# Patient Record
Sex: MALE | Race: WHITE | Employment: OTHER | URBAN - METROPOLITAN AREA
[De-identification: names, ages, dates, MRNs, and addresses within clinical notes are randomized per-mention and may not be internally consistent; named-entity substitution may affect disease eponyms.]

---

## 2017-01-04 ENCOUNTER — APPOINTMENT (OUTPATIENT)
Dept: LAB | Age: 62
End: 2017-01-04
Attending: INTERNAL MEDICINE
Payer: COMMERCIAL

## 2017-01-04 ENCOUNTER — TELEPHONE (OUTPATIENT)
Dept: INTERNAL MEDICINE CLINIC | Facility: CLINIC | Age: 62
End: 2017-01-04

## 2017-01-04 DIAGNOSIS — Z12.5 SPECIAL SCREENING FOR MALIGNANT NEOPLASM OF PROSTATE: ICD-10-CM

## 2017-01-04 DIAGNOSIS — Z00.00 ANNUAL PHYSICAL EXAM: Primary | ICD-10-CM

## 2017-01-04 DIAGNOSIS — R53.83 FATIGUE, UNSPECIFIED TYPE: ICD-10-CM

## 2017-01-04 DIAGNOSIS — I48.20 CHRONIC ATRIAL FIBRILLATION (HCC): ICD-10-CM

## 2017-01-04 DIAGNOSIS — E78.00 HYPERCHOLESTEROLEMIA: ICD-10-CM

## 2017-01-04 PROCEDURE — 85610 PROTHROMBIN TIME: CPT

## 2017-01-04 PROCEDURE — 36415 COLL VENOUS BLD VENIPUNCTURE: CPT

## 2017-02-01 RX ORDER — CELECOXIB 200 MG/1
CAPSULE ORAL
Qty: 90 CAPSULE | Refills: 0 | Status: SHIPPED | OUTPATIENT
Start: 2017-02-01 | End: 2017-04-30

## 2017-02-13 ENCOUNTER — PRIOR ORIGINAL RECORDS (OUTPATIENT)
Dept: OTHER | Age: 62
End: 2017-02-13

## 2017-02-13 ENCOUNTER — LAB ENCOUNTER (OUTPATIENT)
Dept: LAB | Age: 62
End: 2017-02-13
Attending: INTERNAL MEDICINE
Payer: COMMERCIAL

## 2017-02-13 DIAGNOSIS — I48.91 ATRIAL FIBRILLATION (HCC): ICD-10-CM

## 2017-02-13 DIAGNOSIS — Z12.5 SPECIAL SCREENING FOR MALIGNANT NEOPLASM OF PROSTATE: ICD-10-CM

## 2017-02-13 DIAGNOSIS — R53.83 FATIGUE, UNSPECIFIED TYPE: ICD-10-CM

## 2017-02-13 DIAGNOSIS — E78.00 HYPERCHOLESTEROLEMIA: ICD-10-CM

## 2017-02-13 LAB
ALT SERPL-CCNC: 25 U/L (ref 17–63)
ANION GAP SERPL CALC-SCNC: 11 MMOL/L (ref 0–18)
AST SERPL-CCNC: 29 U/L (ref 15–41)
BASOPHILS # BLD: 0 K/UL (ref 0–0.2)
BASOPHILS NFR BLD: 1 %
BUN SERPL-MCNC: 21 MG/DL (ref 8–20)
BUN/CREAT SERPL: 25.9 (ref 10–20)
CALCIUM SERPL-MCNC: 9 MG/DL (ref 8.5–10.5)
CHLORIDE SERPL-SCNC: 105 MMOL/L (ref 95–110)
CHOLEST SERPL-MCNC: 146 MG/DL (ref 110–200)
CO2 SERPL-SCNC: 25 MMOL/L (ref 22–32)
CREAT SERPL-MCNC: 0.81 MG/DL (ref 0.5–1.5)
EOSINOPHIL # BLD: 0.1 K/UL (ref 0–0.7)
EOSINOPHIL NFR BLD: 2 %
ERYTHROCYTE [DISTWIDTH] IN BLOOD BY AUTOMATED COUNT: 13.4 % (ref 11–15)
GLUCOSE SERPL-MCNC: 99 MG/DL (ref 70–99)
HCT VFR BLD AUTO: 44.2 % (ref 41–52)
HDLC SERPL-MCNC: 65 MG/DL
HGB BLD-MCNC: 14.7 G/DL (ref 13.5–17.5)
INR BLD: 3.4 (ref 0.9–1.2)
LDLC SERPL CALC-MCNC: 74 MG/DL (ref 0–99)
LYMPHOCYTES # BLD: 1.1 K/UL (ref 1–4)
LYMPHOCYTES NFR BLD: 21 %
MCH RBC QN AUTO: 30.8 PG (ref 27–32)
MCHC RBC AUTO-ENTMCNC: 33.2 G/DL (ref 32–37)
MCV RBC AUTO: 92.8 FL (ref 80–100)
MONOCYTES # BLD: 0.3 K/UL (ref 0–1)
MONOCYTES NFR BLD: 6 %
NEUTROPHILS # BLD AUTO: 3.8 K/UL (ref 1.8–7.7)
NEUTROPHILS NFR BLD: 71 %
NONHDLC SERPL-MCNC: 81 MG/DL
OSMOLALITY UR CALC.SUM OF ELEC: 295 MOSM/KG (ref 275–295)
PLATELET # BLD AUTO: 193 K/UL (ref 140–400)
PMV BLD AUTO: 8.1 FL (ref 7.4–10.3)
POTASSIUM SERPL-SCNC: 4.2 MMOL/L (ref 3.3–5.1)
PROTHROMBIN TIME: 34.1 SECONDS (ref 11.8–14.5)
PSA SERPL-MCNC: 1.5 NG/ML (ref 0–4)
RBC # BLD AUTO: 4.77 M/UL (ref 4.5–5.9)
SODIUM SERPL-SCNC: 141 MMOL/L (ref 136–144)
TRIGL SERPL-MCNC: 33 MG/DL (ref 1–149)
TSH SERPL-ACNC: 1.12 UIU/ML (ref 0.34–5.6)
WBC # BLD AUTO: 5.4 K/UL (ref 4–11)

## 2017-02-13 PROCEDURE — 80048 BASIC METABOLIC PNL TOTAL CA: CPT

## 2017-02-13 PROCEDURE — 84450 TRANSFERASE (AST) (SGOT): CPT

## 2017-02-13 PROCEDURE — 80061 LIPID PANEL: CPT

## 2017-02-13 PROCEDURE — 85025 COMPLETE CBC W/AUTO DIFF WBC: CPT

## 2017-02-13 PROCEDURE — 84443 ASSAY THYROID STIM HORMONE: CPT

## 2017-02-13 PROCEDURE — 84460 ALANINE AMINO (ALT) (SGPT): CPT

## 2017-02-13 PROCEDURE — 36415 COLL VENOUS BLD VENIPUNCTURE: CPT

## 2017-02-13 PROCEDURE — 85610 PROTHROMBIN TIME: CPT

## 2017-02-20 RX ORDER — SIMVASTATIN 20 MG
TABLET ORAL
Qty: 90 TABLET | Refills: 2 | Status: SHIPPED | OUTPATIENT
Start: 2017-02-20 | End: 2017-11-17

## 2017-02-28 ENCOUNTER — HOSPITAL ENCOUNTER (OUTPATIENT)
Dept: GENERAL RADIOLOGY | Age: 62
Discharge: HOME | End: 2017-02-28
Attending: INTERNAL MEDICINE
Payer: COMMERCIAL

## 2017-02-28 ENCOUNTER — OFFICE VISIT (OUTPATIENT)
Dept: INTERNAL MEDICINE CLINIC | Facility: CLINIC | Age: 62
End: 2017-02-28

## 2017-02-28 VITALS
WEIGHT: 242 LBS | DIASTOLIC BLOOD PRESSURE: 80 MMHG | SYSTOLIC BLOOD PRESSURE: 142 MMHG | BODY MASS INDEX: 31.06 KG/M2 | HEART RATE: 68 BPM | TEMPERATURE: 98 F | HEIGHT: 74 IN

## 2017-02-28 DIAGNOSIS — Z80.0 FAMILY HX OF COLON CANCER: ICD-10-CM

## 2017-02-28 DIAGNOSIS — E78.00 HYPERCHOLESTEROLEMIA: ICD-10-CM

## 2017-02-28 DIAGNOSIS — Z00.00 ROUTINE HEALTH MAINTENANCE: ICD-10-CM

## 2017-02-28 DIAGNOSIS — I48.20 CHRONIC ATRIAL FIBRILLATION (HCC): Primary | ICD-10-CM

## 2017-02-28 DIAGNOSIS — M16.11 PRIMARY OSTEOARTHRITIS OF RIGHT HIP: ICD-10-CM

## 2017-02-28 PROCEDURE — 73502 X-RAY EXAM HIP UNI 2-3 VIEWS: CPT

## 2017-02-28 PROCEDURE — 99396 PREV VISIT EST AGE 40-64: CPT | Performed by: INTERNAL MEDICINE

## 2017-02-28 PROCEDURE — 90715 TDAP VACCINE 7 YRS/> IM: CPT | Performed by: INTERNAL MEDICINE

## 2017-02-28 PROCEDURE — 90471 IMMUNIZATION ADMIN: CPT | Performed by: INTERNAL MEDICINE

## 2017-02-28 RX ORDER — TADALAFIL 10 MG/1
10 TABLET ORAL
Qty: 30 TABLET | Refills: 3 | Status: SHIPPED | OUTPATIENT
Start: 2017-02-28 | End: 2017-05-25

## 2017-02-28 NOTE — PROGRESS NOTES
Raymundo Krabbe is a 57year old malePatient presents with:  Physical    HPI:     Raymundo Krabbe is a 57year old male who presents for a complete physical exam.   R hip has been bothering him. Had L THR (Dr. Iveth Benavidez at [facility]) in 2008.   Asking for ortho blood pressure           Past Surgical History    CABG      HIP REPLACEMENT SURGERY Left     VALVE REPAIR  2002    Comment mitral valve repair    MOLES Left 2011    Comment removed from L upper quadrant. Biopsy (pre-cancerous?  -- Patient unsure if c vaccine. Tdap today 2/28/17. 4. Hypercholesterolemia  Lipids at goal.  Cont zocor and fish oil    5. Varicose veins, stasis ulcer  Has seen vascular surgeon, Dr. Mc Mondragon, at [facility] in the past.  Wears compression stocking daily.      6. OA R hip, rotator c

## 2017-03-01 ENCOUNTER — TELEPHONE (OUTPATIENT)
Dept: INTERNAL MEDICINE CLINIC | Facility: CLINIC | Age: 62
End: 2017-03-01

## 2017-03-02 NOTE — TELEPHONE ENCOUNTER
Pt called Dr Ku's office, cannot be seen for one month, pt would like to see someone sooner  Can Dr Laurance Goodpasture recommend another physician?   Pt can be reached at [phone]  Tasked to nursing

## 2017-03-02 NOTE — TELEPHONE ENCOUNTER
LMTCB  Please give DR. Junior Fernandez # [phone] [facility]

## 2017-03-07 ENCOUNTER — APPOINTMENT (OUTPATIENT)
Dept: LAB | Age: 62
End: 2017-03-07
Attending: INTERNAL MEDICINE
Payer: COMMERCIAL

## 2017-03-07 DIAGNOSIS — I48.91 ATRIAL FIBRILLATION (HCC): ICD-10-CM

## 2017-03-07 LAB
INR BLD: 2.8 (ref 0.9–1.2)
PROTHROMBIN TIME: 29.1 SECONDS (ref 11.8–14.5)

## 2017-03-07 PROCEDURE — 85610 PROTHROMBIN TIME: CPT

## 2017-03-07 PROCEDURE — 36415 COLL VENOUS BLD VENIPUNCTURE: CPT

## 2017-04-12 ENCOUNTER — APPOINTMENT (OUTPATIENT)
Dept: LAB | Age: 62
End: 2017-04-12
Attending: INTERNAL MEDICINE
Payer: COMMERCIAL

## 2017-04-12 DIAGNOSIS — I48.91 ATRIAL FIBRILLATION (HCC): ICD-10-CM

## 2017-04-12 PROCEDURE — 85610 PROTHROMBIN TIME: CPT

## 2017-04-12 PROCEDURE — 36415 COLL VENOUS BLD VENIPUNCTURE: CPT

## 2017-05-02 RX ORDER — CELECOXIB 200 MG/1
CAPSULE ORAL
Qty: 90 CAPSULE | Refills: 3 | Status: SHIPPED | OUTPATIENT
Start: 2017-05-02 | End: 2018-03-07

## 2017-05-16 ENCOUNTER — APPOINTMENT (OUTPATIENT)
Dept: LAB | Age: 62
End: 2017-05-16
Attending: INTERNAL MEDICINE
Payer: COMMERCIAL

## 2017-05-16 DIAGNOSIS — I48.91 ATRIAL FIBRILLATION (HCC): ICD-10-CM

## 2017-05-16 PROCEDURE — 36415 COLL VENOUS BLD VENIPUNCTURE: CPT

## 2017-05-16 PROCEDURE — 85610 PROTHROMBIN TIME: CPT

## 2017-05-18 ENCOUNTER — PRIOR ORIGINAL RECORDS (OUTPATIENT)
Dept: OTHER | Age: 62
End: 2017-05-18

## 2017-05-23 RX ORDER — TADALAFIL 10 MG/1
10 TABLET ORAL
Qty: 30 TABLET | Refills: 3 | Status: CANCELLED | OUTPATIENT
Start: 2017-05-23

## 2017-05-23 NOTE — TELEPHONE ENCOUNTER
To MD:  The above refill request is for a controlled substance. Please indicate yes or no to refill 30 days supply plus one refill. If more refills are appropriate, please indicate quantity  Please advise on pending refills - to DR. Rey Hendrix

## 2017-05-23 NOTE — TELEPHONE ENCOUNTER
Patient has no refills left on Tramadol - thought this was going to be renewed at last appt &  With Dr Ramon Bolanos pt has been taking 2- 10 mg tablets of Cialis - would like Rx for 20 mg of Cialis  Please send both to [facility]

## 2017-05-25 RX ORDER — TADALAFIL 20 MG/1
20 TABLET ORAL
Qty: 30 TABLET | Refills: 3 | Status: SHIPPED | OUTPATIENT
Start: 2017-05-25 | End: 2018-03-07

## 2017-06-16 ENCOUNTER — LAB ENCOUNTER (OUTPATIENT)
Dept: LAB | Age: 62
End: 2017-06-16
Payer: COMMERCIAL

## 2017-06-16 DIAGNOSIS — Z92.29 HISTORY OF COUMADIN THERAPY: Primary | ICD-10-CM

## 2017-06-16 PROCEDURE — 36415 COLL VENOUS BLD VENIPUNCTURE: CPT

## 2017-06-16 PROCEDURE — 85610 PROTHROMBIN TIME: CPT

## 2017-07-14 ENCOUNTER — APPOINTMENT (OUTPATIENT)
Dept: LAB | Age: 62
End: 2017-07-14
Attending: INTERNAL MEDICINE
Payer: COMMERCIAL

## 2017-07-14 DIAGNOSIS — I48.91 ATRIAL FIBRILLATION (HCC): ICD-10-CM

## 2017-07-14 LAB
INR BLD: 2.6 (ref 0.9–1.2)
PROTHROMBIN TIME: 27.1 SECONDS (ref 11.8–14.5)

## 2017-07-14 PROCEDURE — 85610 PROTHROMBIN TIME: CPT

## 2017-07-14 PROCEDURE — 36415 COLL VENOUS BLD VENIPUNCTURE: CPT

## 2017-07-25 ENCOUNTER — OFFICE VISIT (OUTPATIENT)
Dept: INTERNAL MEDICINE CLINIC | Facility: CLINIC | Age: 62
End: 2017-07-25

## 2017-07-25 VITALS
RESPIRATION RATE: 16 BRPM | TEMPERATURE: 98 F | OXYGEN SATURATION: 97 % | HEART RATE: 86 BPM | SYSTOLIC BLOOD PRESSURE: 128 MMHG | BODY MASS INDEX: 31.28 KG/M2 | DIASTOLIC BLOOD PRESSURE: 72 MMHG | WEIGHT: 236 LBS | HEIGHT: 73 IN

## 2017-07-25 DIAGNOSIS — R21 RASH: Primary | ICD-10-CM

## 2017-07-25 PROCEDURE — 99213 OFFICE O/P EST LOW 20 MIN: CPT | Performed by: INTERNAL MEDICINE

## 2017-07-25 PROCEDURE — 99212 OFFICE O/P EST SF 10 MIN: CPT | Performed by: INTERNAL MEDICINE

## 2017-07-25 RX ORDER — AMOXICILLIN 500 MG/1
2000 CAPSULE ORAL AS NEEDED
COMMUNITY
Start: 2017-06-28 | End: 2020-11-12 | Stop reason: ALTCHOICE

## 2017-07-25 RX ORDER — SCOLOPAMINE TRANSDERMAL SYSTEM 1 MG/1
1 PATCH, EXTENDED RELEASE TRANSDERMAL
Qty: 2 PATCH | Refills: 3 | Status: SHIPPED | OUTPATIENT
Start: 2017-07-25 | End: 2019-03-08

## 2017-07-25 RX ORDER — TADALAFIL 10 MG
10 TABLET ORAL AS NEEDED
COMMUNITY
Start: 2017-05-11 | End: 2017-07-25

## 2017-07-25 RX ORDER — METHYLPREDNISOLONE 4 MG/1
TABLET ORAL
Qty: 1 KIT | Refills: 0 | Status: SHIPPED | OUTPATIENT
Start: 2017-07-25 | End: 2017-08-09

## 2017-07-25 RX ORDER — SIMVASTATIN 20 MG
20 TABLET ORAL DAILY
COMMUNITY
Start: 2017-02-20 | End: 2017-07-25

## 2017-07-25 NOTE — PROGRESS NOTES
Axel Marquez is a 57year old male. Patient presents with:  Rash: Red, \"a little itchy\" rash on right LE. First noticed on Friday, progressively spreading on the right leg and has started spreading to the left leg.      HPI:     One week of slightly itch of wine: 15 per week       REVIEW OF SYSTEMS:   GENERAL HEALTH: feels well otherwise  RESPIRATORY: no SOB  CARDIOVASCULAR: no chest pain/pressure  GI: no nausea, vomiting, diarrhea    Wt Readings from Last 5 Encounters:  07/25/17 : 236 lb (107 kg)  02/28/1

## 2017-08-09 ENCOUNTER — OFFICE VISIT (OUTPATIENT)
Dept: DERMATOLOGY CLINIC | Facility: CLINIC | Age: 62
End: 2017-08-09

## 2017-08-09 DIAGNOSIS — L30.9 DERMATITIS: Primary | ICD-10-CM

## 2017-08-09 PROCEDURE — 99202 OFFICE O/P NEW SF 15 MIN: CPT | Performed by: DERMATOLOGY

## 2017-08-09 PROCEDURE — 99212 OFFICE O/P EST SF 10 MIN: CPT | Performed by: DERMATOLOGY

## 2017-08-09 RX ORDER — METHYLPREDNISOLONE 4 MG/1
TABLET ORAL
Qty: 1 KIT | Refills: 0 | Status: SHIPPED | OUTPATIENT
Start: 2017-08-09 | End: 2018-03-07 | Stop reason: ALTCHOICE

## 2017-08-09 RX ORDER — CLOBETASOL PROPIONATE 0.5 MG/G
1 CREAM TOPICAL 2 TIMES DAILY
Qty: 60 G | Refills: 1 | Status: SHIPPED | OUTPATIENT
Start: 2017-08-09 | End: 2018-08-09

## 2017-08-20 NOTE — PROGRESS NOTES
Reji Gupta is a 57year old male. Patient presents with:  Rash: established pt. presents with a rash to arms and legs that started 3-4 weeks. red, bumpy and itchy. pt was RXed medrol dosepak with great results but now it's back.              Emili Encinas History:  Smoking status: Never Smoker                                                              Alcohol use: Yes           9.0 oz/week     Glasses of wine: 15 per week                  Current Outpatient Prescriptions:  methylPREDNISolone (MEDROL) 4 MG Surgical History:  No date: CABG  10/11: COLONOSCOPY  11/16/2016: COLONOSCOPY N/A      Comment: Procedure: COLONOSCOPY;  Surgeon: Good Branch MD;  Location: [facility] ENDOSCOPY  No date: HERNIA SURGERY  : HIP REPLACEMENT SURGERY Left  0 complaints. Physical examination:  Well-developed well-nourished patient alert oriented in no acute distress.       Exam performed, including scalp, head, neck, face,nails, hair, external eyes, including conjunctival mucosa, eyelids, oral mucosa, exter Referral Orders:  No orders of the defined types were placed in this encounter. 8/20/2017  Juan Rizo      The patient indicates understanding of these issues and agrees to the plan.   The patient is asked to return as noted in follow-up/ abo

## 2017-08-24 ENCOUNTER — APPOINTMENT (OUTPATIENT)
Dept: LAB | Age: 62
End: 2017-08-24
Attending: INTERNAL MEDICINE
Payer: COMMERCIAL

## 2017-08-24 DIAGNOSIS — I48.91 ATRIAL FIBRILLATION (HCC): ICD-10-CM

## 2017-08-24 LAB
INR BLD: 2.8 (ref 0.9–1.2)
PROTHROMBIN TIME: 29.2 SECONDS (ref 11.8–14.5)

## 2017-08-24 PROCEDURE — 36415 COLL VENOUS BLD VENIPUNCTURE: CPT

## 2017-08-24 PROCEDURE — 85610 PROTHROMBIN TIME: CPT

## 2017-09-11 LAB
ALT (SGPT): 25 U/L
AST (SGOT): 29 U/L
BUN: 21 MG/DL
CALCIUM: 9 MG/DL
CHLORIDE: 105 MEQ/L
CHOLESTEROL, TOTAL: 146 MG/DL
CREATININE, SERUM: 0.81 MG/DL
GLUCOSE: 99 MG/DL
GLUCOSE: 99 MG/DL
HDL CHOLESTEROL: 65 MG/DL
HEMATOCRIT: 44.2 %
HEMOGLOBIN: 14.7 G/DL
LDL CHOLESTEROL: 74 MG/DL
MCH: 30.8 PG
MCHC: 33.2 G/DL
MCV: 92.8 FL
NON-HDL CHOLESTEROL: 81 MG/DL
PLATELETS: 193 K/UL
POTASSIUM, SERUM: 4.2 MEQ/L
RED BLOOD COUNT: 4.77 X 10-6/U
SGOT (AST): 29 IU/L
SGPT (ALT): 25 IU/L
SODIUM: 141 MEQ/L
TRIGLYCERIDES: 33 MG/DL
WHITE BLOOD COUNT: 5.4 X 10-3/U

## 2017-09-12 ENCOUNTER — MYAURORA ACCOUNT LINK (OUTPATIENT)
Dept: OTHER | Age: 62
End: 2017-09-12

## 2017-09-12 ENCOUNTER — PRIOR ORIGINAL RECORDS (OUTPATIENT)
Dept: OTHER | Age: 62
End: 2017-09-12

## 2017-10-14 ENCOUNTER — APPOINTMENT (OUTPATIENT)
Dept: LAB | Age: 62
End: 2017-10-14
Attending: INTERNAL MEDICINE
Payer: COMMERCIAL

## 2017-10-14 DIAGNOSIS — I48.91 ATRIAL FIBRILLATION (HCC): ICD-10-CM

## 2017-10-14 PROCEDURE — 36415 COLL VENOUS BLD VENIPUNCTURE: CPT

## 2017-10-14 PROCEDURE — 85610 PROTHROMBIN TIME: CPT

## 2017-11-16 ENCOUNTER — APPOINTMENT (OUTPATIENT)
Dept: LAB | Age: 62
End: 2017-11-16
Attending: INTERNAL MEDICINE
Payer: COMMERCIAL

## 2017-11-16 DIAGNOSIS — I48.91 ATRIAL FIBRILLATION (HCC): ICD-10-CM

## 2017-11-16 PROCEDURE — 36415 COLL VENOUS BLD VENIPUNCTURE: CPT

## 2017-11-16 PROCEDURE — 85610 PROTHROMBIN TIME: CPT

## 2017-11-17 RX ORDER — SIMVASTATIN 20 MG
TABLET ORAL
Qty: 90 TABLET | Refills: 1 | Status: SHIPPED | OUTPATIENT
Start: 2017-11-17 | End: 2018-03-07

## 2017-12-20 ENCOUNTER — APPOINTMENT (OUTPATIENT)
Dept: LAB | Age: 62
End: 2017-12-20
Attending: INTERNAL MEDICINE
Payer: COMMERCIAL

## 2017-12-20 DIAGNOSIS — Z92.29 HISTORY OF COUMADIN THERAPY: ICD-10-CM

## 2017-12-20 PROCEDURE — 85610 PROTHROMBIN TIME: CPT

## 2017-12-20 PROCEDURE — 36415 COLL VENOUS BLD VENIPUNCTURE: CPT

## 2018-01-23 ENCOUNTER — APPOINTMENT (OUTPATIENT)
Dept: LAB | Age: 63
End: 2018-01-23
Attending: ORTHOPAEDIC SURGERY
Payer: COMMERCIAL

## 2018-01-23 DIAGNOSIS — Z92.29 HISTORY OF COUMADIN THERAPY: ICD-10-CM

## 2018-01-23 LAB
INR BLD: 2.1 (ref 0.9–1.2)
PROTHROMBIN TIME: 23.1 SECONDS (ref 11.8–14.5)

## 2018-01-23 PROCEDURE — 85610 PROTHROMBIN TIME: CPT

## 2018-01-23 PROCEDURE — 36415 COLL VENOUS BLD VENIPUNCTURE: CPT

## 2018-02-06 ENCOUNTER — OFFICE VISIT (OUTPATIENT)
Dept: WOUND CARE | Facility: HOSPITAL | Age: 63
End: 2018-02-06
Attending: NURSE PRACTITIONER
Payer: COMMERCIAL

## 2018-02-06 DIAGNOSIS — L97.329 NON-PRESSURE CHRONIC ULCER OF LEFT ANKLE (HCC): ICD-10-CM

## 2018-02-06 DIAGNOSIS — L97.329 VARICOSE VEINS OF LEFT LOWER EXTREMITY WITH ULCER OF ANKLE (HCC): Primary | ICD-10-CM

## 2018-02-06 DIAGNOSIS — I83.023 VARICOSE VEINS OF LEFT LOWER EXTREMITY WITH ULCER OF ANKLE (HCC): Primary | ICD-10-CM

## 2018-02-06 PROCEDURE — 97162 PT EVAL MOD COMPLEX 30 MIN: CPT

## 2018-02-06 PROCEDURE — 29581 APPL MULTLAYER CMPRN SYS LEG: CPT

## 2018-02-06 NOTE — PROGRESS NOTES
Subjective    Chief Complaint  This information was obtained from the patient  The patient is new to the [address] [facility],[address] here for an initial visit for the evaluation and management of non-healing wound(s).     Allergies  adhesive tape (Reaction: rash)    HPI Osteoarthrosis, unspecified whether generalized or localized, unspecified site  History of vein stripping  UGI bleed  Unspecified essential hypertension  Atrial fibrillation  High cholesterol  Venous insufficiency    Surgical History  This information was Wound #3 Left, Medial Lower Leg is a Partial Thickness Venous Ulcer and has received a status of Not Healed. Initial wound encounter measurements are 0.7cm length x 0.6cm width x 0.1cm depth, with an area of 0.42 sq cm and a volume of 0.042 cubic cm.  No tu Pt presents with a history of venous insufficiency with chronic LE edema. Pt has a L medial ankle ulcer above the ankle region partial thickness with minimal drainage.  Pt has been wearing 30-40 mmHg compression stocking for work but not all the time and he Cleansed wound and periwound with non-cytotoxic agent. using Wound Cleanser Spray (1)  Applied Primary Wound Dressing.  using Hydrofera ready 2x2 (1), Xeroform 2x2 (1)  Compression wrapping  Limb cleansed using Soap and water (1)  Moisturizing lotion applie

## 2018-02-13 ENCOUNTER — OFFICE VISIT (OUTPATIENT)
Dept: WOUND CARE | Facility: HOSPITAL | Age: 63
End: 2018-02-13
Attending: NURSE PRACTITIONER
Payer: COMMERCIAL

## 2018-02-13 DIAGNOSIS — L97.329 VARICOSE VEINS OF LEFT LOWER EXTREMITY WITH ULCER OF ANKLE (HCC): Primary | ICD-10-CM

## 2018-02-13 DIAGNOSIS — L97.329 NON-PRESSURE CHRONIC ULCER OF LEFT ANKLE (HCC): ICD-10-CM

## 2018-02-13 DIAGNOSIS — I83.023 VARICOSE VEINS OF LEFT LOWER EXTREMITY WITH ULCER OF ANKLE (HCC): Primary | ICD-10-CM

## 2018-02-13 PROCEDURE — 29581 APPL MULTLAYER CMPRN SYS LEG: CPT

## 2018-02-13 NOTE — PROGRESS NOTES
Subjective    Chief Complaint  This information was obtained from the patient  The patient is new to the [address] [facility],[address] here for an initial visit for the evaluation and management of non-healing wound(s).   2/13/18 no complaints for today    Allergies  Arnel Torrey Wound #3 Left, Medial Lower Leg is a Partial Thickness Venous Ulcer and has received a status of Not Healed. There is a small amount of serous drainage noted which has no odor. The patient reports a wound pain of level 1/10.  The wound margin is flat and in Compression used: using Artiflex 10 cm (1), Artiflex 15 cm (1), Comprilan 08 cm (1), Comprilan 10 cm (2), Comprilan 12 cm (1), Komprex kidney - small (curved side away from skin) (1)  Time spent (units are in minutes) using Time (30)  Written PT Goals - . Hu Clemensless

## 2018-02-19 ENCOUNTER — APPOINTMENT (OUTPATIENT)
Dept: WOUND CARE | Facility: HOSPITAL | Age: 63
End: 2018-02-19
Attending: NURSE PRACTITIONER
Payer: COMMERCIAL

## 2018-02-19 DIAGNOSIS — L97.329 VARICOSE VEINS OF LEFT LOWER EXTREMITY WITH ULCER OF ANKLE (HCC): Primary | ICD-10-CM

## 2018-02-19 DIAGNOSIS — I83.023 VARICOSE VEINS OF LEFT LOWER EXTREMITY WITH ULCER OF ANKLE (HCC): Primary | ICD-10-CM

## 2018-02-19 PROCEDURE — 29581 APPL MULTLAYER CMPRN SYS LEG: CPT

## 2018-02-19 NOTE — PROGRESS NOTES
Subjective    Chief Complaint  This information was obtained from the patient  The patient is new to the [address] [facility],[address] here for an initial visit for the evaluation and management of non-healing wound(s).  02/19/2018:  No concerns for today    Allergies  adhe Wound #3 Left, Medial Lower Leg is a Partial Thickness Venous Ulcer and has received an outcome of Resolved. Initial wound encounter measurements are 0cm length x 0cm width x 0cm depth, with an area of 0 sq cm and a volume of 0 cubic cm.  There is a scant a Limb cleansed using Soap and water (1)  Cleansed wound and periwound with non-cytotoxic agent. using Wound Cleanser Spray (1)  Applied Primary Wound Dressing. using Vaseline Gauze (1)  Applied Secondary Wound Dressing.  using Gauze (sterile) 4x4 (3)  Edwina Mckinnon

## 2018-02-26 ENCOUNTER — LAB ENCOUNTER (OUTPATIENT)
Dept: LAB | Age: 63
End: 2018-02-26
Attending: INTERNAL MEDICINE
Payer: COMMERCIAL

## 2018-02-26 DIAGNOSIS — Z79.01 LONG TERM (CURRENT) USE OF ANTICOAGULANTS: ICD-10-CM

## 2018-02-26 DIAGNOSIS — I48.91 A-FIB (HCC): Primary | ICD-10-CM

## 2018-02-26 LAB
INR BLD: 4.5 (ref 0.9–1.2)
PROTHROMBIN TIME: 41.9 SECONDS (ref 11.8–14.5)

## 2018-02-26 PROCEDURE — 36415 COLL VENOUS BLD VENIPUNCTURE: CPT

## 2018-02-26 PROCEDURE — 85610 PROTHROMBIN TIME: CPT

## 2018-02-27 ENCOUNTER — OFFICE VISIT (OUTPATIENT)
Dept: WOUND CARE | Facility: HOSPITAL | Age: 63
End: 2018-02-27
Attending: NURSE PRACTITIONER
Payer: COMMERCIAL

## 2018-02-27 DIAGNOSIS — L97.329 NON-PRESSURE CHRONIC ULCER OF LEFT ANKLE (HCC): ICD-10-CM

## 2018-02-27 DIAGNOSIS — L97.329 VARICOSE VEINS OF LEFT LOWER EXTREMITY WITH ULCER OF ANKLE (HCC): Primary | ICD-10-CM

## 2018-02-27 DIAGNOSIS — I83.023 VARICOSE VEINS OF LEFT LOWER EXTREMITY WITH ULCER OF ANKLE (HCC): Primary | ICD-10-CM

## 2018-02-27 PROCEDURE — 99211 OFF/OP EST MAY X REQ PHY/QHP: CPT

## 2018-02-27 NOTE — PROGRESS NOTES
Subjective    Chief Complaint  This information was obtained from the patient  The patient is new to the [address] [facility],[address] here for an initial visit for the evaluation and management of non-healing wound(s). 02/27/2018:  No concerns for today    Allergies  adhe resolved and pt education was performed to ensure optimal skin care with daily moisturizer prior to bed time. Pt recommended to wear 20-30 mmHg compression stockings from toes to below knee to manage edema and prevent wounds.  Pt has been wearing his compre

## 2018-03-06 ENCOUNTER — APPOINTMENT (OUTPATIENT)
Dept: WOUND CARE | Facility: HOSPITAL | Age: 63
End: 2018-03-06
Attending: NURSE PRACTITIONER
Payer: COMMERCIAL

## 2018-03-07 ENCOUNTER — OFFICE VISIT (OUTPATIENT)
Dept: INTERNAL MEDICINE CLINIC | Facility: CLINIC | Age: 63
End: 2018-03-07

## 2018-03-07 VITALS
HEART RATE: 66 BPM | RESPIRATION RATE: 18 BRPM | TEMPERATURE: 98 F | HEIGHT: 74 IN | DIASTOLIC BLOOD PRESSURE: 84 MMHG | SYSTOLIC BLOOD PRESSURE: 132 MMHG | WEIGHT: 238 LBS | OXYGEN SATURATION: 98 % | BODY MASS INDEX: 30.54 KG/M2

## 2018-03-07 DIAGNOSIS — R53.83 OTHER FATIGUE: ICD-10-CM

## 2018-03-07 DIAGNOSIS — Z11.59 NEED FOR HEPATITIS C SCREENING TEST: ICD-10-CM

## 2018-03-07 DIAGNOSIS — Z00.00 ROUTINE HEALTH MAINTENANCE: ICD-10-CM

## 2018-03-07 DIAGNOSIS — I48.20 CHRONIC ATRIAL FIBRILLATION (HCC): Primary | ICD-10-CM

## 2018-03-07 DIAGNOSIS — Z80.0 FAMILY HX OF COLON CANCER: ICD-10-CM

## 2018-03-07 DIAGNOSIS — M16.11 PRIMARY OSTEOARTHRITIS OF RIGHT HIP: ICD-10-CM

## 2018-03-07 DIAGNOSIS — Z12.5 SPECIAL SCREENING FOR MALIGNANT NEOPLASM OF PROSTATE: ICD-10-CM

## 2018-03-07 DIAGNOSIS — E78.00 HYPERCHOLESTEROLEMIA: ICD-10-CM

## 2018-03-07 DIAGNOSIS — R29.898 WEAKNESS OF BOTH LOWER EXTREMITIES: ICD-10-CM

## 2018-03-07 PROCEDURE — 99396 PREV VISIT EST AGE 40-64: CPT | Performed by: INTERNAL MEDICINE

## 2018-03-07 RX ORDER — SIMVASTATIN 20 MG
TABLET ORAL
Qty: 90 TABLET | Refills: 3 | Status: ON HOLD | OUTPATIENT
Start: 2018-03-07 | End: 2019-05-14

## 2018-03-07 RX ORDER — CELECOXIB 200 MG/1
CAPSULE ORAL
Qty: 90 CAPSULE | Refills: 3 | Status: SHIPPED | OUTPATIENT
Start: 2018-03-07 | End: 2019-03-17

## 2018-03-07 RX ORDER — WARFARIN SODIUM 2.5 MG/1
TABLET ORAL
Qty: 90 TABLET | Refills: 3 | Status: SHIPPED | OUTPATIENT
Start: 2018-03-07 | End: 2019-03-17

## 2018-03-07 RX ORDER — TADALAFIL 20 MG/1
20 TABLET ORAL
Qty: 30 TABLET | Refills: 3 | Status: SHIPPED | OUTPATIENT
Start: 2018-03-07 | End: 2019-03-26

## 2018-03-07 NOTE — PROGRESS NOTES
Pilar York is a 60year old malePatient presents with:  Physical: Annual Px    HPI:     Pilar York is a 60year old male who presents for a complete physical exam.   Notes bilateral distal lower leg stiffness and weakness, don when he first gets up • Atrial fibrillation [facility])     ablation (2004); CV (May 2009)   • High blood pressure    • High cholesterol    • History of vein stripping    • Osteoarthrosis, unspecified whether generalized or localized, unspecified site    • Sleep apnea    • UGI blee distress  HEENT: normal oropharynx, normal TM's  EYES: PERRLA, EOMI, conjunctivae pink  NECK: supple, no cervical or supraclavicular lymphadenopathy, no carotid bruits  LUNGS: clear to auscultation  CARDIO: RRR, normal S1S2, no gallops or murmurs  GI: soft

## 2018-03-12 ENCOUNTER — APPOINTMENT (OUTPATIENT)
Dept: LAB | Age: 63
End: 2018-03-12
Attending: INTERNAL MEDICINE
Payer: COMMERCIAL

## 2018-03-12 DIAGNOSIS — I48.91 A-FIB (HCC): ICD-10-CM

## 2018-03-12 DIAGNOSIS — Z79.01 LONG TERM (CURRENT) USE OF ANTICOAGULANTS: ICD-10-CM

## 2018-03-12 LAB
INR BLD: 2.5 (ref 0.9–1.2)
PROTHROMBIN TIME: 26.3 SECONDS (ref 11.8–14.5)

## 2018-03-12 PROCEDURE — 85610 PROTHROMBIN TIME: CPT

## 2018-03-12 PROCEDURE — 36415 COLL VENOUS BLD VENIPUNCTURE: CPT

## 2018-03-20 ENCOUNTER — APPOINTMENT (OUTPATIENT)
Dept: WOUND CARE | Facility: HOSPITAL | Age: 63
End: 2018-03-20
Attending: NURSE PRACTITIONER
Payer: COMMERCIAL

## 2018-03-24 ENCOUNTER — APPOINTMENT (OUTPATIENT)
Dept: LAB | Age: 63
End: 2018-03-24
Attending: INTERNAL MEDICINE
Payer: COMMERCIAL

## 2018-03-24 ENCOUNTER — PRIOR ORIGINAL RECORDS (OUTPATIENT)
Dept: OTHER | Age: 63
End: 2018-03-24

## 2018-03-24 DIAGNOSIS — Z12.5 SPECIAL SCREENING FOR MALIGNANT NEOPLASM OF PROSTATE: ICD-10-CM

## 2018-03-24 DIAGNOSIS — R29.898 WEAKNESS OF BOTH LOWER EXTREMITIES: ICD-10-CM

## 2018-03-24 DIAGNOSIS — Z00.00 ROUTINE HEALTH MAINTENANCE: ICD-10-CM

## 2018-03-24 DIAGNOSIS — E78.00 HYPERCHOLESTEROLEMIA: ICD-10-CM

## 2018-03-24 DIAGNOSIS — Z11.59 NEED FOR HEPATITIS C SCREENING TEST: ICD-10-CM

## 2018-03-24 LAB
ALBUMIN SERPL BCP-MCNC: 4.2 G/DL (ref 3.5–4.8)
ALBUMIN/GLOB SERPL: 1.5 {RATIO} (ref 1–2)
ALP SERPL-CCNC: 55 U/L (ref 32–100)
ALT SERPL-CCNC: 24 U/L (ref 17–63)
ANION GAP SERPL CALC-SCNC: 9 MMOL/L (ref 0–18)
AST SERPL-CCNC: 25 U/L (ref 15–41)
BILIRUB SERPL-MCNC: 0.8 MG/DL (ref 0.3–1.2)
BUN SERPL-MCNC: 17 MG/DL (ref 8–20)
BUN/CREAT SERPL: 23 (ref 10–20)
CALCIUM SERPL-MCNC: 8.8 MG/DL (ref 8.5–10.5)
CHLORIDE SERPL-SCNC: 103 MMOL/L (ref 95–110)
CHOLEST SERPL-MCNC: 144 MG/DL (ref 110–200)
CO2 SERPL-SCNC: 25 MMOL/L (ref 22–32)
CREAT SERPL-MCNC: 0.74 MG/DL (ref 0.5–1.5)
GLOBULIN PLAS-MCNC: 2.8 G/DL (ref 2.5–3.7)
GLUCOSE SERPL-MCNC: 108 MG/DL (ref 70–99)
HDLC SERPL-MCNC: 59 MG/DL
LDLC SERPL CALC-MCNC: 78 MG/DL (ref 0–99)
NONHDLC SERPL-MCNC: 85 MG/DL
OSMOLALITY UR CALC.SUM OF ELEC: 286 MOSM/KG (ref 275–295)
PATIENT FASTING: YES
POTASSIUM SERPL-SCNC: 3.9 MMOL/L (ref 3.3–5.1)
PROT SERPL-MCNC: 7 G/DL (ref 5.9–8.4)
PSA SERPL-MCNC: 1.3 NG/ML (ref 0–4)
SODIUM SERPL-SCNC: 137 MMOL/L (ref 136–144)
TRIGL SERPL-MCNC: 33 MG/DL (ref 1–149)
TSH SERPL-ACNC: 0.54 UIU/ML (ref 0.45–5.33)
VIT B12 SERPL-MCNC: 1245 PG/ML (ref 181–914)

## 2018-03-24 PROCEDURE — 82607 VITAMIN B-12: CPT

## 2018-03-24 PROCEDURE — 36415 COLL VENOUS BLD VENIPUNCTURE: CPT

## 2018-03-24 PROCEDURE — 82306 VITAMIN D 25 HYDROXY: CPT

## 2018-03-24 PROCEDURE — 80053 COMPREHEN METABOLIC PANEL: CPT

## 2018-03-24 PROCEDURE — 84443 ASSAY THYROID STIM HORMONE: CPT | Performed by: INTERNAL MEDICINE

## 2018-03-24 PROCEDURE — 86803 HEPATITIS C AB TEST: CPT

## 2018-03-24 PROCEDURE — 80061 LIPID PANEL: CPT

## 2018-03-26 LAB
25(OH)D3 SERPL-MCNC: 32.6 NG/ML
HCV AB SERPL QL IA: NONREACTIVE

## 2018-04-09 ENCOUNTER — TELEPHONE (OUTPATIENT)
Dept: INTERNAL MEDICINE CLINIC | Facility: CLINIC | Age: 63
End: 2018-04-09

## 2018-04-09 DIAGNOSIS — R73.9 HYPERGLYCEMIA: Primary | ICD-10-CM

## 2018-04-11 NOTE — TELEPHONE ENCOUNTER
Called patient and relayed Dr Dutch Medrano message on vm per hipaa. Instructed to call back with any further questions.

## 2018-04-16 ENCOUNTER — APPOINTMENT (OUTPATIENT)
Dept: LAB | Facility: HOSPITAL | Age: 63
End: 2018-04-16
Attending: INTERNAL MEDICINE
Payer: COMMERCIAL

## 2018-04-16 DIAGNOSIS — Z79.01 LONG TERM (CURRENT) USE OF ANTICOAGULANTS: ICD-10-CM

## 2018-04-16 DIAGNOSIS — I48.91 A-FIB (HCC): ICD-10-CM

## 2018-04-16 PROCEDURE — 85610 PROTHROMBIN TIME: CPT

## 2018-04-16 PROCEDURE — 36415 COLL VENOUS BLD VENIPUNCTURE: CPT

## 2018-04-16 PROCEDURE — 83036 HEMOGLOBIN GLYCOSYLATED A1C: CPT | Performed by: INTERNAL MEDICINE

## 2018-05-23 ENCOUNTER — APPOINTMENT (OUTPATIENT)
Dept: LAB | Age: 63
End: 2018-05-23
Attending: INTERNAL MEDICINE
Payer: COMMERCIAL

## 2018-05-23 DIAGNOSIS — Z79.01 LONG TERM (CURRENT) USE OF ANTICOAGULANTS: ICD-10-CM

## 2018-05-23 DIAGNOSIS — I48.91 A-FIB (HCC): ICD-10-CM

## 2018-05-23 PROCEDURE — 36415 COLL VENOUS BLD VENIPUNCTURE: CPT

## 2018-05-23 PROCEDURE — 85610 PROTHROMBIN TIME: CPT

## 2018-06-29 ENCOUNTER — LAB ENCOUNTER (OUTPATIENT)
Dept: LAB | Age: 63
End: 2018-06-29
Attending: INTERNAL MEDICINE
Payer: COMMERCIAL

## 2018-06-29 DIAGNOSIS — Z79.01 LONG TERM (CURRENT) USE OF ANTICOAGULANTS: ICD-10-CM

## 2018-06-29 DIAGNOSIS — I48.91 A-FIB (HCC): ICD-10-CM

## 2018-06-29 DIAGNOSIS — R53.83 OTHER FATIGUE: ICD-10-CM

## 2018-06-29 LAB
BASOPHILS # BLD: 0.1 K/UL (ref 0–0.2)
BASOPHILS NFR BLD: 1 %
EOSINOPHIL # BLD: 0.1 K/UL (ref 0–0.7)
EOSINOPHIL NFR BLD: 2 %
ERYTHROCYTE [DISTWIDTH] IN BLOOD BY AUTOMATED COUNT: 14 % (ref 11–15)
HCT VFR BLD AUTO: 40.8 % (ref 41–52)
HGB BLD-MCNC: 13.8 G/DL (ref 13.5–17.5)
INR BLD: 2.4 (ref 0.9–1.2)
LYMPHOCYTES # BLD: 1.8 K/UL (ref 1–4)
LYMPHOCYTES NFR BLD: 32 %
MCH RBC QN AUTO: 30.7 PG (ref 27–32)
MCHC RBC AUTO-ENTMCNC: 33.7 G/DL (ref 32–37)
MCV RBC AUTO: 91.1 FL (ref 80–100)
MONOCYTES # BLD: 0.4 K/UL (ref 0–1)
MONOCYTES NFR BLD: 8 %
NEUTROPHILS # BLD AUTO: 3.1 K/UL (ref 1.8–7.7)
NEUTROPHILS NFR BLD: 57 %
PLATELET # BLD AUTO: 163 K/UL (ref 140–400)
PMV BLD AUTO: 8.1 FL (ref 7.4–10.3)
PROTHROMBIN TIME: 25.6 SECONDS (ref 11.8–14.5)
RBC # BLD AUTO: 4.49 M/UL (ref 4.5–5.9)
WBC # BLD AUTO: 5.5 K/UL (ref 4–11)

## 2018-06-29 PROCEDURE — 85610 PROTHROMBIN TIME: CPT

## 2018-06-29 PROCEDURE — 85025 COMPLETE CBC W/AUTO DIFF WBC: CPT

## 2018-06-29 PROCEDURE — 36415 COLL VENOUS BLD VENIPUNCTURE: CPT

## 2018-08-03 ENCOUNTER — APPOINTMENT (OUTPATIENT)
Dept: LAB | Age: 63
End: 2018-08-03
Attending: INTERNAL MEDICINE
Payer: COMMERCIAL

## 2018-08-03 DIAGNOSIS — Z79.01 LONG TERM (CURRENT) USE OF ANTICOAGULANTS: ICD-10-CM

## 2018-08-03 DIAGNOSIS — I48.91 A-FIB (HCC): ICD-10-CM

## 2018-08-03 LAB
INR BLD: 2.1 (ref 0.9–1.2)
PROTHROMBIN TIME: 23.4 SECONDS (ref 11.8–14.5)

## 2018-08-03 PROCEDURE — 85610 PROTHROMBIN TIME: CPT

## 2018-08-03 PROCEDURE — 36415 COLL VENOUS BLD VENIPUNCTURE: CPT

## 2018-09-10 LAB
CHOLESTEROL, TOTAL: 144 MG/DL
HDL CHOLESTEROL: 59 MG/DL
LDL CHOLESTEROL: 78 MG/DL
NON-HDL CHOLESTEROL: 85 MG/DL
TRIGLYCERIDES: 33 MG/DL

## 2018-09-11 ENCOUNTER — PRIOR ORIGINAL RECORDS (OUTPATIENT)
Dept: OTHER | Age: 63
End: 2018-09-11

## 2018-09-14 ENCOUNTER — PRIOR ORIGINAL RECORDS (OUTPATIENT)
Dept: OTHER | Age: 63
End: 2018-09-14

## 2018-09-14 LAB — INR: 0

## 2018-09-15 ENCOUNTER — MYAURORA ACCOUNT LINK (OUTPATIENT)
Dept: OTHER | Age: 63
End: 2018-09-15

## 2018-09-21 ENCOUNTER — PRIOR ORIGINAL RECORDS (OUTPATIENT)
Dept: OTHER | Age: 63
End: 2018-09-21

## 2018-09-21 ENCOUNTER — APPOINTMENT (OUTPATIENT)
Dept: LAB | Age: 63
End: 2018-09-21
Attending: INTERNAL MEDICINE
Payer: COMMERCIAL

## 2018-09-21 DIAGNOSIS — Z79.01 LONG TERM (CURRENT) USE OF ANTICOAGULANTS: ICD-10-CM

## 2018-09-21 DIAGNOSIS — I48.91 A-FIB (HCC): ICD-10-CM

## 2018-09-21 LAB
INR BLD: 2.3 (ref 0.9–1.2)
INR: 2.3
PROTHROMBIN TIME: 25.1 SECONDS (ref 11.8–14.5)

## 2018-09-21 PROCEDURE — 85610 PROTHROMBIN TIME: CPT

## 2018-09-21 PROCEDURE — 36415 COLL VENOUS BLD VENIPUNCTURE: CPT

## 2018-09-24 ENCOUNTER — PRIOR ORIGINAL RECORDS (OUTPATIENT)
Dept: OTHER | Age: 63
End: 2018-09-24

## 2018-09-27 NOTE — TELEPHONE ENCOUNTER
To Dr. Bob Cool - Benjamin MADRID:  The above refill request is for a controlled substance. Please indicate yes or no to refill 30 days supply plus one refill.   If more refills are appropriate, please indicate quantity

## 2018-10-26 ENCOUNTER — APPOINTMENT (OUTPATIENT)
Dept: LAB | Age: 63
End: 2018-10-26
Attending: INTERNAL MEDICINE
Payer: COMMERCIAL

## 2018-10-26 ENCOUNTER — PRIOR ORIGINAL RECORDS (OUTPATIENT)
Dept: OTHER | Age: 63
End: 2018-10-26

## 2018-10-26 DIAGNOSIS — Z79.01 LONG TERM (CURRENT) USE OF ANTICOAGULANTS: ICD-10-CM

## 2018-10-26 DIAGNOSIS — I48.91 A-FIB (HCC): ICD-10-CM

## 2018-10-26 LAB — INR: 1.9

## 2018-10-26 PROCEDURE — 36415 COLL VENOUS BLD VENIPUNCTURE: CPT

## 2018-10-26 PROCEDURE — 85610 PROTHROMBIN TIME: CPT

## 2018-11-28 ENCOUNTER — PRIOR ORIGINAL RECORDS (OUTPATIENT)
Dept: OTHER | Age: 63
End: 2018-11-28

## 2018-11-28 LAB — INR: 0

## 2018-12-04 ENCOUNTER — APPOINTMENT (OUTPATIENT)
Dept: LAB | Facility: HOSPITAL | Age: 63
End: 2018-12-04
Attending: INTERNAL MEDICINE
Payer: COMMERCIAL

## 2018-12-04 ENCOUNTER — OFFICE VISIT (OUTPATIENT)
Dept: WOUND CARE | Facility: HOSPITAL | Age: 63
End: 2018-12-04
Attending: NURSE PRACTITIONER
Payer: COMMERCIAL

## 2018-12-04 ENCOUNTER — PRIOR ORIGINAL RECORDS (OUTPATIENT)
Dept: OTHER | Age: 63
End: 2018-12-04

## 2018-12-04 DIAGNOSIS — I87.2 VENOUS (PERIPHERAL) INSUFFICIENCY: ICD-10-CM

## 2018-12-04 DIAGNOSIS — Z79.01 LONG TERM (CURRENT) USE OF ANTICOAGULANTS: ICD-10-CM

## 2018-12-04 DIAGNOSIS — S91.002A UNSPECIFIED OPEN WOUND, LEFT ANKLE, INITIAL ENCOUNTER: ICD-10-CM

## 2018-12-04 DIAGNOSIS — I83.023 VARICOSE VEINS OF LEFT LOWER EXTREMITY WITH ULCER OF ANKLE (HCC): Primary | ICD-10-CM

## 2018-12-04 DIAGNOSIS — I48.91 A-FIB (HCC): ICD-10-CM

## 2018-12-04 DIAGNOSIS — L97.329 VARICOSE VEINS OF LEFT LOWER EXTREMITY WITH ULCER OF ANKLE (HCC): Primary | ICD-10-CM

## 2018-12-04 LAB — INR: 2.1

## 2018-12-04 PROCEDURE — 85610 PROTHROMBIN TIME: CPT

## 2018-12-04 PROCEDURE — 29581 APPL MULTLAYER CMPRN SYS LEG: CPT

## 2018-12-04 PROCEDURE — 97162 PT EVAL MOD COMPLEX 30 MIN: CPT

## 2018-12-04 PROCEDURE — 36415 COLL VENOUS BLD VENIPUNCTURE: CPT

## 2018-12-04 NOTE — PROGRESS NOTES
Subjective    Chief Complaint  This information was obtained from the patient  The patient is new to the [address] [facility],[address] here for an initial visit for the evaluation and management of non-healing wound(s). 02/27/2018:  No concerns for today 12/4/2018 \" I am Never smoker, Alcohol Use - 21 glasses of wine/week    Past Medical History  This information was obtained from the patient  Patient has a medical history of:  Osteoarthrosis, unspecified whether generalized or localized, unspecified site  History of vein Lower Extremity Assessment  Edema Assessment:  Left Extremity: Edema is present  Compression Device In Use: No  Vascular Assessment:  Left Extremity Pulses:  Posterior Tibial: Doppler  Dorsalis Pedis: Doppler  Left Extremity colors, hair growth, and condit Active Problems    ICD-10  I83.009 - Varicose veins of unspecified lower extremity with ulcer of unspecified site  L97.909 - Non-pressure chronic ulcer of unspecified part of unspecified lower leg with unspecified severity  General Notes:  Dx: Varicose v

## 2018-12-11 ENCOUNTER — APPOINTMENT (OUTPATIENT)
Dept: WOUND CARE | Facility: HOSPITAL | Age: 63
End: 2018-12-11
Attending: NURSE PRACTITIONER
Payer: COMMERCIAL

## 2018-12-11 DIAGNOSIS — L97.909 VARICOSE VEINS OF LOWER EXTREMITIES WITH ULCER (HCC): ICD-10-CM

## 2018-12-11 DIAGNOSIS — I87.2 VENOUS (PERIPHERAL) INSUFFICIENCY: ICD-10-CM

## 2018-12-11 DIAGNOSIS — L97.329 NON-PRESSURE CHRONIC ULCER OF LEFT ANKLE (HCC): ICD-10-CM

## 2018-12-11 DIAGNOSIS — I83.023 VARICOSE VEINS OF LEFT LOWER EXTREMITY WITH ULCER OF ANKLE (HCC): Primary | ICD-10-CM

## 2018-12-11 DIAGNOSIS — S91.002A UNSPECIFIED OPEN WOUND, LEFT ANKLE, INITIAL ENCOUNTER: ICD-10-CM

## 2018-12-11 DIAGNOSIS — L97.329 VARICOSE VEINS OF LEFT LOWER EXTREMITY WITH ULCER OF ANKLE (HCC): Primary | ICD-10-CM

## 2018-12-11 DIAGNOSIS — I83.009 VARICOSE VEINS OF LOWER EXTREMITIES WITH ULCER (HCC): ICD-10-CM

## 2018-12-11 PROCEDURE — 29581 APPL MULTLAYER CMPRN SYS LEG: CPT

## 2018-12-11 NOTE — PROGRESS NOTES
Subjective    Chief Complaint  This information was obtained from the patient  The patient is new to the [address] [facility],[address] here for an initial visit for the evaluation and management of non-healing wound(s). 02/27/2018:  No concerns for today 12/4/2018 \" I am Height/Length: 74 in (187.96 cm), Weight: 225 lbs (102.27 kgs), BMI: 28.9, Temperature: 98.3 °F (36.83 °C), Pulse: 80 bpm, Respiratory Rate: 16 breaths/min, Blood Pressure: 144/86 mmHg, Pulse Oximetry: 97 %.      Integumentary (Hair, Skin)  Wound #5 Left, L

## 2018-12-18 ENCOUNTER — APPOINTMENT (OUTPATIENT)
Dept: WOUND CARE | Facility: HOSPITAL | Age: 63
End: 2018-12-18
Attending: NURSE PRACTITIONER
Payer: COMMERCIAL

## 2018-12-18 DIAGNOSIS — L97.321 VARICOSE VEINS OF LEFT LOWER EXTREMITY WITH ULCER OF ANKLE LIMITED TO BREAKDOWN OF SKIN (HCC): ICD-10-CM

## 2018-12-18 DIAGNOSIS — I83.009 VARICOSE VEINS OF LOWER EXTREMITIES WITH ULCER (HCC): ICD-10-CM

## 2018-12-18 DIAGNOSIS — L97.329 VARICOSE VEINS OF LEFT LOWER EXTREMITY WITH ULCER OF ANKLE (HCC): Primary | ICD-10-CM

## 2018-12-18 DIAGNOSIS — L97.329 NON-PRESSURE CHRONIC ULCER OF LEFT ANKLE (HCC): ICD-10-CM

## 2018-12-18 DIAGNOSIS — I83.023 VARICOSE VEINS OF LEFT LOWER EXTREMITY WITH ULCER OF ANKLE (HCC): Primary | ICD-10-CM

## 2018-12-18 DIAGNOSIS — I87.2 VENOUS (PERIPHERAL) INSUFFICIENCY: ICD-10-CM

## 2018-12-18 DIAGNOSIS — L97.909 VARICOSE VEINS OF LOWER EXTREMITIES WITH ULCER (HCC): ICD-10-CM

## 2018-12-18 DIAGNOSIS — L97.321 NON-PRESSURE CHRONIC ULCER OF LEFT ANKLE, LIMITED TO BREAKDOWN OF SKIN (HCC): ICD-10-CM

## 2018-12-18 DIAGNOSIS — S91.002A UNSPECIFIED OPEN WOUND, LEFT ANKLE, INITIAL ENCOUNTER: ICD-10-CM

## 2018-12-18 DIAGNOSIS — I83.023 VARICOSE VEINS OF LEFT LOWER EXTREMITY WITH ULCER OF ANKLE LIMITED TO BREAKDOWN OF SKIN (HCC): ICD-10-CM

## 2018-12-18 PROCEDURE — 29581 APPL MULTLAYER CMPRN SYS LEG: CPT

## 2018-12-18 NOTE — PROGRESS NOTES
Subjective    Chief Complaint  This information was obtained from the patient  12/18/2018 \"I feel good the dressing feeling ok no problems\".     Allergies  adhesive tape (Reaction: rash)    HPI  This information was obtained from the patient    HPI 1/27/1 Wound #5 Left, Lateral Ankle is a Full Thickness Venous Ulcer and has received a status of Not Healed. Subsequent wound encounter measurements are 3.3cm length x 1.4cm width x 0.1cm depth, with an area of 4.62 sq cm and a volume of 0.462 cubic cm.  There is Compression used: using Artiflex 10 cm (1), Artiflex 15 cm (1), Comprilan 08 cm (1), Comprilan 10 cm (1), Tubigrip G (1)  Written PT Goals - . Short Term (4-6 weeks)  Reduce necrosis by 75%  Decrease depth by 75%  Written PT Goals - Long Term (8-12 weeks)

## 2018-12-27 ENCOUNTER — OFFICE VISIT (OUTPATIENT)
Dept: WOUND CARE | Facility: HOSPITAL | Age: 63
End: 2018-12-27
Attending: NURSE PRACTITIONER
Payer: COMMERCIAL

## 2018-12-27 DIAGNOSIS — L97.321 VARICOSE VEINS OF LEFT LOWER EXTREMITY WITH ULCER OF ANKLE LIMITED TO BREAKDOWN OF SKIN (HCC): Primary | ICD-10-CM

## 2018-12-27 DIAGNOSIS — I83.023 VARICOSE VEINS OF LEFT LOWER EXTREMITY WITH ULCER OF ANKLE LIMITED TO BREAKDOWN OF SKIN (HCC): Primary | ICD-10-CM

## 2018-12-27 PROCEDURE — 29581 APPL MULTLAYER CMPRN SYS LEG: CPT

## 2018-12-27 NOTE — PROGRESS NOTES
Subjective    Chief Complaint  This information was obtained from the patient  12/27/2018: No new complaints.     Allergies  adhesive tape (Reaction: rash)    HPI  This information was obtained from the patient    HPI 1/27/16: Pt has a long history of venou Wound #5 Left, Lateral Ankle is a Full Thickness Venous Ulcer and has received a status of Not Healed. Subsequent wound encounter measurements are 0.9cm length x 0.5cm width x 0.1cm depth, with an area of 0.45 sq cm and a volume of 0.045 cubic cm.  There is Limb cleansed using Soap and water (1)  Cleansed wound and periwound with non-cytotoxic agent. using Wound Cleanser Spray (1)  Applied topical product to jan-wound area avoiding wound base.  using Betamethasone valerate 0.1% cream (1)  Applied topical agen

## 2019-01-02 ENCOUNTER — OFFICE VISIT (OUTPATIENT)
Dept: WOUND CARE | Facility: HOSPITAL | Age: 64
End: 2019-01-02
Attending: NURSE PRACTITIONER
Payer: COMMERCIAL

## 2019-01-02 DIAGNOSIS — L97.321 VARICOSE VEINS OF LEFT LOWER EXTREMITY WITH ULCER OF ANKLE LIMITED TO BREAKDOWN OF SKIN (HCC): Primary | ICD-10-CM

## 2019-01-02 DIAGNOSIS — I83.023 VARICOSE VEINS OF LEFT LOWER EXTREMITY WITH ULCER OF ANKLE LIMITED TO BREAKDOWN OF SKIN (HCC): Primary | ICD-10-CM

## 2019-01-02 PROCEDURE — 29581 APPL MULTLAYER CMPRN SYS LEG: CPT

## 2019-01-02 NOTE — PROGRESS NOTES
Subjective    Chief Complaint  This information was obtained from the patient  01/02/19: \"I just got two brand new pair of stockings from my insurance- I get them every 6 months\"    Allergies  adhesive tape (Reaction: rash)    HPI  This information was o healing environment. Anticipate ongoing, slow healing but will consider cellular tissue products if needed if wound healing stalls. Active Problems    ICD-10  I83.009 - Varicose veins of unspecified lower extremity with ulcer of unspecified site  L97.

## 2019-01-08 ENCOUNTER — OFFICE VISIT (OUTPATIENT)
Dept: WOUND CARE | Facility: HOSPITAL | Age: 64
End: 2019-01-08
Attending: NURSE PRACTITIONER
Payer: COMMERCIAL

## 2019-01-08 DIAGNOSIS — S91.002A UNSPECIFIED OPEN WOUND, LEFT ANKLE, INITIAL ENCOUNTER: ICD-10-CM

## 2019-01-08 PROCEDURE — 29581 APPL MULTLAYER CMPRN SYS LEG: CPT

## 2019-01-08 NOTE — PROGRESS NOTES
Subjective    Chief Complaint  This information was obtained from the patient  01/02/19: \"I just got two brand new pair of stockings from my insurance- I get them every 6 months\" 1/8/2019 \"The foot was a little more sore and it looks like a little of th Height/Length: 74 in (187.96 cm), Weight: 225 lbs (102.27 kgs), BMI: 28.9, Temperature: 98.1 °F (36.72 °C), Pulse: 80 bpm, Respiratory Rate: 16 breaths/min, Blood Pressure: 128/83 mmHg, Pulse Oximetry: 98 %.      Integumentary (Hair, Skin)  Wound #5 Left, L L97.909 - Non-pressure chronic ulcer of unspecified part of unspecified lower leg with unspecified severity        Plan      Continue per POC above to resolve wounds and transition to compression stockings for edema management.            Entered By: Joann Quintanilla

## 2019-01-11 ENCOUNTER — PRIOR ORIGINAL RECORDS (OUTPATIENT)
Dept: OTHER | Age: 64
End: 2019-01-11

## 2019-01-11 LAB — INR: 0

## 2019-01-15 ENCOUNTER — OFFICE VISIT (OUTPATIENT)
Dept: WOUND CARE | Facility: HOSPITAL | Age: 64
End: 2019-01-15
Attending: NURSE PRACTITIONER
Payer: COMMERCIAL

## 2019-01-15 DIAGNOSIS — I83.009 VARICOSE VEINS OF LOWER EXTREMITIES WITH ULCER (HCC): ICD-10-CM

## 2019-01-15 DIAGNOSIS — I87.2 VENOUS (PERIPHERAL) INSUFFICIENCY: ICD-10-CM

## 2019-01-15 DIAGNOSIS — L97.909 VARICOSE VEINS OF LOWER EXTREMITIES WITH ULCER (HCC): ICD-10-CM

## 2019-01-15 DIAGNOSIS — L97.329 NON-PRESSURE CHRONIC ULCER OF LEFT ANKLE (HCC): ICD-10-CM

## 2019-01-15 DIAGNOSIS — L97.321 VARICOSE VEINS OF LEFT LOWER EXTREMITY WITH ULCER OF ANKLE LIMITED TO BREAKDOWN OF SKIN (HCC): ICD-10-CM

## 2019-01-15 DIAGNOSIS — S91.002A UNSPECIFIED OPEN WOUND, LEFT ANKLE, INITIAL ENCOUNTER: Primary | ICD-10-CM

## 2019-01-15 DIAGNOSIS — I83.023 VARICOSE VEINS OF LEFT LOWER EXTREMITY WITH ULCER OF ANKLE LIMITED TO BREAKDOWN OF SKIN (HCC): ICD-10-CM

## 2019-01-15 PROCEDURE — 29581 APPL MULTLAYER CMPRN SYS LEG: CPT

## 2019-01-15 NOTE — PROGRESS NOTES
Subjective    Chief Complaint  This information was obtained from the patient  01/02/19: \"I just got two brand new pair of stockings from my insurance- I get them every 6 months\" 1/8/2019 \"The foot was a little more sore and it looks like a little of th Height/Length: 74 in (187.96 cm), Weight: 225 lbs (102.27 kgs), BMI: 28.9, Temperature: 98 °F (36.67 °C), Pulse: 66 bpm, Respiratory Rate: 16 breaths/min, Blood Pressure: 148/93 mmHg, Pulse Oximetry: 100 %.      Integumentary (Hair, Skin)  Wound #5 Left, La L97.909 - Non-pressure chronic ulcer of unspecified part of unspecified lower leg with unspecified severity        Plan    Continue per POC above to resolve wound and transition to 30-40 mmHg compression stockings.              Entered By: Doris Jaramillo on

## 2019-01-16 ENCOUNTER — PRIOR ORIGINAL RECORDS (OUTPATIENT)
Dept: OTHER | Age: 64
End: 2019-01-16

## 2019-01-16 ENCOUNTER — APPOINTMENT (OUTPATIENT)
Dept: LAB | Age: 64
End: 2019-01-16
Attending: INTERNAL MEDICINE
Payer: COMMERCIAL

## 2019-01-16 DIAGNOSIS — I48.91 A-FIB (HCC): ICD-10-CM

## 2019-01-16 DIAGNOSIS — Z79.01 LONG TERM (CURRENT) USE OF ANTICOAGULANTS: ICD-10-CM

## 2019-01-16 LAB
INR BLD: 2.3 (ref 0.9–1.2)
INR: 2.3
PROTHROMBIN TIME: 25 SECONDS (ref 11.8–14.5)

## 2019-01-16 PROCEDURE — 36415 COLL VENOUS BLD VENIPUNCTURE: CPT

## 2019-01-16 PROCEDURE — 85610 PROTHROMBIN TIME: CPT

## 2019-01-22 ENCOUNTER — OFFICE VISIT (OUTPATIENT)
Dept: WOUND CARE | Facility: HOSPITAL | Age: 64
End: 2019-01-22
Attending: NURSE PRACTITIONER
Payer: COMMERCIAL

## 2019-01-22 DIAGNOSIS — L97.329 NON-PRESSURE CHRONIC ULCER OF LEFT ANKLE (HCC): ICD-10-CM

## 2019-01-22 DIAGNOSIS — L97.321 VARICOSE VEINS OF LEFT LOWER EXTREMITY WITH ULCER OF ANKLE LIMITED TO BREAKDOWN OF SKIN (HCC): ICD-10-CM

## 2019-01-22 DIAGNOSIS — S91.002A UNSPECIFIED OPEN WOUND, LEFT ANKLE, INITIAL ENCOUNTER: Primary | ICD-10-CM

## 2019-01-22 DIAGNOSIS — L97.909 VARICOSE VEINS OF LOWER EXTREMITIES WITH ULCER (HCC): ICD-10-CM

## 2019-01-22 DIAGNOSIS — I83.009 VARICOSE VEINS OF LOWER EXTREMITIES WITH ULCER (HCC): ICD-10-CM

## 2019-01-22 DIAGNOSIS — I83.023 VARICOSE VEINS OF LEFT LOWER EXTREMITY WITH ULCER OF ANKLE LIMITED TO BREAKDOWN OF SKIN (HCC): ICD-10-CM

## 2019-01-22 DIAGNOSIS — I87.2 VENOUS (PERIPHERAL) INSUFFICIENCY: ICD-10-CM

## 2019-01-22 PROCEDURE — 29581 APPL MULTLAYER CMPRN SYS LEG: CPT

## 2019-01-22 NOTE — PROGRESS NOTES
Subjective    Chief Complaint  This information was obtained from the patient  01/02/19: \"I just got two brand new pair of stockings from my insurance- I get them every 6 months\" 1/8/2019 \"The foot was a little more sore and it looks like a little of th HPI 12/2018: L lat foot/ankle wound began ~ 1 month ago when pt was in [facility] on vacation. Wearing compression stockings on and off- noticed increased edema. Pt sched to see vascular surgeon.         Objective    Constitutional  Height/Length: 74 in (187.96 c Pt presents with with improved wound area and volume. The wound were debrided of non vialbe tissue to promote maximal wound proliferation. Moist wound dressings were applied xeroform and a foam bordered Aquacel to resolve the wounds.  Multilayer compression

## 2019-01-29 ENCOUNTER — OFFICE VISIT (OUTPATIENT)
Dept: WOUND CARE | Facility: HOSPITAL | Age: 64
End: 2019-01-29
Attending: NURSE PRACTITIONER
Payer: COMMERCIAL

## 2019-01-29 DIAGNOSIS — S91.002A UNSPECIFIED OPEN WOUND, LEFT ANKLE, INITIAL ENCOUNTER: Primary | ICD-10-CM

## 2019-01-29 DIAGNOSIS — I83.023 VARICOSE VEINS OF LEFT LOWER EXTREMITY WITH ULCER OF ANKLE LIMITED TO BREAKDOWN OF SKIN (HCC): ICD-10-CM

## 2019-01-29 DIAGNOSIS — L97.909 VARICOSE VEINS OF LOWER EXTREMITIES WITH ULCER (HCC): ICD-10-CM

## 2019-01-29 DIAGNOSIS — L97.329 NON-PRESSURE CHRONIC ULCER OF LEFT ANKLE (HCC): ICD-10-CM

## 2019-01-29 DIAGNOSIS — I83.009 VARICOSE VEINS OF LOWER EXTREMITIES WITH ULCER (HCC): ICD-10-CM

## 2019-01-29 DIAGNOSIS — I87.2 VENOUS (PERIPHERAL) INSUFFICIENCY: ICD-10-CM

## 2019-01-29 DIAGNOSIS — L97.321 VARICOSE VEINS OF LEFT LOWER EXTREMITY WITH ULCER OF ANKLE LIMITED TO BREAKDOWN OF SKIN (HCC): ICD-10-CM

## 2019-01-29 PROCEDURE — 29581 APPL MULTLAYER CMPRN SYS LEG: CPT

## 2019-01-29 NOTE — PROGRESS NOTES
Subjective    Chief Complaint  This information was obtained from the patient  1/29/2019 \"I feel pretty good and the wounds looks good\".     Allergies  adhesive tape (Reaction: rash)    HPI  This information was obtained from the patient    HPI 1/27/16: P Wound #5 Left, Lateral Ankle is a Full Thickness Venous Ulcer and has received a status of Not Healed. There is a small amount of sero-sanguineous drainage noted which has no odor. The wound margin is well defined.  Wound bed has 26-50% slough, 51-75% granu

## 2019-02-05 ENCOUNTER — OFFICE VISIT (OUTPATIENT)
Dept: WOUND CARE | Facility: HOSPITAL | Age: 64
End: 2019-02-05
Attending: NURSE PRACTITIONER
Payer: COMMERCIAL

## 2019-02-05 DIAGNOSIS — S91.002A UNSPECIFIED OPEN WOUND, LEFT ANKLE, INITIAL ENCOUNTER: Primary | ICD-10-CM

## 2019-02-05 DIAGNOSIS — L97.329 NON-PRESSURE CHRONIC ULCER OF LEFT ANKLE (HCC): ICD-10-CM

## 2019-02-05 DIAGNOSIS — L97.909 VARICOSE VEINS OF LOWER EXTREMITIES WITH ULCER (HCC): ICD-10-CM

## 2019-02-05 DIAGNOSIS — L97.321 VARICOSE VEINS OF LEFT LOWER EXTREMITY WITH ULCER OF ANKLE LIMITED TO BREAKDOWN OF SKIN (HCC): ICD-10-CM

## 2019-02-05 DIAGNOSIS — I83.023 VARICOSE VEINS OF LEFT LOWER EXTREMITY WITH ULCER OF ANKLE LIMITED TO BREAKDOWN OF SKIN (HCC): ICD-10-CM

## 2019-02-05 DIAGNOSIS — I83.009 VARICOSE VEINS OF LOWER EXTREMITIES WITH ULCER (HCC): ICD-10-CM

## 2019-02-05 DIAGNOSIS — I87.2 VENOUS (PERIPHERAL) INSUFFICIENCY: ICD-10-CM

## 2019-02-05 PROCEDURE — 29581 APPL MULTLAYER CMPRN SYS LEG: CPT

## 2019-02-05 NOTE — PROGRESS NOTES
Subjective    Chief Complaint  This information was obtained from the patient  1/29/2019 \"I feel pretty good and the wounds looks good\". 2/5/2019 \"The wounds look close to being healed\".     Allergies  adhesive tape (Reaction: rash)    HPI  This informa Wound #5 Left, Lateral Ankle is a Full Thickness Venous Ulcer and has received a status of Not Healed. Subsequent wound encounter measurements are 0.2cm length x 0.1cm width x 0.1cm depth, with an area of 0.02 sq cm and a volume of 0.002 cubic cm.  There is Entered By: Mara Ruiz on 63/73/2293 9:06:27 AM   Signature(s): Date(s):

## 2019-02-12 ENCOUNTER — OFFICE VISIT (OUTPATIENT)
Dept: WOUND CARE | Facility: HOSPITAL | Age: 64
End: 2019-02-12
Attending: NURSE PRACTITIONER
Payer: COMMERCIAL

## 2019-02-12 ENCOUNTER — TELEPHONE (OUTPATIENT)
Dept: INTERNAL MEDICINE CLINIC | Facility: CLINIC | Age: 64
End: 2019-02-12

## 2019-02-12 DIAGNOSIS — S91.002A UNSPECIFIED OPEN WOUND, LEFT ANKLE, INITIAL ENCOUNTER: Primary | ICD-10-CM

## 2019-02-12 DIAGNOSIS — I48.20 CHRONIC ATRIAL FIBRILLATION (HCC): ICD-10-CM

## 2019-02-12 DIAGNOSIS — L97.321 VARICOSE VEINS OF LEFT LOWER EXTREMITY WITH ULCER OF ANKLE LIMITED TO BREAKDOWN OF SKIN (HCC): ICD-10-CM

## 2019-02-12 DIAGNOSIS — E78.00 HYPERCHOLESTEROLEMIA: Primary | ICD-10-CM

## 2019-02-12 DIAGNOSIS — L97.909 VARICOSE VEINS OF LOWER EXTREMITIES WITH ULCER (HCC): ICD-10-CM

## 2019-02-12 DIAGNOSIS — I83.023 VARICOSE VEINS OF LEFT LOWER EXTREMITY WITH ULCER OF ANKLE LIMITED TO BREAKDOWN OF SKIN (HCC): ICD-10-CM

## 2019-02-12 DIAGNOSIS — Z12.5 SCREENING FOR PROSTATE CANCER: ICD-10-CM

## 2019-02-12 DIAGNOSIS — Z00.00 ANNUAL PHYSICAL EXAM: ICD-10-CM

## 2019-02-12 DIAGNOSIS — I83.009 VARICOSE VEINS OF LOWER EXTREMITIES WITH ULCER (HCC): ICD-10-CM

## 2019-02-12 DIAGNOSIS — L97.329 NON-PRESSURE CHRONIC ULCER OF LEFT ANKLE (HCC): ICD-10-CM

## 2019-02-12 DIAGNOSIS — I87.2 VENOUS (PERIPHERAL) INSUFFICIENCY: ICD-10-CM

## 2019-02-12 PROCEDURE — 29581 APPL MULTLAYER CMPRN SYS LEG: CPT

## 2019-02-12 NOTE — TELEPHONE ENCOUNTER
Pt scheduled a physical with Dr Coleen Melchor on March 26  Would like to go for labs prior to appt  Please call pt to confirm orders [phone]

## 2019-02-12 NOTE — PROGRESS NOTES
Subjective    Chief Complaint  This information was obtained from the patient  1/29/2019 \"I feel pretty good and the wounds looks good\". 2/5/2019 \"The wounds look close to being healed\".  2/12/2019 \"The leg feels ok I was hoping it would be closed toda Height/Length: 74 in (187.96 cm), Weight: 225 lbs (102.27 kgs), BMI: 28.9, Temperature: 97.7 °F (36.5 °C), Pulse: 73 bpm, Respiratory Rate: 18 breaths/min, Blood Pressure: 137/86 mmHg, Pulse Oximetry: 95 %.      Integumentary (Hair, Skin)  Wound #5 Left, La

## 2019-02-19 ENCOUNTER — OFFICE VISIT (OUTPATIENT)
Dept: WOUND CARE | Facility: HOSPITAL | Age: 64
End: 2019-02-19
Attending: NURSE PRACTITIONER
Payer: COMMERCIAL

## 2019-02-19 ENCOUNTER — APPOINTMENT (OUTPATIENT)
Dept: LAB | Facility: HOSPITAL | Age: 64
End: 2019-02-19
Attending: INTERNAL MEDICINE
Payer: COMMERCIAL

## 2019-02-19 ENCOUNTER — PRIOR ORIGINAL RECORDS (OUTPATIENT)
Dept: OTHER | Age: 64
End: 2019-02-19

## 2019-02-19 DIAGNOSIS — I48.91 A-FIB (HCC): ICD-10-CM

## 2019-02-19 DIAGNOSIS — L97.909 VARICOSE VEINS OF LOWER EXTREMITIES WITH ULCER (HCC): ICD-10-CM

## 2019-02-19 DIAGNOSIS — L97.329 NON-PRESSURE CHRONIC ULCER OF LEFT ANKLE (HCC): ICD-10-CM

## 2019-02-19 DIAGNOSIS — L97.321 VARICOSE VEINS OF LEFT LOWER EXTREMITY WITH ULCER OF ANKLE LIMITED TO BREAKDOWN OF SKIN (HCC): ICD-10-CM

## 2019-02-19 DIAGNOSIS — I83.009 VARICOSE VEINS OF LOWER EXTREMITIES WITH ULCER (HCC): ICD-10-CM

## 2019-02-19 DIAGNOSIS — S91.002A UNSPECIFIED OPEN WOUND, LEFT ANKLE, INITIAL ENCOUNTER: Primary | ICD-10-CM

## 2019-02-19 DIAGNOSIS — I87.2 VENOUS (PERIPHERAL) INSUFFICIENCY: ICD-10-CM

## 2019-02-19 DIAGNOSIS — Z79.01 LONG TERM (CURRENT) USE OF ANTICOAGULANTS: ICD-10-CM

## 2019-02-19 DIAGNOSIS — I83.023 VARICOSE VEINS OF LEFT LOWER EXTREMITY WITH ULCER OF ANKLE LIMITED TO BREAKDOWN OF SKIN (HCC): ICD-10-CM

## 2019-02-19 LAB
INR BLD: 1.7 (ref 0.9–1.2)
INR: 1.7
PROTHROMBIN TIME: 19.5 SECONDS (ref 11.8–14.5)

## 2019-02-19 PROCEDURE — 36415 COLL VENOUS BLD VENIPUNCTURE: CPT

## 2019-02-19 PROCEDURE — 85610 PROTHROMBIN TIME: CPT

## 2019-02-19 PROCEDURE — 99213 OFFICE O/P EST LOW 20 MIN: CPT

## 2019-02-19 NOTE — PROGRESS NOTES
Subjective    Chief Complaint  This information was obtained from the patient  1/29/2019 \"I feel pretty good and the wounds looks good\". 2/5/2019 \"The wounds look close to being healed\".  2/12/2019 \"The leg feels ok I was hoping it would be closed toda Height/Length: 74 in (187.96 cm), Weight: 225 lbs (102.27 kgs), BMI: 28.9, Temperature: 97.8 °F (36.56 °C), Pulse: 67 bpm, Respiratory Rate: 18 breaths/min, Blood Pressure: 158/97 mmHg, Pulse Oximetry: 100 %.      Integumentary (Hair, Skin)  Wound #5 Left,

## 2019-02-20 ENCOUNTER — ANTI-COAG (OUTPATIENT)
Dept: CARDIOLOGY | Age: 64
End: 2019-02-20

## 2019-02-20 DIAGNOSIS — I48.91 ATRIAL FIBRILLATION, UNSPECIFIED TYPE (CMD): ICD-10-CM

## 2019-02-25 PROBLEM — I48.91 UNSPECIFIED ATRIAL FIBRILLATION (CMD): Status: ACTIVE | Noted: 2019-02-25

## 2019-02-26 ENCOUNTER — APPOINTMENT (OUTPATIENT)
Dept: WOUND CARE | Facility: HOSPITAL | Age: 64
End: 2019-02-26
Attending: NURSE PRACTITIONER
Payer: COMMERCIAL

## 2019-02-28 VITALS
SYSTOLIC BLOOD PRESSURE: 128 MMHG | DIASTOLIC BLOOD PRESSURE: 80 MMHG | BODY MASS INDEX: 34.22 KG/M2 | WEIGHT: 239 LBS | HEIGHT: 70 IN | HEART RATE: 70 BPM

## 2019-02-28 VITALS
SYSTOLIC BLOOD PRESSURE: 134 MMHG | HEIGHT: 70 IN | BODY MASS INDEX: 34.65 KG/M2 | DIASTOLIC BLOOD PRESSURE: 80 MMHG | RESPIRATION RATE: 16 BRPM | OXYGEN SATURATION: 98 % | WEIGHT: 242 LBS | HEART RATE: 65 BPM

## 2019-03-05 ENCOUNTER — APPOINTMENT (OUTPATIENT)
Dept: WOUND CARE | Facility: HOSPITAL | Age: 64
End: 2019-03-05
Attending: NURSE PRACTITIONER
Payer: COMMERCIAL

## 2019-03-09 ENCOUNTER — LAB ENCOUNTER (OUTPATIENT)
Dept: LAB | Age: 64
End: 2019-03-09
Attending: INTERNAL MEDICINE
Payer: COMMERCIAL

## 2019-03-09 DIAGNOSIS — I48.20 CHRONIC ATRIAL FIBRILLATION (HCC): ICD-10-CM

## 2019-03-09 DIAGNOSIS — Z12.5 SCREENING FOR PROSTATE CANCER: ICD-10-CM

## 2019-03-09 DIAGNOSIS — E78.00 HYPERCHOLESTEROLEMIA: ICD-10-CM

## 2019-03-09 DIAGNOSIS — Z00.00 ANNUAL PHYSICAL EXAM: ICD-10-CM

## 2019-03-09 LAB
ALBUMIN SERPL-MCNC: 4.1 G/DL (ref 3.4–5)
ALBUMIN/GLOB SERPL: 1.3 {RATIO} (ref 1–2)
ALP LIVER SERPL-CCNC: 72 U/L (ref 45–117)
ALT SERPL-CCNC: 28 U/L (ref 16–61)
ANION GAP SERPL CALC-SCNC: 6 MMOL/L (ref 0–18)
AST SERPL-CCNC: 22 U/L (ref 15–37)
BASOPHILS # BLD AUTO: 0.06 X10(3) UL (ref 0–0.2)
BASOPHILS NFR BLD AUTO: 0.9 %
BILIRUB SERPL-MCNC: 0.5 MG/DL (ref 0.1–2)
BUN BLD-MCNC: 17 MG/DL (ref 7–18)
BUN/CREAT SERPL: 20.5 (ref 10–20)
CALCIUM BLD-MCNC: 8.8 MG/DL (ref 8.5–10.1)
CHLORIDE SERPL-SCNC: 108 MMOL/L (ref 98–107)
CHOLEST SERPL-MCNC: 154 MG/DL
CHOLEST SMN-MCNC: 154 MG/DL (ref ?–200)
CHOLEST/HDLC SERPL: 65 {RATIO}
CO2 SERPL-SCNC: 27 MMOL/L (ref 21–32)
COMPLEXED PSA SERPL-MCNC: 2.05 NG/ML (ref ?–4)
CREAT BLD-MCNC: 0.83 MG/DL (ref 0.7–1.3)
DEPRECATED RDW RBC AUTO: 46.5 FL (ref 35.1–46.3)
EOSINOPHIL # BLD AUTO: 0.13 X10(3) UL (ref 0–0.7)
EOSINOPHIL NFR BLD AUTO: 2 %
ERYTHROCYTE [DISTWIDTH] IN BLOOD BY AUTOMATED COUNT: 13.3 % (ref 11–15)
GLOBULIN PLAS-MCNC: 3.1 G/DL (ref 2.8–4.4)
GLUCOSE BLD-MCNC: 87 MG/DL (ref 70–99)
HCT VFR BLD AUTO: 45.7 % (ref 39–53)
HDLC SERPL-MCNC: 65 MG/DL (ref 40–59)
HDLC SERPL-MCNC: NORMAL MG/DL
HGB BLD-MCNC: 14.5 G/DL (ref 13–17.5)
IMM GRANULOCYTES # BLD AUTO: 0.02 X10(3) UL (ref 0–1)
IMM GRANULOCYTES NFR BLD: 0.3 %
LDLC SERPL CALC-MCNC: 81 MG/DL
LDLC SERPL CALC-MCNC: 81 MG/DL (ref ?–100)
LENGTH OF FAST TIME PATIENT: NORMAL H
LYMPHOCYTES # BLD AUTO: 2.04 X10(3) UL (ref 1–4)
LYMPHOCYTES NFR BLD AUTO: 31.9 %
M PROTEIN MFR SERPL ELPH: 7.2 G/DL (ref 6.4–8.2)
MCH RBC QN AUTO: 29.8 PG (ref 26–34)
MCHC RBC AUTO-ENTMCNC: 31.7 G/DL (ref 31–37)
MCV RBC AUTO: 94 FL (ref 80–100)
MONOCYTES # BLD AUTO: 0.49 X10(3) UL (ref 0.1–1)
MONOCYTES NFR BLD AUTO: 7.7 %
NEUTROPHILS # BLD AUTO: 3.66 X10 (3) UL (ref 1.5–7.7)
NEUTROPHILS # BLD AUTO: 3.66 X10(3) UL (ref 1.5–7.7)
NEUTROPHILS NFR BLD AUTO: 57.2 %
NONHDLC SERPL-MCNC: 89 MG/DL
NONHDLC SERPL-MCNC: 89 MG/DL (ref ?–130)
OSMOLALITY SERPL CALC.SUM OF ELEC: 293 MOSM/KG (ref 275–295)
PLATELET # BLD AUTO: 201 10(3)UL (ref 150–450)
POTASSIUM SERPL-SCNC: 4.4 MMOL/L (ref 3.5–5.1)
RBC # BLD AUTO: 4.86 X10(6)UL (ref 4.3–5.7)
SODIUM SERPL-SCNC: 141 MMOL/L (ref 136–145)
TRIGL SERPL-MCNC: 39 MG/DL
TRIGL SERPL-MCNC: 39 MG/DL (ref 30–149)
TSI SER-ACNC: 1.92 MIU/ML (ref 0.36–3.74)
VLDLC SERPL CALC-MCNC: 8 MG/DL
VLDLC SERPL CALC-MCNC: 8 MG/DL (ref 0–30)
WBC # BLD AUTO: 6.4 X10(3) UL (ref 4–11)

## 2019-03-09 PROCEDURE — 85025 COMPLETE CBC W/AUTO DIFF WBC: CPT

## 2019-03-09 PROCEDURE — 80053 COMPREHEN METABOLIC PANEL: CPT

## 2019-03-09 PROCEDURE — 80061 LIPID PANEL: CPT

## 2019-03-09 PROCEDURE — 84443 ASSAY THYROID STIM HORMONE: CPT

## 2019-03-09 PROCEDURE — 36415 COLL VENOUS BLD VENIPUNCTURE: CPT

## 2019-03-12 ENCOUNTER — APPOINTMENT (OUTPATIENT)
Dept: WOUND CARE | Facility: HOSPITAL | Age: 64
End: 2019-03-12
Attending: NURSE PRACTITIONER
Payer: COMMERCIAL

## 2019-03-18 NOTE — TELEPHONE ENCOUNTER
S/w patient who states coumadin is managed by [facility] Dr. Erlin Harris but he has been getting refills from Dr. Radha Mcclendon (for a while). INR monthly.  Last INR 1.7 he takes coumadin 2.5mg daily, except Sunday he takes 1/2 tab,       To lise to review refill request.

## 2019-03-18 NOTE — TELEPHONE ENCOUNTER
LMTCB to confirm that patient gets his coumadin 2.5 mg tablet refills from Dr. Seven Jeong and he has his coumadin instructions from [facility] and Dr. Agapito Viramontes. Refills pended to [facility].

## 2019-03-19 ENCOUNTER — APPOINTMENT (OUTPATIENT)
Dept: WOUND CARE | Facility: HOSPITAL | Age: 64
End: 2019-03-19
Attending: NURSE PRACTITIONER
Payer: COMMERCIAL

## 2019-03-19 ENCOUNTER — ANTI-COAG (OUTPATIENT)
Dept: CARDIOLOGY | Age: 64
End: 2019-03-19

## 2019-03-19 DIAGNOSIS — I48.91 ATRIAL FIBRILLATION, UNSPECIFIED TYPE (CMD): ICD-10-CM

## 2019-03-19 DIAGNOSIS — I48.91 ATRIAL FIBRILLATION, UNSPECIFIED TYPE (CMD): Primary | ICD-10-CM

## 2019-03-20 ENCOUNTER — TELEPHONE (OUTPATIENT)
Dept: CARDIOLOGY | Age: 64
End: 2019-03-20

## 2019-03-20 ENCOUNTER — ANTI-COAG (OUTPATIENT)
Dept: CARDIOLOGY | Age: 64
End: 2019-03-20

## 2019-03-20 DIAGNOSIS — I48.91 ATRIAL FIBRILLATION, UNSPECIFIED TYPE (CMD): Primary | ICD-10-CM

## 2019-03-20 RX ORDER — CELECOXIB 200 MG/1
CAPSULE ORAL
Qty: 90 CAPSULE | Refills: 0 | Status: SHIPPED | OUTPATIENT
Start: 2019-03-20 | End: 2019-03-26

## 2019-03-20 RX ORDER — WARFARIN SODIUM 2.5 MG/1
TABLET ORAL
Qty: 90 TABLET | Refills: 0 | Status: SHIPPED | OUTPATIENT
Start: 2019-03-20 | End: 2019-03-26

## 2019-03-20 NOTE — TELEPHONE ENCOUNTER
Reviewed and Rx done  Will call pt to explain future warfarin Rx will need to be done by [facility] [place]

## 2019-03-26 ENCOUNTER — OFFICE VISIT (OUTPATIENT)
Dept: INTERNAL MEDICINE CLINIC | Facility: CLINIC | Age: 64
End: 2019-03-26
Payer: COMMERCIAL

## 2019-03-26 ENCOUNTER — APPOINTMENT (OUTPATIENT)
Dept: WOUND CARE | Facility: HOSPITAL | Age: 64
End: 2019-03-26
Attending: NURSE PRACTITIONER
Payer: COMMERCIAL

## 2019-03-26 VITALS
SYSTOLIC BLOOD PRESSURE: 138 MMHG | BODY MASS INDEX: 28.88 KG/M2 | WEIGHT: 225 LBS | TEMPERATURE: 98 F | HEIGHT: 74 IN | HEART RATE: 54 BPM | DIASTOLIC BLOOD PRESSURE: 90 MMHG | OXYGEN SATURATION: 98 %

## 2019-03-26 DIAGNOSIS — R97.20 INCREASED PROSTATE SPECIFIC ANTIGEN (PSA) VELOCITY: ICD-10-CM

## 2019-03-26 DIAGNOSIS — M79.10 MYALGIA: ICD-10-CM

## 2019-03-26 DIAGNOSIS — Z01.10 ENCOUNTER FOR HEARING SCREENING WITHOUT ABNORMAL FINDINGS: Primary | ICD-10-CM

## 2019-03-26 PROCEDURE — 99396 PREV VISIT EST AGE 40-64: CPT | Performed by: INTERNAL MEDICINE

## 2019-03-26 RX ORDER — TADALAFIL 20 MG/1
20 TABLET ORAL
Qty: 30 TABLET | Refills: 3 | Status: CANCELLED | OUTPATIENT
Start: 2019-03-26

## 2019-03-26 RX ORDER — CELECOXIB 200 MG/1
200 CAPSULE ORAL DAILY
Qty: 90 CAPSULE | Refills: 3 | Status: ON HOLD | OUTPATIENT
Start: 2019-03-26 | End: 2019-12-12

## 2019-03-26 RX ORDER — WARFARIN SODIUM 2.5 MG/1
TABLET ORAL
Qty: 90 TABLET | Refills: 3 | Status: ON HOLD | OUTPATIENT
Start: 2019-03-26 | End: 2019-12-12

## 2019-03-26 RX ORDER — SILDENAFIL 100 MG/1
100 TABLET, FILM COATED ORAL
Qty: 30 TABLET | Refills: 3 | Status: SHIPPED | OUTPATIENT
Start: 2019-03-26 | End: 2020-06-23

## 2019-03-26 RX ORDER — SIMVASTATIN 20 MG
TABLET ORAL
Qty: 90 TABLET | Refills: 3 | Status: CANCELLED | OUTPATIENT
Start: 2019-03-26

## 2019-03-26 NOTE — PROGRESS NOTES
Lily Nava is a 58year old malePatient presents with:  Physical: CRYSTAL Leg pain - x 2 years, CRYSTAL thumb pain. Referral: Pended   Medication Request: pended.      HPI:     Lily Nava is a 58year old male who presents for a complete physical exam. Cap Take 1 capsule by mouth daily.  Disp:  Rfl:       Past Medical History:   Diagnosis Date   • Atrial fibrillation [facility])     ablation (2004); CV (May 2009)   • High blood pressure    • High cholesterol    • History of vein stripping    • Osteoarthrosis, u apparent distress  HEENT: normal oropharynx, normal TM's  EYES: PERRLA, EOMI, conjunctivae pink  NECK: supple, no cervical or supraclavicular lymphadenopathy, no carotid bruits  LUNGS: clear to auscultation  CARDIO: RRR, normal S1S2, no gallops or murmurs PM

## 2019-03-27 ENCOUNTER — APPOINTMENT (OUTPATIENT)
Dept: LAB | Age: 64
End: 2019-03-27
Attending: INTERNAL MEDICINE
Payer: COMMERCIAL

## 2019-03-27 DIAGNOSIS — I48.91 ATRIAL FIBRILLATION, UNSPECIFIED TYPE (HCC): ICD-10-CM

## 2019-03-27 LAB
INR BLD: 1.75 (ref 0.9–1.2)
INR PPP: 1.75
PROTHROMBIN TIME: 20.5 SECONDS (ref 11.8–14.5)

## 2019-03-27 PROCEDURE — 36415 COLL VENOUS BLD VENIPUNCTURE: CPT

## 2019-03-27 PROCEDURE — 85610 PROTHROMBIN TIME: CPT

## 2019-04-03 ENCOUNTER — ANTI-COAG (OUTPATIENT)
Dept: CARDIOLOGY | Age: 64
End: 2019-04-03

## 2019-04-03 DIAGNOSIS — I48.91 ATRIAL FIBRILLATION, UNSPECIFIED TYPE (CMD): ICD-10-CM

## 2019-04-09 RX ORDER — SIMVASTATIN 20 MG
TABLET ORAL
Qty: 90 TABLET | Refills: 3 | OUTPATIENT
Start: 2019-04-09

## 2019-04-12 ENCOUNTER — CLINICAL ABSTRACT (OUTPATIENT)
Dept: CARDIOLOGY | Age: 64
End: 2019-04-12

## 2019-04-12 ENCOUNTER — APPOINTMENT (OUTPATIENT)
Dept: LAB | Age: 64
End: 2019-04-12
Attending: INTERNAL MEDICINE
Payer: COMMERCIAL

## 2019-04-12 DIAGNOSIS — I48.91 ATRIAL FIBRILLATION, UNSPECIFIED TYPE (HCC): ICD-10-CM

## 2019-04-12 DIAGNOSIS — M79.10 MYALGIA: ICD-10-CM

## 2019-04-12 DIAGNOSIS — R97.20 INCREASED PROSTATE SPECIFIC ANTIGEN (PSA) VELOCITY: ICD-10-CM

## 2019-04-12 LAB — INR PPP: 1.96

## 2019-04-12 PROCEDURE — 82550 ASSAY OF CK (CPK): CPT

## 2019-04-12 PROCEDURE — 85652 RBC SED RATE AUTOMATED: CPT

## 2019-04-12 PROCEDURE — 85610 PROTHROMBIN TIME: CPT

## 2019-04-12 PROCEDURE — 36415 COLL VENOUS BLD VENIPUNCTURE: CPT

## 2019-04-12 PROCEDURE — 82085 ASSAY OF ALDOLASE: CPT

## 2019-04-12 PROCEDURE — 84153 ASSAY OF PSA TOTAL: CPT

## 2019-04-12 PROCEDURE — 86140 C-REACTIVE PROTEIN: CPT

## 2019-04-16 ENCOUNTER — TELEPHONE (OUTPATIENT)
Dept: INTERNAL MEDICINE CLINIC | Facility: CLINIC | Age: 64
End: 2019-04-16

## 2019-04-16 RX ORDER — TRAMADOL HYDROCHLORIDE 50 MG/1
TABLET ORAL
COMMUNITY
Start: 2009-09-21 | End: 2020-09-11 | Stop reason: SDUPTHER

## 2019-04-16 RX ORDER — CELECOXIB 200 MG/1
CAPSULE ORAL
COMMUNITY
Start: 2009-09-21

## 2019-04-16 RX ORDER — SILDENAFIL 100 MG/1
TABLET, FILM COATED ORAL
COMMUNITY
Start: 2016-05-13

## 2019-04-16 RX ORDER — AMOXICILLIN 500 MG/1
TABLET, FILM COATED ORAL
COMMUNITY

## 2019-04-16 RX ORDER — SIMVASTATIN 20 MG
TABLET ORAL
COMMUNITY
Start: 2014-08-25 | End: 2019-09-11

## 2019-04-16 RX ORDER — WARFARIN SODIUM 2.5 MG/1
TABLET ORAL
COMMUNITY
Start: 2017-10-26

## 2019-04-17 NOTE — TELEPHONE ENCOUNTER
Spoke to pt and advised on lab results per MD; pt verbalized understanding. Pt states he has not had improvement of muscle aches yet, but has agreed to call with update.     FYI to Dr. Romie Fletcher----

## 2019-04-17 NOTE — TELEPHONE ENCOUNTER
Please call with labs -- muscle enzyme and inflammatory levels are normal.  Repeat PSA is better/decreased. Any improvement in the muscle aches off simvastatin yet? Can sometimes take a couple of months.

## 2019-04-18 ENCOUNTER — ANTI-COAG (OUTPATIENT)
Dept: CARDIOLOGY | Age: 64
End: 2019-04-18

## 2019-04-18 DIAGNOSIS — I48.91 ATRIAL FIBRILLATION, UNSPECIFIED TYPE (CMD): ICD-10-CM

## 2019-04-26 ENCOUNTER — APPOINTMENT (OUTPATIENT)
Dept: LAB | Age: 64
End: 2019-04-26
Attending: INTERNAL MEDICINE
Payer: COMMERCIAL

## 2019-04-26 ENCOUNTER — ANTI-COAG (OUTPATIENT)
Dept: CARDIOLOGY | Age: 64
End: 2019-04-26

## 2019-04-26 DIAGNOSIS — I48.91 ATRIAL FIBRILLATION, UNSPECIFIED TYPE (HCC): ICD-10-CM

## 2019-04-26 DIAGNOSIS — I48.91 ATRIAL FIBRILLATION, UNSPECIFIED TYPE (CMD): ICD-10-CM

## 2019-04-26 LAB — INR PPP: 1.85

## 2019-04-26 PROCEDURE — 85610 PROTHROMBIN TIME: CPT

## 2019-04-26 PROCEDURE — 36415 COLL VENOUS BLD VENIPUNCTURE: CPT

## 2019-05-06 ENCOUNTER — APPOINTMENT (OUTPATIENT)
Dept: WOUND CARE | Facility: HOSPITAL | Age: 64
End: 2019-05-06
Attending: NURSE PRACTITIONER
Payer: COMMERCIAL

## 2019-05-06 DIAGNOSIS — I87.2 VENOUS (PERIPHERAL) INSUFFICIENCY: ICD-10-CM

## 2019-05-06 DIAGNOSIS — L97.919 VARICOSE VEINS OF RIGHT LOWER EXTREMITY WITH ULCER (HCC): Primary | ICD-10-CM

## 2019-05-06 DIAGNOSIS — I83.019 VARICOSE VEINS OF RIGHT LOWER EXTREMITY WITH ULCER (HCC): Primary | ICD-10-CM

## 2019-05-06 PROCEDURE — 29581 APPL MULTLAYER CMPRN SYS LEG: CPT

## 2019-05-06 PROCEDURE — 97162 PT EVAL MOD COMPLEX 30 MIN: CPT

## 2019-05-06 NOTE — PROGRESS NOTES
Subjective    Chief Complaint  This information was obtained from the patient  5/6/2019 \"I was in [place] on vacation and noticed my R ankle started to open up this time about 3 wks ago\".     Allergies  adhesive tape (Reaction: rash)    HPI  This information w Family History  This information was obtained from the patient  Cancer - Father: Colon cancer, Other - Maternal Grandparents: Family history of arthritis and hypertension, Paternal Grandparents: Family history of arthritis and hypertension    Social Histor Wound #7 Right, Distal, Medial Ankle is a chronic Full Thickness Venous Ulcer and has received a status of Not Healed.  Initial wound encounter measurements are 1.2cm length x 0.5cm width x 0.3cm depth, with an area of 0.6 sq cm and a volume of 0.18 cubic c Pt presents with an R medial distal ankle venous ulcer that appeared ~ 4 wks ago. The pt has been wearing BLE compression stockings as advised and he has previously been treated in the [facility] for his LLE.  The pt reports he is scheduled to undergo a vein strip Sincerely,    Electronically signed by therapist: Efren Bell, PT, DPT [facility]    Physician's certification required: Yes    Certification From:  5/6/2019  To: 8/4/2019  Dayana Almaguer [id number]      HPI:    Patient ID: Dayana Almaguer is a 58year old male.

## 2019-05-09 ENCOUNTER — TELEPHONE (OUTPATIENT)
Dept: CARDIOLOGY | Age: 64
End: 2019-05-09

## 2019-05-09 DIAGNOSIS — I48.0 PAROXYSMAL ATRIAL FIBRILLATION (CMD): ICD-10-CM

## 2019-05-09 DIAGNOSIS — Z01.810 PRE-OPERATIVE CARDIOVASCULAR EXAMINATION: Primary | ICD-10-CM

## 2019-05-10 ENCOUNTER — HOSPITAL ENCOUNTER (OUTPATIENT)
Dept: NUCLEAR MEDICINE | Facility: HOSPITAL | Age: 64
Discharge: HOME | End: 2019-05-10
Attending: INTERNAL MEDICINE
Payer: COMMERCIAL

## 2019-05-10 ENCOUNTER — HOSPITAL ENCOUNTER (OUTPATIENT)
Dept: CV DIAGNOSTICS | Facility: HOSPITAL | Age: 64
Discharge: HOME | End: 2019-05-10
Attending: INTERNAL MEDICINE
Payer: COMMERCIAL

## 2019-05-10 DIAGNOSIS — Z91.89 AT RISK FOR INFECTION: ICD-10-CM

## 2019-05-10 DIAGNOSIS — I83.229 VARICOSE VEINS OF LEFT LEG WITH BOTH ULCER AND INFLAMMATION (HCC): ICD-10-CM

## 2019-05-10 DIAGNOSIS — Z91.89 AT RISK FOR BLEEDING: ICD-10-CM

## 2019-05-10 DIAGNOSIS — I48.0 PAROXYSMAL ATRIAL FIBRILLATION (HCC): ICD-10-CM

## 2019-05-10 DIAGNOSIS — I48.91 ATRIAL FIBRILLATION (HCC): ICD-10-CM

## 2019-05-10 DIAGNOSIS — Z01.818 PREOP TESTING: ICD-10-CM

## 2019-05-10 DIAGNOSIS — Z01.810 PRE-OPERATIVE CARDIOVASCULAR EXAMINATION: ICD-10-CM

## 2019-05-10 DIAGNOSIS — L97.929 VARICOSE VEINS OF LEFT LEG WITH BOTH ULCER AND INFLAMMATION (HCC): ICD-10-CM

## 2019-05-10 LAB
ABSOLUTE IMMATURE GRANULOCYTES (OFFPRE24): NORMAL
ALBUMIN SERPL-MCNC: 4 G/DL
ALBUMIN/GLOB SERPL: 1.2 {RATIO}
ALP SERPL-CCNC: 84 U/L
ALT SERPL-CCNC: 29 U/L
ANION GAP SERPL CALC-SCNC: 5 MMOL/L
AST SERPL-CCNC: 19 U/L
BASO+EOS+MONOS # BLD: NORMAL 10*3/UL
BASO+EOS+MONOS NFR BLD: NORMAL %
BASOPHILS # BLD: NORMAL 10*3/UL
BASOPHILS NFR BLD: NORMAL %
BILIRUB SERPL-MCNC: 0.3 MG/DL
BUN SERPL-MCNC: 23 MG/DL
BUN/CREAT SERPL: 28
CALCIUM SERPL-MCNC: 9.2 MG/DL
CHLORIDE SERPL-SCNC: 108 MMOL/L
CO2 SERPL-SCNC: 26 MMOL/L
CREAT SERPL-MCNC: 0.82 MG/DL
DIFFERENTIAL METHOD BLD: NORMAL
EJECTION FRACTION: 63 %
EOSINOPHIL # BLD: NORMAL 10*3/UL
EOSINOPHIL NFR BLD: NORMAL %
ERYTHROCYTE [DISTWIDTH] IN BLOOD: NORMAL %
GLOBULIN SER-MCNC: 3.3 G/DL
GLUCOSE SERPL-MCNC: 87 MG/DL
HCT VFR BLD CALC: 40.1 %
HGB BLD-MCNC: 13 G/DL
IMMATURE GRANULOCYTES (OFFPRE25): NORMAL
LENGTH OF FAST TIME PATIENT: NORMAL H
LYMPHOCYTES # BLD: NORMAL 10*3/UL
LYMPHOCYTES NFR BLD: NORMAL %
MCH RBC QN AUTO: NORMAL PG
MCHC RBC AUTO-ENTMCNC: NORMAL G/DL
MCV RBC AUTO: NORMAL FL
MONOCYTES # BLD: NORMAL 10*3/UL
MONOCYTES NFR BLD: NORMAL %
MPV (OFFPRE2): NORMAL
NEUTROPHILS # BLD: NORMAL 10*3/UL
NEUTROPHILS NFR BLD: NORMAL %
NRBC BLD MANUAL-RTO: NORMAL %
PLAT MORPH BLD: NORMAL
PLATELET # BLD: 185 10*3/UL
POTASSIUM SERPL-SCNC: 4.2 MMOL/L
PROT SERPL-MCNC: 7.3 G/DL
RBC # BLD: 4.27 10*6/UL
RBC MORPH BLD: NORMAL
SODIUM SERPL-SCNC: 139 MMOL/L
WBC # BLD: 6.1 10*3/UL
WBC MORPH BLD: NORMAL

## 2019-05-10 PROCEDURE — 78452 HT MUSCLE IMAGE SPECT MULT: CPT | Performed by: INTERNAL MEDICINE

## 2019-05-10 PROCEDURE — 85610 PROTHROMBIN TIME: CPT | Performed by: SURGERY

## 2019-05-10 PROCEDURE — 93005 ELECTROCARDIOGRAM TRACING: CPT

## 2019-05-10 PROCEDURE — 93017 CV STRESS TEST TRACING ONLY: CPT | Performed by: INTERNAL MEDICINE

## 2019-05-10 PROCEDURE — 93018 CV STRESS TEST I&R ONLY: CPT | Performed by: INTERNAL MEDICINE

## 2019-05-10 PROCEDURE — 85025 COMPLETE CBC W/AUTO DIFF WBC: CPT | Performed by: SURGERY

## 2019-05-10 PROCEDURE — 93016 CV STRESS TEST SUPVJ ONLY: CPT | Performed by: INTERNAL MEDICINE

## 2019-05-10 PROCEDURE — 80053 COMPREHEN METABOLIC PANEL: CPT | Performed by: SURGERY

## 2019-05-10 PROCEDURE — 93010 ELECTROCARDIOGRAM REPORT: CPT | Performed by: SURGERY

## 2019-05-10 PROCEDURE — 36415 COLL VENOUS BLD VENIPUNCTURE: CPT | Performed by: SURGERY

## 2019-05-10 PROCEDURE — 85730 THROMBOPLASTIN TIME PARTIAL: CPT | Performed by: SURGERY

## 2019-05-10 RX ORDER — 0.9 % SODIUM CHLORIDE 0.9 %
VIAL (ML) INJECTION
Status: COMPLETED
Start: 2019-05-10 | End: 2019-05-10

## 2019-05-10 RX ADMIN — 0.9 % SODIUM CHLORIDE: 0.9 % VIAL (ML) INJECTION at 11:25:00

## 2019-05-13 ENCOUNTER — CLINICAL ABSTRACT (OUTPATIENT)
Dept: CARDIOLOGY | Age: 64
End: 2019-05-13

## 2019-05-13 ENCOUNTER — ANESTHESIA EVENT (OUTPATIENT)
Dept: CARDIAC SURGERY | Facility: HOSPITAL | Age: 64
End: 2019-05-13
Payer: COMMERCIAL

## 2019-05-13 ENCOUNTER — OFFICE VISIT (OUTPATIENT)
Dept: WOUND CARE | Facility: HOSPITAL | Age: 64
End: 2019-05-13
Attending: NURSE PRACTITIONER
Payer: COMMERCIAL

## 2019-05-13 DIAGNOSIS — S91.002A UNSPECIFIED OPEN WOUND, LEFT ANKLE, INITIAL ENCOUNTER: ICD-10-CM

## 2019-05-13 PROCEDURE — 29581 APPL MULTLAYER CMPRN SYS LEG: CPT

## 2019-05-13 NOTE — PROGRESS NOTES
Subjective    Chief Complaint  This information was obtained from the patient  5/6/2019 \"I was in [place] on vacation and noticed my R ankle started to open up this time about 3 wks ago\". 5/13/2019 \"I go in for a vein stripping procedure for my L leg on 5/14/ 5/6/2019 Open wound started up on R medial ankle about 3 wks ago pt is also scheduled for a vein stripping procedure for the LLE with Doctor Joanne Plummer with [facility] in the next couple of weeks.  Pt is looking into the same procedure for his RLE when medical hector Continue per POC as above 1x/wk to resolve wound consider a silver product Aquacel Ag if exudation remains moderate.            Entered By: Lissy Marks on 99/63/5652 9:16:00 AM   Signature(s): Date(s):

## 2019-05-14 ENCOUNTER — HOSPITAL ENCOUNTER (OUTPATIENT)
Facility: HOSPITAL | Age: 64
Setting detail: HOSPITAL OUTPATIENT SURGERY
Discharge: HOME | End: 2019-05-14
Attending: SURGERY | Admitting: SURGERY
Payer: COMMERCIAL

## 2019-05-14 ENCOUNTER — ANESTHESIA (OUTPATIENT)
Dept: CARDIAC SURGERY | Facility: HOSPITAL | Age: 64
End: 2019-05-14
Payer: COMMERCIAL

## 2019-05-14 VITALS
WEIGHT: 224.19 LBS | SYSTOLIC BLOOD PRESSURE: 135 MMHG | HEART RATE: 74 BPM | RESPIRATION RATE: 19 BRPM | BODY MASS INDEX: 28.77 KG/M2 | TEMPERATURE: 98 F | OXYGEN SATURATION: 96 % | HEIGHT: 74 IN | DIASTOLIC BLOOD PRESSURE: 87 MMHG

## 2019-05-14 DIAGNOSIS — I83.229 VARICOSE VEINS OF LEFT LEG WITH BOTH ULCER AND INFLAMMATION (HCC): ICD-10-CM

## 2019-05-14 DIAGNOSIS — Z01.818 PREOP TESTING: Primary | ICD-10-CM

## 2019-05-14 DIAGNOSIS — Z91.89 AT RISK FOR BLEEDING: ICD-10-CM

## 2019-05-14 DIAGNOSIS — Z91.89 AT RISK FOR INFECTION: ICD-10-CM

## 2019-05-14 DIAGNOSIS — L97.929 VARICOSE VEINS OF LEFT LEG WITH BOTH ULCER AND INFLAMMATION (HCC): ICD-10-CM

## 2019-05-14 DIAGNOSIS — I48.91 ATRIAL FIBRILLATION (HCC): ICD-10-CM

## 2019-05-14 PROCEDURE — 06DY0ZZ EXTRACTION OF LOWER VEIN, OPEN APPROACH: ICD-10-PCS | Performed by: SURGERY

## 2019-05-14 PROCEDURE — 06LQ0ZZ OCCLUSION OF LEFT SAPHENOUS VEIN, OPEN APPROACH: ICD-10-PCS | Performed by: SURGERY

## 2019-05-14 PROCEDURE — 85610 PROTHROMBIN TIME: CPT | Performed by: SURGERY

## 2019-05-14 PROCEDURE — 85730 THROMBOPLASTIN TIME PARTIAL: CPT | Performed by: SURGERY

## 2019-05-14 RX ORDER — CEFAZOLIN SODIUM/WATER 2 G/20 ML
SYRINGE (ML) INTRAVENOUS
Status: DISCONTINUED | OUTPATIENT
Start: 2019-05-14 | End: 2019-05-14

## 2019-05-14 RX ORDER — SODIUM CHLORIDE, SODIUM LACTATE, POTASSIUM CHLORIDE, CALCIUM CHLORIDE 600; 310; 30; 20 MG/100ML; MG/100ML; MG/100ML; MG/100ML
INJECTION, SOLUTION INTRAVENOUS CONTINUOUS
Status: DISCONTINUED | OUTPATIENT
Start: 2019-05-14 | End: 2019-05-14

## 2019-05-14 RX ORDER — ONDANSETRON 2 MG/ML
4 INJECTION INTRAMUSCULAR; INTRAVENOUS AS NEEDED
Status: DISCONTINUED | OUTPATIENT
Start: 2019-05-14 | End: 2019-05-14 | Stop reason: HOSPADM

## 2019-05-14 RX ORDER — HYDROCODONE BITARTRATE AND ACETAMINOPHEN 10; 325 MG/1; MG/1
1 TABLET ORAL EVERY 4 HOURS PRN
Status: DISCONTINUED | OUTPATIENT
Start: 2019-05-14 | End: 2019-05-14

## 2019-05-14 RX ORDER — METOCLOPRAMIDE HYDROCHLORIDE 5 MG/ML
10 INJECTION INTRAMUSCULAR; INTRAVENOUS AS NEEDED
Status: DISCONTINUED | OUTPATIENT
Start: 2019-05-14 | End: 2019-05-14 | Stop reason: HOSPADM

## 2019-05-14 RX ORDER — HYDROCODONE BITARTRATE AND ACETAMINOPHEN 5; 325 MG/1; MG/1
2 TABLET ORAL AS NEEDED
Status: DISCONTINUED | OUTPATIENT
Start: 2019-05-14 | End: 2019-05-14 | Stop reason: HOSPADM

## 2019-05-14 RX ORDER — BUPIVACAINE HYDROCHLORIDE 5 MG/ML
INJECTION, SOLUTION PERINEURAL AS NEEDED
Status: DISCONTINUED | OUTPATIENT
Start: 2019-05-14 | End: 2019-05-14

## 2019-05-14 RX ORDER — HYDROCODONE BITARTRATE AND ACETAMINOPHEN 5; 325 MG/1; MG/1
1 TABLET ORAL AS NEEDED
Status: DISCONTINUED | OUTPATIENT
Start: 2019-05-14 | End: 2019-05-14 | Stop reason: HOSPADM

## 2019-05-14 RX ORDER — HYDROMORPHONE HYDROCHLORIDE 1 MG/ML
0.5 INJECTION, SOLUTION INTRAMUSCULAR; INTRAVENOUS; SUBCUTANEOUS EVERY 5 MIN PRN
Status: DISCONTINUED | OUTPATIENT
Start: 2019-05-14 | End: 2019-05-14 | Stop reason: HOSPADM

## 2019-05-14 RX ORDER — NALOXONE HYDROCHLORIDE 0.4 MG/ML
80 INJECTION, SOLUTION INTRAMUSCULAR; INTRAVENOUS; SUBCUTANEOUS AS NEEDED
Status: DISCONTINUED | OUTPATIENT
Start: 2019-05-14 | End: 2019-05-14 | Stop reason: HOSPADM

## 2019-05-14 RX ORDER — HYDROCODONE BITARTRATE AND ACETAMINOPHEN 10; 325 MG/1; MG/1
1 TABLET ORAL EVERY 4 HOURS PRN
Qty: 30 TABLET | Refills: 0 | Status: ON HOLD | OUTPATIENT
Start: 2019-05-14 | End: 2019-11-23 | Stop reason: ALTCHOICE

## 2019-05-14 NOTE — ANESTHESIA POSTPROCEDURE EVALUATION
[facility] Patient Status:  Inpatient   Age/Gender 58year old male MRN [id number]   Location [address] [place] [address] Attending Antwan Rivera MD   Hosp Day # 0 PCP Jhonatan Robles MD       Anesthesia Post-op Note

## 2019-05-14 NOTE — ANESTHESIA PREPROCEDURE EVALUATION
PRE-OP EVALUATION    Patient Name: Kimberlyn Kirkland    Pre-op Diagnosis: varicose veins with ulcer and inflammation    Procedure(s):  left stab phlebectomy   Milton MULLER    Surgeon(s) and Role:     Tori Crawley MD - Primary    Pre-op vitals reviewed. problems/murmurs     (+) dysrhythmias and atrial fibrillation                  Endo/Other                           (+) arthritis       Pulmonary                    (+) sleep apnea and CPAP      Neuro/Psych    Negative neuro/psych ROS. guidelines. Post-procedure pain management plan discussed with surgeon and patient.       Plan/risks discussed with: patient and spouse                Present on Admission:  **None**

## 2019-05-14 NOTE — OPERATIVE REPORT
[facility]    PATIENT'S NAME: Vaishali Ware   ATTENDING PHYSICIAN: Colleen Orozco M.D. OPERATING PHYSICIAN: Colleen Orozco M.D.    PATIENT ACCOUNT#:   [de-identified]    LOCATION:  [address] [place] [address] 10  MEDICAL RECORD #:   [id number] and the incision was then closed with 2 layers, 1 of interrupted Vicryl and 1 of running Monocryl.   I then continued phlebectomy proximally and performed a stab phlebectomy of the midthigh with a total of 15 incisions, removing all the varicose veins based

## 2019-05-14 NOTE — BRIEF OP NOTE
Pre-Operative Diagnosis: varicose veins with ulcer and inflammation     Post-Operative Diagnosis: Varicose veins with ulcer and inflammation      Procedure Performed:   1. Left leg stab phlebectomy 15 incisions  2. Ligation of mid calf   3.  Binta Garcia

## 2019-05-20 ENCOUNTER — OFFICE VISIT (OUTPATIENT)
Dept: WOUND CARE | Facility: HOSPITAL | Age: 64
End: 2019-05-20
Attending: NURSE PRACTITIONER
Payer: COMMERCIAL

## 2019-05-20 DIAGNOSIS — I83.009 VARICOSE VEINS OF LOWER EXTREMITIES WITH ULCER (HCC): ICD-10-CM

## 2019-05-20 DIAGNOSIS — L97.329 NON-PRESSURE CHRONIC ULCER OF LEFT ANKLE (HCC): ICD-10-CM

## 2019-05-20 DIAGNOSIS — I83.023 VARICOSE VEINS OF LEFT LOWER EXTREMITY WITH ULCER OF ANKLE LIMITED TO BREAKDOWN OF SKIN (HCC): ICD-10-CM

## 2019-05-20 DIAGNOSIS — L97.909 VARICOSE VEINS OF LOWER EXTREMITIES WITH ULCER (HCC): ICD-10-CM

## 2019-05-20 DIAGNOSIS — L97.321 VARICOSE VEINS OF LEFT LOWER EXTREMITY WITH ULCER OF ANKLE LIMITED TO BREAKDOWN OF SKIN (HCC): ICD-10-CM

## 2019-05-20 DIAGNOSIS — S91.002A UNSPECIFIED OPEN WOUND, LEFT ANKLE, INITIAL ENCOUNTER: Primary | ICD-10-CM

## 2019-05-20 DIAGNOSIS — I87.2 VENOUS (PERIPHERAL) INSUFFICIENCY: ICD-10-CM

## 2019-05-20 PROCEDURE — 97597 DBRDMT OPN WND 1ST 20 CM/<: CPT

## 2019-05-21 NOTE — PROGRESS NOTES
Subjective    Chief Complaint  This information was obtained from the patient  5/6/2019 \"I was in [place] on vacation and noticed my R ankle started to open up this time about 3 wks ago\". 5/13/2019 \"I go in for a vein stripping procedure for my L leg on 5/14/ HPI 12/2018: L lat foot/ankle wound began ~ 1 month ago when pt was in [facility] on vacation. Wearing compression stockings on and off- noticed increased edema. Pt sched to see vascular surgeon.     5/6/2019 Open wound started up on R medial ankle about 3 wks ag I83.009 - Varicose veins of unspecified lower extremity with ulcer of unspecified site  L97.909 - Non-pressure chronic ulcer of unspecified part of unspecified lower leg with unspecified severity        Plan      Continue per POC 1x/wk 30 min each session.

## 2019-05-22 NOTE — H&P
[facility]  Vascular Surgery Consultation    [facility] Patient Status:  Hospital Outpatient Surgery    1955 MRN [id number]   Location [address] [place] [address] Attending No att. providers found   Hosp Day # 0 PCP Zabrina Olson denies cough, sputum, trouble breathing/SOB, chest pain, SOLOMON  GI: denies abdominal pain, heartburn, diarrhea, blood in bm, tarry bm, constipation,    : denies difficulty urinating, pain, blood in urine, or frequency  SKIN: denies any unusual skin lesions

## 2019-05-23 ENCOUNTER — ANTI-COAG (OUTPATIENT)
Dept: CARDIOLOGY | Age: 64
End: 2019-05-23

## 2019-05-23 DIAGNOSIS — I48.91 ATRIAL FIBRILLATION, UNSPECIFIED TYPE (CMD): ICD-10-CM

## 2019-05-28 ENCOUNTER — OFFICE VISIT (OUTPATIENT)
Dept: WOUND CARE | Facility: HOSPITAL | Age: 64
End: 2019-05-28
Attending: NURSE PRACTITIONER
Payer: COMMERCIAL

## 2019-05-28 DIAGNOSIS — I87.2 VENOUS (PERIPHERAL) INSUFFICIENCY: ICD-10-CM

## 2019-05-28 DIAGNOSIS — S91.002A UNSPECIFIED OPEN WOUND, LEFT ANKLE, INITIAL ENCOUNTER: Primary | ICD-10-CM

## 2019-05-28 PROCEDURE — 29581 APPL MULTLAYER CMPRN SYS LEG: CPT

## 2019-05-28 NOTE — PROGRESS NOTES
Subjective    Chief Complaint  This information was obtained from the patient  5/28/2019 \"The wound is hurting a bit maybe you can put something on it to calm it down and it seems a little bigger but more pink in the middle\".      Allergies  adhesive tape 5/6/2019 Open wound started up on R medial ankle about 3 wks ago pt is also scheduled for a vein stripping procedure for the LLE with Doctor Bernice Park with [facility] in the next couple of weeks.  Pt is looking into the same procedure for his RLE when medical progres L97.909 - Non-pressure chronic ulcer of unspecified part of unspecified lower leg with unspecified severity        Plan    Continue per POC to resolve wound and transition to self care with compression stocking 20-30 mmHg or greater TBA as edema requires.

## 2019-06-03 ENCOUNTER — OFFICE VISIT (OUTPATIENT)
Dept: WOUND CARE | Facility: HOSPITAL | Age: 64
End: 2019-06-03
Attending: NURSE PRACTITIONER
Payer: COMMERCIAL

## 2019-06-03 DIAGNOSIS — I83.023 VARICOSE VEINS OF LEFT LOWER EXTREMITY WITH ULCER OF ANKLE (HCC): ICD-10-CM

## 2019-06-03 DIAGNOSIS — L97.329 VARICOSE VEINS OF LEFT LOWER EXTREMITY WITH ULCER OF ANKLE (HCC): ICD-10-CM

## 2019-06-03 DIAGNOSIS — L97.321 VARICOSE VEINS OF LEFT LOWER EXTREMITY WITH ULCER OF ANKLE LIMITED TO BREAKDOWN OF SKIN (HCC): ICD-10-CM

## 2019-06-03 DIAGNOSIS — L97.329 NON-PRESSURE CHRONIC ULCER OF LEFT ANKLE (HCC): ICD-10-CM

## 2019-06-03 DIAGNOSIS — I87.2 VENOUS (PERIPHERAL) INSUFFICIENCY: ICD-10-CM

## 2019-06-03 DIAGNOSIS — L97.909 VARICOSE VEINS OF LOWER EXTREMITIES WITH ULCER (HCC): ICD-10-CM

## 2019-06-03 DIAGNOSIS — S91.002A UNSPECIFIED OPEN WOUND, LEFT ANKLE, INITIAL ENCOUNTER: Primary | ICD-10-CM

## 2019-06-03 DIAGNOSIS — I83.023 VARICOSE VEINS OF LEFT LOWER EXTREMITY WITH ULCER OF ANKLE LIMITED TO BREAKDOWN OF SKIN (HCC): ICD-10-CM

## 2019-06-03 DIAGNOSIS — I83.009 VARICOSE VEINS OF LOWER EXTREMITIES WITH ULCER (HCC): ICD-10-CM

## 2019-06-03 PROCEDURE — 97597 DBRDMT OPN WND 1ST 20 CM/<: CPT

## 2019-06-03 NOTE — PROGRESS NOTES
Subjective    Chief Complaint  This information was obtained from the patient  5/28/2019 \"The wound is hurting a bit maybe you can put something on it to calm it down and it seems a little bigger but more pink in the middle\".      Allergies  adhesive tape 5/6/2019 Open wound started up on R medial ankle about 3 wks ago pt is also scheduled for a vein stripping procedure for the LLE with Doctor Moisés Armendariz with [facility] in the next couple of weeks.  Pt is looking into the same procedure for his RLE when medical hector L97.909 - Non-pressure chronic ulcer of unspecified part of unspecified lower leg with unspecified severity        Plan  Continue per POC scerotherapy intervention is pending to assist with wound prevention and venous insufficiency management.      Wound Or

## 2019-06-10 ENCOUNTER — OFFICE VISIT (OUTPATIENT)
Dept: WOUND CARE | Facility: HOSPITAL | Age: 64
End: 2019-06-10
Attending: NURSE PRACTITIONER
Payer: COMMERCIAL

## 2019-06-10 PROCEDURE — 29581 APPL MULTLAYER CMPRN SYS LEG: CPT

## 2019-06-10 NOTE — PROGRESS NOTES
Subjective    Chief Complaint  This information was obtained from the patient  6/10/2019 \"I may need a referral for the L leg same spot I have some new skin breakdown, I think it is the constant standing in my job and I plan to retire in 6 months\".      A 5/6/2019 Open wound started up on R medial ankle about 3 wks ago pt is also scheduled for a vein stripping procedure for the LLE with Doctor Mary England with [facility] in the next couple of weeks.  Pt is looking into the same procedure for his RLE when medical hector Pt presents with slightly improved wound area with remnants of dry exudates encrusted with each return to the [facility]. The wound edges were debrided on non viable tissue and specialty dressings were applied to promte moist wound healing.  A foam lite dressing w

## 2019-06-17 ENCOUNTER — OFFICE VISIT (OUTPATIENT)
Dept: WOUND CARE | Facility: HOSPITAL | Age: 64
End: 2019-06-17
Attending: NURSE PRACTITIONER
Payer: COMMERCIAL

## 2019-06-17 DIAGNOSIS — L97.909 VARICOSE VEINS OF LOWER EXTREMITIES WITH ULCER (HCC): ICD-10-CM

## 2019-06-17 DIAGNOSIS — L97.329 NON-PRESSURE CHRONIC ULCER OF LEFT ANKLE (HCC): ICD-10-CM

## 2019-06-17 DIAGNOSIS — I83.023 VARICOSE VEINS OF LEFT LOWER EXTREMITY WITH ULCER OF ANKLE (HCC): ICD-10-CM

## 2019-06-17 DIAGNOSIS — L97.329 VARICOSE VEINS OF LEFT LOWER EXTREMITY WITH ULCER OF ANKLE (HCC): ICD-10-CM

## 2019-06-17 DIAGNOSIS — I83.009 VARICOSE VEINS OF LOWER EXTREMITIES WITH ULCER (HCC): ICD-10-CM

## 2019-06-17 DIAGNOSIS — L97.321 VARICOSE VEINS OF LEFT LOWER EXTREMITY WITH ULCER OF ANKLE LIMITED TO BREAKDOWN OF SKIN (HCC): ICD-10-CM

## 2019-06-17 DIAGNOSIS — S91.002A UNSPECIFIED OPEN WOUND, LEFT ANKLE, INITIAL ENCOUNTER: Primary | ICD-10-CM

## 2019-06-17 DIAGNOSIS — I83.023 VARICOSE VEINS OF LEFT LOWER EXTREMITY WITH ULCER OF ANKLE LIMITED TO BREAKDOWN OF SKIN (HCC): ICD-10-CM

## 2019-06-17 DIAGNOSIS — I87.2 VENOUS (PERIPHERAL) INSUFFICIENCY: ICD-10-CM

## 2019-06-17 PROCEDURE — 29581 APPL MULTLAYER CMPRN SYS LEG: CPT

## 2019-06-17 PROCEDURE — 99213 OFFICE O/P EST LOW 20 MIN: CPT

## 2019-06-17 NOTE — PROGRESS NOTES
Subjective    Chief Complaint  This information was obtained from the patient  6/17/2019 \"I feel ok the wound is a bit wet but their is less dead tissue present\".      Allergies  adhesive tape (Reaction: rash)    HPI  This information was obtained from th Constitutional  Height/Length: 74 in (187.96 cm), Weight: 225 lbs (102.27 kgs), BMI: 28.9, Temperature: 99.1 °F (37.28 °C), Pulse: 76 bpm, Respiratory Rate: 17 breaths/min, Blood Pressure: 122/77 mmHg, Pulse Oximetry: 95 %.      Integumentary (Hair, Skin)

## 2019-06-20 ENCOUNTER — ANTI-COAG (OUTPATIENT)
Dept: CARDIOLOGY | Age: 64
End: 2019-06-20

## 2019-06-20 DIAGNOSIS — I48.91 ATRIAL FIBRILLATION, UNSPECIFIED TYPE (CMD): ICD-10-CM

## 2019-06-21 ENCOUNTER — APPOINTMENT (OUTPATIENT)
Dept: LAB | Age: 64
End: 2019-06-21
Attending: INTERNAL MEDICINE
Payer: COMMERCIAL

## 2019-06-21 DIAGNOSIS — I48.91 ATRIAL FIBRILLATION, UNSPECIFIED TYPE (HCC): ICD-10-CM

## 2019-06-21 LAB — INR PPP: 1.7

## 2019-06-21 PROCEDURE — 85610 PROTHROMBIN TIME: CPT

## 2019-06-21 PROCEDURE — 36415 COLL VENOUS BLD VENIPUNCTURE: CPT

## 2019-06-24 ENCOUNTER — OFFICE VISIT (OUTPATIENT)
Dept: WOUND CARE | Facility: HOSPITAL | Age: 64
End: 2019-06-24
Attending: NURSE PRACTITIONER
Payer: COMMERCIAL

## 2019-06-24 DIAGNOSIS — L97.321 VARICOSE VEINS OF LEFT LOWER EXTREMITY WITH ULCER OF ANKLE LIMITED TO BREAKDOWN OF SKIN (HCC): Primary | ICD-10-CM

## 2019-06-24 DIAGNOSIS — I83.023 VARICOSE VEINS OF LEFT LOWER EXTREMITY WITH ULCER OF ANKLE LIMITED TO BREAKDOWN OF SKIN (HCC): Primary | ICD-10-CM

## 2019-06-24 PROCEDURE — 29581 APPL MULTLAYER CMPRN SYS LEG: CPT

## 2019-06-24 NOTE — PROGRESS NOTES
Subjective    Chief Complaint  This information was obtained from the patient  6/24/19: No new complaints.  \"The wound is definitely getting smaller\"    Allergies  adhesive tape (Reaction: rash)    HPI  This information was obtained from the patient    HP Height/Length: 74 in (187.96 cm), Weight: 225 lbs (102.27 kgs), BMI: 28.9, Temperature: 97.1 °F (36.17 °C), Pulse: 60 bpm, Respiratory Rate: 17 breaths/min, Blood Pressure: 147/87 mmHg, Pulse Oximetry: 97 %.      Integumentary (Hair, Skin)  Wound #7 Right, Wound #7 (Right, Distal, Medial Ankle)  . Wound Treatment Note  Limb cleansed using Soap and water (1)  Cleansed wound and periwound with non-cytotoxic agent. using Wound Cleanser Spray (1)  Applied Primary Wound Dressing.  using Xeroform 2x2 (2)  Applied Se

## 2019-06-28 ENCOUNTER — ANTI-COAG (OUTPATIENT)
Dept: CARDIOLOGY | Age: 64
End: 2019-06-28

## 2019-06-28 DIAGNOSIS — I48.91 ATRIAL FIBRILLATION, UNSPECIFIED TYPE (CMD): ICD-10-CM

## 2019-07-01 ENCOUNTER — OFFICE VISIT (OUTPATIENT)
Dept: WOUND CARE | Facility: HOSPITAL | Age: 64
End: 2019-07-01
Attending: NURSE PRACTITIONER
Payer: COMMERCIAL

## 2019-07-01 DIAGNOSIS — L97.321 VARICOSE VEINS OF LEFT LOWER EXTREMITY WITH ULCER OF ANKLE LIMITED TO BREAKDOWN OF SKIN (HCC): Primary | ICD-10-CM

## 2019-07-01 DIAGNOSIS — I83.023 VARICOSE VEINS OF LEFT LOWER EXTREMITY WITH ULCER OF ANKLE LIMITED TO BREAKDOWN OF SKIN (HCC): Primary | ICD-10-CM

## 2019-07-01 PROCEDURE — 99212 OFFICE O/P EST SF 10 MIN: CPT

## 2019-07-01 NOTE — PROGRESS NOTES
Subjective    Chief Complaint  This information was obtained from the patient  07/01/19: \"I need to have wound care on the L ankle now\"    Allergies  adhesive tape (Reaction: rash)    HPI  This information was obtained from the patient    HPI 1/27/16: Pt Wound #7 Right, Distal, Medial Ankle is a chronic Venous Ulcer and has received an outcome of Resolved. Subsequent wound encounter measurements are 0cm length x 0cm width x 0cm depth, with an area of 0 sq cm and a volume of 0 cubic cm.  There is a scant yasmeen Lynn Teague DPT, [facility]OC[facility] 07/01/2019 11:02:38 AM       Entered By: Lynn Teague on 07/01/2019 11:01:58 AM   Treatment Notes Summary  Wound #7 (Right, Distal, Medial Ankle)  . Wound Treatment Note  Limb cleansed using Soap and water (1)  Cleansed wound and p

## 2019-07-08 ENCOUNTER — ANTI-COAG (OUTPATIENT)
Dept: CARDIOLOGY | Age: 64
End: 2019-07-08

## 2019-07-08 ENCOUNTER — APPOINTMENT (OUTPATIENT)
Dept: LAB | Age: 64
End: 2019-07-08
Attending: INTERNAL MEDICINE
Payer: COMMERCIAL

## 2019-07-08 DIAGNOSIS — I48.91 ATRIAL FIBRILLATION, UNSPECIFIED TYPE (HCC): ICD-10-CM

## 2019-07-08 DIAGNOSIS — I48.91 ATRIAL FIBRILLATION, UNSPECIFIED TYPE (CMD): ICD-10-CM

## 2019-07-08 LAB
INR BLD: 2.95 (ref 0.9–1.2)
INR PPP: 2.9
PROTHROMBIN TIME: 31.3 SECONDS (ref 11.8–14.5)

## 2019-07-08 PROCEDURE — 85610 PROTHROMBIN TIME: CPT

## 2019-07-08 PROCEDURE — 36415 COLL VENOUS BLD VENIPUNCTURE: CPT

## 2019-07-09 ENCOUNTER — OFFICE VISIT (OUTPATIENT)
Dept: WOUND CARE | Facility: HOSPITAL | Age: 64
End: 2019-07-09
Attending: NURSE PRACTITIONER
Payer: COMMERCIAL

## 2019-07-09 DIAGNOSIS — L97.329 VARICOSE VEINS OF LEFT LOWER EXTREMITY WITH ULCER OF ANKLE (HCC): ICD-10-CM

## 2019-07-09 DIAGNOSIS — I83.023 VARICOSE VEINS OF LEFT LOWER EXTREMITY WITH ULCER OF ANKLE (HCC): ICD-10-CM

## 2019-07-09 DIAGNOSIS — S91.002A UNSPECIFIED OPEN WOUND, LEFT ANKLE, INITIAL ENCOUNTER: ICD-10-CM

## 2019-07-09 DIAGNOSIS — L97.321 VARICOSE VEINS OF LEFT LOWER EXTREMITY WITH ULCER OF ANKLE LIMITED TO BREAKDOWN OF SKIN (HCC): Primary | ICD-10-CM

## 2019-07-09 DIAGNOSIS — I83.023 VARICOSE VEINS OF LEFT LOWER EXTREMITY WITH ULCER OF ANKLE LIMITED TO BREAKDOWN OF SKIN (HCC): Primary | ICD-10-CM

## 2019-07-09 DIAGNOSIS — I83.009 VARICOSE VEINS OF LOWER EXTREMITIES WITH ULCER (HCC): ICD-10-CM

## 2019-07-09 DIAGNOSIS — I87.2 VENOUS (PERIPHERAL) INSUFFICIENCY: ICD-10-CM

## 2019-07-09 DIAGNOSIS — L97.909 VARICOSE VEINS OF LOWER EXTREMITIES WITH ULCER (HCC): ICD-10-CM

## 2019-07-09 DIAGNOSIS — L97.329 NON-PRESSURE CHRONIC ULCER OF LEFT ANKLE (HCC): ICD-10-CM

## 2019-07-09 PROCEDURE — 99213 OFFICE O/P EST LOW 20 MIN: CPT

## 2019-07-09 NOTE — PROGRESS NOTES
Subjective    Chief Complaint  This information was obtained from the patient  07/09/19: \"I need to have wound care on the L ankle now\"    Allergies  adhesive tape (Reaction: rash)    HPI  This information was obtained from the patient    HPI 1/27/16: Pt 5/6/2019 Open wound started up on R medial ankle about 3 wks ago pt is also scheduled for a vein stripping procedure for the LLE with Doctor Bernice Park with [facility] in the next couple of weeks.  Pt is looking into the same procedure for his RLE when medical progres The periwound skin color is normal. The periwound skin exhibited: Edema, Friable, Moist. The periwound skin did not exhibit: Brawny Induration, Excoriation, Induration, Callus, Crepitus, Fluctuance, Rash, Dry/Scaly, Maceration.         Assessment  Pt presen

## 2019-07-15 ENCOUNTER — OFFICE VISIT (OUTPATIENT)
Dept: WOUND CARE | Facility: HOSPITAL | Age: 64
End: 2019-07-15
Attending: NURSE PRACTITIONER
Payer: COMMERCIAL

## 2019-07-15 DIAGNOSIS — L97.321 VARICOSE VEINS OF LEFT LOWER EXTREMITY WITH ULCER OF ANKLE LIMITED TO BREAKDOWN OF SKIN (HCC): Primary | ICD-10-CM

## 2019-07-15 DIAGNOSIS — I83.023 VARICOSE VEINS OF LEFT LOWER EXTREMITY WITH ULCER OF ANKLE LIMITED TO BREAKDOWN OF SKIN (HCC): Primary | ICD-10-CM

## 2019-07-15 PROCEDURE — 97597 DBRDMT OPN WND 1ST 20 CM/<: CPT

## 2019-07-15 NOTE — PROGRESS NOTES
Subjective    Chief Complaint  This information was obtained from the patient  The patient returns today for follow up and management of Venous Wound(s).  L medial ankle venous ulcer  7/15/19: \"I finally got the order for the L leg\"    Allergies  adhesive 5/6/2019 Open wound started up on R medial ankle about 3 wks ago pt is also scheduled for a vein stripping procedure for the LLE with Doctor Godwin Higgins with [facility] in the next couple of weeks.  Pt is looking into the same procedure for his RLE when medical hector Assessment  Pt has been treated at the [facility] for 2 months for R medial ankle venous ulceration. He has healed and reopened slightly, but today pt's R medial ankle wound is healed. Pt  has been covering and states he has not had any drainage all week.     He Limb cleansed using Soap and water (1)  Cleansed wound and periwound with non-cytotoxic agent. using Wound Cleanser Spray (1)  Applied Primary Wound Dressing. using Vaseline Gauze (1)  Applied Secondary Wound Dressing.  using Foam lite bordered dressing (1) Debridement instrument used: using Scalpel (1)  Assured hemostasis using Pressure (1)  Written PT Goals - . Short Term (4-6 weeks)  Reduce wound area by 100%  Decrease depth by 75%  Written PT Goals - Long Term (8-12 weeks)  Reduce wound area by 100%  Wound

## 2019-07-22 ENCOUNTER — OFFICE VISIT (OUTPATIENT)
Dept: WOUND CARE | Facility: HOSPITAL | Age: 64
End: 2019-07-22
Attending: NURSE PRACTITIONER
Payer: COMMERCIAL

## 2019-07-22 DIAGNOSIS — I83.023 VARICOSE VEINS OF LEFT LOWER EXTREMITY WITH ULCER OF ANKLE LIMITED TO BREAKDOWN OF SKIN (HCC): Primary | ICD-10-CM

## 2019-07-22 DIAGNOSIS — L97.321 VARICOSE VEINS OF LEFT LOWER EXTREMITY WITH ULCER OF ANKLE LIMITED TO BREAKDOWN OF SKIN (HCC): Primary | ICD-10-CM

## 2019-07-22 PROCEDURE — 29581 APPL MULTLAYER CMPRN SYS LEG: CPT

## 2019-07-22 NOTE — PROGRESS NOTES
Subjective    Chief Complaint  This information was obtained from the patient  The patient returns today for follow up and management of Venous Wound(s).  L medial ankle venous ulcer  7/22/19: Pt notes that he is wearing compression and a kidney comprex to 5/6/2019 Open wound started up on R medial ankle about 3 wks ago pt is also scheduled for a vein stripping procedure for the LLE with Doctor Marcus Seen with [facility] in the next couple of weeks.  Pt is looking into the same procedure for his RLE when medical progres Pt is awaiting further venous intervention as well, which may help wound healing progression.     Active Problems    ICD-10  I83.009 - Varicose veins of unspecified lower extremity with ulcer of unspecified site  L97.909 - Non-pressure chronic ulcer of unsp Patient and/or family to be independent with use of compression garments.   Achieve wound closure to promote return to normal functional activities  Notes  Tubigrip to cover comprilan over the top

## 2019-07-29 ENCOUNTER — OFFICE VISIT (OUTPATIENT)
Dept: WOUND CARE | Facility: HOSPITAL | Age: 64
End: 2019-07-29
Attending: NURSE PRACTITIONER
Payer: COMMERCIAL

## 2019-07-29 DIAGNOSIS — I83.023 VARICOSE VEINS OF LEFT LOWER EXTREMITY WITH ULCER OF ANKLE LIMITED TO BREAKDOWN OF SKIN (HCC): Primary | ICD-10-CM

## 2019-07-29 DIAGNOSIS — L97.321 VARICOSE VEINS OF LEFT LOWER EXTREMITY WITH ULCER OF ANKLE LIMITED TO BREAKDOWN OF SKIN (HCC): Primary | ICD-10-CM

## 2019-07-29 PROCEDURE — 29581 APPL MULTLAYER CMPRN SYS LEG: CPT

## 2019-07-29 NOTE — PROGRESS NOTES
Subjective    Chief Complaint  This information was obtained from the patient  The patient returns today for follow up and management of Venous Wound(s).  L medial ankle venous ulcer  7/29/19: \"I went swimming on Saturday, with a waterproof bandage, but I 5/6/2019 Open wound started up on R medial ankle about 3 wks ago pt is also scheduled for a vein stripping procedure for the LLE with Doctor Graeme Salgado with [facility] in the next couple of weeks.  Pt is looking into the same procedure for his RLE when medical progres ICD-10  I83.009 - Varicose veins of unspecified lower extremity with ulcer of unspecified site  L97.909 - Non-pressure chronic ulcer of unspecified part of unspecified lower leg with unspecified severity        Plan  Cont 1x/week per POC to progress toward Tubigrip to cover comprilan over the top

## 2019-08-05 ENCOUNTER — OFFICE VISIT (OUTPATIENT)
Dept: WOUND CARE | Facility: HOSPITAL | Age: 64
End: 2019-08-05
Attending: NURSE PRACTITIONER
Payer: COMMERCIAL

## 2019-08-05 DIAGNOSIS — I83.023 VARICOSE VEINS OF LEFT LOWER EXTREMITY WITH ULCER OF ANKLE LIMITED TO BREAKDOWN OF SKIN (HCC): Primary | ICD-10-CM

## 2019-08-05 DIAGNOSIS — L97.321 VARICOSE VEINS OF LEFT LOWER EXTREMITY WITH ULCER OF ANKLE LIMITED TO BREAKDOWN OF SKIN (HCC): Primary | ICD-10-CM

## 2019-08-05 PROCEDURE — 29581 APPL MULTLAYER CMPRN SYS LEG: CPT

## 2019-08-05 NOTE — PROGRESS NOTES
Subjective    Chief Complaint  This information was obtained from the patient  The patient returns today for follow up and management of Venous Wound(s). L medial ankle venous ulcer  8/05/19: No new wound complaints.     Allergies  adhesive tape (Reaction: 5/6/2019 Open wound started up on R medial ankle about 3 wks ago pt is also scheduled for a vein stripping procedure for the LLE with Doctor Leonardo Miller with [facility] in the next couple of weeks.  Pt is looking into the same procedure for his RLE when medical progres L97.909 - Non-pressure chronic ulcer of unspecified part of unspecified lower leg with unspecified severity        Plan  Cont 1x/week per POC.     Wound Orders:  Wound #1a Left, Medial Ankle     Follow-Up Appointments  Return Appointment: - PT wound care x

## 2019-08-12 ENCOUNTER — OFFICE VISIT (OUTPATIENT)
Dept: WOUND CARE | Facility: HOSPITAL | Age: 64
End: 2019-08-12
Attending: NURSE PRACTITIONER
Payer: COMMERCIAL

## 2019-08-12 DIAGNOSIS — L97.321 VARICOSE VEINS OF LEFT LOWER EXTREMITY WITH ULCER OF ANKLE LIMITED TO BREAKDOWN OF SKIN (HCC): Primary | ICD-10-CM

## 2019-08-12 DIAGNOSIS — I83.023 VARICOSE VEINS OF LEFT LOWER EXTREMITY WITH ULCER OF ANKLE LIMITED TO BREAKDOWN OF SKIN (HCC): Primary | ICD-10-CM

## 2019-08-12 PROCEDURE — 29581 APPL MULTLAYER CMPRN SYS LEG: CPT

## 2019-08-12 NOTE — PROGRESS NOTES
Subjective    Chief Complaint  This information was obtained from the patient  The patient returns today for follow up and management of Venous Wound(s). L medial ankle venous ulcer  8/12/19: No new wound complaints.     Allergies  adhesive tape (Reaction: 5/6/2019 Open wound started up on R medial ankle about 3 wks ago pt is also scheduled for a vein stripping procedure for the LLE with Doctor Audrey Walsh with [facility] in the next couple of weeks.  Pt is looking into the same procedure for his RLE when medical progres I83.009 - Varicose veins of unspecified lower extremity with ulcer of unspecified site  L97.909 - Non-pressure chronic ulcer of unspecified part of unspecified lower leg with unspecified severity        Plan  Cont 1x/week for goal of wound healing and succ Patient and/or family to be independent with use of compression garments.   Achieve wound closure to promote return to normal functional activities  Notes  Tubigrip to cover comprilan over the top

## 2019-08-15 ENCOUNTER — TELEPHONE (OUTPATIENT)
Dept: INTERNAL MEDICINE CLINIC | Facility: CLINIC | Age: 64
End: 2019-08-15

## 2019-08-15 ENCOUNTER — ANTI-COAG (OUTPATIENT)
Dept: CARDIOLOGY | Age: 64
End: 2019-08-15

## 2019-08-15 ENCOUNTER — APPOINTMENT (OUTPATIENT)
Dept: LAB | Age: 64
End: 2019-08-15
Attending: INTERNAL MEDICINE
Payer: COMMERCIAL

## 2019-08-15 DIAGNOSIS — I48.91 ATRIAL FIBRILLATION, UNSPECIFIED TYPE (HCC): ICD-10-CM

## 2019-08-15 DIAGNOSIS — I48.91 ATRIAL FIBRILLATION, UNSPECIFIED TYPE (CMD): ICD-10-CM

## 2019-08-15 LAB
INR BLD: 3.43 (ref 0.9–1.2)
INR PPP: 3.4
PROTHROMBIN TIME: 35.4 SECONDS (ref 11.8–14.5)

## 2019-08-15 PROCEDURE — 85610 PROTHROMBIN TIME: CPT

## 2019-08-15 PROCEDURE — 36415 COLL VENOUS BLD VENIPUNCTURE: CPT

## 2019-08-19 ENCOUNTER — OFFICE VISIT (OUTPATIENT)
Dept: WOUND CARE | Facility: HOSPITAL | Age: 64
End: 2019-08-19
Attending: NURSE PRACTITIONER
Payer: COMMERCIAL

## 2019-08-19 DIAGNOSIS — I83.023 VARICOSE VEINS OF LEFT LOWER EXTREMITY WITH ULCER OF ANKLE LIMITED TO BREAKDOWN OF SKIN (HCC): Primary | ICD-10-CM

## 2019-08-19 DIAGNOSIS — L97.321 VARICOSE VEINS OF LEFT LOWER EXTREMITY WITH ULCER OF ANKLE LIMITED TO BREAKDOWN OF SKIN (HCC): Primary | ICD-10-CM

## 2019-08-19 PROCEDURE — 99212 OFFICE O/P EST SF 10 MIN: CPT

## 2019-08-19 NOTE — PROGRESS NOTES
Subjective    Chief Complaint  This information was obtained from the patient  The patient returns today for follow up and management of Venous Wound(s). L medial ankle venous ulcer  8/19/19: No new complaints regarding wound.  \"I know I will need ongoing 5/6/2019 Open wound started up on R medial ankle about 3 wks ago pt is also scheduled for a vein stripping procedure for the LLE with Doctor Solange Florez with [facility] in the next couple of weeks.  Pt is looking into the same procedure for his RLE when medical hector L97.909 - Non-pressure chronic ulcer of unspecified part of unspecified lower leg with unspecified severity        Plan  F/u in 2 weeks to assess response to transition to self-care.   Wound Orders:  Wound #1a Left, Medial Ankle     Follow-Up Appointments

## 2019-08-26 ENCOUNTER — APPOINTMENT (OUTPATIENT)
Dept: WOUND CARE | Facility: HOSPITAL | Age: 64
End: 2019-08-26
Attending: NURSE PRACTITIONER
Payer: COMMERCIAL

## 2019-08-30 ENCOUNTER — APPOINTMENT (OUTPATIENT)
Dept: LAB | Age: 64
End: 2019-08-30
Attending: INTERNAL MEDICINE
Payer: COMMERCIAL

## 2019-08-30 DIAGNOSIS — I48.91 ATRIAL FIBRILLATION, UNSPECIFIED TYPE (HCC): ICD-10-CM

## 2019-08-30 LAB
INR BLD: 2.1 (ref 0.9–1.2)
INR PPP: 2.1
PROTHROMBIN TIME: 23.8 SECONDS (ref 11.8–14.5)

## 2019-08-30 PROCEDURE — 36415 COLL VENOUS BLD VENIPUNCTURE: CPT

## 2019-08-30 PROCEDURE — 85610 PROTHROMBIN TIME: CPT

## 2019-09-03 ENCOUNTER — ANTI-COAG (OUTPATIENT)
Dept: CARDIOLOGY | Age: 64
End: 2019-09-03

## 2019-09-03 DIAGNOSIS — I48.91 ATRIAL FIBRILLATION, UNSPECIFIED TYPE (CMD): ICD-10-CM

## 2019-09-05 ENCOUNTER — APPOINTMENT (OUTPATIENT)
Dept: WOUND CARE | Facility: HOSPITAL | Age: 64
End: 2019-09-05
Attending: NURSE PRACTITIONER
Payer: COMMERCIAL

## 2019-09-09 ENCOUNTER — APPOINTMENT (OUTPATIENT)
Dept: WOUND CARE | Facility: HOSPITAL | Age: 64
End: 2019-09-09
Attending: NURSE PRACTITIONER
Payer: COMMERCIAL

## 2019-09-10 PROBLEM — I10 BENIGN HYPERTENSION: Status: ACTIVE | Noted: 2019-09-10

## 2019-09-10 PROBLEM — I87.2 VENOUS INSUFFICIENCY (CHRONIC) (PERIPHERAL): Status: ACTIVE | Noted: 2019-09-10

## 2019-09-10 PROBLEM — I34.2 MITRAL VALVE STENOSIS, NON-RHEUMATIC: Status: ACTIVE | Noted: 2019-09-10

## 2019-09-10 PROBLEM — I34.0 MITRAL REGURGITATION: Status: ACTIVE | Noted: 2019-09-10

## 2019-09-10 RX ORDER — CHLORAL HYDRATE 500 MG
1 CAPSULE ORAL DAILY
COMMUNITY
Start: 2012-01-22

## 2019-09-10 RX ORDER — GLUCOSAMINE/CHONDROITIN/C/MANG 500-400 MG
1 CAPSULE ORAL DAILY
COMMUNITY
Start: 2012-08-16

## 2019-09-10 RX ORDER — MULTIVITAMIN
1 CAPSULE ORAL DAILY
COMMUNITY
Start: 2011-05-16

## 2019-09-11 ENCOUNTER — OFFICE VISIT (OUTPATIENT)
Dept: CARDIOLOGY | Age: 64
End: 2019-09-11

## 2019-09-11 VITALS
HEART RATE: 98 BPM | WEIGHT: 228 LBS | DIASTOLIC BLOOD PRESSURE: 66 MMHG | HEIGHT: 74 IN | SYSTOLIC BLOOD PRESSURE: 122 MMHG | BODY MASS INDEX: 29.26 KG/M2

## 2019-09-11 DIAGNOSIS — Z79.01 LONG TERM CURRENT USE OF ANTICOAGULANT: ICD-10-CM

## 2019-09-11 DIAGNOSIS — Z98.890 HISTORY OF MITRAL VALVE REPAIR: ICD-10-CM

## 2019-09-11 DIAGNOSIS — I10 BENIGN HYPERTENSION: ICD-10-CM

## 2019-09-11 DIAGNOSIS — I48.20 CHRONIC ATRIAL FIBRILLATION (CMD): Primary | ICD-10-CM

## 2019-09-11 DIAGNOSIS — I34.0 MITRAL VALVE INSUFFICIENCY, UNSPECIFIED ETIOLOGY: ICD-10-CM

## 2019-09-11 PROCEDURE — 3074F SYST BP LT 130 MM HG: CPT | Performed by: INTERNAL MEDICINE

## 2019-09-11 PROCEDURE — 3078F DIAST BP <80 MM HG: CPT | Performed by: INTERNAL MEDICINE

## 2019-09-11 PROCEDURE — 99214 OFFICE O/P EST MOD 30 MIN: CPT | Performed by: INTERNAL MEDICINE

## 2019-09-16 ENCOUNTER — OFFICE VISIT (OUTPATIENT)
Dept: WOUND CARE | Facility: HOSPITAL | Age: 64
End: 2019-09-16
Attending: NURSE PRACTITIONER
Payer: COMMERCIAL

## 2019-09-16 DIAGNOSIS — I83.023 VARICOSE VEINS OF LEFT LOWER EXTREMITY WITH ULCER OF ANKLE LIMITED TO BREAKDOWN OF SKIN (HCC): Primary | ICD-10-CM

## 2019-09-16 DIAGNOSIS — L97.321 VARICOSE VEINS OF LEFT LOWER EXTREMITY WITH ULCER OF ANKLE LIMITED TO BREAKDOWN OF SKIN (HCC): Primary | ICD-10-CM

## 2019-09-16 PROCEDURE — 99212 OFFICE O/P EST SF 10 MIN: CPT

## 2019-09-16 NOTE — PROGRESS NOTES
Subjective    Chief Complaint  This information was obtained from the patient  The patient returns today for follow up and management of Venous Wound(s).  L medial ankle venous ulcer  9/16/19: \"I've been doing good- I don't think I need wound care anymore\ the same procedure for his RLE when medical progress allows.  7/9/2019 Pt reports he has a new wound on L medial ankle that started about 1.5 wks ago and pt has asked his MD's office for a new referral to include treatment of this wound which is also venous

## 2019-09-27 ENCOUNTER — ANTI-COAG (OUTPATIENT)
Dept: CARDIOLOGY | Age: 64
End: 2019-09-27

## 2019-09-27 ENCOUNTER — APPOINTMENT (OUTPATIENT)
Dept: LAB | Age: 64
End: 2019-09-27
Attending: INTERNAL MEDICINE
Payer: COMMERCIAL

## 2019-09-27 DIAGNOSIS — I48.20 CHRONIC ATRIAL FIBRILLATION (CMD): ICD-10-CM

## 2019-09-27 DIAGNOSIS — I48.91 ATRIAL FIBRILLATION, UNSPECIFIED TYPE (HCC): ICD-10-CM

## 2019-09-27 LAB — INR PPP: 2.05

## 2019-09-27 PROCEDURE — 85610 PROTHROMBIN TIME: CPT

## 2019-09-27 PROCEDURE — 36415 COLL VENOUS BLD VENIPUNCTURE: CPT

## 2019-09-30 ENCOUNTER — APPOINTMENT (OUTPATIENT)
Dept: WOUND CARE | Facility: HOSPITAL | Age: 64
End: 2019-09-30
Attending: NURSE PRACTITIONER
Payer: COMMERCIAL

## 2019-10-07 ENCOUNTER — APPOINTMENT (OUTPATIENT)
Dept: WOUND CARE | Facility: HOSPITAL | Age: 64
End: 2019-10-07
Attending: NURSE PRACTITIONER
Payer: COMMERCIAL

## 2019-10-14 ENCOUNTER — APPOINTMENT (OUTPATIENT)
Dept: WOUND CARE | Facility: HOSPITAL | Age: 64
End: 2019-10-14
Attending: NURSE PRACTITIONER
Payer: COMMERCIAL

## 2019-10-21 ENCOUNTER — APPOINTMENT (OUTPATIENT)
Dept: WOUND CARE | Facility: HOSPITAL | Age: 64
End: 2019-10-21
Attending: NURSE PRACTITIONER
Payer: COMMERCIAL

## 2019-10-28 ENCOUNTER — APPOINTMENT (OUTPATIENT)
Dept: WOUND CARE | Facility: HOSPITAL | Age: 64
End: 2019-10-28
Attending: NURSE PRACTITIONER
Payer: COMMERCIAL

## 2019-11-08 ENCOUNTER — ANTI-COAG (OUTPATIENT)
Dept: CARDIOLOGY | Age: 64
End: 2019-11-08

## 2019-11-08 DIAGNOSIS — I48.20 CHRONIC ATRIAL FIBRILLATION (CMD): ICD-10-CM

## 2019-11-15 ENCOUNTER — APPOINTMENT (OUTPATIENT)
Dept: LAB | Age: 64
End: 2019-11-15
Attending: INTERNAL MEDICINE
Payer: COMMERCIAL

## 2019-11-15 DIAGNOSIS — I48.91 ATRIAL FIBRILLATION, UNSPECIFIED TYPE (HCC): ICD-10-CM

## 2019-11-15 LAB — INR PPP: 2.61

## 2019-11-15 PROCEDURE — 36415 COLL VENOUS BLD VENIPUNCTURE: CPT

## 2019-11-15 PROCEDURE — 85610 PROTHROMBIN TIME: CPT

## 2019-11-18 ENCOUNTER — ANTI-COAG (OUTPATIENT)
Dept: CARDIOLOGY | Age: 64
End: 2019-11-18

## 2019-11-18 DIAGNOSIS — I48.20 CHRONIC ATRIAL FIBRILLATION (CMD): ICD-10-CM

## 2019-11-23 ENCOUNTER — HOSPITAL ENCOUNTER (INPATIENT)
Facility: HOSPITAL | Age: 64
LOS: 19 days | Discharge: NURSING, REHAB OR LONG-TERM CARE | DRG: 329 | End: 2019-12-12
Attending: EMERGENCY MEDICINE | Admitting: INTERNAL MEDICINE
Payer: COMMERCIAL

## 2019-11-23 ENCOUNTER — APPOINTMENT (OUTPATIENT)
Dept: CT IMAGING | Facility: HOSPITAL | Age: 64
DRG: 329 | End: 2019-11-23
Attending: EMERGENCY MEDICINE
Payer: COMMERCIAL

## 2019-11-23 DIAGNOSIS — K35.33 ABSCESS, PERIAPPENDICEAL: ICD-10-CM

## 2019-11-23 DIAGNOSIS — A09 DIARRHEA OF INFECTIOUS ORIGIN: ICD-10-CM

## 2019-11-23 DIAGNOSIS — K35.32 ACUTE PERFORATED APPENDICITIS: Primary | ICD-10-CM

## 2019-11-23 DIAGNOSIS — I48.20 CHRONIC ATRIAL FIBRILLATION (HCC): ICD-10-CM

## 2019-11-23 PROCEDURE — 5A09357 ASSISTANCE WITH RESPIRATORY VENTILATION, LESS THAN 24 CONSECUTIVE HOURS, CONTINUOUS POSITIVE AIRWAY PRESSURE: ICD-10-PCS | Performed by: INTERNAL MEDICINE

## 2019-11-23 PROCEDURE — 74177 CT ABD & PELVIS W/CONTRAST: CPT | Performed by: EMERGENCY MEDICINE

## 2019-11-23 RX ORDER — HYDROMORPHONE HYDROCHLORIDE 1 MG/ML
0.2 INJECTION, SOLUTION INTRAMUSCULAR; INTRAVENOUS; SUBCUTANEOUS EVERY 2 HOUR PRN
Status: DISCONTINUED | OUTPATIENT
Start: 2019-11-23 | End: 2019-11-30

## 2019-11-23 RX ORDER — SODIUM CHLORIDE 9 MG/ML
INJECTION, SOLUTION INTRAVENOUS CONTINUOUS
Status: DISCONTINUED | OUTPATIENT
Start: 2019-11-23 | End: 2019-11-24

## 2019-11-23 RX ORDER — POTASSIUM CHLORIDE 14.9 MG/ML
20 INJECTION INTRAVENOUS ONCE
Status: COMPLETED | OUTPATIENT
Start: 2019-11-23 | End: 2019-11-23

## 2019-11-23 RX ORDER — ACETAMINOPHEN 325 MG/1
650 TABLET ORAL EVERY 6 HOURS PRN
Status: DISCONTINUED | OUTPATIENT
Start: 2019-11-23 | End: 2019-12-04

## 2019-11-23 RX ORDER — LOPERAMIDE HYDROCHLORIDE 2 MG/1
2 TABLET ORAL AS NEEDED
Qty: 20 TABLET | Refills: 0 | Status: SHIPPED | OUTPATIENT
Start: 2019-11-23 | End: 2019-12-12

## 2019-11-23 RX ORDER — MORPHINE SULFATE 4 MG/ML
4 INJECTION, SOLUTION INTRAMUSCULAR; INTRAVENOUS ONCE
Status: COMPLETED | OUTPATIENT
Start: 2019-11-23 | End: 2019-11-23

## 2019-11-23 RX ORDER — FAMOTIDINE 10 MG/ML
20 INJECTION, SOLUTION INTRAVENOUS 2 TIMES DAILY
Status: DISCONTINUED | OUTPATIENT
Start: 2019-11-23 | End: 2019-12-06

## 2019-11-23 RX ORDER — FLUCONAZOLE 2 MG/ML
200 INJECTION, SOLUTION INTRAVENOUS EVERY 24 HOURS
Status: DISCONTINUED | OUTPATIENT
Start: 2019-11-23 | End: 2019-12-06

## 2019-11-23 RX ORDER — ONDANSETRON 2 MG/ML
4 INJECTION INTRAMUSCULAR; INTRAVENOUS EVERY 6 HOURS PRN
Status: DISCONTINUED | OUTPATIENT
Start: 2019-11-23 | End: 2019-12-12

## 2019-11-23 RX ORDER — HYDROMORPHONE HYDROCHLORIDE 1 MG/ML
0.4 INJECTION, SOLUTION INTRAMUSCULAR; INTRAVENOUS; SUBCUTANEOUS EVERY 2 HOUR PRN
Status: DISCONTINUED | OUTPATIENT
Start: 2019-11-23 | End: 2019-11-30

## 2019-11-23 NOTE — ED PROVIDER NOTES
Patient Seen in: [facility] AND [facility] Emergency Department      History   Patient presents with:  Abdominal Pain    Stated Complaint: Diarrhea    HPI    18-year-old male with past medical history significant for atrial fibrillation, high blood pressure, hig Drug use: No             Review of Systems    Positive for stated complaint: Diarrhea  Other systems are as noted in HPI. Constitutional and vital signs reviewed. All other systems reviewed and negative except as noted above.     Physical Exam     ED components:    Glucose 154 (*)     Sodium 133 (*)     BUN 27 (*)     Creatinine 1.32 (*)     BUN/CREA Ratio 20.5 (*)     GFR, Non- 56 (*)     All other components within normal limits   MANUAL DIFFERENTIAL - Abnormal; Notable for the follow Discharge Medication List    START taking these medications    Loperamide HCl 2 MG Oral Tab  Take 1 tablet (2 mg total) by mouth as needed for Diarrhea.   Qty: 20 tablet Refills: 0

## 2019-11-23 NOTE — CONSULTS
Faisal Phillip  FEL:[birth year]  [facility]:[id number]  LOS:0    Date of Admission:  11/23/2019  Date of Consult:  11/23/2019 LIGATION Left 5/14/2019    Performed by Antwan Rivera MD at [facility]      Family History   Problem Relation Age of Onset   • Colon Cancer Father    • Arthritis Other    • Hypertension Other       reports that he has never smoked.  He has never used smokel GFRNAA 56*  --    CA 9.1  --    *  --    K  --  3.2*     --    CO2 22.0  --        Lab Results   Component Value Date    WBC 11.0 11/23/2019    HGB 16.1 11/23/2019    HCT 48.6 11/23/2019    .0 11/23/2019     11/23/2019    K 3.2 1 Ruptured appendix    Acute appendicitis with evidence of rupture  Delayed presentation with phlegmon formation  Atrial fibrillation with warfarin anticoagulation    I discussed our findings in detail with the patient and his wife.   He understood that mitra

## 2019-11-24 PROCEDURE — 99222 1ST HOSP IP/OBS MODERATE 55: CPT | Performed by: INTERNAL MEDICINE

## 2019-11-24 RX ORDER — DEXTROSE, SODIUM CHLORIDE, SODIUM LACTATE, POTASSIUM CHLORIDE, AND CALCIUM CHLORIDE 5; .6; .31; .03; .02 G/100ML; G/100ML; G/100ML; G/100ML; G/100ML
INJECTION, SOLUTION INTRAVENOUS CONTINUOUS
Status: DISCONTINUED | OUTPATIENT
Start: 2019-11-24 | End: 2019-11-28

## 2019-11-24 NOTE — PLAN OF CARE
Pt still having pain and some nausea. C-diff and gi panel negative. Plan is for bowel rest and possible abcess drainage. Pt up in chair today for 30 minutes.

## 2019-11-24 NOTE — PLAN OF CARE
Patient admitted with perforated appendix. Pain controlled with dilaudid currently. Patient is NPO. No surgery planned at this time. Plan is for bowel rest and IV antibiotics. Patient will need PICC line for home antibiotics.  Patient is on coumadin for his anxiety  - Utilize distraction and/or relaxation techniques  - Monitor for opioid side effects  - Notify MD/LIP if interventions unsuccessful or patient reports new pain  - Anticipate increased pain with activity and pre-medicate as appropriate  Outcome: P of redness and/or skin breakdown  - Initiate interventions, skin care algorithm/standards of care as needed  Outcome: Progressing

## 2019-11-24 NOTE — H&P
[facility]    History and Physical    [facility] Patient Status:  Inpatient    1955 MRN [id number]   Location [address] [facility] Attending Ceci Morel,    Hosp Day # 1 PCP Indy Acevedo MD     Date:  2019  Date of Ad Left    • HIP REPLACEMENT SURGERY Right 06/2017   • MOLES Left 2011    removed from L upper quadrant. Biopsy (pre-cancerous?  -- Patient unsure if cancerous)   • TOTAL HIP REPLACEMENT Bilateral    • VALVE REPAIR  2002    mitral valve repair   • VEIN Gastrointestinal: Positive for abdominal pain, diarrhea and abdominal distention. Endocrine: Negative. Genitourinary: Negative for frequency and difficulty urinating. Musculoskeletal: Negative. Skin: Negative.     Neurological: Positive for weak 2.0 11/24/2019    PHOS 1.3 (L) 11/24/2019    CK 96 04/12/2019    B12 1,245 (H) 03/24/2018     Ct Abdomen Pelvis Iv Contrast, No Oral (er)    Result Date: 11/23/2019  CONCLUSION:  1. Findings most consistent with perforated acute appendicitis.   There are ap seen vascular surgeon, Dr. Susan Real, at [facility] in the past.  Wears compression stocking daily.     Rotator cuff tear bilaterally, osteoarthritis multiple joints  Pt has been taking Ultracet 2 tabs qam, sometimes BID.  Takes celebrex daily -- recommend that he t

## 2019-11-24 NOTE — PROGRESS NOTES
[facility]    General Surgery Progress Note  Joane Kehr  XQW:[id number]  HD# 1       Subjective:   Complains of episode of fever this am, improved abdominal pain and denies N/V  Passing flatus      Exam:     General: awake and alert, in appendicitis. There are appendicoliths at the base of the appendix with extensive inflammatory stranding in the right lower quadrant.   In addition, along the posterior margin of the cecum just above the base of the appendix, there is an ill-defined collec CA 8.4 11/24/2019            Karin Truong MD [address] [facility]  11/24/19  8:19 AM

## 2019-11-24 NOTE — PLAN OF CARE
Pt received from [facility], complaining of abdominal pain and nausea, managing with IV dilaudid and zofran, IV fluids and antibiotics infusing, wife at bedside, pt very sleepy    Problem: Patient Centered Care  Goal: Patient preferences are identified and integr appropriate  Outcome: Progressing     Problem: GASTROINTESTINAL - ADULT  Goal: Minimal or absence of nausea and vomiting  Description  INTERVENTIONS:  - Maintain adequate hydration with IV or PO as ordered and tolerated  - Nasogastric tube to low intermitt

## 2019-11-25 PROBLEM — I10 BENIGN ESSENTIAL HTN: Status: ACTIVE | Noted: 2019-11-25

## 2019-11-25 PROBLEM — Z98.890 HISTORY OF MITRAL VALVE REPAIR: Status: ACTIVE | Noted: 2019-11-25

## 2019-11-25 PROBLEM — G47.33 OBSTRUCTIVE SLEEP APNEA SYNDROME: Status: ACTIVE | Noted: 2019-11-25

## 2019-11-25 RX ORDER — ENOXAPARIN SODIUM 100 MG/ML
1 INJECTION SUBCUTANEOUS EVERY 12 HOURS SCHEDULED
Status: DISCONTINUED | OUTPATIENT
Start: 2019-11-25 | End: 2019-11-26

## 2019-11-25 RX ORDER — POTASSIUM CHLORIDE 14.9 MG/ML
20 INJECTION INTRAVENOUS ONCE
Status: COMPLETED | OUTPATIENT
Start: 2019-11-25 | End: 2019-11-25

## 2019-11-25 NOTE — PLAN OF CARE
Pt managing pain with dilaudid every 2 hours and comfort measures. Using call light appropriately and wearing SCDs. Maintaining NPO status with allowance of small amounts of ice chips and popsicles. Ambulating in room.  Plan is for bowel rest and IV antibio unsuccessful or patient reports new pain  - Anticipate increased pain with activity and pre-medicate as appropriate  Outcome: Progressing     Problem: GASTROINTESTINAL - ADULT  Goal: Minimal or absence of nausea and vomiting  Description  INTERVENTIONS:  - Progressing

## 2019-11-25 NOTE — PROGRESS NOTES
[facility]    Progress Note    Vibha Postal Patient Status:  Inpatient    1955 MRN [id number]   Location [facility] 4W/SW/SE Attending Myles Fairbanks, DO   Hosp Day # 2 PCP Jessica Arteaga MD     Subjective:     Constitution at the base of the appendix with extensive inflammatory stranding in the right lower quadrant.   In addition, along the posterior margin of the cecum just above the base of the appendix, there is an ill-defined collection of fluid and gas measuring approxim and to improve patient's strength and endurance.   Has been off Coumadin and INR is now less than 2--      Claire Claire MD  11/25/2019

## 2019-11-25 NOTE — PAYOR COMM NOTE
--------------  ADMISSION REVIEW     Payor: [address]  Subscriber #:  [id number]  Authorization Number: [id number]     Admit date: 11/23/19  Admit time: 0       Admitting Physician: Marilia Devlin DO  Attending Physician:  Marilia Devlin DO  Pr Performed by Wing Verdugo MD at [facility] ENDOSCOPY   • HERNIA SURGERY     • HIP REPLACEMENT SURGERY Left    • HIP REPLACEMENT SURGERY Right 06/2017   • MOLES Left 2011    removed from L upper quadrant. Biopsy (pre-cancerous?  -- Patient unsure if canc Musculoskeletal: Normal range of motion. No deformity. Lymphadenopathy: No sig cervical LAD   Neurological: Awake, alert. Normal reflexes. No cranial nerve deficit. Skin: Skin is warm and dry. No rash noted. No erythema.    Psychiatric:    ED Course Patient's work-up is remarkable for mild dehydration. Patient's BUN and creatinine are elevated from his baseline. He is feeling better after IV fluids and morphine. CT scan is pending at this time.   Will sign out to the next shift to follow-up results Mr. Namita Jordan is a 71year old male with past medical history of atrial fibrillation on coumadin, mitral valve repair presenting with worsening abdominal pain x 2 days.  He took 2 days off of work 2 weeks ago for what he thought was flu, but then was able to re Problem Relation Age of Onset   • Colon Cancer Father    • Arthritis Other    • Hypertension Other      Social History:  Social History    Tobacco Use      Smoking status: Never Smoker      Smokeless tobacco: Never Used    Alcohol use:  Yes      Alcohol/wee Blood pressure 138/82, pulse 84, temperature 98.8 °F (37.1 °C), temperature source Oral, resp. rate 20, height 6' 2\" (1.88 m), weight 218 lb 9.6 oz (99.2 kg), SpO2 94 %. Constitutional: He is oriented to person, place, and time.  He appears well-develo CONCLUSION:  1. Findings most consistent with perforated acute appendicitis. There are appendicoliths at the base of the appendix with extensive inflammatory stranding in the right lower quadrant.   In addition, along the posterior margin of the cecum just Pt has been taking Ultracet 2 tabs qam, sometimes BID.  Takes celebrex daily -- recommend that he try to wean off the celebrex and replace with tylenol (pt aware not to take more than 3g/day of acetaminophen, including that in the ultracet).  Has seen orth Nick Orozco is a a(n) 71year old male who developed muscle aches and malaise 2 weeks ago that lasted 2 days and then he felt better was able to work, eat, etc.  2 to 3 days ago he developed muscle aches fevers and chills and lower abdominal pain.   He d reports that he has never smoked. He has never used smokeless tobacco. He reports current alcohol use of about 15.0 standard drinks of alcohol per week.  He reports that he does not use drugs.      Allergies:     Adhesive Tape               Comment:Other r   HGB 16.1 11/23/2019     HCT 48.6 11/23/2019     .0 11/23/2019      11/23/2019     K 3.2 11/23/2019      11/23/2019     CO2 22.0 11/23/2019     BUN 27 11/23/2019     CREATSERUM 1.32 11/23/2019      11/23/2019     CA 9.1 11/23/201 Atrial fibrillation with warfarin anticoagulation     I discussed our findings in detail with the patient and his wife. He understood that inflammation is so extensive that involves terminal ileum cecum and retroperitoneum.     We discussed possibility of Acute appendicitis with evidence of rupture  Delayed presentation with phlegmon formation  Atrial fibrillation with warfarin anticoagulation     Cont conservative treatment with intravenous antibiotics and bowel rest.  The patient understood that an absces    11/24/2019     MG 2.0 11/24/2019     PHOS 1.3 11/24/2019      11/24/2019     INR 2.68 11/24/2019     CA 8.4 11/24/2019               Goran Nicole MD [facility]  11/24/19  8:19 AM                 11/25  Lizzy Carver,   .0 11/25/2019     CREATSERUM 0.82 11/25/2019     BUN 22 11/25/2019      11/25/2019     K 3.7 11/25/2019      11/25/2019     CO2 26.0 11/25/2019      11/25/2019     CA 8.6 11/25/2019     INR 1.82 11/25/2019     PHOS 1.9 11/25/201 General surgery is following and is planning on repeat CT scan of abdomen over the next 1 to 2 days.     Remains in atrial fibrillation with controlled rate. INR is now less than 2 off Coumadin.   Therefore will start bridging with Lovenox     Blood pressu 11/25/2019 0253 Given 0.4 mg Intravenous Eva Alcaraz RN    11/25/2019 0015 Given 0.4 mg Intravenous Eva Alcaraz RN    11/24/2019 2117 Given 0.4 mg Intravenous Eva Alcaraz RN    11/24/2019 1819 Given 0.4 mg Intravenous Ludivina Beasley RN    11/24/2019

## 2019-11-25 NOTE — CM/SW NOTE
MICHAEL was notified in rounds the pt. Will most likely need IV abx at discharge. MICHAEL met with the pt. At bedside, and he would be agreeable to going to the infusion center at discharge, pending on the IV abx dosing. The pt. Is aware and agreeable.       Dorothea

## 2019-11-25 NOTE — PROGRESS NOTES
[facility]    General Surgery Progress Note  Eleonora Hill  [facility]:[id number]  # 2       Subjective:   Complains of being uncomfortable and feeling weak, has lower abdominal pain and nausea   Afebrile  Passing flatus      Exam:     General: 14.1 13.7   HCT 48.6 41.8 41.4   MCV 89.7 89.7 89.4   MCH 29.7 30.3 29.6   MCHC 33.1 33.7 33.1   RDW 14.4 14.6 15.0   NEPRELIM 9.62* 6.74 7.25   WBC 11.0 7.9 9.1   .0 136.0* 166.0       Recent Labs   Lab 11/23/19  1242  11/24/19  0058 11/24/19  0419

## 2019-11-26 ENCOUNTER — APPOINTMENT (OUTPATIENT)
Dept: CT IMAGING | Facility: HOSPITAL | Age: 64
DRG: 329 | End: 2019-11-26
Attending: SURGERY
Payer: COMMERCIAL

## 2019-11-26 PROCEDURE — 74177 CT ABD & PELVIS W/CONTRAST: CPT | Performed by: SURGERY

## 2019-11-26 PROCEDURE — 99232 SBSQ HOSP IP/OBS MODERATE 35: CPT | Performed by: INTERNAL MEDICINE

## 2019-11-26 NOTE — PLAN OF CARE
Problem: Patient Centered Care  Goal: Patient preferences are identified and integrated in the patient's plan of care  Description  Interventions:  - What would you like us to know as we care for you?  \"I'M A LITTLE [place]\"  - Provide timely, complete, and hydration with IV or PO as ordered and tolerated  - Nasogastric tube to low intermittent suction as ordered  - Evaluate effectiveness of ordered antiemetic medications  - Provide nonpharmacologic comfort measures as appropriate  - Advance diet as tolerated

## 2019-11-26 NOTE — PHYSICAL THERAPY NOTE
PHYSICAL THERAPY EVALUATION - INPATIENT     Room Number: 440/440-A  Evaluation Date: 11/26/2019  Type of Evaluation: Initial   Physician Order: PT Eval and Treat    Presenting Problem: acute perforated appendix  Reason for Therapy: Mobility Dysfunction an education;Gait training;Transfer training;Stair training;Balance training  Rehab Potential : Good  Frequency (Obs): 3x/week       PHYSICAL THERAPY MEDICAL/SOCIAL HISTORY   Problem List  Principal Problem:    Acute perforated appendicitis  Active Problems: precautions)  Fall Risk: Standard fall risk    WEIGHT BEARING RESTRICTION  Weight Bearing Restriction: None                PAIN ASSESSMENT  Ratin  Location: abdomen  Management Techniques: Activity promotion; Body mechanics;Repositioning    COGNITION  · to demonstrate supine - sit EOB @ level: independent     Goal #1   Current Status    Goal #2 Patient is able to demonstrate transfers Sit to/from Stand at assistance level: independent with none     Goal #2  Current Status    Goal #3 Patient is able to amb

## 2019-11-26 NOTE — PROGRESS NOTES
[facility]    Progress Note    Lord Salazar Patient Status:  Inpatient    1955 MRN [id number]   Location [facility] 4W/SW/SE Attending Constantin Arevalo,    Hosp Day # 3 PCP Rui Paniagua MD       SUBJECTIVE:  Feels a littl Q24H  acetaminophen (TYLENOL) tab 650 mg, 650 mg, Oral, Q6H PRN  HYDROmorphone HCl (DILAUDID) 1 MG/ML injection 0.2 mg, 0.2 mg, Intravenous, Q2H PRN    Or  HYDROmorphone HCl (DILAUDID) 1 MG/ML injection 0.4 mg, 0.4 mg, Intravenous, Q2H PRN  influenza vacci

## 2019-11-26 NOTE — PLAN OF CARE
Pt continues NPO status with some ice chips. SCDs on and tolerating pain with PRN medication. Pt went down for CT scan today. Using call light appropriately and frequent rounds being done.        Problem: Patient Centered Care  Goal: Patient preferences are and pre-medicate as appropriate  Outcome: Progressing     Problem: GASTROINTESTINAL - ADULT  Goal: Minimal or absence of nausea and vomiting  Description  INTERVENTIONS:  - Maintain adequate hydration with IV or PO as ordered and tolerated  - Nasogastric t

## 2019-11-26 NOTE — PAYOR COMM NOTE
--------------  CONTINUED STAY REVIEW    Payor: [address] [facility]  Subscriber #:  [id number]  Authorization Number: [id number]     Admit date: 11/23/19  Admit time: 0    Admitting Physician: Myles Fairbanks DO  Attending Physician:  DO JACQUELINE Quiñones Intravenous, Q6H PRN  famoTIDine (PEPCID) injection 20 mg, 20 mg, Intravenous, BID  Fluconazole in Sodium Chloride (DIFLUCAN) IVPB premix 200 mg, 200 mg, Intravenous, Q24H  acetaminophen (TYLENOL) tab 650 mg, 650 mg, Oral, Q6H PRN  HYDROmorphone HCl (DILAU Date Action Dose Route User    11/26/2019 4314 New Bag (none) Intravenous Stella Shine RN    11/25/2019 2018 New Bag (none) Intravenous Eileen ALVAREZ RN      enoxaparin sodium (LOVENOX) 100 MG/ML injection 100 mg     Date Action Dose Route Route User    11/25/2019 1136 New Bag 20 mEq Intravenous Keenan Prakash

## 2019-11-26 NOTE — PROGRESS NOTES
[facility]    General Surgery Progress Note  Lily Nava  HYB:[id number]  HD# 3       Subjective:   Complains of improving lower abdominal pain and min nausea   Afebrile and has more energy  Passing flatus and loose stools but decreased f 11/26/19  0518   RBC 4.66 4.63 4.20   HGB 14.1 13.7 12.8*   HCT 41.8 41.4 38.2*   MCV 89.7 89.4 91.0   MCH 30.3 29.6 30.5   MCHC 33.7 33.1 33.5   RDW 14.6 15.0 14.9   NEPRELIM 6.74 7.25 7.54   WBC 7.9 9.1 9.9   .0* 166.0 170.0       Recent Labs   La

## 2019-11-27 ENCOUNTER — APPOINTMENT (OUTPATIENT)
Dept: GENERAL RADIOLOGY | Facility: HOSPITAL | Age: 64
DRG: 329 | End: 2019-11-27
Attending: SURGERY
Payer: COMMERCIAL

## 2019-11-27 ENCOUNTER — APPOINTMENT (OUTPATIENT)
Dept: PICC SERVICES | Facility: HOSPITAL | Age: 64
DRG: 329 | End: 2019-11-27
Attending: SURGERY
Payer: COMMERCIAL

## 2019-11-27 PROCEDURE — 3E0336Z INTRODUCTION OF NUTRITIONAL SUBSTANCE INTO PERIPHERAL VEIN, PERCUTANEOUS APPROACH: ICD-10-PCS | Performed by: INTERNAL MEDICINE

## 2019-11-27 PROCEDURE — 99232 SBSQ HOSP IP/OBS MODERATE 35: CPT | Performed by: INTERNAL MEDICINE

## 2019-11-27 PROCEDURE — 71046 X-RAY EXAM CHEST 2 VIEWS: CPT | Performed by: SURGERY

## 2019-11-27 PROCEDURE — 71045 X-RAY EXAM CHEST 1 VIEW: CPT | Performed by: SURGERY

## 2019-11-27 PROCEDURE — 02HV33Z INSERTION OF INFUSION DEVICE INTO SUPERIOR VENA CAVA, PERCUTANEOUS APPROACH: ICD-10-PCS | Performed by: INTERNAL MEDICINE

## 2019-11-27 RX ORDER — POTASSIUM CHLORIDE 14.9 MG/ML
20 INJECTION INTRAVENOUS ONCE
Status: COMPLETED | OUTPATIENT
Start: 2019-11-27 | End: 2019-11-27

## 2019-11-27 RX ORDER — SODIUM CHLORIDE 0.9 % (FLUSH) 0.9 %
10 SYRINGE (ML) INJECTION AS NEEDED
Status: DISCONTINUED | OUTPATIENT
Start: 2019-11-27 | End: 2019-12-12

## 2019-11-27 RX ORDER — LIDOCAINE HYDROCHLORIDE 10 MG/ML
0.5 INJECTION, SOLUTION INFILTRATION; PERINEURAL ONCE AS NEEDED
Status: ACTIVE | OUTPATIENT
Start: 2019-11-27 | End: 2019-11-27

## 2019-11-27 NOTE — VASCULAR ACCESS
Initial results of the chest xray indicated that the PICC was proximal. Called to Radiologist Dr. Damien Peters.  We discussed and agreed that the PICC could be further in the SVC, and I felt very confident even though the patient was in A-Fib that the [facility]

## 2019-11-27 NOTE — PROGRESS NOTES
[facility]    General Surgery Progress Note  Jessicastanislaw Hardwick  WLL:[id number]  # 4       Subjective:   Complains of improving lower abdominal pain and min nausea   Afebrile and has more energy and ambulating some  Passing small amount of fla Ct Abdomen+pelvis(contrast Only)(cpt=74177)    Result Date: 11/26/2019  CONCLUSION:  1.  Perforated appendicitis with more pronounced phlegmonous inflammatory changes in the right lower quadrant and periappendiceal abscess formation predominantly asso AM

## 2019-11-27 NOTE — DIETARY NOTE
ADULT NUTRITION INITIAL ASSESSMENT    Pt is at high nutrition risk. Pt does not meet malnutrition criteria. RECOMMENDATIONS TO MD:  See Nutrition Intervention for TPN specifics.        NUTRITION DIAGNOSIS/PROBLEM:  Altered GI function related to alt (218 lb 9.6 oz)   BMI: Body mass index is 28.07 kg/m².   BMI CLASSIFICATION: 25-29.9 kg/m2 - overweight  IBW: 190 lbs        115% IBW  Usual Body Wt: 225 lbs       97% UBW    WEIGHT HISTORY:  Patient Weight(s) for the past 336 hrs:   Weight   11/23/19 1631 = values in this interval not displayed.        NUTRITION RELATED PHYSICAL FINDINGS:  - Body Fat/Muscle Mass: well nourished per visual exam.    - Fluid Accumulation: none per visual exam    - Skin Integrity: intact.  - Seth score 19    NUTRITION PRESCRIP

## 2019-11-27 NOTE — PROGRESS NOTES
[facility]    Progress Note    Cammie Sessions Patient Status:  Inpatient    1955 MRN [id number]   Location [facility] 4W/SW/SE Attending Damien Joshi, DO   Hosp Day # 4 PCP Ranjana Edwards MD       SUBJECTIVE:  Feels better quadrant. 3. Inflammatory changes in right lower quadrant associated with small bowel obstruction. 4. Small right pleural effusion with right basilar atelectasis. 5. Dependent atelectasis in left lower lobe. 6. Mitral valve annuloplasty.   Enlarged left atr for prolonged IV abx. Tylenol prn fever, zofran prn nausea, dilaudid prn pain. Encouraged ambulation, IS.     Diarrhea of infectious origin  cdiff and GI panel negative      Atrial fibrillation (HCC)  Rate controlled; on coumadin per [facility].  Sees Dr. Maryanne Aguilar.

## 2019-11-27 NOTE — PAYOR COMM NOTE
--------------  CONTINUED STAY REVIEW    Payor: [address] [facility]  Subscriber #:  [id number]  Authorization Number: [id number]     Admit date: 11/23/19  Admit time: 0    Admitting Physician: Charley Brittle, DO  Attending Physician:  Charley Brittle, DO P         Height:              Body mass index is 28.07 kg/m².         Intake/Output Summary (Last 24 hours) at 11/27/2019 1115  Last data filed at 11/27/2019 1000      Gross per 24 hour   Intake 3098 ml   Output 300 ml   Net 2798 ml         Ct Abdomen+pelvi BUN 14 11/27/2019     CREATSERUM 0.66 11/27/2019      11/27/2019     MG 2.0 11/27/2019     PHOS 2.8 11/27/2019     PHOS 2.8 11/27/2019     INR 3.35 11/27/2019     CA 8.1 11/27/2019     ALB 2.2 11/27/2019            Cristóbal Cline MD [facility]  Faizan Me Date Action Dose Route User    11/27/2019 0956 New Bag 40 mEq Intravenous IGNACIO BLACK      vancomycin HCl [facility]) 50 MG/ML oral solution 125 mg     Date Action Dose Route User    11/27/2019 0950 Given 125 mg Oral IGNACIO BLACK      11/27  Assessmen

## 2019-11-27 NOTE — PLAN OF CARE
Problem: Patient Centered Care  Goal: Patient preferences are identified and integrated in the patient's plan of care  Description  Interventions:  - What would you like us to know as we care for you?  \"I'M A LITTLE [place]\"  - Provide timely, complete, and hydration with IV or PO as ordered and tolerated  - Nasogastric tube to low intermittent suction as ordered  - Evaluate effectiveness of ordered antiemetic medications  - Provide nonpharmacologic comfort measures as appropriate  - Advance diet as tolerated

## 2019-11-28 PROCEDURE — 99232 SBSQ HOSP IP/OBS MODERATE 35: CPT | Performed by: INTERNAL MEDICINE

## 2019-11-28 RX ORDER — POTASSIUM CHLORIDE 29.8 MG/ML
40 INJECTION INTRAVENOUS ONCE
Status: COMPLETED | OUTPATIENT
Start: 2019-11-28 | End: 2019-11-28

## 2019-11-28 RX ORDER — METOPROLOL TARTRATE 5 MG/5ML
5 INJECTION INTRAVENOUS EVERY 6 HOURS PRN
Status: DISCONTINUED | OUTPATIENT
Start: 2019-11-28 | End: 2019-11-30

## 2019-11-28 NOTE — PLAN OF CARE
Problem: Patient Centered Care  Goal: Patient preferences are identified and integrated in the patient's plan of care  Description  Interventions:  - What would you like us to know as we care for you?  \"I'M A LITTLE [place]\"  - Provide timely, complete, and hydration with IV or PO as ordered and tolerated  - Nasogastric tube to low intermittent suction as ordered  - Evaluate effectiveness of ordered antiemetic medications  - Provide nonpharmacologic comfort measures as appropriate  - Advance diet as tolerated

## 2019-11-28 NOTE — PROGRESS NOTES
[facility]    Progress Note    Lilyreyna Robersongaby Patient Status:  Inpatient    1955 MRN [id number]   Location [facility] 4W/SW/SE Attending María Agustin,    Hosp Day # 5 PCP Antoni Giordano MD     Subjective:     Constitution with tip within the distal SVC. Mild interstitial edema. Bilateral lower lobe air space opacities suggestive of atelectasis.  Underlying pneumonia is felt to be less likely but is also in the differential.    Dictated by (CST): Jarvis Faustin MD on 11/27/20 syndrome    History of mitral valve repair    Remains on Zosyn and Diflucan for management of acute perforated appendicitis. CT has now shown abscess--plan for interventional radiology to drain this on 11/30. Continue with TPN for now.   Continue current

## 2019-11-28 NOTE — PLAN OF CARE
PICC line placed today and placement confirmed by chest X-Ray. IV  Zosyn and Diflucan continuing to be given by IV. Maintaining NPO status and plan for possible drain placement on Saturday. Pain controlled by dilaudid PRN.  Will continue to monitor      Pro reports new pain  - Anticipate increased pain with activity and pre-medicate as appropriate  Outcome: Progressing     Problem: GASTROINTESTINAL - ADULT  Goal: Minimal or absence of nausea and vomiting  Description  INTERVENTIONS:  - Maintain adequate hydra

## 2019-11-28 NOTE — PROGRESS NOTES
[facility]    General Surgery Progress Note  Cassandra Knight  KKS:[id number]  # 5       Subjective:   Feels better improving lower abdominal pain and no nausea   Afebrile and has more energy and ambulating more yesterday  Passing small amou (cpt=71046)    Result Date: 11/27/2019  CONCLUSION:   Right PICC with tip within the distal SVC. Mild interstitial edema. Bilateral lower lobe air space opacities suggestive of atelectasis.  Underlying pneumonia is felt to be less likely but is also in th ALT 40 11/28/2019    INR 2.99 11/28/2019    CA 8.0 11/28/2019    ALB 2.2 11/28/2019    TP 6.5 11/28/2019          Blanquita Valencia MD [facility]  11/28/19  8:23 AM

## 2019-11-28 NOTE — PLAN OF CARE
Problem: Patient Centered Care  Goal: Patient preferences are identified and integrated in the patient's plan of care  Description  Interventions:  - What would you like us to know as we care for you?  \"I'M A LITTLE [place]\"  - Provide timely, complete, and hydration with IV or PO as ordered and tolerated  - Nasogastric tube to low intermittent suction as ordered  - Evaluate effectiveness of ordered antiemetic medications  - Provide nonpharmacologic comfort measures as appropriate  - Advance diet as tolerated monitor.

## 2019-11-29 ENCOUNTER — APPOINTMENT (OUTPATIENT)
Dept: GENERAL RADIOLOGY | Facility: HOSPITAL | Age: 64
DRG: 329 | End: 2019-11-29
Attending: HOSPITALIST
Payer: COMMERCIAL

## 2019-11-29 ENCOUNTER — APPOINTMENT (OUTPATIENT)
Dept: CT IMAGING | Facility: HOSPITAL | Age: 64
DRG: 329 | End: 2019-11-29
Attending: CLINICAL NURSE SPECIALIST
Payer: COMMERCIAL

## 2019-11-29 ENCOUNTER — APPOINTMENT (OUTPATIENT)
Dept: CV DIAGNOSTICS | Facility: HOSPITAL | Age: 64
DRG: 329 | End: 2019-11-29
Attending: INTERNAL MEDICINE
Payer: COMMERCIAL

## 2019-11-29 PROCEDURE — 99232 SBSQ HOSP IP/OBS MODERATE 35: CPT | Performed by: INTERNAL MEDICINE

## 2019-11-29 PROCEDURE — 99152 MOD SED SAME PHYS/QHP 5/>YRS: CPT | Performed by: CLINICAL NURSE SPECIALIST

## 2019-11-29 PROCEDURE — 93306 TTE W/DOPPLER COMPLETE: CPT | Performed by: INTERNAL MEDICINE

## 2019-11-29 PROCEDURE — 0W9G30Z DRAINAGE OF PERITONEAL CAVITY WITH DRAINAGE DEVICE, PERCUTANEOUS APPROACH: ICD-10-PCS | Performed by: RADIOLOGY

## 2019-11-29 PROCEDURE — 74018 RADEX ABDOMEN 1 VIEW: CPT | Performed by: HOSPITALIST

## 2019-11-29 PROCEDURE — 49406 IMAGE CATH FLUID PERI/RETRO: CPT | Performed by: CLINICAL NURSE SPECIALIST

## 2019-11-29 PROCEDURE — 99291 CRITICAL CARE FIRST HOUR: CPT | Performed by: HOSPITALIST

## 2019-11-29 PROCEDURE — 71045 X-RAY EXAM CHEST 1 VIEW: CPT | Performed by: HOSPITALIST

## 2019-11-29 RX ORDER — ALBUTEROL SULFATE 2.5 MG/3ML
2.5 SOLUTION RESPIRATORY (INHALATION) ONCE
Status: COMPLETED | OUTPATIENT
Start: 2019-11-29 | End: 2019-11-29

## 2019-11-29 RX ORDER — ENOXAPARIN SODIUM 100 MG/ML
30 INJECTION SUBCUTANEOUS EVERY 12 HOURS SCHEDULED
Status: DISCONTINUED | OUTPATIENT
Start: 2019-11-29 | End: 2019-11-30

## 2019-11-29 RX ORDER — NALOXONE HYDROCHLORIDE 0.4 MG/ML
80 INJECTION, SOLUTION INTRAMUSCULAR; INTRAVENOUS; SUBCUTANEOUS AS NEEDED
Status: DISCONTINUED | OUTPATIENT
Start: 2019-11-29 | End: 2019-11-29

## 2019-11-29 RX ORDER — FUROSEMIDE 10 MG/ML
40 INJECTION INTRAMUSCULAR; INTRAVENOUS ONCE
Status: COMPLETED | OUTPATIENT
Start: 2019-11-29 | End: 2019-11-29

## 2019-11-29 RX ORDER — MIDAZOLAM HYDROCHLORIDE 1 MG/ML
1 INJECTION INTRAMUSCULAR; INTRAVENOUS EVERY 5 MIN PRN
Status: DISCONTINUED | OUTPATIENT
Start: 2019-11-29 | End: 2019-11-29

## 2019-11-29 RX ORDER — FLUMAZENIL 0.1 MG/ML
0.2 INJECTION, SOLUTION INTRAVENOUS AS NEEDED
Status: DISCONTINUED | OUTPATIENT
Start: 2019-11-29 | End: 2019-11-29

## 2019-11-29 RX ORDER — SODIUM CHLORIDE 9 MG/ML
INJECTION, SOLUTION INTRAVENOUS CONTINUOUS
Status: DISCONTINUED | OUTPATIENT
Start: 2019-11-29 | End: 2019-12-04

## 2019-11-29 RX ORDER — DIPHENHYDRAMINE HYDROCHLORIDE 50 MG/ML
50 INJECTION INTRAMUSCULAR; INTRAVENOUS ONCE
Status: COMPLETED | OUTPATIENT
Start: 2019-11-29 | End: 2019-11-29

## 2019-11-29 RX ORDER — MIDAZOLAM HYDROCHLORIDE 1 MG/ML
INJECTION INTRAMUSCULAR; INTRAVENOUS
Status: DISPENSED
Start: 2019-11-29 | End: 2019-11-29

## 2019-11-29 RX ORDER — DIPHENHYDRAMINE HYDROCHLORIDE 50 MG/ML
25 INJECTION INTRAMUSCULAR; INTRAVENOUS EVERY 4 HOURS PRN
Status: DISCONTINUED | OUTPATIENT
Start: 2019-11-29 | End: 2019-11-30

## 2019-11-29 RX ORDER — ALBUTEROL SULFATE 2.5 MG/3ML
SOLUTION RESPIRATORY (INHALATION)
Status: COMPLETED
Start: 2019-11-29 | End: 2019-11-29

## 2019-11-29 NOTE — PROCEDURES
[facility] - [facility]  Procedure Note    Nick Orozco Patient Status:  Inpatient    1955 MRN [id number]   Location [facility] 4W/SW/SE Attending Romy Aguilar DO   Hosp Day # 6 PCP Ranjit Hagen MD     Procedure: CT guided abdominal

## 2019-11-29 NOTE — SIGNIFICANT EVENT
[facility]    Progress Note    Raymundo Krabbe Patient Status:  Inpatient    1955 MRN [id number]   Location [facility] 4W/SW/SE Attending lEisabet Shaver, DO   Hosp Day # 6 PCP Alexandria Wakefield MD       Subjective:     Responded im 11/29/2019     11/29/2019    CO2 24.0 11/29/2019     (H) 11/29/2019    CA 8.2 (L) 11/29/2019    ALB 2.2 (L) 11/28/2019    ALKPHO 143 (H) 11/28/2019    BILT 1.7 11/28/2019    TP 6.5 11/28/2019    AST 37 11/28/2019    ALT 40 11/28/2019    PTT 38

## 2019-11-29 NOTE — PROGRESS NOTES
[facility]    General Surgery Progress Note  Lilyreyna Robersongaby FARMER:[id number]  # 6       Subjective:   No new complaints improving lower abdominal pain and no nausea   Afebrile and has more energy and more active  Passing small amount of fla 88.0 86.9 87.3   MCH 29.2 29.6 29.6   MCHC 33.1 34.1 33.9   RDW 14.9 14.9 14.9   NEPRELIM 8.98* 11.50* 12.98*   WBC 11.4* 14.5* 16.1*   .0 273.0 345.0       Recent Labs   Lab 11/27/19  0456 11/27/19  1540 11/28/19  0446 11/29/19  0525   *  --

## 2019-11-29 NOTE — PAYOR COMM NOTE
--------------  CONTINUED STAY REVIEW    Payor: [facility]  Subscriber #:  [id number]  Authorization Number: [id number]     Admit date: 11/23/19  Admit time: 0    Admitting Physician: Jesse Robles DO  Attending Physician:  DO JACQUELINE Simms [id number]      Gross per 24 hour   Intake 3284 ml   Output 1850 ml   Net 1434 ml                Results:      Recent Labs   Lab 11/27/19  0456 11/28/19  0446 11/29/19  0525   RBC 4.08 3.98 4.02   HGB 11.9* 11.8* 11.9*   HCT 35.9* 34.6* 35.1*   MCV 88.0 86.9 87. Cardiovascular: Normal rate. An irregularly irregular rhythm present. Edema not present. Pulmonary/Chest: Effort normal and breath sounds normal. No respiratory distress. He has no wheezes. He has no rales. Abdominal: Soft.  He exhibits no distens effusion with right basilar atelectasis. 5. Dependent atelectasis in left lower lobe. 6. Mitral valve annuloplasty. Enlarged left atrium. 7. Small hiatal hernia. 8. Atherosclerotic calcification without aneurysm.     Dictated by (CST): Cyndie Bryant MD on MD  11/28/2019                  MEDICATIONS ADMINISTERED IN LAST 1 DAY:  adult 3 in 1 TPN     Date Action Dose Route User    11/28/2019 2105 New Bag (none) Intravenous Eliane Pratt RN      famoTIDine (PEPCID) injection 20 mg     Date Action Dose Route — — — CJ   11/28/19 1147 99.1 °F (37.3 °C) 100 20 157/103Abnormal  100 % — None (Room air) — CM   11/28/19 0700 — — — 151/95Abnormal  — — — — PARVIN   11/28/19 0547 98.5 °F (36.9 °C) 74 20 166/91Abnormal  97 % — None (Room air) — AA   11/27/19 2043 98.2 °F (36

## 2019-11-29 NOTE — PRE-SEDATION ASSESSMENT
[place] [facility]  IR Pre-Procedure Sedation Assessment    History of snoring or sleep or apnea?    No    History of previous problems with anesthesia or sedation  No    Physical Findings:  Neck: nl ROM  CV: RRR  PULM: normal respiratory rate/effor

## 2019-11-29 NOTE — PHYSICAL THERAPY NOTE
Attempted PT treatment X2 today however pt was done for drain placement and then with RRT when returned from procedure. Will defer PT treatment for today and check on pt tomorrow as able/appropriate.

## 2019-11-29 NOTE — PLAN OF CARE
Fran Banegas is alert and oriented. TPN infusing through PICC. Dilaudid administered PRN for pain. Accuchecks Q6. Metoprolol given PRN for elevated BP. Coumadin and Lovenox on hold for IR procedure on Saturday. Patient and family aware of POC.  Call light in reac interventions unsuccessful or patient reports new pain  - Anticipate increased pain with activity and pre-medicate as appropriate  Outcome: Progressing     Problem: GASTROINTESTINAL - ADULT  Goal: Minimal or absence of nausea and vomiting  Description  INT needed  Outcome: Progressing

## 2019-11-29 NOTE — CONSULTS
Case Management/ Progression of Care    CM  notifiedpt. Will most likely need IV abx at discharge. PT is agreeable to going to the infusion center at discharge, pending on the IV abx dosing. The pt.  Is aware and agreeable.       Written prescription is

## 2019-11-29 NOTE — PROGRESS NOTES
PROCEDURE COMPLETE. 8FR VINCENT DRAIN TO BULB SUCTION PLACED TO RIGHT ABDOMEN. DRAIN DRESSING AND BIOPATCH APPLIED. REPORT CALLED TO RN.

## 2019-11-29 NOTE — PROGRESS NOTES
[facility]    Progress Note    Vibha Postal Patient Status:  Inpatient    1955 MRN [id number]   Location [facility] 4W/SW/SE Attending Myles Fairbanks, DO   Hosp Day # 6 PCP Jessica Arteaga MD         Assessment and Plan: liquids        OBJECTIVE:  PHYSICAL EXAM:     Vital signs in last 24 hours:  /73 (BP Location: Left arm)   Pulse 75   Temp 98.2 °F (36.8 °C) (Oral)   Resp 18   Ht 6' 2\" (1.88 m)   Wt 218 lb 9.6 oz (99.2 kg)   SpO2 94%   BMI 28.07 kg/m²     Intake/Ou Regular Human (NOVOLIN R) 100 UNIT/ML injection 1-5 Units, 1-5 Units, Subcutaneous, Q6H  Normal Saline Flush 0.9 % injection 10 mL, 10 mL, Intravenous, PRN  ondansetron HCl (ZOFRAN) injection 4 mg, 4 mg, Intravenous, Q6H PRN  famoTIDine (PEPCID) injection hours.      Imaging:  Xr Chest Pa + Lat Chest (cpt=71046)    Result Date: 11/27/2019  CONCLUSION:   Right PICC with tip within the distal SVC. Mild interstitial edema. Bilateral lower lobe air space opacities suggestive of atelectasis.  Underlying pneumon prosthesis. 3.  Small right pleural effusion. 4.  Right upper lobe and bibasilar pulmonary opacities which could represent atelectasis, pneumonia, or other inflammatory process. 5.  No pneumothorax.    Dictated by (CST): Nemesio Denny MD on 11/27/2019 at 14:

## 2019-11-30 ENCOUNTER — APPOINTMENT (OUTPATIENT)
Dept: CT IMAGING | Facility: HOSPITAL | Age: 64
DRG: 329 | End: 2019-11-30
Attending: HOSPITALIST
Payer: COMMERCIAL

## 2019-11-30 PROBLEM — I63.9 CVA (CEREBRAL VASCULAR ACCIDENT) (HCC): Status: ACTIVE | Noted: 2019-11-30

## 2019-11-30 PROBLEM — R17 JAUNDICE: Status: ACTIVE | Noted: 2019-11-30

## 2019-11-30 PROCEDURE — 70496 CT ANGIOGRAPHY HEAD: CPT | Performed by: HOSPITALIST

## 2019-11-30 PROCEDURE — 99233 SBSQ HOSP IP/OBS HIGH 50: CPT | Performed by: INTERNAL MEDICINE

## 2019-11-30 PROCEDURE — 3E03317 INTRODUCTION OF OTHER THROMBOLYTIC INTO PERIPHERAL VEIN, PERCUTANEOUS APPROACH: ICD-10-PCS | Performed by: INTERNAL MEDICINE

## 2019-11-30 PROCEDURE — 99291 CRITICAL CARE FIRST HOUR: CPT | Performed by: HOSPITALIST

## 2019-11-30 PROCEDURE — 70450 CT HEAD/BRAIN W/O DYE: CPT | Performed by: HOSPITALIST

## 2019-11-30 PROCEDURE — 99255 IP/OBS CONSLTJ NEW/EST HI 80: CPT | Performed by: OTHER

## 2019-11-30 PROCEDURE — 70498 CT ANGIOGRAPHY NECK: CPT | Performed by: HOSPITALIST

## 2019-11-30 RX ORDER — FAMOTIDINE 10 MG/ML
20 INJECTION, SOLUTION INTRAVENOUS ONCE AS NEEDED
Status: ACTIVE | OUTPATIENT
Start: 2019-11-30 | End: 2019-11-30

## 2019-11-30 RX ORDER — DIPHENHYDRAMINE HYDROCHLORIDE 50 MG/ML
50 INJECTION INTRAMUSCULAR; INTRAVENOUS ONCE AS NEEDED
Status: ACTIVE | OUTPATIENT
Start: 2019-11-30 | End: 2019-11-30

## 2019-11-30 RX ORDER — SODIUM CHLORIDE 9 MG/ML
INJECTION, SOLUTION INTRAVENOUS CONTINUOUS
Status: DISCONTINUED | OUTPATIENT
Start: 2019-11-30 | End: 2019-12-03

## 2019-11-30 RX ORDER — HYDROMORPHONE HYDROCHLORIDE 1 MG/ML
0.2 INJECTION, SOLUTION INTRAMUSCULAR; INTRAVENOUS; SUBCUTANEOUS EVERY 4 HOURS PRN
Status: DISCONTINUED | OUTPATIENT
Start: 2019-11-30 | End: 2019-12-05

## 2019-11-30 RX ORDER — METHYLPREDNISOLONE SODIUM SUCCINATE 125 MG/2ML
125 INJECTION, POWDER, LYOPHILIZED, FOR SOLUTION INTRAMUSCULAR; INTRAVENOUS ONCE AS NEEDED
Status: ACTIVE | OUTPATIENT
Start: 2019-11-30 | End: 2019-11-30

## 2019-11-30 RX ORDER — LABETALOL HYDROCHLORIDE 5 MG/ML
10 INJECTION, SOLUTION INTRAVENOUS ONCE AS NEEDED
Status: ACTIVE | OUTPATIENT
Start: 2019-11-30 | End: 2019-11-30

## 2019-11-30 RX ORDER — ACETAMINOPHEN 650 MG/1
650 SUPPOSITORY RECTAL EVERY 4 HOURS PRN
Status: DISCONTINUED | OUTPATIENT
Start: 2019-11-30 | End: 2019-12-06

## 2019-11-30 NOTE — CM/SW NOTE
Received MDO for  eval.    Per chart review: TERRIE was in process of arranging home infusion IVAB for pt at d/c. Per [facility]: [facility] can accept, pending insurance verification.     Will need paper script for d/c to confirm cost and coverage for

## 2019-11-30 NOTE — PROGRESS NOTES
TPA ordered by neurology. Bolus completed and infusion started. Neuro checks and vital signs per protocol, no changes in neuro status, no bleeding noted at this time, VSS, afib on tele monitor, family at bedside for support.

## 2019-11-30 NOTE — CM/SW NOTE
Case Management/ Progression of Care    CM met with patient and wife who prefer home infusion for IV antibiotics. Prefers home with IV ABX   Rx needed.    Referral sent to [facility] Home infusion  [facility]   Via All scripts by 4th

## 2019-11-30 NOTE — CONSULTS
Neurology Inpatient Consult Note    Keaton Moreno : 1955   Referring Physician: Dr. Bre Bernstein  HPI:     Keaton Moreno is a 71year old male who is being seen in neurologic evaluation. Patient being seen in evaluation for stroke alert.     H • Hypertension Other       Social History:  Social History    Socioeconomic History      Marital status:       Spouse name: Not on file      Number of children: Not on file      Years of education: Not on file      Highest education level: Not on carotid or vertebral arteries  2. No stenosis, occlusion, or gross aneurysm in the anterior or posterior circulations. CT head wo  CONCLUSION: Normal examination for a patient of this age.       ASSESSMENT AND PLAN:   Right sided weakness  And mental with any questions / concerns.     Stacey Schroeder MD  [address] [place] [address] [phone]

## 2019-11-30 NOTE — PROGRESS NOTES
[facility]    Progress Note    Hettie Loly Patient Status:  Inpatient    1955 MRN [id number]   Location [facility] 4W/SW/SE Attending Ceci Morel, DO   Hosp Day # 7 PCP Indy Acevedo MD     Subjective:     Constitution PHOS 3.6 11/30/2019    TROP <0.045 11/30/2019       Xr Abdomen (1 View) (cpt=74018)    Result Date: 11/29/2019  CONCLUSION:  1. Persistent small bowel distension. 2. Percutaneous drainage catheter in the right lower quadrant of the abdomen.     Dictated by for any bleed. CTA still being done. Neurology to evaluate. ?  If patient is candidate for TPA. Patient has been off his Coumadin secondary to IR biopsy done yesterday.   Again, will await neuro evaluations about anticoagulation recommendations at [facility]

## 2019-11-30 NOTE — PLAN OF CARE
RRT    *See RRT Documentation Record*    Reason the RRT was called: 1350  Assessment of patient leading up to RRT: pt came back from IR, VINCENT tube was placed, phytonadione 5 mg IV given.  Pt was c/o being cold noted riger and wheezing, vitals checked BP elev

## 2019-11-30 NOTE — PROGRESS NOTES
RRT    *See RRT Documentation Record*    Reason the RRT was called: at 0830 sudden altered men nickolas status, slurred speech, r facial drop, R arm weakness-inability to move, r  absent,   Assessment of patient leading up to RRT: pt assessment was negative

## 2019-11-30 NOTE — PROGRESS NOTES
[place] HOSPITALIST  RAPID RESPONSE NOTE     Nick Carleyalfa Patient Status:  Inpatient    1955 MRN [id number]   Location [facility] 4W/SW/SE Attending Romy Aguilar, [address] [facility] Day # 7 PCP Ranjit Hagen MD     Reason for RRT: [address] [facility]

## 2019-11-30 NOTE — PROGRESS NOTES
[facility]    General Surgery Progress Note  Keatondavid Moreno  [facility]:[id number]  # 7       Subjective:   No new complaints improving lower abdominal pain and no nausea   Afebrile and has more energy and more active  Passing small amount of fla TempSrc:       SpO2: 92% 94% 93% 94%   Weight:       Height:         Body mass index is 27.94 kg/m².        Intake/Output Summary (Last 24 hours) at 11/30/2019 1432  Last data filed at 11/30/2019 1000  Gross per 24 hour   Intake 150 ml   Output 3010 ml 14.5* 16.1* 13.0*   .0 345.0 381.0       Recent Labs   Lab 11/28/19  0446 11/29/19  0525 11/30/19  0456   * 109* 111*   BUN 9 10 17   CREATSERUM 0.63* 0.75 0.82   GFRAA 120 111 107   GFRNAA 103 96 93   CA 8.0* 8.2* 8.5    135* 138   K 3

## 2019-11-30 NOTE — CONSULTS
INFECTIOUS DISEASE CONSULT NOTE    Isauro Close Patient Status:  Inpatient    1955 MRN [id number]   [facility] 2W/SW Attending Mica Dorsey, [address] [facility] Day # 7 PCP [place] Problem Relation Age of Onset   • Colon Cancer Father    • Arthritis Other    • Hypertension Other       reports that he has never smoked.  He has never used smokeless tobacco. He reports current alcohol use of about 15.0 standard drinks of alcohol per we stroke    Physical Exam:  Vital signs: Blood pressure 145/89, pulse 86, temperature 98.2 °F (36.8 °C), temperature source Oral, resp. rate 26, height 6' 2\" (1.88 m), weight 218 lb 9.6 oz (99.2 kg), SpO2 93 %.     General: Alert, oriented, mild distress, sl drainage (11/29) with clx obtained. GI panel negative. Bcx on admission negative. Vitals with tmax of 102.9 on 11/24, afebrile since, HDS, satting well on RA. Code stroke called 11/30 with slurred speech, right sided facial droop.   Labs with leukocytos

## 2019-11-30 NOTE — PLAN OF CARE
Problem: PAIN - ADULT  Goal: Verbalizes/displays adequate comfort level or patient's stated pain goal  Description  INTERVENTIONS:  - Encourage pt to monitor pain and request assistance  - Assess pain using appropriate pain scale  - Administer analgesics Problem: SKIN/TISSUE INTEGRITY - ADULT  Goal: Skin integrity remains intact  Description  INTERVENTIONS  - Assess and document risk factors for pressure ulcer development  - Assess and document skin integrity  - Monitor for areas of redness and/or skin b appropriate resources  Description  INTERVENTIONS:  - Identify barriers to discharge w/pt and caregiver  - Include patient/family/discharge partner in discharge planning  - Arrange for needed discharge resources and transportation as appropriate  - Identif CONTROLLED\"    Interventions:   - MEDS AS ORDERED  - See additional Care Plan goals for specific interventions   Outcome: Not Progressing     Problem: MUSCULOSKELETAL - ADULT  Goal: Return mobility to safest level of function  Description  INTERVENTIONS:

## 2019-11-30 NOTE — SLP NOTE
ADULT SWALLOWING EVALUATION    ASSESSMENT    ASSESSMENT/OVERALL IMPRESSION:      This BSE was ordered d/t Pt presenting with (R) sided facial droop/stroke alert called earlier in this a.m. Pt was transferred to CCU and placed NPO.           Pt alert, on hong safety/efficiency of the swallow. Will monitor for any new CXR results. Diet to be upgraded as tolerated. Will complete VFSS as appropriate (if CXR declines/no improvement and/or CSA noted at bedside). Family education to be completed as available.  SLE to ordered; not yet completed.        OBJECTIVE   ORAL MOTOR EXAMINATION  Dentition: Natural;Functional     Strength: Unable to assess  Tone: Unable to assess  Range of Motion: Unable to assess  Rate of Motion: Unable to assess    Voice Quality: (aphasic)  Res session(s). In Progress   Goal #6 The patient will complete OMEs targeting Lingual, Labial and Buccal strength, ROM, and coordination with 90 % accuracy within 5 session(s).     In Progress       FOLLOW UP  Treatment Plan/Recommendations: Dysphagia thera

## 2019-11-30 NOTE — PLAN OF CARE
Clarence Dennis is alert and oriented. TPN infusing through PICC. Dilaudid administered PRN for pain. Accuchecks Q6. Lovenox restarted this evening. Patient and family aware of POC. Call light in reach. Will continue to monitor.   Problem: Patient [address] increased pain with activity and pre-medicate as appropriate  Outcome: Progressing     Problem: GASTROINTESTINAL - ADULT  Goal: Minimal or absence of nausea and vomiting  Description  INTERVENTIONS:  - Maintain adequate hydration with IV or PO as ordered a

## 2019-11-30 NOTE — PROGRESS NOTES
responded to Stroke Alert and accompanied patient and team to CT and then to .  assisted team as needed and provided emotional / spiritual support to patient.  Informed him that wife is aware of his transfer to a different room and i

## 2019-11-30 NOTE — PROGRESS NOTES
Pt transferred to CCU after RRT/stroke alert called.  Pt aphasic upon arrival, unable to use right arm or feel sensation to right arm and right leg, bilateral legs respond to commands, left arm strong to respond, right facial droop noted, wife en route to E

## 2019-12-01 ENCOUNTER — APPOINTMENT (OUTPATIENT)
Dept: MRI IMAGING | Facility: HOSPITAL | Age: 64
DRG: 329 | End: 2019-12-01
Attending: Other
Payer: COMMERCIAL

## 2019-12-01 ENCOUNTER — APPOINTMENT (OUTPATIENT)
Dept: CT IMAGING | Facility: HOSPITAL | Age: 64
DRG: 329 | End: 2019-12-01
Attending: Other
Payer: COMMERCIAL

## 2019-12-01 PROCEDURE — 99255 IP/OBS CONSLTJ NEW/EST HI 80: CPT | Performed by: INTERNAL MEDICINE

## 2019-12-01 PROCEDURE — 99233 SBSQ HOSP IP/OBS HIGH 50: CPT | Performed by: OTHER

## 2019-12-01 PROCEDURE — 99233 SBSQ HOSP IP/OBS HIGH 50: CPT | Performed by: INTERNAL MEDICINE

## 2019-12-01 PROCEDURE — 70551 MRI BRAIN STEM W/O DYE: CPT | Performed by: OTHER

## 2019-12-01 RX ORDER — ENOXAPARIN SODIUM 100 MG/ML
1 INJECTION SUBCUTANEOUS EVERY 12 HOURS SCHEDULED
Status: DISCONTINUED | OUTPATIENT
Start: 2019-12-01 | End: 2019-12-02

## 2019-12-01 NOTE — PHYSICAL THERAPY NOTE
PHYSICAL THERAPY EVALUATION - INPATIENT     Room Number: 217/217-A  Evaluation Date: 12/1/2019  Type of Evaluation: Re-evaluation   Physician Order: PT Eval and Treat    Presenting Problem: acute perforated appendicitis, new L parietal and occipital lobe appendicitis  Active Problems:    Atrial fibrillation (HCC)    Other hyperlipidemia    Benign essential HTN    Obstructive sleep apnea syndrome    History of mitral valve repair    CVA (cerebral vascular accident) ([address]    Jaundice      Past Medical Histor mechanics;Repositioning    COGNITION  Oriented x 3, drowsy with difficulty keeping eyes open    RANGE OF MOTION AND STRENGTH ASSESSMENT  UE:  Please see OT eval.  History of B RTC tears    Lower extremity ROM is within functional limits     Lower extremity present    CURRENT GOALS    Goals to be met by: 12/9/19  Patient Goal Patient's self-stated goal is: to regain strength and mobility   Goal #1 Patient is able to demonstrate supine - sit EOB @ level: supervision     Goal #1   Current Status    Goal #2 Dago Osullivan

## 2019-12-01 NOTE — PROGRESS NOTES
Neurology Inpatient Follow-up Note      HPI:     Patient being seen in follow up. Interval notes and workup reviewed. Clinically improved. Less drowsy. Continues to have marked right arm weakness.       Past Medical Hisotory:  Reviewed    Medications: continuity     equation (using LVOT flow): 1.9cm^2. 4. Left atrium: The atrium was massively dilated. 5. Right ventricle: The cavity size was dilated. Systolic function     was reduced. 6. Right atrium: The atrium was severely dilated.   7. Tricuspid pat

## 2019-12-01 NOTE — PROGRESS NOTES
INFECTIOUS DISEASE PROGRESS NOTE    Carine Cespedes Patient Status:  Inpatient    1955 MRN [id number]   Location [facility] 2W/SW Attending Rafael Rodriguez, [address] [facility] Day # 8 PCP Alva Tripp MD     Subjective: Pt got TPA yesterday.   MRI brain hyperlipidemia     Osteoarthrosis     Routine health maintenance     Special screening for malignant neoplasm of prostate     Unspecified open wound, left ankle, initial encounter     Ruptured appendix     Diarrhea of infectious origin     Acute perforated

## 2019-12-01 NOTE — PROGRESS NOTES
[facility]    General Surgery Progress Note  Ernesto Salcido  UDT:[id number]  HD# 8       Subjective:   No new complaints improving lower abdominal pain and no nausea   Afebrile and has more energy and more active  Passing small amount of fla Height:         Body mass index is 28.35 kg/m².        Intake/Output Summary (Last 24 hours) at 12/1/2019 1104  Last data filed at 12/1/2019 0828  Gross per 24 hour   Intake 3981.61 ml   Output 1170 ml   Net 2811.61 ml             Results:     Recent Labs

## 2019-12-01 NOTE — OCCUPATIONAL THERAPY NOTE
Acute perforated appendicitis s/p abscess drainage with subsequent RRT; flaccid R UE s/p TPA; (+) L parietal/occipital infarct; Acute rehab

## 2019-12-01 NOTE — PLAN OF CARE
Problem: Patient/Family Goals  Goal: Patient/Family Long Term Goal  Description  Patient's Long Term Goal: TO GO HOME    Interventions:  - SURG CONSULT, MEDS  - See additional Care Plan goals for specific interventions   Outcome: Progressing  No discharg Progressing  Prn zofran given x 1.       Problem: METABOLIC/FLUID AND ELECTROLYTES - ADULT  Goal: Glucose maintained within prescribed range  Description  INTERVENTIONS:  - Monitor Blood Glucose as ordered  - Assess for signs and symptoms of hyperglycemia a physical limitations  - Instruct pt to call for assistance with activity based on assessment  - Modify environment to reduce risk of injury  - Provide assistive devices as appropriate  - Consider OT/PT consult to assist with strengthening/mobility  - Encou Progressing  VSS, afib on tele monitor. Lovenox scheduled to start this evening.       Problem: MUSCULOSKELETAL - ADULT  Goal: Return mobility to safest level of function  Description  INTERVENTIONS:  - Assess patient stability and activity tolerance for st

## 2019-12-01 NOTE — CONSULTS
[facility]    Report of Consultation    Melinda Kirkland Patient Status:  Inpatient    1955 MRN [id number]   Location [facility] 2W/SW Attending Sammie Crabtree, [address] [facility] Day # 8 PCP Bean Clarke MD     Date of Admission:   Date   • COLONOSCOPY  10/11   • COLONOSCOPY N/A 11/16/2016    Performed by Jose Luis Otto MD at [place] [address]     • HIP REPLACEMENT SURGERY Left    • HIP REPLACEMENT SURGERY Right 06/2017   • MOLES Left 2011    removed from L upp PRN  famoTIDine (PEPCID) injection 20 mg, 20 mg, Intravenous, BID  Fluconazole in Sodium Chloride (DIFLUCAN) IVPB premix 200 mg, 200 mg, Intravenous, Q24H  acetaminophen (TYLENOL) tab 650 mg, 650 mg, Oral, Q6H PRN  influenza vaccine (PF)(FLUZONE HD) high d Component Value Date    WBC 15.0 (H) 12/01/2019    HGB 12.7 (L) 12/01/2019    HCT 38.0 (L) 12/01/2019    .0 (H) 12/01/2019    CREATSERUM 0.77 12/01/2019    CREATSERUM 0.77 12/01/2019    BUN 22 (H) 12/01/2019    BUN 22 (H) 12/01/2019     12/0 Ct Stroke Cta Brain/cta Neck (w Iv)(cpt=70496/06225)    Result Date: 11/30/2019  CONCLUSION:  1. No stenosis, occlusion, or dissection within the carotid or vertebral arteries 2.   No stenosis, occlusion, or gross aneurysm in the anterior or posterior

## 2019-12-01 NOTE — OCCUPATIONAL THERAPY NOTE
Pt currently on hold until 11:45 this date per protocol 2/2 receiving TPA. Will attempt again as appropriate.

## 2019-12-01 NOTE — OCCUPATIONAL THERAPY NOTE
OCCUPATIONAL THERAPY EVALUATION - INPATIENT     Room Number: 217/217-A  Evaluation Date: 12/1/2019  Type of Evaluation: Initial  Presenting Problem: Acute perforated appendicits and CVA     Physician Order: IP Consult to Occupational Therapy  Reason for Th DISCHARGE RECOMMENDATIONS  OT Discharge Recommendations: Acute rehabilitation       PLAN  OT Treatment Plan: Balance activities; Energy conservation/work simplification techniques;ADL training;IADL training;Visual perceptual training;Functional transfer t Dominance: Right  Drives: Yes  Patient Regularly Uses: Glasses    Stairs in Home: yes  Use of Assistive Device(s): none    Prior Level of [place]: I with all ADLs, IADLs and driving; works, but may retire soon    SUBJECTIVE  Thank you.     OCCUPATIONAL use of L UE while seated; wiping face with cloth  Lower Extremity Dressing: D; donning/doffing socks    Education Provided: safety training  Patient End of Session: Up in chair;Needs met;Call light within reach;RN aware of session/findings; All patient ques

## 2019-12-01 NOTE — PROGRESS NOTES
[facility]    Progress Note    Nobles Page Patient Status:  Inpatient    1955 MRN [id number]   Location [facility] 2W/SW Attending Sorin Vaz, [address] [facility] Day # 8 PCP Samuel Valadez MD     Subjective:     Constitutional: K 4.2 12/01/2019     12/01/2019     12/01/2019    CO2 21.0 12/01/2019    CO2 21.0 12/01/2019     12/01/2019     12/01/2019    CA 8.7 12/01/2019    CA 8.7 12/01/2019    ALB 2.3 12/01/2019    ALKPHO 176 12/01/2019    BILT 3.5 12/01/ dissection within the carotid or vertebral arteries 2. No stenosis, occlusion, or gross aneurysm in the anterior or posterior circulations.    Dictated by (CST): Zac Mendoza MD on 11/30/2019 at 9:41     Approved by (CST): Zac Mendoza MD on 11/30/2019 at abdomen when more medically stable. Blood pressure currently controlled. Allow permissive elevated blood pressure status post recent CVA.         Loraine Knowles MD  12/1/2019

## 2019-12-01 NOTE — SLP NOTE
SPEECH DAILY NOTE - INPATIENT    ASSESSMENT & PLAN   ASSESSMENT  RN reports pt NPO d/t TPA administration. RN requests pt to be seen prior to initiating oral diet. SLP reviewed swallow POC with the patient and his wife.  Safe swallowing compensatory strategi Undetermined    Treatment Plan  Treatment Plan/Recommendations: Dysphagia therapy;SLP to reassess;Aspiration precautions    Interdisciplinary Communication: Discussed with RN  Recommendations posted at bedside            GOALS  Goal #1 The patient will debbie Goal #6 The patient will complete OMEs targeting Lingual, Labial and Buccal, BOT, elevation, adduction strength, ROM, and coordination with 90 % accuracy within 5 session(s).   Lingual ROM cheek to cheek x20   Lingual ROM corner to corner of mouth x20

## 2019-12-02 ENCOUNTER — APPOINTMENT (OUTPATIENT)
Dept: INTERVENTIONAL RADIOLOGY/VASCULAR | Facility: HOSPITAL | Age: 64
DRG: 329 | End: 2019-12-02
Attending: NURSE PRACTITIONER
Payer: COMMERCIAL

## 2019-12-02 ENCOUNTER — APPOINTMENT (OUTPATIENT)
Dept: CV DIAGNOSTICS | Facility: HOSPITAL | Age: 64
DRG: 329 | End: 2019-12-02
Attending: INTERNAL MEDICINE
Payer: COMMERCIAL

## 2019-12-02 ENCOUNTER — TELEPHONE (OUTPATIENT)
Dept: INTERNAL MEDICINE CLINIC | Facility: CLINIC | Age: 64
End: 2019-12-02

## 2019-12-02 ENCOUNTER — APPOINTMENT (OUTPATIENT)
Dept: CT IMAGING | Facility: HOSPITAL | Age: 64
DRG: 329 | End: 2019-12-02
Attending: SURGERY
Payer: COMMERCIAL

## 2019-12-02 PROCEDURE — 93320 DOPPLER ECHO COMPLETE: CPT | Performed by: INTERNAL MEDICINE

## 2019-12-02 PROCEDURE — 99233 SBSQ HOSP IP/OBS HIGH 50: CPT | Performed by: INTERNAL MEDICINE

## 2019-12-02 PROCEDURE — B246ZZ4 ULTRASONOGRAPHY OF RIGHT AND LEFT HEART, TRANSESOPHAGEAL: ICD-10-PCS | Performed by: INTERNAL MEDICINE

## 2019-12-02 PROCEDURE — 99232 SBSQ HOSP IP/OBS MODERATE 35: CPT | Performed by: OTHER

## 2019-12-02 PROCEDURE — 93325 DOPPLER ECHO COLOR FLOW MAPG: CPT | Performed by: INTERNAL MEDICINE

## 2019-12-02 PROCEDURE — 99232 SBSQ HOSP IP/OBS MODERATE 35: CPT | Performed by: INTERNAL MEDICINE

## 2019-12-02 PROCEDURE — 74177 CT ABD & PELVIS W/CONTRAST: CPT | Performed by: SURGERY

## 2019-12-02 PROCEDURE — 93312 ECHO TRANSESOPHAGEAL: CPT | Performed by: INTERNAL MEDICINE

## 2019-12-02 RX ORDER — HEPARIN SODIUM AND DEXTROSE 10000; 5 [USP'U]/100ML; G/100ML
1000 INJECTION INTRAVENOUS ONCE
Status: COMPLETED | OUTPATIENT
Start: 2019-12-02 | End: 2019-12-02

## 2019-12-02 RX ORDER — MIDAZOLAM HYDROCHLORIDE 1 MG/ML
4 INJECTION INTRAMUSCULAR; INTRAVENOUS ONCE
Status: COMPLETED | OUTPATIENT
Start: 2019-12-02 | End: 2019-12-02

## 2019-12-02 RX ORDER — SODIUM CHLORIDE 0.9 % (FLUSH) 0.9 %
10 SYRINGE (ML) INJECTION AS NEEDED
Status: DISCONTINUED | OUTPATIENT
Start: 2019-12-02 | End: 2019-12-04 | Stop reason: HOSPADM

## 2019-12-02 RX ORDER — IPRATROPIUM BROMIDE AND ALBUTEROL SULFATE 2.5; .5 MG/3ML; MG/3ML
3 SOLUTION RESPIRATORY (INHALATION) EVERY 6 HOURS PRN
Status: DISCONTINUED | OUTPATIENT
Start: 2019-12-02 | End: 2019-12-12

## 2019-12-02 RX ORDER — HEPARIN SODIUM 1000 [USP'U]/ML
5000 INJECTION, SOLUTION INTRAVENOUS; SUBCUTANEOUS ONCE
Status: COMPLETED | OUTPATIENT
Start: 2019-12-02 | End: 2019-12-02

## 2019-12-02 RX ORDER — HEPARIN SODIUM AND DEXTROSE 10000; 5 [USP'U]/100ML; G/100ML
INJECTION INTRAVENOUS CONTINUOUS
Status: DISCONTINUED | OUTPATIENT
Start: 2019-12-03 | End: 2019-12-12

## 2019-12-02 RX ORDER — MIDAZOLAM HYDROCHLORIDE 1 MG/ML
INJECTION INTRAMUSCULAR; INTRAVENOUS
Status: COMPLETED
Start: 2019-12-02 | End: 2019-12-02

## 2019-12-02 RX ORDER — SODIUM CHLORIDE 9 MG/ML
INJECTION, SOLUTION INTRAVENOUS ONCE
Status: DISCONTINUED | OUTPATIENT
Start: 2019-12-02 | End: 2019-12-02

## 2019-12-02 NOTE — PROGRESS NOTES
[place] [facility]    Progress Note    Lily Nava Patient Status:  Inpatient    1955 MRN [id number]   Location [facility] 2W/SW Attending María Agustin, [address] [facility] Day # 9 PCP Antoni Giordano MD        Subjective:     Constitution check     DAVEY today to r/o Vegetation / Endocarditis      Supportive care   swallow eval   Pt/ot   With h/o a-fib  , anticoagulation with Lovenox subcu was resumed         2-Admitted with perforated appendicitis 11/23/2019  Status post IR drainage  IV anti Dictated by (CST): Jhonatan Reyes MD on 12/01/2019 at 13:10     Approved by (CST): Gm Frey MD on 12/01/2019 at 13:15          Ct Abdomen+pelvis(contrast Only)(cpt=74177)    Result Date: 12/2/2019  CONCLUSION:  1.  Perforated appendi

## 2019-12-02 NOTE — PAYOR COMM NOTE
--------------  CONTINUED STAY REVIEW    Payor: [employer] [facility]  Subscriber #:  [id number]  Authorization Number: [id number]     Admit date: 11/23/19  Admit time: 0    Admitting Physician: Jose R Reyna DO  Attending Physician:  DO JACQUELINE Abarca vegetation     - for time being, due to very likely noninfectious cardioembolism, continue treatment dose Lovenox     - Permissive hypertension, do not treat SBP less than 185 or DBP less than 110     - PT, OT, SLP     - continue delirium precautions       Unspecified essential hypertension     • Venous insufficiency       RT greater saphenous vein ablation 2007         Past Surgical History        Past Surgical History:   Procedure Laterality Date   • COLONOSCOPY   10/11   • COLONOSCOPY N/A 11/16/2016     P (NOVOLIN R) 100 UNIT/ML injection 1-5 Units, 1-5 Units, Subcutaneous, Q6H  Normal Saline Flush 0.9 % injection 10 mL, 10 mL, Intravenous, PRN  ondansetron HCl (ZOFRAN) injection 4 mg, 4 mg, Intravenous, Q6H PRN  famoTIDine (PEPCID) injection 20 mg, 20 mg, guarding or rebound and diminished BS   Ext : no edema , no calf tenderness  Awake , alert   Slurred speech  Weakness in the right arm 2/5  Weakness in the right leg 4/5   Positive Babinski in the right side     Results:      Laboratory Data:        Lab Re Normal examination for a patient of this age. This report was called immediately at 0841 hours to the patient's floor and discussed with nurse Martin Parekh.      Dictated by (CST): Emily Beckwith MD on 11/30/2019 at 8:51     Approved by (CST): Naomi Jain admission for lower abdominal pain and fevers and chills. Found to have perforated appendicitis complicated by an abscess. He underwent IR drainage. Was febrile initially. ID note reviewed.     Stroke alert called today.   Per discussion with RN, luanne s education level: Not on file    Tobacco Use      Smoking status: Never Smoker      Smokeless tobacco: Never Used    Substance and Sexual Activity      Alcohol use:  Yes        Alcohol/week: 15.0 standard drinks        Types: 15 Glasses of wine per week sided weakness  And mental state  High clinical suspicion for L MCA territory stroke, cardioembolic. Suspect patient also has superimposed toxic metabolic encephalopathy.   Other possibilities that cannot absolutely be ruled out include septic cardioemboli IN LAST 1 DAY:  adult 3 in 1 TPN     Date Action Dose Route User    12/1/2019 2203 New Bag (none) Intravenous Jon Roth, RN      enoxaparin sodium (LOVENOX) 100 MG/ML injection 100 mg     Date Action Dose Route User    12/1/2019 2100 Given 100 mg S 140/91Abnormal  95 % — None (Room air) — LR

## 2019-12-02 NOTE — PROGRESS NOTES
Neurology Inpatient Follow-up Note      HPI:     Patient being seen in follow up. Interval notes and workup reviewed. Patient feeling about the same today.       Past Medical Hisotory:  Reviewed    Medications:  Reviewed    Allergies:    Adhesive Tape follow. Please page with any questions / concerns.     Jesse Marie MD  [address] [place] [address] [phone]

## 2019-12-02 NOTE — DIETARY NOTE
ADULT NUTRITION REASSESSMENT     Pt is at high nutrition risk. Pt does not meet malnutrition criteria. RECOMMENDATIONS TO MD:  See Nutrition Intervention for TPN specifics.   If diet advances but inadequate intake appropriate to transition off TPN t Osteoarthrosis, unspecified whether generalized or localized, unspecified site, Sleep apnea, UGI bleed, Unspecified essential hypertension, and Venous insufficiency.      ANTHROPOMETRICS:  HT: 188 cm (6' 2\")  WT: 99.7 kg (219 lb 12.8 oz)   BMI: Body mass i ondansetron HCl, acetaminophen, influenza virus vaccine PF    • fentaNYL citrate       • Midazolam HCl       • Midazolam HCl  4 mg Intravenous Once   • fentaNYL citrate  75 mcg Intravenous Once   • enoxaparin  1 mg/kg Subcutaneous 2 times per day   • merop promote healing and improved GI status, transition off of PN to po intake and/or EN support. DIETITIAN FOLLOW UP: within next 5 days.    Tabby Coffman RD, [address] [facility], [address] [place] [address]  ([id number])

## 2019-12-02 NOTE — TELEPHONE ENCOUNTER
Pt's wife, Tad Gallardo, is calling to get [facility] paperwork filled out by Dr Loren Mantilla. Pt is not sure if this is something Dr Loren Mantilla can do for the pt. Pt is in the hospital @ [facility] right now with a ruptured appendix and needs [facility] paperwork for work.      Best call back: [phone]

## 2019-12-02 NOTE — OCCUPATIONAL THERAPY NOTE
OCCUPATIONAL THERAPY TREATMENT NOTE - INPATIENT        Room Number: 419/085-L           Presenting Problem: Acute perforated appendicits and CVA     Problem List  Principal Problem:    Acute perforated appendicitis  Active Problems:    Atrial fibrillation calculated based on documentation in the [facility] '6 clicks' Inpatient Daily Activity Short Form. Research supports that patients with this level of impairment may benefit from acute rehab.     DISCHARGE RECOMMENDATIONS  OT Discharge Recommendations: Acute re role of OT, activity promotion   Patient End of Session: Up in chair;Needs met;Call light within reach;RN aware of session/findings; Alarm set; Family present    OT Goals:      Patient will complete functional transfer with MOD I  Comment: progress    Patien

## 2019-12-02 NOTE — PLAN OF CARE
- I have discussed and reviewed the risks/benefits of having a DAVEY with the patient  - NO GI bleed and/or other bleeding disorders  - NO Esophageal varacies  - No loose teeth or dentures  - discussed risk of infection & risk of esophageal perforation

## 2019-12-02 NOTE — PROCEDURES
Preop: Cryptogenic stroke. Postop: Cryptogenic stroke  Procedure: Transesophageal echo    Findings: Possible small vegetation on posterior mitral valve leaflet. Marked spontaneous echo contrast in left atrium. No PFO or ASD.   No thrombus in left atrial

## 2019-12-02 NOTE — PLAN OF CARE
Problem: Patient Centered Care  Goal: Patient preferences are identified and integrated in the patient's plan of care  Description  Interventions:  - What would you like us to know as we care for you?  \"I'M A LITTLE Knik\"  - Provide timely, complete, and effects  - Notify MD/LIP if interventions unsuccessful or patient reports new pain  - Anticipate increased pain with activity and pre-medicate as appropriate  12/2/2019 0537 by Revonda Homans, RN  Outcome: Progressing  12/2/2019 0521 by Revonda Homans maintain glucose within target range  - Assess barriers to adequate nutritional intake and initiate nutrition consult as needed  - Instruct patient on self management of diabetes  Outcome: Progressing     Problem: SKIN/TISSUE INTEGRITY - ADULT  Goal: Skin [facility] RN  Outcome: Progressing  12/2/2019 0521 by Juan Alberto Crespo RN  Outcome: Progressing     Problem: DISCHARGE PLANNING  Goal: Discharge to home or other facility with appropriate resources  Description  INTERVENTIONS:  - Identify barriers to disch Ensure adequate protection for wounds/incisions during mobilization  - Obtain PT/OT consults as needed  - Advance activity as appropriate  - Communicate ordered activity level and limitations with patient/family  Outcome: Progressing     Problem: Cristian Lay Progressing     Patient more alert, oriented, following commands. Able to move Right arm more. Pupils equal and reactive. Remains with right facial drop. Patient able to speak in full sentences. RA. Afib. VSS. Tolerating diet.  BMx1. VINCENT in place with bile/g

## 2019-12-02 NOTE — PROGRESS NOTES
[facility]    Progress Note    Ivette Blanco Patient Status:  Inpatient    1955 MRN [id number]   Location [facility] 2W/SW Attending Chrissy Johnston, [address] [facility] Day # 5 PCP Sd Sandhu MD       SUBJECTIVE:    States he is f TP 7.2 7.4 7.3         Imaging:  Xr Abdomen (1 View) (cpt=74018)    Result Date: 11/29/2019  CONCLUSION:  1. Persistent small bowel distension. 2. Percutaneous drainage catheter in the right lower quadrant of the abdomen.     Dictated by (CST): Whitney Enciso No stenosis, occlusion, or gross aneurysm in the anterior or posterior circulations. Dictated by (CST): Nemesio Denny MD on 11/30/2019 at 9:41     Approved by (CST): Nemesio Denny MD on 11/30/2019 at 9:48                  Assessment and Plan:     1.  Acute

## 2019-12-02 NOTE — PROGRESS NOTES
Patient sitting up in chair about 20-25 minutes. States could not tolerate and walked back to bed with 2 person assist.  Patient daughter-in-law at bedside.

## 2019-12-02 NOTE — CONSULTS
Consult requested on this 50-year-old gentleman who was admitted 9 days ago with acute perforated appendicitis. No surgical intervention. Is placed on meropenem and Diflucan per ID since November 30, initially was on Zosyn and Diflucan.   Blood cultures 1

## 2019-12-02 NOTE — PLAN OF CARE
Problem: Patient Centered Care  Goal: Patient preferences are identified and integrated in the patient's plan of care  Description  Interventions:  - What would you like us to know as we care for you?  \"I'M A LITTLE [place]\"  - Provide timely, complete, and factors for pressure ulcer development  - Assess and document skin integrity  - Monitor for areas of redness and/or skin breakdown  - Initiate interventions, skin care algorithm/standards of care as needed  Outcome: Progressing     Problem: SAFETY ADULT - Right facial droop and with expressive aphasia. RA. Afib. VSS. NPO. Voiding. Repositioning q2hrs.

## 2019-12-02 NOTE — PHYSICAL THERAPY NOTE
PHYSICAL THERAPY TREATMENT NOTE - INPATIENT     Room Number: 367/390-D       Presenting Problem: acute perforated appendicitis, new L parietal and occipital lobe CVA    Problem List  Principal Problem:    Acute perforated appendicitis  Active Problems: [employer] press in January. His daughter-in-law's brother is having brain surgery today.     OBJECTIVE  Precautions: (abdominal precautions)    WEIGHT BEARING RESTRICTION  Weight Bearing Restriction: None                PAIN ASSESSMENT   Ratin  Location: Goal #1 Patient is able to demonstrate supine - sit EOB @ level: supervision      Goal #1   Current Status Min A   Goal #2 Patient is able to demonstrate transfers Sit to/from Stand at assistance level: supervision with cane - quad      Goal #2  Current

## 2019-12-02 NOTE — PROGRESS NOTES
INFECTIOUS DISEASE PROGRESS NOTE    Raymundo Krabbe Patient Status:  Inpatient    1955 MRN [id number]   Location [facility] 2W/SW Attending Elisabet Shaver, DO   Hosp Day # 9 PCP Alexandria Wakefield MD     Subjective:   Awake, still with right upper perforated appendicitis     Benign essential HTN     Obstructive sleep apnea syndrome     History of mitral valve repair     CVA (cerebral vascular accident) ([facility] [address]     Jaundice    ASSESSMENT/PLAN:  71year old male w/ PMH sig for HTN, HLD, Afib, TEJINDER, who p

## 2019-12-02 NOTE — PROGRESS NOTES
DAVEY performed. Probe passed and procedure performed. Bubble contrast injected   Probe removed. Suctioned clear secretions. Patient alert and oriented.   [facility]

## 2019-12-02 NOTE — PROGRESS NOTES
CT called to inform of order placed by Dr. Zuleika Estevez. Informed CT that CT needs to be done on second floor close to CCU.

## 2019-12-02 NOTE — CM/SW NOTE
12/2: Received call from [facility] [employer], [facility] CRISTAL BRIONES, patient is covered 100% for IV abx at home. PT/OT recommending acute rehab. Requested PM&R consult. Please see Rola Jerez note regarding additional d/c planning information.     Addendum 12/3/2019:    PMR recommen

## 2019-12-02 NOTE — PROGRESS NOTES
[facility]    General Surgery Progress Note  Juan Jose Medal  SNW:[id number]  # 9       Subjective:   No new complaints improving lower abdominal pain and no nausea   Afebrile and has more energy and more active  Passing small amount of fla 1205 ml   Net 2731.02 ml       Mri Brain (cpt=70551)    Result Date: 12/1/2019  CONCLUSION:  1. Acute infarct in the left superior parietal lobe and left posterior medial occipital lobe.     Dictated by (CST): Jose Alfredo Lezama MD on 12/01/2019 at 13:

## 2019-12-03 ENCOUNTER — APPOINTMENT (OUTPATIENT)
Dept: GENERAL RADIOLOGY | Facility: HOSPITAL | Age: 64
DRG: 329 | End: 2019-12-03
Attending: INTERNAL MEDICINE
Payer: COMMERCIAL

## 2019-12-03 PROCEDURE — 71045 X-RAY EXAM CHEST 1 VIEW: CPT | Performed by: INTERNAL MEDICINE

## 2019-12-03 PROCEDURE — 99232 SBSQ HOSP IP/OBS MODERATE 35: CPT | Performed by: OTHER

## 2019-12-03 PROCEDURE — 99233 SBSQ HOSP IP/OBS HIGH 50: CPT | Performed by: INTERNAL MEDICINE

## 2019-12-03 PROCEDURE — 99232 SBSQ HOSP IP/OBS MODERATE 35: CPT | Performed by: INTERNAL MEDICINE

## 2019-12-03 RX ORDER — HEPARIN SODIUM 1000 [USP'U]/ML
30 INJECTION, SOLUTION INTRAVENOUS; SUBCUTANEOUS ONCE
Status: COMPLETED | OUTPATIENT
Start: 2019-12-03 | End: 2019-12-03

## 2019-12-03 NOTE — PROGRESS NOTES
[facility]    Progress Note    Ayo Andrews Patient Status:  Inpatient    1955 MRN [id number]   Location [facility] 2W/SW Attending Amira Mckeon, [address] [facility] Day # 8 PCP Benoit Mendes MD       SUBJECTIVE:  Feels okay.   St Only)(cpt=74177)    Result Date: 12/2/2019  CONCLUSION:  1. Perforated appendicitis with interval drainage of a right lower quadrant abscess with pigtail drain. Trace residual fluid along the course of the drain with surrounding inflammatory changes.   ANTONIO r ejection fraction was 55-60%. Wall motion    was normal; there were no regional wall motion abnormalities. 2. Aortic valve: Mild regurgitation. 3. Mitral valve: Cannot exclude vegetation. The findings are    consistent with moderate stenosis.  Mild regurgit Aortic valve:   Trileaflet; mildly calcified leaflets. Doppler: There was no stenosis. Mild regurgitation. Aorta: The aorta was mildly dilated. Aortic root: The aortic root was normal in size. Mitral valve:    Moderately thickened, mildly calcified frank ES, A4C                7.4   cm     -----------  LV PW thickness, ED               (H)     1.2   cm     0.6 - 1.0  IVS/LV PW ratio, ED                       0.91         -----------  LV end-diastolic volume, 2-p      (H)     157   ml     62 - 150  LV end-s cm/sec -----------  Mitral deceleration time                  299   ms     -----------  Mitral pressure half-time                 132   ms     -----------  Mitral mean gradient, D                   5     mm Hg  -----------  Mitral peak gradient, D Continuous  acetaminophen (TYLENOL) 650 MG rectal suppository 650 mg, 650 mg, Rectal, Q4H PRN  niCARdipine HCl (CARDENE) 25 mg in sodium chloride 0.9% 250 mL infusion, 5-15 mg/hr, Intravenous, Continuous PRN  Meropenem (MERREM) 500 mg in sodium chloride 0. periappendicial fluid collection, s/p drain by IR (11/29). Drain fluid culture +Ecoli. Repeat CT on 12/2 reveals a new RLQ interloop fluid collection, separate from the drained abscess, not accessible by IR.   Discussed with Dr. Nneka Ramires; pt tentatively s

## 2019-12-03 NOTE — PROGRESS NOTES
[facility]    Progress Note    Rolena Favor Patient Status:  Inpatient    1955 MRN [id number]   [facility] 2W/SW Attending Margaret Durbin, [address] [facility] [address] Day # 10 PCP Sue Stacy MD        Subjective:     Constitutio With h/o a-fib  , anticoagulation IV heparin         2-Admitted with perforated appendicitis 11/23/2019  Status post IR drainage  IV antibiotics  General surgery following      3- chronic a-fib   IV heparin      4-  Obstructive sleep apnea syndrome  Auto enlargement. 7. Bilateral gynecomastia. Dictated by (CST): Osiel Cervantes MD on 12/02/2019 at 11:21     Approved by (CST): Osiel Cervantes MD on 12/02/2019 at 11:33          Xr Chest Ap Portable  (cpt=71045)    Result Date: 12/3/2019  CONCLUSION:  1.  Int

## 2019-12-03 NOTE — PAYOR COMM NOTE
--------------  CONTINUED STAY REVIEW    Payor: [employer] [facility]  Subscriber #:  [id number]  Authorization Number: [id number]     Admit date: 11/23/19  Admit time: 0    Admitting Physician: Jose R Reyna DO  Attending Physician:  DO JACQUELINE Abarca Summary (Last 24 hours) at 12/3/2019 0826  Last data filed at 12/3/2019 0736      Gross per 24 hour   Intake 3026 ml   Output 1533 ml   Net 1493 ml         Ct Abdomen+pelvis(contrast Only)(cpt=74177)     Result Date: 12/2/2019  CONCLUSION:            1.  Pe 0.62 12/03/2019      12/03/2019     PTT 36.9 12/03/2019     CA 8.8 12/03/2019         Kelby Lyman MD [facility]  12/03/19  8:39 AM                MEDICATIONS ADMINISTERED IN LAST 1 DAY:  adult 3 in 1 TPN     Date Action Dose Rout New Bag 500 mg Intravenous Violet Box RN    12/2/2019 2210 New Bag 500 mg Intravenous Violet Box RN    12/2/2019 1345 New Bag 500 mg Intravenous Sukhwinder Mchugh RN      Midazolam HCl (VERSED) 2 MG/2ML injection 4 mg     Date Action Dose Route Us

## 2019-12-03 NOTE — PLAN OF CARE
Problem: Patient Centered Care  Goal: Patient preferences are identified and integrated in the patient's plan of care  Description  Interventions:  - What would you like us to know as we care for you?  \"I'M A LITTLE [place]\"  - Provide timely, complete, and hydration with IV or PO as ordered and tolerated  - Nasogastric tube to low intermittent suction as ordered  - Evaluate effectiveness of ordered antiemetic medications  - Provide nonpharmacologic comfort measures as appropriate  - Advance diet as tolerated Monitor for areas of redness and/or skin breakdown  - Initiate interventions, skin care algorithm/standards of care as needed  Outcome: Progressing     Problem: HEMATOLOGIC - ADULT  Goal: Free from bleeding injury  Description  (Example usage: patient with Complete POLST form as appropriate  - Assess patient's ability to be responsible for managing their own health  - Refer to Case Management Department for coordinating discharge planning if the patient needs post-hospital services based on physician/LIP ord glucose, and sodium.  Initiate appropriate interventions as ordered  Outcome: Progressing  Goal: Achieves maximal functionality and self care  Description  INTERVENTIONS  - Monitor swallowing and airway patency with patient fatigue and changes in neurologic

## 2019-12-03 NOTE — SLP NOTE
SPEECH DAILY NOTE - INPATIENT    ASSESSMENT & PLAN   ASSESSMENT    Pt alert, afebrile and on room air. Pt seen for swallow analysis per BSE recommendations (after consulting with RN).  Pt observed upright in chair with current diet of pureed/honey-thick liq precautions/strategies to improve safety/efficiency of the swallow. Diet to be upgraded as tolerated. Will complete VFSS as cleared by GI MD. Speech therapy to be continued.        Diet Recommendations - Solids: ground  Diet Recommendations - Liquid:  Mark Hutton precautions/strategies discussed and demonstrated. Pt required mild cues for multiple swallows and alternating consistencies. No straws used.  Pt would benefit from additional reinforcement of aspiration precautions and safe swallowing strategies.      In P

## 2019-12-03 NOTE — PROGRESS NOTES
Neurology Inpatient Follow-up Note      HPI:     Patient being seen in follow up. Interval notes and workup reviewed. Patient feeling a little bit better today. Interval notes and work-up reviewed.       Past Medical Hisotory:  Reviewed    Medications: or appendage. There was severe     spontaneous echo contrast (&quot;smoke&quot;) in the cavity and the     appendage. 5. Right atrium: The atrium was moderately dilated.   6. Atrial septum: Doppler showed a possible trivial left-to-right     atrial level s

## 2019-12-03 NOTE — PROGRESS NOTES
[facility]    General Surgery Progress Note  Morelanddavid Mehtaer  DEL:[id number]  # 10       Subjective:   No new complaints denies abdominal pain and no nausea   Afebrile and able to ambulate with help  Passed large liquid BM yesterday but no 12/2/2019  CONCLUSION:  1. Perforated appendicitis with interval drainage of a right lower quadrant abscess with pigtail drain. Trace residual fluid along the course of the drain with surrounding inflammatory changes.   A right lower quadrant interloop flu

## 2019-12-03 NOTE — TELEPHONE ENCOUNTER
I can fill out but we are still unsure of how long he will need to be off -- it will likely be several weeks at least

## 2019-12-03 NOTE — PROGRESS NOTES
INFECTIOUS DISEASE PROGRESS NOTE    Quiana Alberts Patient Status:  Inpatient    1955 MRN [id number]   Location [facility] 2W/SW Attending Asmita Santos, [address] [facility] Day # 10 PCP Robby Henderson MD     Subjective:   Awake, still with right upper appendix     Diarrhea of infectious origin     Acute perforated appendicitis     Benign essential HTN     Obstructive sleep apnea syndrome     History of mitral valve repair     CVA (cerebral vascular accident) ([facility] [address]     Jaundice    ASSESSMENT/PLAN:    64

## 2019-12-03 NOTE — PLAN OF CARE
Problem: Patient Centered Care  Goal: Patient preferences are identified and integrated in the patient's plan of care  Description  Interventions:  - What would you like us to know as we care for you?  \"I'M A LITTLE [place]\"  - Provide timely, complete, adequate hydration with IV or PO as ordered and tolerated  - Nasogastric tube to low intermittent suction as ordered  - Evaluate effectiveness of ordered antiemetic medications  - Provide nonpharmacologic comfort measures as appropriate  - Advance diet as integrity  - Monitor for areas of redness and/or skin breakdown  - Initiate interventions, skin care algorithm/standards of care as needed  Outcome: Progressing     Problem: HEMATOLOGIC - ADULT  Goal: Free from bleeding injury  Description  (Example usage: partner  - Complete POLST form as appropriate  - Assess patient's ability to be responsible for managing their own health  - Refer to Case Management Department for coordinating discharge planning if the patient needs post-hospital services based on physic temperature, glucose, and sodium.  Initiate appropriate interventions as ordered  Outcome: Progressing  Goal: Achieves maximal functionality and self care  Description  INTERVENTIONS  - Monitor swallowing and airway patency with patient fatigue and changes

## 2019-12-03 NOTE — PROGRESS NOTES
Offered emotional and spiritual support to patient prior to scheduled procedure through empathic listening and prayer. Patient expressed anxiousness regarding his son Amy Kimbrough who had seizures earlier today and is now [place].      12/03/19

## 2019-12-03 NOTE — SLP NOTE
SPEECH/LANGUAGE/COGNITIVE EVALUATION - INPATIENT    Admission Date: 11/23/2019  Evaluation Date: 12/03/19    Reason for Referral: Stroke protocol; Stroke Alert called on 11/30/19. MRI Brain 12/01/19:  CONCLUSION:   1.  Acute infarct in the left superi quality judged to be adequate. .     IMPRESSIONS: Pt presents with mild to moderate dysarthria. In conversation, Pt's speech intelligibilty is judged to be ~75% with unfamiliar listener and unknown content.   Per [facility] TINA Scale, Pt's Motor Speech Production informed and has taken part in this evaluation and plan of treatment and have been advised and agree on the findings and goals.       FOLLOW UP  Treatment Plan/Recommendations: Dysphagia therapyAspiration precautions; dysarthria treatment  Number of Visits

## 2019-12-04 ENCOUNTER — ANESTHESIA EVENT (OUTPATIENT)
Dept: SURGERY | Facility: HOSPITAL | Age: 64
DRG: 329 | End: 2019-12-04
Payer: COMMERCIAL

## 2019-12-04 ENCOUNTER — ANESTHESIA (OUTPATIENT)
Dept: SURGERY | Facility: HOSPITAL | Age: 64
DRG: 329 | End: 2019-12-04
Payer: COMMERCIAL

## 2019-12-04 ENCOUNTER — APPOINTMENT (OUTPATIENT)
Dept: GENERAL RADIOLOGY | Facility: HOSPITAL | Age: 64
DRG: 329 | End: 2019-12-04
Attending: PHYSICIAN ASSISTANT
Payer: COMMERCIAL

## 2019-12-04 PROCEDURE — 0DTJ4ZZ RESECTION OF APPENDIX, PERCUTANEOUS ENDOSCOPIC APPROACH: ICD-10-PCS | Performed by: SURGERY

## 2019-12-04 PROCEDURE — 0DQH4ZZ REPAIR CECUM, PERCUTANEOUS ENDOSCOPIC APPROACH: ICD-10-PCS | Performed by: SURGERY

## 2019-12-04 PROCEDURE — 99233 SBSQ HOSP IP/OBS HIGH 50: CPT | Performed by: INTERNAL MEDICINE

## 2019-12-04 PROCEDURE — 0W9G4ZZ DRAINAGE OF PERITONEAL CAVITY, PERCUTANEOUS ENDOSCOPIC APPROACH: ICD-10-PCS | Performed by: SURGERY

## 2019-12-04 PROCEDURE — 99232 SBSQ HOSP IP/OBS MODERATE 35: CPT | Performed by: INTERNAL MEDICINE

## 2019-12-04 PROCEDURE — 71045 X-RAY EXAM CHEST 1 VIEW: CPT | Performed by: PHYSICIAN ASSISTANT

## 2019-12-04 RX ORDER — HYDROCODONE BITARTRATE AND ACETAMINOPHEN 5; 325 MG/1; MG/1
1 TABLET ORAL AS NEEDED
Status: CANCELLED | OUTPATIENT
Start: 2019-12-04

## 2019-12-04 RX ORDER — SODIUM CHLORIDE 9 MG/ML
INJECTION, SOLUTION INTRAVENOUS CONTINUOUS PRN
Status: DISCONTINUED | OUTPATIENT
Start: 2019-12-04 | End: 2019-12-04 | Stop reason: SURG

## 2019-12-04 RX ORDER — MORPHINE SULFATE 10 MG/ML
6 INJECTION, SOLUTION INTRAMUSCULAR; INTRAVENOUS EVERY 10 MIN PRN
Status: CANCELLED | OUTPATIENT
Start: 2019-12-04

## 2019-12-04 RX ORDER — HYDROMORPHONE HYDROCHLORIDE 1 MG/ML
0.4 INJECTION, SOLUTION INTRAMUSCULAR; INTRAVENOUS; SUBCUTANEOUS EVERY 5 MIN PRN
Status: CANCELLED | OUTPATIENT
Start: 2019-12-04

## 2019-12-04 RX ORDER — DEXAMETHASONE SODIUM PHOSPHATE 4 MG/ML
VIAL (ML) INJECTION AS NEEDED
Status: DISCONTINUED | OUTPATIENT
Start: 2019-12-04 | End: 2019-12-04 | Stop reason: SURG

## 2019-12-04 RX ORDER — SODIUM CHLORIDE 9 MG/ML
INJECTION, SOLUTION INTRAVENOUS CONTINUOUS
Status: DISCONTINUED | OUTPATIENT
Start: 2019-12-04 | End: 2019-12-05

## 2019-12-04 RX ORDER — EPHEDRINE SULFATE 50 MG/ML
INJECTION, SOLUTION INTRAVENOUS AS NEEDED
Status: DISCONTINUED | OUTPATIENT
Start: 2019-12-04 | End: 2019-12-04 | Stop reason: SURG

## 2019-12-04 RX ORDER — MORPHINE SULFATE 4 MG/ML
2 INJECTION, SOLUTION INTRAMUSCULAR; INTRAVENOUS EVERY 10 MIN PRN
Status: CANCELLED | OUTPATIENT
Start: 2019-12-04

## 2019-12-04 RX ORDER — ROCURONIUM BROMIDE 10 MG/ML
INJECTION, SOLUTION INTRAVENOUS AS NEEDED
Status: DISCONTINUED | OUTPATIENT
Start: 2019-12-04 | End: 2019-12-04 | Stop reason: SURG

## 2019-12-04 RX ORDER — LIDOCAINE HYDROCHLORIDE 10 MG/ML
INJECTION, SOLUTION EPIDURAL; INFILTRATION; INTRACAUDAL; PERINEURAL AS NEEDED
Status: DISCONTINUED | OUTPATIENT
Start: 2019-12-04 | End: 2019-12-04 | Stop reason: SURG

## 2019-12-04 RX ORDER — HEPARIN SODIUM 1000 [USP'U]/ML
30 INJECTION, SOLUTION INTRAVENOUS; SUBCUTANEOUS ONCE
Status: COMPLETED | OUTPATIENT
Start: 2019-12-04 | End: 2019-12-04

## 2019-12-04 RX ORDER — NALOXONE HYDROCHLORIDE 0.4 MG/ML
80 INJECTION, SOLUTION INTRAMUSCULAR; INTRAVENOUS; SUBCUTANEOUS AS NEEDED
Status: CANCELLED | OUTPATIENT
Start: 2019-12-04 | End: 2019-12-04

## 2019-12-04 RX ORDER — PROCHLORPERAZINE EDISYLATE 5 MG/ML
5 INJECTION INTRAMUSCULAR; INTRAVENOUS ONCE AS NEEDED
Status: CANCELLED | OUTPATIENT
Start: 2019-12-04 | End: 2019-12-04

## 2019-12-04 RX ORDER — HYDROMORPHONE HYDROCHLORIDE 1 MG/ML
0.6 INJECTION, SOLUTION INTRAMUSCULAR; INTRAVENOUS; SUBCUTANEOUS EVERY 5 MIN PRN
Status: CANCELLED | OUTPATIENT
Start: 2019-12-04

## 2019-12-04 RX ORDER — MORPHINE SULFATE 4 MG/ML
4 INJECTION, SOLUTION INTRAMUSCULAR; INTRAVENOUS EVERY 10 MIN PRN
Status: CANCELLED | OUTPATIENT
Start: 2019-12-04

## 2019-12-04 RX ORDER — SODIUM CHLORIDE, SODIUM LACTATE, POTASSIUM CHLORIDE, CALCIUM CHLORIDE 600; 310; 30; 20 MG/100ML; MG/100ML; MG/100ML; MG/100ML
INJECTION, SOLUTION INTRAVENOUS CONTINUOUS
Status: CANCELLED | OUTPATIENT
Start: 2019-12-04

## 2019-12-04 RX ORDER — HYDROMORPHONE HYDROCHLORIDE 1 MG/ML
0.2 INJECTION, SOLUTION INTRAMUSCULAR; INTRAVENOUS; SUBCUTANEOUS EVERY 5 MIN PRN
Status: CANCELLED | OUTPATIENT
Start: 2019-12-04

## 2019-12-04 RX ORDER — BUPIVACAINE HYDROCHLORIDE AND EPINEPHRINE 5; 5 MG/ML; UG/ML
INJECTION, SOLUTION PERINEURAL AS NEEDED
Status: DISCONTINUED | OUTPATIENT
Start: 2019-12-04 | End: 2019-12-04 | Stop reason: HOSPADM

## 2019-12-04 RX ORDER — HALOPERIDOL 5 MG/ML
0.25 INJECTION INTRAMUSCULAR ONCE AS NEEDED
Status: CANCELLED | OUTPATIENT
Start: 2019-12-04 | End: 2019-12-04

## 2019-12-04 RX ORDER — ONDANSETRON 2 MG/ML
INJECTION INTRAMUSCULAR; INTRAVENOUS AS NEEDED
Status: DISCONTINUED | OUTPATIENT
Start: 2019-12-04 | End: 2019-12-04 | Stop reason: SURG

## 2019-12-04 RX ORDER — MIDAZOLAM HYDROCHLORIDE 1 MG/ML
INJECTION INTRAMUSCULAR; INTRAVENOUS AS NEEDED
Status: DISCONTINUED | OUTPATIENT
Start: 2019-12-04 | End: 2019-12-04 | Stop reason: SURG

## 2019-12-04 RX ORDER — ONDANSETRON 2 MG/ML
4 INJECTION INTRAMUSCULAR; INTRAVENOUS ONCE AS NEEDED
Status: CANCELLED | OUTPATIENT
Start: 2019-12-04 | End: 2019-12-04

## 2019-12-04 RX ORDER — HYDROCODONE BITARTRATE AND ACETAMINOPHEN 5; 325 MG/1; MG/1
2 TABLET ORAL AS NEEDED
Status: CANCELLED | OUTPATIENT
Start: 2019-12-04

## 2019-12-04 RX ADMIN — ROCURONIUM BROMIDE 10 MG: 10 INJECTION, SOLUTION INTRAVENOUS at 12:32:00

## 2019-12-04 RX ADMIN — ROCURONIUM BROMIDE 10 MG: 10 INJECTION, SOLUTION INTRAVENOUS at 13:44:00

## 2019-12-04 RX ADMIN — MIDAZOLAM HYDROCHLORIDE 2 MG: 1 INJECTION INTRAMUSCULAR; INTRAVENOUS at 11:45:00

## 2019-12-04 RX ADMIN — EPHEDRINE SULFATE 10 MG: 50 INJECTION, SOLUTION INTRAVENOUS at 12:20:00

## 2019-12-04 RX ADMIN — SODIUM CHLORIDE: 9 INJECTION, SOLUTION INTRAVENOUS at 11:58:00

## 2019-12-04 RX ADMIN — LIDOCAINE HYDROCHLORIDE 50 MG: 10 INJECTION, SOLUTION EPIDURAL; INFILTRATION; INTRACAUDAL; PERINEURAL at 11:53:00

## 2019-12-04 RX ADMIN — SODIUM CHLORIDE: 9 INJECTION, SOLUTION INTRAVENOUS at 11:45:00

## 2019-12-04 RX ADMIN — ONDANSETRON 4 MG: 2 INJECTION INTRAMUSCULAR; INTRAVENOUS at 12:33:00

## 2019-12-04 RX ADMIN — ROCURONIUM BROMIDE 10 MG: 10 INJECTION, SOLUTION INTRAVENOUS at 12:40:00

## 2019-12-04 RX ADMIN — DEXAMETHASONE SODIUM PHOSPHATE 4 MG: 4 MG/ML VIAL (ML) INJECTION at 12:33:00

## 2019-12-04 RX ADMIN — ROCURONIUM BROMIDE 20 MG: 10 INJECTION, SOLUTION INTRAVENOUS at 13:11:00

## 2019-12-04 RX ADMIN — ROCURONIUM BROMIDE 10 MG: 10 INJECTION, SOLUTION INTRAVENOUS at 12:12:00

## 2019-12-04 RX ADMIN — SODIUM CHLORIDE: 9 INJECTION, SOLUTION INTRAVENOUS at 13:15:00

## 2019-12-04 RX ADMIN — SODIUM CHLORIDE: 9 INJECTION, SOLUTION INTRAVENOUS at 15:16:00

## 2019-12-04 RX ADMIN — SODIUM CHLORIDE: 9 INJECTION, SOLUTION INTRAVENOUS at 11:46:00

## 2019-12-04 RX ADMIN — ROCURONIUM BROMIDE 40 MG: 10 INJECTION, SOLUTION INTRAVENOUS at 11:53:00

## 2019-12-04 NOTE — ANESTHESIA PROCEDURE NOTES
Peripheral IV  Inserted by: Dulce Maria Mccabe MD    Placement  Laterality: left  Location: hand  Local anesthetic: none  Site prep: alcohol  Technique: anatomical landmarks  Attempts: 1

## 2019-12-04 NOTE — OCCUPATIONAL THERAPY NOTE
Attempt for occupational therapy treatment. Pt out of room for surgery from late morning to late afternoon: laparoscopic drainage of multiple abscesses, lap appendectomy, lap suture colon perforation, and lap extensive lysis of adhesions.  Discussed with RN

## 2019-12-04 NOTE — RESPIRATORY THERAPY NOTE
Patient's wife is refusing CPAP stating  he can't tolerate hospital machine. Refusing to use CPAP during hospital stay.

## 2019-12-04 NOTE — CM/SW NOTE
Referral sent via [employer] to [address] [place] [address], awaiting review. Last PT 12/3 and OT 12/2. Call placed to [facility] liaison/Odette to alert of the case and to clarify if a full PMR consult is needed as pt was away at testing when PMR came to see him.     Anson Edwards

## 2019-12-04 NOTE — ANESTHESIA PREPROCEDURE EVALUATION
Anesthesia PreOp Note    HPI:     Ernesto Salcido is a 71year old male who presents for preoperative consultation requested by: Jared Jim MD    Date of Surgery: 11/23/2019 - 12/4/2019    Procedure(s):  LAPAROSCOPIC APPENDECTOMY  Indication: absces • Unspecified essential hypertension    • Venous insufficiency     RT greater saphenous vein ablation 2007       Past Surgical History:   Procedure Laterality Date   • COLONOSCOPY  10/11   • COLONOSCOPY N/A 11/16/2016    Performed by Toby Fernandez MD IFTIKHAR, CRNA, 10 mg at 12/04/19 1220  Rocuronium Bromide (ZEMURON) injection, , Intravenous, PRN, Ryann BUITRAGO, HOWIE, 10 mg at 12/04/19 1232  Midazolam HCl (VERSED) 2 MG/2ML injection, , Intravenous, PRN, Ryann BUITRAGO, HOWIE, 2 mg at 12/04/ HYDROmorphone HCl (DILAUDID) 1 MG/ML injection 0.2 mg, 0.2 mg, Intravenous, Q4H PRN, Allison Huynh MD, 0.2 mg at 12/01/19 0508  0.9% NaCl infusion, , Intravenous, Continuous, iMke Staton MD  vancomycin HCl [facility]) 50 MG/ML oral solution 125 mg Non-medical: Not on file    Tobacco Use      Smoking status: Never Smoker      Smokeless tobacco: Never Used    Substance and Sexual Activity      Alcohol use:  Yes        Alcohol/week: 15.0 standard drinks        Types: 15 Glasses of wine per week        C 12/04/2019    RDW 15.2 (H) 12/04/2019    .0 (H) 12/04/2019     Lab Results   Component Value Date     12/04/2019    K 4.0 12/04/2019     12/04/2019    CO2 23.0 12/04/2019    BUN 17 12/04/2019    CREATSERUM 0.60 (L) 12/04/2019

## 2019-12-04 NOTE — ANESTHESIA POSTPROCEDURE EVALUATION
Patient: Cassandra Knight    Procedure Summary     Date:  12/04/19 Room / Location:  [address] [place] [address] MAIN OR 04 / 300 [facility] MAIN OR    Anesthesia Start:  3004 Anesthesia Stop:      Procedure:  LAPAROSCOPIC APPENDECTOMY (N/A Abdomen) Diagnosis:  (abscess, perforated appendix)

## 2019-12-04 NOTE — OR NURSING
A 16fr saenz (10cc balloon) catheter  was inserted, urine was returned after a few minutes urine was leaking around penis tip and saenz was removed,  per PA. Two hundred urine in the saenz bag.     A new saenz was inserted and the balloon was inflated by

## 2019-12-04 NOTE — PROGRESS NOTES
[facility]    Progress Note    Jessica Matter Patient Status:  Inpatient    1955 MRN [id number]   Location [facility] [place] 2W/SW Attending Penelope Palmer, [address] [facility] Day # 6 PCP Teressa Swanson MD       SUBJECTIVE:  Feels okay.   Sl x 2.7 by 4.2 cm is separate from the drain. 2. Right lower quadrant inflammatory changes associated with small bowel obstruction. 3. Prostate enlargement. 4. Small hiatal hernia. 5. Atherosclerotic calcification without aneurysm.  6. Mitral valve annuloplas normal. Wall thickness was    increased in a pattern of mild LVH. Systolic function was    normal. The estimated ejection fraction was 55-60%. Wall motion    was normal; there were no regional wall motion abnormalities. 2. Aortic valve: Mild regurgitation. was not technically sufficient to allow evaluation of LV diastolic dysfunction due to atrial fibrillation. Aortic valve:   Trileaflet; mildly calcified leaflets. Doppler: There was no stenosis. Mild regurgitation. Aorta: The aorta was mildly dilated. 2.5   cm     2.5 - 4.0  LV fx shortening, PLAX                    41    %      25 - 43  LV ID, major axis, ES, A4C                7.4   cm     -----------  LV PW thickness, ED               (H)     1.2   cm     0.6 - 1.0  IVS/LV PW ratio, ED Mitral valve                              Value        Reference  Mitral E-wave peak velocity               176   cm/sec -----------  Mitral deceleration time                  299   ms     -----------  Mitral pressure half-time                 132   ms 50989dydua/250mL infusion CONTINUOUS, 200-3,000 Units/hr, Intravenous, Continuous  acetaminophen (TYLENOL) 650 MG rectal suppository 650 mg, 650 mg, Rectal, Q4H PRN  niCARdipine HCl (CARDENE) 25 mg in sodium chloride 0.9% 250 mL infusion, 5-15 mg/hr, Intra droop and slurred speech, as well as RUE weakness. RLE strength is good. Pt ambulated with PT yesterday. PT/OT/Speech to continue.   Cont heparin gtt.     Acute perforated appendicitis  On meropenem and diflucan per ID since 11/30 (initially was on zosyn

## 2019-12-04 NOTE — PHYSICAL THERAPY NOTE
PHYSICAL THERAPY TREATMENT NOTE - INPATIENT     Room Number: 389/733-C       Presenting Problem: acute perforated appendicitis, new L parietal and occipital lobe CVA    Problem List  Principal Problem:    Acute perforated appendicitis  Active Problems: Static Sitting: Good  Dynamic Sitting: Good           Static Standing: Fair +  Dynamic Standing: Fair +    ACTIVITY TOLERANCE level: minimum assistance   Goal #3   Current Status MET 12/2/19   Goal #4 NEW GOAL added 12/2/19: Patient is able to ambulate 300 feet w/o AD with modified independence.     Goal #4   Current Status Amb 150 ft w/ quad cane & CGA  Amb 15 ft w/o AD & Min A

## 2019-12-04 NOTE — ANESTHESIA PROCEDURE NOTES
Airway  Urgency: Elective      General Information and Staff    Patient location during procedure: OR  Anesthesiologist: Ivette Beverly MD  Resident/CRNA: Jessi Blanca CRNA  Performed: anesthesiologist and CRNA     Indications and Patient C

## 2019-12-04 NOTE — PLAN OF CARE
Patient s/p Lap drainage of abscess, lysis of adhesions. Drowsy, wife at bedside. Remains in Afib- heparin to be resumed at 1900. NG to LIS with minimal to no output noted. 2 right sided VINCENT drains noted from OR. Patient remains NPO at this time.   TPN and I response  - Consider cultural and social influences on pain and pain management  - Manage/alleviate anxiety  - Utilize distraction and/or relaxation techniques  - Monitor for opioid side effects  - Notify MD/LIP if interventions unsuccessful or patient rep and symptoms of hyperglycemia and hypoglycemia  - Administer ordered medications to maintain glucose within target range  - Assess barriers to adequate nutritional intake and initiate nutrition consult as needed  - Instruct patient on self management of di PLANNING  Goal: Discharge to home or other facility with appropriate resources  Description  INTERVENTIONS:  - Identify barriers to discharge w/pt and caregiver  - Include patient/family/discharge partner in discharge planning  - Arrange for needed dischar as appropriate  - Communicate ordered activity level and limitations with patient/family  Outcome: Progressing     Problem: NEUROLOGICAL - ADULT  Goal: Achieves stable or improved neurological status  Description  INTERVENTIONS  - Assess for and report adia

## 2019-12-04 NOTE — PLAN OF CARE
Promoted patient to perform incentive spirometry 10 times every hour while awake. Still in afib on heparin drip. VINCENT irrigated, drained, output monitored. No abdominal pain, no neurological concerns overnight. Patient reports he is feeling better.  Upon wak distraction and/or relaxation techniques  - Monitor for opioid side effects  - Notify MD/LIP if interventions unsuccessful or patient reports new pain  - Anticipate increased pain with activity and pre-medicate as appropriate  Outcome: Progressing     Prob range  - Assess barriers to adequate nutritional intake and initiate nutrition consult as needed  - Instruct patient on self management of diabetes  Outcome: Progressing     Problem: SKIN/TISSUE INTEGRITY - ADULT  Goal: Skin integrity remains intact  Descr barriers to discharge w/pt and caregiver  - Include patient/family/discharge partner in discharge planning  - Arrange for needed discharge resources and transportation as appropriate  - Identify discharge learning needs (meds, wound care, etc)  - Arrange f Problem: NEUROLOGICAL - ADULT  Goal: Achieves stable or improved neurological status  Description  INTERVENTIONS  - Assess for and report changes in neurological status  - Initiate measures to prevent increased intracranial pressure  - Maintain blood pre

## 2019-12-04 NOTE — PAYOR COMM NOTE
--------------  CONTINUED STAY REVIEW    Payor: [facility]  Subscriber #:  [id number]  Authorization Number: [id number]     Admit date: 11/23/19  Admit time: 0    Admitting Physician: Celestino Zuñiga DO  Attending Physician:  DO JACQUELINE Gandhi Routine health maintenance     Special screening for malignant neoplasm of prostate     Unspecified open wound, left ankle, initial encounter     Ruptured appendix     Diarrhea of infectious origin     Acute perforated appendicitis     Benign essential Electronically signed by Sahara Cheema MD at 12/4/2019  9:50 AM           MEDICATIONS ADMINISTERED IN LAST 1 DAY:  adult 3 in 1 TPN     Date Action Dose Route User    12/3/2019 2102 New Bag (none) Intravenous Yunior Fishman RN      heparin (PORCINE) Supportive care   Pt/ot and rehab   With h/o a-fib  , anticoagulation IV heparin         2-Admitted with perforated appendicitis 11/23/2019  Status post IR drainage  IV antibiotics  General surgery following and laparoscopic appendectomy today      3-

## 2019-12-04 NOTE — PROGRESS NOTES
[facility]    General Surgery Progress Note  Nobles Page  [id number]:[id number]  HD# [id number]       Subjective:   No new complaints denies abdominal pain and no nausea   Afebrile and able to ambulate with help  Passed large liquid BM today but minimal 12.7* 12.3* 12.5*   HCT 38.0* 36.9* 37.6*   MCV 87.6 88.7 89.7   MCH 29.3 29.6 29.8   MCHC 33.4 33.3 33.2   RDW 15.4* 15.5* 15.3*   NEPRELIM 12.75* 9.24* 6.68   WBC 15.0* 11.4* 9.3   .0* 496.0* 486.0*       Recent Labs   Lab 12/03/19  0443 12/03/19

## 2019-12-04 NOTE — OPERATIVE REPORT
[facility]    PATIENT'S NAME: CANDELARIOANDIDAIANA   ATTENDING PHYSICIAN: Zandra Kussmaul, DO   OPERATING PHYSICIAN: Tee Friday.  Madai Rajput MD   PATIENT ACCOUNT#:   [id number]    LOCATION:  [address] [place] [address] #:   [id number]       DATE OF BIRTH:  0 perforation, or small bowel perforation, etc.  The patient and his wife understood the indications, details, potential risks, complications, including bleeding, infection, damage to surrounding structures, sepsis, DVT, PE, MI, CVA, etc.  They consented to We irrigated, suctioned, etc.  We could see the entrance of the ileocecal valve, but we could not see the actual hole in the cecum. It was decided not to pursue any further dissection due to fear of damaging the terminal ilium.   Then, we used 2-0 Vicryl

## 2019-12-04 NOTE — BRIEF OP NOTE
Pre-Operative Diagnosis: abscess, perforated appendix     Post-Operative Diagnosis: abscess, perforated appendix, dense adhesions      Procedure Performed:   Procedure(s): laparoscopic drainage of multiple abscesses, lap appendectomy, lap suture colon perf

## 2019-12-04 NOTE — PROGRESS NOTES
INFECTIOUS DISEASE PROGRESS NOTE    Ayo Andrews Patient Status:  Inpatient    1955 MRN [id number]   Location [facility] 2W/SW Attending Amira Mckeon, [address] [facility] Day # 6 PCP Benoit Mendes MD     Subjective:   Overall feels better. Had BM. Ruptured appendix     Diarrhea of infectious origin     Acute perforated appendicitis     Benign essential HTN     Obstructive sleep apnea syndrome     History of mitral valve repair     CVA (cerebral vascular accident) ([address]     Jaundice    ASSESSMENT/KEN

## 2019-12-04 NOTE — PLAN OF CARE
Patient alert and oriented, rested throughout the day in bed. Got up to the chair, ambulated in the room, and ambulated in the hallway with PT.  Speech eval suggesting ground/nectar thick diet and also a swallow video eval recommended, but needs okay from s Consider cultural and social influences on pain and pain management  - Manage/alleviate anxiety  - Utilize distraction and/or relaxation techniques  - Monitor for opioid side effects  - Notify MD/LIP if interventions unsuccessful or patient reports new samira of hyperglycemia and hypoglycemia  - Administer ordered medications to maintain glucose within target range  - Assess barriers to adequate nutritional intake and initiate nutrition consult as needed  - Instruct patient on self management of diabetes  Outco Discharge to home or other facility with appropriate resources  Description  INTERVENTIONS:  - Identify barriers to discharge w/pt and caregiver  - Include patient/family/discharge partner in discharge planning  - Arrange for needed discharge resources and Communicate ordered activity level and limitations with patient/family  Outcome: Progressing     Problem: NEUROLOGICAL - ADULT  Goal: Achieves stable or improved neurological status  Description  INTERVENTIONS  - Assess for and report changes in neurologic

## 2019-12-04 NOTE — PROGRESS NOTES
Neurology Inpatient interval note    Attempt to see patient today, away for surgery. ASSESSMENT/PLAN:   Acute ischemic stroke, left MCA distribution  Stroke likely cardioembolic in the context of A. fib, much less likely infectious embolus.         - con

## 2019-12-05 ENCOUNTER — APPOINTMENT (OUTPATIENT)
Dept: GENERAL RADIOLOGY | Facility: HOSPITAL | Age: 64
DRG: 329 | End: 2019-12-05
Attending: PHYSICIAN ASSISTANT
Payer: COMMERCIAL

## 2019-12-05 PROCEDURE — 99233 SBSQ HOSP IP/OBS HIGH 50: CPT | Performed by: INTERNAL MEDICINE

## 2019-12-05 PROCEDURE — 99231 SBSQ HOSP IP/OBS SF/LOW 25: CPT | Performed by: OTHER

## 2019-12-05 PROCEDURE — 99232 SBSQ HOSP IP/OBS MODERATE 35: CPT | Performed by: INTERNAL MEDICINE

## 2019-12-05 PROCEDURE — 71045 X-RAY EXAM CHEST 1 VIEW: CPT | Performed by: PHYSICIAN ASSISTANT

## 2019-12-05 RX ORDER — HYDROMORPHONE HYDROCHLORIDE 1 MG/ML
0.5 INJECTION, SOLUTION INTRAMUSCULAR; INTRAVENOUS; SUBCUTANEOUS EVERY 2 HOUR PRN
Status: DISCONTINUED | OUTPATIENT
Start: 2019-12-05 | End: 2019-12-12

## 2019-12-05 NOTE — PROGRESS NOTES
Pulmonary/Critical Care Follow Up Note    HPI:   Raymundo Krabbe is a 71year old male with Patient presents with:  Abdominal Pain      PCP Alexandria Wakefield MD  Admission Attending Silvana Walls, [facility] Day #12    Feeling better  No HA  No N/V  leana s discharge      Allergies:    Adhesive Tape               Comment:Other reaction(s): rash        PHYSICAL EXAM:   Blood pressure 121/52, pulse 82, temperature 97.8 °F (36.6 °C), temperature source Temporal, resp.  rate 26, height 6' 2\" (1.88 m), weight 211

## 2019-12-05 NOTE — SLP NOTE
VFSS was recommended prior to upgrade to thin liquids. Per RN,  d/t GI issues, MD requesting \"wait one more day\" prior to VFSS being completed with barium intake. Will f/u 12/06/19 and complete VFSS as appropriate.        Tommy Pham M.S. CCC/SLP

## 2019-12-05 NOTE — PLAN OF CARE
Patient up walking with PT today. Remains NPO with NG to LIS. Encouraged IS and CDB exercises. IV fluids discontinued, TPN infusing. Heparin gtt therapeutic at this time.  Patient still has right facial droop, right arm/hand weakness and mild slurring of sp influences on pain and pain management  - Manage/alleviate anxiety  - Utilize distraction and/or relaxation techniques  - Monitor for opioid side effects  - Notify MD/LIP if interventions unsuccessful or patient reports new pain  - Anticipate increased samira hypoglycemia  - Administer ordered medications to maintain glucose within target range  - Assess barriers to adequate nutritional intake and initiate nutrition consult as needed  - Instruct patient on self management of diabetes  Outcome: Progressing     P other facility with appropriate resources  Description  INTERVENTIONS:  - Identify barriers to discharge w/pt and caregiver  - Include patient/family/discharge partner in discharge planning  - Arrange for needed discharge resources and transportation as ap activity level and limitations with patient/family  Outcome: Progressing     Problem: NEUROLOGICAL - ADULT  Goal: Achieves stable or improved neurological status  Description  INTERVENTIONS  - Assess for and report changes in neurological status  - Initiat

## 2019-12-05 NOTE — PROGRESS NOTES
Neurology Inpatient Follow-up Note      HPI:     Patient being seen in follow up. Interval notes and workup reviewed. Op report reviewed. Patient understandably in pain after surgery. However, he does feel right arm power is improving.       Past Medi

## 2019-12-05 NOTE — PROGRESS NOTES
[facility] [place] [facility]    General Surgery Progress Note  Rolena Favor  YAV:[id number]  # 12       Subjective:   No new complaints periincisional and RLQ abdominal pain and no nausea   NGT draining non bilious gastric output  Passing no flatus yet Cardiomegaly. Post mitral valve annuloplasty. No acute CHF. 4. Right PICC line tip near RA SVC junction. 5. Atherosclerosis aorta.     Dictated by (CST): Dejan Dobbins MD on 12/04/2019 at 16:05     Approved by (CST): Dejan Dobbins MD on 12/04/2019 at 16

## 2019-12-05 NOTE — PAYOR COMM NOTE
--------------  CONTINUED STAY REVIEW------REQUESTING ADDITIONAL DAY 12/5      Payor: Eric [facility]  Subscriber #:  [id number]  Authorization Number: [id number]     Admit date: 11/23/19  Admit time: 0    Admitting Physician: Selam Rosenthal DO  At TempSrc:       Temporal   SpO2: 97% 96% 97% 98%   Weight:           Height:              Body mass index is 27.19 kg/m².         Intake/Output Summary (Last 24 hours) at 12/5/2019 0846  Last data filed at 12/5/2019 0600      Gross per 24 hour   Intake [address]  12/05/19  8:51 AM                                 MEDICATIONS ADMINISTERED IN LAST 1 DAY:  adult 3 in 1 TPN     Date Action Dose Route User    12/5/2019 0600 Rate/Dose Verify (none) Intravenous Heather Hawk RN    12/4/2019 9904 12/4/2019 1315 Given 50 mcg Intravenous Yary Medicine M, CRNA    12/4/2019 1237 Given 50 mcg Intravenous Yary Medicine, CRNA    12/4/2019 1233 Given 50 mcg Intravenous Yary Medicine, CRNA    12/4/2019 1205 Given 50 mcg Intraven 12/4/2019 1344 Given 10 mg Intravenous Shayy Rayshawn M, CRNA    12/4/2019 1311 Given 20 mg Intravenous Shayy Rayshawn, CRNA    12/4/2019 1240 Given 10 mg Intravenous Shayy Rayshawn, CRNA    12/4/2019 1232 Given 10 mg Intravenous

## 2019-12-05 NOTE — PROGRESS NOTES
Discussed with patient and his wife at bedside the importance of using CPAP machine at night for TEJINDER. Patient's wife brought in patient's CPAP appliance and machine from home. Per patient, this is his second CPAP appliance and machine he has used.  Patient

## 2019-12-05 NOTE — PHYSICAL THERAPY NOTE
PHYSICAL THERAPY Re-EVALUATION - INPATIENT     Room Number: 217/217-A  Evaluation Date: 12/5/2019  Type of Evaluation: Re-evaluation   Physician Order: PT Eval and Treat    Presenting Problem: acute perforate appendicitis; s/p abscess drainage; CVA (Acute these deficits in preparation for discharge. DISCHARGE RECOMMENDATIONS  PT Discharge Recommendations: Acute rehabilitation    PLAN  PT Treatment Plan: Bed mobility; Endurance; Patient education; Family education;Gait training;Range of motion;Strengthening; localized, unspecified site    • Sleep apnea    • UGI bleed    • Unspecified essential hypertension    • Venous insufficiency     RT greater saphenous vein ablation 2007       Past Surgical History  Past Surgical History:   Procedure Laterality Date   • CO assistance required to correct errors made and decreased awareness of errors   · Awareness of Deficits:  decreased awareness of deficits  · Problem Solving:  assistance required to identify errors made, assistance required to generate solutions and assista 12/19/19  Patient Goal Patient's self-stated goal is: not stated   Goal #1 Patient is able to demonstrate supine - sit EOB @ level: independent     Goal #1   Current Status    Goal #2 Patient is able to demonstrate transfers Sit to/from Stand at assistance

## 2019-12-05 NOTE — CM/SW NOTE
Discharge Planning: Followed up with [facility] liaison, Pratik Singh, to determine if patient accepted. Due to patient having appendectomy/abcess drain yesterday, Pratik Singh requesting updates. Progress notes and PT/OT notes sent.   Speech will re-evaluate tomorrow and C

## 2019-12-05 NOTE — PROGRESS NOTES
INFECTIOUS DISEASE PROGRESS NOTE    Pablo Torres Patient Status:  Inpatient    1955 MRN [id number]   Location [facility] 2W/SW Attending Josh Langley, [address] [facility] Day # 15 PCP Bella Garcia MD     Subjective:   Having some post-operative lo of infectious origin     Acute perforated appendicitis     Benign essential HTN     Obstructive sleep apnea syndrome     History of mitral valve repair     CVA (cerebral vascular accident) ([facility] [address]     Jaundice    ASSESSMENT/PLAN:    71year old male w/ PMH s

## 2019-12-05 NOTE — PLAN OF CARE
Problem: Patient Centered Care  Goal: Patient preferences are identified and integrated in the patient's plan of care  Description  Interventions:  - What would you like us to know as we care for you?  \"I'M A LITTLE [place]\"  - Provide timely, complete, and Minimal or absence of nausea and vomiting  Description  INTERVENTIONS:  - Maintain adequate hydration with IV or PO as ordered and tolerated  - Nasogastric tube to low intermittent suction as ordered  - Evaluate effectiveness of ordered antiemetic medicati document risk factors for pressure ulcer development  - Assess and document skin integrity  - Monitor for areas of redness and/or skin breakdown  - Initiate interventions, skin care algorithm/standards of care as needed  Outcome: Progressing     Problem: H as needed  - Consider post-discharge preferences of patient/family/discharge partner  - Complete POLST form as appropriate  - Assess patient's ability to be responsible for managing their own health  - Refer to Case Management Department for coordinating d parameters to optimize cerebral perfusion and minimize risk of hemorrhage  - Monitor temperature, glucose, and sodium.  Initiate appropriate interventions as ordered  Outcome: Progressing  Goal: Achieves maximal functionality and self care  Description  INT

## 2019-12-05 NOTE — OCCUPATIONAL THERAPY NOTE
OCCUPATIONAL THERAPY   RE EVALUATION - INPATIENT     Room Number: 217/217-A  Evaluation Date: 12/5/2019  Type of Evaluation: Initial  Presenting Problem: Acute perforated appendicits and CVA     Physician Order: IP Consult to Occupational Therapy  Reason f lotion to improve R side awareness (rub lotion along arm while focusing, looking at location). Pt attempting to utilize RUE as a stabilizer. At times it is positioned awkwardly when pt at rest and he needs verbal cues for positioning.  Talked to pt and wife RT greater saphenous vein ablation 2007       Past Surgical History  Past Surgical History:   Procedure Laterality Date   • COLONOSCOPY  10/11   • COLONOSCOPY N/A 11/16/2016    Performed by Any Drummond MD at [facility] ENDOSCOPY   • HERNIA SURGERY     • HIP R the patient currently need…  -   Putting on and taking off regular lower body clothing?: Total  -   Bathing (including washing, rinsing, drying)?: A Lot  -   Toileting, which includes using toilet, bedpan or urinal? : A Lot  -   Putting on and taking off r

## 2019-12-06 PROCEDURE — 99233 SBSQ HOSP IP/OBS HIGH 50: CPT | Performed by: INTERNAL MEDICINE

## 2019-12-06 RX ORDER — ACETAMINOPHEN 10 MG/ML
1000 INJECTION, SOLUTION INTRAVENOUS EVERY 6 HOURS PRN
Status: DISCONTINUED | OUTPATIENT
Start: 2019-12-06 | End: 2019-12-07

## 2019-12-06 NOTE — PAYOR COMM NOTE
--------------  CONTINUED STAY REVIEW    Payor: Eric [facility]  Subscriber #:  [id number]  Authorization Number: [id number]     Admit date: 11/23/19  Admit time: 0    Admitting Physician: Jayy Hirsch DO  Attending Physician:  DO JACQUELINE Chris       3- chronic a-fib   History of mitral valve repair   LVEF 60 %   Moderate MS   RV dilated and reduced function   DAEVY possible small vegetation on MV ,  not enough large vegetation to require surgery      IV heparin      4-  Obstructive sleep apnea syn the GE junction. 2. Suboptimal inspiration with probable atelectasis in the right lower lobe. 3. Cardiomegaly. Post mitral valve annuloplasty. No acute CHF. 4. Right PICC line tip near RA SVC junction. 5. Atherosclerosis aorta.     Dictated by (CST): [place] 12/5/2019 2000 Hi-Risk Rate/Dose Verify 1750 Units/hr Intravenous Dora Cook RN    12/5/2019 1900 Hi-Risk Rate/Dose Verify 1750 Units/hr Intravenous Dora Cook [facility]    12/5/2019 1520 New Bag 1750 Units/hr Intravenous Max King

## 2019-12-06 NOTE — PROGRESS NOTES
[facility] - [facility]    Progress Note (Late entry 19)    Melinda Kirkland Patient Status:  Inpatient    1955 MRN [id number]   [facility] 2W/SW Attending Sammie Crabtree, [address] [facility] Day # 15 PCP Bean Clarke MD       SUBJECT 3. Nasogastric tube extends into the stomach. Right-sided PICC line with the tip in the distal SVC.     Dictated by (CST): Steven Guerrero MD on 12/05/2019 at 8:49     Approved by (CST): Steven Guerrero MD on 12/05/2019 at 8:53          Xr Chest Ap was 55-60%. Wall motion    was normal; there were no regional wall motion abnormalities. 2. Aortic valve: Mild regurgitation. 3. Mitral valve: Cannot exclude vegetation. The findings are    consistent with moderate stenosis. Mild regurgitation.  Valve    ar Trileaflet; mildly calcified leaflets. Doppler: There was no stenosis. Mild regurgitation. Aorta: The aorta was mildly dilated. Aortic root: The aortic root was normal in size. Mitral valve: Moderately thickened, mildly calcified leaflets.  Mobility 7.4   cm     -----------  LV PW thickness, ED               (H)     1.2   cm     0.6 - 1.0  IVS/LV PW ratio, ED                       0.91         -----------  LV end-diastolic volume, 2-p      (H)     157   ml     62 - 563  LV end-systolic volume, 2- -----------  Mitral deceleration time                  299   ms     -----------  Mitral pressure half-time                 132   ms     -----------  Mitral mean gradient, D                   5     mm Hg  -----------  Mitral peak gradient, D Nebulization, Q6H PRN  heparin (PORCINE) drip 79743stfab/250mL infusion CONTINUOUS, 200-3,000 Units/hr, Intravenous, Continuous  niCARdipine HCl (CARDENE) 25 mg in sodium chloride 0.9% 250 mL infusion, 5-15 mg/hr, Intravenous, Continuous PRN  Meropenem (ME cx 11/29 NGTD  CT with periappendicial fluid collection, s/p drain by IR (11/29). Drain fluid culture +Ecoli.   Repeat CT on 12/2 revealed a new RLQ interloop fluid collection, separate from the drained abscess, not accessible by IR.   Pt underwent surgical

## 2019-12-06 NOTE — PROGRESS NOTES
INFECTIOUS DISEASE PROGRESS NOTE    Eric Loveless Patient Status:  Inpatient    1955 MRN [id number]   Location [facility] 2W/SW Attending Alen Palomino DO   Hosp Day # 15 PCP Danielle Rock MD     Subjective:   Awake, NGT in place.  Sitting Benign essential HTN     Obstructive sleep apnea syndrome     History of mitral valve repair     CVA (cerebral vascular accident) ([facility] [address]     Jaundice    ASSESSMENT/PLAN:    71year old male w/ PMH sig for HTN, HLD, Afib, TEJINDER, who presented to ED on 11/23 wi

## 2019-12-06 NOTE — PHYSICAL THERAPY NOTE
PHYSICAL THERAPY TREATMENT NOTE - INPATIENT     Room Number: 863/769-D       Presenting Problem: acute perforate appendicitis; s/p abscess drainage; CVA (Acute infacrt L parietal & occipital lobe w/ LUE weakness); s/p laparocopic drainage of small abscess Static Sitting: Fair -  Dynamic Sitting: Fair -           Static Standing: Poor +  Dynamic Standing: Poor    ACTIVITY TOLERANCE independence with home activity/exercise instructions provided to patient in preparation for discharge.    Goal #5   Current Status    Goal #6    Goal #6  Current Status

## 2019-12-06 NOTE — SLP NOTE
SLP attempt x2. RN reports pt remains NPO with NG drain in place and not appropriate for swallow evaluation at this time. SLP to f/u with re-evaluation of swallow function as pt appropriate for oral intake and assess for VFSS readiness. Thank you.

## 2019-12-06 NOTE — PLAN OF CARE
Pt A/Ox4. RUE weakness noted with mild contracture. LE weakness R>L. Slurred speech. Follows commands. On cpap overnight. Tolerated well. On RA when awake. Clear lung sounds upon ausculation. Spo2: 96%. BP and HR stable.  Controlled A.fib. on heparin gtt pe much as possible.   - See additional Care Plan goals for specific interventions    Outcome: Progressing     Problem: PAIN - ADULT  Goal: Verbalizes/displays adequate comfort level or patient's stated pain goal  Description  INTERVENTIONS:  - Encourage pt to rhythm and assess patient for signs and symptoms of electrolyte imbalances  - Administer electrolyte replacement as ordered  - Monitor response to electrolyte replacements, including rhythm and repeat lab results as appropriate  - Fluid restriction as orde deficits and behaviors that affect risk of falls.   - [place] fall precautions as indicated by assessment.  - Educate pt/family on patient safety including physical limitations  - Instruct pt to call for assistance with activity based on assessment  - Mod weights  Outcome: Progressing     Problem: MUSCULOSKELETAL - ADULT  Goal: Return mobility to safest level of function  Description  INTERVENTIONS:  - Assess patient stability and activity tolerance for standing, transferring and ambulating w/ or w/o assist assistance (call light)  - Provide an  as needed  - Communicate barriers and strategies to overcome with those who interact with patient  Outcome: Progressing

## 2019-12-06 NOTE — PROGRESS NOTES
[facility]    Progress Note    Eleonora [place] Patient Status:  Inpatient    1955 MRN [id number]   [facility] 2W/SW Attending Adalgisa May, [address] [facility] [address] Day # 15 PCP Laina Silva MD        Subjective:     Constitutio MS   RV dilated and reduced function   DAVEY possible small vegetation on MV ,  not enough large vegetation to require surgery     IV heparin      4-  Obstructive sleep apnea syndrome  Auto cpap during sleep      5- DVT prophylaxis   Heparin iv     Ok with m annuloplasty. No acute CHF. 4. Right PICC line tip near RA SVC junction. 5. Atherosclerosis aorta.     Dictated by (CST): Tez Childers MD on 12/04/2019 at 16:05     Approved by (CST): Tez Childers MD on 12/04/2019 at 16:09                       Rajni Rojas

## 2019-12-06 NOTE — OCCUPATIONAL THERAPY NOTE
OCCUPATIONAL THERAPY TREATMENT NOTE - INPATIENT        Room Number: 526/290-M           Presenting Problem: Acute perforated appendicits and CVA     Problem List  Principal Problem:    Acute perforated appendicitis  Active Problems:    Atrial fibrillation Degree of Impairment: 59.67% has been calculated based on documentation in the [facility] '6 clicks' Inpatient Daily Activity Short Form. Research supports that patients with this level of impairment may benefit from acute rehab.  Pt is young and motivated to w assistance(x2)  Chair Transfer: Max A x 2  Bedroom Mobility: Mod A x 2    BALANCE ASSESSMENT  Static Sitting: fair-  Static Standing: poor    Education Provided: role of OT, activity promotion, neuromuscular reeducation   Patient End of Session: In bed;Rocco

## 2019-12-06 NOTE — PROGRESS NOTES
[facility] - [facility]    General Surgery Progress Note  Quiana Alberts  NHS:[id number]  # 13       Subjective:   No new complaints periincisional and RLQ abdominal pain and no nausea   NGT draining non bilious gastric output  Passing no flatus yet 89.0 90.2 89.2   MCH 29.3 29.3 29.0   MCHC 32.9 32.4 32.5   RDW 15.2* 15.1* 15.0   NEPRELIM 5.34 8.85* 8.55*   WBC 8.1 11.9* 11.7*   .0* 512.0* 463.0*       Recent Labs   Lab 12/05/19  0520 12/05/19  1838 12/06/19  0516   * 117* 101*   BUN 20

## 2019-12-06 NOTE — PROGRESS NOTES
[facility]    Progress Note    Raymundo Krabbe Patient Status:  Inpatient    1955 MRN [id number]   Location [facility] 2W/SW Attending Elisabet Shaver, [address] Day # 15 PCP Alexandria Wakefield MD       SUBJECTIVE:  Feels okay but consolidation. 3. Nasogastric tube extends into the stomach. Right-sided PICC line with the tip in the distal SVC.     Dictated by (CST): Mari Pope MD on 12/05/2019 at 8:49     Approved by (CST): Mari Pope MD on 12/05/2019 at 8:53 ejection fraction was 55-60%. Wall motion    was normal; there were no regional wall motion abnormalities. 2. Aortic valve: Mild regurgitation. 3. Mitral valve: Cannot exclude vegetation. The findings are    consistent with moderate stenosis.  Mild regurgit Aortic valve:   Trileaflet; mildly calcified leaflets. Doppler: There was no stenosis. Mild regurgitation. Aorta: The aorta was mildly dilated. Aortic root: The aortic root was normal in size. Mitral valve:    Moderately thickened, mildly calcified frank ES, A4C                7.4   cm     -----------  LV PW thickness, ED               (H)     1.2   cm     0.6 - 1.0  IVS/LV PW ratio, ED                       0.91         -----------  LV end-diastolic volume, 2-p      (H)     157   ml     62 - 150  LV end-s cm/sec -----------  Mitral deceleration time                  299   ms     -----------  Mitral pressure half-time                 132   ms     -----------  Mitral mean gradient, D                   5     mm Hg  -----------  Mitral peak gradient, D mL, Nebulization, Q6H PRN  heparin (PORCINE) drip 11440fmctg/250mL infusion CONTINUOUS, 200-3,000 Units/hr, Intravenous, Continuous  niCARdipine HCl (CARDENE) 25 mg in sodium chloride 0.9% 250 mL infusion, 5-15 mg/hr, Intravenous, Continuous PRN  Meropenem and diflucan)  Blood cx 11/29 NGTD  CT with periappendicial fluid collection, s/p drain by IR (11/29). Drain fluid culture +Ecoli.   Repeat CT on 12/2 revealed a new RLQ interloop fluid collection, separate from the drained abscess, not accessible by IR.

## 2019-12-06 NOTE — SLP NOTE
Pt currently NPO w/ NGT placement. SLP attempt to schedule VFSS. RN reports will contact MD about NGT placement and removal for swallow evaluation and/or VFSS. RN to contact SLP with MD clearance to safely proceed with an  evaluation. Thank you.     B

## 2019-12-07 PROCEDURE — 99233 SBSQ HOSP IP/OBS HIGH 50: CPT | Performed by: INTERNAL MEDICINE

## 2019-12-07 PROCEDURE — 99232 SBSQ HOSP IP/OBS MODERATE 35: CPT | Performed by: INTERNAL MEDICINE

## 2019-12-07 RX ORDER — ACETAMINOPHEN 10 MG/ML
1000 INJECTION, SOLUTION INTRAVENOUS EVERY 8 HOURS
Status: DISCONTINUED | OUTPATIENT
Start: 2019-12-07 | End: 2019-12-10

## 2019-12-07 RX ORDER — WARFARIN SODIUM 5 MG/1
5 TABLET ORAL
Status: COMPLETED | OUTPATIENT
Start: 2019-12-07 | End: 2019-12-07

## 2019-12-07 NOTE — PHYSICAL THERAPY NOTE
PHYSICAL THERAPY TREATMENT NOTE - INPATIENT     Room Number: 414/279-L       Presenting Problem: acute perforate appendicitis; s/p abscess drainage; CVA (Acute infacrt L parietal & occipital lobe w/ LUE weakness); s/p laparocopic drainage of small abscess activity with pt for improved static and dynamic standing balance with CGA to min assist of 2 used . At end of session pt left up in chair , needs within reach , report to RN and chair alarm set. Pt with stable vitals this session see below.       The pa much difficulty does the patient currently have. ..  -   Turning over in bed (including adjusting bedclothes, sheets and blankets)?: A Lot   -   Sitting down on and standing up from a chair with arms (e.g., wheelchair, bedside commode, etc.): A Lot   -   Mo instructions provided to patient in preparation for discharge.    Goal #5   Current Status  initiated in progress      Goal #6     Goal #6  Current Status

## 2019-12-07 NOTE — OCCUPATIONAL THERAPY NOTE
OCCUPATIONAL THERAPY TREATMENT NOTE - INPATIENT        Room Number: 328/767-C           Presenting Problem: Acute perforated appendicits and CVA     Problem List  Principal Problem:    Acute perforated appendicitis  Active Problems:    Atrial fibrillation SATURATIONS                ACTIVITIES OF DAILY LIVING ASSESSMENT  AM-PAC ‘6-Clicks’ Inpatient Daily Activity Short Form  How much help from another person does the patient currently need…  -   Putting on and taking off regular lower body clothing?: A Lot

## 2019-12-07 NOTE — PROGRESS NOTES
[facility]    Progress Note    Abimbola Files Patient Status:  Inpatient    1955 MRN [id number]   Location [facility] 2W/SW Attending Ahsan Lakhani, [address] [facility] Day # 15 PCP Asuncion Umana MD       SUBJECTIVE:  Feels okay but complete spectral Doppler, and color Doppler ------------------------------------------------------------------- Elver Barton 64 09/09/1955 H: [id number]                                E: [id number] Study date: Nov 29 2019 body habitus. Scanning was performed from the parasternal, apical, and subcostal acoustic windows. Study completion:  The patient tolerated the procedure well. There were no complications. Transthoracic echocardiography.  M-mode, complete 2D, complete spec not restricted. Doppler: There was no evidence for stenosis. Moderate regurgitation. Pulmonary artery:   The right ventricular systolic pressure was increased consistent with moderate pulmonary hypertension.  Right atrium:  The atrium was severely dilate Value        Reference  IVS thickness, ED                 (H)     1.1   cm     0.6 - 1.0   LVOT                                      Value        Reference  LVOT ID, S                                2.5   cm     -----------  Stroke volume (SV), LVOT 3.4   cm     -----------  TAPSE                                     2.0   cm     1.7 - 3.1  RV pressure, S, DP                        62    mm Hg  -----------  RV s&apos;, lateral, S                         0.13  m/sec  0.06 - 0.13   Pulmonic valve S/p tPA 11/30. MRI 12/1 demonstrating acute infarct in L superior parietal and L posterior medial occipital lobe. -likely 2/2 embolism from LA, as pt with AF and was off coumadin in anticipation of poss surgery.  DAVEY revealed a very large amount of sponta since 3/2019)     Varicose veins, stasis ulcer  Has seen vascular surgeon, Dr. Anson Jason, at [facility] in the past.  Wears compression stocking daily.     TEJINDER  Uses nasal CPAP at home -- unable to adequately use while NGT in place.        FEN:  IVF  pepcid changed

## 2019-12-07 NOTE — PLAN OF CARE
Patient Aox4. Patietn demonstrates some right sided upper weakness as residual from stroke. Patient has moderate right hand  with a score of +3 for lifting the limb. NIH scale done daily. Patient on RA. A-fib with rate 60-75.  NG tube to LIS with Naila Ferguson severity of pain and evaluate response  - Implement non-pharmacological measures as appropriate and evaluate response  - Consider cultural and social influences on pain and pain management  - Manage/alleviate anxiety  - Utilize distraction and/or relaxatio within prescribed range  Description  INTERVENTIONS:  - Monitor Blood Glucose as ordered  - Assess for signs and symptoms of hyperglycemia and hypoglycemia  - Administer ordered medications to maintain glucose within target range  - Assess barriers to adeq with strengthening/mobility  - Encourage toileting schedule  Outcome: Progressing     Problem: DISCHARGE PLANNING  Goal: Discharge to home or other facility with appropriate resources  Description  INTERVENTIONS:  - Identify barriers to discharge w/pt and protection for wounds/incisions during mobilization  - Obtain PT/OT consults as needed  - Advance activity as appropriate  - Communicate ordered activity level and limitations with patient/family  Outcome: Progressing     Problem: [address] [facility]

## 2019-12-07 NOTE — PROGRESS NOTES
Pulmonary/Critical Care Follow Up Note    HPI:   Eleonora Hill is a 71year old male with Patient presents with:  Abdominal Pain      PCP Laina Silva MD  Admission Attending [address] [facility] Day #14      No CARVER  No N/V  leana sob  No appteit ondansetron HCl (ZOFRAN) injection 4 mg, 4 mg, Intravenous, Q6H PRN  •  influenza vaccine (PF)(FLUZONE HD) high dose for 65 yrs & older inj 0.5ml, 0.5 mL, Intramuscular, Prior to discharge      Allergies:    Adhesive Tape               Comment:Other reacti

## 2019-12-07 NOTE — PLAN OF CARE
Pt resting in bed at this time in no distress. Pt up to chair 3 times today however only staying up for approx 45 mins at a time, despite being pre medicated with pain medication before getting up.  Encouraged pt to stay up in chair for longer periods of ti temperature  - Assess for signs of decreased coronary artery perfusion - ex.  Angina  - Evaluate fluid balance, assess for edema, trend weights  Outcome: Progressing     Problem: NEUROLOGICAL - ADULT  Goal: Achieves stable or improved neurological status  D

## 2019-12-07 NOTE — SLP NOTE
SLP collaborated with RN who stated NG in place for suction at this time. PM suction monitoring per RN reportedly at 4pm today and will determine removal of NG in place and potential for PO intake. Will follow 12/08 for PO/VFSS readiness as appropriate.

## 2019-12-07 NOTE — PROGRESS NOTES
[facility] - [facility]    General Surgery Progress Note  Joane Kehr  [id number]  # 14       Subjective:   No new complaints RLQ abdominal pain improved and no nausea   NGT draining non bilious gastric output - decreased amount  Passing no flat 29.3 29.0 29.4   MCHC 32.4 32.5 32.6   RDW 15.1* 15.0 14.7   NEPRELIM 8.85* 8.55* 6.26   WBC 11.9* 11.7* 8.7   .0* 463.0* 438.0       Recent Labs   Lab 12/05/19  1838 12/06/19  0516 12/07/19  0440   * 101* 131*   BUN 26* 24* 17   CREATSERUM 0

## 2019-12-07 NOTE — DIETARY NOTE
ADULT NUTRITION REASSESSMENT     Pt is at high nutrition risk. Pt does not meet malnutrition criteria. RECOMMENDATIONS TO MD:  See Nutrition Intervention for TPN specifics.   If diet initiated, advances but inadequate intake appropriate to transitio appendectomy    PAST MEDICAL HISTORY   has a past medical history of Arrhythmia, Atrial fibrillation ([facility] [address] High blood pressure, High cholesterol, History of vein stripping, Osteoarthrosis, unspecified whether generalized or localized, unspecified site, Sl meropenem  500 mg Intravenous Q8H   • vancomycin HCl  125 mg Oral Daily       LABS: reviewed. TPN ordered accordingly.    Recent Labs     12/04/19  0528 12/05/19  0520 12/05/19  1838 12/06/19  0516 12/07/19  0440   * 119* 117* 101* 131*   BUN 17 20*

## 2019-12-07 NOTE — PROGRESS NOTES
INFECTIOUS DISEASE PROGRESS NOTE    Axel Marquez Patient Status:  Inpatient    1955 MRN [id number]   Location [facility] 2W/SW Attending Marilia Devlin DO   Hosp Day # 15 PCP Akin Purcell MD     Subjective:   Awake, NGT in place.  Still wi Benign essential HTN     Obstructive sleep apnea syndrome     History of mitral valve repair     CVA (cerebral vascular accident) ([facility] [address]     Jaundice    ASSESSMENT/PLAN:    71year old male w/ PMH sig for HTN, HLD, Afib, TEJINDER, who presented to ED on 11/23 wi

## 2019-12-08 PROCEDURE — 99232 SBSQ HOSP IP/OBS MODERATE 35: CPT | Performed by: INTERNAL MEDICINE

## 2019-12-08 PROCEDURE — 99233 SBSQ HOSP IP/OBS HIGH 50: CPT | Performed by: INTERNAL MEDICINE

## 2019-12-08 NOTE — PROGRESS NOTES
Collaborated with RN who reported NG remains in place, though to trial clamping today. Will continue to hold ST and follow up when medically appropriate.     Doy Jeans M.S., [address] [place] [address]

## 2019-12-08 NOTE — PLAN OF CARE
Problem: Patient Centered Care  Goal: Patient preferences are identified and integrated in the patient's plan of care  Description  Interventions:  - What would you like us to know as we care for you? \"I'M A LITTLE [place]\".  Pt is from home wit Anticipate increased pain with activity and pre-medicate as appropriate  Outcome: Progressing     Problem: GASTROINTESTINAL - ADULT  Goal: Minimal or absence of nausea and vomiting  Description  INTERVENTIONS:  - Maintain adequate hydration with IV or PO a Progressing     Problem: SKIN/TISSUE INTEGRITY - ADULT  Goal: Skin integrity remains intact  Description  INTERVENTIONS  - Assess and document risk factors for pressure ulcer development  - Assess and document skin integrity  - Monitor for areas of redness transportation as appropriate  - Identify discharge learning needs (meds, wound care, etc)  - Arrange for interpreters to assist at discharge as needed  - Consider post-discharge preferences of patient/family/discharge partner  - Complete POLST form as jamal neurological status  - Initiate measures to prevent increased intracranial pressure  - Maintain blood pressure and fluid volume within ordered parameters to optimize cerebral perfusion and minimize risk of hemorrhage  - Monitor temperature, glucose, and so

## 2019-12-08 NOTE — PROGRESS NOTES
[facility]    General Surgery Progress Note  Lily Nava  [id number]  # 15       Subjective:   No new complaints RLQ abdominal pain and no nausea   NGT draining non bilious gastric output - decreased amount  Passing no flatus yet 11.1* 10.8*   HCT 34.8* 34.1* 32.0*   MCV 89.2 90.5 89.9   MCH 29.0 29.4 30.3   MCHC 32.5 32.6 33.8   RDW 15.0 14.7 14.6   NEPRELIM 8.55* 6.26 5.59   WBC 11.7* 8.7 7.8   .0* 438.0 444.0       Recent Labs   Lab 12/06/19  0516 12/07/19  0440 12/08/19

## 2019-12-08 NOTE — PROGRESS NOTES
Skin check perform with second RN, Heriberto. Skin is clean, dry and intact with no signs of redness or trauma.

## 2019-12-08 NOTE — PHYSICAL THERAPY NOTE
Attempted Treatment:  Pts chart reviewed. Per RN Giselle Patton, pt just reconnected to NGT and preferred not to get the pt OOB at this time. RN requesting PT to come back again later. PT to attempt again later as time allows.

## 2019-12-08 NOTE — PROGRESS NOTES
[facility] - [facility]    Progress Note    Eleonora PleitezValders Patient Status:  Inpatient    1955 MRN [id number]   Location [facility] 2W/SW Attending Adalgisa May, [address] [facility] Day # 13 PCP Laina Silva MD       SUBJECTIVE:  Feels tired tod Doppler, and color Doppler ------------------------------------------------------------------- Dionisio Madrid 64 09/09/1955 H: [id number]                                E: [id number] Study date: Nov 29 2019 4:28PM Scanning was performed from the parasternal, apical, and subcostal acoustic windows. Study completion:  The patient tolerated the procedure well. There were no complications. Transthoracic echocardiography.  M-mode, complete 2D, complete spectral Doppler, restricted. Doppler: There was no evidence for stenosis. Moderate regurgitation. Pulmonary artery:   The right ventricular systolic pressure was increased consistent with moderate pulmonary hypertension. Right atrium:  The atrium was severely dilated.  P Value        Reference  IVS thickness, ED                 (H)     1.1   cm     0.6 - 1.0   LVOT                                      Value        Reference  LVOT ID, S                                2.5   cm     -----------  Stroke volume (SV), LVOT DP 3.4   cm     -----------  TAPSE                                     2.0   cm     1.7 - 3.1  RV pressure, S, DP                        62    mm Hg  -----------  RV s&apos;, lateral, S                         0.13  m/sec  0.06 - 0.13   Pulmonic valve S/p tPA 11/30. MRI 12/1 demonstrating acute infarct in L superior parietal and L posterior medial occipital lobe. -likely 2/2 embolism from LA, as pt with AF and was off coumadin in anticipation of poss surgery.  DAVEY revealed a very large amount of sponta normalized.     Hypercholesterolemia  Cont fish oil (off zocor since 3/2019)     Varicose veins, stasis ulcer  Has seen vascular surgeon, Dr. Oz Ingram, at [facility] in the past.  Wears compression stocking daily.     TEJINDER  Uses nasal CPAP at home -- unable to adequ

## 2019-12-08 NOTE — PROGRESS NOTES
Patient received as a transfer from PCU. VS stable. AFIB on tele. Patient and family oriented to room and unit. TPN infusing, heparin gtt infusing. Bed alarm on. Call light within reach. Skin check done. No skin breakdown.

## 2019-12-08 NOTE — PLAN OF CARE
Problem: Patient Centered Care  Goal: Patient preferences are identified and integrated in the patient's plan of care  Description  Interventions:  - What would you like us to know as we care for you?  \"I'M A LITTLE [place]\"  - Provide timely, complete, and Minimal or absence of nausea and vomiting  Description  INTERVENTIONS:  - Maintain adequate hydration with IV or PO as ordered and tolerated  - Nasogastric tube to low intermittent suction as ordered  - Evaluate effectiveness of ordered antiemetic medicati document risk factors for pressure ulcer development  - Assess and document skin integrity  - Monitor for areas of redness and/or skin breakdown  - Initiate interventions, skin care algorithm/standards of care as needed  Outcome: Progressing     Problem: H as needed  - Consider post-discharge preferences of patient/family/discharge partner  - Complete POLST form as appropriate  - Assess patient's ability to be responsible for managing their own health  - Refer to Case Management Department for coordinating d parameters to optimize cerebral perfusion and minimize risk of hemorrhage  - Monitor temperature, glucose, and sodium.  Initiate appropriate interventions as ordered  Outcome: Progressing  Goal: Achieves maximal functionality and self care  Description  INT

## 2019-12-08 NOTE — PROGRESS NOTES
Pulmonary/Critical Care Follow Up Note    HPI:   Janes Crawford is a 71year old male with Patient presents with:  Abdominal Pain      PCP Merlin Dike, MD  Admission Attending Yaw Sanders, [address] Day #15      No HA  No N/V  Denies sob  No apptei PRN  •  influenza vaccine (PF)(FLUZONE HD) high dose for 65 yrs & older inj 0.5ml, 0.5 mL, Intramuscular, Prior to discharge      Allergies:    Adhesive Tape               Comment:Other reaction(s): rash        PHYSICAL EXAM:   Blood pressure 125/76, pulse

## 2019-12-09 ENCOUNTER — APPOINTMENT (OUTPATIENT)
Dept: GENERAL RADIOLOGY | Facility: HOSPITAL | Age: 64
DRG: 329 | End: 2019-12-09
Attending: INTERNAL MEDICINE
Payer: COMMERCIAL

## 2019-12-09 PROCEDURE — 99232 SBSQ HOSP IP/OBS MODERATE 35: CPT | Performed by: INTERNAL MEDICINE

## 2019-12-09 PROCEDURE — 71046 X-RAY EXAM CHEST 2 VIEWS: CPT | Performed by: INTERNAL MEDICINE

## 2019-12-09 PROCEDURE — 74230 X-RAY XM SWLNG FUNCJ C+: CPT | Performed by: INTERNAL MEDICINE

## 2019-12-09 NOTE — PROGRESS NOTES
[facility]    Progress Note    Eleonora PleitezGeorgetown Patient Status:  Inpatient    1955 MRN [id number]   Location [facility] 3W/SW Attending Adalgisa May,    Hosp Day # 16 PCP Laina Silva MD        Subjective:     Constitutio HGB 10.6 (L) 12/09/2019    HCT 32.7 (L) 12/09/2019    .0 12/09/2019    CREATSERUM 0.49 (L) 12/09/2019    BUN 18 12/09/2019     12/09/2019    K 4.0 12/09/2019     12/09/2019    CO2 26.0 12/09/2019    GLU 88 12/09/2019    CA 8.9 12/09/2019

## 2019-12-09 NOTE — PROGRESS NOTES
INFECTIOUS DISEASE PROGRESS NOTE    Pablo Torres Patient Status:  Inpatient    1955 MRN [id number]   Location [facility] 2W/SW Attending Josh Langley, DO   Hosp Day # 12 PCP Bella Garcia MD     Subjective:   Awake, NGT removed yesterday. Diarrhea of infectious origin     Acute perforated appendicitis     Benign essential HTN     Obstructive sleep apnea syndrome     History of mitral valve repair     CVA (cerebral vascular accident) ([facility] [address]     Jaundice    ASSESSMENT/PLAN:    71year old male

## 2019-12-09 NOTE — PROGRESS NOTES
[facility] - [facility]    General Surgery Progress Note  Cassandra Knight  [id number]  # 16       Subjective:   No new complaints feels better, abdominal pain improved and no nausea   NGT removed yesterday  Passing flatus - no BM yet    Exam:     Ge 10.6*   HCT 34.1* 32.0* 32.7*   MCV 90.5 89.9 89.6   MCH 29.4 30.3 29.0   MCHC 32.6 33.8 32.4   RDW 14.7 14.6 14.6   NEPRELIM 6.26 5.59 3.77   WBC 8.7 7.8 6.5   .0 444.0 449.0       Recent Labs   Lab 12/08/19  0448 12/08/19  2045 12/09/19  1045   GL

## 2019-12-09 NOTE — PROGRESS NOTES
[facility]    Progress Note    Lord Salazar Patient Status:  Inpatient    1955 MRN [id number]   Location [facility] 3W/SW Attending Constantin Arevalo, [address] [facility] Day # 12 PCP Rui Paniagua MD         Assessment and Plan:     A C.glabrata. Blood cx 11/29 NGTD  -WBC normalized.  NG removed 12/8  -On TPN; swallow eval today  - on Ofirmev 1000mg IV q8hrs for pain; prn IV dilaudid.    -fever to 101 on 12/8; repeat CXR PA Lat, check UA C&S; repeat BCx drawn; check CBC; VINCENT drain with m non-distended  Extremities: no clubbing, cyanosis or edema        Meds:   Warfarin Sodium (COUMADIN) tab 7.5 mg, 7.5 mg, Oral, Once at night  adult 3 in 1 TPN, , Intravenous, Continuous TPN  Warfarin Sodium (COUMADIN) tab 7.5 mg, 7.5 mg, Oral, Once at nigh UROBILINOGEN, NITRITE, LEUUR, WBCUR, RBCUR, BACUR, EPIUR in the last 168 hours. No results for input(s): URINE, CULTI, BLDSMR in the last 168 hours.       Imaging:                         Khadijah Eisenberg MD

## 2019-12-09 NOTE — PAYOR COMM NOTE
--------------  CONTINUED STAY REVIEW    Payor: [facility]  Subscriber #:  [id number]  Authorization Number: [id number]     Admit date: 11/23/19  Admit time: 0    Admitting Physician: Sara Maldonado DO  Attending Physician:  DO JACQUELINE [facility] filed at 12/9/2019 1200      Gross per 24 hour   Intake 1632 ml   Output 1960 ml   Net -328 ml                Results:            Recent Labs   Lab 12/07/19  0440 12/08/19  0448 12/09/19  1035   RBC 3.77* 3.56* 3.65*   HGB 11.1* 10.8* 10.6* essential hypertension     • Venous insufficiency       RT greater saphenous vein ablation 2007            Medications:     Current Facility-Administered Medications:   •  adult 3 in 1 TPN, , Intravenous, Continuous TPN  •  Micafungin Sodium (MYCAMINE) 100 abd soft +bs  Ext warm        LABS:           Lab Results   Component Value Date     WBC 7.8 12/08/2019     HGB 10.8 12/08/2019     HCT 32.0 12/08/2019     .0 12/08/2019     CREATSERUM 0.49 12/08/2019     BUN 16 12/08/2019      12/08/2019 8. 1 11.9* 11.7* 8.7   HGB 12.5* 12.2* 12.0* 11.3* 11.1*   .0* 543.0* 512.0* 463.0* 438.0              Recent Labs   Lab 12/04/19  0528 12/05/19  0520 12/05/19  1838 12/06/19  0516 12/07/19  0440    137 136 134* 138   K 4.0 4.2 4.1 4.2 4.1   CL following                -s/p CT A/P 12/2 with new smaller fluid collection, not amenable to IR drainage               - OR 12/4 for laparoscopy I&D, lap appendectomy, OR cx with E. Coli, PSAR, C. glabrata  # Acute stroke s/p TPA               -Neurology f Crystal Resendiz RN    12/8/2019 1842 New Bag 1900 Units/hr Intravenous Luisa Montoya RN      HYDROmorphone HCl (DILAUDID) 1 MG/ML injection 0.5 mg     Date Action Dose Route User    12/9/2019 1556 Given 0.5 mg Intravenous Herb Gaitan, [facility]    12/9/2019 2015 Give 164/60Abnormal  90 % — None (Room air) — MD   12/07/19 0400 97.5 °F (36.4 °C) 76 22 137/86 97 % 212 lb 3.2 oz None (Room air) — MD

## 2019-12-09 NOTE — OCCUPATIONAL THERAPY NOTE
OCCUPATIONAL THERAPY TREATMENT NOTE - INPATIENT        Room Number: 347/347-A           Presenting Problem: Acute perforated appendicits and CVA     Problem List  Principal Problem:    Acute perforated appendicitis  Active Problems:    Atrial fibrillation impacts his performance in self care and transfer skills. Recommend ACUTE REHAB at this time. The patient's Approx Degree of Impairment: 59.67% has been calculated based on documentation in the [facility] '6 clicks' Inpatient Daily Activity Short Form.   Res Ax2    Bedroom Mobility: mod. Ax2    FUNCTIONAL ADL ASSESSMENT  Grooming: mod. A   Feeding: n/t   Bathing: n/t   Toileting: max.  A   Upper Extremity Dressing: n/t   Lower Extremity Dressing: n/t     Education Provided: Role of therapies, safety awareness,

## 2019-12-09 NOTE — PHYSICAL THERAPY NOTE
Attempted PT treatment- pt off floor for testing. Will re-attempt as schedule permits.    Thank you,  Jalyn Blum, PT, DPT

## 2019-12-09 NOTE — OCCUPATIONAL THERAPY NOTE
Attempted to see pt for OT session, however pt off floor for video swallow test per JOE Wolf. Will cont to follow.

## 2019-12-09 NOTE — PLAN OF CARE
Febrile last night, resolved. IV Tylenol scheduled. TPN continuous. Heparin per protocol. IV antibiotics. Melo. VINCENT drains x2. Neuros p2yxgyi. Will continue to monitor.     Problem: Patient Centered Care  Goal: Patient preferences are identified and Adonis Carrasquillo anxiety  - Utilize distraction and/or relaxation techniques  - Monitor for opioid side effects  - Notify MD/LIP if interventions unsuccessful or patient reports new pain  - Anticipate increased pain with activity and pre-medicate as appropriate  Outcome: P within target range  - Assess barriers to adequate nutritional intake and initiate nutrition consult as needed  - Instruct patient on self management of diabetes  Outcome: Progressing     Problem: SKIN/TISSUE INTEGRITY - ADULT  Goal: Skin integrity remains resources  Description  INTERVENTIONS:  - Identify barriers to discharge w/pt and caregiver  - Include patient/family/discharge partner in discharge planning  - Arrange for needed discharge resources and transportation as appropriate  - Identify discharge with patient/family  Outcome: Progressing     Problem: NEUROLOGICAL - ADULT  Goal: Achieves stable or improved neurological status  Description  INTERVENTIONS  - Assess for and report changes in neurological status  - Initiate measures to prevent increased

## 2019-12-09 NOTE — SLP NOTE
ADULT VIDEOFLUOROSCOPIC SWALLOWING STUDY    Admission Date: 11/23/2019  Evaluation Date: 12/09/19  Radiologist: Dr. Flood Huge: Mechanical soft ground(chin tuck )  Diet Recommendations - Liquid: Nectar thi alveolar consolidation. 3. Nasogastric tube extends into the stomach. Right-sided PICC line with the tip in the distal SVC.     Dictated by (CST): Guicho Steve MD on 12/05/2019 at 8:49       Approved by (CST): Guicho Steve MD on 12/05/2019 a straws  Effectiveness: Yes     PUREE  Oral Phase of Swallow (VFSS - Puree): Impaired  Bolus Retrieval (VFSS - Puree): Intact  Bilabial Seal (VFSS - Puree): Intact  Bolus Formation (VFSS - Puree): Impaired  Bolus Propulsion (VFSS - Puree):  Impaired  Retenti Impression: Abnormal     GOALS  Goal #1 The patient will tolerate GROUND SOLID  consistency and NECTAR THICKENED  Liquids with chin tuck maneuver without overt signs or symptoms of aspiration with 100 % accuracy over 2-3 session(s).   In Progress   Goal #2 [phone]

## 2019-12-09 NOTE — PHYSICAL THERAPY NOTE
PHYSICAL THERAPY TREATMENT NOTE - INPATIENT     Room Number: 347/347-A       Presenting Problem: acute perforate appendicitis; s/p abscess drainage; CVA (Acute infacrt L parietal & occipital lobe w/ LUE weakness); s/p laparocopic drainage of small abscess optimize functional outcomes. DISCHARGE RECOMMENDATIONS  PT Discharge Recommendations: 24 hour care/supervision;Acute rehabilitation     PLAN  PT Treatment Plan: Bed mobility; Body mechanics; Coordination; Endurance; Energy conservation;Patient education; Fa Scale): 35.33   CMS Modifier (G-Code): CL    FUNCTIONAL ABILITY STATUS  Gait Assessment   Gait Assistance:  Moderate assistance  Distance (ft): 30 ft   Assistive Device: (SBQC on left side )  Pattern: R Steppage;L Steppage;R Foot flat;L Foot flat  Stoop/Cur

## 2019-12-10 PROCEDURE — 99232 SBSQ HOSP IP/OBS MODERATE 35: CPT | Performed by: INTERNAL MEDICINE

## 2019-12-10 RX ORDER — ACETAMINOPHEN 160 MG/5ML
1000 SOLUTION ORAL EVERY 8 HOURS
Status: DISCONTINUED | OUTPATIENT
Start: 2019-12-10 | End: 2019-12-12

## 2019-12-10 NOTE — PLAN OF CARE
Problem: Patient Centered Care  Goal: Patient preferences are identified and integrated in the patient's plan of care  Description  Interventions:  - What would you like us to know as we care for you? \"I'M A LITTLE Hard of hearing\".  Pt is from home wit injury  Description  (Example usage: patient with low platelets)  INTERVENTIONS:  - Avoid intramuscular injections, enemas and rectal medication administration  - Ensure safe mobilization of patient  - Hold pressure on venipuncture sites to achieve adequat post-hospital services based on physician/LIP order or complex needs related to functional status, cognitive ability or social support system  Outcome: Progressing     Problem: CARDIOVASCULAR - ADULT  Goal: Maintains optimal cardiac output and hemodynamic patency with patient fatigue and changes in neurological status  - Encourage and assist patient to increase activity and self care with guidance from PT/OT  - Encourage visually impaired, hearing impaired and aphasic patients to use assistive/communication

## 2019-12-10 NOTE — SLP NOTE
SPEECH DAILY NOTE - INPATIENT    ASSESSMENT & PLAN   ASSESSMENT      Pt alert, afebrile and on room air. Pt seen for swallow analysis per VFSS recommendations (after consulting with RN).  Pt observed upright in chair with current diet of ground/nectar-thick sips;No straw  Medication Administration Recommendations: Whole in puree    Patient Experiencing Pain: No                Discharge Recommendations  Discharge Recommendations/Plan: Undetermined    Treatment Plan  Treatment Plan/Recommendations: Dysphagia th recommendations. Mild cues required for chin-tuck, bolus placement on the (L) and controlled amounts. No straws used.  Pt would benefit from additional reinforcement of aspiration precautions and safe swallowing strategies d/t new strategies recommended fro

## 2019-12-10 NOTE — PROGRESS NOTES
[facility]    General Surgery Progress Note  Lord Salazar  WGV:[id number]  # 17       Subjective:   No new complaints feels better, abdominal pain improved and no nausea   Passing flatus and BM    Exam:     General: awake and alert, in n by (CST): Aishwarya Frey MD on 12/09/2019 at 16:33     Approved by (CST): Aishwarya Frey MD on 12/09/2019 at 16:35          Xr Video Swallow (cpt=74230)    Result Date: 12/9/2019  CONCLUSION:  1. Penetration and aspiration.  2.          A

## 2019-12-10 NOTE — DIETARY NOTE
ADULT NUTRITION REASSESSMENT     Pt is at high nutrition risk. Pt does not meet malnutrition criteria. RECOMMENDATIONS TO MD:  See Nutrition Intervention for TPN specifics. Watch diet progression.  If inadequate intake appropriate to transition off rounds today.    - Discharge and transfer of nutrition care to new setting or provider: monitor plans    ADMITTING DIAGNOSIS:   Diarrhea of infectious origin, Acute perforated appendicitis   POD#6 laparoscopic drainage of abscesses and appendectomy    PAST dextrose, Normal Saline Flush, ondansetron HCl, influenza virus vaccine PF    • Warfarin Sodium  7.5 mg Oral Once at night   • acetaminophen  1,000 mg Oral Q8H   • micafungin (MYCAMINE) IVPB  100 mg Intravenous Q24H   • pantoprazole (PROTONIX) IV push  40

## 2019-12-10 NOTE — PLAN OF CARE
Faisal Braden is resting on the chair with no complaints at this time. Patient has a dry cough and is encouraged to use incentive spirometry. Vital signs stable, medications given and reviewed. Chair alarm on and call light in hand.      Problem: Patient Centered influences on pain and pain management  - Manage/alleviate anxiety  - Utilize distraction and/or relaxation techniques  - Monitor for opioid side effects  - Notify MD/LIP if interventions unsuccessful or patient reports new pain  - Anticipate increased samira hypoglycemia  - Administer ordered medications to maintain glucose within target range  - Assess barriers to adequate nutritional intake and initiate nutrition consult as needed  - Instruct patient on self management of diabetes  Outcome: Progressing     P other facility with appropriate resources  Description  INTERVENTIONS:  - Identify barriers to discharge w/pt and caregiver  - Include patient/family/discharge partner in discharge planning  - Arrange for needed discharge resources and transportation as ap activity level and limitations with patient/family  Outcome: Progressing     Problem: NEUROLOGICAL - ADULT  Goal: Achieves stable or improved neurological status  Description  INTERVENTIONS  - Assess for and report changes in neurological status  - Initiat

## 2019-12-10 NOTE — OCCUPATIONAL THERAPY NOTE
OCCUPATIONAL THERAPY TREATMENT NOTE - INPATIENT        Room Number: 347/347-A           Presenting Problem: Acute perforated appendicits and CVA     Problem List  Principal Problem:    Acute perforated appendicitis  Active Problems:    Atrial fibrillation transfer training;UE strengthening/ROM; Endurance training;Patient/Family education;Patient/Family training;Equipment eval/education; Neuromuscluar reeducation; Fine motor coordination activities; Compensatory technique education    SUBJECTIVE  \"I can't sit i complete functional transfer with SBA  Comment: Progressing- minimal assist    Patient will complete toileting with SBA  Comment: NT    Patient will tolerate standing for 5 minutes in prep for adls with SBA   Comment: Progressing    Patient will complete i

## 2019-12-10 NOTE — PHYSICAL THERAPY NOTE
PHYSICAL THERAPY TREATMENT NOTE - INPATIENT     Room Number: 347/347-A       Presenting Problem: acute perforate appendicitis; s/p abscess drainage; CVA (Acute infacrt L parietal & occipital lobe w/ LUE weakness); s/p laparocopic drainage of small abscess care/supervision;Acute rehabilitation     PLAN  PT Treatment Plan: Bed mobility; Endurance; Energy conservation;Patient education; Family education;Gait training;Strengthening;Transfer training;Balance training    SUBJECTIVE  \"I feel stronger than yesterday. extension     Position Sitting       Patient End of Session: Up in chair;Needs met; All patient questions and concerns addressed; Alarm set    CURRENT GOALS   Goals to be met by: 12/19/19  Patient Goal Patient's self-stated goal is: not stated   Goal #1 Kimmy

## 2019-12-10 NOTE — PROGRESS NOTES
[facility]    Progress Note    Cassandra Knight Patient Status:  Inpatient    1955 MRN [id number]   Location [facility] 3W/SW Attending Leny Perdomo DO   Hosp Day # 16 PCP Jhonatan Robles MD       SUBJECTIVE:  Feeling a littl 12/9/2019  CONCLUSION:  1. Penetration and aspiration. 2.          A full report from speech pathology to follow. Dictated by (CST): Brittany Anthony MD on 12/09/2019 at 14:43     Approved by (CST):  Brittany Anthony MD on 12/09/2019 at 14:44 6. Right atrium: The atrium was severely dilated. 7. Tricuspid valve: Moderate regurgitation. 8. Pericardium, extracardiac: A small pericardial effusion was    identified. There was a left pleural effusion. No previous study was available for comparison.  - Valve area by continuity equation (using LVOT flow): 1.9cm^2. Mean gradient (D): 5mm Hg. Peak gradient (D): 10mm Hg. Left atrium:  The atrium was massively dilated. Atrial septum:  The septum was normal. Right ventricle: The cavity size was dilated.  Sy 28 - 75  Richar BEEBE  LV e&apos;, lateral                            16.5  cm/sec -----------  LV E/e&apos;, lateral                          10.7         -----------  LV e&apos;, medial                             7.9   cm/sec -----------  LV E/e&apos;, peak RV-RA gradient             47    mm Hg  -----------   Right atrium                              Value        Reference  RA volume, ES, A/L                        136   ml     -----------  RA volume/bsa, ES, A/L            (H)     59.6  ml/m^2 11.0 - 3 mg, 125 mg, Oral, Daily  dextrose 10 % infusion, , Intravenous, Continuous PRN  Normal Saline Flush 0.9 % injection 10 mL, 10 mL, Intravenous, PRN  ondansetron HCl (ZOFRAN) injection 4 mg, 4 mg, Intravenous, Q6H PRN  influenza vaccine (PF)(FLUZONE HD) high q8hrs per ID since 11/30/19 (initially was on zosyn and diflucan); Diflucan changed to micafungin for C.glabrata. Blood cx 11/29 NGTD  -WBC normalized.  NG removed 12/8  -On TPN; pt being followed by speech therapy -- on mech ground solid diet with NTL.

## 2019-12-10 NOTE — SLP NOTE
SPEECH DAILY NOTE - INPATIENT    ASSESSMENT & PLAN   ASSESSMENT      PLAN: Treatment focused on training OME, verbal agility drills and speech compensatory strategies for improved speech intelligibility in all contexts.  Pt with good participation in all ta for the reduced length of speech production.        GOAL MET      Goal #4 Pt to use trained speech compensatory strategies (slow rate, exaggerated articulation, pausing between words) during repetition of 5-7 word functional sentences with 90% accuracy with

## 2019-12-10 NOTE — PAYOR COMM NOTE
--------------  CONTINUED STAY REVIEW    Payor: [facility]  Subscriber #:  [id number]  Authorization Number: [id number]     Admit date: 11/23/19  Admit time: 0    Admitting Physician: Romy Aguilar DO  Attending Physician:  DO JACQUELINE Casper 12/10/2019 1235  Last data filed at 12/10/2019 0752      Gross per 24 hour   Intake 3208.4 ml   Output 2166 ml   Net 1042.4 ml         Xr Chest Pa + Lat Chest (kue=[id number])     Result Date: 12/9/2019  CONCLUSION:            1. No acute findings.     Dictated DAY:  acetaminophen (OFIRMEV) infusion 1,000 mg     Date Action Dose Route User    12/10/2019 0556 Given Other 1000 mg Intravenous Martita Neville RN    12/9/2019 2329 Given 1000 mg Intravenous Martita Neville RN    12/9/2019 1514 Given 1000 mg Intravenous Candis 2146 Given 7.5 mg Oral Radha Napier, JOE        SUBJECTIVE:  Feeling a little better     OBJECTIVE:  Vital signs in last 24 hours:  /84 (BP Location: Left arm)   Pulse 75   Temp 98.1 °F (36.7 °C) (Oral)   Resp 18   Ht 6' 2\" (1.88 m)   Wt 209 lb 1.6 o Darcie Cramer MD on 12/09/2019 at 14:43     Approved by (CST):  Darcie Cramer MD on 12/09/2019 at 14:44           Card Echo 2d Doppler (qtz=[id number])     Result Date: 11/29/2019                    [facility]* ----------------------- pericardial effusion was    identified. There was a left pleural effusion. No previous study was available for comparison. ------------------------------------------------------------------- Study data:  No prior study was available for comparison.   Study The atrium was massively dilated. Atrial septum:  The septum was normal. Right ventricle: The cavity size was dilated. Systolic function was reduced. Pulmonic valve:   Not well visualized. The valve appears to be grossly normal.    Doppler:    There was n 10.7         -----------  LV e&apos;, medial                             7.9   cm/sec -----------  LV E/e&apos;, medial                           22.3         -----------  LV e&apos;, average                            12.2  cm/sec -----------  LV ES, A/L                        136   ml     -----------  RA volume/bsa, ES, A/L            (H)     59.6  ml/m^2 11.0 - 39.0   Systemic veins                            Value        Reference  Estimated CVP                             15    mm Hg  --------- Intravenous, PRN  ondansetron HCl (ZOFRAN) injection 4 mg, 4 mg, Intravenous, Q6H PRN  influenza vaccine (PF)(FLUZONE HD) high dose for 65 yrs & older inj 0.5ml, 0.5 mL, Intramuscular, Prior to discharge              Assessment & Plan     Acute CVA with RU 11/29 NGTD  -WBC normalized.  NG removed 12/8  -On TPN; pt being followed by speech therapy -- on [facility] ground solid diet with NTL.   - on Ofirmev 1000mg IV q8hrs for pain -- change to po acetaminophen; prn IV dilaudid.   -fever to 101 on 12/8; CXR neg; uri

## 2019-12-11 PROCEDURE — 99232 SBSQ HOSP IP/OBS MODERATE 35: CPT | Performed by: INTERNAL MEDICINE

## 2019-12-11 RX ORDER — ACETAMINOPHEN 160 MG/5ML
650 SOLUTION ORAL EVERY 6 HOURS PRN
Status: DISCONTINUED | OUTPATIENT
Start: 2019-12-11 | End: 2019-12-11

## 2019-12-11 RX ORDER — OXYCODONE HYDROCHLORIDE 5 MG/1
5 TABLET ORAL EVERY 4 HOURS PRN
Status: DISCONTINUED | OUTPATIENT
Start: 2019-12-11 | End: 2019-12-12

## 2019-12-11 NOTE — PROGRESS NOTES
INFECTIOUS DISEASE PROGRESS NOTE    Cammie Sessions Patient Status:  Inpatient    1955 MRN [id number]   Location [facility] 2W/SW Attending Damien Joshi,    Hosp Day # 25 PCP Ranjana Edwards MD     Subjective:   Awake, TPN stopped last night History of mitral valve repair     CVA (cerebral vascular accident) ([facility] [address]     Jaundice    ASSESSMENT/PLAN:    71year old male w/ PMH sig for HTN, HLD, Afib, TEJINDER, who presented to ED on 11/23 with diarrhea, F/C, lower abd pain.   Found to have perforated ap

## 2019-12-11 NOTE — PAYOR COMM NOTE
--------------  CONTINUED STAY REVIEW------REQUESTING ADDITIONAL DAY 12/11      Payor: [address] [facility]  Subscriber #:  [id number]  Authorization Number: [id number]     Admit date: 11/23/19  Admit time: 1617    Admitting Physician: DO ANTONIO Wilkins Imaging:  Xr Chest Pa + Lat Chest (xha=[id number])     Result Date: 12/9/2019  CONCLUSION:  1. No acute findings.     Dictated by (CST): Jhonatan Reyes MD on 12/09/2019 at 16:33     Approved by (CST): Gm Frey MD on 12/09/2019 at 16:35 [facility]* -------------------------------------------------------------------      Transthoracic Echocardiography M-mode, complete 2D, complete spectral Doppler, and color Doppler ------------------------------------ ------------------------------------------------------------------- Study data:  No prior study was available for comparison. Study status:  Elective. Procedure:  Transthoracic echocardiography. The study was technically limited due to body habitus.  Scan Systolic function was reduced. Pulmonic valve:   Not well visualized. The valve appears to be grossly normal.    Doppler: There was no evidence for stenosis. Mild regurgitation. Tricuspid valve:   Structurally normal valve.    Mobility was not restricte E/e&apos;, medial                           22.3         -----------  LV e&apos;, average                            12.2  cm/sec -----------  LV E/e&apos;, average                          14           -----------   Ventricular septum 11.0 - 39.0   Systemic veins                            Value        Reference  Estimated CVP                             15    mm Hg  -----------   Right ventricle                           Value        Reference  RV ID, ED, PLAX Assessment & Plan     Acute CVA with RUE weakness and facial droop  -11/30/19 code stroke called; initial CT negative. S/p tPA 11/30. MRI 12/1 demonstrating acute infarct in L superior parietal and L posterior medial occipital lobe.    -likely 2/2 embolism - fever to 101 on 12/8; CXR neg; urine cx neg; repeat BCx still NGTD (PICC and peripheral); WBC normal; no further fevers.     Atrial fibrillation  On IV heparin gtt; redose coumadin.   If pt subtherapeutic on coumadin at time of discharge, can change hepar Micafungin Sodium (MYCAMINE) 100 mg in sodium chloride 0.9% 100 mL IVPB     Date Action Dose Route User    12/11/2019 1057 New Bag 100 mg Intravenous Tamera Wright, RN      Pantoprazole Sodium (PROTONIX) 40 mg in Sodium Chloride 0.9 % 10 mL IV push

## 2019-12-11 NOTE — PROGRESS NOTES
VINCENT drains x 2 removed at bedside w/o issue. Dressing applied.    Possible d/c to rehab in [id number] Rusty Cordero, PA-C  12/11/19  12:07 PM     D/w Dr. Dalila Zepeda and RN

## 2019-12-11 NOTE — SLP NOTE
SPEECH DAILY NOTE - INPATIENT    ASSESSMENT & PLAN   ASSESSMENT    Received call from RN requesting speech evaluate to determine if diet can be advanced to soft.   Per the RN the MD is advancing the pt to a full liquid diet with advancement to soft diet/low straw  Medication Administration Recommendations: Whole in puree    Patient Experiencing Pain: No                Discharge Recommendations  Discharge Recommendations/Plan: Undetermined    Treatment Plan  Treatment Plan/Recommendations: Dysphagia therapy; As upright in the bed self-feeding oral trials. Swallowing precautions/strategies discussed and demonstrated. Mild cues required for chin-tuck, bolus placement on the (L) and alternating amounts. No straws used.  Pt demonstrated improved use of slow rate and s

## 2019-12-11 NOTE — PROGRESS NOTES
[place] [facility]    Progress Note    Venita Morris Patient Status:  Inpatient    1955 MRN [id number]   Location [facility] 3W/SW Attending Jesse Robles, [address] [facility] [place] Day # 25 PCP Isidro Knowles MD       SUBJECTIVE:  Feels better to 12/09/2019 at 14:44           Card Echo 2d Doppler (cpt=[id number])    Result Date: 11/29/2019                    [facility]* -------------------------------------------------------------------      Transthoracic Echocardiography M-mode, com available for comparison. ------------------------------------------------------------------- Study data:  No prior study was available for comparison. Study status:  Elective. Procedure:  Transthoracic echocardiography.  The study was technically limited cavity size was dilated. Systolic function was reduced. Pulmonic valve:   Not well visualized. The valve appears to be grossly normal.    Doppler: There was no evidence for stenosis. Mild regurgitation. Tricuspid valve:   Structurally normal valve.    Breana Wallace -----------  LV E/e&apos;, medial                           22.3         -----------  LV e&apos;, average                            12.2  cm/sec -----------  LV E/e&apos;, average                          14           -----------   Ventricular septum 59.6  ml/m^2 11.0 - 39.0   Systemic veins                            Value        Reference  Estimated CVP                             15    mm Hg  -----------   Right ventricle                           Value        Reference  RV ID, ED, PLAX discharge          Assessment & Plan    Acute CVA with RUE weakness and facial droop  -11/30/19 code stroke called; initial CT negative. S/p tPA 11/30. MRI 12/1 demonstrating acute infarct in L superior parietal and L posterior medial occipital lobe.    -li fever to 101 on 12/8; CXR neg; urine cx neg; repeat BCx still NGTD (PICC and peripheral); WBC normal; no further fevers.     Atrial fibrillation  On IV heparin gtt; redose coumadin.   If pt subtherapeutic on coumadin at time of discharge, can change heparin

## 2019-12-11 NOTE — DIETARY NOTE
ADULT NUTRITION UPDATE NOTE    Pt is at moderate nutrition risk off TPN. Pt does not meet malnutrition criteria. PATIENT STATUS:  Initial 11/27/2019 Pt admitted with dx of perforated appendicitis and now with developing abscess via CT.  MD orders fo 11/30/19 1100 98.7 kg (217 lb 9.5 oz)     GASTROINTESTINAL: perforated appendicitis with developing abscess. POD#7   + BM's    FOOD/NUTRITION RELATED HISTORY:  Appetite: fair to good.   Wanting to eat    NUTRITION PRESCRIPTION:  Diet: 2 gm Na, Full Liquid

## 2019-12-11 NOTE — PLAN OF CARE
Patient is alert and oriented x 4. VSS. RA. Voiding. Ambulates with one assist. Heparin gtt infusing at 18 mg/hr. IV merrem. IV micafungin. PO vanco. Patient has remained free from falls. Bed locked and in lowest position.  Call light and belongings within measures as appropriate and evaluate response  - Consider cultural and social influences on pain and pain management  - Manage/alleviate anxiety  - Utilize distraction and/or relaxation techniques  - Monitor for opioid side effects  - Notify MD/LIP if inte Glucose as ordered  - Assess for signs and symptoms of hyperglycemia and hypoglycemia  - Administer ordered medications to maintain glucose within target range  - Assess barriers to adequate nutritional intake and initiate nutrition consult as needed  - In Progressing     Problem: DISCHARGE PLANNING  Goal: Discharge to home or other facility with appropriate resources  Description  INTERVENTIONS:  - Identify barriers to discharge w/pt and caregiver  - Include patient/family/discharge partner in discharge fernando consults as needed  - Advance activity as appropriate  - Communicate ordered activity level and limitations with patient/family  Outcome: Progressing     Problem: NEUROLOGICAL - ADULT  Goal: Achieves stable or improved neurological status  Description  INT

## 2019-12-11 NOTE — OCCUPATIONAL THERAPY NOTE
OCCUPATIONAL THERAPY TREATMENT NOTE - INPATIENT        Room Number: 347/347-A           Presenting Problem: Acute perforated appendicits and CVA     Problem List  Principal Problem:    Acute perforated appendicitis  Active Problems:    Atrial fibrillation OT session    OBJECTIVE  Precautions: Cardiac;Limb alert - right;Bed/chair alarm    WEIGHT BEARING RESTRICTION  Weight Bearing Restriction: None                PAIN ASSESSMENT  Rating: Unable to rate  Location: (generalized abdomen)  Management Techniques: functional transfer with SBA  Comment: Progressing- minimal assist    Patient will complete toileting with SBA  Comment: mod assist    Patient will tolerate standing for 5 minutes in prep for adls with SBA   Comment: 5 min with CGA    Patient will complete

## 2019-12-11 NOTE — PLAN OF CARE
Rosa M Joe is resting in bed comfortably with no complaints of discomfort. Vital signs stable, medications given. Call light in hand, bed alarm on.      Problem: Patient Centered Care  Goal: Patient preferences are identified and integrated in the patient's fernando and/or relaxation techniques  - Monitor for opioid side effects  - Notify MD/LIP if interventions unsuccessful or patient reports new pain  - Anticipate increased pain with activity and pre-medicate as appropriate  Outcome: Progressing     Problem: Cathy Avila barriers to adequate nutritional intake and initiate nutrition consult as needed  - Instruct patient on self management of diabetes  Outcome: Progressing     Problem: SKIN/TISSUE INTEGRITY - ADULT  Goal: Skin integrity remains intact  Description  INTERVEN discharge w/pt and caregiver  - Include patient/family/discharge partner in discharge planning  - Arrange for needed discharge resources and transportation as appropriate  - Identify discharge learning needs (meds, wound care, etc)  - Arrange for interpret NEUROLOGICAL - ADULT  Goal: Achieves stable or improved neurological status  Description  INTERVENTIONS  - Assess for and report changes in neurological status  - Initiate measures to prevent increased intracranial pressure  - Maintain blood pressure and f

## 2019-12-11 NOTE — PHYSICAL THERAPY NOTE
PHYSICAL THERAPY TREATMENT NOTE - INPATIENT     Room Number: 347/347-A       Presenting Problem: acute perforate appendicitis; s/p abscess drainage; CVA (Acute infacrt L parietal & occipital lobe w/ LUE weakness); s/p laparocopic drainage of small abscess Static Sitting: Good  Dynamic Sitting: Good           Static Standing: Fair -  Dynamic Standing: Poor +               AM-PAC '6-Clicks' INHALEYEN independent   Goal #3   Current Status  min A 150 feet with RW; assist on R UE at times, cues required   Goal #4 Patient will negotiate 12 stairs/one curb w/ assistive device and supervision   Goal #4   Current Status  NA    Goal #5 Patient to demonstrate

## 2019-12-11 NOTE — PROGRESS NOTES
[facility] [place] [facility]    General Surgery Progress Note  Lord Salazar  [id number]  # 18       Subjective:   No new complaints feels better, abdominal pain improved and no nausea   Passing flatus and BM    Exam:     General: awake and alert, in n 12/08/19  0448 12/09/19  1035 12/10/19  0504   RBC 3.56* 3.65* 3.63*   HGB 10.8* 10.6* 11.1*   HCT 32.0* 32.7* 32.5*   MCV 89.9 89.6 89.5   MCH 30.3 29.0 30.6   MCHC 33.8 32.4 34.2   RDW 14.6 14.6 14.6   NEPRELIM 5.59 3.77 4.12   WBC 7.8 6.5 6.7   .

## 2019-12-12 VITALS
TEMPERATURE: 98 F | HEIGHT: 74 IN | SYSTOLIC BLOOD PRESSURE: 117 MMHG | BODY MASS INDEX: 26.31 KG/M2 | WEIGHT: 205 LBS | OXYGEN SATURATION: 95 % | HEART RATE: 70 BPM | RESPIRATION RATE: 17 BRPM | DIASTOLIC BLOOD PRESSURE: 86 MMHG

## 2019-12-12 LAB — INR PPP: 2.26

## 2019-12-12 PROCEDURE — 99239 HOSP IP/OBS DSCHRG MGMT >30: CPT | Performed by: INTERNAL MEDICINE

## 2019-12-12 RX ORDER — ACETAMINOPHEN 160 MG/5ML
1000 SOLUTION ORAL EVERY 8 HOURS
Qty: 1 BOTTLE | Refills: 0 | Status: SHIPPED | COMMUNITY
Start: 2019-12-12 | End: 2020-01-09

## 2019-12-12 RX ORDER — OXYCODONE HYDROCHLORIDE 5 MG/1
5 TABLET ORAL EVERY 4 HOURS PRN
Qty: 30 TABLET | Refills: 0 | Status: SHIPPED | OUTPATIENT
Start: 2019-12-12 | End: 2020-01-09

## 2019-12-12 RX ORDER — PANTOPRAZOLE SODIUM 40 MG/1
40 TABLET, DELAYED RELEASE ORAL DAILY
Qty: 30 TABLET | Refills: 0 | Status: SHIPPED | COMMUNITY
Start: 2019-12-12 | End: 2020-06-12

## 2019-12-12 RX ORDER — WARFARIN SODIUM 2.5 MG/1
2.5 TABLET ORAL NIGHTLY
Qty: 90 TABLET | Refills: 3 | Status: SHIPPED | COMMUNITY
Start: 2019-12-12 | End: 2020-06-16

## 2019-12-12 RX ORDER — ENOXAPARIN SODIUM 100 MG/ML
90 INJECTION SUBCUTANEOUS ONCE
Status: COMPLETED | OUTPATIENT
Start: 2019-12-12 | End: 2019-12-12

## 2019-12-12 NOTE — PLAN OF CARE
Problem: DISCHARGE PLANNING  Goal: Discharge to home or other facility with appropriate resources  Description  INTERVENTIONS:  - Identify barriers to discharge w/pt and caregiver  - Include patient/family/discharge partner in discharge Demond Hernandez

## 2019-12-12 NOTE — DISCHARGE SUMMARY
[facility]    Discharge Summary    Krissy Kirkland Patient Status:  Inpatient    1955 MRN [id number]   [facility] 3W/SW Attending Charley Brittle, [address] [facility] Day # 23 PCP Regine Yan MD     Date of Admission:  meropenem 500 mg IV q8hrs per ID since 11/30/19 (initially was on zosyn and diflucan); Diflucan changed to micafungin for C.glabrata.   Pt to continue meropenem and micafungin thru 12/14/19 and oral vancomycin thru 12/21/19  Blood cx 11/29 and 12/9/19 NGTD fibrillation  On IV heparin gtt;  INR now therapeutic at 2.26. Received coumadin 7.5mg last pm.  Will change coumadin to 2.5mg qhs (home dose was 2.5mg qhs except 5mg on Sun). Repeat INR tomorrow.   Will stop heparin gtt and give one dose of lovenox 1mg/k into the vein every 8 (eight) hours. sodium chloride 0.9% SOLN 100 mL with Micafungin Sodium 100 MG SOLR 100 mg  Inject 100 mg into the vein daily. vancomycin HCl 50 MG/ML Oral Recon Soln  Take 2.5 mL (125 mg total) by mouth daily.     oxyCODONE HCl 5 Steri Strip \"butterfly\" tapes intact. You may continue to shower after that. All incisions become somewhat hard and sometimes lumpy. That is part of the normal healing process.  Bruising around the incisions or lower is also normal and should resolve w

## 2019-12-12 NOTE — CM/SW NOTE
Per RN rounds - pt ready for d/c today. MICHAEL spoke w/ Pratik Singh - pt accepted and requesting 2PM d/c.    MICHAEL updated pt's RN - agreeable to 2PM d/c.    MICHAEL also spoke w/ pt and pt's wife - they are agreeable to [id number] transport.     MICHAEL informed pt and his wife

## 2019-12-12 NOTE — PROGRESS NOTES
[facility]    General Surgery Progress Note  Ivette Blanco  [id number]  # 19       Subjective:   No new complaints feels better, denies abdominal pain and no nausea   Passing flatus and BM  Tolerating diet    Exam:     General: awake 89.5 90.3   MCH 29.0 30.6 29.2   MCHC 32.4 34.2 32.4   RDW 14.6 14.6 14.5   NEPRELIM 3.77 4.12 2.88   WBC 6.5 6.7 5.6   .0 420.0 393.0       Recent Labs   Lab 12/10/19  0504 12/11/19  0516 12/12/19  0340   * 92 92   BUN 17 21* 24*   CHIO

## 2019-12-12 NOTE — PROGRESS NOTES
INFECTIOUS DISEASE PROGRESS NOTE    Isauro Close Patient Status:  Inpatient    1955 MRN [id number]   [facility] 2W/SW Attending Mica Dorsey, DO   Hosp Day # 23 PCP Henok Swanson MD     Subjective:   Awake, tolerating soft diet. mitral valve repair     CVA (cerebral vascular accident) ([facility] [address]     Jaundice    ASSESSMENT/PLAN:    71year old male w/ PMH sig for HTN, HLD, Afib, TEJINDER, who presented to ED on 11/23 with diarrhea, F/C, lower abd pain.   Found to have perforated appendicitis

## 2019-12-12 NOTE — SLP NOTE
SPEECH DAILY NOTE - INPATIENT    ASSESSMENT & PLAN   ASSESSMENT  Received call from PA requesting follow up for possible upgrade to thin liquids prior to d/c to [facility] today. Pt was advanced to soft diet yesterday and remained on nectar thick liquids. patient/family/caregiver will demonstrate understanding and implementation of aspiration precautions and swallow strategies independently over 2-3 session(s). Reviewed swallowing precautions/strategies with Pt.   Pt acknowledged understanding of educatio solid consistency and thin liquids without overt signs or symptoms of aspiration with 90 % accuracy over 3 session(s).     New goal     FOLLOW UP  Follow Up Needed: Yes  SLP Follow-up Date: 12/13/19  Number of Visits to Meet Established Goals: 5    Session:

## 2019-12-12 NOTE — PLAN OF CARE
Problem: Patient Centered Care  Goal: Patient preferences are identified and integrated in the patient's plan of care  Description  Interventions:  - What would you like us to know as we care for you? \"I'M A LITTLE Hard of hearing\".  Pt is from home wit Anticipate increased pain with activity and pre-medicate as appropriate  Outcome: Progressing     Problem: GASTROINTESTINAL - ADULT  Goal: Minimal or absence of nausea and vomiting  Description  INTERVENTIONS:  - Maintain adequate hydration with IV or PO a of vasoactive medications to optimize hemodynamic stability  - Monitor arterial and/or venous puncture sites for bleeding and/or hematoma  - Assess quality of pulses, skin color and temperature  - Assess for signs of decreased coronary artery perfusion - e

## 2019-12-12 NOTE — PAYOR COMM NOTE
--------------  CONTINUED STAY REVIEW    Payor: [facility]  Subscriber #:  [id number]  Authorization Number: [id number]     Admit date: 11/23/19  Admit time: 0    Admitting Physician: Adalgisa May DO  Attending Physician:  DO JACQUELINE Randall Gross per 24 hour   Intake 390 ml   Output 865 ml   Net -475 ml                Results:            Recent Labs   Lab 12/09/19  1035 12/10/19  0504 12/12/19  0340   RBC 3.65* 3.63* 3.90   HGB 10.6* 11.1* 11.4*   HCT 32.7* 32.5* 35.2*   MCV 89.6 89.5 90.3 Sanjeev Jarvis RN    12/11/2019 1449 New Bag 500 mg Intravenous Tamera Wright RN      Micafungin Sodium (MYCAMINE) 100 mg in sodium chloride 0.9% 100 mL IVPB     Date Action Dose Route User    12/11/2019 1057 New Bag 100 mg Intravenous Bautista Wright

## 2019-12-12 NOTE — OCCUPATIONAL THERAPY NOTE
OCCUPATIONAL THERAPY TREATMENT NOTE - INPATIENT        Room Number: 347/347-A           Presenting Problem: Acute perforated appendicits and CVA     Problem List  Principal Problem:    Acute perforated appendicitis  Active Problems:    Atrial fibrillation training;Functional transfer training;UE strengthening/ROM; Endurance training    SUBJECTIVE  Pt seen up in chair and agreeable for OT session     OBJECTIVE  Precautions: Cardiac;Limb alert - right;Bed/chair alarm    WEIGHT BEARING RESTRICTION  Weight Beari use of RW for support, car transfers. Patient End of Session: Up in chair; With [facility] staff;Needs met;Call light within reach;RN aware of session/findings; Family present    OT Goals:       Patient will complete functional transfer with SBA  Comment: Progress

## 2019-12-13 NOTE — PAYOR COMM NOTE
--------------  DISCHARGE REVIEW    Payor: [address]  Subscriber #:  [id number]  Authorization Number: [id number]     Admit date: 11/23/19  Admit time:  1763  Discharge Date: 12/12/2019  2:39 PM     Admitting Physician: DO Sesar Kirkland murmurs. Equal pulses   Abdomen: Soft, minimal RLQ tend, nondistended. Positive bowel sounds.  No rebound tenderness  Extremities: No LE edema, +b/l varicosities and chronic venous stasis changes        Hospital Course:     Acute perforated appendicitis  - leaflet per Dr. Josie Gomez; no intervention indicated. Clinically, pt does not have sx of infective endocarditis; blood cx neg on admission 11/23 and on 11/29; fever to 101 on 12/8; repeat BCx drawn;   Still with some R facial droop and slurred speech (but imp Rosa Elena Joseph MD Consulting Physician  SURGERY, [address] [facility], [address], [address] [facility] Physician  Internal Medicine        Surgical Procedures     Case IDs Date Procedure Surgeon Location Status    [id number] 11/23/19 LAPAROSCOPIC APPENDECTOMY Yeny Arreguin Schedule an appointment as soon as possible for a visit with Lori Calvin MD.    Specialty:  SURGERY, GENERAL  Why:  For follow up in 2 weeks, call to schedule apt  Contact information:  [address]

## 2019-12-16 ENCOUNTER — ANTI-COAG (OUTPATIENT)
Dept: CARDIOLOGY | Age: 64
End: 2019-12-16

## 2019-12-16 DIAGNOSIS — I48.20 CHRONIC ATRIAL FIBRILLATION (CMD): ICD-10-CM

## 2019-12-26 ENCOUNTER — ANTI-COAG (OUTPATIENT)
Dept: CARDIOLOGY | Age: 64
End: 2019-12-26

## 2019-12-26 ENCOUNTER — LAB ENCOUNTER (OUTPATIENT)
Dept: LAB | Facility: HOSPITAL | Age: 64
End: 2019-12-26
Payer: COMMERCIAL

## 2019-12-26 DIAGNOSIS — I48.20 CHRONIC ATRIAL FIBRILLATION (HCC): ICD-10-CM

## 2019-12-26 DIAGNOSIS — I48.20 CHRONIC ATRIAL FIBRILLATION (CMD): ICD-10-CM

## 2019-12-26 DIAGNOSIS — I48.91 A-FIB (HCC): Primary | ICD-10-CM

## 2019-12-26 LAB — INR PPP: 2.18

## 2019-12-26 PROCEDURE — 36415 COLL VENOUS BLD VENIPUNCTURE: CPT

## 2019-12-26 PROCEDURE — 85610 PROTHROMBIN TIME: CPT

## 2019-12-30 ENCOUNTER — APPOINTMENT (OUTPATIENT)
Dept: LAB | Age: 64
End: 2019-12-30
Payer: COMMERCIAL

## 2019-12-30 DIAGNOSIS — I48.91 A-FIB (HCC): ICD-10-CM

## 2019-12-30 LAB
INR BLD: 2.2 (ref 0.9–1.2)
INR PPP: 2.2
PROTHROMBIN TIME: 24.7 SECONDS (ref 11.8–14.5)

## 2019-12-30 PROCEDURE — 36415 COLL VENOUS BLD VENIPUNCTURE: CPT

## 2019-12-30 PROCEDURE — 85610 PROTHROMBIN TIME: CPT

## 2020-01-02 ENCOUNTER — ANTI-COAG (OUTPATIENT)
Dept: CARDIOLOGY | Age: 65
End: 2020-01-02

## 2020-01-02 DIAGNOSIS — I48.20 CHRONIC ATRIAL FIBRILLATION (CMD): ICD-10-CM

## 2020-01-06 ENCOUNTER — ANTI-COAG (OUTPATIENT)
Dept: CARDIOLOGY | Age: 65
End: 2020-01-06

## 2020-01-06 ENCOUNTER — APPOINTMENT (OUTPATIENT)
Dept: LAB | Age: 65
End: 2020-01-06
Payer: COMMERCIAL

## 2020-01-06 DIAGNOSIS — I48.20 CHRONIC ATRIAL FIBRILLATION (CMD): ICD-10-CM

## 2020-01-06 DIAGNOSIS — I48.91 A-FIB (HCC): ICD-10-CM

## 2020-01-06 LAB
INR BLD: 2.59 (ref 0.9–1.2)
INR PPP: 2.5
PROTHROMBIN TIME: 28.2 SECONDS (ref 11.8–14.5)

## 2020-01-06 PROCEDURE — 85610 PROTHROMBIN TIME: CPT

## 2020-01-06 PROCEDURE — 36415 COLL VENOUS BLD VENIPUNCTURE: CPT

## 2020-01-09 ENCOUNTER — OFFICE VISIT (OUTPATIENT)
Dept: INTERNAL MEDICINE CLINIC | Facility: CLINIC | Age: 65
End: 2020-01-09
Payer: COMMERCIAL

## 2020-01-09 VITALS
HEART RATE: 72 BPM | DIASTOLIC BLOOD PRESSURE: 80 MMHG | BODY MASS INDEX: 28.49 KG/M2 | SYSTOLIC BLOOD PRESSURE: 112 MMHG | TEMPERATURE: 98 F | WEIGHT: 222 LBS | HEIGHT: 74 IN

## 2020-01-09 DIAGNOSIS — Z12.5 PROSTATE CANCER SCREENING: ICD-10-CM

## 2020-01-09 DIAGNOSIS — I63.9 CEREBROVASCULAR ACCIDENT (CVA), UNSPECIFIED MECHANISM (HCC): ICD-10-CM

## 2020-01-09 DIAGNOSIS — M79.662 BILATERAL CALF PAIN: ICD-10-CM

## 2020-01-09 DIAGNOSIS — M62.81 CALF MUSCLE WEAKNESS: Primary | ICD-10-CM

## 2020-01-09 DIAGNOSIS — I10 BENIGN ESSENTIAL HTN: ICD-10-CM

## 2020-01-09 DIAGNOSIS — E53.8 VITAMIN B12 DEFICIENCY: ICD-10-CM

## 2020-01-09 DIAGNOSIS — M79.661 BILATERAL CALF PAIN: ICD-10-CM

## 2020-01-09 DIAGNOSIS — I48.20 CHRONIC ATRIAL FIBRILLATION (HCC): ICD-10-CM

## 2020-01-09 DIAGNOSIS — E78.49 OTHER HYPERLIPIDEMIA: ICD-10-CM

## 2020-01-09 PROCEDURE — 1111F DSCHRG MED/CURRENT MED MERGE: CPT | Performed by: INTERNAL MEDICINE

## 2020-01-09 PROCEDURE — 99215 OFFICE O/P EST HI 40 MIN: CPT | Performed by: INTERNAL MEDICINE

## 2020-01-09 RX ORDER — CELECOXIB 200 MG/1
200 CAPSULE ORAL EVERY OTHER DAY
Qty: 90 CAPSULE | Refills: 0 | COMMUNITY
Start: 2020-01-09 | End: 2020-05-05

## 2020-01-09 RX ORDER — SIMVASTATIN 20 MG
20 TABLET ORAL NIGHTLY
Qty: 90 TABLET | Refills: 3 | Status: SHIPPED | OUTPATIENT
Start: 2020-01-09 | End: 2021-01-03

## 2020-01-09 RX ORDER — ACETAMINOPHEN 500 MG
1000 TABLET ORAL EVERY 6 HOURS PRN
COMMUNITY
End: 2020-07-14

## 2020-01-09 NOTE — PROGRESS NOTES
Dayana Almaguer is a 71year old male. Patient presents with:  Hospital F/U: Patient is here today for a hospital/rehab f/u. In general he is feeling better each day. He notes feeling weak, achy, and fatigued. Joints are more painful.  Tylenol only helps slig Cap Take 2,000 mg by mouth as needed (FOR DENTAL WORK). • Glucosamine-Chondroit-Vit C-Mn (GLUCOSAMINE-CHONDROITIN) Oral Cap Take 1 tablet by mouth daily. • omega-3 fatty acids (FISH OIL) 1000 MG Oral Cap Take 1 capsule by mouth daily.         Past new RLQ interloop fluid collection, separate from the drained abscess, not accessible by IR.  -s/p laparoscopic drainage of mult abscesses, appendectomy, and suture of colon perforation on 12/4/19, Dr. Maame Tan.   -s/p IV abx         Acute CVA with RUE weak celebrex (qod)      The patient indicates understanding of these issues and agrees to the plan.     RTC in 3/2020 for annual PE and labs    Regine Yan, 01/09/20, 5:17 PM

## 2020-01-17 ENCOUNTER — APPOINTMENT (OUTPATIENT)
Dept: LAB | Age: 65
End: 2020-01-17
Attending: STUDENT IN AN ORGANIZED HEALTH CARE EDUCATION/TRAINING PROGRAM
Payer: COMMERCIAL

## 2020-01-17 ENCOUNTER — OFFICE VISIT (OUTPATIENT)
Dept: OTOLARYNGOLOGY | Facility: CLINIC | Age: 65
End: 2020-01-17
Payer: COMMERCIAL

## 2020-01-17 VITALS
WEIGHT: 222 LBS | SYSTOLIC BLOOD PRESSURE: 145 MMHG | BODY MASS INDEX: 28.49 KG/M2 | TEMPERATURE: 98 F | HEIGHT: 74 IN | DIASTOLIC BLOOD PRESSURE: 88 MMHG

## 2020-01-17 DIAGNOSIS — I48.91 A-FIB (HCC): ICD-10-CM

## 2020-01-17 DIAGNOSIS — R49.0 HOARSENESS: Primary | ICD-10-CM

## 2020-01-17 LAB
INR BLD: 3.42 (ref 0.9–1.2)
INR PPP: 3.4
PROTHROMBIN TIME: 35.3 SECONDS (ref 11.8–14.5)

## 2020-01-17 PROCEDURE — 99243 OFF/OP CNSLTJ NEW/EST LOW 30: CPT | Performed by: OTOLARYNGOLOGY

## 2020-01-17 PROCEDURE — 36415 COLL VENOUS BLD VENIPUNCTURE: CPT

## 2020-01-17 PROCEDURE — 31575 DIAGNOSTIC LARYNGOSCOPY: CPT | Performed by: OTOLARYNGOLOGY

## 2020-01-17 PROCEDURE — 85610 PROTHROMBIN TIME: CPT

## 2020-01-17 NOTE — PROGRESS NOTES
Yocasta Whittaker is a 71year old male. Patient presents with:  Throat Problem: per pt  he had stroke, c/o coughing when start talking    HPI:   About 6 weeks ago he had a stroke. He was having difficulty eating drinking and swallowing as he was choking.   Michelle Machuca site    • Sleep apnea    • UGI bleed    • Unspecified essential hypertension    • Venous insufficiency     RT greater saphenous vein ablation 2007      Social History:  Social History    Tobacco Use      Smoking status: Never Smoker      Smokeless tobacco: Procedure/risks were explained. Questions were answered. Correct patient identified. Correct side and site confirmed. A topical spray of 0.25% Neosynephrine was sprayed into the nose.     Laryngoscopy:  Flexible Fiberoptic Laryngoscopy: A diagnostic fl

## 2020-01-20 ENCOUNTER — ANTI-COAG (OUTPATIENT)
Dept: CARDIOLOGY | Age: 65
End: 2020-01-20

## 2020-01-20 DIAGNOSIS — I48.20 CHRONIC ATRIAL FIBRILLATION (CMD): ICD-10-CM

## 2020-02-03 ENCOUNTER — APPOINTMENT (OUTPATIENT)
Dept: LAB | Age: 65
End: 2020-02-03
Attending: STUDENT IN AN ORGANIZED HEALTH CARE EDUCATION/TRAINING PROGRAM
Payer: COMMERCIAL

## 2020-02-03 DIAGNOSIS — I48.91 ATRIAL FIBRILLATION (HCC): Primary | ICD-10-CM

## 2020-02-03 DIAGNOSIS — I48.91 A-FIB (HCC): ICD-10-CM

## 2020-02-03 LAB
INR BLD: 2.26 (ref 0.9–1.2)
INR PPP: 2.26 (ref 2–3)
PROTHROMBIN TIME: 25.2 SECONDS (ref 11.8–14.5)

## 2020-02-03 PROCEDURE — 36415 COLL VENOUS BLD VENIPUNCTURE: CPT

## 2020-02-03 PROCEDURE — 85610 PROTHROMBIN TIME: CPT

## 2020-02-04 ENCOUNTER — ANTI-COAG (OUTPATIENT)
Dept: CARDIOLOGY | Age: 65
End: 2020-02-04

## 2020-02-04 DIAGNOSIS — I48.20 CHRONIC ATRIAL FIBRILLATION (CMD): ICD-10-CM

## 2020-02-19 ENCOUNTER — PROCEDURE VISIT (OUTPATIENT)
Dept: NEUROLOGY | Facility: CLINIC | Age: 65
End: 2020-02-19
Payer: COMMERCIAL

## 2020-02-19 DIAGNOSIS — R20.2 NUMBNESS AND TINGLING OF BOTH LEGS: Primary | ICD-10-CM

## 2020-02-19 DIAGNOSIS — R20.0 NUMBNESS AND TINGLING OF BOTH LEGS: Primary | ICD-10-CM

## 2020-02-19 PROCEDURE — 95911 NRV CNDJ TEST 9-10 STUDIES: CPT | Performed by: PHYSICAL MEDICINE & REHABILITATION

## 2020-02-19 PROCEDURE — 95886 MUSC TEST DONE W/N TEST COMP: CPT | Performed by: PHYSICAL MEDICINE & REHABILITATION

## 2020-02-19 NOTE — PROCEDURES
[address] [facility]   Nerve Conduction Study and Electromyography Procedure Note    Patient: Lee Ann Skinner Hand Dominance:  Right   Patient ID: [id number] Referring Dr:  Dr. Ricky Choe   Sex: Male Test Dr:  Dr. Lillian Zambrano R. Peroneus longus Peroneal L5-S1 N 2+ 1+ None None Normal N N N N   R. Tibialis posterior Tibial L4-L5 N 4+ None None None Normal 8-10 N N N   L. Vastus medialis Femoral L2-L4 N None None None None Normal N N N N   L.  Gastrocnemius (Medial head) Tibial S1 The needle EMG examination was performed in 10 muscles. It was normal in 5 muscle(s): R. Vastus medialis, L. Vastus medialis, L. Gastrocnemius (Medial head), L. Peroneus longus, L. Tibialis anterior.  The study was abnormal in 5 muscle(s), with the followin

## 2020-02-21 ENCOUNTER — TELEPHONE (OUTPATIENT)
Dept: INTERNAL MEDICINE CLINIC | Facility: CLINIC | Age: 65
End: 2020-02-21

## 2020-02-21 DIAGNOSIS — M54.17 LUMBOSACRAL RADICULOPATHY AT L5: ICD-10-CM

## 2020-02-21 DIAGNOSIS — R29.898 BILATERAL LEG WEAKNESS: ICD-10-CM

## 2020-02-21 DIAGNOSIS — G60.9 IDIOPATHIC PERIPHERAL NEUROPATHY: Primary | ICD-10-CM

## 2020-02-21 NOTE — TELEPHONE ENCOUNTER
Patient calling for results of EMG done 2/18 or 2/19 at [facility]. (pt can't remember which date). Ordered 1/9/2020    Patient states can wait until Tuesday for Dr. Tang Saavedra to call him back.     Best number to contact patient at [phone]

## 2020-02-25 ENCOUNTER — TELEPHONE (OUTPATIENT)
Dept: INTERNAL MEDICINE CLINIC | Facility: CLINIC | Age: 65
End: 2020-02-25

## 2020-02-25 NOTE — TELEPHONE ENCOUNTER
EMG shows a severe sensorimotor peripheral neuropathy in both legs -- this is likely contributing to his calf weakness. I would like to check a Vitamin B12 level to rule out Vit B12 deficiency as a cause (although level was normal in 2018).   I would like

## 2020-02-25 NOTE — TELEPHONE ENCOUNTER
Pt stopped in today to drop off a form to start working out at [employer] in [address] would like completed form to be faxed to [employer] @ [phone] with Attn to Azeb Preston. Pt states he does not wish to have a copy for himself.     Fo

## 2020-02-25 NOTE — TELEPHONE ENCOUNTER
Patient called and relayed Dr Seven Jeong message. Patient verbalized understanding and will do lab, schedule with Dr Marquis Douglass, and schedule MRI spine with numbers given for scheduling. No further questions noted.

## 2020-03-09 ENCOUNTER — APPOINTMENT (OUTPATIENT)
Dept: LAB | Age: 65
End: 2020-03-09
Attending: INTERNAL MEDICINE
Payer: COMMERCIAL

## 2020-03-09 ENCOUNTER — ANTI-COAG (OUTPATIENT)
Dept: CARDIOLOGY | Age: 65
End: 2020-03-09

## 2020-03-09 DIAGNOSIS — I48.20 CHRONIC ATRIAL FIBRILLATION (CMD): ICD-10-CM

## 2020-03-09 DIAGNOSIS — I48.91 ATRIAL FIBRILLATION, UNSPECIFIED TYPE (HCC): ICD-10-CM

## 2020-03-09 LAB
INR BLD: 2.39 (ref 0.9–1.2)
INR PPP: 2.39
PROTHROMBIN TIME: 26.4 SECONDS (ref 11.8–14.5)

## 2020-03-09 PROCEDURE — 36415 COLL VENOUS BLD VENIPUNCTURE: CPT

## 2020-03-09 PROCEDURE — 85610 PROTHROMBIN TIME: CPT

## 2020-03-15 ENCOUNTER — HOSPITAL ENCOUNTER (OUTPATIENT)
Dept: MRI IMAGING | Facility: HOSPITAL | Age: 65
Discharge: HOME | End: 2020-03-15
Attending: INTERNAL MEDICINE
Payer: COMMERCIAL

## 2020-03-15 DIAGNOSIS — R29.898 BILATERAL LEG WEAKNESS: ICD-10-CM

## 2020-03-15 DIAGNOSIS — M54.17 LUMBOSACRAL RADICULOPATHY AT L5: ICD-10-CM

## 2020-03-15 PROCEDURE — 72148 MRI LUMBAR SPINE W/O DYE: CPT | Performed by: INTERNAL MEDICINE

## 2020-03-18 ENCOUNTER — TELEPHONE (OUTPATIENT)
Dept: INTERNAL MEDICINE CLINIC | Facility: CLINIC | Age: 65
End: 2020-03-18

## 2020-03-18 DIAGNOSIS — M48.061 SPINAL STENOSIS OF LUMBAR REGION, UNSPECIFIED WHETHER NEUROGENIC CLAUDICATION PRESENT: Primary | ICD-10-CM

## 2020-03-18 DIAGNOSIS — M54.16 LUMBAR RADICULOPATHY: ICD-10-CM

## 2020-03-18 RX ORDER — GABAPENTIN 300 MG/1
300 CAPSULE ORAL 3 TIMES DAILY
Qty: 90 CAPSULE | Refills: 2 | Status: SHIPPED | OUTPATIENT
Start: 2020-03-18 | End: 2020-05-05

## 2020-03-18 NOTE — TELEPHONE ENCOUNTER
Discussed MRI results -- mod-severe foraminal stenosis at L4-5. Still with R>L leg pain/weakness. Has 4 more sessions of PT at [address] [place] [address] (s/p CVA) -- rec additional PT to address the spinal stenosis. Order mailed to pt.   Given recent CVA off coumadin, he

## 2020-03-27 ENCOUNTER — LAB ENCOUNTER (OUTPATIENT)
Dept: LAB | Facility: HOSPITAL | Age: 65
End: 2020-03-27
Attending: INTERNAL MEDICINE
Payer: COMMERCIAL

## 2020-03-27 DIAGNOSIS — I10 BENIGN ESSENTIAL HTN: ICD-10-CM

## 2020-03-27 DIAGNOSIS — Z12.5 PROSTATE CANCER SCREENING: ICD-10-CM

## 2020-03-27 DIAGNOSIS — G60.9 IDIOPATHIC PERIPHERAL NEUROPATHY: ICD-10-CM

## 2020-03-27 DIAGNOSIS — E53.8 VITAMIN B12 DEFICIENCY: ICD-10-CM

## 2020-03-27 DIAGNOSIS — E78.49 OTHER HYPERLIPIDEMIA: ICD-10-CM

## 2020-03-27 LAB
ALBUMIN SERPL-MCNC: 3.9 G/DL (ref 3.4–5)
ALBUMIN/GLOB SERPL: 1.1 {RATIO} (ref 1–2)
ALP LIVER SERPL-CCNC: 73 U/L (ref 45–117)
ALT SERPL-CCNC: 27 U/L (ref 16–61)
ANION GAP SERPL CALC-SCNC: 5 MMOL/L (ref 0–18)
AST SERPL-CCNC: 20 U/L (ref 15–37)
BASOPHILS # BLD AUTO: 0.04 X10(3) UL (ref 0–0.2)
BASOPHILS NFR BLD AUTO: 0.8 %
BILIRUB SERPL-MCNC: 0.7 MG/DL (ref 0.1–2)
BUN BLD-MCNC: 22 MG/DL (ref 7–18)
BUN/CREAT SERPL: 27.2 (ref 10–20)
CALCIUM BLD-MCNC: 8.8 MG/DL (ref 8.5–10.1)
CHLORIDE SERPL-SCNC: 108 MMOL/L (ref 98–112)
CHOLEST SERPL-MCNC: 150 MG/DL
CHOLEST SMN-MCNC: 150 MG/DL (ref ?–200)
CO2 SERPL-SCNC: 27 MMOL/L (ref 21–32)
COMPLEXED PSA SERPL-MCNC: 3.29 NG/ML (ref ?–4)
CREAT BLD-MCNC: 0.81 MG/DL (ref 0.7–1.3)
DEPRECATED RDW RBC AUTO: 46.5 FL (ref 35.1–46.3)
EOSINOPHIL # BLD AUTO: 0.11 X10(3) UL (ref 0–0.7)
EOSINOPHIL NFR BLD AUTO: 2.1 %
ERYTHROCYTE [DISTWIDTH] IN BLOOD BY AUTOMATED COUNT: 13.8 % (ref 11–15)
GLOBULIN PLAS-MCNC: 3.5 G/DL (ref 2.8–4.4)
GLUCOSE BLD-MCNC: 92 MG/DL (ref 70–99)
HCT VFR BLD AUTO: 44.1 % (ref 39–53)
HCT VFR BLD CALC: 44.1 %
HDLC SERPL-MCNC: 78 MG/DL
HDLC SERPL-MCNC: 78 MG/DL (ref 40–59)
HGB BLD-MCNC: 14.3 G/DL
HGB BLD-MCNC: 14.3 G/DL (ref 13–17.5)
IMM GRANULOCYTES # BLD AUTO: 0.01 X10(3) UL (ref 0–1)
IMM GRANULOCYTES NFR BLD: 0.2 %
LDLC SERPL CALC-MCNC: 62 MG/DL
LDLC SERPL CALC-MCNC: 62 MG/DL (ref ?–100)
LYMPHOCYTES # BLD AUTO: 1.92 X10(3) UL (ref 1–4)
LYMPHOCYTES NFR BLD AUTO: 37.1 %
M PROTEIN MFR SERPL ELPH: 7.4 G/DL (ref 6.4–8.2)
MCH RBC QN AUTO: 30 PG (ref 26–34)
MCHC RBC AUTO-ENTMCNC: 32.4 G/DL (ref 31–37)
MCV RBC AUTO: 92.5 FL (ref 80–100)
MONOCYTES # BLD AUTO: 0.36 X10(3) UL (ref 0.1–1)
MONOCYTES NFR BLD AUTO: 7 %
NEUTROPHILS # BLD AUTO: 2.73 X10 (3) UL (ref 1.5–7.7)
NEUTROPHILS # BLD AUTO: 2.73 X10(3) UL (ref 1.5–7.7)
NEUTROPHILS NFR BLD AUTO: 52.8 %
NONHDLC SERPL-MCNC: 72 MG/DL
NONHDLC SERPL-MCNC: 72 MG/DL (ref ?–130)
OSMOLALITY SERPL CALC.SUM OF ELEC: 293 MOSM/KG (ref 275–295)
PATIENT FASTING Y/N/NP: YES
PATIENT FASTING Y/N/NP: YES
PLATELET # BLD AUTO: 202 10(3)UL (ref 150–450)
PLATELET # BLD: 202 K/MCL
POTASSIUM SERPL-SCNC: 4.1 MMOL/L (ref 3.5–5.1)
RBC # BLD AUTO: 4.77 X10(6)UL (ref 3.8–5.8)
RBC # BLD: 4.77 10*6/UL
SODIUM SERPL-SCNC: 140 MMOL/L (ref 136–145)
TRIGL SERPL-MCNC: 48 MG/DL
TRIGL SERPL-MCNC: 48 MG/DL (ref 30–149)
TSH SERPL-ACNC: 0.78 MCUNITS/ML
TSI SER-ACNC: 0.78 MIU/ML (ref 0.36–3.74)
VIT B12 SERPL-MCNC: 571 PG/ML (ref 193–986)
VLDLC SERPL CALC-MCNC: 10 MG/DL
VLDLC SERPL CALC-MCNC: 10 MG/DL (ref 0–30)
WBC # BLD AUTO: 5.2 X10(3) UL (ref 4–11)
WBC # BLD: 5.2 K/MCL

## 2020-03-27 PROCEDURE — 80053 COMPREHEN METABOLIC PANEL: CPT

## 2020-03-27 PROCEDURE — 82607 VITAMIN B-12: CPT

## 2020-03-27 PROCEDURE — 84443 ASSAY THYROID STIM HORMONE: CPT

## 2020-03-27 PROCEDURE — 36415 COLL VENOUS BLD VENIPUNCTURE: CPT

## 2020-03-27 PROCEDURE — 85025 COMPLETE CBC W/AUTO DIFF WBC: CPT

## 2020-03-27 PROCEDURE — 80061 LIPID PANEL: CPT

## 2020-03-31 ENCOUNTER — OFFICE VISIT (OUTPATIENT)
Dept: INTERNAL MEDICINE CLINIC | Facility: CLINIC | Age: 65
End: 2020-03-31
Payer: COMMERCIAL

## 2020-03-31 VITALS
HEIGHT: 74 IN | SYSTOLIC BLOOD PRESSURE: 150 MMHG | TEMPERATURE: 98 F | BODY MASS INDEX: 30.42 KG/M2 | WEIGHT: 237 LBS | HEART RATE: 59 BPM | OXYGEN SATURATION: 100 % | DIASTOLIC BLOOD PRESSURE: 90 MMHG

## 2020-03-31 DIAGNOSIS — I63.9 CEREBROVASCULAR ACCIDENT (CVA), UNSPECIFIED MECHANISM (HCC): ICD-10-CM

## 2020-03-31 DIAGNOSIS — M48.061 SPINAL STENOSIS OF LUMBAR REGION, UNSPECIFIED WHETHER NEUROGENIC CLAUDICATION PRESENT: ICD-10-CM

## 2020-03-31 DIAGNOSIS — R97.20 ELEVATED PSA: Primary | ICD-10-CM

## 2020-03-31 DIAGNOSIS — R03.0 ELEVATED BLOOD PRESSURE READING: ICD-10-CM

## 2020-03-31 DIAGNOSIS — E78.49 OTHER HYPERLIPIDEMIA: ICD-10-CM

## 2020-03-31 DIAGNOSIS — I10 BENIGN ESSENTIAL HTN: ICD-10-CM

## 2020-03-31 DIAGNOSIS — I48.20 CHRONIC ATRIAL FIBRILLATION (HCC): ICD-10-CM

## 2020-03-31 DIAGNOSIS — G60.9 IDIOPATHIC PERIPHERAL NEUROPATHY: ICD-10-CM

## 2020-03-31 DIAGNOSIS — G47.33 OBSTRUCTIVE SLEEP APNEA SYNDROME: ICD-10-CM

## 2020-03-31 DIAGNOSIS — H91.93 BILATERAL HEARING LOSS, UNSPECIFIED HEARING LOSS TYPE: ICD-10-CM

## 2020-03-31 PROBLEM — R17 JAUNDICE: Status: RESOLVED | Noted: 2019-11-30 | Resolved: 2020-03-31

## 2020-03-31 PROCEDURE — 90471 IMMUNIZATION ADMIN: CPT | Performed by: INTERNAL MEDICINE

## 2020-03-31 PROCEDURE — 99397 PER PM REEVAL EST PAT 65+ YR: CPT | Performed by: INTERNAL MEDICINE

## 2020-03-31 PROCEDURE — 99212 OFFICE O/P EST SF 10 MIN: CPT | Performed by: INTERNAL MEDICINE

## 2020-03-31 PROCEDURE — 90732 PPSV23 VACC 2 YRS+ SUBQ/IM: CPT | Performed by: INTERNAL MEDICINE

## 2020-03-31 NOTE — PROGRESS NOTES
Lord Salazar is a 71year old maleNo chief complaint on file. HPI:     Lord Salazar is a 71year old male who presents for a complete physical exam.     Feels well. PT stopped 2 weeks ago due to the Covid-19 outbreak. Doing exercises at home.   R h capsule by mouth daily.         Past Medical History:   Diagnosis Date   • Acute, but ill-defined, cerebrovascular disease 12/2019   • Arrhythmia     afib   • Atrial fibrillation [facility])     ablation (2004); CV (May 2009)   • High blood pressure    • High cho stream  NEURO: denies headaches or dizziness      EXAM:   /90 (BP Location: Right arm, Patient Position: Sitting, Cuff Size: adult)   Pulse 59   Temp 97.8 °F (36.6 °C) (Oral)   Ht 6' 2\" (1.88 m)   Wt 237 lb (107.5 kg)   SpO2 100%   BMI 30.43 kg/m² compression stocking daily.  Had planned to have laser on the varicose veins; will reschedule in the future.     TEJINDER  Uses nasal CPAP     Bilateral calf weakness and R calf pain (preceded the CVA), due to peripheral neuropathy and lumbar stenosis  - Trial o given to pt.     RTC 1 yr/liz Yan MD  3/31/2020  12:33 PM

## 2020-04-15 ENCOUNTER — APPOINTMENT (OUTPATIENT)
Dept: LAB | Facility: HOSPITAL | Age: 65
End: 2020-04-15
Attending: INTERNAL MEDICINE
Payer: COMMERCIAL

## 2020-04-15 LAB — INR PPP: 2.57

## 2020-04-15 PROCEDURE — 85610 PROTHROMBIN TIME: CPT

## 2020-04-15 PROCEDURE — 36415 COLL VENOUS BLD VENIPUNCTURE: CPT

## 2020-04-20 ENCOUNTER — ANTI-COAG (OUTPATIENT)
Dept: CARDIOLOGY | Age: 65
End: 2020-04-20

## 2020-04-20 DIAGNOSIS — I48.20 CHRONIC ATRIAL FIBRILLATION (CMD): ICD-10-CM

## 2020-04-30 ENCOUNTER — TELEPHONE (OUTPATIENT)
Dept: INTERNAL MEDICINE CLINIC | Facility: CLINIC | Age: 65
End: 2020-04-30

## 2020-05-01 ENCOUNTER — TELEPHONE (OUTPATIENT)
Dept: CARDIOLOGY | Age: 65
End: 2020-05-01

## 2020-05-01 ENCOUNTER — TELEPHONE (OUTPATIENT)
Dept: INTERNAL MEDICINE CLINIC | Facility: CLINIC | Age: 65
End: 2020-05-01

## 2020-05-01 DIAGNOSIS — I10 ESSENTIAL HYPERTENSION: Primary | ICD-10-CM

## 2020-05-01 RX ORDER — GABAPENTIN 300 MG/1
300 CAPSULE ORAL 3 TIMES DAILY
Qty: 90 CAPSULE | Refills: 2 | OUTPATIENT
Start: 2020-05-01

## 2020-05-01 NOTE — TELEPHONE ENCOUNTER
Pt dropped of his BP readings about two weeks ago its on  desk like a call after reviewing.  Please advise

## 2020-05-01 NOTE — TELEPHONE ENCOUNTER
Pt fills locally  Current refill request refused due to refill is either a duplicate request or has active refills at the pharmacy. Check previous templates.     Requested Prescriptions     Refused Prescriptions Disp Refills   • gabapentin 300 MG Oral Cap

## 2020-05-05 RX ORDER — LOSARTAN POTASSIUM 50 MG/1
50 TABLET ORAL DAILY
Qty: 30 TABLET | Refills: 0 | Status: SHIPPED | OUTPATIENT
Start: 2020-05-05 | End: 2020-06-01

## 2020-05-05 RX ORDER — CELECOXIB 200 MG/1
CAPSULE ORAL
Qty: 90 CAPSULE | Refills: 3 | Status: SHIPPED | OUTPATIENT
Start: 2020-05-05 | End: 2021-04-07

## 2020-05-05 RX ORDER — GABAPENTIN 300 MG/1
300 CAPSULE ORAL 3 TIMES DAILY
Qty: 270 CAPSULE | Refills: 3 | Status: SHIPPED | OUTPATIENT
Start: 2020-05-05 | End: 2020-06-12

## 2020-05-05 RX ORDER — LOSARTAN POTASSIUM 50 MG/1
50 TABLET ORAL DAILY
Qty: 90 TABLET | Refills: 3 | Status: SHIPPED | OUTPATIENT
Start: 2020-05-05 | End: 2020-06-01

## 2020-05-05 NOTE — TELEPHONE ENCOUNTER
BP's still elevated (150's-170's/90's-100's). Recommend starting him on losartan 50mg/day (Rx pended). Check BMP (ordered) and call with BP readings (or send thru MyChart) in 2 weeks. 62 y/o male with PMHx of ESRD on HD ( M-W-F )  HTN, Chronic Anemia, Thrombocytopenia, S/P Left Nephrectomy due to Renal stone,  presenting to the ED c/o SOB that began yesterday, worse when he lays down flat, better when sitting forward. Pt reports he missed two days of dialysis because he was out of the country in [place] for the past 3 months.  Admits to epigastric pain that he said felt like gas and has resolved, NO CP, + SOB, mild dizziness, no HA, no N/V, no cough, + Dysuria, no fever, still making urine, . Denies other abdominal pain, diarrhea, fever, chills, recent illness. Pt is A+O x 3, seen By Nephrology tonight for Volume overload, HD tonight and tomorrow,     Labs: Lactate 0.6, Na 137, K+ 5.1, CO2=19, BNP 9621, , Creatinine 17.06, Glucose 95, LFT Normal, KENIA < 0.06, , WBC 6.01, Hgb 7.4, Platelet 109,     Vitals Tem 98.4, HR 60, /107, RR 16, 100% RA

## 2020-05-05 NOTE — TELEPHONE ENCOUNTER
Called patient and relayed DR. CHRISTINA message - verbalized understanding. Short term supply sent to local pharmacy and  [facility] .

## 2020-05-11 ENCOUNTER — OFFICE VISIT (OUTPATIENT)
Dept: WOUND CARE | Facility: HOSPITAL | Age: 65
End: 2020-05-11
Attending: NURSE PRACTITIONER
Payer: COMMERCIAL

## 2020-05-11 DIAGNOSIS — I83.023 VARICOSE VEINS OF LEFT LOWER EXTREMITY WITH ULCER OF ANKLE LIMITED TO BREAKDOWN OF SKIN (HCC): Primary | ICD-10-CM

## 2020-05-11 DIAGNOSIS — I87.312 CHRONIC VENOUS HYPERTENSION W ULCER OF L LOW EXTREM (HCC): ICD-10-CM

## 2020-05-11 DIAGNOSIS — L97.321 VARICOSE VEINS OF LEFT LOWER EXTREMITY WITH ULCER OF ANKLE LIMITED TO BREAKDOWN OF SKIN (HCC): Primary | ICD-10-CM

## 2020-05-11 DIAGNOSIS — I87.2 VENOUS (PERIPHERAL) INSUFFICIENCY: ICD-10-CM

## 2020-05-11 DIAGNOSIS — L97.929 CHRONIC VENOUS HYPERTENSION W ULCER OF L LOW EXTREM (HCC): ICD-10-CM

## 2020-05-11 PROCEDURE — 29581 APPL MULTLAYER CMPRN SYS LEG: CPT

## 2020-05-11 PROCEDURE — 99214 OFFICE O/P EST MOD 30 MIN: CPT

## 2020-05-11 RX ORDER — WARFARIN SODIUM 2.5 MG/1
TABLET ORAL
Qty: 90 TABLET | Refills: 3 | OUTPATIENT
Start: 2020-05-11

## 2020-05-11 NOTE — TELEPHONE ENCOUNTER
Refill request has failed the Ambulatory Medication Refill Standing Order cardiologist monitors    Requested Prescriptions     Refused Prescriptions Disp Refills   • WARFARIN SODIUM 2.5 MG Oral Tab [Pharmacy Med Name: WARFARIN TABS 2.5MG] 90 tablet 3     S

## 2020-05-11 NOTE — PROGRESS NOTES
Subjective    Chief Complaint  This information was obtained from the patient  The patient returns today for follow up and management of left medial ankle venous ulcer, which started a week ago. Patient states that he may have bumped it.     Allergies  adhe HPI 12/2018: L lat foot/ankle wound began ~ 1 month ago when pt was in [facility] on vacation. Wearing compression stockings on and off- noticed increased edema. Pt sched to see vascular surgeon.     5/6/2019 Open wound started up on R medial ankle about 3 wks ag Eyes: Vision Changes (right eye reduced vision, corrected by eye glasses), Glasses / Contacts  Ear/Nose/Mouth/Throat: Hearing Loss / Aid  Musculoskeletal: Muscle Weakness (right side)  Integumentary (Hair/Skin/Nails): Ulcers (left lower leg)  Neurological: Wound #8 Left, Medial Lower Leg is a Full Thickness Venous Ulcer and has received a status of Not Healed. Initial wound encounter measurements are 6cm length x 3.5cm width x 0.2cm depth, with an area of 21 sq cm and a volume of 4.2 cubic cm.  No tunneling h soft, non-tender, bowel sounds present. Lymphatic:  no lymphedema. Musculoskeletal:  Normal gait. Onychomycosis toenails, no clubbing or cyanosis on toenails. No significant deformity or joint abnormality. No edema. Peripheral pulses intact.  Caitlin Cottrell Lipodermatosclerosis: No    General Notes:  heel to knee=50.5 cm  biphasic pedal and posterior tibial signals via handheld doppler on BLE        Assessment    Active Problems    ICD-10  (Encounter Diagnosis) I87.312 - Chronic venous hypertension (idiopathi Increase dietary protein intake. Exercise as tolerated. Decrease salt intake. Moisturizer. S/S of infection - monitor for S&S of soft tissue infection fever, chills, erythema, increased drainage and foul smell from the wound.     Plan of Care:  Nutritio Status: Initiated Date: 5/11/2020  - Perform lower extremity assessment to determine perfusion status by assessing pedal pulses (dorsalis pedis and posterior tibial), skin and color temperature changes, venous refill time, capillary refill, and presence of Discussed lifelong compression therapy as the main management of venous insufficiency and prevention of the ulcerations. Discussed leg elevation and regular exercises to manage edema in addition to compression therapy.   Discussed signs and symptoms of inf Compression used: using Artiflex 10 cm (1), Artiflex 15 cm (1), Comprilan 08 cm (2), Comprilan 10 cm (2), Comprilan 12 cm (1)    Header Image    Multi Layer Compression Wrap Details  Patient Name: Joleen Null   Patient Number: [id number]  Patient Date of B

## 2020-05-12 ENCOUNTER — APPOINTMENT (OUTPATIENT)
Dept: WOUND CARE | Facility: HOSPITAL | Age: 65
End: 2020-05-12
Attending: NURSE PRACTITIONER
Payer: COMMERCIAL

## 2020-05-18 ENCOUNTER — OFFICE VISIT (OUTPATIENT)
Dept: WOUND CARE | Facility: HOSPITAL | Age: 65
End: 2020-05-18
Attending: NURSE PRACTITIONER
Payer: COMMERCIAL

## 2020-05-18 DIAGNOSIS — L97.929 CHRONIC VENOUS HYPERTENSION W ULCER OF L LOW EXTREM (HCC): ICD-10-CM

## 2020-05-18 DIAGNOSIS — I83.023 VARICOSE VEINS OF LEFT LOWER EXTREMITY WITH ULCER OF ANKLE LIMITED TO BREAKDOWN OF SKIN (HCC): Primary | ICD-10-CM

## 2020-05-18 DIAGNOSIS — L97.321 VARICOSE VEINS OF LEFT LOWER EXTREMITY WITH ULCER OF ANKLE LIMITED TO BREAKDOWN OF SKIN (HCC): Primary | ICD-10-CM

## 2020-05-18 DIAGNOSIS — I87.312 CHRONIC VENOUS HYPERTENSION W ULCER OF L LOW EXTREM (HCC): ICD-10-CM

## 2020-05-18 PROCEDURE — 29581 APPL MULTLAYER CMPRN SYS LEG: CPT

## 2020-05-18 RX ORDER — DOXYCYCLINE HYCLATE 100 MG
100 TABLET ORAL 2 TIMES DAILY
Qty: 14 TABLET | Refills: 0 | Status: SHIPPED | OUTPATIENT
Start: 2020-05-18 | End: 2020-05-25

## 2020-05-18 NOTE — PROGRESS NOTES
Subjective    Chief Complaint  This information was obtained from the patient  The patient returns today for follow up and management of left medial ankle venous ulcer, which started a week ago. No additional concerns for today's visit.     Allergies  adhes HPI 12/2018: L lat foot/ankle wound began ~ 1 month ago when pt was in [facility] on vacation. Wearing compression stockings on and off- noticed increased edema. Pt sched to see vascular surgeon.     5/6/2019 Open wound started up on R medial ankle about 3 wks ag Wound #8 Left, Medial Lower Leg is a Full Thickness Venous Ulcer and has received a status of Not Healed. Subsequent wound encounter measurements are 10.8cm length x 4.5cm width x 0.2cm depth, with an area of 48.6 sq cm and a volume of 9.72 cubic cm.  No tu (Encounter Diagnosis) I87.312 - Chronic venous hypertension (idiopathic) with ulcer of left lower extremity  (Encounter Diagnosis) I48.91 - Unspecified atrial fibrillation    Diagnoses    ICD-10  I87.312: Chronic venous hypertension (idiopathic) with ulcer Electronic Signature(s)  Signed By: Date:  Salma Becker FNP-BC 05/18/2020 11:09:12 AM       Entered By: Salma Becker on 05/18/2020 11:06:40 AM       Treatment Notes Summary  Wound #8 (Left, Medial Lower Leg)  . Wound Treatment Note  Assessed patient’s samira

## 2020-05-21 ENCOUNTER — OFFICE VISIT (OUTPATIENT)
Dept: WOUND CARE | Facility: HOSPITAL | Age: 65
End: 2020-05-21
Attending: NURSE PRACTITIONER
Payer: COMMERCIAL

## 2020-05-21 DIAGNOSIS — I87.312 CHRONIC VENOUS HYPERTENSION (IDIOPATHIC) WITH ULCER OF LEFT LOWER EXTREMITY (HCC): Primary | ICD-10-CM

## 2020-05-21 DIAGNOSIS — L97.929 CHRONIC VENOUS HYPERTENSION (IDIOPATHIC) WITH ULCER OF LEFT LOWER EXTREMITY (HCC): Primary | ICD-10-CM

## 2020-05-21 PROCEDURE — 29581 APPL MULTLAYER CMPRN SYS LEG: CPT

## 2020-05-21 PROCEDURE — 97162 PT EVAL MOD COMPLEX 30 MIN: CPT

## 2020-05-21 NOTE — PROGRESS NOTES
Subjective    Chief Complaint  This information was obtained from the patient  The patient returns today for follow up and management of left medial ankle venous ulcer and compression stocking/garment fitting by [facility].  5/21/20: No new wound com foot/ankle wound began ~ 1 month ago when pt was in [facility] on vacation. Wearing compression stockings on and off- noticed increased edema. Pt sched to see vascular surgeon.     5/6/2019 Open wound started up on R medial ankle about 3 wks ago pt is also schedu skin exhibited: Edema, Rash, Moist, Hemosiderosis.  The periwound skin did not exhibit: Brawny Induration, Excoriation, Induration, Callus, Crepitus, Fluctuance, Friable, Dry/Scaly, Maceration, Atrophie [place], Cyanosis, Ecchymosis, Erythema, Pallor, Rubor betamethasone applied. Pt is on oral antibiotics and should continue until prescription is out. Will monitor wound response and adjust plan of care accordingly. Pt is not diabetic and has adequate perfusion for healing.   Healing will likely be delayed using Hydrofera Blue Classic 4x4 (1)  Applied Secondary Wound Dressing.  using ABD (1), Aquacel 4x5 hydrofiber (1), Vaseline Gauze (2)  Dressing secured with non-allergenic tape/stockinet/wrap. using Kerlix (1), Silk tape (1)  Compression wrapping  Moisturi

## 2020-05-26 ENCOUNTER — APPOINTMENT (OUTPATIENT)
Dept: LAB | Age: 65
End: 2020-05-26
Attending: INTERNAL MEDICINE
Payer: COMMERCIAL

## 2020-05-26 ENCOUNTER — APPOINTMENT (OUTPATIENT)
Dept: WOUND CARE | Facility: HOSPITAL | Age: 65
End: 2020-05-26
Payer: COMMERCIAL

## 2020-05-26 DIAGNOSIS — I10 ESSENTIAL HYPERTENSION: ICD-10-CM

## 2020-05-26 DIAGNOSIS — R97.20 ELEVATED PSA: ICD-10-CM

## 2020-05-26 DIAGNOSIS — I48.91 A-FIB (HCC): ICD-10-CM

## 2020-05-26 LAB — INR PPP: 2.37

## 2020-05-26 PROCEDURE — 85610 PROTHROMBIN TIME: CPT

## 2020-05-26 PROCEDURE — 84153 ASSAY OF PSA TOTAL: CPT

## 2020-05-26 PROCEDURE — 80048 BASIC METABOLIC PNL TOTAL CA: CPT

## 2020-05-26 PROCEDURE — 36415 COLL VENOUS BLD VENIPUNCTURE: CPT

## 2020-05-27 ENCOUNTER — TELEPHONE (OUTPATIENT)
Dept: INTERNAL MEDICINE CLINIC | Facility: CLINIC | Age: 65
End: 2020-05-27

## 2020-05-27 ENCOUNTER — ANTI-COAG (OUTPATIENT)
Dept: CARDIOLOGY | Age: 65
End: 2020-05-27

## 2020-05-27 DIAGNOSIS — R97.20 ELEVATED PSA: ICD-10-CM

## 2020-05-27 DIAGNOSIS — I10 ESSENTIAL HYPERTENSION: Primary | ICD-10-CM

## 2020-05-27 DIAGNOSIS — I48.20 CHRONIC ATRIAL FIBRILLATION (CMD): ICD-10-CM

## 2020-05-27 NOTE — TELEPHONE ENCOUNTER
Received pt's BP readings. Improved but still somewhat high in the mornings (130's-150's/80's-90's). Evenings are good (120's-130's/60's-70's). Recommend increasing losartan from 50mg to 100mg daily (try taking at night).     Check BMP in 2 weeks (no f

## 2020-05-28 ENCOUNTER — OFFICE VISIT (OUTPATIENT)
Dept: WOUND CARE | Facility: HOSPITAL | Age: 65
End: 2020-05-28
Attending: NURSE PRACTITIONER
Payer: COMMERCIAL

## 2020-05-28 DIAGNOSIS — L97.929 CHRONIC VENOUS HYPERTENSION (IDIOPATHIC) WITH ULCER OF LEFT LOWER EXTREMITY (HCC): Primary | ICD-10-CM

## 2020-05-28 DIAGNOSIS — I87.312 CHRONIC VENOUS HYPERTENSION (IDIOPATHIC) WITH ULCER OF LEFT LOWER EXTREMITY (HCC): Primary | ICD-10-CM

## 2020-05-28 PROCEDURE — 29581 APPL MULTLAYER CMPRN SYS LEG: CPT

## 2020-05-28 PROCEDURE — 87186 SC STD MICRODIL/AGAR DIL: CPT

## 2020-05-28 PROCEDURE — 87205 SMEAR GRAM STAIN: CPT

## 2020-05-28 PROCEDURE — 87070 CULTURE OTHR SPECIMN AEROBIC: CPT

## 2020-05-28 PROCEDURE — 87077 CULTURE AEROBIC IDENTIFY: CPT

## 2020-05-28 NOTE — PROGRESS NOTES
Subjective    Chief Complaint  This information was obtained from the patient  The patient returns today for follow up and management of left medial ankle venous ulcer and compression stocking/garment fitting by [facility].  5/28/20: Pt finished oral PMH: Unspecified venous (peripheral) insufficiency, Varicose veins with ulcer and inflammation ([facility] [address] Atrial fibrillation ([facility] [address] Hypercholesterolemia, Musculoskeletal, Osteoarthrosis, Other, Family hx of colon cancer  HPI 12/2018: L lat foot/ankle wound be Wound #3a Left, Medial Lower Leg is a Full Thickness Venous Ulcer and has received a status of Not Healed. Subsequent wound encounter measurements are 6.5cm length x 4cm width x 0.2cm depth, with an area of 26 sq cm and a volume of 5.2 cubic cm.  There is a (Encounter Diagnosis) I48.91 - Unspecified atrial fibrillation        Plan  Cont 1x/week per POC as above. Await culture results. Pt to call if worsening concerns.   Wound Orders:  Wound #3a Left, Medial Lower Leg    Follow-Up Appointments  Return Appoint Written PT Goals - . Short Term (4-6 weeks)  Reduce wound area by 25%  Decrease depth by 75%  Decrease edema by 1 cm by >=1 area measured  Written PT Goals - Long Term (8-12 weeks)  Reduce wound area by 75%  Notes  Aerobic culture obtained  Wound #3a (Left,

## 2020-05-29 NOTE — TELEPHONE ENCOUNTER
Spoke to patient and relayed MD message. Patient verbalized understanding and agrees with plan. Dr. Artur Caruso-- patient still c/o leg pains after starting gabapentin 300 mg TID.  Pt states pain has not worsened but that is also has not improved since startin

## 2020-06-01 ENCOUNTER — APPOINTMENT (OUTPATIENT)
Dept: WOUND CARE | Facility: HOSPITAL | Age: 65
End: 2020-06-01
Payer: COMMERCIAL

## 2020-06-01 RX ORDER — LOSARTAN POTASSIUM 50 MG/1
TABLET ORAL
Qty: 30 TABLET | Refills: 0 | OUTPATIENT
Start: 2020-06-01

## 2020-06-01 RX ORDER — LOSARTAN POTASSIUM 100 MG/1
100 TABLET ORAL DAILY
Qty: 30 TABLET | Refills: 1 | Status: SHIPPED | OUTPATIENT
Start: 2020-06-01 | End: 2020-06-16 | Stop reason: DRUGHIGH

## 2020-06-01 NOTE — TELEPHONE ENCOUNTER
From 5/27/20  Note      Received pt's BP readings. Improved but still somewhat high in the mornings (130's-150's/80's-90's). Evenings are good (120's-130's/60's-70's).     Recommend increasing losartan from 50mg to 100mg daily (try taking at night).

## 2020-06-02 NOTE — TELEPHONE ENCOUNTER
Please remind him to see neurologist Dr. Michael Wei re the neuropathy.   Can increase the gabapentin to 600mg TID:  Day 1 300/300/600  Day 2 600/300/600  Day 3 and thereafter: 600 TID

## 2020-06-04 ENCOUNTER — APPOINTMENT (OUTPATIENT)
Dept: WOUND CARE | Facility: HOSPITAL | Age: 65
End: 2020-06-04
Attending: NURSE PRACTITIONER
Payer: COMMERCIAL

## 2020-06-05 ENCOUNTER — OFFICE VISIT (OUTPATIENT)
Dept: WOUND CARE | Facility: HOSPITAL | Age: 65
End: 2020-06-05
Attending: NURSE PRACTITIONER
Payer: COMMERCIAL

## 2020-06-05 DIAGNOSIS — I87.2 VENOUS (PERIPHERAL) INSUFFICIENCY: Primary | ICD-10-CM

## 2020-06-05 PROCEDURE — 29581 APPL MULTLAYER CMPRN SYS LEG: CPT

## 2020-06-05 NOTE — PROGRESS NOTES
Subjective    Chief Complaint  This information was obtained from the patient  The patient returns today for follow up and management of left medial ankle venous ulcer and compression stocking/garment fitting by [facility].  5/28/20: Pt finished oral HPI 2/6/2018 PMH of venous ulcers with past treatments in the [facility] with compression and specialty dressings. L medical ankle wound present for 3 wks. Pt reports wound started with weeping and increased clear drainage that ended up forming a ulcer.  Pt has a Respiratory: Oxygen Use  Cardiovascular (Central/Peripheral): Chest Pain, Palpitations  Gastrointestinal (GI): Constipation, Nausea / Vomiting / Diarrhea (N/V/D)  Genitourinary (): Urinary Incontinence  Musculoskeletal: Decreased Activity  Neurological: Constitutional  well developed, no acute distress. Respiratory:  effortless breathing. Cardiovascular:  BLE varicose veins, with hemosiderin staining, BLE's edema well managed by compression therapy .     Musculoskeletal:  no clubbing, no cyanosis of Left leg wound culture was reviewed in person today, 2+ growth Pseudomonas aeruginosa. Also reviewed the indication for oral antibiotics and benefit and possible side effect of Cipro with patient.   Patient is physically active and is concern about tendon r Limb cleansed using Soap and water (1)  Moisturizing lotion applied to skin  Stockinette applied using 3\" (1)  Compression applied to: using Toe bases to tibial tubercle (1)  Compression used: using 1/2\" foam (1), Artiflex 10 cm (1), Artiflex 15 cm (1),

## 2020-06-08 ENCOUNTER — APPOINTMENT (OUTPATIENT)
Dept: WOUND CARE | Facility: HOSPITAL | Age: 65
End: 2020-06-08
Payer: COMMERCIAL

## 2020-06-11 ENCOUNTER — APPOINTMENT (OUTPATIENT)
Dept: WOUND CARE | Facility: HOSPITAL | Age: 65
End: 2020-06-11
Attending: NURSE PRACTITIONER
Payer: COMMERCIAL

## 2020-06-12 ENCOUNTER — LAB ENCOUNTER (OUTPATIENT)
Dept: LAB | Facility: HOSPITAL | Age: 65
End: 2020-06-12
Attending: Other
Payer: COMMERCIAL

## 2020-06-12 ENCOUNTER — OFFICE VISIT (OUTPATIENT)
Dept: NEUROLOGY | Facility: CLINIC | Age: 65
End: 2020-06-12
Payer: COMMERCIAL

## 2020-06-12 ENCOUNTER — OFFICE VISIT (OUTPATIENT)
Dept: WOUND CARE | Facility: HOSPITAL | Age: 65
End: 2020-06-12
Attending: NURSE PRACTITIONER
Payer: COMMERCIAL

## 2020-06-12 VITALS — BODY MASS INDEX: 30.16 KG/M2 | WEIGHT: 235 LBS | HEIGHT: 74 IN

## 2020-06-12 DIAGNOSIS — G60.9 IDIOPATHIC PERIPHERAL NEUROPATHY: Primary | ICD-10-CM

## 2020-06-12 DIAGNOSIS — I87.312 CHRONIC VENOUS HYPERTENSION (IDIOPATHIC) WITH ULCER OF LEFT LOWER EXTREMITY (HCC): Primary | ICD-10-CM

## 2020-06-12 DIAGNOSIS — G60.9 IDIOPATHIC PERIPHERAL NEUROPATHY: ICD-10-CM

## 2020-06-12 DIAGNOSIS — I10 ESSENTIAL HYPERTENSION: ICD-10-CM

## 2020-06-12 DIAGNOSIS — M54.17 LUMBOSACRAL RADICULOPATHY AT L5: ICD-10-CM

## 2020-06-12 DIAGNOSIS — L97.929 CHRONIC VENOUS HYPERTENSION (IDIOPATHIC) WITH ULCER OF LEFT LOWER EXTREMITY (HCC): Primary | ICD-10-CM

## 2020-06-12 DIAGNOSIS — I63.9 CEREBROVASCULAR ACCIDENT (CVA), UNSPECIFIED MECHANISM (HCC): ICD-10-CM

## 2020-06-12 PROCEDURE — 29581 APPL MULTLAYER CMPRN SYS LEG: CPT

## 2020-06-12 PROCEDURE — 86038 ANTINUCLEAR ANTIBODIES: CPT

## 2020-06-12 PROCEDURE — 86334 IMMUNOFIX E-PHORESIS SERUM: CPT

## 2020-06-12 PROCEDURE — 99215 OFFICE O/P EST HI 40 MIN: CPT | Performed by: OTHER

## 2020-06-12 PROCEDURE — 83516 IMMUNOASSAY NONANTIBODY: CPT

## 2020-06-12 PROCEDURE — 80048 BASIC METABOLIC PNL TOTAL CA: CPT

## 2020-06-12 PROCEDURE — 86255 FLUORESCENT ANTIBODY SCREEN: CPT

## 2020-06-12 PROCEDURE — 82784 ASSAY IGA/IGD/IGG/IGM EACH: CPT

## 2020-06-12 PROCEDURE — 86256 FLUORESCENT ANTIBODY TITER: CPT

## 2020-06-12 PROCEDURE — 86431 RHEUMATOID FACTOR QUANT: CPT | Performed by: OTHER

## 2020-06-12 PROCEDURE — 36415 COLL VENOUS BLD VENIPUNCTURE: CPT

## 2020-06-12 RX ORDER — GABAPENTIN 400 MG/1
800 CAPSULE ORAL 3 TIMES DAILY
Qty: 540 CAPSULE | Refills: 3 | Status: SHIPPED | OUTPATIENT
Start: 2020-06-12 | End: 2020-06-25

## 2020-06-12 NOTE — PROGRESS NOTES
Subjective    Chief Complaint  This information was obtained from the patient  6/12/2020 \"I feel ok the dressings have been good no problems\".     Allergies  adhesive tape (Reaction: rash)    HPI  This information was obtained from the patient  6/5/2020: PMH: Unspecified venous (peripheral) insufficiency, Varicose veins with ulcer and inflammation ([facility] [address] Atrial fibrillation ([facility] [address] Hypercholesterolemia, Musculoskeletal, Osteoarthrosis, Other, Family hx of colon cancer  HPI 12/2018: L lat foot/ankle wound be Wound #3a Left, Medial Lower Leg is a Full Thickness Venous Ulcer and has received a status of Not Healed. Subsequent wound encounter measurements are 6.4cm length x 4cm width x 0.2cm depth, with an area of 25.6 sq cm and a volume of 5.12 cubic cm.  There i Wound #1b (Left, Medial Ankle)  . Wound Treatment Note  Assessed patient’s pain status and effectiveness of pain management plan. Limb cleansed using Betasept and water (1), Soap and water (1)  Cleansed wound and periwound with non-cytotoxic agent.  using W Applied topical product to jan-wound area avoiding wound base. using Betamethasone valerate 0.1% cream (1)  Applied topical agent to base of wound bed. using Mupirocin ointment (1)  Applied Primary Wound Dressing.  using Hydrofera Blue Classic 4x4 (1)  Jessie

## 2020-06-12 NOTE — PROGRESS NOTES
Neurology Follow up Visit     Referred By: Dr. Camacho ref. provider found    Chief Complaint: Patient presents with:  Neurologic Problem: Patient presents today stating he had a stroke about 6 months ago and was hospitalized on 12/12/19.  He states that since unspecified site    • Sleep apnea    • UGI bleed    • Unspecified essential hypertension    • Venous insufficiency     RT greater saphenous vein ablation 2007       Past Surgical History:   Procedure Laterality Date   • APPENDECTOMY  12/04/2019   • Jaziel Unger Erectile Dysfunction. , Disp: 30 tablet, Rfl: 3  •  amoxicillin 500 MG Oral Cap, Take 2,000 mg by mouth as needed (FOR DENTAL WORK). , Disp: , Rfl:   •  Glucosamine-Chondroit-Vit C-Mn (GLUCOSAMINE-CHONDROITIN) Oral Cap, Take 1 tablet by mouth daily. , Disp: right  Brachioradialis 2+ on the left and 3+ right  Patellar 2+ on the left and 3+ right  Ankle jerk 2+ on the left and 3+ right  Upgoing toe on the right    Coordination:  Finger to nose intact on the left and slower and mildly ataxic on the right  Rapid seek medical attention immediately if symptoms worsen. Patient verbalized understanding of information given. All questions were answered. All side effects of drugs were discussed. Return to clinic in: Return in about 3 months (around 9/12/2020).     A

## 2020-06-15 ENCOUNTER — TELEPHONE (OUTPATIENT)
Dept: INTERNAL MEDICINE CLINIC | Facility: CLINIC | Age: 65
End: 2020-06-15

## 2020-06-15 ENCOUNTER — APPOINTMENT (OUTPATIENT)
Dept: WOUND CARE | Facility: HOSPITAL | Age: 65
End: 2020-06-15
Payer: COMMERCIAL

## 2020-06-15 ENCOUNTER — TELEPHONE (OUTPATIENT)
Dept: NEUROLOGY | Facility: CLINIC | Age: 65
End: 2020-06-15

## 2020-06-15 DIAGNOSIS — I10 BENIGN ESSENTIAL HTN: Primary | ICD-10-CM

## 2020-06-15 NOTE — TELEPHONE ENCOUNTER
Patient notified of the normal RA test results per Dr. Gisela Mckeon. Patient expressed understanding and will await results of the other testing.

## 2020-06-15 NOTE — TELEPHONE ENCOUNTER
----- Message from Dee Dee Garcia MD sent at 6/15/2020  7:23 AM CDT -----  Please let the patient know that results of these particular lab tests so far were normal.    Thank you

## 2020-06-16 ENCOUNTER — TELEPHONE (OUTPATIENT)
Dept: NEUROLOGY | Facility: CLINIC | Age: 65
End: 2020-06-16

## 2020-06-16 RX ORDER — WARFARIN SODIUM 2.5 MG/1
TABLET ORAL
Qty: 90 TABLET | Refills: 3 | Status: SHIPPED | OUTPATIENT
Start: 2020-06-16 | End: 2021-04-07

## 2020-06-16 RX ORDER — LOSARTAN POTASSIUM AND HYDROCHLOROTHIAZIDE 12.5; 1 MG/1; MG/1
1 TABLET ORAL DAILY
Qty: 30 TABLET | Refills: 5 | Status: SHIPPED | OUTPATIENT
Start: 2020-06-16 | End: 2020-11-06

## 2020-06-16 NOTE — TELEPHONE ENCOUNTER
Spoke with patient and relayed message from Dr. Radha Mcclendon. Patient verbalized understanding and repeated back instructions. Educated on diuretic therapy and BMP/potassium monitoring. Emphasized stopping current plain losartan and starting new combo pill.  Pa

## 2020-06-16 NOTE — TELEPHONE ENCOUNTER
Patients wife Ricard Hodgkins informed of normal RA, Paraneoplastic Autoab and ROSS labs were normal per Dr. Allen Smith.  Ricard Hodgkins verbalized understanding and will notify patient,

## 2020-06-16 NOTE — TELEPHONE ENCOUNTER
----- Message from La Nena Zheng MD sent at 6/15/2020  3:17 PM CDT -----  Please let the patient know that results of these particular lab tests so far were normal.    Thank you

## 2020-06-16 NOTE — TELEPHONE ENCOUNTER
Losartan increased to 100mg/day on 6/1/20. BP's improved (no further 150's) but still somewhat high (110's-140's/80's-90's). Rec changing losartan to losartan /12.5mg once daily (order pended).   The addition of the diuretic can be very helpful f

## 2020-06-18 ENCOUNTER — OFFICE VISIT (OUTPATIENT)
Dept: WOUND CARE | Facility: HOSPITAL | Age: 65
End: 2020-06-18
Attending: NURSE PRACTITIONER
Payer: COMMERCIAL

## 2020-06-18 DIAGNOSIS — L97.929 CHRONIC VENOUS HYPERTENSION (IDIOPATHIC) WITH ULCER OF LEFT LOWER EXTREMITY (HCC): Primary | ICD-10-CM

## 2020-06-18 DIAGNOSIS — I87.312 CHRONIC VENOUS HYPERTENSION (IDIOPATHIC) WITH ULCER OF LEFT LOWER EXTREMITY (HCC): Primary | ICD-10-CM

## 2020-06-18 PROCEDURE — 29581 APPL MULTLAYER CMPRN SYS LEG: CPT

## 2020-06-18 NOTE — PROGRESS NOTES
Subjective    Chief Complaint  This information was obtained from the patient  The patient returns today for follow up and management of Venous Wound(s).  L medial ankle venous ulcer  6/18/20: Pt denies pain in the wound area    Allergies  adhesive tape (Re PMH: Unspecified venous (peripheral) insufficiency, Varicose veins with ulcer and inflammation ([facility] [address] Atrial fibrillation ([facility] [address] Hypercholesterolemia, Musculoskeletal, Osteoarthrosis, Other, Family hx of colon cancer  HPI 12/2018: L lat foot/ankle wound be Wound #3a Left, Medial Lower Leg is a Full Thickness Venous Ulcer and has received a status of Not Healed. Subsequent wound encounter measurements are 7cm length x 2cm width x 0.1cm depth, with an area of 14 sq cm and a volume of 1.4 cubic cm.  There is a l Signed By: Date:  SHARMAINE Early DPT 06/18/2020 11:43:51 AM       Entered By: Hilda Serrano on 06/18/2020 11:43:23 AM  Treatment Notes Summary  Wound #1b (Left, Medial Ankle)  . Wound Treatment Note  Assessed patient’s pain status and effectiveness of Antiseptic soak time: using 10 minutes (1)  Applied topical agent to base of wound bed. using Mupirocin ointment (1)  Applied Primary Wound Dressing. using Hydrofera Blue Classic 4x4 (1)  Applied Secondary Wound Dressing.  using ABD (1), Xeroform 5x9 (1)  D

## 2020-06-19 ENCOUNTER — TELEPHONE (OUTPATIENT)
Dept: NEUROLOGY | Facility: CLINIC | Age: 65
End: 2020-06-19

## 2020-06-19 DIAGNOSIS — G60.9 IDIOPATHIC PERIPHERAL NEUROPATHY: ICD-10-CM

## 2020-06-23 ENCOUNTER — MED REC SCAN ONLY (OUTPATIENT)
Dept: NEUROLOGY | Facility: CLINIC | Age: 65
End: 2020-06-23

## 2020-06-23 RX ORDER — SILDENAFIL 100 MG/1
TABLET, FILM COATED ORAL
Qty: 30 TABLET | Refills: 3 | Status: SHIPPED | OUTPATIENT
Start: 2020-06-23 | End: 2021-04-07

## 2020-06-24 NOTE — TELEPHONE ENCOUNTER
Spoke to patient who wanted to clarify Gabapentin dose and directions. Patient notified Dr. Baez Dg prescribed Gabapentin 400mg~take 2 caps by mouth three times daily. Patient states he is confused as to why the dose is so high.  Patient states he was

## 2020-06-25 ENCOUNTER — OFFICE VISIT (OUTPATIENT)
Dept: WOUND CARE | Facility: HOSPITAL | Age: 65
End: 2020-06-25
Attending: NURSE PRACTITIONER
Payer: COMMERCIAL

## 2020-06-25 DIAGNOSIS — I87.312 CHRONIC VENOUS HYPERTENSION (IDIOPATHIC) WITH ULCER OF LEFT LOWER EXTREMITY (HCC): Primary | ICD-10-CM

## 2020-06-25 DIAGNOSIS — L97.929 CHRONIC VENOUS HYPERTENSION (IDIOPATHIC) WITH ULCER OF LEFT LOWER EXTREMITY (HCC): Primary | ICD-10-CM

## 2020-06-25 DIAGNOSIS — I87.2 VENOUS (PERIPHERAL) INSUFFICIENCY: ICD-10-CM

## 2020-06-25 PROCEDURE — 29581 APPL MULTLAYER CMPRN SYS LEG: CPT

## 2020-06-25 RX ORDER — GABAPENTIN 300 MG/1
300 CAPSULE ORAL 3 TIMES DAILY
Qty: 270 CAPSULE | Refills: 3 | Status: SHIPPED | OUTPATIENT
Start: 2020-06-25 | End: 2020-11-05

## 2020-06-25 NOTE — PROGRESS NOTES
Subjective    Chief Complaint  This information was obtained from the patient  The patient returns today for follow up and management of Venous Wound(s). L medial ankle venous ulcer  6/25/20: No new wound complaints.  \"It looks a lot better today\"    Jenna Owens PMH: Unspecified venous (peripheral) insufficiency, Varicose veins with ulcer and inflammation ([facility] [address] Atrial fibrillation ([facility] [address] Hypercholesterolemia, Musculoskeletal, Osteoarthrosis, Other, Family hx of colon cancer  HPI 12/2018: L lat foot/ankle wound be Wound #3a Left, Medial Lower Leg is a Full Thickness Venous Ulcer and has received a status of Not Healed. Subsequent wound encounter measurements are 1.9cm length x 1cm width x 0.1cm depth, with an area of 1.9 sq cm and a volume of 0.19 cubic cm.  There is Decrease edema by 1 cm by >=1 area measured- MET  Previous Long Term Physical Therapy Goals (8-12 weeks)  Reduce wound area by 75%- MET  New Short Term Physical Therapy Goals (4-6 weeks)  Achieve wound closure to progress towards normal functional activiti Compression used: using 1/2\" foam (1), Artiflex 10 cm (1), Artiflex 15 cm (1), Comprilan 08 cm (1), Comprilan 10 cm (1), Comprilan 12 cm (1), Other - see notes (1)  Foam application location: using Wound area (1)  Time spent (units are in minutes) using T

## 2020-06-25 NOTE — TELEPHONE ENCOUNTER
If patient only was taking 100 mg 3 times a day, then will increase the dose to 300 mg 3 times a day.     If he still has some of 100 mg pills left, he can take 2 pills 3 times a day until he gets the new prescription

## 2020-07-02 ENCOUNTER — OFFICE VISIT (OUTPATIENT)
Dept: WOUND CARE | Facility: HOSPITAL | Age: 65
End: 2020-07-02
Attending: NURSE PRACTITIONER
Payer: COMMERCIAL

## 2020-07-02 ENCOUNTER — OFFICE VISIT (OUTPATIENT)
Dept: NEUROLOGY | Facility: CLINIC | Age: 65
End: 2020-07-02
Payer: COMMERCIAL

## 2020-07-02 VITALS
BODY MASS INDEX: 30.16 KG/M2 | HEIGHT: 74 IN | SYSTOLIC BLOOD PRESSURE: 128 MMHG | DIASTOLIC BLOOD PRESSURE: 84 MMHG | WEIGHT: 235 LBS

## 2020-07-02 DIAGNOSIS — M48.061 SPINAL STENOSIS AT L4-L5 LEVEL: Primary | ICD-10-CM

## 2020-07-02 DIAGNOSIS — I87.312 CHRONIC VENOUS HYPERTENSION (IDIOPATHIC) WITH ULCER OF LEFT LOWER EXTREMITY (HCC): Primary | ICD-10-CM

## 2020-07-02 DIAGNOSIS — M54.17 LUMBOSACRAL RADICULOPATHY AT L5: ICD-10-CM

## 2020-07-02 DIAGNOSIS — L97.929 CHRONIC VENOUS HYPERTENSION (IDIOPATHIC) WITH ULCER OF LEFT LOWER EXTREMITY (HCC): Primary | ICD-10-CM

## 2020-07-02 DIAGNOSIS — R20.0 NUMBNESS AND TINGLING OF BOTH LEGS: ICD-10-CM

## 2020-07-02 DIAGNOSIS — G60.9 IDIOPATHIC PERIPHERAL NEUROPATHY: ICD-10-CM

## 2020-07-02 DIAGNOSIS — R20.2 NUMBNESS AND TINGLING OF BOTH LEGS: ICD-10-CM

## 2020-07-02 DIAGNOSIS — I63.9 CEREBROVASCULAR ACCIDENT (CVA), UNSPECIFIED MECHANISM (HCC): ICD-10-CM

## 2020-07-02 PROCEDURE — 99244 OFF/OP CNSLTJ NEW/EST MOD 40: CPT | Performed by: PHYSICAL MEDICINE & REHABILITATION

## 2020-07-02 PROCEDURE — 29581 APPL MULTLAYER CMPRN SYS LEG: CPT

## 2020-07-02 NOTE — PROGRESS NOTES
Subjective    Chief Complaint  This information was obtained from the patient  The patient returns today for follow up and management of Venous Wound(s).  L medial ankle venous ulcer  7/02/20: \"I can see it is really doing good\" referring to wound progres PMH: Unspecified venous (peripheral) insufficiency, Varicose veins with ulcer and inflammation ([facility] [address] Atrial fibrillation ([facility] [address] Hypercholesterolemia, Musculoskeletal, Osteoarthrosis, Other, Family hx of colon cancer  HPI 12/2018: L lat foot/ankle wound be Pt's L medial lower leg venous ulcer is rapidly progressing at this time. Wound area is overall reduced by 97% since starting treatment. Anticipate full wound closure over the next several weeks.   Pt will be having venous ablation procedure later this mo Dressing secured with non-allergenic tape/stockinet/wrap. using Martha (1), Silk tape (1)  Compression wrapping  Limb cleansed using Soap and water (1)  Moisturizing lotion applied to skin  Stockinette applied using 3\" (1)  Compression applied to: using To

## 2020-07-02 NOTE — PROGRESS NOTES
[address] [facility]  NEW PATIENT EVALUATION    Consultation as a request of Dr. Gloria Abad    Chief Complaint: back pain.     HISTORY OF PRESENT ILLNESS:   Patient presents with:  Back Pain: New patient referred by  Unspecified essential hypertension    • Venous insufficiency     RT greater saphenous vein ablation 2007         PAST SURGICAL HISTORY:     Past Surgical History:   Procedure Laterality Date   • APPENDECTOMY  12/04/2019   • COLONOSCOPY  10/11   • COLONOSCO Relation Age of Onset   • Colon Cancer Father    • Arthritis Other    • Hypertension Other           SOCIAL HISTORY:   Social History    Tobacco Use      Smoking status: Never Smoker      Smokeless tobacco: Never Used    Alcohol use:  Yes      Alcohol/week: & oriented x 3, attentive, able to follow 2 step commands, comprehention intact, spontaneous speech intact  Psychiatric: Mood and affect appropriate    Gait Normal Valgus deviation of bilateral knees  Posture: No scoliosis or kyphosis    Musculoskeletal/Ne IMAGING:     MRI LUMBAR SPINE dated March 15, 2020 revealed:   CONCLUSION:   Disc disease, alignment changes, and osteoarthritis within the lumbar spine as above.   Changes are most advanced at L4-5 where there is severe central canal narrowing and mo

## 2020-07-02 NOTE — PATIENT INSTRUCTIONS
-Start PT and home exercises  -Ice/Heat for back pain  -Follow up in 4 weeks  -Continue Gabapentin  -If no better will discuss epidural injection

## 2020-07-06 ENCOUNTER — APPOINTMENT (OUTPATIENT)
Dept: LAB | Age: 65
End: 2020-07-06
Attending: INTERNAL MEDICINE
Payer: COMMERCIAL

## 2020-07-06 ENCOUNTER — TELEPHONE (OUTPATIENT)
Dept: INTERNAL MEDICINE CLINIC | Facility: CLINIC | Age: 65
End: 2020-07-06

## 2020-07-06 DIAGNOSIS — I48.91 A-FIB (HCC): ICD-10-CM

## 2020-07-06 DIAGNOSIS — I10 BENIGN ESSENTIAL HTN: ICD-10-CM

## 2020-07-06 LAB
ANION GAP SERPL CALC-SCNC: 6 MMOL/L
ANION GAP SERPL CALC-SCNC: 6 MMOL/L (ref 0–18)
BUN BLD-MCNC: 21 MG/DL (ref 7–18)
BUN SERPL-MCNC: 21 MG/DL
BUN/CREAT SERPL: 26.6
BUN/CREAT SERPL: 26.6 (ref 10–20)
CALCIUM BLD-MCNC: 8.6 MG/DL (ref 8.5–10.1)
CALCIUM SERPL-MCNC: 8.6 MG/DL
CHLORIDE SERPL-SCNC: 109 MMOL/L
CHLORIDE SERPL-SCNC: 109 MMOL/L (ref 98–112)
CO2 SERPL-SCNC: 25 MMOL/L
CO2 SERPL-SCNC: 25 MMOL/L (ref 21–32)
CREAT BLD-MCNC: 0.79 MG/DL (ref 0.7–1.3)
CREAT SERPL-MCNC: 0.79 MG/DL
GLUCOSE BLD-MCNC: 94 MG/DL (ref 70–99)
GLUCOSE SERPL-MCNC: 94 MG/DL
INR BLD: 4.01 (ref 0.9–1.2)
INR PPP: 4.01
OSMOLALITY SERPL CALC.SUM OF ELEC: 293 MOSM/KG (ref 275–295)
PATIENT FASTING Y/N/NP: NO
POTASSIUM SERPL-SCNC: 4.2 MMOL/L
POTASSIUM SERPL-SCNC: 4.2 MMOL/L (ref 3.5–5.1)
PROTHROMBIN TIME: 38.5 SECONDS (ref 11.8–14.5)
SODIUM SERPL-SCNC: 140 MMOL/L
SODIUM SERPL-SCNC: 140 MMOL/L (ref 136–145)

## 2020-07-06 PROCEDURE — 36415 COLL VENOUS BLD VENIPUNCTURE: CPT

## 2020-07-06 PROCEDURE — 85610 PROTHROMBIN TIME: CPT

## 2020-07-06 PROCEDURE — 80048 BASIC METABOLIC PNL TOTAL CA: CPT

## 2020-07-07 ENCOUNTER — ANTI-COAG (OUTPATIENT)
Dept: CARDIOLOGY | Age: 65
End: 2020-07-07

## 2020-07-07 DIAGNOSIS — I48.20 CHRONIC ATRIAL FIBRILLATION (CMD): ICD-10-CM

## 2020-07-07 RX ORDER — AMLODIPINE BESYLATE 5 MG/1
5 TABLET ORAL DAILY
Qty: 90 TABLET | Refills: 1 | Status: SHIPPED | OUTPATIENT
Start: 2020-07-07 | End: 2020-08-27 | Stop reason: DRUGHIGH

## 2020-07-07 NOTE — TELEPHONE ENCOUNTER
Spoke to patient and relayed MD message and instructions, patient verbalizes understanding and agrees with plan. Patient agrees to continue with losartan HCTZ 100/12.5 mg q day and add amlodipine 5mg/day. He prefers 90 day supply.  Sent to pharmacy as wr

## 2020-07-07 NOTE — TELEPHONE ENCOUNTER
BP's reviewed. Still elevated (mostly 130's-150's/80's-90's). Continue the losartan /12.5mg qday. Add amlodipine 5mg/day (can give 30 or 90 day supply w/refills). Please schedule [facility] phone visit in 2 weeks.

## 2020-07-09 ENCOUNTER — OFFICE VISIT (OUTPATIENT)
Dept: WOUND CARE | Facility: HOSPITAL | Age: 65
End: 2020-07-09
Attending: NURSE PRACTITIONER
Payer: COMMERCIAL

## 2020-07-09 ENCOUNTER — TELEPHONE (OUTPATIENT)
Dept: NEUROLOGY | Facility: CLINIC | Age: 65
End: 2020-07-09

## 2020-07-09 DIAGNOSIS — I87.312 CHRONIC VENOUS HYPERTENSION (IDIOPATHIC) WITH ULCER OF LEFT LOWER EXTREMITY (HCC): Primary | ICD-10-CM

## 2020-07-09 DIAGNOSIS — L97.929 CHRONIC VENOUS HYPERTENSION (IDIOPATHIC) WITH ULCER OF LEFT LOWER EXTREMITY (HCC): Primary | ICD-10-CM

## 2020-07-09 PROCEDURE — 29581 APPL MULTLAYER CMPRN SYS LEG: CPT

## 2020-07-09 NOTE — PROGRESS NOTES
Subjective    Chief Complaint  This information was obtained from the patient  The patient returns today for follow up and management of Venous Wound(s). L medial ankle venous ulcer  7/09/20: No new complaints.  Pt has venous procedure in the next few weeks PMH: Unspecified venous (peripheral) insufficiency, Varicose veins with ulcer and inflammation ([facility] [address] Atrial fibrillation ([facility] [address] Hypercholesterolemia, Musculoskeletal, Osteoarthrosis, Other, Family hx of colon cancer  HPI 12/2018: L lat foot/ankle wound be Assessment  Pt's wound measurements are improved, with very fragile neoepithelium in the inferior aspect of the wound. Pt is awaiting venous procedure in the next few weeks.   He will benefit from ongoing use of short stretch compression in order to promot Compression used: using 1/2\" foam (1), Artiflex 10 cm (1), Artiflex 15 cm (1), Comprilan 08 cm (1), Comprilan 10 cm (1), Comprilan 12 cm (1), Other - see notes (1)  Foam application location: using Wound area (1)  Time spent (units are in minutes) using T

## 2020-07-13 ENCOUNTER — ANTI-COAG (OUTPATIENT)
Dept: CARDIOLOGY | Age: 65
End: 2020-07-13

## 2020-07-13 ENCOUNTER — TELEPHONE (OUTPATIENT)
Dept: INTERNAL MEDICINE CLINIC | Facility: CLINIC | Age: 65
End: 2020-07-13

## 2020-07-13 DIAGNOSIS — I48.20 CHRONIC ATRIAL FIBRILLATION (CMD): ICD-10-CM

## 2020-07-13 NOTE — TELEPHONE ENCOUNTER
Pt requesting RX renewal for:  Tramadol 37.5/325, qty 90 w/refills  Pt uses [facility] for this refill  Tasked to [employer]

## 2020-07-14 ENCOUNTER — APPOINTMENT (OUTPATIENT)
Dept: LAB | Age: 65
End: 2020-07-14
Attending: INTERNAL MEDICINE
Payer: COMMERCIAL

## 2020-07-14 DIAGNOSIS — I48.91 A-FIB (HCC): ICD-10-CM

## 2020-07-14 LAB
INR BLD: 2.81 (ref 0.9–1.2)
INR PPP: 2.81
PROTHROMBIN TIME: 29.1 SECONDS (ref 11.8–14.5)

## 2020-07-14 PROCEDURE — 36415 COLL VENOUS BLD VENIPUNCTURE: CPT

## 2020-07-14 PROCEDURE — 85610 PROTHROMBIN TIME: CPT

## 2020-07-14 NOTE — TELEPHONE ENCOUNTER
To MD:  The above refill request is for a controlled substance. Please review pended medication order. Print and sign for staff to fax to pharmacy or prescribe electronically. Medication pended. Last refilled on 01/09/2020 #- 360 Refill -1.

## 2020-07-15 ENCOUNTER — ANTI-COAG (OUTPATIENT)
Dept: CARDIOLOGY | Age: 65
End: 2020-07-15

## 2020-07-15 DIAGNOSIS — I48.20 CHRONIC ATRIAL FIBRILLATION (CMD): ICD-10-CM

## 2020-07-16 ENCOUNTER — OFFICE VISIT (OUTPATIENT)
Dept: WOUND CARE | Facility: HOSPITAL | Age: 65
End: 2020-07-16
Attending: NURSE PRACTITIONER
Payer: COMMERCIAL

## 2020-07-16 DIAGNOSIS — L97.929 CHRONIC VENOUS HYPERTENSION (IDIOPATHIC) WITH ULCER OF LEFT LOWER EXTREMITY (HCC): Primary | ICD-10-CM

## 2020-07-16 DIAGNOSIS — I87.312 CHRONIC VENOUS HYPERTENSION (IDIOPATHIC) WITH ULCER OF LEFT LOWER EXTREMITY (HCC): Primary | ICD-10-CM

## 2020-07-16 PROCEDURE — 99213 OFFICE O/P EST LOW 20 MIN: CPT

## 2020-07-16 NOTE — PROGRESS NOTES
Subjective    Chief Complaint  This information was obtained from the patient  The patient returns today for follow up and management of Venous Wound(s).  L medial ankle venous ulcer  7/16/20: No new complaints regarding wound    Allergies  adhesive tape (R PMH: Unspecified venous (peripheral) insufficiency, Varicose veins with ulcer and inflammation ([facility] [address] Atrial fibrillation ([facility] [address] Hypercholesterolemia, Musculoskeletal, Osteoarthrosis, Other, Family hx of colon cancer  HPI 12/2018: L lat foot/ankle wound be Pt's L medial lower leg ulcer is tenuously healed this session.   The tissue is still very friable and will require 1-2 weeks of additional short stretch compression in order to protect the new skin and prevent reulceration due to increased edema or shearin Compression applied to: using Toe bases to tibial tubercle (1)  Compression used: using 1/2\" foam (1), Artiflex 10 cm (1), Artiflex 15 cm (1), Comprilan 10 cm (1), Comprilan 12 cm (1), Other - see notes (1)  Foam application location: using Wound area (1)

## 2020-07-17 ENCOUNTER — TELEPHONE (OUTPATIENT)
Dept: NEUROLOGY | Facility: CLINIC | Age: 65
End: 2020-07-17

## 2020-07-22 ENCOUNTER — TELEPHONE (OUTPATIENT)
Dept: INTERNAL MEDICINE CLINIC | Facility: CLINIC | Age: 65
End: 2020-07-22

## 2020-07-22 NOTE — TELEPHONE ENCOUNTER
Received Tramadol refill from [facility] - pt requested 90 day supply & received QTY of 28 tablets    Requests call back to advise [phone]

## 2020-07-22 NOTE — TELEPHONE ENCOUNTER
Patient called stating he received a smaller qty of tramadol    Refill was for #360/1. [facility] who state that a PA is needed. PA#[id number] ID# [id number]. #27 (7 day supply) was provided to patient.      Left vm Explaining to patient P

## 2020-07-23 ENCOUNTER — OFFICE VISIT (OUTPATIENT)
Dept: WOUND CARE | Facility: HOSPITAL | Age: 65
End: 2020-07-23
Attending: NURSE PRACTITIONER
Payer: COMMERCIAL

## 2020-07-23 DIAGNOSIS — I87.312 CHRONIC VENOUS HYPERTENSION (IDIOPATHIC) WITH ULCER OF LEFT LOWER EXTREMITY (HCC): Primary | ICD-10-CM

## 2020-07-23 DIAGNOSIS — L97.929 CHRONIC VENOUS HYPERTENSION (IDIOPATHIC) WITH ULCER OF LEFT LOWER EXTREMITY (HCC): Primary | ICD-10-CM

## 2020-07-23 PROCEDURE — 99212 OFFICE O/P EST SF 10 MIN: CPT

## 2020-07-23 NOTE — TELEPHONE ENCOUNTER
Fax rec'd from [employer] re: Prior Authorization for:  Tramadol  Form placed in purple folder  Tasked to [employer]

## 2020-07-23 NOTE — PROGRESS NOTES
Subjective    Chief Complaint  This information was obtained from the patient  The patient returns today for follow up and management of Venous Wound(s).  L medial ankle venous ulcer  7/23/20: Pt had vein injections yesterday with Dr. Waldemar hogue (peripheral) insufficiency, Varicose veins with ulcer and inflammation ([address] Atrial fibrillation ([address] Hypercholesterolemia, Musculoskeletal, Osteoarthrosis, Other, Family hx of colon cancer  HPI 12/2018: L lat foot/ankle wound began ~ 1 month ago when jazlyn Appointments  Return Appointment: - PT wound care x 30 min.  Cancel 7/30, return 8/6          Electronic Signature(s)  Signed By: Date:  Beatriz Zuniga DPT, [address] 07/23/2020 11:37:50 AM       Entered By: Beatriz Zuniga on 07/23/2020 11:37:05 AM  Treatment Not

## 2020-07-24 ENCOUNTER — OFFICE VISIT (OUTPATIENT)
Dept: PHYSICAL THERAPY | Facility: HOSPITAL | Age: 65
End: 2020-07-24
Attending: PHYSICAL MEDICINE & REHABILITATION
Payer: COMMERCIAL

## 2020-07-24 DIAGNOSIS — M48.061 SPINAL STENOSIS AT L4-L5 LEVEL: ICD-10-CM

## 2020-07-24 PROCEDURE — 97162 PT EVAL MOD COMPLEX 30 MIN: CPT | Performed by: PHYSICAL THERAPIST

## 2020-07-24 PROCEDURE — 97110 THERAPEUTIC EXERCISES: CPT | Performed by: PHYSICAL THERAPIST

## 2020-07-24 PROCEDURE — 97112 NEUROMUSCULAR REEDUCATION: CPT | Performed by: PHYSICAL THERAPIST

## 2020-07-24 NOTE — PROGRESS NOTES
LUMBAR SPINE EVALUATION:   Referring Physician: Dr. Chanelle Zamorano  Diagnosis:     Spinal stenosis at L4-L5 level (M48.061)     Order summary:  EM - RFL, Routine, To - [employer] M 1 visit  Physical Therapy Area of Concentration: Ortho  Physical Therapy Ortho: step over step method. Past medical history was reviewed with Loyda Leach.  Significant findings include       Past Medical History:   Diagnosis Date   • Acute, but ill-defined, cerebrovascular disease 12/2019   • Arrhythmia       afib   • Atrial fibrillation Precautions: wound L LE     OBJECTIVE:   Observation/Posture: valgus position of the knees, over pronation at hip feet, IR at the hips, protruding abdomin, FHP, increased thoracic kyphosis  Gait:  Ataxic gait with increased valgus moment at the knees b min      Total Treatment Time: 55 min       In agreement with FOTO score and clinical rationale, this evaluation involved Moderate Complexity decision making due to 3+ personal factors/comorbidities, 3 body structures involved/activity limitations, and uns

## 2020-07-27 NOTE — TELEPHONE ENCOUNTER
Received fax from [employer] [address] with   Approval for   Tramadol hcl-acetaminophen 37.5-325 tablet    Effective 6/22/2020 - 7/27/2021    Placed in red folder

## 2020-07-28 ENCOUNTER — TELEPHONE (OUTPATIENT)
Dept: NEUROLOGY | Facility: CLINIC | Age: 65
End: 2020-07-28

## 2020-07-29 NOTE — TELEPHONE ENCOUNTER
Spoke to patient. He states that he has used up allotment of physical therapy sessions with insurance for the year. They can approve it if we can contact insurance and request additional visits otherwise it would be an out of pocket expense.      He would l

## 2020-07-30 ENCOUNTER — APPOINTMENT (OUTPATIENT)
Dept: WOUND CARE | Facility: HOSPITAL | Age: 65
End: 2020-07-30
Payer: COMMERCIAL

## 2020-08-03 ENCOUNTER — APPOINTMENT (OUTPATIENT)
Dept: PHYSICAL THERAPY | Facility: HOSPITAL | Age: 65
End: 2020-08-03
Attending: PHYSICAL MEDICINE & REHABILITATION
Payer: COMMERCIAL

## 2020-08-03 ENCOUNTER — TELEPHONE (OUTPATIENT)
Dept: PHYSICAL THERAPY | Facility: HOSPITAL | Age: 65
End: 2020-08-03

## 2020-08-05 ENCOUNTER — TELEPHONE (OUTPATIENT)
Dept: PHYSICAL THERAPY | Age: 65
End: 2020-08-05

## 2020-08-06 ENCOUNTER — APPOINTMENT (OUTPATIENT)
Dept: PHYSICAL THERAPY | Facility: HOSPITAL | Age: 65
End: 2020-08-06
Attending: PHYSICAL MEDICINE & REHABILITATION
Payer: COMMERCIAL

## 2020-08-06 ENCOUNTER — OFFICE VISIT (OUTPATIENT)
Dept: WOUND CARE | Facility: HOSPITAL | Age: 65
End: 2020-08-06
Attending: NURSE PRACTITIONER
Payer: COMMERCIAL

## 2020-08-06 DIAGNOSIS — L97.929 CHRONIC VENOUS HYPERTENSION (IDIOPATHIC) WITH ULCER OF LEFT LOWER EXTREMITY (HCC): Primary | ICD-10-CM

## 2020-08-06 DIAGNOSIS — I87.2 VENOUS (PERIPHERAL) INSUFFICIENCY: ICD-10-CM

## 2020-08-06 DIAGNOSIS — I87.312 CHRONIC VENOUS HYPERTENSION (IDIOPATHIC) WITH ULCER OF LEFT LOWER EXTREMITY (HCC): Primary | ICD-10-CM

## 2020-08-06 PROCEDURE — 99212 OFFICE O/P EST SF 10 MIN: CPT

## 2020-08-06 NOTE — PROGRESS NOTES
Subjective    Chief Complaint  This information was obtained from the patient  The patient returns today for follow up and management of Venous Wound(s).  L medial ankle venous ulcer  8/06/20: \"I want you to look at a spot on my ankle\"    Allergies  adhes (peripheral) insufficiency, Varicose veins with ulcer and inflammation ([address] Atrial fibrillation ([address] Hypercholesterolemia, Musculoskeletal, Osteoarthrosis, Other, Family hx of colon cancer  HPI 12/2018: L lat foot/ankle wound began ~ 1 month ago when jazlyn transition into self-care compression garments - MET    Active Problems    ICD-10  (Encounter Diagnosis) I87.312 - Chronic venous hypertension (idiopathic) with ulcer of left lower extremity  (Encounter Diagnosis) I48.91 - Unspecified atrial fibrillation

## 2020-08-07 RX ORDER — LOSARTAN POTASSIUM 50 MG/1
TABLET ORAL
Qty: 30 TABLET | Refills: 0 | OUTPATIENT
Start: 2020-08-07

## 2020-08-07 NOTE — TELEPHONE ENCOUNTER
Losartan 50 mg refill request denied. It looks like patient was started on losartan HCTZ 100/12.5 mg by Dr. Anika Bhakta on 6/16/20. To Dr. Anika Bhakta, just [facility].

## 2020-08-10 ENCOUNTER — TELEPHONE (OUTPATIENT)
Dept: PHYSICAL THERAPY | Age: 65
End: 2020-08-10

## 2020-08-10 ENCOUNTER — OFFICE VISIT (OUTPATIENT)
Dept: PHYSICAL THERAPY | Facility: HOSPITAL | Age: 65
End: 2020-08-10
Attending: PHYSICAL MEDICINE & REHABILITATION
Payer: COMMERCIAL

## 2020-08-10 DIAGNOSIS — M48.061 SPINAL STENOSIS AT L4-L5 LEVEL: ICD-10-CM

## 2020-08-10 DIAGNOSIS — M54.17 LUMBOSACRAL RADICULOPATHY AT L5: ICD-10-CM

## 2020-08-10 PROCEDURE — 97112 NEUROMUSCULAR REEDUCATION: CPT | Performed by: PHYSICAL THERAPIST

## 2020-08-10 PROCEDURE — 97110 THERAPEUTIC EXERCISES: CPT | Performed by: PHYSICAL THERAPIST

## 2020-08-10 NOTE — PROGRESS NOTES
8/10/2020  Dx:    Spinal stenosis at L4-L5 level (M48.061)        Authorized # of Visits:  18 visits approved per insurance         Next MD visit: none   Fall Risk: standard         Precautions:    Medication Changes since last visit?: No    Subjective: Re Neuromuscular Re-education; Therapeutic Activity; Patient education: Home exercise program instruction; TNE Education; Modalities as needed.     Education or treatment limitation: None  Rehab Potential:good      Certification From: 7/18/5739  To:10/22/2020 falling. 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 Does yard work, home projects currently. He is not currently working. Current functional limitations include limited ability to walk, perform stairs, go from sit to stand.      Sophie Olson describes prior ASSESSMENT:   Mr. Toro Vinson presents to therapy with a complex medical hx and significant LE weakness and alignment issues.   He also displays very poor core strengthen and difficulty with pressure management and breathing mechanics that contribute to his limit

## 2020-08-13 ENCOUNTER — TELEPHONE (OUTPATIENT)
Dept: INTERNAL MEDICINE CLINIC | Facility: CLINIC | Age: 65
End: 2020-08-13

## 2020-08-13 ENCOUNTER — APPOINTMENT (OUTPATIENT)
Dept: WOUND CARE | Facility: HOSPITAL | Age: 65
End: 2020-08-13
Payer: COMMERCIAL

## 2020-08-13 NOTE — TELEPHONE ENCOUNTER
Patient is calling in regards to his Losartan HCTZ medication. Medication was last sent to the [facility] in [place] in June 2020 for 30 tablets with five refills. Patient has had no problems getting the refills on the medication until today.  Patient states he p

## 2020-08-14 ENCOUNTER — TELEPHONE (OUTPATIENT)
Dept: INTERNAL MEDICINE CLINIC | Facility: CLINIC | Age: 65
End: 2020-08-14

## 2020-08-14 NOTE — TELEPHONE ENCOUNTER
To Sujey Tay---    Does [facility] allow 90 day of Tramadol? Patient was sent in 90 day supply. Please advise thanks!

## 2020-08-14 NOTE — TELEPHONE ENCOUNTER
Per patient - [facility] advises they do not have proper authorization from Dr Norberto Alfaro to send him a 3 month suuply of his Tramadol    They are dispensing med on a weekly basis    Getting low on medication  Can prescription be sent for 90 day supply

## 2020-08-14 NOTE — TELEPHONE ENCOUNTER
Spoke to pt  - we sent in active Rx; We have not received any correspondence with questions/concerns;   Pt relates [facility] is telling him \"they don't have what they need\"  There is no way for us to know that by osmosis, so recommended pt call [facility] and have th

## 2020-08-18 ENCOUNTER — APPOINTMENT (OUTPATIENT)
Dept: LAB | Facility: HOSPITAL | Age: 65
End: 2020-08-18
Attending: INTERNAL MEDICINE
Payer: COMMERCIAL

## 2020-08-18 ENCOUNTER — MED REC SCAN ONLY (OUTPATIENT)
Dept: INTERNAL MEDICINE CLINIC | Facility: CLINIC | Age: 65
End: 2020-08-18

## 2020-08-18 ENCOUNTER — TELEPHONE (OUTPATIENT)
Dept: INTERNAL MEDICINE CLINIC | Facility: CLINIC | Age: 65
End: 2020-08-18

## 2020-08-18 ENCOUNTER — OFFICE VISIT (OUTPATIENT)
Dept: PHYSICAL THERAPY | Facility: HOSPITAL | Age: 65
End: 2020-08-18
Attending: PHYSICAL MEDICINE & REHABILITATION
Payer: COMMERCIAL

## 2020-08-18 DIAGNOSIS — M48.061 SPINAL STENOSIS AT L4-L5 LEVEL: ICD-10-CM

## 2020-08-18 DIAGNOSIS — I48.91 A-FIB (HCC): ICD-10-CM

## 2020-08-18 LAB
INR BLD: 2.76 (ref 0.9–1.2)
PROTHROMBIN TIME: 28.7 SECONDS (ref 11.8–14.5)

## 2020-08-18 PROCEDURE — 36415 COLL VENOUS BLD VENIPUNCTURE: CPT

## 2020-08-18 PROCEDURE — 85610 PROTHROMBIN TIME: CPT

## 2020-08-18 PROCEDURE — 97112 NEUROMUSCULAR REEDUCATION: CPT | Performed by: PHYSICAL THERAPIST

## 2020-08-18 NOTE — TELEPHONE ENCOUNTER
Routed to Dr. Allegra Arreguin of mail (sent to [facility]) from [facility] with BP Readings (A.M. & P.M.) noted below:    A.M.  P.M.  7/13 128/90  129/86  7/14 138/84  119/71  7/15 131/76  122/69  7/16 148/84  130/82  7/17 138/84  116/70  7//18 120/7

## 2020-08-18 NOTE — TELEPHONE ENCOUNTER
Pt. Called upset that he is still having a problem getting his refills from [facility].   He stated he is caught in the middle because we are telling him to have [address] fax us in writing whatever they need to process the script, and [facility]

## 2020-08-18 NOTE — TELEPHONE ENCOUNTER
Spent 25 mins on the phone waiting and speaking to Agnes Subramanian; We discussed they have on record that the PA is not complete;  I gave the case# and approval dates (see earlier TT) for the PA ;   This is an issue that needs to be cleared within the [facility]

## 2020-08-18 NOTE — PROGRESS NOTES
8/18/2020    Dx:    Spinal stenosis at L4-L5 level (M48.061)        Authorized # of Visits:  18 visits approved per insurance         Next MD visit: none   Fall Risk: standard         Precautions:    Medication Changes since last visit?: No    Subjective: heel toe gait pattern without gross deviation. Frequency / Duration: Patient will be seen for 1-2 x/week or a total of 18 visits over a 90 day period. Treatment will include: Manual Therapy; Therapeutic Exercises; Neuromuscular Re-education;  Therapeu for OT for his R hand. States the CVA affected the R side of his body. Had pain in the LB pain and leg weakness prior to his CVA. Reports he doesn't fall but states he has to catch himself from falling. 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   Aldo frightened, demeaned, or taken advantage of by anyone at your home or in your life? No  Have you recently had thoughts of hurting yourself?   No    Have you tried to hurt yourself in the past?  No      ASSESSMENT:   Mr. Ema Cook presents to therapy with a comp

## 2020-08-19 ENCOUNTER — ANTI-COAG (OUTPATIENT)
Dept: CARDIOLOGY | Age: 65
End: 2020-08-19

## 2020-08-19 DIAGNOSIS — I48.20 CHRONIC ATRIAL FIBRILLATION (CMD): ICD-10-CM

## 2020-08-19 LAB — INR PPP: 2.76

## 2020-08-20 ENCOUNTER — TELEPHONE (OUTPATIENT)
Dept: INTERNAL MEDICINE CLINIC | Facility: CLINIC | Age: 65
End: 2020-08-20

## 2020-08-20 ENCOUNTER — OFFICE VISIT (OUTPATIENT)
Dept: PHYSICAL THERAPY | Facility: HOSPITAL | Age: 65
End: 2020-08-20
Attending: PHYSICAL MEDICINE & REHABILITATION
Payer: COMMERCIAL

## 2020-08-20 DIAGNOSIS — M48.061 SPINAL STENOSIS AT L4-L5 LEVEL: ICD-10-CM

## 2020-08-20 PROCEDURE — 97110 THERAPEUTIC EXERCISES: CPT | Performed by: PHYSICAL THERAPIST

## 2020-08-20 PROCEDURE — 97112 NEUROMUSCULAR REEDUCATION: CPT | Performed by: PHYSICAL THERAPIST

## 2020-08-20 NOTE — TELEPHONE ENCOUNTER
[employer] form signed and completed by Dr Rajendra Daniels. Patient called who requested form faxed to [phone] with confirmation received. Copy of form send to scanning.

## 2020-08-20 NOTE — PROGRESS NOTES
8/20/2020    Dx:    Spinal stenosis at L4-L5 level (M48.061)        Authorized # of Visits:  18 visits approved per insurance         Next MD visit: none   Fall Risk: standard         Precautions:    Medication Changes since last visit?: No    Subjective: Centralization of symptoms to the lumbar spine. 3.  The pt will be able to ascend/descend a flight of stairs step over step method. 4.  The pt will be in appropriate shoe wear to improve his alignment.   5.  The pt will be able to walk with a bilateral he legs.  Had a ruptured appendix around Thanksgiving and then had a CVA during surgery. Reports his weakness got worse. .  Reports he felt like he recovered from the CVA. Stairs he does one leg at a time, leading with the L LE.   Went to OP PT through [facility] [facility]. Biopsy (pre-cancerous?  -- Patient unsure if cancerous)   • TOTAL HIP REPLACEMENT Bilateral     • VALVE REPAIR   2002     mitral valve repair   • VEIN LIGATION Left 5/14/2019     Performed by Rakesh Desai MD at [address] [facility]      Are you being h degrees 90 degrees   Piriformis long long   Hip Flexor long long     Special Tests:  (+) Ramirez GALLARDO    Outcome Surverys  Date    FOTO Unable to complete secondary to Pandemic Precautions   Modified Oswestry    FABQ

## 2020-08-24 ENCOUNTER — OFFICE VISIT (OUTPATIENT)
Dept: PHYSICAL THERAPY | Facility: HOSPITAL | Age: 65
End: 2020-08-24
Attending: PHYSICAL MEDICINE & REHABILITATION
Payer: COMMERCIAL

## 2020-08-24 DIAGNOSIS — M48.061 SPINAL STENOSIS AT L4-L5 LEVEL: ICD-10-CM

## 2020-08-24 PROCEDURE — 97112 NEUROMUSCULAR REEDUCATION: CPT | Performed by: PHYSICAL THERAPIST

## 2020-08-24 PROCEDURE — 97110 THERAPEUTIC EXERCISES: CPT | Performed by: PHYSICAL THERAPIST

## 2020-08-24 NOTE — PROGRESS NOTES
8/24/2020    Dx:    Spinal stenosis at L4-L5 level (M48.061)        Authorized # of Visits:  18 visits approved per insurance         Next MD visit: none   Fall Risk: standard         Precautions:    Medication Changes since last visit?: No    Subjective: The pt will be in appropriate shoe wear to improve his alignment. 5.  The pt will be able to walk with a bilateral heel toe gait pattern without gross deviation.       Frequency / Duration: Patient will be seen for 1-2 x/week or a total of 18 visits over a like he recovered from the CVA. Stairs he does one leg at a time, leading with the L LE. Went to OP PT through [facility] for OT for his R hand. States the CVA affected the R side of his body.       Had pain in the LB pain and leg weakness prior to his CV valve repair   • VEIN LIGATION Left 5/14/2019     Performed by Bret Saavedra MD at [address] [facility]      Are you being hurt, frightened, demeaned, or taken advantage of by anyone at your home or in your life?  No  Have you recently had thoughts of hurting you complete secondary to Pandemic Precautions   Modified Chiara ADRIANQ

## 2020-08-27 ENCOUNTER — TELEPHONE (OUTPATIENT)
Dept: INTERNAL MEDICINE CLINIC | Facility: CLINIC | Age: 65
End: 2020-08-27

## 2020-08-27 ENCOUNTER — OFFICE VISIT (OUTPATIENT)
Dept: PHYSICAL THERAPY | Facility: HOSPITAL | Age: 65
End: 2020-08-27
Attending: PHYSICAL MEDICINE & REHABILITATION
Payer: COMMERCIAL

## 2020-08-27 DIAGNOSIS — M48.061 SPINAL STENOSIS AT L4-L5 LEVEL: ICD-10-CM

## 2020-08-27 PROCEDURE — 97110 THERAPEUTIC EXERCISES: CPT | Performed by: PHYSICAL THERAPIST

## 2020-08-27 PROCEDURE — 97112 NEUROMUSCULAR REEDUCATION: CPT | Performed by: PHYSICAL THERAPIST

## 2020-08-27 RX ORDER — AMLODIPINE BESYLATE 10 MG/1
10 TABLET ORAL DAILY
Qty: 90 TABLET | Refills: 0 | Status: SHIPPED | OUTPATIENT
Start: 2020-08-27 | End: 2020-11-06

## 2020-08-27 NOTE — TELEPHONE ENCOUNTER
Pt requesting NEW RX for:   Amlodipine 10MG (dosage changed )  Pt is out of medication  Pt uses [facility] as pharmacy  Tasked to [employer]

## 2020-08-27 NOTE — TELEPHONE ENCOUNTER
8/18 telephone encounter from Dr Thanh Talley:  BPs better but still high. Increase amlodipine from 5 to 10mg/day. Call with readings in 2 weeks    I called patient. He had been taking two of his 5 mg tablets and now has run out.   I checked to make sure he h

## 2020-08-27 NOTE — PROGRESS NOTES
8/27/2020  Dx:    Spinal stenosis at L4-L5 level (M48.061)        Authorized # of Visits:  18 visits approved per insurance         Next MD visit: none   Fall Risk: standard         Precautions:    Medication Changes since last visit?: No    Subjective:  H able to walk with a bilateral heel toe gait pattern without gross deviation. Frequency / Duration: Patient will be seen for 1-2 x/week or a total of 18 visits over a 90 day period. Treatment will include: Manual Therapy; Therapeutic Exercises;  Neurom Went to OP PT through [facility] for OT for his R hand. States the CVA affected the R side of his body. Had pain in the LB pain and leg weakness prior to his CVA. Reports he doesn't fall but states he has to catch himself from falling.     Reilly garcia        Are you being hurt, frightened, demeaned, or taken advantage of by anyone at your home or in your life? No  Have you recently had thoughts of hurting yourself?   No    Have you tried to hurt yourself in the past?  No      ASSESSMENT:   Mr. Skylar Rod pr

## 2020-09-01 ENCOUNTER — OFFICE VISIT (OUTPATIENT)
Dept: PHYSICAL THERAPY | Facility: HOSPITAL | Age: 65
End: 2020-09-01
Attending: PHYSICAL MEDICINE & REHABILITATION
Payer: COMMERCIAL

## 2020-09-01 PROCEDURE — 97110 THERAPEUTIC EXERCISES: CPT | Performed by: PHYSICAL THERAPIST

## 2020-09-01 NOTE — PROGRESS NOTES
8/27/2020  Dx:    Spinal stenosis at L4-L5 level (M48.061)        Authorized # of Visits:  18 visits approved per insurance         Next MD visit: none   Fall Risk: standard         Precautions:    Medication Changes since last visit?: No    Subjective:  H pattern without gross deviation. Frequency / Duration: Patient will be seen for 1-2 x/week or a total of 18 visits over a 90 day period. Treatment will include: Manual Therapy; Therapeutic Exercises; Neuromuscular Re-education;  Therapeutic Activity; his R hand. States the CVA affected the R side of his body. Had pain in the LB pain and leg weakness prior to his CVA. Reports he doesn't fall but states he has to catch himself from falling. 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   Does yard work demeaned, or taken advantage of by anyone at your home or in your life? No  Have you recently had thoughts of hurting yourself?   No    Have you tried to hurt yourself in the past?  No      ASSESSMENT:   Mr. Malia Engle presents to therapy with a complex medical

## 2020-09-03 NOTE — TELEPHONE ENCOUNTER
See TE 8/27-- patient had not been keeping a log of BP readings. Advised to start and call in 2 weeks. To follow up with patient next week.

## 2020-09-04 ENCOUNTER — LAB ENCOUNTER (OUTPATIENT)
Dept: LAB | Age: 65
End: 2020-09-04
Attending: INTERNAL MEDICINE
Payer: COMMERCIAL

## 2020-09-04 DIAGNOSIS — I48.91 A-FIB (HCC): ICD-10-CM

## 2020-09-04 LAB
INR BLD: 2.4 (ref 0.9–1.2)
INR PPP: 2.4
PROTHROMBIN TIME: 25.8 SECONDS (ref 11.8–14.5)

## 2020-09-04 PROCEDURE — 36415 COLL VENOUS BLD VENIPUNCTURE: CPT

## 2020-09-04 PROCEDURE — 85610 PROTHROMBIN TIME: CPT

## 2020-09-08 ENCOUNTER — ANTI-COAG (OUTPATIENT)
Dept: CARDIOLOGY | Age: 65
End: 2020-09-08

## 2020-09-08 DIAGNOSIS — I48.20 CHRONIC ATRIAL FIBRILLATION (CMD): ICD-10-CM

## 2020-09-10 ENCOUNTER — OFFICE VISIT (OUTPATIENT)
Dept: PHYSICAL THERAPY | Facility: HOSPITAL | Age: 65
End: 2020-09-10
Attending: PHYSICAL MEDICINE & REHABILITATION
Payer: COMMERCIAL

## 2020-09-10 PROCEDURE — 97110 THERAPEUTIC EXERCISES: CPT | Performed by: PHYSICAL THERAPIST

## 2020-09-10 NOTE — PROGRESS NOTES
9/10/2020  Dx:    Spinal stenosis at L4-L5 level (M48.061)        Authorized # of Visits:  18 visits approved per insurance         Next MD visit: none   Fall Risk: standard         Precautions:    Medication Changes since last visit?: No    Subjective:  R method. 4.  The pt will be in appropriate shoe wear to improve his alignment. 5.  The pt will be able to walk with a bilateral heel toe gait pattern without gross deviation.       Frequency / Duration: Patient will be seen for 1-2 x/week or a total of 18 felt like he recovered from the CVA. Stairs he does one leg at a time, leading with the L LE. Went to OP PT through [facility] for OT for his R hand. States the CVA affected the R side of his body.       Had pain in the LB pain and leg weakness prior to h valve repair   • VEIN LIGATION Left 5/14/2019     Performed by Lili Coles MD at [address] [facility]      Are you being hurt, frightened, demeaned, or taken advantage of by anyone at your home or in your life?  No  Have you recently had thoughts of hurting you complete secondary to Pandemic Precautions   Modified Chiara ADRIANQ

## 2020-09-11 RX ORDER — AMLODIPINE BESYLATE 10 MG/1
10 TABLET ORAL DAILY
COMMUNITY
Start: 2020-08-27

## 2020-09-11 RX ORDER — SIMVASTATIN 20 MG
20 TABLET ORAL NIGHTLY
COMMUNITY

## 2020-09-11 RX ORDER — LOSARTAN POTASSIUM AND HYDROCHLOROTHIAZIDE 12.5; 1 MG/1; MG/1
1 TABLET ORAL DAILY
COMMUNITY
Start: 2020-06-16

## 2020-09-11 RX ORDER — GABAPENTIN 300 MG/1
300 CAPSULE ORAL
COMMUNITY
Start: 2020-06-25 | End: 2020-09-16

## 2020-09-14 ENCOUNTER — TELEPHONE (OUTPATIENT)
Dept: PHYSICAL THERAPY | Facility: HOSPITAL | Age: 65
End: 2020-09-14

## 2020-09-14 ENCOUNTER — APPOINTMENT (OUTPATIENT)
Dept: PHYSICAL THERAPY | Facility: HOSPITAL | Age: 65
End: 2020-09-14
Attending: PHYSICAL MEDICINE & REHABILITATION
Payer: COMMERCIAL

## 2020-09-14 NOTE — TELEPHONE ENCOUNTER
Left message on cell phone when the patient didn't answer when the pt DNS for his therapy session. Then called home phone and spoke with patient. He stated he was unaware of this appointment.   Rescheduled for 9/15 at 8:45 am.

## 2020-09-15 ENCOUNTER — OFFICE VISIT (OUTPATIENT)
Dept: PHYSICAL THERAPY | Facility: HOSPITAL | Age: 65
End: 2020-09-15
Attending: PHYSICAL MEDICINE & REHABILITATION
Payer: COMMERCIAL

## 2020-09-15 PROCEDURE — 97112 NEUROMUSCULAR REEDUCATION: CPT | Performed by: PHYSICAL THERAPIST

## 2020-09-15 NOTE — PROGRESS NOTES
9/15/2020  Dx:    Spinal stenosis at L4-L5 level (M48.061)        Authorized # of Visits:  18 visits approved per insurance         Next MD visit: none   Fall Risk: standard         Precautions:    Medication Changes since last visit?: No    Subjective:  R include: Manual Therapy; Therapeutic Exercises; Neuromuscular Re-education; Therapeutic Activity; Patient education: Home exercise program instruction; TNE Education; Modalities as needed.     Education or treatment limitation: None  Rehab Potential:good he has to catch himself from falling. 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 Does yard work, home projects currently. He is not currently working.      Current functional limitations include limited ability to walk, perform stairs, go from sit to stand yourself in the past?  No      ASSESSMENT:   Mr. Malia Engle presents to therapy with a complex medical hx and significant LE weakness and alignment issues.   He also displays very poor core strengthen and difficulty with pressure management and breathing mechani

## 2020-09-16 ENCOUNTER — OFFICE VISIT (OUTPATIENT)
Dept: CARDIOLOGY | Age: 65
End: 2020-09-16

## 2020-09-16 VITALS
BODY MASS INDEX: 30.67 KG/M2 | HEIGHT: 74 IN | SYSTOLIC BLOOD PRESSURE: 114 MMHG | WEIGHT: 239 LBS | OXYGEN SATURATION: 97 % | DIASTOLIC BLOOD PRESSURE: 72 MMHG | HEART RATE: 60 BPM

## 2020-09-16 DIAGNOSIS — I34.0 MITRAL VALVE INSUFFICIENCY, UNSPECIFIED ETIOLOGY: ICD-10-CM

## 2020-09-16 DIAGNOSIS — I34.2 MITRAL VALVE STENOSIS, NON-RHEUMATIC: ICD-10-CM

## 2020-09-16 DIAGNOSIS — I48.20 CHRONIC ATRIAL FIBRILLATION (CMD): Primary | ICD-10-CM

## 2020-09-16 DIAGNOSIS — I63.9 CEREBROVASCULAR ACCIDENT (CVA), UNSPECIFIED MECHANISM (CMD): ICD-10-CM

## 2020-09-16 DIAGNOSIS — I87.2 VENOUS INSUFFICIENCY (CHRONIC) (PERIPHERAL): ICD-10-CM

## 2020-09-16 DIAGNOSIS — I10 BENIGN HYPERTENSION: ICD-10-CM

## 2020-09-16 DIAGNOSIS — Z98.890 HISTORY OF MITRAL VALVE REPAIR: ICD-10-CM

## 2020-09-16 DIAGNOSIS — Z79.01 LONG TERM CURRENT USE OF ANTICOAGULANT: ICD-10-CM

## 2020-09-16 PROCEDURE — 3078F DIAST BP <80 MM HG: CPT | Performed by: INTERNAL MEDICINE

## 2020-09-16 PROCEDURE — 99214 OFFICE O/P EST MOD 30 MIN: CPT | Performed by: INTERNAL MEDICINE

## 2020-09-16 PROCEDURE — 3074F SYST BP LT 130 MM HG: CPT | Performed by: INTERNAL MEDICINE

## 2020-09-16 RX ORDER — GABAPENTIN 400 MG/1
1 CAPSULE ORAL 3 TIMES DAILY
COMMUNITY
Start: 2020-06-12

## 2020-09-16 ASSESSMENT — PATIENT HEALTH QUESTIONNAIRE - PHQ9
CLINICAL INTERPRETATION OF PHQ2 SCORE: NO FURTHER SCREENING NEEDED
SUM OF ALL RESPONSES TO PHQ9 QUESTIONS 1 AND 2: 0
SUM OF ALL RESPONSES TO PHQ9 QUESTIONS 1 AND 2: 0
2. FEELING DOWN, DEPRESSED OR HOPELESS: NOT AT ALL
1. LITTLE INTEREST OR PLEASURE IN DOING THINGS: NOT AT ALL
CLINICAL INTERPRETATION OF PHQ9 SCORE: NO FURTHER SCREENING NEEDED

## 2020-09-18 NOTE — TELEPHONE ENCOUNTER
I spoke with patient. He ropped off readings to Dr. Ricky Choe on Monday, September 14. He thinks his readings are now in the 120s/70-80s.  His home blood pressure readings are:    9/1/20 138/78 in /89 in PM  9/2/20 126/87 in /76 in PM  9/3/20 127

## 2020-09-25 ENCOUNTER — OFFICE VISIT (OUTPATIENT)
Dept: PHYSICAL THERAPY | Facility: HOSPITAL | Age: 65
End: 2020-09-25
Attending: PHYSICAL MEDICINE & REHABILITATION
Payer: COMMERCIAL

## 2020-09-25 PROCEDURE — 97112 NEUROMUSCULAR REEDUCATION: CPT | Performed by: PHYSICAL THERAPIST

## 2020-09-25 NOTE — PROGRESS NOTES
9/25/2020  Dx:    Spinal stenosis at L4-L5 level (M48.061)        Authorized # of Visits:  18 visits approved per insurance         Next MD visit: none   Fall Risk: standard         Precautions:    Medication Changes since last visit?: No    Subjective:  R therapy. All questions were answered. 1.  The pt will be independent in their HEP. 2.  Centralization of symptoms to the lumbar spine. 3.  The pt will be able to ascend/descend a flight of stairs step over step method.   4.  The pt will be in appropr current condition: reports he had back pain for years. Has weakness and pain in his lower legs. Had a ruptured appendix around Thanksgiving and then had a CVA during surgery. Reports his weakness got worse. .  Reports he felt like he recovered from the C by Jen Lyons MD at [facility] MAIN OR   • MOLES Left 2011     removed from L upper quadrant. Biopsy (pre-cancerous?  -- Patient unsure if cancerous)   • TOTAL HIP REPLACEMENT Bilateral     • VALVE REPAIR   2002     mitral valve repair   • VEIN LIGATION Christina Aguirre Abduction 4- 4-    Hip Extension 4- 4-      Flexibility:      R L   Hamstrings 90 degrees 90 degrees   Piriformis long long   Hip Flexor long long     Special Tests:  (+) Ramirez GALLARDO    Outcome Surverys  Date    FOTO Unable to complete secondary to Pandemic Pr

## 2020-09-29 ENCOUNTER — TELEPHONE (OUTPATIENT)
Dept: INTERNAL MEDICINE CLINIC | Facility: CLINIC | Age: 65
End: 2020-09-29

## 2020-09-30 NOTE — TELEPHONE ENCOUNTER
Received pt's BP log from this month    119-135/68-85 in am    Mostly 110's-120's/70's in pm    [employer] message sent.

## 2020-10-01 ENCOUNTER — OFFICE VISIT (OUTPATIENT)
Dept: PHYSICAL THERAPY | Facility: HOSPITAL | Age: 65
End: 2020-10-01
Attending: PHYSICAL MEDICINE & REHABILITATION
Payer: COMMERCIAL

## 2020-10-01 PROCEDURE — 97112 NEUROMUSCULAR REEDUCATION: CPT | Performed by: PHYSICAL THERAPIST

## 2020-10-01 NOTE — PROGRESS NOTES
10/1/2020    Patient Name: Ayo Andrews  YOB: 1955          MRN number:  [id number]  Referring Physician:  Becki Overall  Summary    Pt has attended 11, cancelled 0, and no shown 1 visits in Physical Therapy.      Dx:    Osiel hip flexion    R stance with L hip flexion    PNF diagonals     Hip taps in standing without UE support quadraped thoracic rotation    Down dog (mofidied on table)  With alternation    soleus stretch in down dog position    Inchworm - modified using the ta limitation: None  Rehab Potential:good       Charges: NM3      Total Timed Treatment: 45 min  Total Treatment Time: 45min         Patient was advised of these findings, precautions, and treatment options and has agreed to actively participate in planning a

## 2020-10-05 ENCOUNTER — OFFICE VISIT (OUTPATIENT)
Dept: NEUROLOGY | Facility: CLINIC | Age: 65
End: 2020-10-05
Payer: COMMERCIAL

## 2020-10-05 ENCOUNTER — HOSPITAL ENCOUNTER (OUTPATIENT)
Dept: GENERAL RADIOLOGY | Facility: HOSPITAL | Age: 65
Discharge: HOME | End: 2020-10-05
Attending: PHYSICAL MEDICINE & REHABILITATION
Payer: COMMERCIAL

## 2020-10-05 VITALS — HEIGHT: 74 IN | BODY MASS INDEX: 30.16 KG/M2 | WEIGHT: 235 LBS

## 2020-10-05 DIAGNOSIS — M48.061 SPINAL STENOSIS AT L4-L5 LEVEL: Primary | ICD-10-CM

## 2020-10-05 DIAGNOSIS — M54.17 LUMBOSACRAL RADICULOPATHY AT L5: ICD-10-CM

## 2020-10-05 DIAGNOSIS — G60.9 IDIOPATHIC PERIPHERAL NEUROPATHY: ICD-10-CM

## 2020-10-05 DIAGNOSIS — M48.061 SPINAL STENOSIS AT L4-L5 LEVEL: ICD-10-CM

## 2020-10-05 DIAGNOSIS — M17.0 BILATERAL PRIMARY OSTEOARTHRITIS OF KNEE: ICD-10-CM

## 2020-10-05 DIAGNOSIS — I63.9 CEREBROVASCULAR ACCIDENT (CVA), UNSPECIFIED MECHANISM (HCC): ICD-10-CM

## 2020-10-05 PROCEDURE — 3008F BODY MASS INDEX DOCD: CPT | Performed by: PHYSICAL MEDICINE & REHABILITATION

## 2020-10-05 PROCEDURE — 99214 OFFICE O/P EST MOD 30 MIN: CPT | Performed by: PHYSICAL MEDICINE & REHABILITATION

## 2020-10-05 PROCEDURE — 73564 X-RAY EXAM KNEE 4 OR MORE: CPT | Performed by: PHYSICAL MEDICINE & REHABILITATION

## 2020-10-05 NOTE — PATIENT INSTRUCTIONS
-Continue PT and home exercises  -Continue Gabapentin  -Ice/Heat as tolerated for the knees and low back  -Xray of the knees on the way out today  -My office will call once injection is approved

## 2020-10-05 NOTE — PROCEDURES
[address] [facility]  FOLLOW UP EVALUATION    Chief Complaint: back pain. HISTORY OF PRESENT ILLNESS:   Patient presents with:  Low Back Pain: LOV: 7/2/20. F/U on leg pain.  Patient states that he did PT and feels s spine and some pain in the calves bilaterally. This pain is improved since patient started gabapentin medication. He notices pain in the right calf worse than the left as well as weakness specifically when going up and down the stairs.   He denies any fal CURRENT MEDICATIONS:     Current Outpatient Medications   Medication Sig Dispense Refill   • amLODIPine Besylate 10 MG Oral Tab Take 1 tablet (10 mg total) by mouth daily.  90 tablet 0   • tramadol-acetaminophen (ULTRACET) 37.5-325 MG Oral Tab Take Genitourinary  Difficulty Urinating: denies  Bladder Incontinence: denies   Musculoskeletal  Joint Stiffness: admits  Painful Joints: admits   Neurological  Loss of Strength Since last Visit: denies  Tingling/Numbness: denies       PHYSICAL EXAM:   Ht 74 symptoms  Slump test: negative for pain symptoms for radicular pain symptoms      LABS:     Lab Results   Component Value Date    A1C 5.6 04/16/2018     Lab Results   Component Value Date    WBC 5.2 03/27/2020    RBC 4.77 03/27/2020    HGB 14.3 03/27/2020 exercises for the low back. His knee pain is secondary to osteoarthritis and he has valgus deformity in both knees. I recommended new x-ray imaging for both knees as he has not had any recent imaging.   We discussed hyaluronic acid injections and he has a

## 2020-10-08 ENCOUNTER — LAB ENCOUNTER (OUTPATIENT)
Dept: LAB | Facility: HOSPITAL | Age: 65
End: 2020-10-08
Attending: INTERNAL MEDICINE
Payer: COMMERCIAL

## 2020-10-08 ENCOUNTER — ANTI-COAG (OUTPATIENT)
Dept: CARDIOLOGY | Age: 65
End: 2020-10-08

## 2020-10-08 ENCOUNTER — OFFICE VISIT (OUTPATIENT)
Dept: PHYSICAL THERAPY | Facility: HOSPITAL | Age: 65
End: 2020-10-08
Attending: PHYSICAL MEDICINE & REHABILITATION
Payer: COMMERCIAL

## 2020-10-08 DIAGNOSIS — I48.20 CHRONIC ATRIAL FIBRILLATION (CMD): ICD-10-CM

## 2020-10-08 DIAGNOSIS — I48.91 A-FIB (HCC): ICD-10-CM

## 2020-10-08 LAB — INR PPP: 2.32

## 2020-10-08 PROCEDURE — 85610 PROTHROMBIN TIME: CPT

## 2020-10-08 PROCEDURE — 97112 NEUROMUSCULAR REEDUCATION: CPT | Performed by: PHYSICAL THERAPIST

## 2020-10-08 PROCEDURE — 36415 COLL VENOUS BLD VENIPUNCTURE: CPT

## 2020-10-08 NOTE — PROGRESS NOTES
10/8/2020    Patient Name: Keaton Moreno  YOB: 1955          MRN number:  [id number]  Referring Physician:  Francisco Orellana    Dx:     Spinal stenosis at L4-L5 level (M48.061)        Authorized # of Visits:  18 visits approved per [place] alternation    soleus stretch in down dog position    Inchworm - modified using the table    Standing R glut max with Red Tband     Anterior step down    Lateral step down    Review of current HEP    Gastroc/soleus stretching in standing    Gastroc/soleus for your referral. Please co-sign or sign and return this letter via fax as soon as possible to [phone]. If you have any questions, please contact me at Dept: [phone].     Sincerely,  Anusha Tabares    Electronically signed by therapist: Anusha Tabares

## 2020-10-27 ENCOUNTER — TELEPHONE (OUTPATIENT)
Dept: PHYSICAL THERAPY | Facility: HOSPITAL | Age: 65
End: 2020-10-27

## 2020-10-29 ENCOUNTER — APPOINTMENT (OUTPATIENT)
Dept: PHYSICAL THERAPY | Facility: HOSPITAL | Age: 65
End: 2020-10-29
Attending: PHYSICAL MEDICINE & REHABILITATION
Payer: COMMERCIAL

## 2020-10-29 ENCOUNTER — TELEPHONE (OUTPATIENT)
Dept: NEUROLOGY | Facility: CLINIC | Age: 65
End: 2020-10-29

## 2020-10-29 NOTE — TELEPHONE ENCOUNTER
Bilateral knee aspiration and injection with Hyaluronic Acid (gel one preferred vs synvisc one) CPT Code: 82065, 88415  or - DENIED  [address], spoke to Roly Rodriguez who states no authorization is required for   CPT CODE: 90822, 09834 janie

## 2020-10-29 NOTE — TELEPHONE ENCOUNTER
Patients wife Randee Clemensly informed of the denial verbalizing understanding and will contact insurance carrier. -verified in fyi

## 2020-10-30 ENCOUNTER — TELEPHONE (OUTPATIENT)
Dept: NEUROLOGY | Facility: CLINIC | Age: 65
End: 2020-10-30

## 2020-11-02 ENCOUNTER — TELEPHONE (OUTPATIENT)
Dept: PHYSICAL THERAPY | Facility: HOSPITAL | Age: 65
End: 2020-11-02

## 2020-11-02 NOTE — TELEPHONE ENCOUNTER
Patient states he was on disability bc he had a stroke almost a year ago. His insurance wants him to file for social security disability. Patient told insurance company he cannot work bc of residual symptoms from the stroke and neuropathy.  Patient states h

## 2020-11-03 ENCOUNTER — TELEPHONE (OUTPATIENT)
Dept: PHYSICAL THERAPY | Age: 65
End: 2020-11-03

## 2020-11-05 ENCOUNTER — TELEPHONE (OUTPATIENT)
Dept: NEUROLOGY | Facility: CLINIC | Age: 65
End: 2020-11-05

## 2020-11-05 ENCOUNTER — APPOINTMENT (OUTPATIENT)
Dept: PHYSICAL THERAPY | Facility: HOSPITAL | Age: 65
End: 2020-11-05
Attending: PHYSICAL MEDICINE & REHABILITATION
Payer: COMMERCIAL

## 2020-11-05 DIAGNOSIS — G60.9 IDIOPATHIC PERIPHERAL NEUROPATHY: ICD-10-CM

## 2020-11-05 RX ORDER — GABAPENTIN 400 MG/1
800 CAPSULE ORAL 3 TIMES DAILY
Qty: 540 CAPSULE | Refills: 3 | Status: SHIPPED | OUTPATIENT
Start: 2020-11-05 | End: 2021-07-02

## 2020-11-05 NOTE — TELEPHONE ENCOUNTER
S/w [place] who needs a refill on Gabapentin. He was previously given Rx by Dr. Allen Amanda but is requesting to have it refilled by Dr. Karen Hahn. His last refill was [facility] by [facility] since the wrong strength was sent. Patient taking 800mg TID.

## 2020-11-06 RX ORDER — AMLODIPINE BESYLATE 10 MG/1
10 TABLET ORAL DAILY
Qty: 90 TABLET | Refills: 1 | Status: SHIPPED | OUTPATIENT
Start: 2020-11-06 | End: 2021-04-07

## 2020-11-06 RX ORDER — LOSARTAN POTASSIUM AND HYDROCHLOROTHIAZIDE 12.5; 1 MG/1; MG/1
1 TABLET ORAL DAILY
Qty: 90 TABLET | Refills: 1 | Status: SHIPPED | OUTPATIENT
Start: 2020-11-06 | End: 2020-12-20

## 2020-11-06 NOTE — TELEPHONE ENCOUNTER
Pt will be due in March for physical.     Refill request is for a maintenance medication and has met the criteria specified in the Ambulatory Medication Refill Standing Order for eligibility, visits, laboratory, alerts and was sent to the requested pharmac

## 2020-11-11 ENCOUNTER — LAB ENCOUNTER (OUTPATIENT)
Dept: LAB | Facility: HOSPITAL | Age: 65
End: 2020-11-11
Attending: INTERNAL MEDICINE
Payer: COMMERCIAL

## 2020-11-11 ENCOUNTER — TELEPHONE (OUTPATIENT)
Dept: CARDIOLOGY | Age: 65
End: 2020-11-11

## 2020-11-11 DIAGNOSIS — Z79.01 ENCOUNTER FOR MONITORING WARFARIN THERAPY: ICD-10-CM

## 2020-11-11 DIAGNOSIS — Z51.81 ENCOUNTER FOR MONITORING WARFARIN THERAPY: ICD-10-CM

## 2020-11-11 LAB — INR PPP: 3

## 2020-11-11 PROCEDURE — 85610 PROTHROMBIN TIME: CPT

## 2020-11-11 PROCEDURE — 36415 COLL VENOUS BLD VENIPUNCTURE: CPT

## 2020-11-12 ENCOUNTER — OFFICE VISIT (OUTPATIENT)
Dept: NEUROLOGY | Facility: CLINIC | Age: 65
End: 2020-11-12
Payer: COMMERCIAL

## 2020-11-12 ENCOUNTER — TELEPHONE (OUTPATIENT)
Dept: NEUROLOGY | Facility: CLINIC | Age: 65
End: 2020-11-12

## 2020-11-12 ENCOUNTER — ANTI-COAG (OUTPATIENT)
Dept: CARDIOLOGY | Age: 65
End: 2020-11-12

## 2020-11-12 ENCOUNTER — APPOINTMENT (OUTPATIENT)
Dept: PHYSICAL THERAPY | Facility: HOSPITAL | Age: 65
End: 2020-11-12
Attending: PHYSICAL MEDICINE & REHABILITATION
Payer: COMMERCIAL

## 2020-11-12 DIAGNOSIS — M54.17 LUMBOSACRAL RADICULOPATHY AT L5: ICD-10-CM

## 2020-11-12 DIAGNOSIS — M17.0 BILATERAL PRIMARY OSTEOARTHRITIS OF KNEE: Primary | ICD-10-CM

## 2020-11-12 DIAGNOSIS — M48.061 SPINAL STENOSIS AT L4-L5 LEVEL: ICD-10-CM

## 2020-11-12 DIAGNOSIS — G60.9 IDIOPATHIC PERIPHERAL NEUROPATHY: ICD-10-CM

## 2020-11-12 DIAGNOSIS — I48.20 CHRONIC ATRIAL FIBRILLATION (CMD): ICD-10-CM

## 2020-11-12 DIAGNOSIS — I63.9 CEREBROVASCULAR ACCIDENT (CVA), UNSPECIFIED MECHANISM (HCC): ICD-10-CM

## 2020-11-12 PROCEDURE — 99214 OFFICE O/P EST MOD 30 MIN: CPT | Performed by: PHYSICAL MEDICINE & REHABILITATION

## 2020-11-12 PROCEDURE — 99072 ADDL SUPL MATRL&STAF TM PHE: CPT | Performed by: PHYSICAL MEDICINE & REHABILITATION

## 2020-11-12 NOTE — TELEPHONE ENCOUNTER
Received an incoming call from Dr. Ezekiel Estevez, requesting to verify coverage for Bilateral knee aspiration and injection with Hyaluronic Acid (gel one preferred vs synvisc one) for the members plan.    Called [employer] [facility], spoke to representative who states t

## 2020-11-12 NOTE — TELEPHONE ENCOUNTER
Called [facility], [facility] who verified coverage and states Bilateral knee aspiration and injection with Hyaluronic Acid (gel one preferred vs synvisc one)   CPT Code: 11523, 22 [id number] or  is not a cover benefit in the members plan.    Addit

## 2020-11-12 NOTE — PROGRESS NOTES
[address]  FOLLOW UP EVALUATION    Chief Complaint: back pain. HISTORY OF PRESENT ILLNESS:   Patient presents with:  Knee Pain: pt here for follow up on plan after hyaluronic acid injections were denied. 4 out of 10 diffusely located anteriorly, worse with increased activity, improved with rest.  He has noted some swelling and has noted worsening valgus deformity in both knees over the last several years.   He has not had any treatment or therapy or injecti greater saphenous vein ablation 2007         PAST SURGICAL HISTORY:     Past Surgical History:   Procedure Laterality Date   • APPENDECTOMY  12/04/2019   • COLONOSCOPY  10/11   • COLONOSCOPY N/A 11/16/2016    Performed by Bianca Bustillos MD at [facility]      FAMILY HISTORY:     Family History   Problem Relation Age of Onset   • Colon Cancer Father    • Arthritis Other    • Hypertension Other           SOCIAL HISTORY:   Social History    Tobacco Use      Smoking status: Never Smoker      Smokeless tob lower extremities  Reflexes: 2/4 at L4 and S1  Isaac Test: Positive for pain over patella      LUMBAR SPINE:  Inspection: no erythema, swelling, or obvious deformity.   Their iliac crest and shoulder heights are symmetrical.     Palpation: Non tender to pal March 15, 2020 revealed:   CONCLUSION:   Disc disease, alignment changes, and osteoarthritis within the lumbar spine as above.   Changes are most advanced at L4-5 where there is severe central canal narrowing and moderate bilateral neural foraminal narrowin

## 2020-11-13 ENCOUNTER — TELEPHONE (OUTPATIENT)
Dept: NEUROLOGY | Facility: CLINIC | Age: 65
End: 2020-11-13

## 2020-11-13 NOTE — TELEPHONE ENCOUNTER
S/W Dr. Pilar Magana regarding disability paper work. Dr Pilar Magana would like him to see Dr. Breanne Cabral because his disability is from his stroke. Disability forms in Dr. Pilar Magana nurse sue.

## 2020-11-13 NOTE — TELEPHONE ENCOUNTER
Spoke to patient and he has been scheduled for Bilateral knee aspiration and injection with corticosteroidtient for 11/18/2020 at 10 am

## 2020-11-13 NOTE — TELEPHONE ENCOUNTER
Called [facility] BS for authorization of approval of Bilateral knee aspiration and injection with corticosteroid cpt codes 15265, 58961, . Talked to Arelis Chen. who states authorization is not required.  Reference #  [id number]            Will inform Nursing

## 2020-11-18 ENCOUNTER — OFFICE VISIT (OUTPATIENT)
Dept: NEUROLOGY | Facility: CLINIC | Age: 65
End: 2020-11-18
Payer: COMMERCIAL

## 2020-11-18 DIAGNOSIS — M17.0 BILATERAL PRIMARY OSTEOARTHRITIS OF KNEE: Primary | ICD-10-CM

## 2020-11-18 PROCEDURE — 20610 DRAIN/INJ JOINT/BURSA W/O US: CPT | Performed by: PHYSICAL MEDICINE & REHABILITATION

## 2020-11-18 RX ORDER — LIDOCAINE HYDROCHLORIDE 10 MG/ML
3 INJECTION, SOLUTION INFILTRATION; PERINEURAL ONCE
Status: COMPLETED | OUTPATIENT
Start: 2020-11-18 | End: 2020-11-18

## 2020-11-18 RX ORDER — TRIAMCINOLONE ACETONIDE 40 MG/ML
40 INJECTION, SUSPENSION INTRA-ARTICULAR; INTRAMUSCULAR ONCE
Status: COMPLETED | OUTPATIENT
Start: 2020-11-18 | End: 2020-11-18

## 2020-11-18 RX ORDER — LIDOCAINE HYDROCHLORIDE 10 MG/ML
4 INJECTION, SOLUTION INFILTRATION; PERINEURAL ONCE
Status: COMPLETED | OUTPATIENT
Start: 2020-11-18 | End: 2020-11-18

## 2020-11-18 RX ADMIN — LIDOCAINE HYDROCHLORIDE 3 ML: 10 INJECTION, SOLUTION INFILTRATION; PERINEURAL at 10:44:00

## 2020-11-18 RX ADMIN — LIDOCAINE HYDROCHLORIDE 4 ML: 10 INJECTION, SOLUTION INFILTRATION; PERINEURAL at 10:45:00

## 2020-11-18 RX ADMIN — LIDOCAINE HYDROCHLORIDE 3 ML: 10 INJECTION, SOLUTION INFILTRATION; PERINEURAL at 10:43:00

## 2020-11-18 RX ADMIN — TRIAMCINOLONE ACETONIDE 40 MG: 40 INJECTION, SUSPENSION INTRA-ARTICULAR; INTRAMUSCULAR at 10:46:00

## 2020-11-18 RX ADMIN — LIDOCAINE HYDROCHLORIDE 4 ML: 10 INJECTION, SOLUTION INFILTRATION; PERINEURAL at 10:44:00

## 2020-11-18 NOTE — PROCEDURES
Procedure:  Bilateral knee aspiration and injection with corticosteroid  The risks, benefits and anticipated outcomes of the procedure, the risks and benefits of the alternatives to the procedure, and the roles and tasks of the personnel to be involved, we

## 2020-11-19 ENCOUNTER — TELEPHONE (OUTPATIENT)
Dept: INTERNAL MEDICINE CLINIC | Facility: CLINIC | Age: 65
End: 2020-11-19

## 2020-11-19 ENCOUNTER — APPOINTMENT (OUTPATIENT)
Dept: PHYSICAL THERAPY | Facility: HOSPITAL | Age: 65
End: 2020-11-19
Attending: PHYSICAL MEDICINE & REHABILITATION
Payer: COMMERCIAL

## 2020-11-19 NOTE — TELEPHONE ENCOUNTER
To Dr. Solomon Street - see pt message below. 1) intermittent LRQ pain, sometimes radiating to back - 3-4/10 - \"uncomfortable\". BMs normal, eating /drinking fine. Denies fever/chills.   2) pt had 2 cortisone shots yesterday - pt also in PT for pain/strength l

## 2020-11-19 NOTE — TELEPHONE ENCOUNTER
Please call pt  Has pain in abdomen, started a month ago, went away, now it has returned  Pt has no appendix  No fever, no symptoms    Pt would also like to discuss results from other appointments he has had    Dr Tang Saavedra has not appointments this week or

## 2020-11-20 ENCOUNTER — HOSPITAL ENCOUNTER (OUTPATIENT)
Age: 65
Discharge: HOME | End: 2020-11-20
Attending: EMERGENCY MEDICINE
Payer: COMMERCIAL

## 2020-11-20 VITALS
OXYGEN SATURATION: 98 % | RESPIRATION RATE: 16 BRPM | HEIGHT: 74 IN | BODY MASS INDEX: 30.16 KG/M2 | DIASTOLIC BLOOD PRESSURE: 81 MMHG | HEART RATE: 65 BPM | SYSTOLIC BLOOD PRESSURE: 130 MMHG | TEMPERATURE: 98 F | WEIGHT: 235 LBS

## 2020-11-20 DIAGNOSIS — R10.9 ABDOMINAL PAIN, ACUTE: Primary | ICD-10-CM

## 2020-11-20 PROCEDURE — 81002 URINALYSIS NONAUTO W/O SCOPE: CPT | Performed by: EMERGENCY MEDICINE

## 2020-11-20 PROCEDURE — 99213 OFFICE O/P EST LOW 20 MIN: CPT | Performed by: EMERGENCY MEDICINE

## 2020-11-20 NOTE — TELEPHONE ENCOUNTER
Pt returned call. He states urine testing at  was normal. He received no other recommendations from . He rates lower right abd pain 2 out of 10. He states he has had the pain intermittently for the last few weeks. BM normal. No fever/chills.  Pt asking

## 2020-11-20 NOTE — TELEPHONE ENCOUNTER
Pt called; pt states pain is 4 inches to right of umbilicus; sxs x 5 weeks; UA neg hematuria; appetite normal; normal BMs; no fever    needs office exam; he will call on Mon for appt; pt called; he verbalized understanding

## 2020-11-20 NOTE — ED PROVIDER NOTES
Patient Seen in: [facility]      History   Patient presents with:  Abdominal Pain    Stated Complaint: ABDOMINAL PAIN    HPI  Reports intermittent abdominal pain for the last month.   It comes couple times a day but then may not come for sever • TOTAL HIP REPLACEMENT Bilateral    • VALVE REPAIR  2002    mitral valve repair   • VEIN LIGATION Left 5/14/2019    Performed by Elisa Hernandez MD at [address] [place] [address] history reviewed with patient/caregiver and is not pertinent to present sign.   Musculoskeletal: Normal range of motion. Lymphadenopathy:      Cervical: No cervical adenopathy. Skin:     General: Skin is warm and dry. Coloration: Skin is not pale.    Neurological:      Mental Status: He is alert and oriented to person,

## 2020-11-20 NOTE — TELEPHONE ENCOUNTER
I actually have 2 openings on Tuesday, but I do not think he should wait until then to be seen for the abdominal pain -- either another doc or urgent care.     The other issues seem non-urgent and can probably wait or can probably be phone visit

## 2020-11-20 NOTE — TELEPHONE ENCOUNTER
Left message to call back to see what was recommended in urgent care today. To Dr. Contreras Kiran, please advise.

## 2020-11-20 NOTE — ED INITIAL ASSESSMENT (HPI)
Right upper abdominal pain for 4 weeks. Pain is worse in the morning. Pain radiates to back. No N/V/D. No constipation.  No fever

## 2020-11-20 NOTE — TELEPHONE ENCOUNTER
Spoke with patient and relayed Dr. Juancarlos Red' message. No openings with another physician tomorrow so patient advised to go to UC/immediate care tomorrow or go to ER tonight/tomorrow with new or worsening symptoms.  Patient verbalized an understanding to all

## 2020-11-20 NOTE — TELEPHONE ENCOUNTER
Please call pt, he did go to urgent care  Had exam and took urine test  Pt wondering what addt'l steps he should take?   Pt abdominal pain comes and goes, has been going on for awhile, he is concerned   Tasked to nursing

## 2020-11-23 ENCOUNTER — TELEPHONE (OUTPATIENT)
Dept: PHYSICAL THERAPY | Facility: HOSPITAL | Age: 65
End: 2020-11-23

## 2020-11-23 NOTE — TELEPHONE ENCOUNTER
I spoke with Siena Gillespie. He states symptoms are the same as when he spoke with Dr Tiffany Cai on Friday. I advised office visit per Dr Antonio Hernández recommendation.   Appt scheduled for tomorrow at 6 pm with Dr Nettie Thorne

## 2020-11-23 NOTE — TELEPHONE ENCOUNTER
Patient is calling after going to [facility]. Patient is wondering what his next steps are after completing the urine test that was given to her.

## 2020-11-24 ENCOUNTER — APPOINTMENT (OUTPATIENT)
Dept: PHYSICAL THERAPY | Facility: HOSPITAL | Age: 65
End: 2020-11-24
Attending: PHYSICAL MEDICINE & REHABILITATION
Payer: COMMERCIAL

## 2020-11-24 ENCOUNTER — OFFICE VISIT (OUTPATIENT)
Dept: INTERNAL MEDICINE CLINIC | Facility: CLINIC | Age: 65
End: 2020-11-24
Payer: COMMERCIAL

## 2020-11-24 VITALS
TEMPERATURE: 98 F | OXYGEN SATURATION: 98 % | HEIGHT: 74 IN | BODY MASS INDEX: 30.54 KG/M2 | HEART RATE: 70 BPM | SYSTOLIC BLOOD PRESSURE: 128 MMHG | DIASTOLIC BLOOD PRESSURE: 76 MMHG | WEIGHT: 238 LBS

## 2020-11-24 DIAGNOSIS — R10.11 RUQ ABDOMINAL PAIN: Primary | ICD-10-CM

## 2020-11-24 PROCEDURE — 3008F BODY MASS INDEX DOCD: CPT | Performed by: INTERNAL MEDICINE

## 2020-11-24 PROCEDURE — 99072 ADDL SUPL MATRL&STAF TM PHE: CPT | Performed by: INTERNAL MEDICINE

## 2020-11-24 PROCEDURE — 3078F DIAST BP <80 MM HG: CPT | Performed by: INTERNAL MEDICINE

## 2020-11-24 PROCEDURE — 3074F SYST BP LT 130 MM HG: CPT | Performed by: INTERNAL MEDICINE

## 2020-11-24 PROCEDURE — 99213 OFFICE O/P EST LOW 20 MIN: CPT | Performed by: INTERNAL MEDICINE

## 2020-11-25 NOTE — PROGRESS NOTES
Bakari Cohen is a 76year old male. Patient presents with:  Abdominal Pain: pt here for abdominal pain x 5 weeks     HPI:     Was at [facility] on 11/20/20 for 4 week hx of intermittent RUQ abdominal pain. Pt declined going to ED for further evaluation.   Hx of a (2004); CV (May 2009)   • High blood pressure    • High cholesterol    • History of vein stripping    • Osteoarthrosis, unspecified whether generalized or localized, unspecified site    • Sleep apnea    • UGI bleed    • Unspecified essential hypertension

## 2020-12-08 ENCOUNTER — OFFICE VISIT (OUTPATIENT)
Dept: PHYSICAL THERAPY | Facility: HOSPITAL | Age: 65
End: 2020-12-08
Attending: PHYSICAL MEDICINE & REHABILITATION
Payer: COMMERCIAL

## 2020-12-08 PROCEDURE — 97110 THERAPEUTIC EXERCISES: CPT | Performed by: PHYSICAL THERAPIST

## 2020-12-08 NOTE — PROGRESS NOTES
12/8/2020    Patient Name: Jessica Hardwick  YOB: 1955          MRN number:  [id number]  Referring Physician:  Valentín Oliva    Dx:     Spinal stenosis at L4-L5 level (M48.061)        Authorized # of Visits:  18 visits approved per insurance 4-/4               9/15/2020  Visit #9 9/25/2020  Visit #10 10/1/2020  Visit #11 12/8/2020  Visit #12   Manual Therapy       Therapeutic Exercise       Therapeutic Activity       Neuromuscular Education Standing R glut max exercise with red Tband    L saul flight of stairs step over step method. PROGRESSING 10/1/2020  4. The pt will be in appropriate shoe wear to improve his alignment. MET 10/1/2020  5. The pt will be able to walk with a bilateral heel toe gait pattern without gross deviation.   PROGRESSI

## 2020-12-11 ENCOUNTER — LAB ENCOUNTER (OUTPATIENT)
Dept: LAB | Facility: HOSPITAL | Age: 65
End: 2020-12-11
Attending: INTERNAL MEDICINE
Payer: COMMERCIAL

## 2020-12-11 ENCOUNTER — LAB ENCOUNTER (OUTPATIENT)
Dept: LAB | Facility: REFERENCE LAB | Age: 65
End: 2020-12-11
Attending: INTERNAL MEDICINE
Payer: COMMERCIAL

## 2020-12-11 DIAGNOSIS — Z79.01 ENCOUNTER FOR MONITORING WARFARIN THERAPY: ICD-10-CM

## 2020-12-11 DIAGNOSIS — Z51.81 ENCOUNTER FOR MONITORING WARFARIN THERAPY: ICD-10-CM

## 2020-12-11 DIAGNOSIS — R10.11 RUQ ABDOMINAL PAIN: ICD-10-CM

## 2020-12-11 DIAGNOSIS — R97.20 ELEVATED PSA: ICD-10-CM

## 2020-12-11 LAB — INR PPP: 2.39

## 2020-12-11 PROCEDURE — 84153 ASSAY OF PSA TOTAL: CPT

## 2020-12-11 PROCEDURE — 85610 PROTHROMBIN TIME: CPT

## 2020-12-11 PROCEDURE — 36415 COLL VENOUS BLD VENIPUNCTURE: CPT

## 2020-12-11 PROCEDURE — 80053 COMPREHEN METABOLIC PANEL: CPT

## 2020-12-14 ENCOUNTER — TELEPHONE (OUTPATIENT)
Dept: NEUROLOGY | Facility: CLINIC | Age: 65
End: 2020-12-14

## 2020-12-14 ENCOUNTER — OFFICE VISIT (OUTPATIENT)
Dept: PHYSICAL THERAPY | Facility: HOSPITAL | Age: 65
End: 2020-12-14
Attending: PHYSICAL MEDICINE & REHABILITATION
Payer: COMMERCIAL

## 2020-12-14 PROCEDURE — 97112 NEUROMUSCULAR REEDUCATION: CPT | Performed by: PHYSICAL THERAPIST

## 2020-12-14 NOTE — PROGRESS NOTES
12/14/2020    Patient Name: Yocasta Whittaker  YOB: 1955          MRN number:  [id number]  Referring Physician:  Elicia Severance    Dx:     Spinal stenosis at L4-L5 level (M48.061)        Authorized # of Visits:  18 visits approved per insurance squats to 45 degrees with holding    Alternating tap backs to improve gait pattern       TNE Education      HEP            Assessment: No adverse effects to treatment. The pt displays an improved gait pattern with less valgus moment at the knees.   Improve these services furnished under this plan of treatment and while under my care.     X___________________________________________________ Date____________________    Certification From: 67/3/7193  To:12/31/2020

## 2020-12-15 NOTE — TELEPHONE ENCOUNTER
Dr. Corry Freed is not filling out disability paperwork for this patient. Patient needs to see Dr. Payal Reveles for disability paperwork.

## 2020-12-17 ENCOUNTER — TELEMEDICINE (OUTPATIENT)
Dept: NEUROLOGY | Facility: CLINIC | Age: 65
End: 2020-12-17
Payer: COMMERCIAL

## 2020-12-17 ENCOUNTER — TELEPHONE (OUTPATIENT)
Dept: NEUROLOGY | Facility: CLINIC | Age: 65
End: 2020-12-17

## 2020-12-17 ENCOUNTER — APPOINTMENT (OUTPATIENT)
Dept: PHYSICAL THERAPY | Facility: HOSPITAL | Age: 65
End: 2020-12-17
Attending: PHYSICAL MEDICINE & REHABILITATION
Payer: COMMERCIAL

## 2020-12-17 DIAGNOSIS — M48.061 SPINAL STENOSIS AT L4-L5 LEVEL: ICD-10-CM

## 2020-12-17 DIAGNOSIS — M54.17 LUMBOSACRAL RADICULOPATHY AT L5: ICD-10-CM

## 2020-12-17 DIAGNOSIS — M17.0 BILATERAL PRIMARY OSTEOARTHRITIS OF KNEE: Primary | ICD-10-CM

## 2020-12-17 PROCEDURE — 99214 OFFICE O/P EST MOD 30 MIN: CPT | Performed by: PHYSICAL MEDICINE & REHABILITATION

## 2020-12-17 NOTE — PROGRESS NOTES
[address]    Telemedicine Visit - Follow Up Evaluation    Telehealth outside of [address] Verbal Consent   I conducted a telehealth visit with Ernesto Salcido today, 12/17/20, which was completed u improvement in the sharp pain and feels overall 25% improvement in his pain and function. He is still having a difficult time going up and down the stairs or with any increased activity. His pain is still along the joint line in both knees.   He denies an attending physical therapy and doing home exercises. Overall he has noted great improvement in his low back symptoms however occasionally feels pain in the calves bilaterally specifically when going down the stairs as well as when going up the stairs.   [place] He has not had any physical therapy or other treatment. His pain is rated a 7 out of 10 in the right calf.       PAST MEDICAL HISTORY:     Past Medical History:   Diagnosis Date   • Acute, but ill-defined, cerebrovascular disease 12/2019   • Arrhythmia Tab TAKE 1 TABLET DAILY AS NEEDED FOR ERECTILE DYSFUNCTION 30 tablet 3   • WARFARIN SODIUM 2.5 MG Oral Tab TAKE 1 TABLET DAILY AS DIRECTED 90 tablet 3   • CELECOXIB 200 MG Oral Cap TAKE 1 CAPSULE DAILY AS NEEDED 90 capsule 3   • simvastatin 20 MG Oral Tab affect appropriate    Musculoskeletal Exam  Exam is unchanged from [address] [place] [address] on 11/12/20  Able to stand from a sitting position without any significant discomfort, at least 3 out of 5 strength in the bilateral lower extremities        LABS:     Lab Results   Com Isael is a pleasant 53-year-old gentleman who is following up for bilateral knee pain. He denies any new injury or trauma. He recently had bilateral knee aspiration injection with corticosteroid however he did not notice any significant improvement.   Sarah Marsh There are no barriers to learning. All questions were answered. Mike TYLER. [facility]  Physical Medicine and Rehabilitation/Sports Medicine

## 2020-12-17 NOTE — TELEPHONE ENCOUNTER
Called Sarbjit for authorization of approval of Bilateral knee aspiration and injection Gel One  cpt codes 85891,. Talked to Bailee V. Authorization is not required. Pre certification is required. Insurance will term on 01/01/21.  Reference # [id number]

## 2020-12-20 RX ORDER — LOSARTAN POTASSIUM AND HYDROCHLOROTHIAZIDE 12.5; 1 MG/1; MG/1
TABLET ORAL
Qty: 90 TABLET | Refills: 3 | Status: SHIPPED | OUTPATIENT
Start: 2020-12-20 | End: 2021-06-17

## 2020-12-21 ENCOUNTER — HOSPITAL ENCOUNTER (OUTPATIENT)
Dept: ULTRASOUND IMAGING | Age: 65
Discharge: HOME | End: 2020-12-21
Attending: INTERNAL MEDICINE
Payer: COMMERCIAL

## 2020-12-21 DIAGNOSIS — R10.11 RUQ ABDOMINAL PAIN: ICD-10-CM

## 2020-12-21 PROCEDURE — 76705 ECHO EXAM OF ABDOMEN: CPT | Performed by: INTERNAL MEDICINE

## 2020-12-22 RX ORDER — AMLODIPINE BESYLATE 10 MG/1
TABLET ORAL
Qty: 30 TABLET | Refills: 1 | OUTPATIENT
Start: 2020-12-22

## 2020-12-22 NOTE — TELEPHONE ENCOUNTER
Called patient to clarify refill request for a 2 month supply of amlodipine that we received from [facility]. Pt declined need for refill through [facility]--he states he prefers refills for this via [facility].  Rx was sent in 11/2020 for this medication, pt decline

## 2020-12-23 ENCOUNTER — TELEPHONE (OUTPATIENT)
Dept: INTERNAL MEDICINE CLINIC | Facility: CLINIC | Age: 65
End: 2020-12-23

## 2020-12-23 ENCOUNTER — ANTI-COAG (OUTPATIENT)
Dept: CARDIOLOGY | Age: 65
End: 2020-12-23

## 2020-12-23 DIAGNOSIS — I48.20 CHRONIC ATRIAL FIBRILLATION (CMD): ICD-10-CM

## 2020-12-23 DIAGNOSIS — R10.11 RUQ PAIN: Primary | ICD-10-CM

## 2020-12-24 NOTE — TELEPHONE ENCOUNTER
Called pt re test results. PSA gradually trending up --   1.3>2.05>1.72>3.29>3.10>4.63  Refer to HOLLEY Gómez. The RUQ pain continues to occur daily. RUQ u/s is normal.  The pain sometimes radiates to his back.   Still not associated with eating

## 2021-01-03 RX ORDER — SIMVASTATIN 20 MG
TABLET ORAL
Qty: 90 TABLET | Refills: 0 | Status: SHIPPED | OUTPATIENT
Start: 2021-01-03 | End: 2021-04-03

## 2021-01-06 ENCOUNTER — TELEPHONE (OUTPATIENT)
Dept: PHYSICAL THERAPY | Facility: HOSPITAL | Age: 66
End: 2021-01-06

## 2021-01-08 ENCOUNTER — OFFICE VISIT (OUTPATIENT)
Dept: PHYSICAL THERAPY | Facility: HOSPITAL | Age: 66
End: 2021-01-08
Attending: PHYSICAL MEDICINE & REHABILITATION
Payer: COMMERCIAL

## 2021-01-08 PROCEDURE — 97110 THERAPEUTIC EXERCISES: CPT | Performed by: PHYSICAL THERAPIST

## 2021-01-08 NOTE — PROGRESS NOTES
1/8/2021    Patient Name: Cassandra Knight  YOB: 1955          MRN number:  [id number]  Referring Physician:  Ricard Hodgkins    Dx:     Spinal stenosis at L4-L5 level (M48.061)        Authorized # of Visits:  18 visits approved per insurance [place]    3 way hip in standing   Therapeutic Activity       Neuromuscular Education Anterior step down    Lateral step down    Review of current HEP    Gastroc/soleus stretching in standing    Gastroc/soleus stretching on stairs   anterior step ups 8 inches s education: Home exercise program instruction; TNE Education; Modalities as needed.     Education or treatment limitation: None  Rehab Potential:good       Charges: TE3      Total Timed Treatment: 45 min  Total Treatment Time: 45min         Patient was advis

## 2021-01-12 ENCOUNTER — HOSPITAL ENCOUNTER (OUTPATIENT)
Dept: NUCLEAR MEDICINE | Facility: HOSPITAL | Age: 66
Discharge: HOME | End: 2021-01-12
Attending: INTERNAL MEDICINE
Payer: COMMERCIAL

## 2021-01-12 ENCOUNTER — HOSPITAL ENCOUNTER (OUTPATIENT)
Dept: NUCLEAR MEDICINE | Facility: HOSPITAL | Age: 66
End: 2021-01-12
Attending: INTERNAL MEDICINE
Payer: COMMERCIAL

## 2021-01-12 DIAGNOSIS — R10.11 RUQ PAIN: ICD-10-CM

## 2021-01-12 PROCEDURE — 78227 HEPATOBIL SYST IMAGE W/DRUG: CPT | Performed by: INTERNAL MEDICINE

## 2021-01-15 ENCOUNTER — OFFICE VISIT (OUTPATIENT)
Dept: PHYSICAL THERAPY | Facility: HOSPITAL | Age: 66
End: 2021-01-15
Attending: PHYSICAL MEDICINE & REHABILITATION
Payer: COMMERCIAL

## 2021-01-15 ENCOUNTER — ANTI-COAG (OUTPATIENT)
Dept: CARDIOLOGY | Age: 66
End: 2021-01-15

## 2021-01-15 ENCOUNTER — LAB ENCOUNTER (OUTPATIENT)
Dept: LAB | Facility: HOSPITAL | Age: 66
End: 2021-01-15
Attending: INTERNAL MEDICINE
Payer: COMMERCIAL

## 2021-01-15 DIAGNOSIS — I48.20 CHRONIC ATRIAL FIBRILLATION (CMD): ICD-10-CM

## 2021-01-15 DIAGNOSIS — Z79.01 ENCOUNTER FOR MONITORING WARFARIN THERAPY: ICD-10-CM

## 2021-01-15 DIAGNOSIS — Z51.81 ENCOUNTER FOR MONITORING WARFARIN THERAPY: ICD-10-CM

## 2021-01-15 LAB
INR BLD: 2.11 (ref 0.9–1.2)
INR PPP: 2.11
PROTHROMBIN TIME: 23.3 SECONDS (ref 11.8–14.5)

## 2021-01-15 PROCEDURE — 85610 PROTHROMBIN TIME: CPT

## 2021-01-15 PROCEDURE — 36415 COLL VENOUS BLD VENIPUNCTURE: CPT

## 2021-01-15 PROCEDURE — 97110 THERAPEUTIC EXERCISES: CPT | Performed by: PHYSICAL THERAPIST

## 2021-01-15 NOTE — PROGRESS NOTES
1/15/2021    Patient Name: Axel Marquez  YOB: 1955          MRN number:  [id number]  Referring Physician:  Ly Luna    Dx:     Spinal stenosis at L4-L5 level (M48.061)        Authorized # of Visits:  18 visits approved per insurance the stairs. He displays increased valgus movement of both knees, R greater than L. He is complaint with his HEP. Will continue PT x 1 visit and then plan to DC PT at that time. Goals:       The pt was educated on the plan of care, purpose and individual

## 2021-01-19 ENCOUNTER — ANTI-COAG (OUTPATIENT)
Dept: CARDIOLOGY | Age: 66
End: 2021-01-19

## 2021-01-19 DIAGNOSIS — I48.20 CHRONIC ATRIAL FIBRILLATION (CMD): ICD-10-CM

## 2021-01-22 ENCOUNTER — OFFICE VISIT (OUTPATIENT)
Dept: PHYSICAL THERAPY | Facility: HOSPITAL | Age: 66
End: 2021-01-22
Attending: PHYSICAL MEDICINE & REHABILITATION
Payer: COMMERCIAL

## 2021-01-22 PROCEDURE — 97110 THERAPEUTIC EXERCISES: CPT | Performed by: PHYSICAL THERAPIST

## 2021-01-22 NOTE — PROGRESS NOTES
1/22/2021    Patient Name: Keaton Moreno  YOB: 1955          MRN number:  [id number]  Referring Physician:  Lawanda Montalvo    Dx:     Spinal stenosis at L4-L5 level (M48.061)        Authorized # of Visits:  18 visits approved per insurance case    Anterior step up - working on form   Therapeutic Activity     Neuromuscular Education     TNE Education     HEP           Assessment:  has 6 visits this calendar year and has progressed well.   He is independent and compliance with his HEP a [address]    Electronically signed by therapist: Nallely Castillo PT    [de-identified] certification required: Yes  I certify the need for these services furnished under this plan of treatment and while under my care.     X____________________________________________

## 2021-01-27 ENCOUNTER — LAB ENCOUNTER (OUTPATIENT)
Dept: LAB | Facility: HOSPITAL | Age: 66
End: 2021-01-27
Attending: UROLOGY
Payer: COMMERCIAL

## 2021-01-27 DIAGNOSIS — R97.20 ELEVATED PROSTATE SPECIFIC ANTIGEN (PSA): Primary | ICD-10-CM

## 2021-01-27 LAB — PSA SERPL-MCNC: 3.36 NG/ML (ref ?–4)

## 2021-01-27 PROCEDURE — 36415 COLL VENOUS BLD VENIPUNCTURE: CPT

## 2021-01-27 PROCEDURE — 84153 ASSAY OF PSA TOTAL: CPT

## 2021-01-28 ENCOUNTER — TELEPHONE (OUTPATIENT)
Dept: NEUROLOGY | Facility: CLINIC | Age: 66
End: 2021-01-28

## 2021-01-29 ENCOUNTER — APPOINTMENT (OUTPATIENT)
Dept: PHYSICAL THERAPY | Facility: HOSPITAL | Age: 66
End: 2021-01-29
Attending: PHYSICAL MEDICINE & REHABILITATION
Payer: COMMERCIAL

## 2021-02-05 ENCOUNTER — APPOINTMENT (OUTPATIENT)
Dept: PHYSICAL THERAPY | Facility: HOSPITAL | Age: 66
End: 2021-02-05
Attending: PHYSICAL MEDICINE & REHABILITATION
Payer: COMMERCIAL

## 2021-02-10 RX ORDER — AMLODIPINE BESYLATE 10 MG/1
10 TABLET ORAL DAILY
Qty: 90 TABLET | Refills: 1 | OUTPATIENT
Start: 2021-02-10

## 2021-02-10 NOTE — TELEPHONE ENCOUNTER
Current refill request refused due to refill is either a duplicate request or has active refills at the pharmacy. Check previous templates.     Requested Prescriptions     Refused Prescriptions Disp Refills   • amLODIPine Besylate 10 MG Oral Tab 90 tablet

## 2021-02-19 ENCOUNTER — APPOINTMENT (OUTPATIENT)
Dept: PHYSICAL THERAPY | Facility: HOSPITAL | Age: 66
End: 2021-02-19
Attending: PHYSICAL MEDICINE & REHABILITATION
Payer: COMMERCIAL

## 2021-02-22 ENCOUNTER — LAB ENCOUNTER (OUTPATIENT)
Dept: LAB | Age: 66
End: 2021-02-22
Attending: INTERNAL MEDICINE
Payer: COMMERCIAL

## 2021-02-22 DIAGNOSIS — Z79.01 ENCOUNTER FOR MONITORING WARFARIN THERAPY: ICD-10-CM

## 2021-02-22 DIAGNOSIS — Z51.81 ENCOUNTER FOR MONITORING WARFARIN THERAPY: ICD-10-CM

## 2021-02-22 LAB
INR BLD: 3 (ref 0.9–1.2)
INR PPP: 3
PROTHROMBIN TIME: 30.7 SECONDS (ref 11.8–14.5)

## 2021-02-22 PROCEDURE — 36415 COLL VENOUS BLD VENIPUNCTURE: CPT

## 2021-02-22 PROCEDURE — 85610 PROTHROMBIN TIME: CPT

## 2021-03-02 ENCOUNTER — ANTI-COAG (OUTPATIENT)
Dept: CARDIOLOGY | Age: 66
End: 2021-03-02

## 2021-03-02 DIAGNOSIS — I48.20 CHRONIC ATRIAL FIBRILLATION (CMD): ICD-10-CM

## 2021-03-03 DIAGNOSIS — Z23 NEED FOR VACCINATION: ICD-10-CM

## 2021-03-06 ENCOUNTER — IMMUNIZATION (OUTPATIENT)
Dept: LAB | Age: 66
End: 2021-03-06
Attending: HOSPITALIST
Payer: COMMERCIAL

## 2021-03-06 DIAGNOSIS — Z23 NEED FOR VACCINATION: Primary | ICD-10-CM

## 2021-03-06 PROCEDURE — 0001A SARSCOV2 VAC 30MCG/0.3ML IM: CPT

## 2021-03-15 RX ORDER — GABAPENTIN 300 MG/1
CAPSULE ORAL
Qty: 90 CAPSULE | Refills: 2 | OUTPATIENT
Start: 2021-03-15

## 2021-03-15 RX ORDER — AMLODIPINE BESYLATE 10 MG/1
TABLET ORAL
Qty: 30 TABLET | Refills: 1 | OUTPATIENT
Start: 2021-03-15

## 2021-03-15 NOTE — TELEPHONE ENCOUNTER
RX for Gabapentin denied as dose was last changed and filled by Dr. Abdullahi Fenton (neuro). Amlodipine denied as he has refills until May. PE scheduled for 4/7. Med to be filled at that time.

## 2021-03-27 ENCOUNTER — IMMUNIZATION (OUTPATIENT)
Dept: LAB | Age: 66
End: 2021-03-27
Attending: HOSPITALIST
Payer: COMMERCIAL

## 2021-03-27 DIAGNOSIS — Z23 NEED FOR VACCINATION: Primary | ICD-10-CM

## 2021-03-27 PROCEDURE — 0002A SARSCOV2 VAC 30MCG/0.3ML IM: CPT

## 2021-03-29 ENCOUNTER — LAB ENCOUNTER (OUTPATIENT)
Dept: LAB | Age: 66
End: 2021-03-29
Attending: INTERNAL MEDICINE
Payer: COMMERCIAL

## 2021-03-29 DIAGNOSIS — Z79.01 ENCOUNTER FOR MONITORING WARFARIN THERAPY: ICD-10-CM

## 2021-03-29 DIAGNOSIS — Z51.81 ENCOUNTER FOR MONITORING WARFARIN THERAPY: ICD-10-CM

## 2021-03-29 LAB — INR PPP: 2.42

## 2021-03-29 PROCEDURE — 36415 COLL VENOUS BLD VENIPUNCTURE: CPT

## 2021-03-29 PROCEDURE — 85610 PROTHROMBIN TIME: CPT

## 2021-04-01 ENCOUNTER — ANTI-COAG (OUTPATIENT)
Dept: CARDIOLOGY | Age: 66
End: 2021-04-01

## 2021-04-01 DIAGNOSIS — I48.20 CHRONIC ATRIAL FIBRILLATION (CMD): ICD-10-CM

## 2021-04-03 RX ORDER — SIMVASTATIN 20 MG
TABLET ORAL
Qty: 90 TABLET | Refills: 0 | Status: SHIPPED | OUTPATIENT
Start: 2021-04-03 | End: 2021-04-07

## 2021-04-07 ENCOUNTER — OFFICE VISIT (OUTPATIENT)
Dept: INTERNAL MEDICINE CLINIC | Facility: CLINIC | Age: 66
End: 2021-04-07
Payer: COMMERCIAL

## 2021-04-07 VITALS
HEIGHT: 74 IN | SYSTOLIC BLOOD PRESSURE: 122 MMHG | OXYGEN SATURATION: 98 % | BODY MASS INDEX: 31.57 KG/M2 | HEART RATE: 67 BPM | DIASTOLIC BLOOD PRESSURE: 70 MMHG | WEIGHT: 246 LBS

## 2021-04-07 DIAGNOSIS — I63.9 CEREBROVASCULAR ACCIDENT (CVA), UNSPECIFIED MECHANISM (HCC): ICD-10-CM

## 2021-04-07 DIAGNOSIS — I10 BENIGN ESSENTIAL HTN: ICD-10-CM

## 2021-04-07 DIAGNOSIS — I48.20 CHRONIC ATRIAL FIBRILLATION (HCC): ICD-10-CM

## 2021-04-07 DIAGNOSIS — E78.49 OTHER HYPERLIPIDEMIA: ICD-10-CM

## 2021-04-07 DIAGNOSIS — G47.33 OBSTRUCTIVE SLEEP APNEA SYNDROME: ICD-10-CM

## 2021-04-07 DIAGNOSIS — Z00.00 ROUTINE HEALTH MAINTENANCE: ICD-10-CM

## 2021-04-07 DIAGNOSIS — M17.0 BILATERAL PRIMARY OSTEOARTHRITIS OF KNEE: ICD-10-CM

## 2021-04-07 DIAGNOSIS — Z80.0 FAMILY HX OF COLON CANCER: Primary | ICD-10-CM

## 2021-04-07 PROBLEM — Z98.890 H/O MITRAL VALVE REPAIR: Status: ACTIVE | Noted: 2019-11-25

## 2021-04-07 PROBLEM — Z98.890 HISTORY OF MITRAL VALVE REPAIR: Status: RESOLVED | Noted: 2019-11-25 | Resolved: 2021-04-07

## 2021-04-07 PROBLEM — A09 DIARRHEA OF INFECTIOUS ORIGIN: Status: RESOLVED | Noted: 2019-11-23 | Resolved: 2021-04-07

## 2021-04-07 PROCEDURE — 3074F SYST BP LT 130 MM HG: CPT | Performed by: INTERNAL MEDICINE

## 2021-04-07 PROCEDURE — 99397 PER PM REEVAL EST PAT 65+ YR: CPT | Performed by: INTERNAL MEDICINE

## 2021-04-07 PROCEDURE — 3078F DIAST BP <80 MM HG: CPT | Performed by: INTERNAL MEDICINE

## 2021-04-07 PROCEDURE — 3008F BODY MASS INDEX DOCD: CPT | Performed by: INTERNAL MEDICINE

## 2021-04-07 RX ORDER — TADALAFIL 20 MG/1
20 TABLET ORAL AS NEEDED
Qty: 24 TABLET | Refills: 3 | Status: SHIPPED | OUTPATIENT
Start: 2021-04-07 | End: 2021-07-02

## 2021-04-07 RX ORDER — CELECOXIB 200 MG/1
200 CAPSULE ORAL
Qty: 90 CAPSULE | Refills: 3 | Status: SHIPPED | OUTPATIENT
Start: 2021-04-07 | End: 2021-07-02

## 2021-04-07 RX ORDER — AMLODIPINE BESYLATE 10 MG/1
10 TABLET ORAL DAILY
Qty: 90 TABLET | Refills: 3 | Status: SHIPPED | OUTPATIENT
Start: 2021-04-07 | End: 2021-07-02

## 2021-04-07 RX ORDER — SIMVASTATIN 20 MG
20 TABLET ORAL NIGHTLY
Qty: 90 TABLET | Refills: 3 | Status: SHIPPED | OUTPATIENT
Start: 2021-04-07 | End: 2021-07-02

## 2021-04-07 RX ORDER — WARFARIN SODIUM 2.5 MG/1
2.5 TABLET ORAL DAILY
Qty: 90 TABLET | Refills: 3 | Status: SHIPPED | OUTPATIENT
Start: 2021-04-07 | End: 2021-07-02

## 2021-04-07 NOTE — PROGRESS NOTES
At-Home BP Readings:  AM              PM     3/29      134/90  3/30    134/71  3/31                131/81  4/1    135/94  4/2    139/81  4/3    141/80  4/5    127/78  140/81  4/6    141/82  138/69  4/7    128/78

## 2021-04-07 NOTE — PROGRESS NOTES
Pablo Torres is a 76year old malePatient presents with:  Physical: Here today for Annual Physical     HPI:     Pablo Torres is a 76year old male who presents for a complete physical exam.     Had both covid (Armond Mcghee) vaccines.     Wants to change from or localized, unspecified site    • Sleep apnea    • UGI bleed    • Unspecified essential hypertension    • Venous insufficiency     RT greater saphenous vein ablation 2007      Past Surgical History:   Procedure Laterality Date   • APPENDECTOMY  12/04/201 lymphadenopathy, no carotid bruits  LUNGS: clear to auscultation  CARDIO: RRR, normal S1S2, no gallops or murmurs  GI: soft, NT, ND, NABS, no HSM  EXTREMITIES: trace BLE edema.  +2 DP pulses b/l      ASSESSMENT AND PLAN:     Acute CVA with RUE weakness and reminded to schedule appt with neurologist Dr. Ada Brandt. EMG also showed a possible right lumbar radiculopathy (from L5 or S1), so MRI ordered. -  MRI 3/2020 -- mod-severe foraminal stenosis at L4-5.   Still with R>L leg pain/weakness; the pain is exclusiv

## 2021-04-14 NOTE — TELEPHONE ENCOUNTER
1 year sent to [facility] 4/7/21  New request from [facility]     Mychart to pt to clarify pharmacy

## 2021-04-15 ENCOUNTER — TELEPHONE (OUTPATIENT)
Dept: NEUROLOGY | Facility: CLINIC | Age: 66
End: 2021-04-15

## 2021-04-15 NOTE — TELEPHONE ENCOUNTER
Patient's is asking for disability forms, I see no note being set for restrictions and LOV is 12/17/20 please advise.

## 2021-04-15 NOTE — TELEPHONE ENCOUNTER
Katherine Estevez advised that patient goes thru his primary for disability.       Left a detailed message for Vania Velásquez for claim #[id number]

## 2021-04-20 ENCOUNTER — TELEPHONE (OUTPATIENT)
Dept: INTERNAL MEDICINE CLINIC | Facility: CLINIC | Age: 66
End: 2021-04-20

## 2021-04-29 ENCOUNTER — TELEPHONE (OUTPATIENT)
Dept: NEUROLOGY | Facility: CLINIC | Age: 66
End: 2021-04-29

## 2021-04-29 NOTE — TELEPHONE ENCOUNTER
Patient picked up medication on 4/20/21-    Spoke with Russ Mcdonough, pharmacist to refill rx. stated

## 2021-05-10 RX ORDER — CELECOXIB 200 MG/1
CAPSULE ORAL
Qty: 90 CAPSULE | Refills: 3 | OUTPATIENT
Start: 2021-05-10

## 2021-05-10 RX ORDER — AMLODIPINE BESYLATE 10 MG/1
TABLET ORAL
Qty: 90 TABLET | Refills: 3 | OUTPATIENT
Start: 2021-05-10

## 2021-05-10 RX ORDER — LOSARTAN POTASSIUM AND HYDROCHLOROTHIAZIDE 12.5; 1 MG/1; MG/1
TABLET ORAL
Qty: 90 TABLET | Refills: 3 | OUTPATIENT
Start: 2021-05-10

## 2021-05-12 ENCOUNTER — ANTI-COAG (OUTPATIENT)
Dept: CARDIOLOGY | Age: 66
End: 2021-05-12

## 2021-05-12 DIAGNOSIS — I48.20 CHRONIC ATRIAL FIBRILLATION (CMD): ICD-10-CM

## 2021-05-13 ENCOUNTER — LAB ENCOUNTER (OUTPATIENT)
Dept: LAB | Age: 66
End: 2021-05-13
Attending: INTERNAL MEDICINE
Payer: MEDICARE

## 2021-05-13 DIAGNOSIS — E78.49 OTHER HYPERLIPIDEMIA: ICD-10-CM

## 2021-05-13 DIAGNOSIS — Z79.01 ENCOUNTER FOR MONITORING WARFARIN THERAPY: ICD-10-CM

## 2021-05-13 DIAGNOSIS — I10 BENIGN ESSENTIAL HTN: ICD-10-CM

## 2021-05-13 DIAGNOSIS — Z51.81 ENCOUNTER FOR MONITORING WARFARIN THERAPY: ICD-10-CM

## 2021-05-13 DIAGNOSIS — Z00.00 ROUTINE HEALTH MAINTENANCE: ICD-10-CM

## 2021-05-13 LAB — INR PPP: 2.84

## 2021-05-13 PROCEDURE — 85610 PROTHROMBIN TIME: CPT

## 2021-05-13 PROCEDURE — 82306 VITAMIN D 25 HYDROXY: CPT

## 2021-05-13 PROCEDURE — 80053 COMPREHEN METABOLIC PANEL: CPT

## 2021-05-13 PROCEDURE — 84443 ASSAY THYROID STIM HORMONE: CPT | Performed by: INTERNAL MEDICINE

## 2021-05-13 PROCEDURE — 80061 LIPID PANEL: CPT

## 2021-05-13 PROCEDURE — 36415 COLL VENOUS BLD VENIPUNCTURE: CPT

## 2021-05-13 PROCEDURE — 85025 COMPLETE CBC W/AUTO DIFF WBC: CPT

## 2021-05-14 RX ORDER — SILDENAFIL 100 MG/1
TABLET, FILM COATED ORAL
Qty: 30 TABLET | Refills: 3 | OUTPATIENT
Start: 2021-05-14

## 2021-05-14 NOTE — TELEPHONE ENCOUNTER
Requested Prescriptions     Refused Prescriptions Disp Refills   • SILDENAFIL CITRATE 100 MG Oral Tab [Pharmacy Med Name: SILDENAFIL TABS 100MG] 30 tablet 3     Sig: TAKE 1 TABLET DAILY AS NEEDED FOR ERECTILE DYSFUNCTION     Refused By: John Gómez IR

## 2021-05-18 ENCOUNTER — ANTI-COAG (OUTPATIENT)
Dept: CARDIOLOGY | Age: 66
End: 2021-05-18

## 2021-05-18 DIAGNOSIS — I48.20 CHRONIC ATRIAL FIBRILLATION (CMD): ICD-10-CM

## 2021-05-24 RX ORDER — WARFARIN SODIUM 2.5 MG/1
TABLET ORAL
Qty: 90 TABLET | Refills: 3 | OUTPATIENT
Start: 2021-05-24

## 2021-05-24 NOTE — TELEPHONE ENCOUNTER
Current refill request refused due to refill is either a duplicate request or has active refills at the pharmacy. Check previous templates.     Requested Prescriptions     Refused Prescriptions Disp Refills   • WARFARIN SODIUM 2.5 MG Oral Tab [Pharmacy Med

## 2021-05-26 NOTE — PROGRESS NOTES
Forms have been printed when they are ready they will be faxed.   [facility]    Progress Note    Lilyata Robersongaby Patient Status:  Inpatient    1955 MRN [id number]   [facility] 2W/SW Attending María Agustin, [address] [facility] Day # 11 PCP Antoni Giordano MD        Subjective:     Constitutio heparin         2-Admitted with perforated appendicitis 11/23/2019  Status post IR drainage  IV antibiotics  General surgery following and laparoscopic appendectomy today      3- chronic a-fib   IV heparin      4-  Obstructive sleep apnea syndrome  Auto cp Deonna López MD on 12/02/2019 at 11:33          Xr Chest Ap Portable  (cpt=71045)    Result Date: 12/3/2019  CONCLUSION:  1. Interval improvement in the chest with some decrease in what is now mild interstitial pulmonary edema changes.   Mild bibasilar sc

## 2021-06-01 NOTE — TELEPHONE ENCOUNTER
LM on patient's cell asking if he would like to use [facility] instead of [facility]. 1 yr sent to [facility] in April 2021.

## 2021-06-02 ENCOUNTER — TELEPHONE (OUTPATIENT)
Dept: NEUROLOGY | Facility: CLINIC | Age: 66
End: 2021-06-02

## 2021-06-02 ENCOUNTER — OFFICE VISIT (OUTPATIENT)
Dept: NEUROLOGY | Facility: CLINIC | Age: 66
End: 2021-06-02
Payer: MEDICARE

## 2021-06-02 VITALS
HEIGHT: 74 IN | OXYGEN SATURATION: 98 % | DIASTOLIC BLOOD PRESSURE: 74 MMHG | SYSTOLIC BLOOD PRESSURE: 112 MMHG | HEART RATE: 72 BPM | WEIGHT: 245 LBS | BODY MASS INDEX: 31.44 KG/M2

## 2021-06-02 DIAGNOSIS — M17.0 BILATERAL PRIMARY OSTEOARTHRITIS OF KNEE: Primary | ICD-10-CM

## 2021-06-02 DIAGNOSIS — M48.061 SPINAL STENOSIS AT L4-L5 LEVEL: ICD-10-CM

## 2021-06-02 DIAGNOSIS — I63.9 CEREBROVASCULAR ACCIDENT (CVA), UNSPECIFIED MECHANISM (HCC): ICD-10-CM

## 2021-06-02 DIAGNOSIS — M17.0 PRIMARY OSTEOARTHRITIS OF BOTH KNEES: Primary | ICD-10-CM

## 2021-06-02 DIAGNOSIS — G60.9 IDIOPATHIC PERIPHERAL NEUROPATHY: ICD-10-CM

## 2021-06-02 PROCEDURE — 99214 OFFICE O/P EST MOD 30 MIN: CPT | Performed by: PHYSICAL MEDICINE & REHABILITATION

## 2021-06-02 NOTE — TELEPHONE ENCOUNTER
Received a call from Basil FRAZIER requesting authorization for knee injection with HA. Informed MA, insurance will need to be verified for request.     Message routed via Epic bubble to front office staff to verified and update primary coverage for member.

## 2021-06-02 NOTE — PROGRESS NOTES
[address]  FOLLOW UP EVALUATION    Chief Complaint: back pain.     HISTORY OF PRESENT ILLNESS:   Patient presents with:  Knee Pain: LOV:12/7/20 Patient f/u on bilateral knee pain that radiates down to ankles bowel bladder habits, any new fevers chills or weight loss. Patient has completed physical therapy and continues home exercises. 10/5/20  Patient is here for follow-up evaluation of low back pain as well as new onset of bilateral knee pain.   He has bee bowel bladder control, any fevers chills or weight loss. He does have a left lower extremity wound that is being treated as well and wears compression stockings for varicose veins.   Patient recently had MRI imaging of the lumbar spine which is noted below LOSARTAN POTASSIUM-HCTZ 100-12.5 MG Oral Tab TAKE 1 TABLET BY MOUTH EVERY DAY 90 tablet 3   • gabapentin 400 MG Oral Cap Take 2 capsules (800 mg total) by mouth 3 (three) times daily.  540 capsule 3   • tramadol-acetaminophen (ULTRACET) 37.5-325 MG Oral Tab peripheral pulses intact. Normal capillary refill.    Lungs: Non-labored respirations  Abdomen: No abdominal guarding  Extremities: No lower extremity edema bilaterally   Skin: No lesions noted   Cognition: alert & oriented x 3, attentive, able to follow 2 BUNCREA 22.5 (H) 05/13/2021    CREATSERUM 0.71 05/13/2021    ANIONGAP 7 05/13/2021    GFRNAA 98 05/13/2021    GFRAA 113 05/13/2021    CA 9.2 05/13/2021    OSMOCALC 291 05/13/2021    ALKPHO 74 05/13/2021    AST 20 05/13/2021    ALT 25 05/13/2021    BILT 0.9 injection however was not long-lasting and given this I recommended bilateral knee aspiration injection with hyaluronic acid. Advised patient my office will call him once the injection is approved.   He will continue his current exercise plan and consider

## 2021-06-03 ENCOUNTER — TELEPHONE (OUTPATIENT)
Dept: NEUROLOGY | Facility: CLINIC | Age: 66
End: 2021-06-03

## 2021-06-03 DIAGNOSIS — M17.0 PRIMARY OSTEOARTHRITIS OF BOTH KNEES: Primary | ICD-10-CM

## 2021-06-03 NOTE — TELEPHONE ENCOUNTER
Order for Bilateral knee aspiration and injection with Hyaluronic Acid (Gel One preferred) placed 6/2/21 at [address] [place] [address] location did not drop in our workqueue-New order placed    Per Medicare Guidelines -no authorization is required for HA and steroid injectio

## 2021-06-03 NOTE — TELEPHONE ENCOUNTER
Order for Bilateral knee aspiration and injection with Hyaluronic Acid (Gel One preferred) placed 6/2/21 at [place] location did not drop in our workqueue-New order placed    Per Medicare Guidelines -no authorization is required for HA and steroid injectio

## 2021-06-11 ENCOUNTER — ANTI-COAG (OUTPATIENT)
Dept: CARDIOLOGY | Age: 66
End: 2021-06-11

## 2021-06-11 ENCOUNTER — LAB ENCOUNTER (OUTPATIENT)
Dept: LAB | Age: 66
End: 2021-06-11
Attending: INTERNAL MEDICINE
Payer: MEDICARE

## 2021-06-11 DIAGNOSIS — Z79.01 ENCOUNTER FOR MONITORING WARFARIN THERAPY: ICD-10-CM

## 2021-06-11 DIAGNOSIS — Z51.81 ENCOUNTER FOR MONITORING WARFARIN THERAPY: ICD-10-CM

## 2021-06-11 DIAGNOSIS — I48.20 CHRONIC ATRIAL FIBRILLATION (CMD): ICD-10-CM

## 2021-06-11 LAB — INR PPP: 2.45

## 2021-06-11 PROCEDURE — 36415 COLL VENOUS BLD VENIPUNCTURE: CPT

## 2021-06-11 PROCEDURE — 85610 PROTHROMBIN TIME: CPT

## 2021-06-14 ENCOUNTER — TELEPHONE (OUTPATIENT)
Dept: CARDIOLOGY | Age: 66
End: 2021-06-14

## 2021-06-14 ENCOUNTER — ANTI-COAG (OUTPATIENT)
Dept: CARDIOLOGY | Age: 66
End: 2021-06-14

## 2021-06-14 DIAGNOSIS — I48.20 CHRONIC ATRIAL FIBRILLATION (CMD): ICD-10-CM

## 2021-06-17 ENCOUNTER — OFFICE VISIT (OUTPATIENT)
Dept: INTERNAL MEDICINE CLINIC | Facility: CLINIC | Age: 66
End: 2021-06-17
Payer: MEDICARE

## 2021-06-17 VITALS
DIASTOLIC BLOOD PRESSURE: 78 MMHG | HEART RATE: 77 BPM | BODY MASS INDEX: 32.08 KG/M2 | HEIGHT: 74 IN | OXYGEN SATURATION: 97 % | WEIGHT: 250 LBS | TEMPERATURE: 98 F | SYSTOLIC BLOOD PRESSURE: 128 MMHG

## 2021-06-17 DIAGNOSIS — I10 BENIGN ESSENTIAL HTN: Primary | ICD-10-CM

## 2021-06-17 PROCEDURE — 99213 OFFICE O/P EST LOW 20 MIN: CPT | Performed by: INTERNAL MEDICINE

## 2021-06-17 NOTE — PROGRESS NOTES
Ernesto Salcido is a 76year old male. Patient presents with: Follow - Up: Pt here for 6 week f/u     HPI:     Here for f/u of BP.  BP's at home have been good -- 110's-130's/60s-70's (occ low 140's/80's).     Seeing PM&R Dr. Jeanie Sanchez for knee OA -- getti 2007      Social History:  Social History    Tobacco Use      Smoking status: Never Smoker      Smokeless tobacco: Never Used    Alcohol use:  Yes      Alcohol/week: 15.0 standard drinks      Types: 15 Glasses of wine per week      Comment: weekends    Drug

## 2021-06-17 NOTE — TELEPHONE ENCOUNTER
Just saw Dr Ulysses Mack  He is out of Losartan and forgot to ask for refill  Please send prescription to [facility] in [place]

## 2021-06-18 RX ORDER — LOSARTAN POTASSIUM AND HYDROCHLOROTHIAZIDE 12.5; 1 MG/1; MG/1
1 TABLET ORAL DAILY
Qty: 90 TABLET | Refills: 3 | Status: SHIPPED | OUTPATIENT
Start: 2021-06-18 | End: 2021-07-02

## 2021-06-22 ENCOUNTER — OFFICE VISIT (OUTPATIENT)
Dept: NEUROLOGY | Facility: CLINIC | Age: 66
End: 2021-06-22
Payer: MEDICARE

## 2021-06-22 DIAGNOSIS — M17.0 PRIMARY OSTEOARTHRITIS OF BOTH KNEES: Primary | ICD-10-CM

## 2021-06-22 PROCEDURE — 20610 DRAIN/INJ JOINT/BURSA W/O US: CPT | Performed by: PHYSICAL MEDICINE & REHABILITATION

## 2021-06-22 NOTE — PATIENT INSTRUCTIONS
Steroid Injection Information  What to expect: The injection contains Lidocaine (which numbs the area). You may have pain relief within hours of the injection due to the Lidocaine.  It is also possible to have a slight increase in symptoms over the first fe

## 2021-06-22 NOTE — PROCEDURES
Procedure:  Bilateral knee aspiration and injection with Hyaluronic acid  The risks, benefits and anticipated outcomes of the procedure, the risks and benefits of the alternatives to the procedure, and the roles and tasks of the personnel to be involved, w

## 2021-06-25 RX ORDER — SIMVASTATIN 20 MG
TABLET ORAL
Qty: 90 TABLET | Refills: 3 | OUTPATIENT
Start: 2021-06-25

## 2021-06-29 DIAGNOSIS — G60.9 IDIOPATHIC PERIPHERAL NEUROPATHY: ICD-10-CM

## 2021-06-29 NOTE — TELEPHONE ENCOUNTER
Pt. Changed his insurance and needs refills   On all of his meds   Ph. # [phone]  Send meds to [place] ph.  # [phone]  Routed to Rx

## 2021-07-01 DIAGNOSIS — G60.9 IDIOPATHIC PERIPHERAL NEUROPATHY: ICD-10-CM

## 2021-07-01 RX ORDER — GABAPENTIN 400 MG/1
800 CAPSULE ORAL 3 TIMES DAILY
Qty: 540 CAPSULE | Refills: 3 | Status: CANCELLED | OUTPATIENT
Start: 2021-07-01

## 2021-07-01 NOTE — TELEPHONE ENCOUNTER
To Dr. Dye Arts - see message below, pended meds were reviewed with pt. To MD:  The above refill request is for a controlled substance. Please review pended medication order. Print and sign for staff to fax to pharmacy or prescribe electronically.

## 2021-07-01 NOTE — TELEPHONE ENCOUNTER
PT call to innform that due to ins change prescriptions need to be send to [facility] - Fax [phone] Prescription to be done in a 90 day increments.

## 2021-07-02 RX ORDER — WARFARIN SODIUM 2.5 MG/1
2.5 TABLET ORAL DAILY
Qty: 90 TABLET | Refills: 3 | Status: SHIPPED | OUTPATIENT
Start: 2021-07-02 | End: 2021-08-12

## 2021-07-02 RX ORDER — AMLODIPINE BESYLATE 10 MG/1
10 TABLET ORAL DAILY
Qty: 90 TABLET | Refills: 3 | Status: SHIPPED | OUTPATIENT
Start: 2021-07-02 | End: 2021-08-12

## 2021-07-02 RX ORDER — LOSARTAN POTASSIUM AND HYDROCHLOROTHIAZIDE 12.5; 1 MG/1; MG/1
1 TABLET ORAL DAILY
Qty: 90 TABLET | Refills: 3 | Status: SHIPPED | OUTPATIENT
Start: 2021-07-02 | End: 2021-08-12

## 2021-07-02 RX ORDER — CELECOXIB 200 MG/1
200 CAPSULE ORAL
Qty: 90 CAPSULE | Refills: 3 | Status: SHIPPED | OUTPATIENT
Start: 2021-07-02 | End: 2021-08-12

## 2021-07-02 RX ORDER — TADALAFIL 20 MG/1
20 TABLET ORAL AS NEEDED
Qty: 24 TABLET | Refills: 3 | Status: SHIPPED | OUTPATIENT
Start: 2021-07-02 | End: 2021-08-12

## 2021-07-02 RX ORDER — SIMVASTATIN 20 MG
20 TABLET ORAL NIGHTLY
Qty: 90 TABLET | Refills: 3 | Status: SHIPPED | OUTPATIENT
Start: 2021-07-02 | End: 2021-08-12

## 2021-07-02 NOTE — TELEPHONE ENCOUNTER
Medication request: Gabapentin 400mg  Take 2 capsules (800 mg total) by mouth 3 (three) times daily.     LOV: 06/22/21  NOV: 07/09/21    [place] 11/05/20 #540 r-3

## 2021-07-06 RX ORDER — GABAPENTIN 400 MG/1
800 CAPSULE ORAL 3 TIMES DAILY
Qty: 540 CAPSULE | Refills: 3 | Status: SHIPPED | OUTPATIENT
Start: 2021-07-06

## 2021-07-09 ENCOUNTER — TELEMEDICINE (OUTPATIENT)
Dept: NEUROLOGY | Facility: CLINIC | Age: 66
End: 2021-07-09
Payer: MEDICARE

## 2021-07-09 ENCOUNTER — TELEPHONE (OUTPATIENT)
Dept: NEUROLOGY | Facility: CLINIC | Age: 66
End: 2021-07-09

## 2021-07-09 DIAGNOSIS — M17.0 PRIMARY OSTEOARTHRITIS OF BOTH KNEES: Primary | ICD-10-CM

## 2021-07-09 DIAGNOSIS — M48.061 SPINAL STENOSIS AT L4-L5 LEVEL: ICD-10-CM

## 2021-07-09 DIAGNOSIS — I63.9 CEREBROVASCULAR ACCIDENT (CVA), UNSPECIFIED MECHANISM (HCC): ICD-10-CM

## 2021-07-09 PROCEDURE — 99213 OFFICE O/P EST LOW 20 MIN: CPT | Performed by: PHYSICAL MEDICINE & REHABILITATION

## 2021-07-09 NOTE — PROGRESS NOTES
[address]    Telemedicine Visit - Follow Up Evaluation    Telehealth Verbal Consent   I conducted a telehealth visit with Reji Gupta today, 07/09/21, which was completed using two-way, real-time interac ambulation. He is starting to stay more active. He still has numbness tingling in both legs as well and has spinal stenosis. He denies any changes in bowel bladder.   He continues to take Celebrex but is now taking it only every other day as opposed to d tablet 3   • simvastatin 20 MG Oral Tab Take 1 tablet (20 mg total) by mouth nightly. 90 tablet 3   • Tadalafil (CIALIS) 20 MG Oral Tab Take 1 tablet (20 mg total) by mouth as needed for Erectile Dysfunction.  24 tablet 3   • tramadol-acetaminophen Gilson Peters RBC 4.64 05/13/2021    HGB 14.3 05/13/2021    HCT 42.7 05/13/2021    MCV 92.0 05/13/2021    MCH 30.8 05/13/2021    MCHC 33.5 05/13/2021    RDW 14.3 05/13/2021    .0 05/13/2021    MPV 8.1 06/29/2018     Lab Results   Component Value Date    GLU 99 05 secondary to spinal stenosis and we discussed that he may benefit from an epidural injection in the near future as well however patient is very hesitant to stop anticoagulation treatment given his previous complication that led to a CVA.   I advised him jeanna

## 2021-07-19 ENCOUNTER — TELEPHONE (OUTPATIENT)
Dept: CARDIOLOGY | Age: 66
End: 2021-07-19

## 2021-07-22 ENCOUNTER — LAB ENCOUNTER (OUTPATIENT)
Dept: LAB | Age: 66
End: 2021-07-22
Attending: INTERNAL MEDICINE
Payer: MEDICARE

## 2021-07-22 DIAGNOSIS — Z79.01 ENCOUNTER FOR MONITORING WARFARIN THERAPY: ICD-10-CM

## 2021-07-22 DIAGNOSIS — Z51.81 ENCOUNTER FOR MONITORING WARFARIN THERAPY: ICD-10-CM

## 2021-07-22 LAB
INR BLD: 2.4 (ref 0.9–1.2)
PROTHROMBIN TIME: 25.8 SECONDS (ref 11.8–14.5)

## 2021-07-22 PROCEDURE — 85610 PROTHROMBIN TIME: CPT

## 2021-07-22 PROCEDURE — 36415 COLL VENOUS BLD VENIPUNCTURE: CPT

## 2021-07-23 ENCOUNTER — TELEPHONE (OUTPATIENT)
Dept: CARDIOLOGY | Age: 66
End: 2021-07-23

## 2021-07-26 ENCOUNTER — ANTI-COAG (OUTPATIENT)
Dept: CARDIOLOGY | Age: 66
End: 2021-07-26

## 2021-07-26 DIAGNOSIS — I48.20 CHRONIC ATRIAL FIBRILLATION (CMD): Primary | ICD-10-CM

## 2021-08-12 ENCOUNTER — TELEPHONE (OUTPATIENT)
Dept: INTERNAL MEDICINE CLINIC | Facility: CLINIC | Age: 66
End: 2021-08-12

## 2021-08-13 NOTE — TELEPHONE ENCOUNTER
I spoke with patient. Explained that Dr. Irma Matute refilled his medications to [facility] on 7/2/21. Patient is not home right now. He will call his mail order pharmacy to check the status of his refills. He will call back to follow up.  Explained that Dr. Khushi Comer

## 2021-08-17 RX ORDER — WARFARIN SODIUM 2.5 MG/1
2.5 TABLET ORAL DAILY
Qty: 90 TABLET | Refills: 3 | Status: SHIPPED | OUTPATIENT
Start: 2021-08-17

## 2021-08-17 RX ORDER — TADALAFIL 20 MG/1
20 TABLET ORAL AS NEEDED
Qty: 24 TABLET | Refills: 3 | Status: SHIPPED | OUTPATIENT
Start: 2021-08-17

## 2021-08-17 RX ORDER — AMLODIPINE BESYLATE 10 MG/1
10 TABLET ORAL DAILY
Qty: 90 TABLET | Refills: 3 | Status: SHIPPED | OUTPATIENT
Start: 2021-08-17

## 2021-08-17 RX ORDER — CELECOXIB 200 MG/1
200 CAPSULE ORAL
Qty: 90 CAPSULE | Refills: 3 | Status: SHIPPED | OUTPATIENT
Start: 2021-08-17

## 2021-08-17 RX ORDER — SIMVASTATIN 20 MG
20 TABLET ORAL NIGHTLY
Qty: 90 TABLET | Refills: 3 | Status: SHIPPED | OUTPATIENT
Start: 2021-08-17

## 2021-08-17 RX ORDER — LOSARTAN POTASSIUM AND HYDROCHLOROTHIAZIDE 12.5; 1 MG/1; MG/1
1 TABLET ORAL DAILY
Qty: 90 TABLET | Refills: 3 | Status: SHIPPED | OUTPATIENT
Start: 2021-08-17

## 2021-08-17 NOTE — TELEPHONE ENCOUNTER
Patient calling, wife Randee Malave checked [facility] site, there is no record of refills from our office. Asking that we resend refill request asap.

## 2021-08-17 NOTE — TELEPHONE ENCOUNTER
Re-sent all Rx's as prescribed by  at the 7/2/21 office visit but unable to eRx the Ultracet.  Clinical to please call Rx into pharmacy

## 2021-08-18 NOTE — TELEPHONE ENCOUNTER
Spoke with Cici Loaiza, pharmacist at [facility], who reports rx for Ultracet was received on 7/2/21 but was placed on hold.  She states this is the first time that it is being filled through them so it is telling her the supply should only be for 7 days as kim

## 2021-09-02 ENCOUNTER — TELEPHONE (OUTPATIENT)
Dept: NEUROLOGY | Facility: CLINIC | Age: 66
End: 2021-09-02

## 2021-09-02 DIAGNOSIS — M17.0 BILATERAL PRIMARY OSTEOARTHRITIS OF KNEE: Primary | ICD-10-CM

## 2021-09-02 NOTE — TELEPHONE ENCOUNTER
C/o 5/10 bilateral knee pain. Patient would like bilateral knee CSI discussed at [address] [place] [address]. When can patient scheduled. 07/09/21 HA injection & 11/18/20 CSI. Please advise.        07/09/21 OV:     PLAN:   Pilar [facility] is a pleasant 42-year-old gentleman was fol

## 2021-09-03 ENCOUNTER — TELEPHONE (OUTPATIENT)
Dept: NEUROLOGY | Facility: CLINIC | Age: 66
End: 2021-09-03

## 2021-09-03 NOTE — TELEPHONE ENCOUNTER
Per Medicare guidelines authorization is not required for Bilateral knee aspiration and injection with corticosteroid cpt codes 71355,  x 2. Will inform Nursing.

## 2021-09-10 ENCOUNTER — LAB ENCOUNTER (OUTPATIENT)
Dept: LAB | Age: 66
End: 2021-09-10
Attending: INTERNAL MEDICINE
Payer: MEDICARE

## 2021-09-10 DIAGNOSIS — Z51.81 ENCOUNTER FOR MONITORING WARFARIN THERAPY: ICD-10-CM

## 2021-09-10 DIAGNOSIS — Z79.01 ENCOUNTER FOR MONITORING WARFARIN THERAPY: ICD-10-CM

## 2021-09-10 LAB
INR BLD: 2.31 (ref 0.9–1.2)
PROTHROMBIN TIME: 25.1 SECONDS (ref 11.8–14.5)

## 2021-09-10 PROCEDURE — 36415 COLL VENOUS BLD VENIPUNCTURE: CPT

## 2021-09-10 PROCEDURE — 85610 PROTHROMBIN TIME: CPT

## 2021-09-14 ENCOUNTER — TELEPHONE (OUTPATIENT)
Dept: CARDIOLOGY | Age: 66
End: 2021-09-14

## 2021-09-20 ENCOUNTER — OFFICE VISIT (OUTPATIENT)
Dept: PHYSICAL MEDICINE AND REHAB | Facility: CLINIC | Age: 66
End: 2021-09-20
Payer: MEDICARE

## 2021-09-20 ENCOUNTER — OFFICE VISIT (OUTPATIENT)
Dept: WOUND CARE | Facility: HOSPITAL | Age: 66
End: 2021-09-20
Attending: CLINICAL NURSE SPECIALIST
Payer: MEDICARE

## 2021-09-20 VITALS
SYSTOLIC BLOOD PRESSURE: 123 MMHG | TEMPERATURE: 97 F | HEART RATE: 57 BPM | RESPIRATION RATE: 18 BRPM | OXYGEN SATURATION: 97 % | DIASTOLIC BLOOD PRESSURE: 75 MMHG

## 2021-09-20 DIAGNOSIS — M17.0 BILATERAL PRIMARY OSTEOARTHRITIS OF KNEE: Primary | ICD-10-CM

## 2021-09-20 DIAGNOSIS — I87.2 VENOUS (PERIPHERAL) INSUFFICIENCY: ICD-10-CM

## 2021-09-20 DIAGNOSIS — S81.801D OPEN WOUND OF RIGHT LOWER EXTREMITY, SUBSEQUENT ENCOUNTER: Primary | ICD-10-CM

## 2021-09-20 DIAGNOSIS — L89.522 PRESSURE INJURY OF LEFT ANKLE, STAGE 2 (HCC): ICD-10-CM

## 2021-09-20 PROBLEM — S81.801A OPEN WOUND OF RIGHT LOWER EXTREMITY: Status: ACTIVE | Noted: 2021-09-20

## 2021-09-20 PROCEDURE — 20610 DRAIN/INJ JOINT/BURSA W/O US: CPT | Performed by: PHYSICAL MEDICINE & REHABILITATION

## 2021-09-20 PROCEDURE — 99214 OFFICE O/P EST MOD 30 MIN: CPT | Performed by: CLINICAL NURSE SPECIALIST

## 2021-09-20 RX ORDER — TRIAMCINOLONE ACETONIDE 40 MG/ML
80 INJECTION, SUSPENSION INTRA-ARTICULAR; INTRAMUSCULAR ONCE
Status: COMPLETED | OUTPATIENT
Start: 2021-09-20 | End: 2021-09-20

## 2021-09-20 RX ORDER — LIDOCAINE HYDROCHLORIDE 10 MG/ML
14 INJECTION, SOLUTION INFILTRATION; PERINEURAL ONCE
Status: COMPLETED | OUTPATIENT
Start: 2021-09-20 | End: 2021-09-20

## 2021-09-20 RX ADMIN — LIDOCAINE HYDROCHLORIDE 14 ML: 10 INJECTION, SOLUTION INFILTRATION; PERINEURAL at 12:13:00

## 2021-09-20 RX ADMIN — TRIAMCINOLONE ACETONIDE 80 MG: 40 INJECTION, SUSPENSION INTRA-ARTICULAR; INTRAMUSCULAR at 12:13:00

## 2021-09-20 NOTE — PROGRESS NOTES
Subjective   Tran Davidson is a 78year old male. Patient presents with:  Wound Care    9/20/2021: 78year old male with Hx: Venous insufficiency,  Afib, Patient has been seen in outpatient wound clinic in past for leg ulcerations.  Patient verbalized h Not approximated 09/20/21 0911   Drainage Amount Moderate 09/20/21 0911   Drainage Description Serosanguineous 09/20/21 0911   Treatments Cleansed;  Saline/Wound ; Site Care; Topical (Barrier/Moisturizer/Ointment) 09/20/21 0911   Dressing Hydrofiber 3870   Compression Product Type Artiflex 10cm; Artiflex 15cm; Comprilan 8cm; Comprilan 10cm; Comprilan 12cm;  Stockinette 3in 09/20/21 0911   Dressing Applied Yes 09/20/21 0911   Compression Wrap Location Toes to Knee 09/20/21 0911   Compression Wrap Status Wound Image     Site Assessment Moist; Red; Yellow   Closure Not approximated   Drainage Amount Small   Drainage Description Serosanguineous   Treatments Cleansed;  Saline/Wound ; Site Care   Dressing Hydrofiber with silver; Kerlix roll   Dressing serosang drainage. Bilateral pedal /posterior tibial pulses Biphasic. Adequate perfusion present. Hemosiderin deposits present to BLE.        Encounter Diagnosis  Open wound of right lower extremity, subsequent encounter  (primary encounter diagnosis)  Veno

## 2021-09-22 RX ORDER — TRIAMCINOLONE ACETONIDE 40 MG/ML
80 INJECTION, SUSPENSION INTRA-ARTICULAR; INTRAMUSCULAR ONCE
Status: COMPLETED | OUTPATIENT
Start: 2021-09-22 | End: 2021-09-22

## 2021-09-22 RX ORDER — LIDOCAINE HYDROCHLORIDE 10 MG/ML
14 INJECTION, SOLUTION INFILTRATION; PERINEURAL ONCE
Status: COMPLETED | OUTPATIENT
Start: 2021-09-22 | End: 2021-09-22

## 2021-09-22 RX ADMIN — TRIAMCINOLONE ACETONIDE 80 MG: 40 INJECTION, SUSPENSION INTRA-ARTICULAR; INTRAMUSCULAR at 16:54:00

## 2021-09-22 RX ADMIN — LIDOCAINE HYDROCHLORIDE 14 ML: 10 INJECTION, SOLUTION INFILTRATION; PERINEURAL at 16:54:00

## 2021-09-27 ENCOUNTER — OFFICE VISIT (OUTPATIENT)
Dept: WOUND CARE | Facility: HOSPITAL | Age: 66
End: 2021-09-27
Attending: CLINICAL NURSE SPECIALIST
Payer: MEDICARE

## 2021-09-27 VITALS
TEMPERATURE: 97 F | DIASTOLIC BLOOD PRESSURE: 79 MMHG | RESPIRATION RATE: 18 BRPM | OXYGEN SATURATION: 95 % | HEART RATE: 104 BPM | SYSTOLIC BLOOD PRESSURE: 124 MMHG

## 2021-09-27 DIAGNOSIS — I87.311 CHRONIC VENOUS HYPERTENSION (IDIOPATHIC) WITH ULCER OF RIGHT LOWER EXTREMITY (CODE) (HCC): ICD-10-CM

## 2021-09-27 DIAGNOSIS — I87.312 CHRONIC VENOUS HYPERTENSION (IDIOPATHIC) WITH ULCER OF LEFT LOWER EXTREMITY (HCC): ICD-10-CM

## 2021-09-27 DIAGNOSIS — I87.2 VENOUS (PERIPHERAL) INSUFFICIENCY: Primary | ICD-10-CM

## 2021-09-27 DIAGNOSIS — L97.929 CHRONIC VENOUS HYPERTENSION (IDIOPATHIC) WITH ULCER OF LEFT LOWER EXTREMITY (HCC): ICD-10-CM

## 2021-09-27 PROCEDURE — 87147 CULTURE TYPE IMMUNOLOGIC: CPT

## 2021-09-27 PROCEDURE — 97162 PT EVAL MOD COMPLEX 30 MIN: CPT

## 2021-09-27 PROCEDURE — 87205 SMEAR GRAM STAIN: CPT

## 2021-09-27 PROCEDURE — 87186 SC STD MICRODIL/AGAR DIL: CPT

## 2021-09-27 PROCEDURE — 87077 CULTURE AEROBIC IDENTIFY: CPT

## 2021-09-27 PROCEDURE — 87070 CULTURE OTHR SPECIMN AEROBIC: CPT

## 2021-09-27 PROCEDURE — 29581 APPL MULTLAYER CMPRN SYS LEG: CPT

## 2021-09-27 NOTE — PROGRESS NOTES
Subjective   Cassandra Knight is a 78year old male. \"The wounds definitely look a little worse this week\"     Patient presents with:  Wound Care:  B LE venous ulcers    HPI 9/20/2021: 78year old male with Hx: Venous insufficiency,  Afib, Patient has been 09/27/21 0914   State of Healing Non-healing 09/27/21 0914       Wound 09/20/21 #2 Venous Ulcer Leg Left; Medial (Active)   Wound Image   09/27/21 0915   Site Assessment Granulation tissue;  Yellow 09/27/21 0915   Closure Not approximated 09/20/21 0911    0930   Dressing Applied Yes 09/27/21 0930   Compression Wrap Location Toes to Knee 09/27/21 0930   Compression Wrap Status Clean; Dry; Intact 09/20/21 0911     LE Assessment     09/27/21 1000   Edema   Edema Yes   Edema Location Bilateral   Compression Dev tibial tubercle B LE: stockinette 4\", artiflex x 2, 1/2\" foam over wound area, comprilan x 3, spandagrip F to cover ankle to knee    Assessment   Assessment  Pt is a 78 y.o. man who is known to the [facility] with a long history of venous ulcers.   He has return prostate     Unspecified open wound, left ankle, initial encounter     Ruptured appendix     Acute perforated appendicitis     Benign essential HTN     Obstructive sleep apnea syndrome     H/O mitral valve repair     CVA (cerebral vascular accident) ([facility] [address]

## 2021-09-29 RX ORDER — AMOXICILLIN AND CLAVULANATE POTASSIUM 500; 125 MG/1; MG/1
1 TABLET, FILM COATED ORAL 3 TIMES DAILY
Qty: 21 TABLET | Refills: 0 | Status: SHIPPED | OUTPATIENT
Start: 2021-09-29

## 2021-10-04 ENCOUNTER — OFFICE VISIT (OUTPATIENT)
Dept: WOUND CARE | Facility: HOSPITAL | Age: 66
End: 2021-10-04
Attending: CLINICAL NURSE SPECIALIST
Payer: MEDICARE

## 2021-10-04 VITALS
HEART RATE: 48 BPM | TEMPERATURE: 98 F | DIASTOLIC BLOOD PRESSURE: 82 MMHG | OXYGEN SATURATION: 96 % | SYSTOLIC BLOOD PRESSURE: 127 MMHG

## 2021-10-04 DIAGNOSIS — I87.311 CHRONIC VENOUS HYPERTENSION (IDIOPATHIC) WITH ULCER OF RIGHT LOWER EXTREMITY (CODE) (HCC): ICD-10-CM

## 2021-10-04 DIAGNOSIS — I87.2 VENOUS (PERIPHERAL) INSUFFICIENCY: ICD-10-CM

## 2021-10-04 DIAGNOSIS — I87.312 CHRONIC VENOUS HYPERTENSION (IDIOPATHIC) WITH ULCER OF LEFT LOWER EXTREMITY (HCC): Primary | ICD-10-CM

## 2021-10-04 DIAGNOSIS — L97.929 CHRONIC VENOUS HYPERTENSION (IDIOPATHIC) WITH ULCER OF LEFT LOWER EXTREMITY (HCC): Primary | ICD-10-CM

## 2021-10-04 PROCEDURE — 29581 APPL MULTLAYER CMPRN SYS LEG: CPT

## 2021-10-04 NOTE — PROGRESS NOTES
Subjective   Quiana Alberts is a 78year old male. No new complaints. Began taking antibiotics last Wed. Patient presents with:  Wound Care:  B LE venous ulcers    HPI 9/20/2021: 78year old male with Hx: Venous insufficiency,  Afib, Patient has been se Description Full thickness 10/04/21 0913   Mary-wound Assessment Hemosiderin staining; Edema; Hyperpigmented;  Atrophy rogelio 10/04/21 0913   Wound Granulation Tissue Pink 10/04/21 0913   Wound Bed Granulation (%) 50 % 10/04/21 0913   Wound Bed Epithelium 10/04/21 0946   Compression Wrap Location Toes to Knee 10/04/21 0946   Compression Wrap Status Clean; Dry; Intact 09/20/21 0911       Compression Wrap Leg Anterior;  Left (Active)   Response to Treatment Well tolerated 10/04/21 0945   Compression Layers Mul List:     Venous (peripheral) insufficiency     Atrial fibrillation (HCC)     Family hx of colon cancer     Other hyperlipidemia     Osteoarthrosis     Routine health maintenance     Special screening for malignant neoplasm of prostate     Unspecified open

## 2021-10-11 ENCOUNTER — LAB ENCOUNTER (OUTPATIENT)
Dept: LAB | Facility: HOSPITAL | Age: 66
End: 2021-10-11
Attending: INTERNAL MEDICINE
Payer: MEDICARE

## 2021-10-11 ENCOUNTER — OFFICE VISIT (OUTPATIENT)
Dept: WOUND CARE | Facility: HOSPITAL | Age: 66
End: 2021-10-11
Attending: CLINICAL NURSE SPECIALIST
Payer: MEDICARE

## 2021-10-11 VITALS
TEMPERATURE: 98 F | SYSTOLIC BLOOD PRESSURE: 129 MMHG | HEART RATE: 77 BPM | DIASTOLIC BLOOD PRESSURE: 78 MMHG | OXYGEN SATURATION: 96 %

## 2021-10-11 DIAGNOSIS — I87.311 CHRONIC VENOUS HYPERTENSION (IDIOPATHIC) WITH ULCER OF RIGHT LOWER EXTREMITY (CODE) (HCC): ICD-10-CM

## 2021-10-11 DIAGNOSIS — L97.929 CHRONIC VENOUS HYPERTENSION (IDIOPATHIC) WITH ULCER OF LEFT LOWER EXTREMITY (HCC): Primary | ICD-10-CM

## 2021-10-11 DIAGNOSIS — Z51.81 ENCOUNTER FOR MONITORING WARFARIN THERAPY: ICD-10-CM

## 2021-10-11 DIAGNOSIS — Z79.01 ENCOUNTER FOR MONITORING WARFARIN THERAPY: ICD-10-CM

## 2021-10-11 DIAGNOSIS — I87.312 CHRONIC VENOUS HYPERTENSION (IDIOPATHIC) WITH ULCER OF LEFT LOWER EXTREMITY (HCC): Primary | ICD-10-CM

## 2021-10-11 DIAGNOSIS — I87.2 VENOUS (PERIPHERAL) INSUFFICIENCY: ICD-10-CM

## 2021-10-11 PROCEDURE — 99212 OFFICE O/P EST SF 10 MIN: CPT

## 2021-10-11 PROCEDURE — 36415 COLL VENOUS BLD VENIPUNCTURE: CPT

## 2021-10-11 PROCEDURE — 29581 APPL MULTLAYER CMPRN SYS LEG: CPT

## 2021-10-11 PROCEDURE — 85610 PROTHROMBIN TIME: CPT

## 2021-10-11 NOTE — PROGRESS NOTES
Subjective   Isauroreinaldo Gordillo is a 78year old male. No new complaints. Pt leaving for [facility] Saturday. \"I will call the stocking company\"    Patient presents with:  Wound Care:  B LE venous ulcers    HPI 9/20/2021: 78year old male with Hx: Venous insufficie Non-staged Wound Description Partial thickness 10/11/21 0910   Mary-wound Assessment Hemosiderin staining; Edema; Hyperpigmented;  Atrophy rogelio 10/11/21 0910   Wound Granulation Tissue Red 10/11/21 0910   Wound Bed Granulation (%) 50 % 10/11/21 0910 Wrap Location Toes to Knee 10/11/21 0942   Compression Wrap Status Clean; Dry; Intact 09/20/21 0911       Compression Wrap Leg Anterior;  Left (Active)   Response to Treatment Well tolerated 10/11/21 0943   Compression Layers Multilayer 10/11/21 0943   Comp insufficiency    Problem List  Patient Active Problem List:     Venous (peripheral) insufficiency     Atrial fibrillation (HCC)     Family hx of colon cancer     Other hyperlipidemia     Osteoarthrosis     Routine health maintenance     Special screening f

## 2021-10-14 ENCOUNTER — APPOINTMENT (OUTPATIENT)
Dept: WOUND CARE | Facility: HOSPITAL | Age: 66
End: 2021-10-14
Payer: MEDICARE

## 2021-11-01 ENCOUNTER — OFFICE VISIT (OUTPATIENT)
Dept: WOUND CARE | Facility: HOSPITAL | Age: 66
End: 2021-11-01
Attending: NURSE PRACTITIONER
Payer: MEDICARE

## 2021-11-01 VITALS
OXYGEN SATURATION: 98 % | HEART RATE: 55 BPM | SYSTOLIC BLOOD PRESSURE: 124 MMHG | RESPIRATION RATE: 17 BRPM | DIASTOLIC BLOOD PRESSURE: 74 MMHG | TEMPERATURE: 99 F

## 2021-11-01 DIAGNOSIS — I87.2 VENOUS (PERIPHERAL) INSUFFICIENCY: Primary | ICD-10-CM

## 2021-11-01 PROCEDURE — 99212 OFFICE O/P EST SF 10 MIN: CPT

## 2021-11-01 NOTE — PROGRESS NOTES
Subjective   Isauro Close is a 78year old male. \"I did go swimming, but then I would put my compression back on and keep the stockings on all night\"    Patient presents with:  Wound Care:  B LE venous ulcers    HPI 9/20/2021: 78year old male with Hx: thickness 10/11/21 0910   Mary-wound Assessment Hemosiderin staining;Edema; Hyperpigmented; Atrophy rogelio 11/01/21 0917   Wound Granulation Tissue Red 10/11/21 0910   Wound Bed Granulation (%) 50 % 10/11/21 0910   Wound Bed Epithelium (%) 100 % 11/01/21 09 [facility])     Spinal stenosis at L4-L5 level     Lumbosacral radiculopathy at L5     Cerebrovascular accident (CVA) ([facility] [address]     Bilateral primary osteoarthritis of knee     Open wound of right lower extremity     Pressure injury of left ankle, stage 2 ([facility] [address]

## 2021-11-02 ENCOUNTER — TELEPHONE (OUTPATIENT)
Dept: PHYSICAL MEDICINE AND REHAB | Facility: CLINIC | Age: 66
End: 2021-11-02

## 2021-11-02 NOTE — TELEPHONE ENCOUNTER
Pt is being told by Marcel Andrew no refills remaining  Pt is low on medication  Tasked to [employer]

## 2021-11-02 NOTE — TELEPHONE ENCOUNTER
Spoke to Rosa Oliva, pharmacist at [facility], who confirms patient has 1 refill that will be eligible for refill tomorrow. There was an issue with manufacturing that the prior tech may have not been aware of. Patient should call tomorrow to request refill.      Deandre Vasquez

## 2021-11-05 ENCOUNTER — LAB ENCOUNTER (OUTPATIENT)
Dept: LAB | Age: 66
End: 2021-11-05
Attending: INTERNAL MEDICINE
Payer: MEDICARE

## 2021-11-05 DIAGNOSIS — I48.91 ATRIAL FIBRILLATION, UNSPECIFIED TYPE (HCC): ICD-10-CM

## 2021-11-05 PROCEDURE — 85610 PROTHROMBIN TIME: CPT

## 2021-11-05 PROCEDURE — 36415 COLL VENOUS BLD VENIPUNCTURE: CPT

## 2021-11-08 ENCOUNTER — LAB ENCOUNTER (OUTPATIENT)
Dept: LAB | Facility: HOSPITAL | Age: 66
End: 2021-11-08
Attending: INTERNAL MEDICINE
Payer: MEDICARE

## 2021-11-08 DIAGNOSIS — I48.91 ATRIAL FIBRILLATION, UNSPECIFIED TYPE (HCC): ICD-10-CM

## 2021-11-08 DIAGNOSIS — I10 BENIGN ESSENTIAL HTN: ICD-10-CM

## 2021-11-08 DIAGNOSIS — E78.49 OTHER HYPERLIPIDEMIA: ICD-10-CM

## 2021-11-08 DIAGNOSIS — Z79.01 ANTICOAGULATED: ICD-10-CM

## 2021-11-08 PROCEDURE — 85025 COMPLETE CBC W/AUTO DIFF WBC: CPT

## 2021-11-08 PROCEDURE — 36415 COLL VENOUS BLD VENIPUNCTURE: CPT

## 2021-11-08 PROCEDURE — 80053 COMPREHEN METABOLIC PANEL: CPT

## 2021-11-08 PROCEDURE — 80061 LIPID PANEL: CPT

## 2021-11-12 ENCOUNTER — TELEPHONE (OUTPATIENT)
Dept: PHYSICAL MEDICINE AND REHAB | Facility: CLINIC | Age: 66
End: 2021-11-12

## 2021-11-12 ENCOUNTER — TELEMEDICINE (OUTPATIENT)
Dept: PHYSICAL MEDICINE AND REHAB | Facility: CLINIC | Age: 66
End: 2021-11-12

## 2021-11-12 DIAGNOSIS — M17.0 BILATERAL PRIMARY OSTEOARTHRITIS OF KNEE: Primary | ICD-10-CM

## 2021-11-12 DIAGNOSIS — M54.17 LUMBOSACRAL RADICULOPATHY AT L5: ICD-10-CM

## 2021-11-12 DIAGNOSIS — I63.9 CEREBROVASCULAR ACCIDENT (CVA), UNSPECIFIED MECHANISM (HCC): ICD-10-CM

## 2021-11-12 DIAGNOSIS — L89.522 PRESSURE INJURY OF LEFT ANKLE, STAGE 2 (HCC): ICD-10-CM

## 2021-11-12 DIAGNOSIS — I48.20 CHRONIC ATRIAL FIBRILLATION (HCC): ICD-10-CM

## 2021-11-12 DIAGNOSIS — I10 BENIGN ESSENTIAL HTN: ICD-10-CM

## 2021-11-12 DIAGNOSIS — M48.061 SPINAL STENOSIS AT L4-L5 LEVEL: ICD-10-CM

## 2021-11-12 PROCEDURE — 99214 OFFICE O/P EST MOD 30 MIN: CPT | Performed by: PHYSICAL MEDICINE & REHABILITATION

## 2021-11-12 NOTE — PROGRESS NOTES
[address]    Telemedicine Visit - Follow Up Evaluation    Telehealth Verbal Consent   I conducted a telehealth visit with Ernesto Salcido today, 11/12/21, which was completed using two-way, real-time interac function however this is not as severe as it was previously. He does benefit each time with the injection although it is not sustained and long term relief.   He does not want to pursue any surgical intervention although as he did have complications with h mouth daily. 90 tablet 3   • celecoxib 200 MG Oral Cap Take 1 capsule (200 mg total) by mouth daily as needed. 90 capsule 3   • Losartan Potassium-HCTZ 100-12.5 MG Oral Tab Take 1 tablet by mouth daily.  90 tablet 3   • simvastatin 20 MG Oral Tab Take 1 tab comprehention intact, spontaneous speech intact  Psychiatric: Mood and affect appropriate    Musculoskeletal Exam:  Patient is sitting with some discomfort        LABS:     Lab Results   Component Value Date    A1C 5.6 04/16/2018     Lab Results   Componen unspecified mechanism ([facility] [address]    6. Benign essential HTN    7. [facility] fibrillation ([facility]          PLAN:   Quiana Alberts is a pleasant 48-year-old gentleman who is following up for bilateral knee and leg pain with numbness tingling sensation.   We rec verbalized understanding with this plan and was in agreement. There are no barriers to learning. All questions were answered. Ray TYLER. [address] [facility]  Physical Medicine and Rehabilitation/Sports Medicine

## 2021-11-15 ENCOUNTER — TELEPHONE (OUTPATIENT)
Dept: INTERNAL MEDICINE CLINIC | Facility: CLINIC | Age: 66
End: 2021-11-15

## 2021-11-15 NOTE — TELEPHONE ENCOUNTER
Pt. Called stating he is due for his Colonoscopy this month. Dr. Monika Lagos has retired and he is looking for a new DrChristian Wakefield would Dr. Ulysses Mack recommend?

## 2021-11-25 NOTE — LETTER
[facility] [place], [facility]  [address] [place] [id number]  Dept: [phone]      2020    Re: Yina Michael ( 1955)      To Whom It May Concern,    As you are aware, Yina Rodriguez suffered an acute stro
[id number] [facility]

## 2021-12-01 ENCOUNTER — TELEPHONE (OUTPATIENT)
Dept: PHYSICAL MEDICINE AND REHAB | Facility: CLINIC | Age: 66
End: 2021-12-01

## 2021-12-01 DIAGNOSIS — M17.0 PRIMARY OSTEOARTHRITIS OF KNEES, BILATERAL: Primary | ICD-10-CM

## 2021-12-01 NOTE — TELEPHONE ENCOUNTER
Order for Bilateral knee aspiration and injection with Hyaluronic Acid (Gel One preferred) pended and patient has been scheduled on 12/30/21

## 2021-12-02 ENCOUNTER — TELEPHONE (OUTPATIENT)
Dept: PHYSICAL MEDICINE AND REHAB | Facility: CLINIC | Age: 66
End: 2021-12-02

## 2021-12-02 ENCOUNTER — TELEPHONE (OUTPATIENT)
Dept: NEUROLOGY | Facility: CLINIC | Age: 66
End: 2021-12-02

## 2021-12-02 DIAGNOSIS — I10 BENIGN ESSENTIAL HTN: ICD-10-CM

## 2021-12-02 DIAGNOSIS — M17.0 PRIMARY OSTEOARTHRITIS OF KNEES, BILATERAL: Primary | ICD-10-CM

## 2021-12-02 DIAGNOSIS — M48.061 SPINAL STENOSIS AT L4-L5 LEVEL: ICD-10-CM

## 2021-12-02 DIAGNOSIS — I63.9 CEREBROVASCULAR ACCIDENT (CVA), UNSPECIFIED MECHANISM (HCC): ICD-10-CM

## 2021-12-02 NOTE — TELEPHONE ENCOUNTER
Per Medicare Guidelines -no authorization is required for HA and CSI injections     Viscosupplementation with Hyaluronans will only be covered for Osteoarthritis of the knee or shoulder joint when radiological evidence to support the diagnosis of osteoarth

## 2021-12-02 NOTE — TELEPHONE ENCOUNTER
Per Medicare guidelines authorization is not required for knee injection bilateral with Gel One cpt codes 64340,  x 2.  Will inform Nursing

## 2021-12-03 ENCOUNTER — TELEPHONE (OUTPATIENT)
Dept: PHYSICAL MEDICINE AND REHAB | Facility: CLINIC | Age: 66
End: 2021-12-03

## 2021-12-03 NOTE — TELEPHONE ENCOUNTER
Spoke with RN from [facility] office (cardiologist)   Zeb Estrada RN. States that patient wanted approval to hold Warfarin 3-5 day for Caudal before it is in to instruct patient how to hold Warfarin and how to use Levonox.         Fax: [phone]

## 2021-12-17 ENCOUNTER — LAB ENCOUNTER (OUTPATIENT)
Dept: LAB | Age: 66
End: 2021-12-17
Attending: INTERNAL MEDICINE
Payer: MEDICARE

## 2021-12-17 DIAGNOSIS — I48.91 ATRIAL FIBRILLATION, UNSPECIFIED TYPE (HCC): ICD-10-CM

## 2021-12-17 PROCEDURE — 36415 COLL VENOUS BLD VENIPUNCTURE: CPT

## 2021-12-17 PROCEDURE — 85610 PROTHROMBIN TIME: CPT

## 2021-12-30 ENCOUNTER — OFFICE VISIT (OUTPATIENT)
Dept: PHYSICAL MEDICINE AND REHAB | Facility: CLINIC | Age: 66
End: 2021-12-30
Payer: MEDICARE

## 2021-12-30 DIAGNOSIS — M17.0 PRIMARY OSTEOARTHRITIS OF KNEES, BILATERAL: Primary | ICD-10-CM

## 2021-12-30 PROCEDURE — 20610 DRAIN/INJ JOINT/BURSA W/O US: CPT | Performed by: PHYSICAL MEDICINE & REHABILITATION

## 2022-01-28 ENCOUNTER — LAB ENCOUNTER (OUTPATIENT)
Dept: LAB | Age: 67
End: 2022-01-28
Attending: INTERNAL MEDICINE
Payer: MEDICARE

## 2022-01-28 DIAGNOSIS — I48.91 ATRIAL FIBRILLATION, UNSPECIFIED TYPE (HCC): ICD-10-CM

## 2022-01-28 LAB
INR BLD: 2.63 (ref 0.8–1.2)
PROTHROMBIN TIME: 28.5 SECONDS (ref 11.6–14.8)

## 2022-01-28 PROCEDURE — 36415 COLL VENOUS BLD VENIPUNCTURE: CPT

## 2022-01-28 PROCEDURE — 85610 PROTHROMBIN TIME: CPT

## 2022-02-22 ENCOUNTER — TELEPHONE (OUTPATIENT)
Dept: INTERNAL MEDICINE CLINIC | Facility: CLINIC | Age: 67
End: 2022-02-22

## 2022-02-22 RX ORDER — GABAPENTIN 400 MG/1
800 CAPSULE ORAL 3 TIMES DAILY
Qty: 540 CAPSULE | Refills: 3 | Status: CANCELLED | OUTPATIENT
Start: 2022-02-22

## 2022-02-22 NOTE — TELEPHONE ENCOUNTER
Received refill request for gabapentin. Last written 7/6/21 for 1 year supply. Too early for refill, request denied. LOV  1/28/22.

## 2022-02-24 RX ORDER — SIMVASTATIN 20 MG
20 TABLET ORAL NIGHTLY
Qty: 90 TABLET | Refills: 3 | OUTPATIENT
Start: 2022-02-24

## 2022-02-24 RX ORDER — WARFARIN SODIUM 2.5 MG/1
2.5 TABLET ORAL DAILY
Qty: 90 TABLET | Refills: 3 | OUTPATIENT
Start: 2022-02-24

## 2022-02-24 RX ORDER — CELECOXIB 200 MG/1
200 CAPSULE ORAL
Qty: 90 CAPSULE | Refills: 3 | OUTPATIENT
Start: 2022-02-24

## 2022-02-24 RX ORDER — AMLODIPINE BESYLATE 10 MG/1
10 TABLET ORAL DAILY
Qty: 90 TABLET | Refills: 3 | OUTPATIENT
Start: 2022-02-24

## 2022-02-24 RX ORDER — LOSARTAN POTASSIUM AND HYDROCHLOROTHIAZIDE 12.5; 1 MG/1; MG/1
1 TABLET ORAL DAILY
Qty: 90 TABLET | Refills: 3 | OUTPATIENT
Start: 2022-02-24

## 2022-02-25 ENCOUNTER — LAB ENCOUNTER (OUTPATIENT)
Dept: LAB | Facility: HOSPITAL | Age: 67
End: 2022-02-25
Attending: INTERNAL MEDICINE
Payer: MEDICARE

## 2022-02-25 DIAGNOSIS — I48.91 ATRIAL FIBRILLATION, UNSPECIFIED TYPE (HCC): ICD-10-CM

## 2022-02-25 LAB
INR BLD: 2.23 (ref 0.8–1.2)
PROTHROMBIN TIME: 25 SECONDS (ref 11.6–14.8)

## 2022-02-25 PROCEDURE — 85610 PROTHROMBIN TIME: CPT

## 2022-02-25 PROCEDURE — 36415 COLL VENOUS BLD VENIPUNCTURE: CPT

## 2022-02-26 ENCOUNTER — LAB ENCOUNTER (OUTPATIENT)
Dept: LAB | Facility: HOSPITAL | Age: 67
End: 2022-02-26
Attending: INTERNAL MEDICINE
Payer: MEDICARE

## 2022-02-26 DIAGNOSIS — I48.91 ATRIAL FIBRILLATION, UNSPECIFIED TYPE (HCC): ICD-10-CM

## 2022-02-26 LAB — INR BLD: 2.26 (ref 0.8–1.2)

## 2022-02-26 PROCEDURE — 36415 COLL VENOUS BLD VENIPUNCTURE: CPT

## 2022-02-26 PROCEDURE — 85610 PROTHROMBIN TIME: CPT

## 2022-02-28 ENCOUNTER — LAB ENCOUNTER (OUTPATIENT)
Dept: LAB | Facility: HOSPITAL | Age: 67
End: 2022-02-28
Attending: INTERNAL MEDICINE
Payer: MEDICARE

## 2022-02-28 DIAGNOSIS — I48.91 ATRIAL FIBRILLATION, UNSPECIFIED TYPE (HCC): ICD-10-CM

## 2022-02-28 LAB
INR BLD: 1.82 (ref 0.8–1.2)
PROTHROMBIN TIME: 21.4 SECONDS (ref 11.6–14.8)

## 2022-02-28 PROCEDURE — 36415 COLL VENOUS BLD VENIPUNCTURE: CPT

## 2022-02-28 PROCEDURE — 85610 PROTHROMBIN TIME: CPT

## 2022-03-04 ENCOUNTER — LAB ENCOUNTER (OUTPATIENT)
Dept: LAB | Facility: HOSPITAL | Age: 67
End: 2022-03-04
Attending: INTERNAL MEDICINE
Payer: MEDICARE

## 2022-03-04 DIAGNOSIS — I48.91 ATRIAL FIBRILLATION, UNSPECIFIED TYPE (HCC): ICD-10-CM

## 2022-03-04 LAB
INR BLD: 1.54 (ref 0.8–1.2)
PROTHROMBIN TIME: 18.7 SECONDS (ref 11.6–14.8)

## 2022-03-04 PROCEDURE — 85610 PROTHROMBIN TIME: CPT

## 2022-03-04 PROCEDURE — 36415 COLL VENOUS BLD VENIPUNCTURE: CPT

## 2022-03-07 ENCOUNTER — LAB ENCOUNTER (OUTPATIENT)
Dept: LAB | Facility: HOSPITAL | Age: 67
End: 2022-03-07
Attending: INTERNAL MEDICINE
Payer: MEDICARE

## 2022-03-07 DIAGNOSIS — I48.91 ATRIAL FIBRILLATION, UNSPECIFIED TYPE (HCC): ICD-10-CM

## 2022-03-07 LAB
INR BLD: 2.24 (ref 0.8–1.2)
PROTHROMBIN TIME: 25.1 SECONDS (ref 11.6–14.8)

## 2022-03-07 PROCEDURE — 36415 COLL VENOUS BLD VENIPUNCTURE: CPT

## 2022-03-07 PROCEDURE — 85610 PROTHROMBIN TIME: CPT

## 2022-03-15 ENCOUNTER — TELEPHONE (OUTPATIENT)
Dept: INTERNAL MEDICINE CLINIC | Facility: CLINIC | Age: 67
End: 2022-03-15

## 2022-03-15 ENCOUNTER — LAB ENCOUNTER (OUTPATIENT)
Dept: LAB | Age: 67
End: 2022-03-15
Attending: INTERNAL MEDICINE
Payer: MEDICARE

## 2022-03-15 DIAGNOSIS — I48.91 ATRIAL FIBRILLATION, UNSPECIFIED TYPE (HCC): ICD-10-CM

## 2022-03-15 LAB
INR BLD: 2.39 (ref 0.8–1.2)
PROTHROMBIN TIME: 26.4 SECONDS (ref 11.6–14.8)

## 2022-03-15 PROCEDURE — 85610 PROTHROMBIN TIME: CPT

## 2022-03-15 PROCEDURE — 36415 COLL VENOUS BLD VENIPUNCTURE: CPT

## 2022-03-15 NOTE — TELEPHONE ENCOUNTER
Patient stopped in today to schedule an annual physical with Dr Berry Mosley on 4/12/2022. Patient is requesting blood work be added to the system for him to complete before his appointment.

## 2022-03-16 NOTE — ADDENDUM NOTE
Addended by: Kang Gomez on: 9/2/2021 04:25 PM     Modules accepted: Orders
Addended by: Kiki Ovalles on: 9/3/2021 10:21 AM     Modules accepted: Orders
Dr Martin Hudson

## 2022-04-07 ENCOUNTER — TELEPHONE (OUTPATIENT)
Dept: NEUROLOGY | Facility: CLINIC | Age: 67
End: 2022-04-07

## 2022-04-07 ENCOUNTER — OFFICE VISIT (OUTPATIENT)
Dept: PHYSICAL MEDICINE AND REHAB | Facility: CLINIC | Age: 67
End: 2022-04-07
Payer: MEDICARE

## 2022-04-07 VITALS
WEIGHT: 235 LBS | OXYGEN SATURATION: 99 % | DIASTOLIC BLOOD PRESSURE: 58 MMHG | HEART RATE: 50 BPM | BODY MASS INDEX: 30 KG/M2 | SYSTOLIC BLOOD PRESSURE: 116 MMHG

## 2022-04-07 DIAGNOSIS — I10 BENIGN ESSENTIAL HTN: ICD-10-CM

## 2022-04-07 DIAGNOSIS — I48.20 CHRONIC ATRIAL FIBRILLATION (HCC): ICD-10-CM

## 2022-04-07 DIAGNOSIS — I63.9 CEREBROVASCULAR ACCIDENT (CVA), UNSPECIFIED MECHANISM (HCC): ICD-10-CM

## 2022-04-07 DIAGNOSIS — M17.0 PRIMARY OSTEOARTHRITIS OF KNEES, BILATERAL: Primary | ICD-10-CM

## 2022-04-07 DIAGNOSIS — M48.061 SPINAL STENOSIS AT L4-L5 LEVEL: ICD-10-CM

## 2022-04-07 PROCEDURE — 20610 DRAIN/INJ JOINT/BURSA W/O US: CPT | Performed by: PHYSICAL MEDICINE & REHABILITATION

## 2022-04-07 PROCEDURE — 99214 OFFICE O/P EST MOD 30 MIN: CPT | Performed by: PHYSICAL MEDICINE & REHABILITATION

## 2022-04-07 RX ORDER — TRIAMCINOLONE ACETONIDE 40 MG/ML
80 INJECTION, SUSPENSION INTRA-ARTICULAR; INTRAMUSCULAR ONCE
Status: COMPLETED | OUTPATIENT
Start: 2022-04-07 | End: 2022-04-07

## 2022-04-07 RX ORDER — LIDOCAINE HYDROCHLORIDE 10 MG/ML
14 INJECTION, SOLUTION INFILTRATION; PERINEURAL ONCE
Status: COMPLETED | OUTPATIENT
Start: 2022-04-07 | End: 2022-04-07

## 2022-04-07 NOTE — TELEPHONE ENCOUNTER
Bilateral knee aspiration and injection with corticosteroid   CPT 20611X2,    Status: Approved- No pre-certification required. Per Medicare Guidelines; request is a covered benefit and pre-certification is not require. All Medicare coverage is based on Medical Necessity.    Notified nursing for scheduling

## 2022-04-07 NOTE — PATIENT INSTRUCTIONS
Steroid Injection Information  What to expect: The injection contains Lidocaine (which numbs the area) and Kenalog (a steroid which decreases inflammation). You may have pain relief within hours of the injection due to the Lidocaine. The Kenalog can take a couple days, up to a couple weeks, to reach the full effect. It is also possible to have a slight increase in symptoms over the first few days, but that should resolve fairly quickly. How long will the injection last?: The length of response to an injection is variable. Literally a couple weeks to a couple years. The injection will decrease the inflammation and the pain will return if/when the inflammation returns. Activity Recommendations: For the first 24 hours after injection, keep the area clean and dry. It is ok to shower but don't soak in a tub during that time. No vigorous activity such as running or heavy lifting for the first week but other than that you can gradually resume your normal activities immediately. If you have a significant decrease in pain, be careful not to do too much too soon. Again, the key is GRADUAL resumption of activites. Things to look out for: Common injection side effects include soreness at the injection site, bruising, flushing of the face or skin, and a temporary increase in your blood sugars and/or blood pressure. Infection is very rare but please notify my office [facility] for [address] [phone]) if you develop any fevers, drainage from the injection site, or severe increase in pain. If it is the weekend, go to an Emergency Room.       3 months in office

## 2022-04-11 ENCOUNTER — LAB ENCOUNTER (OUTPATIENT)
Dept: LAB | Age: 67
End: 2022-04-11
Attending: INTERNAL MEDICINE
Payer: MEDICARE

## 2022-04-11 DIAGNOSIS — I48.91 ATRIAL FIBRILLATION, UNSPECIFIED TYPE (HCC): ICD-10-CM

## 2022-04-11 LAB
ALBUMIN SERPL-MCNC: 4.2 G/DL (ref 3.4–5)
ALBUMIN/GLOB SERPL: 1.4 {RATIO} (ref 1–2)
ALP LIVER SERPL-CCNC: 63 U/L
ALT SERPL-CCNC: 37 U/L
ANION GAP SERPL CALC-SCNC: 5 MMOL/L (ref 0–18)
AST SERPL-CCNC: 18 U/L (ref 15–37)
BASOPHILS # BLD AUTO: 0.02 X10(3) UL (ref 0–0.2)
BASOPHILS NFR BLD AUTO: 0.3 %
BILIRUB SERPL-MCNC: 0.8 MG/DL (ref 0.1–2)
BUN BLD-MCNC: 23 MG/DL (ref 7–18)
BUN/CREAT SERPL: 25.3 (ref 10–20)
CALCIUM BLD-MCNC: 9.2 MG/DL (ref 8.5–10.1)
CHLORIDE SERPL-SCNC: 106 MMOL/L (ref 98–112)
CHOLEST SERPL-MCNC: 149 MG/DL (ref ?–200)
CO2 SERPL-SCNC: 30 MMOL/L (ref 21–32)
CREAT BLD-MCNC: 0.91 MG/DL
DEPRECATED RDW RBC AUTO: 47.8 FL (ref 35.1–46.3)
EOSINOPHIL # BLD AUTO: 0.09 X10(3) UL (ref 0–0.7)
EOSINOPHIL NFR BLD AUTO: 1.5 %
ERYTHROCYTE [DISTWIDTH] IN BLOOD BY AUTOMATED COUNT: 13.7 % (ref 11–15)
FASTING PATIENT LIPID ANSWER: YES
FASTING STATUS PATIENT QL REPORTED: YES
GLOBULIN PLAS-MCNC: 2.9 G/DL (ref 2.8–4.4)
GLUCOSE BLD-MCNC: 86 MG/DL (ref 70–99)
HCT VFR BLD AUTO: 46.8 %
HDLC SERPL-MCNC: 74 MG/DL (ref 40–59)
HGB BLD-MCNC: 15.4 G/DL
IMM GRANULOCYTES # BLD AUTO: 0.02 X10(3) UL (ref 0–1)
IMM GRANULOCYTES NFR BLD: 0.3 %
INR BLD: 2.73 (ref 0.8–1.2)
LDLC SERPL CALC-MCNC: 64 MG/DL (ref ?–100)
LYMPHOCYTES # BLD AUTO: 2.1 X10(3) UL (ref 1–4)
LYMPHOCYTES NFR BLD AUTO: 34.7 %
MCH RBC QN AUTO: 31.1 PG (ref 26–34)
MCHC RBC AUTO-ENTMCNC: 32.9 G/DL (ref 31–37)
MCV RBC AUTO: 94.5 FL
MONOCYTES # BLD AUTO: 0.54 X10(3) UL (ref 0.1–1)
MONOCYTES NFR BLD AUTO: 8.9 %
NEUTROPHILS # BLD AUTO: 3.29 X10 (3) UL (ref 1.5–7.7)
NEUTROPHILS # BLD AUTO: 3.29 X10(3) UL (ref 1.5–7.7)
NEUTROPHILS NFR BLD AUTO: 54.3 %
NONHDLC SERPL-MCNC: 75 MG/DL (ref ?–130)
OSMOLALITY SERPL CALC.SUM OF ELEC: 295 MOSM/KG (ref 275–295)
PLATELET # BLD AUTO: 219 10(3)UL (ref 150–450)
POTASSIUM SERPL-SCNC: 3.8 MMOL/L (ref 3.5–5.1)
PROT SERPL-MCNC: 7.1 G/DL (ref 6.4–8.2)
PROTHROMBIN TIME: 29.3 SECONDS (ref 11.6–14.8)
PSA SERPL-MCNC: 2.7 NG/ML (ref ?–4)
RBC # BLD AUTO: 4.95 X10(6)UL
SODIUM SERPL-SCNC: 141 MMOL/L (ref 136–145)
TRIGL SERPL-MCNC: 54 MG/DL (ref 30–149)
TSI SER-ACNC: 1.43 MIU/ML (ref 0.36–3.74)
VIT D+METAB SERPL-MCNC: 31.5 NG/ML (ref 30–100)
VLDLC SERPL CALC-MCNC: 8 MG/DL (ref 0–30)
WBC # BLD AUTO: 6.1 X10(3) UL (ref 4–11)

## 2022-04-11 PROCEDURE — 80053 COMPREHEN METABOLIC PANEL: CPT | Performed by: INTERNAL MEDICINE

## 2022-04-11 PROCEDURE — 82306 VITAMIN D 25 HYDROXY: CPT | Performed by: INTERNAL MEDICINE

## 2022-04-11 PROCEDURE — 84153 ASSAY OF PSA TOTAL: CPT | Performed by: INTERNAL MEDICINE

## 2022-04-11 PROCEDURE — 84443 ASSAY THYROID STIM HORMONE: CPT | Performed by: INTERNAL MEDICINE

## 2022-04-11 PROCEDURE — 80061 LIPID PANEL: CPT | Performed by: INTERNAL MEDICINE

## 2022-04-11 PROCEDURE — 36415 COLL VENOUS BLD VENIPUNCTURE: CPT | Performed by: INTERNAL MEDICINE

## 2022-04-11 PROCEDURE — 85610 PROTHROMBIN TIME: CPT

## 2022-04-11 PROCEDURE — 85025 COMPLETE CBC W/AUTO DIFF WBC: CPT | Performed by: INTERNAL MEDICINE

## 2022-04-12 ENCOUNTER — OFFICE VISIT (OUTPATIENT)
Dept: INTERNAL MEDICINE CLINIC | Facility: CLINIC | Age: 67
End: 2022-04-12
Payer: MEDICARE

## 2022-04-12 VITALS
DIASTOLIC BLOOD PRESSURE: 70 MMHG | HEART RATE: 64 BPM | SYSTOLIC BLOOD PRESSURE: 118 MMHG | TEMPERATURE: 99 F | HEIGHT: 74 IN | WEIGHT: 235 LBS | OXYGEN SATURATION: 97 % | BODY MASS INDEX: 30.16 KG/M2

## 2022-04-12 DIAGNOSIS — G60.9 IDIOPATHIC PERIPHERAL NEUROPATHY: ICD-10-CM

## 2022-04-12 DIAGNOSIS — H91.93 BILATERAL HEARING LOSS, UNSPECIFIED HEARING LOSS TYPE: Primary | ICD-10-CM

## 2022-04-12 DIAGNOSIS — Z80.0 FAMILY HX OF COLON CANCER: ICD-10-CM

## 2022-04-12 DIAGNOSIS — M17.0 PRIMARY OSTEOARTHRITIS OF KNEES, BILATERAL: ICD-10-CM

## 2022-04-12 DIAGNOSIS — I48.20 CHRONIC ATRIAL FIBRILLATION (HCC): ICD-10-CM

## 2022-04-12 DIAGNOSIS — M54.16 LEFT LUMBAR RADICULOPATHY: ICD-10-CM

## 2022-04-12 DIAGNOSIS — I63.9 CEREBROVASCULAR ACCIDENT (CVA), UNSPECIFIED MECHANISM (HCC): ICD-10-CM

## 2022-04-12 DIAGNOSIS — M48.061 SPINAL STENOSIS OF LUMBAR REGION, UNSPECIFIED WHETHER NEUROGENIC CLAUDICATION PRESENT: ICD-10-CM

## 2022-04-12 DIAGNOSIS — G47.33 OBSTRUCTIVE SLEEP APNEA SYNDROME: ICD-10-CM

## 2022-04-12 DIAGNOSIS — E78.49 OTHER HYPERLIPIDEMIA: ICD-10-CM

## 2022-04-12 DIAGNOSIS — I10 BENIGN ESSENTIAL HTN: ICD-10-CM

## 2022-04-12 DIAGNOSIS — Z00.00 ROUTINE HEALTH MAINTENANCE: ICD-10-CM

## 2022-04-12 DIAGNOSIS — I87.2 VENOUS (PERIPHERAL) INSUFFICIENCY: ICD-10-CM

## 2022-04-12 PROBLEM — L89.522 PRESSURE INJURY OF LEFT ANKLE, STAGE 2 (HCC): Status: RESOLVED | Noted: 2021-09-20 | Resolved: 2022-04-12

## 2022-04-12 PROBLEM — K35.32 RUPTURED APPENDIX: Status: RESOLVED | Noted: 2019-11-23 | Resolved: 2022-04-12

## 2022-04-12 PROBLEM — S81.801A OPEN WOUND OF RIGHT LOWER EXTREMITY: Status: RESOLVED | Noted: 2021-09-20 | Resolved: 2022-04-12

## 2022-04-12 PROCEDURE — 99215 OFFICE O/P EST HI 40 MIN: CPT | Performed by: INTERNAL MEDICINE

## 2022-04-12 NOTE — PROCEDURES
Procedure:  Bilateral knee aspiration and injection with corticosteroid  The risks, benefits and anticipated outcomes of the procedure, the risks and benefits of the alternatives to the procedure, and the roles and tasks of the personnel to be involved, were discussed with the patient, and the patient consents to the procedure and agrees to proceed. UNIVERSAL PROTOCOL / SAFETY CHECKLIST  Sign in Communication: Completed  Time Out:  Team Confirms the Correct Patient, Correct Procedure, Correct Site and Site Marking, Correct Position (if applicable), Prep and Dry Time (if applicable). The procedure was carried out under sterile prep with  with sterile gel. A 27 ga 1&1/4in needle was introduced and advanced for skin anesthesia with 3 cc of 1% lidocaine. Then, a 18 gauge 1.5 in needle was advanced and 14 cc of serosanguineous synovial fluid was aspirated from the right knee and 5 cc of synovial fluid was aspirated from the left knee. Following aspiration, a mixture of 4 cc of lidocaine and 1 cc of Kenalog (40mg/ml) was injected. The patient tolerated the procedure without complication and was instructed in post-procedure precautions.     Laya Pollock DO, FAAPMR & CAQSM  Physical Medicine and Rehabilitation/Sports Medicine  MEDICAL CENTER [facility]

## 2022-05-03 NOTE — LETTER
24        To Whom It May Concern:       Russ Kirkland  :  1955    Dr. TOSHA Cain would like to perform a L5-S1 Interlaminar Epidural Steroid Injection under fluoroscopy guidance under local anesthesia and will need clearance to hold medication Warfarin. Please hold Warfarin for   [] 3 days  [] 4 days  [] 5 days  prior to procedure.  Holding the medication(s) may put the patient at an increased risk for stroke, heart attack, or neurologic or cardiac events.    []  Patient has NOT been cleared to hold Warfarin for 3 days prior to procedure.        X_________________________________________  (Provider signature)                                     (Date)      Please make a selection, sign and fax this letter back to our office    If this office may be of further assistance, please do not hesitate to contact us.      Sincerely,    Yessica Cain, DO    Phone #: [phone]  Fax #: [phone]   Home

## 2022-05-20 ENCOUNTER — LAB ENCOUNTER (OUTPATIENT)
Dept: LAB | Age: 67
End: 2022-05-20
Attending: INTERNAL MEDICINE
Payer: MEDICARE

## 2022-05-20 DIAGNOSIS — I48.91 ATRIAL FIBRILLATION, UNSPECIFIED TYPE (HCC): ICD-10-CM

## 2022-05-20 LAB
INR BLD: 3.35 (ref 0.8–1.2)
PROTHROMBIN TIME: 34.4 SECONDS (ref 11.6–14.8)

## 2022-05-20 PROCEDURE — 36415 COLL VENOUS BLD VENIPUNCTURE: CPT

## 2022-05-20 PROCEDURE — 85610 PROTHROMBIN TIME: CPT

## 2022-05-27 ENCOUNTER — TELEMEDICINE (OUTPATIENT)
Dept: PHYSICAL MEDICINE AND REHAB | Facility: CLINIC | Age: 67
End: 2022-05-27

## 2022-05-27 ENCOUNTER — TELEPHONE (OUTPATIENT)
Dept: NEUROLOGY | Facility: CLINIC | Age: 67
End: 2022-05-27

## 2022-05-27 DIAGNOSIS — I10 BENIGN ESSENTIAL HTN: ICD-10-CM

## 2022-05-27 DIAGNOSIS — I48.20 CHRONIC ATRIAL FIBRILLATION (HCC): ICD-10-CM

## 2022-05-27 DIAGNOSIS — I63.9 CEREBROVASCULAR ACCIDENT (CVA), UNSPECIFIED MECHANISM (HCC): ICD-10-CM

## 2022-05-27 DIAGNOSIS — M48.061 SPINAL STENOSIS AT L4-L5 LEVEL: Primary | ICD-10-CM

## 2022-05-27 DIAGNOSIS — M17.0 PRIMARY OSTEOARTHRITIS OF KNEES, BILATERAL: ICD-10-CM

## 2022-05-27 PROCEDURE — 99214 OFFICE O/P EST MOD 30 MIN: CPT | Performed by: PHYSICAL MEDICINE & REHABILITATION

## 2022-05-27 NOTE — TELEPHONE ENCOUNTER
Status of Anticoag  [x] Clearance pending to hold Warfarin 2.5 for 3-5 days prior to L5-S1 Interlaminar epidural steroid injection F: [phone] Arvind Tena MD)  [] Clearance approved  [] Clearance denied    Procedure: L5-S1 Interlaminar epidural steroid injection  Anesthesia: Local  Dx: S95.156  Location: [facility]   CPT Codes: 59436.

## 2022-05-27 NOTE — TELEPHONE ENCOUNTER
L5-S1 Interlaminar Epidural Steroid Injection under fluoroscopy CPT CODE: 07701    Status: Approved- No pre-certification required. Per Medicare Guidelines; request is a covered benefit and pre-certification is not require. All Medicare coverage is based on Medical Necessity.    Notified nursing for scheduling

## 2022-06-03 ENCOUNTER — LAB ENCOUNTER (OUTPATIENT)
Dept: LAB | Age: 67
End: 2022-06-03
Attending: INTERNAL MEDICINE
Payer: MEDICARE

## 2022-06-03 DIAGNOSIS — I48.91 ATRIAL FIBRILLATION, UNSPECIFIED TYPE (HCC): ICD-10-CM

## 2022-06-03 LAB
INR BLD: 2.64 (ref 0.8–1.2)
PROTHROMBIN TIME: 28.6 SECONDS (ref 11.6–14.8)

## 2022-06-03 PROCEDURE — 36415 COLL VENOUS BLD VENIPUNCTURE: CPT

## 2022-06-03 PROCEDURE — 85610 PROTHROMBIN TIME: CPT

## 2022-06-04 NOTE — PHYSICAL THERAPY NOTE
Pt was to be seen for PT treatment session. Consulted w/ RN. Per RN, pt is currently off the floor in OR for diagnostic laparotomy and possible lap appendectomy. Pt can be re-evaluated tomorrow. lacerations

## 2022-06-06 ENCOUNTER — TELEPHONE (OUTPATIENT)
Dept: INTERNAL MEDICINE CLINIC | Facility: CLINIC | Age: 67
End: 2022-06-06

## 2022-06-06 NOTE — TELEPHONE ENCOUNTER
Note faxed back for  to consult on [facility] hold as pt is at high risk and [facility] DIVISION monitors [facility] - [phone]

## 2022-06-06 NOTE — TELEPHONE ENCOUNTER
Val called from Dr Janneth Valdez office  She sent anti-coag form  Can be located under letters in Epic  Please read and respond via Epic  Tasked to Coumadin

## 2022-06-23 ENCOUNTER — TELEPHONE (OUTPATIENT)
Dept: PHYSICAL MEDICINE AND REHAB | Facility: CLINIC | Age: 67
End: 2022-06-23

## 2022-06-23 DIAGNOSIS — I48.20 CHRONIC ATRIAL FIBRILLATION (HCC): Primary | ICD-10-CM

## 2022-06-24 ENCOUNTER — LAB ENCOUNTER (OUTPATIENT)
Dept: LAB | Age: 67
End: 2022-06-24
Attending: INTERNAL MEDICINE
Payer: MEDICARE

## 2022-06-24 LAB
INR BLD: 2.07 (ref 0.8–1.2)
PROTHROMBIN TIME: 23.5 SECONDS (ref 11.6–14.8)

## 2022-06-24 PROCEDURE — 85610 PROTHROMBIN TIME: CPT | Performed by: PHYSICAL MEDICINE & REHABILITATION

## 2022-06-24 PROCEDURE — 36415 COLL VENOUS BLD VENIPUNCTURE: CPT | Performed by: PHYSICAL MEDICINE & REHABILITATION

## 2022-06-25 ENCOUNTER — LAB ENCOUNTER (OUTPATIENT)
Dept: LAB | Facility: HOSPITAL | Age: 67
End: 2022-06-25
Attending: INTERNAL MEDICINE
Payer: MEDICARE

## 2022-06-25 DIAGNOSIS — I48.91 ATRIAL FIBRILLATION, UNSPECIFIED TYPE (HCC): ICD-10-CM

## 2022-06-25 LAB
INR BLD: 1.89 (ref 0.8–1.2)
PROTHROMBIN TIME: 21.9 SECONDS (ref 11.6–14.8)

## 2022-06-25 PROCEDURE — 36415 COLL VENOUS BLD VENIPUNCTURE: CPT

## 2022-06-25 PROCEDURE — 85610 PROTHROMBIN TIME: CPT

## 2022-06-27 ENCOUNTER — OFFICE VISIT (OUTPATIENT)
Dept: SURGERY | Facility: CLINIC | Age: 67
End: 2022-06-27

## 2022-06-27 ENCOUNTER — LAB ENCOUNTER (OUTPATIENT)
Dept: LAB | Facility: HOSPITAL | Age: 67
End: 2022-06-27
Attending: PHYSICAL MEDICINE & REHABILITATION
Payer: MEDICARE

## 2022-06-27 DIAGNOSIS — M48.062 SPINAL STENOSIS OF LUMBAR REGION WITH NEUROGENIC CLAUDICATION: Primary | ICD-10-CM

## 2022-06-27 DIAGNOSIS — I48.20 CHRONIC ATRIAL FIBRILLATION (HCC): Primary | ICD-10-CM

## 2022-06-27 LAB
INR BLD: 1.35 (ref 0.8–1.2)
PROTHROMBIN TIME: 16.8 SECONDS (ref 11.6–14.8)

## 2022-06-27 PROCEDURE — 85610 PROTHROMBIN TIME: CPT

## 2022-06-27 PROCEDURE — 36415 COLL VENOUS BLD VENIPUNCTURE: CPT

## 2022-06-27 NOTE — PROCEDURES
[address]    LUMBAR INTERLAMINAR  NAME:  Nathan Red    MR #:    [id number] :  1955     PHYSICIAN:  Harpreet Mendoza. Galen Marion DO        Operative Report    DATE OF PROCEDURE: 2022   PREOPERATIVE DIAGNOSES: 1. Spinal stenosis of lumbar region with neurogenic claudication        POSTOPERATIVE DIAGNOSES:   1. Spinal stenosis of lumbar region with neurogenic claudication        PROCEDURES: L5-S1 interlaminar epidural steroid injection done under fluoroscopic guidance with contrast enhancement. SURGEON: Harpreet Mendoza. Galen Marion DO   ANESTHESIA: Local   INDICATIONS:       OPERATIVE PROCEDURE:  Written consent was obtained from the patient. The patient was brought into the operating room and placed in the prone position on the fluoroscopy table with pillow underneath the chest and shoulders. The patient's skin was cleaned and draped in a normal sterile fashion. Using AP fluoroscopy, all 5 lumbar vertebrae were identified. The left L5 lamina was identified. Skin was anesthetized with 1% PF lidocaine without epinephrine. Then, a 3-1/2 inch, 18-gauge Tuohy needle was inserted and directed towards the left L5 lamina. When it was engaged, it was walked inferiorly and medially until it was felt to drop off the inferiormedial aspect. Then, Omnipaque-240 contrast was used to obtain a good epidurogram indicating correct needle placement. Then, aspiration was performed. No blood, fluid, or air was aspirated. Then, the patient was injected with a 10 cc solution of 5 cc of normal sterile saline and 2 cc of 1% PF lidocaine without epinephrine, and 3 cc of 6 mg/cc of Celestone Soluspan. Then, the needle was removed. The patient's skin was cleaned. A Band-Aid was applied. The patient was transferred to the cart and into [facility]. The patient was given discharge instructions and will follow up in the clinic as scheduled.   Throughout the whole procedure, the patient's pulse oximetry and vital signs were monitored and they remained completely stable. Also, throughout the whole procedure, prior to injection of any medication, aspiration was performed. No blood, fluid, or air was aspirated at anytime.     Kenrick Choudhary DO, FAAPMR & CAQSM  Physical Medicine and Rehabilitation/Sports Medicine  MEDICAL CENTER [facility]

## 2022-06-29 ENCOUNTER — LAB ENCOUNTER (OUTPATIENT)
Dept: LAB | Age: 67
End: 2022-06-29
Attending: INTERNAL MEDICINE
Payer: MEDICARE

## 2022-06-29 DIAGNOSIS — I48.91 ATRIAL FIBRILLATION, UNSPECIFIED TYPE (HCC): ICD-10-CM

## 2022-06-29 LAB
INR BLD: 1.59 (ref 0.8–1.2)
PROTHROMBIN TIME: 19.1 SECONDS (ref 11.6–14.8)

## 2022-06-29 PROCEDURE — 36415 COLL VENOUS BLD VENIPUNCTURE: CPT

## 2022-06-29 PROCEDURE — 85610 PROTHROMBIN TIME: CPT

## 2022-07-01 ENCOUNTER — LAB ENCOUNTER (OUTPATIENT)
Dept: LAB | Age: 67
End: 2022-07-01
Attending: INTERNAL MEDICINE
Payer: MEDICARE

## 2022-07-01 DIAGNOSIS — I48.91 ATRIAL FIBRILLATION, UNSPECIFIED TYPE (HCC): ICD-10-CM

## 2022-07-01 LAB
INR BLD: 2 (ref 0.8–1.2)
PROTHROMBIN TIME: 23 SECONDS (ref 11.6–14.8)

## 2022-07-01 PROCEDURE — 36415 COLL VENOUS BLD VENIPUNCTURE: CPT

## 2022-07-01 PROCEDURE — 85610 PROTHROMBIN TIME: CPT

## 2022-07-07 RX ORDER — WARFARIN SODIUM 2.5 MG/1
TABLET ORAL
Qty: 90 TABLET | Refills: 3 | OUTPATIENT
Start: 2022-07-07

## 2022-07-07 RX ORDER — SIMVASTATIN 20 MG
TABLET ORAL
Qty: 90 TABLET | Refills: 3 | OUTPATIENT
Start: 2022-07-07

## 2022-07-07 RX ORDER — LOSARTAN POTASSIUM AND HYDROCHLOROTHIAZIDE 12.5; 1 MG/1; MG/1
1 TABLET ORAL DAILY
Qty: 90 TABLET | Refills: 3 | OUTPATIENT
Start: 2022-07-07

## 2022-07-07 RX ORDER — AMLODIPINE BESYLATE 10 MG/1
TABLET ORAL
Qty: 90 TABLET | Refills: 3 | OUTPATIENT
Start: 2022-07-07

## 2022-07-08 ENCOUNTER — LAB ENCOUNTER (OUTPATIENT)
Dept: LAB | Age: 67
End: 2022-07-08
Attending: INTERNAL MEDICINE
Payer: MEDICARE

## 2022-07-08 DIAGNOSIS — I48.91 ATRIAL FIBRILLATION, UNSPECIFIED TYPE (HCC): ICD-10-CM

## 2022-07-08 LAB
INR BLD: 2.46 (ref 0.8–1.2)
PROTHROMBIN TIME: 27.1 SECONDS (ref 11.6–14.8)

## 2022-07-08 PROCEDURE — 36415 COLL VENOUS BLD VENIPUNCTURE: CPT

## 2022-07-08 PROCEDURE — 85610 PROTHROMBIN TIME: CPT

## 2022-07-22 ENCOUNTER — TELEMEDICINE (OUTPATIENT)
Dept: PHYSICAL MEDICINE AND REHAB | Facility: CLINIC | Age: 67
End: 2022-07-22

## 2022-07-22 ENCOUNTER — TELEPHONE (OUTPATIENT)
Dept: NEUROLOGY | Facility: CLINIC | Age: 67
End: 2022-07-22

## 2022-07-22 ENCOUNTER — LAB ENCOUNTER (OUTPATIENT)
Dept: LAB | Age: 67
End: 2022-07-22
Attending: INTERNAL MEDICINE
Payer: MEDICARE

## 2022-07-22 DIAGNOSIS — I63.9 CEREBROVASCULAR ACCIDENT (CVA), UNSPECIFIED MECHANISM (HCC): ICD-10-CM

## 2022-07-22 DIAGNOSIS — M17.0 PRIMARY OSTEOARTHRITIS OF KNEES, BILATERAL: ICD-10-CM

## 2022-07-22 DIAGNOSIS — I63.9 CEREBROVASCULAR ACCIDENT (CVA), UNSPECIFIED MECHANISM (HCC): Primary | ICD-10-CM

## 2022-07-22 DIAGNOSIS — M54.17 LUMBOSACRAL RADICULOPATHY AT L5: ICD-10-CM

## 2022-07-22 DIAGNOSIS — I10 BENIGN ESSENTIAL HTN: ICD-10-CM

## 2022-07-22 DIAGNOSIS — I48.20 CHRONIC ATRIAL FIBRILLATION (HCC): ICD-10-CM

## 2022-07-22 DIAGNOSIS — M48.061 SPINAL STENOSIS AT L4-L5 LEVEL: Primary | ICD-10-CM

## 2022-07-22 DIAGNOSIS — I48.91 ATRIAL FIBRILLATION, UNSPECIFIED TYPE (HCC): ICD-10-CM

## 2022-07-22 LAB
INR BLD: 2.95 (ref 0.8–1.2)
PROTHROMBIN TIME: 31.2 SECONDS (ref 11.6–14.8)

## 2022-07-22 PROCEDURE — 85610 PROTHROMBIN TIME: CPT

## 2022-07-22 PROCEDURE — 36415 COLL VENOUS BLD VENIPUNCTURE: CPT

## 2022-07-22 PROCEDURE — 99214 OFFICE O/P EST MOD 30 MIN: CPT | Performed by: PHYSICAL MEDICINE & REHABILITATION

## 2022-07-22 NOTE — TELEPHONE ENCOUNTER
Per Medicare guidelines authorization is not required for L5-S1 Interlaminar Epidural Steroid Injection under fluoroscopy cpt codes 78278, 20530. Will inform Nursing.

## 2022-07-26 ENCOUNTER — TELEPHONE (OUTPATIENT)
Dept: PHYSICAL MEDICINE AND REHAB | Facility: CLINIC | Age: 67
End: 2022-07-26

## 2022-07-26 NOTE — TELEPHONE ENCOUNTER
Status of Anticoag  [] Clearance pending  [x] Clearance approved (Warfarin 3-5 days)  [] Clearance denied       Not specified number of days patient is to hold off medication.  (3-5 days)

## 2022-07-27 NOTE — TELEPHONE ENCOUNTER
Per last anticoag form patient is to call office to get bridge on Levonox. Patient has been scheduled for L5-S1 Interlaminar epidural steroid injection on 8/8/22 at the [facility] with Dr. Frank Metcalf.   -Anesthesia type: Local.  -If receiving MAC or IVC sedation patient will need to get COVID tested 3 days prior even if already vaccinated (order placed by [facility].)  -If scheduling [address] covid testing required for all procedures whether patient is vaccinated or not. -Patient informed not to eat or drink anything after midnight the night prior to the procedure, if being sedated. -Patient was advised that if he/she does receive the covid vaccine it needs to be at least 2 weeks before or after the injection. -Medications and allergies reviewed. -Patient reminded to hold NSAIDs (Ibuprofen, ASA 81, Aleve, Naproxen, Mobic etc.) for 3 days prior to [facility]  if BMI is greater than 35. For Cervical injections only hold multivitamins, Vitamin E, Fish Oil, Phentermine (Lomaira) for 7 days prior to injection and NSAIDS.  mg to be held for 7 days prior to injections.  -If patient is receiving MAC/IVCS Phentermine Magdalen Ennis) will need to be held for 7 days prior to injection.  -If on blood thinner clearance has been received to hold this medication by provider.   -Patient informed he/she will need a  to and from procedure. -[facility] is located in the [facility] 1st floor. Patient may park in the yellow parking. Patient verbalized understanding and agrees with plan.  -----> Scheduled in Epic: Yes  -----> Scheduled in [employer]: Yes    INR test ordered.

## 2022-07-27 NOTE — TELEPHONE ENCOUNTER
Action taken:  [x] Rescheduled on Epic 8/22/22  [x] Rescheduled on [employer]/Surgical Change Case Request 8/22/22  [x] Verified authorization dates Valid    New INR ordered.

## 2022-08-05 ENCOUNTER — TELEPHONE (OUTPATIENT)
Dept: INTERNAL MEDICINE CLINIC | Facility: CLINIC | Age: 67
End: 2022-08-05

## 2022-08-05 RX ORDER — SIMVASTATIN 20 MG
TABLET ORAL
Qty: 90 TABLET | Refills: 3 | Status: SHIPPED | OUTPATIENT
Start: 2022-08-05

## 2022-08-05 RX ORDER — WARFARIN SODIUM 2.5 MG/1
TABLET ORAL
Qty: 90 TABLET | Refills: 1 | OUTPATIENT
Start: 2022-08-05

## 2022-08-05 RX ORDER — AMLODIPINE BESYLATE 10 MG/1
TABLET ORAL
Qty: 90 TABLET | Refills: 3 | Status: SHIPPED | OUTPATIENT
Start: 2022-08-05

## 2022-08-05 RX ORDER — LOSARTAN POTASSIUM AND HYDROCHLOROTHIAZIDE 12.5; 1 MG/1; MG/1
1 TABLET ORAL DAILY
Qty: 90 TABLET | Refills: 3 | OUTPATIENT
Start: 2022-08-05

## 2022-08-05 RX ORDER — LOSARTAN POTASSIUM AND HYDROCHLOROTHIAZIDE 12.5; 1 MG/1; MG/1
1 TABLET ORAL DAILY
Qty: 90 TABLET | Refills: 3 | Status: SHIPPED | OUTPATIENT
Start: 2022-08-05

## 2022-08-20 ENCOUNTER — LAB ENCOUNTER (OUTPATIENT)
Dept: LAB | Facility: HOSPITAL | Age: 67
End: 2022-08-20
Attending: INTERNAL MEDICINE
Payer: MEDICARE

## 2022-08-20 DIAGNOSIS — I48.20 CHRONIC ATRIAL FIBRILLATION (HCC): ICD-10-CM

## 2022-08-20 DIAGNOSIS — I63.9 CEREBROVASCULAR ACCIDENT (CVA), UNSPECIFIED MECHANISM (HCC): ICD-10-CM

## 2022-08-20 LAB
INR BLD: 2.49 (ref 0.85–1.16)
PROTHROMBIN TIME: 26.4 SECONDS (ref 11.6–14.8)

## 2022-08-20 PROCEDURE — 36415 COLL VENOUS BLD VENIPUNCTURE: CPT

## 2022-08-20 PROCEDURE — 85610 PROTHROMBIN TIME: CPT

## 2022-08-22 ENCOUNTER — LAB ENCOUNTER (OUTPATIENT)
Dept: LAB | Facility: HOSPITAL | Age: 67
End: 2022-08-22
Attending: PHYSICAL MEDICINE & REHABILITATION
Payer: MEDICARE

## 2022-08-22 DIAGNOSIS — I48.20 CHRONIC ATRIAL FIBRILLATION (HCC): ICD-10-CM

## 2022-08-22 DIAGNOSIS — I63.9 CEREBROVASCULAR ACCIDENT (CVA), UNSPECIFIED MECHANISM (HCC): ICD-10-CM

## 2022-08-22 LAB
INR BLD: 1.82 (ref 0.85–1.16)
PROTHROMBIN TIME: 20.8 SECONDS (ref 11.6–14.8)

## 2022-08-22 PROCEDURE — 85610 PROTHROMBIN TIME: CPT

## 2022-08-22 PROCEDURE — 36415 COLL VENOUS BLD VENIPUNCTURE: CPT

## 2022-08-23 ENCOUNTER — OFFICE VISIT (OUTPATIENT)
Dept: SURGERY | Facility: CLINIC | Age: 67
End: 2022-08-23

## 2022-08-23 ENCOUNTER — LAB ENCOUNTER (OUTPATIENT)
Dept: LAB | Facility: HOSPITAL | Age: 67
End: 2022-08-23
Attending: PHYSICAL MEDICINE & REHABILITATION
Payer: MEDICARE

## 2022-08-23 DIAGNOSIS — M48.062 SPINAL STENOSIS OF LUMBAR REGION WITH NEUROGENIC CLAUDICATION: Primary | ICD-10-CM

## 2022-08-23 DIAGNOSIS — I63.9 CEREBROVASCULAR ACCIDENT (CVA), UNSPECIFIED MECHANISM (HCC): ICD-10-CM

## 2022-08-23 DIAGNOSIS — M54.17 LUMBOSACRAL RADICULOPATHY AT L5: ICD-10-CM

## 2022-08-23 LAB
INR BLD: 1.37 (ref 0.85–1.16)
PROTHROMBIN TIME: 16.8 SECONDS (ref 11.6–14.8)

## 2022-08-23 PROCEDURE — 85610 PROTHROMBIN TIME: CPT

## 2022-08-23 PROCEDURE — 62323 NJX INTERLAMINAR LMBR/SAC: CPT | Performed by: PHYSICAL MEDICINE & REHABILITATION

## 2022-08-23 PROCEDURE — 36415 COLL VENOUS BLD VENIPUNCTURE: CPT

## 2022-08-26 ENCOUNTER — LAB ENCOUNTER (OUTPATIENT)
Dept: LAB | Age: 67
End: 2022-08-26
Attending: INTERNAL MEDICINE
Payer: MEDICARE

## 2022-08-26 DIAGNOSIS — I48.20 CHRONIC ATRIAL FIBRILLATION (HCC): ICD-10-CM

## 2022-08-26 DIAGNOSIS — I63.9 CEREBROVASCULAR ACCIDENT (CVA), UNSPECIFIED MECHANISM (HCC): ICD-10-CM

## 2022-08-26 LAB
INR BLD: 1.68 (ref 0.85–1.16)
PROTHROMBIN TIME: 19.5 SECONDS (ref 11.6–14.8)

## 2022-08-26 PROCEDURE — 85610 PROTHROMBIN TIME: CPT

## 2022-08-26 PROCEDURE — 36415 COLL VENOUS BLD VENIPUNCTURE: CPT

## 2022-08-29 ENCOUNTER — LAB ENCOUNTER (OUTPATIENT)
Dept: LAB | Age: 67
End: 2022-08-29
Attending: INTERNAL MEDICINE
Payer: MEDICARE

## 2022-08-29 DIAGNOSIS — I48.20 CHRONIC ATRIAL FIBRILLATION (HCC): ICD-10-CM

## 2022-08-29 DIAGNOSIS — I63.9 CEREBROVASCULAR ACCIDENT (CVA), UNSPECIFIED MECHANISM (HCC): ICD-10-CM

## 2022-08-29 LAB
INR BLD: 2.82 (ref 0.85–1.16)
PROTHROMBIN TIME: 29.1 SECONDS (ref 11.6–14.8)

## 2022-08-29 PROCEDURE — 36415 COLL VENOUS BLD VENIPUNCTURE: CPT

## 2022-08-29 PROCEDURE — 85610 PROTHROMBIN TIME: CPT

## 2022-08-31 ENCOUNTER — TELEPHONE (OUTPATIENT)
Dept: PHYSICAL MEDICINE AND REHAB | Facility: CLINIC | Age: 67
End: 2022-08-31

## 2022-08-31 NOTE — PROCEDURES
[address]    LUMBAR INTERLAMINAR  NAME:  Brenda Abdi    MR #:    [id number] :  1955     PHYSICIAN:  Ernesto Rodriguez. Jo Rich DO        Operative Report    DATE OF PROCEDURE: 2022   PREOPERATIVE DIAGNOSES: 1. Spinal stenosis of lumbar region with neurogenic claudication        POSTOPERATIVE DIAGNOSES:   1. Spinal stenosis of lumbar region with neurogenic claudication        PROCEDURES: L5-S1 interlaminar epidural steroid injection done under fluoroscopic guidance with contrast enhancement. SURGEON: Ernesto Rodriguez. Jo Rich DO   ANESTHESIA: Local   INDICATIONS:       OPERATIVE PROCEDURE:  Written consent was obtained from the patient. The patient was brought into the operating room and placed in the prone position on the fluoroscopy table with pillow underneath the chest and shoulders. The patient's skin was cleaned and draped in a normal sterile fashion. Using AP fluoroscopy, all 5 lumbar vertebrae were identified. The left L5 lamina was identified. Skin was anesthetized with 1% PF lidocaine without epinephrine. Then, a 3-1/2 inch, 18-gauge Tuohy needle was inserted and directed towards the left L5 lamina. When it was engaged, it was walked inferiorly and medially until it was felt to drop off the inferiormedial aspect. Then, Omnipaque-240 contrast was used to obtain a good epidurogram indicating correct needle placement. Then, aspiration was performed. No blood, fluid, or air was aspirated. Then, the patient was injected with a 10 cc solution of 5 cc of normal sterile saline and 2 cc of 1% PF lidocaine without epinephrine, and 3 cc of 6 mg/cc of Celestone Soluspan. Then, the needle was removed. The patient's skin was cleaned. A Band-Aid was applied. The patient was transferred to the cart and into [facility]. The patient was given discharge instructions and will follow up in the clinic as scheduled.   Throughout the whole procedure, the patient's pulse oximetry and vital signs were monitored and they remained completely stable. Also, throughout the whole procedure, prior to injection of any medication, aspiration was performed. No blood, fluid, or air was aspirated at anytime.     Varghese Bro DO FAAPMR & CAQSM  Physical Medicine and Rehabilitation/Sports Medicine  MEDICAL CENTER [facility]

## 2022-09-13 ENCOUNTER — LAB ENCOUNTER (OUTPATIENT)
Dept: LAB | Age: 67
End: 2022-09-13
Attending: INTERNAL MEDICINE
Payer: MEDICARE

## 2022-09-13 DIAGNOSIS — I48.20 CHRONIC ATRIAL FIBRILLATION (HCC): ICD-10-CM

## 2022-09-13 DIAGNOSIS — I63.9 CEREBROVASCULAR ACCIDENT (CVA), UNSPECIFIED MECHANISM (HCC): ICD-10-CM

## 2022-09-13 LAB
INR BLD: 2.83 (ref 0.85–1.16)
PROTHROMBIN TIME: 29.1 SECONDS (ref 11.6–14.8)

## 2022-09-13 PROCEDURE — 36415 COLL VENOUS BLD VENIPUNCTURE: CPT

## 2022-09-13 PROCEDURE — 85610 PROTHROMBIN TIME: CPT

## 2022-09-15 ENCOUNTER — TELEPHONE (OUTPATIENT)
Dept: INTERNAL MEDICINE CLINIC | Facility: CLINIC | Age: 67
End: 2022-09-15

## 2022-09-15 DIAGNOSIS — Z23 VACCINE FOR STREPTOCOCCUS PNEUMONIAE AND INFLUENZA: Primary | ICD-10-CM

## 2022-09-15 NOTE — TELEPHONE ENCOUNTER
Please call patient  Is patient due for a pneumonia vaccine? If so is order needed?   Tasked to nursing

## 2022-09-15 NOTE — TELEPHONE ENCOUNTER
Spoke to patient and relayed MD message. Will call tomorrow to sched appt for lab appt to get Prevnar 20 vaccine. Pt verbalized understanding and agrees with plan.

## 2022-09-20 RX ORDER — CELECOXIB 200 MG/1
CAPSULE ORAL
Qty: 90 CAPSULE | Refills: 3 | Status: CANCELLED | OUTPATIENT
Start: 2022-09-20

## 2022-09-21 ENCOUNTER — NURSE ONLY (OUTPATIENT)
Dept: INTERNAL MEDICINE CLINIC | Facility: CLINIC | Age: 67
End: 2022-09-21

## 2022-09-21 PROCEDURE — 90677 PCV20 VACCINE IM: CPT | Performed by: INTERNAL MEDICINE

## 2022-09-21 PROCEDURE — G0009 ADMIN PNEUMOCOCCAL VACCINE: HCPCS | Performed by: INTERNAL MEDICINE

## 2022-09-22 RX ORDER — CELECOXIB 200 MG/1
CAPSULE ORAL
Qty: 90 CAPSULE | Refills: 3 | Status: SHIPPED | OUTPATIENT
Start: 2022-09-22

## 2022-09-22 NOTE — TELEPHONE ENCOUNTER
To DR. CARRI Hayward pt has been on celebrex for years, but > 65yoa and on warfarin which we don't monitor;   Rx Pended

## 2022-09-23 NOTE — TELEPHONE ENCOUNTER
I spoke to Maximus Hernandez and relayed Dr Yuan Smoker message to him. He verbalized understanding.

## 2022-09-23 NOTE — TELEPHONE ENCOUNTER
Refill done but please express to pt that it is ideal for him to minimize usage of celebrex given potential interaction with warfarin (increased bleeding)

## 2022-09-25 RX ORDER — CELECOXIB 200 MG/1
CAPSULE ORAL
Qty: 90 CAPSULE | Refills: 3 | OUTPATIENT
Start: 2022-09-25

## 2022-10-06 ENCOUNTER — TELEPHONE (OUTPATIENT)
Dept: PHYSICAL MEDICINE AND REHAB | Facility: CLINIC | Age: 67
End: 2022-10-06

## 2022-10-07 ENCOUNTER — TELEMEDICINE (OUTPATIENT)
Dept: PHYSICAL MEDICINE AND REHAB | Facility: CLINIC | Age: 67
End: 2022-10-07

## 2022-10-07 DIAGNOSIS — M17.0 PRIMARY OSTEOARTHRITIS OF KNEES, BILATERAL: ICD-10-CM

## 2022-10-07 DIAGNOSIS — I63.9 CEREBROVASCULAR ACCIDENT (CVA), UNSPECIFIED MECHANISM (HCC): ICD-10-CM

## 2022-10-07 DIAGNOSIS — I10 BENIGN ESSENTIAL HTN: ICD-10-CM

## 2022-10-07 DIAGNOSIS — M48.061 SPINAL STENOSIS AT L4-L5 LEVEL: Primary | ICD-10-CM

## 2022-10-07 DIAGNOSIS — I48.20 CHRONIC ATRIAL FIBRILLATION (HCC): ICD-10-CM

## 2022-10-07 PROCEDURE — 99214 OFFICE O/P EST MOD 30 MIN: CPT | Performed by: PHYSICAL MEDICINE & REHABILITATION

## 2022-10-11 ENCOUNTER — TELEPHONE (OUTPATIENT)
Dept: PHYSICAL MEDICINE AND REHAB | Facility: CLINIC | Age: 67
End: 2022-10-11

## 2022-10-11 NOTE — TELEPHONE ENCOUNTER
Per Medicare Guidelines -no authorization is required for knee injection and HA w/ Gel One   Viscosupplementation with Hyaluronans will only be covered for Osteoarthritis of the knee or shoulder joint when radiological evidence to support the diagnosis of osteoarthritis; and there is adequate documentation that simple pharmacologic therapy (ie aspirin), or exercise and physical therapy has been tried and the patient has failed to respond satisfactorily.  FYI-Synvisc One is limited to Osteoarthritis of the knee    Status: Authorization is not required however may be subject to review once claim is submitted-Covered Benefit (Buy[employer]Bill)    Clinical staff can proceed with scheduling Bilateral knee aspiration+ injection with hyaluronic acid w/ Gel One CPT 20610x2+[id number]+

## 2022-10-12 ENCOUNTER — LAB ENCOUNTER (OUTPATIENT)
Dept: LAB | Age: 67
End: 2022-10-12
Attending: INTERNAL MEDICINE
Payer: MEDICARE

## 2022-10-12 DIAGNOSIS — I49.3 PREMATURE VENTRICULAR CONTRACTIONS: ICD-10-CM

## 2022-10-12 DIAGNOSIS — I48.91 ATRIAL FIBRILLATION, UNSPECIFIED TYPE (HCC): ICD-10-CM

## 2022-10-12 DIAGNOSIS — Z79.01 LONG TERM (CURRENT) USE OF ANTICOAGULANTS: Primary | ICD-10-CM

## 2022-10-12 LAB
ALBUMIN SERPL-MCNC: 4.2 G/DL (ref 3.4–5)
ALBUMIN/GLOB SERPL: 1.4 {RATIO} (ref 1–2)
ALP LIVER SERPL-CCNC: 76 U/L
ALT SERPL-CCNC: 34 U/L
ANION GAP SERPL CALC-SCNC: 6 MMOL/L (ref 0–18)
AST SERPL-CCNC: 20 U/L (ref 15–37)
BASOPHILS # BLD AUTO: 0.05 X10(3) UL (ref 0–0.2)
BASOPHILS NFR BLD AUTO: 1 %
BILIRUB SERPL-MCNC: 0.9 MG/DL (ref 0.1–2)
BUN BLD-MCNC: 20 MG/DL (ref 7–18)
BUN/CREAT SERPL: 26.7 (ref 10–20)
CALCIUM BLD-MCNC: 9.6 MG/DL (ref 8.5–10.1)
CHLORIDE SERPL-SCNC: 107 MMOL/L (ref 98–112)
CHOLEST SERPL-MCNC: 160 MG/DL (ref ?–200)
CO2 SERPL-SCNC: 25 MMOL/L (ref 21–32)
CREAT BLD-MCNC: 0.75 MG/DL
DEPRECATED RDW RBC AUTO: 47.4 FL (ref 35.1–46.3)
EOSINOPHIL # BLD AUTO: 0.12 X10(3) UL (ref 0–0.7)
EOSINOPHIL NFR BLD AUTO: 2.5 %
ERYTHROCYTE [DISTWIDTH] IN BLOOD BY AUTOMATED COUNT: 13.4 % (ref 11–15)
FASTING PATIENT LIPID ANSWER: YES
FASTING STATUS PATIENT QL REPORTED: YES
GFR SERPLBLD BASED ON 1.73 SQ M-ARVRAT: 98 ML/MIN/1.73M2 (ref 60–?)
GLOBULIN PLAS-MCNC: 3.1 G/DL (ref 2.8–4.4)
GLUCOSE BLD-MCNC: 95 MG/DL (ref 70–99)
HCT VFR BLD AUTO: 46.1 %
HDLC SERPL-MCNC: 74 MG/DL (ref 40–59)
HGB BLD-MCNC: 14.9 G/DL
IMM GRANULOCYTES # BLD AUTO: 0.01 X10(3) UL (ref 0–1)
IMM GRANULOCYTES NFR BLD: 0.2 %
INR BLD: 2.84 (ref 0.85–1.16)
LDLC SERPL CALC-MCNC: 74 MG/DL (ref ?–100)
LYMPHOCYTES # BLD AUTO: 1.4 X10(3) UL (ref 1–4)
LYMPHOCYTES NFR BLD AUTO: 29.2 %
MCH RBC QN AUTO: 30.8 PG (ref 26–34)
MCHC RBC AUTO-ENTMCNC: 32.3 G/DL (ref 31–37)
MCV RBC AUTO: 95.2 FL
MONOCYTES # BLD AUTO: 0.37 X10(3) UL (ref 0.1–1)
MONOCYTES NFR BLD AUTO: 7.7 %
NEUTROPHILS # BLD AUTO: 2.84 X10 (3) UL (ref 1.5–7.7)
NEUTROPHILS # BLD AUTO: 2.84 X10(3) UL (ref 1.5–7.7)
NEUTROPHILS NFR BLD AUTO: 59.4 %
NONHDLC SERPL-MCNC: 86 MG/DL (ref ?–130)
OSMOLALITY SERPL CALC.SUM OF ELEC: 288 MOSM/KG (ref 275–295)
PLATELET # BLD AUTO: 210 10(3)UL (ref 150–450)
POTASSIUM SERPL-SCNC: 3.7 MMOL/L (ref 3.5–5.1)
PROT SERPL-MCNC: 7.3 G/DL (ref 6.4–8.2)
PROTHROMBIN TIME: 29.3 SECONDS (ref 11.6–14.8)
RBC # BLD AUTO: 4.84 X10(6)UL
SODIUM SERPL-SCNC: 138 MMOL/L (ref 136–145)
TRIGL SERPL-MCNC: 61 MG/DL (ref 30–149)
VLDLC SERPL CALC-MCNC: 9 MG/DL (ref 0–30)
WBC # BLD AUTO: 4.8 X10(3) UL (ref 4–11)

## 2022-10-12 PROCEDURE — 80053 COMPREHEN METABOLIC PANEL: CPT

## 2022-10-12 PROCEDURE — 85610 PROTHROMBIN TIME: CPT

## 2022-10-12 PROCEDURE — 80061 LIPID PANEL: CPT

## 2022-10-12 PROCEDURE — 85025 COMPLETE CBC W/AUTO DIFF WBC: CPT

## 2022-10-12 PROCEDURE — 36415 COLL VENOUS BLD VENIPUNCTURE: CPT

## 2022-10-28 ENCOUNTER — OFFICE VISIT (OUTPATIENT)
Dept: WOUND CARE | Facility: HOSPITAL | Age: 67
End: 2022-10-28
Attending: NURSE PRACTITIONER
Payer: MEDICARE

## 2022-10-28 DIAGNOSIS — I87.312 CHRONIC VENOUS HYPERTENSION (IDIOPATHIC) WITH ULCER OF LEFT LOWER EXTREMITY (HCC): Primary | ICD-10-CM

## 2022-10-28 DIAGNOSIS — L97.929 CHRONIC VENOUS HYPERTENSION (IDIOPATHIC) WITH ULCER OF LEFT LOWER EXTREMITY (HCC): Primary | ICD-10-CM

## 2022-10-28 PROCEDURE — 99204 OFFICE O/P NEW MOD 45 MIN: CPT | Performed by: FAMILY MEDICINE

## 2022-10-28 NOTE — PROGRESS NOTES
Weekly Wound Education Note    Teaching Provided To: Patient  Training Topics: Cleasing and general instructions; Compression;Nutrition;Skin care  Training Method: Demonstration  Training Response: Reinforcement needed        Notes: Started enluxtra artiflex and comprilan 10 cm and 12 cm

## 2022-11-04 ENCOUNTER — OFFICE VISIT (OUTPATIENT)
Dept: WOUND CARE | Facility: HOSPITAL | Age: 67
End: 2022-11-04
Attending: FAMILY MEDICINE
Payer: MEDICARE

## 2022-11-04 VITALS
RESPIRATION RATE: 20 BRPM | SYSTOLIC BLOOD PRESSURE: 130 MMHG | OXYGEN SATURATION: 97 % | TEMPERATURE: 98 F | HEART RATE: 57 BPM | DIASTOLIC BLOOD PRESSURE: 89 MMHG

## 2022-11-04 DIAGNOSIS — L97.929 CHRONIC VENOUS HYPERTENSION (IDIOPATHIC) WITH ULCER OF LEFT LOWER EXTREMITY (HCC): Primary | ICD-10-CM

## 2022-11-04 DIAGNOSIS — I87.312 CHRONIC VENOUS HYPERTENSION (IDIOPATHIC) WITH ULCER OF LEFT LOWER EXTREMITY (HCC): Primary | ICD-10-CM

## 2022-11-04 PROCEDURE — 99214 OFFICE O/P EST MOD 30 MIN: CPT | Performed by: FAMILY MEDICINE

## 2022-11-04 NOTE — PROGRESS NOTES
Weekly Wound Education Note    Teaching Provided To: Patient  Training Topics: Cleasing and general instructions; Compression;Dressing;Nutrition;Skin care  Training Method: Explain/Verbal  Training Response: Reinforcement needed        Notes: Wound slightly larger changed to hydroferra transfer kerrmax comprilan 2 wraps Patient/Caregiver provided printed discharge information.

## 2022-11-07 ENCOUNTER — OFFICE VISIT (OUTPATIENT)
Dept: PHYSICAL MEDICINE AND REHAB | Facility: CLINIC | Age: 67
End: 2022-11-07
Payer: MEDICARE

## 2022-11-07 DIAGNOSIS — M17.0 BILATERAL PRIMARY OSTEOARTHRITIS OF KNEE: ICD-10-CM

## 2022-11-07 DIAGNOSIS — M48.061 SPINAL STENOSIS AT L4-L5 LEVEL: Primary | ICD-10-CM

## 2022-11-07 PROCEDURE — 20610 DRAIN/INJ JOINT/BURSA W/O US: CPT | Performed by: PHYSICAL MEDICINE & REHABILITATION

## 2022-11-07 RX ORDER — LIDOCAINE HYDROCHLORIDE 10 MG/ML
6 INJECTION, SOLUTION INFILTRATION; PERINEURAL ONCE
Status: COMPLETED | OUTPATIENT
Start: 2022-11-07 | End: 2022-11-07

## 2022-11-07 RX ADMIN — LIDOCAINE HYDROCHLORIDE 6 ML: 10 INJECTION, SOLUTION INFILTRATION; PERINEURAL at 17:34:00

## 2022-11-07 NOTE — PATIENT INSTRUCTIONS
Hyaluronic Acid Injection  What to expect: The injection contains Lidocaine (which numbs the area) and hyaluronic acid. You may have pain relief within hours of the injection due to the Lidocaine. The Hyaluronic Acid can take a couple days, up to a couple weeks, to reach the full effect. It is also possible to have a slight increase in symptoms over the first few days, but that should resolve fairly quickly. How long will the injection last?: The length of response to an injection is variable. Literally a couple weeks to a couple years. The injection will decrease the inflammation and the pain will return if/when the inflammation returns. Activity Recommendations: For the first 24 hours after injection, keep the area clean and dry. It is ok to shower but don't soak in a tub during that time. No vigorous activity such as running or heavy lifting for the first week but other than that you can gradually resume your normal activities immediately. If you have a significant decrease in pain, be careful not to do too much too soon. Again, the key is GRADUAL resumption of activites. Things to look out for: Common injection side effects include soreness at the injection site, bruising, and flushing of the face or skin. Infection is very rare but please notify my office [facility] for [address] [place] [phone]) if you develop any fevers, drainage from the injection site, or severe increase in pain. If it is the weekend, go to an Emergency Room.       Follow up four weeks virtually

## 2022-11-08 ENCOUNTER — TELEPHONE (OUTPATIENT)
Dept: INTERNAL MEDICINE CLINIC | Facility: CLINIC | Age: 67
End: 2022-11-08

## 2022-11-08 NOTE — PROCEDURES
Procedure:  Bilateral knee aspiration and injection with Hyaluronic acid  The risks, benefits and anticipated outcomes of the procedure, the risks and benefits of the alternatives to the procedure, and the roles and tasks of the personnel to be involved, were discussed with the patient, and the patient consents to the procedure and agrees to proceed. UNIVERSAL PROTOCOL / SAFETY CHECKLIST  Sign in Communication: Completed  Time Out:  Team Confirms the Correct Patient, Correct Procedure, Correct Site and Site Marking, Correct Position (if applicable), Prep and Dry Time (if applicable). The procedure was carried out under sterile prep with  with sterile gel. A 27 ga 1&1/4in needle was introduced and advanced for skin anesthesia with 3 cc of 1% lidocaine. Then, a 18 gauge 1.5 in needle was advanced in the suprapatellar bursa using the superior lateral knee injection approach and 20 cc of synovial blood-tinged fluid was aspirated from the right knee and 0 cc of synovial blood-tinged fluid was aspirated from the left knee. Following aspiration, Gel One was injected. The patient tolerated the procedure without complication and was instructed in post-procedure precautions. See patient instructions.     Noah Shannon DO, FAAPMR & CAQSM  Physical Medicine and Rehabilitation/Sports Medicine  MEDICAL CENTER [facility]

## 2022-11-09 NOTE — H&P
[facility]    Cardiac Electrophysiology H&P Addendum    Yana Coello [facility] Location:Cath Lab Suites   [facility] [id number] MRN [id number]   Admission Date 11/30/2022 Procedure Date 11/30/2022   Attending Physician Rene Lopes MD Procedure Physician Samuel Stanford MD       H&P ADDENDUM    I personally reviewed the attached H&P on 11/30/2022, and no significant changes have occurred in the patient's condition since the H&P was performed. Risks, alternatives, and benefits of procedure were again reviewed at bedside with the patient. They have no additional questions and wish to proceed. Samuel Stanford M.D.    Cardiac Electrophysiology       -----------------------------------------

## 2022-11-10 RX ORDER — WARFARIN SODIUM 2.5 MG/1
TABLET ORAL
Qty: 90 TABLET | Refills: 3 | Status: SHIPPED | OUTPATIENT
Start: 2022-11-10

## 2022-11-10 RX ORDER — LOSARTAN POTASSIUM AND HYDROCHLOROTHIAZIDE 12.5; 1 MG/1; MG/1
1 TABLET ORAL DAILY
Qty: 90 TABLET | Refills: 3 | OUTPATIENT
Start: 2022-11-10

## 2022-11-11 ENCOUNTER — APPOINTMENT (OUTPATIENT)
Dept: WOUND CARE | Facility: HOSPITAL | Age: 67
End: 2022-11-11
Attending: FAMILY MEDICINE
Payer: MEDICARE

## 2022-11-11 VITALS
OXYGEN SATURATION: 96 % | DIASTOLIC BLOOD PRESSURE: 76 MMHG | TEMPERATURE: 98 F | RESPIRATION RATE: 20 BRPM | SYSTOLIC BLOOD PRESSURE: 128 MMHG | HEART RATE: 71 BPM

## 2022-11-11 DIAGNOSIS — L97.929 CHRONIC VENOUS HYPERTENSION (IDIOPATHIC) WITH ULCER OF LEFT LOWER EXTREMITY (HCC): Primary | ICD-10-CM

## 2022-11-11 DIAGNOSIS — I87.2 VENOUS (PERIPHERAL) INSUFFICIENCY: ICD-10-CM

## 2022-11-11 DIAGNOSIS — I87.312 CHRONIC VENOUS HYPERTENSION (IDIOPATHIC) WITH ULCER OF LEFT LOWER EXTREMITY (HCC): Primary | ICD-10-CM

## 2022-11-11 PROCEDURE — 99215 OFFICE O/P EST HI 40 MIN: CPT | Performed by: FAMILY MEDICINE

## 2022-11-11 NOTE — PROGRESS NOTES
Weekly Wound Education Note    Teaching Provided To: Patient  Training Topics: Compression;Dressing;Cleasing and general instructions  Training Method: Explain/Verbal;Demonstration  Training Response: Patient responds and understands        Notes: wound is improving, continued with hydrofera blue transfer and kerramax, comprilan x 2, betamethasone to periwound mild irritation

## 2022-11-14 ENCOUNTER — LAB ENCOUNTER (OUTPATIENT)
Dept: LAB | Age: 67
End: 2022-11-14
Attending: INTERNAL MEDICINE
Payer: MEDICARE

## 2022-11-14 DIAGNOSIS — I48.20 CHRONIC ATRIAL FIBRILLATION (HCC): ICD-10-CM

## 2022-11-14 DIAGNOSIS — I63.9 CEREBROVASCULAR ACCIDENT (CVA), UNSPECIFIED MECHANISM (HCC): ICD-10-CM

## 2022-11-14 LAB
INR BLD: 2.25 (ref 0.85–1.16)
PROTHROMBIN TIME: 24.5 SECONDS (ref 11.6–14.8)

## 2022-11-14 PROCEDURE — 85610 PROTHROMBIN TIME: CPT

## 2022-11-14 PROCEDURE — 36415 COLL VENOUS BLD VENIPUNCTURE: CPT

## 2022-11-18 ENCOUNTER — OFFICE VISIT (OUTPATIENT)
Dept: WOUND CARE | Facility: HOSPITAL | Age: 67
End: 2022-11-18
Attending: FAMILY MEDICINE
Payer: MEDICARE

## 2022-11-18 VITALS
SYSTOLIC BLOOD PRESSURE: 141 MMHG | HEART RATE: 68 BPM | DIASTOLIC BLOOD PRESSURE: 79 MMHG | OXYGEN SATURATION: 99 % | RESPIRATION RATE: 20 BRPM | TEMPERATURE: 97 F

## 2022-11-18 DIAGNOSIS — I87.312 CHRONIC VENOUS HYPERTENSION (IDIOPATHIC) WITH ULCER OF LEFT LOWER EXTREMITY (HCC): Primary | ICD-10-CM

## 2022-11-18 DIAGNOSIS — L97.929 CHRONIC VENOUS HYPERTENSION (IDIOPATHIC) WITH ULCER OF LEFT LOWER EXTREMITY (HCC): Primary | ICD-10-CM

## 2022-11-18 DIAGNOSIS — I87.2 VENOUS (PERIPHERAL) INSUFFICIENCY: ICD-10-CM

## 2022-11-18 PROCEDURE — 99214 OFFICE O/P EST MOD 30 MIN: CPT | Performed by: FAMILY MEDICINE

## 2022-11-18 NOTE — PROGRESS NOTES
Weekly Wound Education Note    Teaching Provided To: Patient  Training Topics: Dressing; Compression;Skin care  Training Method: Explain/Verbal  Training Response: Patient responds and understands        Notes: improving, added collagen, hydrofera blue transfer and kerramax, 2 layers comprilan

## 2022-11-23 ENCOUNTER — NURSE ONLY (OUTPATIENT)
Dept: WOUND CARE | Facility: HOSPITAL | Age: 67
End: 2022-11-23
Attending: FAMILY MEDICINE
Payer: MEDICARE

## 2022-11-23 VITALS
HEART RATE: 65 BPM | RESPIRATION RATE: 20 BRPM | TEMPERATURE: 98 F | SYSTOLIC BLOOD PRESSURE: 114 MMHG | OXYGEN SATURATION: 95 % | DIASTOLIC BLOOD PRESSURE: 71 MMHG

## 2022-11-23 DIAGNOSIS — S91.002A UNSPECIFIED OPEN WOUND, LEFT ANKLE, INITIAL ENCOUNTER: ICD-10-CM

## 2022-11-23 PROCEDURE — 29581 APPL MULTLAYER CMPRN SYS LEG: CPT

## 2022-11-23 NOTE — PROGRESS NOTES
Weekly Wound Education Note    Teaching Provided To: Patient  Training Topics: Dressing; Compression;Skin care  Training Method: Explain/Verbal  Training Response: Patient responds and understands        Notes: improving wound, continued current wound dressing care.

## 2022-11-28 ENCOUNTER — NURSE ONLY (OUTPATIENT)
Dept: LAB | Facility: HOSPITAL | Age: 67
End: 2022-11-28
Attending: INTERNAL MEDICINE
Payer: MEDICARE

## 2022-11-28 ENCOUNTER — HOSPITAL ENCOUNTER (OUTPATIENT)
Dept: GENERAL RADIOLOGY | Facility: HOSPITAL | Age: 67
Discharge: HOME | End: 2022-11-28
Attending: INTERNAL MEDICINE
Payer: MEDICARE

## 2022-11-28 DIAGNOSIS — Z01.818 PRE-OP TESTING: ICD-10-CM

## 2022-11-28 LAB
ALBUMIN SERPL-MCNC: 3.9 G/DL (ref 3.4–5)
ALBUMIN/GLOB SERPL: 1 {RATIO} (ref 1–2)
ALP LIVER SERPL-CCNC: 95 U/L
ALT SERPL-CCNC: 50 U/L
ANION GAP SERPL CALC-SCNC: 5 MMOL/L (ref 0–18)
AST SERPL-CCNC: 31 U/L (ref 15–37)
BASOPHILS # BLD AUTO: 0.05 X10(3) UL (ref 0–0.2)
BASOPHILS NFR BLD AUTO: 0.9 %
BILIRUB SERPL-MCNC: 0.4 MG/DL (ref 0.1–2)
BUN BLD-MCNC: 21 MG/DL (ref 7–18)
BUN/CREAT SERPL: 26.9 (ref 10–20)
CALCIUM BLD-MCNC: 9.2 MG/DL (ref 8.5–10.1)
CHLORIDE SERPL-SCNC: 106 MMOL/L (ref 98–112)
CO2 SERPL-SCNC: 28 MMOL/L (ref 21–32)
CREAT BLD-MCNC: 0.78 MG/DL
DEPRECATED RDW RBC AUTO: 45.6 FL (ref 35.1–46.3)
EOSINOPHIL # BLD AUTO: 0.14 X10(3) UL (ref 0–0.7)
EOSINOPHIL NFR BLD AUTO: 2.5 %
ERYTHROCYTE [DISTWIDTH] IN BLOOD BY AUTOMATED COUNT: 13 % (ref 11–15)
FASTING STATUS PATIENT QL REPORTED: YES
GFR SERPLBLD BASED ON 1.73 SQ M-ARVRAT: 97 ML/MIN/1.73M2 (ref 60–?)
GLOBULIN PLAS-MCNC: 3.8 G/DL (ref 2.8–4.4)
GLUCOSE BLD-MCNC: 92 MG/DL (ref 70–99)
HCT VFR BLD AUTO: 46.3 %
HGB BLD-MCNC: 14.7 G/DL
IMM GRANULOCYTES # BLD AUTO: 0.01 X10(3) UL (ref 0–1)
IMM GRANULOCYTES NFR BLD: 0.2 %
LYMPHOCYTES # BLD AUTO: 1.85 X10(3) UL (ref 1–4)
LYMPHOCYTES NFR BLD AUTO: 32.7 %
MCH RBC QN AUTO: 29.9 PG (ref 26–34)
MCHC RBC AUTO-ENTMCNC: 31.7 G/DL (ref 31–37)
MCV RBC AUTO: 94.1 FL
MONOCYTES # BLD AUTO: 0.45 X10(3) UL (ref 0.1–1)
MONOCYTES NFR BLD AUTO: 8 %
MRSA DNA SPEC QL NAA+PROBE: NEGATIVE
NEUTROPHILS # BLD AUTO: 3.15 X10 (3) UL (ref 1.5–7.7)
NEUTROPHILS # BLD AUTO: 3.15 X10(3) UL (ref 1.5–7.7)
NEUTROPHILS NFR BLD AUTO: 55.7 %
OSMOLALITY SERPL CALC.SUM OF ELEC: 291 MOSM/KG (ref 275–295)
PLATELET # BLD AUTO: 240 10(3)UL (ref 150–450)
POTASSIUM SERPL-SCNC: 3.8 MMOL/L (ref 3.5–5.1)
PROT SERPL-MCNC: 7.7 G/DL (ref 6.4–8.2)
RBC # BLD AUTO: 4.92 X10(6)UL
SARS-COV-2 RNA RESP QL NAA+PROBE: NOT DETECTED
SODIUM SERPL-SCNC: 139 MMOL/L (ref 136–145)
WBC # BLD AUTO: 5.7 X10(3) UL (ref 4–11)

## 2022-11-28 PROCEDURE — 87641 MR-STAPH DNA AMP PROBE: CPT

## 2022-11-28 PROCEDURE — 71046 X-RAY EXAM CHEST 2 VIEWS: CPT | Performed by: INTERNAL MEDICINE

## 2022-11-28 PROCEDURE — 36415 COLL VENOUS BLD VENIPUNCTURE: CPT

## 2022-11-28 PROCEDURE — 85025 COMPLETE CBC W/AUTO DIFF WBC: CPT

## 2022-11-28 PROCEDURE — 80053 COMPREHEN METABOLIC PANEL: CPT

## 2022-11-28 NOTE — TELEPHONE ENCOUNTER
Refill request is for a maintenance medication and has met the criteria specified in the Ambulatory Medication Refill Standing Order for eligibility, visits, laboratory, alerts and was sent to the requested pharmacy. Hatchet Flap Text: Because of the full-thickness nature of the defect and to avoid deformity of free margin of the ala, and after full discussion of the spectrum of repair options, a dorsal nasal rotation flap was planned.  After prep and anesthesia, a Burow’s triangle was excised from the lateral portion superiorly on the nasal sidewall towards the inner canthus and an incision extended from the inferior margin of the wound across the nose and sidewall, then extended superiorly along the nasofacial sulcus and medial cheek through the inner canthus to the glabella where a back cut was made to the contralateral inner canthus.  The flap was elevated by skeletonizing the nasal root, dorsum, and sidewalls.  After hemostasis obtained, the flap was rotated into place, trimmed to fit the defect, and sutured in a layered fashion.

## 2022-11-29 ENCOUNTER — NURSE ONLY (OUTPATIENT)
Dept: WOUND CARE | Facility: HOSPITAL | Age: 67
End: 2022-11-29
Attending: NURSE PRACTITIONER
Payer: MEDICARE

## 2022-11-29 VITALS
SYSTOLIC BLOOD PRESSURE: 146 MMHG | DIASTOLIC BLOOD PRESSURE: 84 MMHG | RESPIRATION RATE: 20 BRPM | HEART RATE: 63 BPM | OXYGEN SATURATION: 98 % | TEMPERATURE: 98 F

## 2022-11-29 PROCEDURE — 29581 APPL MULTLAYER CMPRN SYS LEG: CPT

## 2022-11-30 ENCOUNTER — HOSPITAL ENCOUNTER (OUTPATIENT)
Dept: INTERVENTIONAL RADIOLOGY/VASCULAR | Facility: HOSPITAL | Age: 67
Discharge: HOME | End: 2022-11-30
Attending: INTERNAL MEDICINE | Admitting: INTERNAL MEDICINE
Payer: MEDICARE

## 2022-11-30 ENCOUNTER — HOSPITAL ENCOUNTER (OUTPATIENT)
Dept: GENERAL RADIOLOGY | Facility: HOSPITAL | Age: 67
Discharge: HOME | End: 2022-11-30
Attending: INTERNAL MEDICINE | Admitting: INTERNAL MEDICINE
Payer: MEDICARE

## 2022-11-30 VITALS
OXYGEN SATURATION: 94 % | TEMPERATURE: 98 F | HEART RATE: 60 BPM | RESPIRATION RATE: 17 BRPM | DIASTOLIC BLOOD PRESSURE: 71 MMHG | SYSTOLIC BLOOD PRESSURE: 107 MMHG | HEIGHT: 74 IN | BODY MASS INDEX: 31.44 KG/M2 | WEIGHT: 245 LBS

## 2022-11-30 DIAGNOSIS — Z86.73 HISTORY OF CARDIOEMBOLIC CEREBROVASCULAR ACCIDENT (CVA): ICD-10-CM

## 2022-11-30 DIAGNOSIS — Z98.890 HX OF MITRAL VALVE REPAIR: ICD-10-CM

## 2022-11-30 DIAGNOSIS — Z01.818 PRE-OP TESTING: Primary | ICD-10-CM

## 2022-11-30 DIAGNOSIS — R00.1 BRADYCARDIA: ICD-10-CM

## 2022-11-30 DIAGNOSIS — I49.5 SINUS NODE DYSFUNCTION (HCC): ICD-10-CM

## 2022-11-30 LAB
INR BLD: 2.47 (ref 0.85–1.16)
PROTHROMBIN TIME: 26.5 SECONDS (ref 11.6–14.8)

## 2022-11-30 PROCEDURE — 0JH605Z INSERTION OF PACEMAKER, SINGLE CHAMBER RATE RESPONSIVE INTO CHEST SUBCUTANEOUS TISSUE AND FASCIA, OPEN APPROACH: ICD-10-PCS | Performed by: INTERNAL MEDICINE

## 2022-11-30 PROCEDURE — 99153 MOD SED SAME PHYS/QHP EA: CPT

## 2022-11-30 PROCEDURE — 33207 INSERT HEART PM VENTRICULAR: CPT

## 2022-11-30 PROCEDURE — 99152 MOD SED SAME PHYS/QHP 5/>YRS: CPT

## 2022-11-30 PROCEDURE — 71045 X-RAY EXAM CHEST 1 VIEW: CPT | Performed by: INTERNAL MEDICINE

## 2022-11-30 PROCEDURE — 02HK3JZ INSERTION OF PACEMAKER LEAD INTO RIGHT VENTRICLE, PERCUTANEOUS APPROACH: ICD-10-PCS | Performed by: INTERNAL MEDICINE

## 2022-11-30 PROCEDURE — 85610 PROTHROMBIN TIME: CPT | Performed by: INTERNAL MEDICINE

## 2022-11-30 PROCEDURE — 36415 COLL VENOUS BLD VENIPUNCTURE: CPT

## 2022-11-30 RX ORDER — ACETAMINOPHEN AND CODEINE PHOSPHATE 300; 30 MG/1; MG/1
2 TABLET ORAL EVERY 4 HOURS PRN
Status: DISCONTINUED | OUTPATIENT
Start: 2022-11-30 | End: 2022-11-30

## 2022-11-30 RX ORDER — BESIFLOXACIN 6 MG/ML
SUSPENSION OPHTHALMIC
COMMUNITY
Start: 2022-10-11

## 2022-11-30 RX ORDER — CEFAZOLIN SODIUM/WATER 2 G/20 ML
SYRINGE (ML) INTRAVENOUS
Status: COMPLETED
Start: 2022-11-30 | End: 2022-11-30

## 2022-11-30 RX ORDER — MIDAZOLAM HYDROCHLORIDE 1 MG/ML
INJECTION INTRAMUSCULAR; INTRAVENOUS
Status: COMPLETED
Start: 2022-11-30 | End: 2022-11-30

## 2022-11-30 RX ORDER — CEPHALEXIN 500 MG/1
500 CAPSULE ORAL 4 TIMES DAILY
Qty: 4 CAPSULE | Refills: 0 | Status: SHIPPED | OUTPATIENT
Start: 2022-11-30 | End: 2022-12-01

## 2022-11-30 RX ORDER — ACETAMINOPHEN 325 MG/1
650 TABLET ORAL EVERY 4 HOURS PRN
Status: DISCONTINUED | OUTPATIENT
Start: 2022-11-30 | End: 2022-11-30

## 2022-11-30 RX ORDER — ACETAMINOPHEN AND CODEINE PHOSPHATE 300; 30 MG/1; MG/1
1 TABLET ORAL EVERY 4 HOURS PRN
Status: DISCONTINUED | OUTPATIENT
Start: 2022-11-30 | End: 2022-11-30

## 2022-11-30 RX ORDER — LIDOCAINE HYDROCHLORIDE 20 MG/ML
INJECTION, SOLUTION EPIDURAL; INFILTRATION; INTRACAUDAL; PERINEURAL
Status: COMPLETED
Start: 2022-11-30 | End: 2022-11-30

## 2022-11-30 RX ORDER — ACETAMINOPHEN AND CODEINE PHOSPHATE 300; 30 MG/1; MG/1
TABLET ORAL
Status: COMPLETED
Start: 2022-11-30 | End: 2022-11-30

## 2022-11-30 RX ORDER — SODIUM CHLORIDE 9 MG/ML
INJECTION, SOLUTION INTRAVENOUS CONTINUOUS
Status: DISCONTINUED | OUTPATIENT
Start: 2022-11-30 | End: 2022-11-30

## 2022-11-30 RX ADMIN — ACETAMINOPHEN AND CODEINE PHOSPHATE 1 TABLET: 300; 30 TABLET ORAL at 11:19:00

## 2022-11-30 RX ADMIN — SODIUM CHLORIDE: 9 INJECTION, SOLUTION INTRAVENOUS at 06:45:00

## 2022-11-30 NOTE — OPERATIVE REPORT
[facility]    Cardiac Electrophysiology Operative Note    Violeta [facility] Location:Cath Lab Suites   [facility] [id number] MRN [id number]   Admission Date 11/30/2022 Procedure Date 11/30/2022   Attending Physician Chiara Barreto MD Procedure Physician Garry [place], MD       Preprocedure Diagnosis:  Atrial fibrillation with symptomatic bradycardia    Postprocedure Diagnosis:  Atrial fibrillation with symptomatic bradycardia    Procedures:    - Implantation of a MRI-conditional single-chamber permanent pacemaker and right ventricular lead [facility]  - Fluoroscopy   - Venography   - Moderate conscious sedation     :  Garry [place], M.D. Anesthesia:  I provided moderate conscious sedation from 08:16 to 09:00. Versed, fentanyl. Estimated blood loss:  10 mL      Complications:  None apparent. Findings:  The patient was brought to the operating room in the postabsorptive and stable state. A procedural pause was performed to confirm the patient's identity and the site and type of surgery. Intravenous conscious sedation was administered. The chest was prepped and draped in a sterile fashion. 1% lidocaine was administered in the left chest, and an incision was made medial to the deltopectoral groove. Electrocautery was used to create a small inferomedially directed prepectoral pocket. The left axillary vein was accessed using venography, fluoroscopy, and the modified Seldinger technique. A J-tipped guide wire was advanced into the inferior vena cava and over this, a 6-Tajik sheath was advanced into the axillary vein, and through this the right ventricular lead was advanced to the right ventricular septum, confirmed in the Telugu and YU fluoroscopic perspectives. Lead stability and electrical parameters were satisfactory, and the lead was secured to the pectoralis muscle using 3 separate ties of 0-ethibond suture and the collar.   10-volt output produced no extracardiac stimulation. The generator was connected to the lead, with visual inspection and gentle tugging confirming a secure connection. The device pocket was irrigated with sterile solution, and hemostasis was excellent. The device was placed within tthe pocket, and it was closed with a deep layer of 2-0 Vicryl suture, an intermediate layer of 2-0 Vicryl suture, and the subcuticular layer was approximated with a Zipline device, with a sterile bandage placed over the site. The pacemaker was interrogated via the external , with satisfactory findings. The patient was transported to the recovery area in comfortable and stable status. Device Settings:   VVIR  ppm         RESULTS:   - Implantation of a MRI-conditional single-chamber pacemaker      PLAN:   - Portable CXR in recovery  - Remote device interrogation in AM  - No heparin or lovenox. - Continue warfarin tomorrow tonight.   - Incision care as directed. Keep dry for 5 days.  - Follow-up in [address] [place] [address] for incision check in 7-10 days.   - See me to remove Zipline in 4 weeks. - Arm restrictions for 3 months, with sling at night for 1 month. - Driving/activity restrictions as instructed. - Oral antibioticvs x 24 hours (script sent to pharmacy)      Serene Olivares M.D.    Cardiac Electrophysiology     11/30/2022   -----------------------------------------

## 2022-11-30 NOTE — DISCHARGE INSTRUCTIONS
[facility] Discharge Instructions - Pacemaker or ICD Placement     1. Your appointment for a wound check is on December 7th, 2022 with Dr. Brooks Monahan at 11 AM.     2.  At that appointment, they will make an appointment with Dr Brooks Monahan in 4 weeks to remove the Zipline dressing, and also an appointment with the [address] [place] [address] for 3 months after implantation of your device. 3.  Please call [phone] if any questions about the follow-up appointments. 4. Remove top dressing in 24 hours and leave Zipline dressing open to air. Do not get the incision site wet. 5.You may shower in 5 days. Blot the incision area gently with a towel. The Zipline dressing will remain in place until your office visit  in 4 weeks. 6. For surgical site pain, you may take extra strength tylenol (500mg), 1 or 2 tabs/caps every 4-6 hours as needed. Do not exceed maximum of 6 tabs/caps in 24 hours. 7. No strenuous exercises until cleared by your physician. Do NOT lift anything greater than 5-8 pounds for 1 month. 8. You may NOT reach or stretch your LEFT arm for 1 month. Keep your elbow below the level of the shoulder and you may only raise your elbow to shoulder level. 9. Wear your sling for 24 hours, then when in bed at night for 4 weeks. 10. No repeated arm movements such as swimming for 3 months. 11. Carry your ID Card at all times. 12. Screening mammography SHOULD NOT BE DONE until 6 months after device implant. Please consult with your ordering physician if diagnostic mammography is needed prior to 6 months from device implant. 15. Inform your doctors, dentists, and emergency personnel that you have a implanted cardiac device. 14. Do not drive until cleared by your physician. .      15. No alcohol, working, or critical decision making today.      16. Oral antibiotics will need to be taken for 24 hours      Notify your doctor if:    You have shortness of breath or persistent cough  Chest pain  Persistent pain on the site   Fever (temperature greater than 101F) chills, infection (redness, swelling, or yellow drainage to the site)

## 2022-11-30 NOTE — IVS NOTE
DISCHARGE NOTE     Pt is able to sit up and ambulate without difficulty. Pt voided and tolerated fluids and food. Procedural site remains dry and intact with good circulation, motion, and sensation. No signs and symptoms of bleeding/hematoma noted. IV access removed  Instruction provided, patient/family verbalizes understanding. Dr. Lion Valdez spoke with patient/family post procedure.      Pt discharge via wheelchair to [address]     Follow up Appointment: as already scheduled    New Prescription: keflex, ready for  at patient's pharmacy

## 2022-12-02 ENCOUNTER — APPOINTMENT (OUTPATIENT)
Dept: WOUND CARE | Facility: HOSPITAL | Age: 67
End: 2022-12-02
Attending: FAMILY MEDICINE
Payer: MEDICARE

## 2022-12-02 ENCOUNTER — OFFICE VISIT (OUTPATIENT)
Dept: WOUND CARE | Facility: HOSPITAL | Age: 67
End: 2022-12-02
Attending: NURSE PRACTITIONER
Payer: MEDICARE

## 2022-12-02 VITALS
SYSTOLIC BLOOD PRESSURE: 124 MMHG | DIASTOLIC BLOOD PRESSURE: 71 MMHG | OXYGEN SATURATION: 97 % | TEMPERATURE: 99 F | RESPIRATION RATE: 18 BRPM | HEART RATE: 60 BPM

## 2022-12-02 DIAGNOSIS — L97.929 CHRONIC VENOUS HYPERTENSION (IDIOPATHIC) WITH ULCER OF LEFT LOWER EXTREMITY (HCC): Primary | ICD-10-CM

## 2022-12-02 DIAGNOSIS — I87.312 CHRONIC VENOUS HYPERTENSION (IDIOPATHIC) WITH ULCER OF LEFT LOWER EXTREMITY (HCC): Primary | ICD-10-CM

## 2022-12-02 DIAGNOSIS — I87.2 VENOUS (PERIPHERAL) INSUFFICIENCY: ICD-10-CM

## 2022-12-02 PROCEDURE — 99214 OFFICE O/P EST MOD 30 MIN: CPT | Performed by: FAMILY MEDICINE

## 2022-12-02 NOTE — PROGRESS NOTES
Weekly Wound Education Note    Teaching Provided To: Patient  Training Topics: Dressing; Compression  Training Method: Explain/Verbal  Training Response: Patient responds and understands        Notes: wound is improving, continued endoform,hydrofera blue transfer, kerramax and comprilan x 2 for compression wrap

## 2022-12-09 ENCOUNTER — OFFICE VISIT (OUTPATIENT)
Dept: WOUND CARE | Facility: HOSPITAL | Age: 67
End: 2022-12-09
Attending: NURSE PRACTITIONER
Payer: MEDICARE

## 2022-12-09 VITALS
DIASTOLIC BLOOD PRESSURE: 78 MMHG | SYSTOLIC BLOOD PRESSURE: 131 MMHG | TEMPERATURE: 97 F | HEART RATE: 65 BPM | OXYGEN SATURATION: 99 % | RESPIRATION RATE: 20 BRPM

## 2022-12-09 DIAGNOSIS — I87.2 VENOUS (PERIPHERAL) INSUFFICIENCY: ICD-10-CM

## 2022-12-09 DIAGNOSIS — I87.312 CHRONIC VENOUS HYPERTENSION (IDIOPATHIC) WITH ULCER OF LEFT LOWER EXTREMITY (HCC): Primary | ICD-10-CM

## 2022-12-09 DIAGNOSIS — L97.929 CHRONIC VENOUS HYPERTENSION (IDIOPATHIC) WITH ULCER OF LEFT LOWER EXTREMITY (HCC): Primary | ICD-10-CM

## 2022-12-09 PROCEDURE — 99215 OFFICE O/P EST HI 40 MIN: CPT | Performed by: FAMILY MEDICINE

## 2022-12-09 NOTE — PROGRESS NOTES
Weekly Wound Education Note    Teaching Provided To: Patient  Training Topics: Cleasing and general instructions; Compression;Skin care;Dressing  Training Method: Explain/Verbal  Training Response: Patient responds and understands        Notes: wound appears stable, changed dressing to acticoat and kerramax with 2 layers comprilan compression wraps

## 2022-12-12 ENCOUNTER — NURSE ONLY (OUTPATIENT)
Dept: WOUND CARE | Facility: HOSPITAL | Age: 67
End: 2022-12-12
Attending: FAMILY MEDICINE
Payer: MEDICARE

## 2022-12-12 DIAGNOSIS — I87.312 CHRONIC VENOUS HYPERTENSION W ULCER OF L LOW EXTREM (HCC): ICD-10-CM

## 2022-12-12 DIAGNOSIS — L97.929 CHRONIC VENOUS HYPERTENSION W ULCER OF L LOW EXTREM (HCC): ICD-10-CM

## 2022-12-12 PROCEDURE — 29581 APPL MULTLAYER CMPRN SYS LEG: CPT

## 2022-12-12 NOTE — PROGRESS NOTES
Weekly Wound Education Note    Teaching Provided To: Patient  Training Topics: Dressing;Cleasing and general instructions; Compression;Nutrition  Training Method: Explain/Verbal  Training Response: Patient responds and understands        Notes: wound is improving, with islets of epithelialization noted, continued acticoat, kerramax dressing with comprilan compression wrap x 2

## 2022-12-16 ENCOUNTER — OFFICE VISIT (OUTPATIENT)
Dept: WOUND CARE | Facility: HOSPITAL | Age: 67
End: 2022-12-16
Attending: FAMILY MEDICINE
Payer: MEDICARE

## 2022-12-16 VITALS
TEMPERATURE: 98 F | HEART RATE: 64 BPM | DIASTOLIC BLOOD PRESSURE: 80 MMHG | OXYGEN SATURATION: 98 % | SYSTOLIC BLOOD PRESSURE: 147 MMHG | RESPIRATION RATE: 19 BRPM

## 2022-12-16 DIAGNOSIS — I87.2 VENOUS (PERIPHERAL) INSUFFICIENCY: ICD-10-CM

## 2022-12-16 DIAGNOSIS — L97.929 CHRONIC VENOUS HYPERTENSION (IDIOPATHIC) WITH ULCER OF LEFT LOWER EXTREMITY (HCC): Primary | ICD-10-CM

## 2022-12-16 DIAGNOSIS — I87.312 CHRONIC VENOUS HYPERTENSION (IDIOPATHIC) WITH ULCER OF LEFT LOWER EXTREMITY (HCC): Primary | ICD-10-CM

## 2022-12-16 PROCEDURE — 99214 OFFICE O/P EST MOD 30 MIN: CPT | Performed by: FAMILY MEDICINE

## 2022-12-16 NOTE — PROGRESS NOTES
Weekly Wound Education Note    Teaching Provided To: Patient  Training Topics: Cleasing and general instructions;Dressing;Skin care; Compression  Training Method: Demonstration;Explain/Verbal  Training Response: Patient responds and understands        Notes: wound is improving, continue Acticoat and Kerramax with comprilan compression wrap

## 2022-12-22 ENCOUNTER — NURSE ONLY (OUTPATIENT)
Dept: WOUND CARE | Facility: HOSPITAL | Age: 67
End: 2022-12-22
Attending: FAMILY MEDICINE
Payer: MEDICARE

## 2022-12-22 VITALS
RESPIRATION RATE: 18 BRPM | HEART RATE: 90 BPM | DIASTOLIC BLOOD PRESSURE: 70 MMHG | SYSTOLIC BLOOD PRESSURE: 114 MMHG | TEMPERATURE: 97 F | OXYGEN SATURATION: 99 %

## 2022-12-22 DIAGNOSIS — I87.2 VENOUS (PERIPHERAL) INSUFFICIENCY: ICD-10-CM

## 2022-12-22 PROCEDURE — 29581 APPL MULTLAYER CMPRN SYS LEG: CPT

## 2022-12-22 NOTE — PROGRESS NOTES
Weekly Wound Education Note    Teaching Provided To: Patient  Training Topics: Dressing;Cleasing and general instructions; Compression;Skin care  Training Method: Explain/Verbal  Training Response: Patient responds and understands        Notes: improving, continued Acticoat and Kerramax dressings with comprilan compression wraps x 2

## 2022-12-23 ENCOUNTER — APPOINTMENT (OUTPATIENT)
Dept: WOUND CARE | Facility: HOSPITAL | Age: 67
End: 2022-12-23
Attending: FAMILY MEDICINE
Payer: MEDICARE

## 2022-12-30 ENCOUNTER — NURSE ONLY (OUTPATIENT)
Dept: WOUND CARE | Facility: HOSPITAL | Age: 67
End: 2022-12-30
Attending: FAMILY MEDICINE
Payer: MEDICARE

## 2022-12-30 VITALS
OXYGEN SATURATION: 98 % | HEART RATE: 64 BPM | TEMPERATURE: 98 F | DIASTOLIC BLOOD PRESSURE: 72 MMHG | SYSTOLIC BLOOD PRESSURE: 127 MMHG

## 2022-12-30 PROCEDURE — 29581 APPL MULTLAYER CMPRN SYS LEG: CPT

## 2022-12-30 NOTE — PROGRESS NOTES
12/30/22 1116   Wound 10/28/22 3 Venous Ulcer Leg Left; Lower   Date First Assessed: 10/28/22    Wound Number (Wound Clinic Only): 3  Primary Wound Type: Venous Ulcer  Location: Leg  Wound Location Orientation: Left; Lower   Wound Image    Site Assessment Moist;Granulation tissue;Red;Yellow;Pink   Closure Not approximated   Drainage Amount Moderate   Drainage Description Serosanguineous   Treatments Cleansed; Compression   Dressing Acticoat;Gauze;Kerlix roll   Dressing Changed Changed   Dressing Status Dressing Changed   Wound Length (cm) 0.7 cm   Wound Width (cm) 0.5 cm   Wound Surface Area (cm^2) 0.35 cm^2   Wound Depth (cm) 0.2 cm   Wound Volume (cm^3) 0.07 cm^3   Wound Healing % 94   Margins Attached edges   Non-staged Wound Description Full thickness   Mary-wound Assessment Clean;Dry; Intact; Hemosiderin staining   Wound Granulation Tissue Red;Pink   Wound Bed Granulation (%) 20 %   Wound Bed Epithelium (%) 80 %   Wound Bed Slough (%) 0 %   Wound Bed Eschar (%) 0 %   Wound Odor None   State of Healing Fully granulated   Compression Wrap 10/28/22 Leg Left; Lower   Placement Date: 10/28/22   Location: Leg  Wound Location Orientation: Left; Lower   Response to Treatment Well tolerated   Compression Layers 2   Compression Product Type Artiflex 10cm; Comprilan 8cm; Comprilan 10cm; Stockinette 4in   Dressing Applied Yes   Compression Wrap Location Toes to Knee   Compression Wrap Status Clean;Dry; Intact     Weekly Wound Education Note    Teaching Provided To: Patient  Training Topics: Cleasing and general instructions; Compression;Dressing;Nutrition  Training Method: Explain/Verbal  Training Response: Patient responds and understands        Notes: improving, continued Acticoat dressing with comprilan compression wraps x 2

## 2023-01-06 ENCOUNTER — OFFICE VISIT (OUTPATIENT)
Dept: WOUND CARE | Facility: HOSPITAL | Age: 68
End: 2023-01-06
Attending: FAMILY MEDICINE
Payer: MEDICARE

## 2023-01-06 ENCOUNTER — LAB ENCOUNTER (OUTPATIENT)
Dept: LAB | Facility: HOSPITAL | Age: 68
End: 2023-01-06
Attending: INTERNAL MEDICINE
Payer: MEDICARE

## 2023-01-06 VITALS
SYSTOLIC BLOOD PRESSURE: 159 MMHG | HEART RATE: 64 BPM | RESPIRATION RATE: 20 BRPM | TEMPERATURE: 97 F | OXYGEN SATURATION: 99 % | DIASTOLIC BLOOD PRESSURE: 79 MMHG

## 2023-01-06 DIAGNOSIS — I63.9 CEREBROVASCULAR ACCIDENT (CVA), UNSPECIFIED MECHANISM (HCC): ICD-10-CM

## 2023-01-06 DIAGNOSIS — I87.312 CHRONIC VENOUS HYPERTENSION W ULCER OF L LOW EXTREM (HCC): ICD-10-CM

## 2023-01-06 DIAGNOSIS — I87.2 VENOUS (PERIPHERAL) INSUFFICIENCY: ICD-10-CM

## 2023-01-06 DIAGNOSIS — L97.929 CHRONIC VENOUS HYPERTENSION W ULCER OF L LOW EXTREM (HCC): ICD-10-CM

## 2023-01-06 DIAGNOSIS — I48.20 CHRONIC ATRIAL FIBRILLATION (HCC): ICD-10-CM

## 2023-01-06 LAB
INR BLD: 3.31 (ref 0.85–1.16)
PROTHROMBIN TIME: 33.1 SECONDS (ref 11.6–14.8)

## 2023-01-06 PROCEDURE — 36415 COLL VENOUS BLD VENIPUNCTURE: CPT

## 2023-01-06 PROCEDURE — 85610 PROTHROMBIN TIME: CPT

## 2023-01-06 PROCEDURE — 99214 OFFICE O/P EST MOD 30 MIN: CPT | Performed by: FAMILY MEDICINE

## 2023-01-06 NOTE — PROGRESS NOTES
Weekly Wound Education Note    Teaching Provided To: Patient  Training Topics: Dressing;Cleasing and general instructions; Compression  Training Method: Explain/Verbal  Training Response: Patient responds and understands        Notes: wound is stable, silver nitrate applied by Dr. Cindy Montgomery , continue Acticoat dressing and 2 layers comprilan compression wraps

## 2023-01-13 ENCOUNTER — LAB ENCOUNTER (OUTPATIENT)
Dept: LAB | Facility: HOSPITAL | Age: 68
End: 2023-01-13
Attending: INTERNAL MEDICINE
Payer: MEDICARE

## 2023-01-13 ENCOUNTER — OFFICE VISIT (OUTPATIENT)
Dept: WOUND CARE | Facility: HOSPITAL | Age: 68
End: 2023-01-13
Attending: FAMILY MEDICINE
Payer: MEDICARE

## 2023-01-13 VITALS
DIASTOLIC BLOOD PRESSURE: 80 MMHG | HEART RATE: 60 BPM | RESPIRATION RATE: 20 BRPM | OXYGEN SATURATION: 98 % | TEMPERATURE: 97 F | SYSTOLIC BLOOD PRESSURE: 142 MMHG

## 2023-01-13 DIAGNOSIS — I63.9 CEREBROVASCULAR ACCIDENT (CVA), UNSPECIFIED MECHANISM (HCC): ICD-10-CM

## 2023-01-13 DIAGNOSIS — I87.312 CHRONIC VENOUS HYPERTENSION W ULCER OF L LOW EXTREM (HCC): ICD-10-CM

## 2023-01-13 DIAGNOSIS — I48.20 CHRONIC ATRIAL FIBRILLATION (HCC): ICD-10-CM

## 2023-01-13 DIAGNOSIS — L97.929 CHRONIC VENOUS HYPERTENSION W ULCER OF L LOW EXTREM (HCC): ICD-10-CM

## 2023-01-13 LAB
INR BLD: 2.7 (ref 0.85–1.16)
PROTHROMBIN TIME: 28.1 SECONDS (ref 11.6–14.8)

## 2023-01-13 PROCEDURE — 85610 PROTHROMBIN TIME: CPT

## 2023-01-13 PROCEDURE — 99214 OFFICE O/P EST MOD 30 MIN: CPT | Performed by: FAMILY MEDICINE

## 2023-01-13 PROCEDURE — 36415 COLL VENOUS BLD VENIPUNCTURE: CPT

## 2023-01-13 NOTE — PROGRESS NOTES
Weekly Wound Education Note    Teaching Provided To: Patient  Training Topics: Compression;Cleasing and general instructions;Edema control;Dressing;Venous disease  Training Method: Explain/Verbal  Training Response: Reinforcement needed      Notes: slight improvement- acticoat gauze kerlix, 3 layers comprilan compression wraps

## 2023-01-20 ENCOUNTER — OFFICE VISIT (OUTPATIENT)
Dept: WOUND CARE | Facility: HOSPITAL | Age: 68
End: 2023-01-20
Attending: FAMILY MEDICINE
Payer: MEDICARE

## 2023-01-20 VITALS
SYSTOLIC BLOOD PRESSURE: 134 MMHG | HEART RATE: 61 BPM | TEMPERATURE: 98 F | OXYGEN SATURATION: 98 % | RESPIRATION RATE: 18 BRPM | DIASTOLIC BLOOD PRESSURE: 72 MMHG

## 2023-01-20 DIAGNOSIS — I87.312 CHRONIC VENOUS HYPERTENSION W ULCER OF L LOW EXTREM (HCC): Primary | ICD-10-CM

## 2023-01-20 DIAGNOSIS — L97.929 CHRONIC VENOUS HYPERTENSION W ULCER OF L LOW EXTREM (HCC): Primary | ICD-10-CM

## 2023-01-20 DIAGNOSIS — I87.2 VENOUS (PERIPHERAL) INSUFFICIENCY: ICD-10-CM

## 2023-01-20 PROCEDURE — 99214 OFFICE O/P EST MOD 30 MIN: CPT | Performed by: FAMILY MEDICINE

## 2023-01-20 NOTE — PROGRESS NOTES
Weekly Wound Education Note    Teaching Provided To: Patient  Training Topics: Compression;Cleasing and general instructions;Edema control;Dressing;Venous disease  Training Method: Explain/Verbal  Training Response: Reinforcement needed        Notes: wound is slowly improving, eduardo with hydrofera blue raedy applied with 3 layers compression wrap

## 2023-01-27 ENCOUNTER — OFFICE VISIT (OUTPATIENT)
Dept: WOUND CARE | Facility: HOSPITAL | Age: 68
End: 2023-01-27
Attending: FAMILY MEDICINE
Payer: MEDICARE

## 2023-01-27 VITALS
OXYGEN SATURATION: 96 % | DIASTOLIC BLOOD PRESSURE: 75 MMHG | RESPIRATION RATE: 18 BRPM | TEMPERATURE: 98 F | SYSTOLIC BLOOD PRESSURE: 143 MMHG | HEART RATE: 59 BPM

## 2023-01-27 DIAGNOSIS — I87.312 CHRONIC VENOUS HYPERTENSION W ULCER OF L LOW EXTREM (HCC): Primary | ICD-10-CM

## 2023-01-27 DIAGNOSIS — I87.2 VENOUS (PERIPHERAL) INSUFFICIENCY: ICD-10-CM

## 2023-01-27 DIAGNOSIS — L97.929 CHRONIC VENOUS HYPERTENSION W ULCER OF L LOW EXTREM (HCC): Primary | ICD-10-CM

## 2023-01-27 PROCEDURE — 99214 OFFICE O/P EST MOD 30 MIN: CPT | Performed by: FAMILY MEDICINE

## 2023-01-27 NOTE — PROGRESS NOTES
Weekly Wound Education Note    Teaching Provided To: Patient  Training Topics: Dressing; Compression;Cleasing and general instructions;Nutrition;Skin care;Signs and symptoms of infection  Training Method: Explain/Verbal  Training Response: Patient responds and understands        Notes: wound is slightly improved, continue collagen and hydrofera blue dressing with a black foam over the area for added compression

## 2023-02-03 ENCOUNTER — OFFICE VISIT (OUTPATIENT)
Dept: WOUND CARE | Facility: HOSPITAL | Age: 68
End: 2023-02-03
Attending: FAMILY MEDICINE
Payer: MEDICARE

## 2023-02-03 ENCOUNTER — LAB ENCOUNTER (OUTPATIENT)
Dept: LAB | Facility: HOSPITAL | Age: 68
End: 2023-02-03
Attending: INTERNAL MEDICINE
Payer: MEDICARE

## 2023-02-03 VITALS
RESPIRATION RATE: 18 BRPM | HEART RATE: 64 BPM | OXYGEN SATURATION: 97 % | DIASTOLIC BLOOD PRESSURE: 69 MMHG | SYSTOLIC BLOOD PRESSURE: 129 MMHG | TEMPERATURE: 98 F

## 2023-02-03 DIAGNOSIS — I87.312 CHRONIC VENOUS HYPERTENSION W ULCER OF L LOW EXTREM (HCC): ICD-10-CM

## 2023-02-03 DIAGNOSIS — I48.20 CHRONIC ATRIAL FIBRILLATION (HCC): ICD-10-CM

## 2023-02-03 DIAGNOSIS — I87.2 VENOUS (PERIPHERAL) INSUFFICIENCY: Primary | ICD-10-CM

## 2023-02-03 DIAGNOSIS — L97.929 CHRONIC VENOUS HYPERTENSION W ULCER OF L LOW EXTREM (HCC): ICD-10-CM

## 2023-02-03 DIAGNOSIS — I63.9 CEREBROVASCULAR ACCIDENT (CVA), UNSPECIFIED MECHANISM (HCC): ICD-10-CM

## 2023-02-03 LAB
INR BLD: 2.07 (ref 0.85–1.16)
PROTHROMBIN TIME: 23 SECONDS (ref 11.6–14.8)

## 2023-02-03 PROCEDURE — 36415 COLL VENOUS BLD VENIPUNCTURE: CPT

## 2023-02-03 PROCEDURE — 85610 PROTHROMBIN TIME: CPT

## 2023-02-03 PROCEDURE — 99214 OFFICE O/P EST MOD 30 MIN: CPT | Performed by: FAMILY MEDICINE

## 2023-02-03 NOTE — PROGRESS NOTES
Weekly Wound Education Note       Training Topics: Cleasing and general instructions; Compression;Nutrition;Skin care  Training Method: Demonstration  Training Response: Patient responds and understands        Notes: Healed.   Patient discharged to self care

## 2023-02-03 NOTE — PATIENT INSTRUCTIONS
PATIENT INSTRUCTIONS      FOR JOSH Golden 1955    DATE OF SERVICE: 2/3/2023      Will apply spandagrip stocking to left lower extremity. When you go home please use your compression stocking, we will give you a prescription for new stockings. Also get opinion from vascular surgery regarding the left lower extremity. No further appointments in wound clinic at this time. If you develop any new wounds please feel free to see us back.

## 2023-02-07 ENCOUNTER — TELEPHONE (OUTPATIENT)
Dept: PHYSICAL MEDICINE AND REHAB | Facility: CLINIC | Age: 68
End: 2023-02-07

## 2023-02-07 ENCOUNTER — OFFICE VISIT (OUTPATIENT)
Dept: PHYSICAL MEDICINE AND REHAB | Facility: CLINIC | Age: 68
End: 2023-02-07
Payer: MEDICARE

## 2023-02-07 VITALS — HEART RATE: 73 BPM | WEIGHT: 240 LBS | BODY MASS INDEX: 30.8 KG/M2 | OXYGEN SATURATION: 98 % | HEIGHT: 74 IN

## 2023-02-07 DIAGNOSIS — M17.0 PRIMARY OSTEOARTHRITIS OF KNEES, BILATERAL: ICD-10-CM

## 2023-02-07 DIAGNOSIS — M17.0 BILATERAL PRIMARY OSTEOARTHRITIS OF KNEE: Primary | ICD-10-CM

## 2023-02-07 PROCEDURE — 20610 DRAIN/INJ JOINT/BURSA W/O US: CPT | Performed by: PHYSICAL MEDICINE & REHABILITATION

## 2023-02-07 PROCEDURE — 99214 OFFICE O/P EST MOD 30 MIN: CPT | Performed by: PHYSICAL MEDICINE & REHABILITATION

## 2023-02-07 RX ORDER — LIDOCAINE HYDROCHLORIDE 10 MG/ML
14 INJECTION, SOLUTION INFILTRATION; PERINEURAL ONCE
Status: COMPLETED | OUTPATIENT
Start: 2023-02-07 | End: 2023-02-07

## 2023-02-07 RX ORDER — TRIAMCINOLONE ACETONIDE 40 MG/ML
80 INJECTION, SUSPENSION INTRA-ARTICULAR; INTRAMUSCULAR ONCE
Status: COMPLETED | OUTPATIENT
Start: 2023-02-07 | End: 2023-02-07

## 2023-02-07 NOTE — PATIENT INSTRUCTIONS
Steroid Injection Information  What to expect: The injection contains Lidocaine (which numbs the area) and Kenalog (a steroid which decreases inflammation). You may have pain relief within hours of the injection due to the Lidocaine. The Kenalog can take a couple days, up to a couple weeks, to reach the full effect. It is also possible to have a slight increase in symptoms over the first few days, but that should resolve fairly quickly. How long will the injection last?: The length of response to an injection is variable. Literally a couple weeks to a couple years. The injection will decrease the inflammation and the pain will return if/when the inflammation returns. Activity Recommendations: For the first 24 hours after injection, keep the area clean and dry. It is ok to shower but don't soak in a tub during that time. No vigorous activity such as running or heavy lifting for the first week but other than that you can gradually resume your normal activities immediately. If you have a significant decrease in pain, be careful not to do too much too soon. Again, the key is GRADUAL resumption of activites. Things to look out for: Common injection side effects include soreness at the injection site, bruising, flushing of the face or skin, and a temporary increase in your blood sugars and/or blood pressure. Infection is very rare but please notify my office [facility] for [address] [place] [phone]) if you develop any fevers, drainage from the injection site, or severe increase in pain. If it is the weekend, go to an Emergency Room.

## 2023-02-07 NOTE — TELEPHONE ENCOUNTER
Per Medicare Guidelines -no authorization is required for Bilateral Knee corticosteroid injection under ultrasound guidance CPT 20611x2,     Status: Authorization is not required however may be subject to review once claim is submitted-Covered Benefit     Patient received injections in office today

## 2023-02-08 NOTE — PROCEDURES
Procedure:  Bilateral knee aspiration and injection with corticosteroid  The risks, benefits and anticipated outcomes of the procedure, the risks and benefits of the alternatives to the procedure, and the roles and tasks of the personnel to be involved, were discussed with the patient, and the patient consents to the procedure and agrees to proceed. UNIVERSAL PROTOCOL / SAFETY CHECKLIST  Sign in Communication: Completed  Time Out:  Team Confirms the Correct Patient, Correct Procedure, Correct Site and Site Marking, Correct Position (if applicable), Prep and Dry Time (if applicable). The procedure was carried out under sterile prep with  with sterile gel. A 27 ga 1&1/4in needle was introduced and advanced for skin anesthesia with 3 cc of 1% lidocaine. Then, a 18 gauge 1.5 in needle was advanced and 16 cc of serosanguineous synovial fluid was aspirated from the right knee and 0 cc of synovial fluid was aspirated from the left knee. Following aspiration, a mixture of 4 cc of lidocaine and 1 cc of Kenalog (40mg/ml) was injected. The patient tolerated the procedure without complication and was instructed in post-procedure precautions.     Noah Shannon DO, FAAPMR & CAQSM  Physical Medicine and Rehabilitation/Sports Medicine  MEDICAL CENTER [facility]

## 2023-02-10 ENCOUNTER — APPOINTMENT (OUTPATIENT)
Dept: WOUND CARE | Facility: HOSPITAL | Age: 68
End: 2023-02-10
Attending: FAMILY MEDICINE
Payer: MEDICARE

## 2023-03-06 ENCOUNTER — TELEPHONE (OUTPATIENT)
Dept: INTERNAL MEDICINE CLINIC | Facility: CLINIC | Age: 68
End: 2023-03-06

## 2023-03-06 RX ORDER — AMLODIPINE BESYLATE 10 MG/1
10 TABLET ORAL DAILY
Qty: 30 TABLET | Refills: 0 | Status: SHIPPED | OUTPATIENT
Start: 2023-03-06

## 2023-03-06 NOTE — TELEPHONE ENCOUNTER
Patient called, left voicemail   Ran out of Amlodipine  Pt waiting for mail order to be delivered  Can one week be sent to [facility] Friday?  Please call patient   Tasked to [employer]

## 2023-03-08 NOTE — TELEPHONE ENCOUNTER
To MD:  The above refill request is for a controlled substance. Please review pended medication order. Print and sign for staff to fax to pharmacy or prescribe electronically.     Last office visit: 4/12/22  Last time refill sent and quantity/refills: 8/8/22 #360/1  [facility]

## 2023-03-09 RX ORDER — AMLODIPINE BESYLATE 10 MG/1
TABLET ORAL
Qty: 90 TABLET | Refills: 0 | OUTPATIENT
Start: 2023-03-09

## 2023-03-10 ENCOUNTER — LAB ENCOUNTER (OUTPATIENT)
Dept: LAB | Age: 68
End: 2023-03-10
Attending: INTERNAL MEDICINE
Payer: MEDICARE

## 2023-03-10 DIAGNOSIS — I63.9 CEREBROVASCULAR ACCIDENT (CVA), UNSPECIFIED MECHANISM (HCC): ICD-10-CM

## 2023-03-10 DIAGNOSIS — I48.20 CHRONIC ATRIAL FIBRILLATION (HCC): ICD-10-CM

## 2023-03-10 LAB
INR BLD: 3.24 (ref 0.85–1.16)
PROTHROMBIN TIME: 32.3 SECONDS (ref 11.6–14.8)

## 2023-03-10 PROCEDURE — 36415 COLL VENOUS BLD VENIPUNCTURE: CPT

## 2023-03-10 PROCEDURE — 85610 PROTHROMBIN TIME: CPT

## 2023-03-24 ENCOUNTER — LAB ENCOUNTER (OUTPATIENT)
Dept: LAB | Facility: HOSPITAL | Age: 68
End: 2023-03-24
Attending: INTERNAL MEDICINE
Payer: MEDICARE

## 2023-03-24 ENCOUNTER — OFFICE VISIT (OUTPATIENT)
Dept: WOUND CARE | Facility: HOSPITAL | Age: 68
End: 2023-03-24
Attending: FAMILY MEDICINE
Payer: MEDICARE

## 2023-03-24 VITALS
HEIGHT: 74 IN | SYSTOLIC BLOOD PRESSURE: 144 MMHG | RESPIRATION RATE: 17 BRPM | BODY MASS INDEX: 30.8 KG/M2 | TEMPERATURE: 98 F | DIASTOLIC BLOOD PRESSURE: 87 MMHG | HEART RATE: 85 BPM | OXYGEN SATURATION: 96 % | WEIGHT: 240 LBS

## 2023-03-24 DIAGNOSIS — I63.9 CEREBROVASCULAR ACCIDENT (CVA), UNSPECIFIED MECHANISM (HCC): ICD-10-CM

## 2023-03-24 DIAGNOSIS — I87.312 CHRONIC VENOUS HYPERTENSION W ULCER OF L LOW EXTREM (HCC): Primary | ICD-10-CM

## 2023-03-24 DIAGNOSIS — I48.20 CHRONIC ATRIAL FIBRILLATION (HCC): ICD-10-CM

## 2023-03-24 DIAGNOSIS — L97.929 CHRONIC VENOUS HYPERTENSION W ULCER OF L LOW EXTREM (HCC): Primary | ICD-10-CM

## 2023-03-24 LAB
INR BLD: 2.66 (ref 0.85–1.16)
PROTHROMBIN TIME: 27.8 SECONDS (ref 11.6–14.8)

## 2023-03-24 PROCEDURE — 99215 OFFICE O/P EST HI 40 MIN: CPT | Performed by: FAMILY MEDICINE

## 2023-03-24 PROCEDURE — 36415 COLL VENOUS BLD VENIPUNCTURE: CPT

## 2023-03-24 PROCEDURE — 85610 PROTHROMBIN TIME: CPT

## 2023-03-24 NOTE — PROGRESS NOTES
Weekly Wound Education Note    Teaching Provided To: Patient  Training Topics: Cleasing and general instructions; Compression;Dressing;Skin care  Training Method: Demonstration           Notes: New wound previous patient enldaniela urias and raoilan 3 wrap

## 2023-03-28 ENCOUNTER — NURSE ONLY (OUTPATIENT)
Dept: WOUND CARE | Facility: HOSPITAL | Age: 68
End: 2023-03-28
Attending: FAMILY MEDICINE
Payer: MEDICARE

## 2023-03-28 VITALS
RESPIRATION RATE: 18 BRPM | HEART RATE: 70 BPM | SYSTOLIC BLOOD PRESSURE: 127 MMHG | OXYGEN SATURATION: 98 % | TEMPERATURE: 98 F | DIASTOLIC BLOOD PRESSURE: 78 MMHG

## 2023-03-28 PROCEDURE — 29581 APPL MULTLAYER CMPRN SYS LEG: CPT

## 2023-03-28 NOTE — PROGRESS NOTES
Weekly Wound Education Note    Teaching Provided To: Patient  Training Topics: Cleasing and general instructions; Compression;Dressing;Skin care  Training Method: Demonstration           Notes: Cont. enluxtra kerrmax and comprilan 3 wrap

## 2023-03-31 ENCOUNTER — NURSE ONLY (OUTPATIENT)
Dept: WOUND CARE | Facility: HOSPITAL | Age: 68
End: 2023-03-31
Attending: FAMILY MEDICINE
Payer: MEDICARE

## 2023-03-31 VITALS
OXYGEN SATURATION: 97 % | DIASTOLIC BLOOD PRESSURE: 83 MMHG | RESPIRATION RATE: 17 BRPM | TEMPERATURE: 98 F | HEART RATE: 80 BPM | SYSTOLIC BLOOD PRESSURE: 129 MMHG

## 2023-03-31 DIAGNOSIS — L97.929 CHRONIC VENOUS HYPERTENSION W ULCER OF L LOW EXTREM (HCC): ICD-10-CM

## 2023-03-31 DIAGNOSIS — I87.312 CHRONIC VENOUS HYPERTENSION W ULCER OF L LOW EXTREM (HCC): ICD-10-CM

## 2023-03-31 PROCEDURE — 29581 APPL MULTLAYER CMPRN SYS LEG: CPT

## 2023-04-03 ENCOUNTER — TELEPHONE (OUTPATIENT)
Dept: INTERNAL MEDICINE CLINIC | Facility: CLINIC | Age: 68
End: 2023-04-03

## 2023-04-03 DIAGNOSIS — Z12.5 ENCOUNTER FOR PROSTATE CANCER SCREENING: ICD-10-CM

## 2023-04-03 DIAGNOSIS — E55.9 VITAMIN D DEFICIENCY: Primary | ICD-10-CM

## 2023-04-03 DIAGNOSIS — E78.49 OTHER HYPERLIPIDEMIA: ICD-10-CM

## 2023-04-03 DIAGNOSIS — I10 BENIGN ESSENTIAL HYPERTENSION: ICD-10-CM

## 2023-04-07 ENCOUNTER — OFFICE VISIT (OUTPATIENT)
Dept: WOUND CARE | Facility: HOSPITAL | Age: 68
End: 2023-04-07
Attending: NURSE PRACTITIONER
Payer: MEDICARE

## 2023-04-07 VITALS
SYSTOLIC BLOOD PRESSURE: 159 MMHG | DIASTOLIC BLOOD PRESSURE: 96 MMHG | TEMPERATURE: 98 F | HEART RATE: 71 BPM | OXYGEN SATURATION: 98 % | RESPIRATION RATE: 20 BRPM

## 2023-04-07 DIAGNOSIS — I87.312 CHRONIC VENOUS HYPERTENSION W ULCER OF L LOW EXTREM (HCC): Primary | ICD-10-CM

## 2023-04-07 DIAGNOSIS — L97.929 CHRONIC VENOUS HYPERTENSION W ULCER OF L LOW EXTREM (HCC): Primary | ICD-10-CM

## 2023-04-07 PROCEDURE — 99214 OFFICE O/P EST MOD 30 MIN: CPT | Performed by: FAMILY MEDICINE

## 2023-04-07 NOTE — PROGRESS NOTES
Weekly Wound Education Note    Teaching Provided To: Patient  Training Topics: Cleasing and general instructions; Compression;Dressing;Skin care  Training Method: Demonstration  Training Response: Patient responds and understands        Notes: Cont. enluxtra and comprilan 3 wrap.  betamethasone added to periwound redness

## 2023-04-14 ENCOUNTER — LAB ENCOUNTER (OUTPATIENT)
Dept: LAB | Facility: HOSPITAL | Age: 68
End: 2023-04-14
Attending: INTERNAL MEDICINE
Payer: MEDICARE

## 2023-04-14 ENCOUNTER — APPOINTMENT (OUTPATIENT)
Dept: WOUND CARE | Facility: HOSPITAL | Age: 68
End: 2023-04-14
Attending: NURSE PRACTITIONER
Payer: MEDICARE

## 2023-04-14 ENCOUNTER — OFFICE VISIT (OUTPATIENT)
Dept: WOUND CARE | Facility: HOSPITAL | Age: 68
End: 2023-04-14
Attending: FAMILY MEDICINE
Payer: MEDICARE

## 2023-04-14 VITALS — OXYGEN SATURATION: 98 % | HEART RATE: 71 BPM | RESPIRATION RATE: 20 BRPM | TEMPERATURE: 98 F

## 2023-04-14 DIAGNOSIS — Z12.5 ENCOUNTER FOR PROSTATE CANCER SCREENING: ICD-10-CM

## 2023-04-14 DIAGNOSIS — I87.312 CHRONIC VENOUS HYPERTENSION W ULCER OF L LOW EXTREM (HCC): Primary | ICD-10-CM

## 2023-04-14 DIAGNOSIS — L97.929 CHRONIC VENOUS HYPERTENSION W ULCER OF L LOW EXTREM (HCC): Primary | ICD-10-CM

## 2023-04-14 DIAGNOSIS — E78.49 OTHER HYPERLIPIDEMIA: ICD-10-CM

## 2023-04-14 DIAGNOSIS — I10 BENIGN ESSENTIAL HYPERTENSION: ICD-10-CM

## 2023-04-14 DIAGNOSIS — E55.9 VITAMIN D DEFICIENCY: ICD-10-CM

## 2023-04-14 LAB
ALBUMIN SERPL-MCNC: 3.8 G/DL (ref 3.4–5)
ALBUMIN/GLOB SERPL: 1.1 {RATIO} (ref 1–2)
ALP LIVER SERPL-CCNC: 71 U/L
ALT SERPL-CCNC: 41 U/L
ANION GAP SERPL CALC-SCNC: 6 MMOL/L (ref 0–18)
AST SERPL-CCNC: 21 U/L (ref 15–37)
BASOPHILS # BLD AUTO: 0.05 X10(3) UL (ref 0–0.2)
BASOPHILS NFR BLD AUTO: 1 %
BILIRUB SERPL-MCNC: 0.6 MG/DL (ref 0.1–2)
BUN BLD-MCNC: 24 MG/DL (ref 7–18)
BUN/CREAT SERPL: 31.6 (ref 10–20)
CALCIUM BLD-MCNC: 8.9 MG/DL (ref 8.5–10.1)
CHLORIDE SERPL-SCNC: 108 MMOL/L (ref 98–112)
CHOLEST SERPL-MCNC: 150 MG/DL (ref ?–200)
CO2 SERPL-SCNC: 26 MMOL/L (ref 21–32)
COMPLEXED PSA SERPL-MCNC: 3.41 NG/ML (ref ?–4)
CREAT BLD-MCNC: 0.76 MG/DL
DEPRECATED RDW RBC AUTO: 49.1 FL (ref 35.1–46.3)
EOSINOPHIL # BLD AUTO: 0.15 X10(3) UL (ref 0–0.7)
EOSINOPHIL NFR BLD AUTO: 3 %
ERYTHROCYTE [DISTWIDTH] IN BLOOD BY AUTOMATED COUNT: 14.4 % (ref 11–15)
FASTING PATIENT LIPID ANSWER: YES
FASTING STATUS PATIENT QL REPORTED: YES
GFR SERPLBLD BASED ON 1.73 SQ M-ARVRAT: 98 ML/MIN/1.73M2 (ref 60–?)
GLOBULIN PLAS-MCNC: 3.4 G/DL (ref 2.8–4.4)
GLUCOSE BLD-MCNC: 98 MG/DL (ref 70–99)
HCT VFR BLD AUTO: 44 %
HDLC SERPL-MCNC: 80 MG/DL (ref 40–59)
HGB BLD-MCNC: 14.4 G/DL
IMM GRANULOCYTES # BLD AUTO: 0.01 X10(3) UL (ref 0–1)
IMM GRANULOCYTES NFR BLD: 0.2 %
LDLC SERPL CALC-MCNC: 61 MG/DL (ref ?–100)
LYMPHOCYTES # BLD AUTO: 1.49 X10(3) UL (ref 1–4)
LYMPHOCYTES NFR BLD AUTO: 29.6 %
MCH RBC QN AUTO: 30.3 PG (ref 26–34)
MCHC RBC AUTO-ENTMCNC: 32.7 G/DL (ref 31–37)
MCV RBC AUTO: 92.4 FL
MONOCYTES # BLD AUTO: 0.42 X10(3) UL (ref 0.1–1)
MONOCYTES NFR BLD AUTO: 8.3 %
NEUTROPHILS # BLD AUTO: 2.92 X10 (3) UL (ref 1.5–7.7)
NEUTROPHILS # BLD AUTO: 2.92 X10(3) UL (ref 1.5–7.7)
NEUTROPHILS NFR BLD AUTO: 57.9 %
NONHDLC SERPL-MCNC: 70 MG/DL (ref ?–130)
OSMOLALITY SERPL CALC.SUM OF ELEC: 294 MOSM/KG (ref 275–295)
PLATELET # BLD AUTO: 206 10(3)UL (ref 150–450)
POTASSIUM SERPL-SCNC: 3.7 MMOL/L (ref 3.5–5.1)
PROT SERPL-MCNC: 7.2 G/DL (ref 6.4–8.2)
RBC # BLD AUTO: 4.76 X10(6)UL
SODIUM SERPL-SCNC: 140 MMOL/L (ref 136–145)
TRIGL SERPL-MCNC: 34 MG/DL (ref 30–149)
TSI SER-ACNC: 1.28 MIU/ML (ref 0.36–3.74)
VIT D+METAB SERPL-MCNC: 24.1 NG/ML (ref 30–100)
VLDLC SERPL CALC-MCNC: 5 MG/DL (ref 0–30)
WBC # BLD AUTO: 5 X10(3) UL (ref 4–11)

## 2023-04-14 PROCEDURE — 80061 LIPID PANEL: CPT

## 2023-04-14 PROCEDURE — 82306 VITAMIN D 25 HYDROXY: CPT

## 2023-04-14 PROCEDURE — 99214 OFFICE O/P EST MOD 30 MIN: CPT | Performed by: FAMILY MEDICINE

## 2023-04-14 PROCEDURE — 84443 ASSAY THYROID STIM HORMONE: CPT

## 2023-04-14 PROCEDURE — 85025 COMPLETE CBC W/AUTO DIFF WBC: CPT

## 2023-04-14 PROCEDURE — 80053 COMPREHEN METABOLIC PANEL: CPT

## 2023-04-14 PROCEDURE — 36415 COLL VENOUS BLD VENIPUNCTURE: CPT

## 2023-04-14 NOTE — PROGRESS NOTES
Weekly Wound Education Note    Teaching Provided To: Patient  Training Topics: Cleasing and general instructions;Dressing;Skin care  Training Method: Demonstration;Explain/Verbal  Training Response: Patient responds and understands        Notes: Initiated endoform, hydrofera transfer, kerramax.  Cont 3 layers of comprilan.    '

## 2023-04-19 ENCOUNTER — OFFICE VISIT (OUTPATIENT)
Dept: INTERNAL MEDICINE CLINIC | Facility: CLINIC | Age: 68
End: 2023-04-19

## 2023-04-19 VITALS
OXYGEN SATURATION: 98 % | HEIGHT: 74 IN | HEART RATE: 69 BPM | WEIGHT: 243.81 LBS | BODY MASS INDEX: 31.29 KG/M2 | SYSTOLIC BLOOD PRESSURE: 132 MMHG | DIASTOLIC BLOOD PRESSURE: 78 MMHG | TEMPERATURE: 97 F

## 2023-04-19 DIAGNOSIS — Z00.00 ROUTINE HEALTH MAINTENANCE: ICD-10-CM

## 2023-04-19 DIAGNOSIS — M16.11 PRIMARY OSTEOARTHRITIS OF RIGHT HIP: ICD-10-CM

## 2023-04-19 DIAGNOSIS — R59.0 LEFT CERVICAL LYMPHADENOPATHY: Primary | ICD-10-CM

## 2023-04-19 DIAGNOSIS — I10 BENIGN ESSENTIAL HTN: ICD-10-CM

## 2023-04-19 DIAGNOSIS — M48.061 SPINAL STENOSIS OF LUMBAR REGION, UNSPECIFIED WHETHER NEUROGENIC CLAUDICATION PRESENT: ICD-10-CM

## 2023-04-19 DIAGNOSIS — G60.9 IDIOPATHIC PERIPHERAL NEUROPATHY: ICD-10-CM

## 2023-04-19 DIAGNOSIS — I63.9 CEREBROVASCULAR ACCIDENT (CVA), UNSPECIFIED MECHANISM (HCC): ICD-10-CM

## 2023-04-19 DIAGNOSIS — I87.2 VENOUS (PERIPHERAL) INSUFFICIENCY: ICD-10-CM

## 2023-04-19 DIAGNOSIS — L97.929 CHRONIC VENOUS HYPERTENSION W ULCER OF L LOW EXTREM (HCC): ICD-10-CM

## 2023-04-19 DIAGNOSIS — I87.312 CHRONIC VENOUS HYPERTENSION W ULCER OF L LOW EXTREM (HCC): ICD-10-CM

## 2023-04-19 DIAGNOSIS — G47.33 OBSTRUCTIVE SLEEP APNEA SYNDROME: ICD-10-CM

## 2023-04-19 DIAGNOSIS — Z80.0 FAMILY HX OF COLON CANCER: ICD-10-CM

## 2023-04-19 DIAGNOSIS — I48.20 CHRONIC ATRIAL FIBRILLATION (HCC): ICD-10-CM

## 2023-04-19 PROBLEM — R20.2 NUMBNESS AND TINGLING OF BOTH LEGS: Status: RESOLVED | Noted: 2020-02-19 | Resolved: 2023-04-19

## 2023-04-19 PROBLEM — R20.0 NUMBNESS AND TINGLING OF BOTH LEGS: Status: RESOLVED | Noted: 2020-02-19 | Resolved: 2023-04-19

## 2023-04-19 PROBLEM — K35.32 ACUTE PERFORATED APPENDICITIS: Status: RESOLVED | Noted: 2019-11-23 | Resolved: 2023-04-19

## 2023-04-19 RX ORDER — SILDENAFIL 100 MG/1
100 TABLET, FILM COATED ORAL DAILY PRN
Qty: 10 TABLET | Refills: 11 | Status: SHIPPED | OUTPATIENT
Start: 2023-04-19

## 2023-04-19 RX ORDER — GABAPENTIN 400 MG/1
800 CAPSULE ORAL 3 TIMES DAILY
Qty: 540 CAPSULE | Refills: 3 | Status: SHIPPED | OUTPATIENT
Start: 2023-04-19

## 2023-04-19 RX ORDER — LOSARTAN POTASSIUM AND HYDROCHLOROTHIAZIDE 12.5; 1 MG/1; MG/1
1 TABLET ORAL DAILY
Qty: 90 TABLET | Refills: 3 | Status: SHIPPED | OUTPATIENT
Start: 2023-04-19

## 2023-04-19 RX ORDER — SIMVASTATIN 20 MG
20 TABLET ORAL NIGHTLY
Qty: 90 TABLET | Refills: 3 | Status: SHIPPED | OUTPATIENT
Start: 2023-04-19

## 2023-04-19 RX ORDER — AMLODIPINE BESYLATE 10 MG/1
10 TABLET ORAL DAILY
Qty: 90 TABLET | Refills: 3 | Status: SHIPPED | OUTPATIENT
Start: 2023-04-19

## 2023-04-19 RX ORDER — CELECOXIB 200 MG/1
200 CAPSULE ORAL
Qty: 90 CAPSULE | Refills: 3 | Status: SHIPPED | OUTPATIENT
Start: 2023-04-19

## 2023-04-19 RX ORDER — WARFARIN SODIUM 2.5 MG/1
2.5 TABLET ORAL DAILY
Qty: 90 TABLET | Refills: 3 | Status: SHIPPED | OUTPATIENT
Start: 2023-04-19

## 2023-04-21 ENCOUNTER — OFFICE VISIT (OUTPATIENT)
Dept: WOUND CARE | Facility: HOSPITAL | Age: 68
End: 2023-04-21
Attending: FAMILY MEDICINE
Payer: MEDICARE

## 2023-04-21 VITALS
DIASTOLIC BLOOD PRESSURE: 82 MMHG | OXYGEN SATURATION: 96 % | HEART RATE: 76 BPM | TEMPERATURE: 98 F | SYSTOLIC BLOOD PRESSURE: 146 MMHG

## 2023-04-21 DIAGNOSIS — I87.312 CHRONIC VENOUS HYPERTENSION W ULCER OF L LOW EXTREM (HCC): Primary | ICD-10-CM

## 2023-04-21 DIAGNOSIS — L97.929 CHRONIC VENOUS HYPERTENSION W ULCER OF L LOW EXTREM (HCC): Primary | ICD-10-CM

## 2023-04-21 PROCEDURE — 99215 OFFICE O/P EST HI 40 MIN: CPT | Performed by: FAMILY MEDICINE

## 2023-04-21 NOTE — PROGRESS NOTES
Weekly Wound Education Note    Teaching Provided To: Patient  Training Topics: Cleasing and general instructions; Compression;Dressing;Nutrition  Training Method: Explain/Verbal  Training Response: Patient responds and understands        Notes:  Altered dressing to honey alginate; kerramax; comprilan

## 2023-04-28 ENCOUNTER — OFFICE VISIT (OUTPATIENT)
Dept: WOUND CARE | Facility: HOSPITAL | Age: 68
End: 2023-04-28
Attending: FAMILY MEDICINE
Payer: MEDICARE

## 2023-04-28 ENCOUNTER — APPOINTMENT (OUTPATIENT)
Dept: WOUND CARE | Facility: HOSPITAL | Age: 68
End: 2023-04-28
Attending: NURSE PRACTITIONER
Payer: MEDICARE

## 2023-04-28 ENCOUNTER — LAB ENCOUNTER (OUTPATIENT)
Dept: LAB | Facility: HOSPITAL | Age: 68
End: 2023-04-28
Attending: INTERNAL MEDICINE
Payer: MEDICARE

## 2023-04-28 VITALS
HEART RATE: 69 BPM | SYSTOLIC BLOOD PRESSURE: 133 MMHG | RESPIRATION RATE: 20 BRPM | OXYGEN SATURATION: 94 % | TEMPERATURE: 98 F | DIASTOLIC BLOOD PRESSURE: 78 MMHG

## 2023-04-28 DIAGNOSIS — L97.929 CHRONIC VENOUS HYPERTENSION W ULCER OF L LOW EXTREM (HCC): Primary | ICD-10-CM

## 2023-04-28 DIAGNOSIS — I48.20 CHRONIC ATRIAL FIBRILLATION (HCC): ICD-10-CM

## 2023-04-28 DIAGNOSIS — I87.312 CHRONIC VENOUS HYPERTENSION W ULCER OF L LOW EXTREM (HCC): Primary | ICD-10-CM

## 2023-04-28 DIAGNOSIS — I63.9 CEREBROVASCULAR ACCIDENT (CVA), UNSPECIFIED MECHANISM (HCC): ICD-10-CM

## 2023-04-28 LAB
INR BLD: 2.59 (ref 0.85–1.16)
PROTHROMBIN TIME: 27.2 SECONDS (ref 11.6–14.8)

## 2023-04-28 PROCEDURE — 85610 PROTHROMBIN TIME: CPT

## 2023-04-28 PROCEDURE — 36415 COLL VENOUS BLD VENIPUNCTURE: CPT

## 2023-04-28 PROCEDURE — 99215 OFFICE O/P EST HI 40 MIN: CPT | Performed by: FAMILY MEDICINE

## 2023-05-05 ENCOUNTER — OFFICE VISIT (OUTPATIENT)
Dept: WOUND CARE | Facility: HOSPITAL | Age: 68
End: 2023-05-05
Attending: NURSE PRACTITIONER
Payer: MEDICARE

## 2023-05-05 VITALS
HEART RATE: 70 BPM | DIASTOLIC BLOOD PRESSURE: 83 MMHG | OXYGEN SATURATION: 94 % | RESPIRATION RATE: 20 BRPM | TEMPERATURE: 98 F | SYSTOLIC BLOOD PRESSURE: 127 MMHG

## 2023-05-05 DIAGNOSIS — I87.312 CHRONIC VENOUS HYPERTENSION W ULCER OF L LOW EXTREM (HCC): Primary | ICD-10-CM

## 2023-05-05 DIAGNOSIS — L97.929 CHRONIC VENOUS HYPERTENSION W ULCER OF L LOW EXTREM (HCC): Primary | ICD-10-CM

## 2023-05-05 PROCEDURE — 99215 OFFICE O/P EST HI 40 MIN: CPT | Performed by: FAMILY MEDICINE

## 2023-05-05 NOTE — PROGRESS NOTES
Weekly Wound Education Note    Teaching Provided To: Patient  Training Topics: Cleasing and general instructions; Compression;Dressing;Nutrition  Training Method: Explain/Verbal  Training Response: Patient responds and understands        Notes: cont dressing to honey alginate; kerramax; comprilan; added gray foam underneath compression

## 2023-05-12 ENCOUNTER — OFFICE VISIT (OUTPATIENT)
Dept: WOUND CARE | Facility: HOSPITAL | Age: 68
End: 2023-05-12
Attending: NURSE PRACTITIONER
Payer: MEDICARE

## 2023-05-12 VITALS
OXYGEN SATURATION: 97 % | RESPIRATION RATE: 20 BRPM | DIASTOLIC BLOOD PRESSURE: 81 MMHG | SYSTOLIC BLOOD PRESSURE: 128 MMHG | TEMPERATURE: 98 F | HEART RATE: 70 BPM

## 2023-05-12 DIAGNOSIS — L97.929 CHRONIC VENOUS HYPERTENSION W ULCER OF L LOW EXTREM (HCC): Primary | ICD-10-CM

## 2023-05-12 DIAGNOSIS — I87.312 CHRONIC VENOUS HYPERTENSION W ULCER OF L LOW EXTREM (HCC): Primary | ICD-10-CM

## 2023-05-12 PROCEDURE — 99215 OFFICE O/P EST HI 40 MIN: CPT | Performed by: FAMILY MEDICINE

## 2023-05-19 ENCOUNTER — OFFICE VISIT (OUTPATIENT)
Dept: WOUND CARE | Facility: HOSPITAL | Age: 68
End: 2023-05-19
Attending: NURSE PRACTITIONER
Payer: MEDICARE

## 2023-05-19 VITALS
OXYGEN SATURATION: 93 % | TEMPERATURE: 98 F | SYSTOLIC BLOOD PRESSURE: 127 MMHG | DIASTOLIC BLOOD PRESSURE: 81 MMHG | RESPIRATION RATE: 18 BRPM | HEART RATE: 83 BPM

## 2023-05-19 DIAGNOSIS — L97.929 CHRONIC VENOUS HYPERTENSION W ULCER OF L LOW EXTREM (HCC): Primary | ICD-10-CM

## 2023-05-19 DIAGNOSIS — I87.312 CHRONIC VENOUS HYPERTENSION W ULCER OF L LOW EXTREM (HCC): Primary | ICD-10-CM

## 2023-05-19 PROCEDURE — 99214 OFFICE O/P EST MOD 30 MIN: CPT | Performed by: FAMILY MEDICINE

## 2023-05-19 NOTE — PROGRESS NOTES
Weekly Wound Education Note    Teaching Provided To: Patient  Training Topics: Cleasing and general instructions; Compression;Dressing  Training Method: Explain/Verbal;Demonstration  Training Response: Patient responds and understands        Notes: applied hydrofera blue dressing, betamethasone to periwound with gray foam over dressing and  3 layers comprilan

## 2023-05-26 ENCOUNTER — OFFICE VISIT (OUTPATIENT)
Dept: WOUND CARE | Facility: HOSPITAL | Age: 68
End: 2023-05-26
Attending: FAMILY MEDICINE
Payer: MEDICARE

## 2023-05-26 ENCOUNTER — LAB ENCOUNTER (OUTPATIENT)
Dept: LAB | Facility: HOSPITAL | Age: 68
End: 2023-05-26
Attending: INTERNAL MEDICINE
Payer: MEDICARE

## 2023-05-26 VITALS
SYSTOLIC BLOOD PRESSURE: 124 MMHG | RESPIRATION RATE: 18 BRPM | OXYGEN SATURATION: 96 % | TEMPERATURE: 98 F | DIASTOLIC BLOOD PRESSURE: 76 MMHG | HEART RATE: 69 BPM

## 2023-05-26 DIAGNOSIS — I63.9 CEREBROVASCULAR ACCIDENT (CVA), UNSPECIFIED MECHANISM (HCC): ICD-10-CM

## 2023-05-26 DIAGNOSIS — L97.929 CHRONIC VENOUS HYPERTENSION W ULCER OF L LOW EXTREM (HCC): Primary | ICD-10-CM

## 2023-05-26 DIAGNOSIS — I87.312 CHRONIC VENOUS HYPERTENSION W ULCER OF L LOW EXTREM (HCC): Primary | ICD-10-CM

## 2023-05-26 DIAGNOSIS — I48.20 CHRONIC ATRIAL FIBRILLATION (HCC): ICD-10-CM

## 2023-05-26 LAB
INR BLD: 2.68 (ref 0.85–1.16)
PROTHROMBIN TIME: 28 SECONDS (ref 11.6–14.8)

## 2023-05-26 PROCEDURE — 99214 OFFICE O/P EST MOD 30 MIN: CPT | Performed by: FAMILY MEDICINE

## 2023-05-26 PROCEDURE — 85610 PROTHROMBIN TIME: CPT

## 2023-05-26 PROCEDURE — 36415 COLL VENOUS BLD VENIPUNCTURE: CPT

## 2023-05-26 NOTE — PATIENT INSTRUCTIONS
PATIENT INSTRUCTIONS      FOR Deandre Mar JOSH Kirkland 1955    DATE OF SERVICE: 2023      Your wound is now healed    Wearing a compression stocking to control swelling    No further appointments needed in wound clinic unless new wound develops

## 2023-05-26 NOTE — PROGRESS NOTES
Weekly Wound Education Note    Teaching Provided To: Patient  Training Topics: Dressing;Cleasing and general instructions; Compression  Training Method: Explain/Verbal  Training Response: Patient responds and understands        Notes: wound is healed,patient is discharged home

## 2023-06-02 ENCOUNTER — APPOINTMENT (OUTPATIENT)
Dept: WOUND CARE | Facility: HOSPITAL | Age: 68
End: 2023-06-02
Attending: FAMILY MEDICINE
Payer: MEDICARE

## 2023-06-02 ENCOUNTER — APPOINTMENT (OUTPATIENT)
Dept: WOUND CARE | Facility: HOSPITAL | Age: 68
End: 2023-06-02
Attending: NURSE PRACTITIONER
Payer: MEDICARE

## 2023-06-09 ENCOUNTER — APPOINTMENT (OUTPATIENT)
Dept: WOUND CARE | Facility: HOSPITAL | Age: 68
End: 2023-06-09
Attending: FAMILY MEDICINE
Payer: MEDICARE

## 2023-06-13 ENCOUNTER — OFFICE VISIT (OUTPATIENT)
Dept: PHYSICAL MEDICINE AND REHAB | Facility: CLINIC | Age: 68
End: 2023-06-13
Payer: MEDICARE

## 2023-06-13 ENCOUNTER — TELEPHONE (OUTPATIENT)
Dept: PHYSICAL MEDICINE AND REHAB | Facility: CLINIC | Age: 68
End: 2023-06-13

## 2023-06-13 VITALS
BODY MASS INDEX: 31.81 KG/M2 | HEART RATE: 77 BPM | DIASTOLIC BLOOD PRESSURE: 72 MMHG | SYSTOLIC BLOOD PRESSURE: 124 MMHG | HEIGHT: 73 IN | WEIGHT: 240 LBS | OXYGEN SATURATION: 95 %

## 2023-06-13 DIAGNOSIS — M47.816 LUMBAR FACET ARTHROPATHY: ICD-10-CM

## 2023-06-13 DIAGNOSIS — M17.0 PRIMARY OSTEOARTHRITIS OF KNEES, BILATERAL: Primary | ICD-10-CM

## 2023-06-13 RX ORDER — SIMVASTATIN 40 MG
40 TABLET ORAL NIGHTLY
COMMUNITY
Start: 2023-03-06

## 2023-06-13 RX ORDER — TADALAFIL 20 MG/1
TABLET ORAL
COMMUNITY

## 2023-06-13 NOTE — TELEPHONE ENCOUNTER
Per CMS Guidelines -no authorization is required for Bilateral knee aspiration and injection with Gel One CPT 39470-72, E0751d3   Viscosupplementation with Hyaluronans will only be covered for Osteoarthritis of the knee or shoulder joint when radiological evidence to support the diagnosis of osteoarthritis; and there is adequate documentation that simple pharmacologic therapy (ie aspirin), or exercise and physical therapy has been tried and the patient has failed to respond satisfactorily. FYI-Synvisc One is limited to Osteoarthritis of the knee    Status: Authorization is not required however may be subject to review once claim is submitted-Covered Benefit (Buy&Bill)    Gel One inj done in office    AND    Per CMS Guidelines -no authorization is required for Bilateral L3-4, L4-5, L5-S1 Facet joint injection under fluoroscopy guidance CPT 83466-04, 88637H8    Status: Authorization is not required however may be subject to review once claim is submitted-Covered Benefit    Per CMS Guidelines-One to two levels, either unilateral or bilateral, are allowed per session per spine region. The need for a three or four-level procedure bilaterally may be considered under unique circumstances and with sufficient documentation of medical necessity on appeal. A session is a time period, which includes all procedures (i.e., medial branch block (MBB), intraarticular injections (IA), facet cyst ruptures, and RFA ablations that are performed during the same day. Frequency Limitation: For each covered spinal region no more than two (2) radiofrequency sessions will be reimbursed per rolling 12 months.

## 2023-06-13 NOTE — PROCEDURES
Procedure:  Bilateral knee aspiration and injection with Hyaluronic acid  The risks, benefits and anticipated outcomes of the procedure, the risks and benefits of the alternatives to the procedure, and the roles and tasks of the personnel to be involved, were discussed with the patient, and the patient consents to the procedure and agrees to proceed. UNIVERSAL PROTOCOL / SAFETY CHECKLIST  Sign in Communication: Completed  Time Out:  Team Confirms the Correct Patient, Correct Procedure, Correct Site and Site Marking, Correct Position (if applicable), Prep and Dry Time (if applicable). The procedure was carried out under sterile prep with  with sterile gel. A 27 ga 1&1/4in needle was introduced and advanced for skin anesthesia with 3 cc of 1% lidocaine. Then, a 18 gauge 1.5 in needle was advanced in the suprapatellar bursa using the superior lateral knee injection approach and 26 cc of serosanguineous synovial fluid was aspirated from the right knee and 9 cc of serosanguineous synovial fluid was aspirated from the left knee. Following aspiration, Gel One was injected. The patient tolerated the procedure without complication and was instructed in post-procedure precautions. See patient instructions.     Mustapha Hines DO, FAAPMR & CAQSM  Physical Medicine and Rehabilitation/Sports Medicine  MEDICAL CENTER [facility]

## 2023-06-13 NOTE — PATIENT INSTRUCTIONS
Hyaluronic Acid Injection  What to expect: The injection contains Lidocaine (which numbs the area) and hyaluronic acid. You may have pain relief within hours of the injection due to the Lidocaine. The Hyaluronic Acid can take a couple days, up to a couple weeks, to reach the full effect. It is also possible to have a slight increase in symptoms over the first few days, but that should resolve fairly quickly. How long will the injection last?: The length of response to an injection is variable. Literally a couple weeks to a couple years. The injection will decrease the inflammation and the pain will return if/when the inflammation returns. Activity Recommendations: For the first 24 hours after injection, keep the area clean and dry. It is ok to shower but don't soak in a tub during that time. No vigorous activity such as running or heavy lifting for the first week but other than that you can gradually resume your normal activities immediately. If you have a significant decrease in pain, be careful not to do too much too soon. Again, the key is GRADUAL resumption of activites. Things to look out for: Common injection side effects include soreness at the injection site, bruising, and flushing of the face or skin. Infection is very rare but please notify my office [facility] for [address] [place] [phone]) if you develop any fevers, drainage from the injection site, or severe increase in pain. If it is the weekend, go to an Emergency Room.       -My office will call once injection is approved

## 2023-06-14 NOTE — TELEPHONE ENCOUNTER
Patient has been scheduled for Bilateral L4-5, L5-S1 Facet joint injection under fluoroscopy guidance on 6/19/23 at the [facility] with Dr. Frank Metcalf.   -Anesthesia type: Local  -Patient informed to fast 12 hours prior to procedure with IVCS/MAC.   -Scheduling [address] covid testing required for all procedures whether patient is vaccinated or not. -Patient was advised that if he/she does receive the covid vaccine it needs to be at least 2 weeks before or after the injection. -Medications and allergies reviewed. -Patient reminded to hold NSAIDs (Ibuprofen, ASA 81, Aleve, Naproxen, Mobic, Diclofenac, Etodolac, Celebrex etc.) for 3 days prior to Lumbar MBB/Facet if BMI is greater than 35. For Cervical injections only hold multivitamins, Vitamin E, Fish Oil, Phentermine/Lomaira for 7 days prior to injection and NSAIDS.  mg to be held for 7 days prior to injections.  -If patient is receiving MAC/IVCS Phentermine Magdajolly Ennis) will need to be held for 7 days prior to injection.  -If on blood thinner clearance has been received to hold this medication by provider.   -Patient informed he/she will need a  to and from procedure. Jameel Martin is located in the [facility] [address] 1st floor,  may park in the yellow/purple parking lot. Patient verbalized understanding and agrees with plan.  -----> Scheduled in Epic: Yes  -----> Scheduled in [employer]: Yes  [x] Follow up appointment made.

## 2023-06-16 ENCOUNTER — APPOINTMENT (OUTPATIENT)
Dept: WOUND CARE | Facility: HOSPITAL | Age: 68
End: 2023-06-16
Attending: NURSE PRACTITIONER
Payer: MEDICARE

## 2023-06-16 NOTE — TELEPHONE ENCOUNTER
Per [facility] PAT - \"Tried contacting MD's office, no answer. Patient is on Warfarin, per patient under the impression did not need to hold medication prior to procedure. Also, does patient require PT/INR? Please advise. \"    Per Zonia Dash - no need to hold AC. This RN informed Claudean Au with [facility] that no need to hold [facility].

## 2023-06-19 ENCOUNTER — OFFICE VISIT (OUTPATIENT)
Dept: SURGERY | Facility: CLINIC | Age: 68
End: 2023-06-19
Payer: MEDICARE

## 2023-06-19 DIAGNOSIS — M47.816 LUMBAR FACET ARTHROPATHY: Primary | ICD-10-CM

## 2023-06-19 NOTE — PROCEDURES
[facility] Z-JOINT/FACET INJECTIONS  NAME:  Dhara Waldrop    MR #:    [id number] :  1955     PHYSICIAN:  Madhavi Tellez. Lisset Chavez DO        Operative Report    DATE OF PROCEDURE: 2023   PREOPERATIVE DIAGNOSES: 1. Lumbar facet arthropathy        POSTOPERATIVE DIAGNOSES:   1. Lumbar facet arthropathy        PROCEDURES: bilateral L4-5 and L5-S1 Z-joint/facet injection done under fluoroscopic guidance with contrast enhancement. SURGEON: Madhavi Tellez. Lisset Chavez DO   ANESTHESIA: Local   INDICATIONS:      OPERATIVE PROCEDURE:  Written consent was obtained from the patient. The patient was brought into the operating room and placed in the prone position on the fluoroscopy table with a pillow underneath the abdomen. The patient's skin was cleaned and draped in a normal sterile fashion. Using AP fluoroscopy, all five lumbar vertebrae were identified. Then the bilateral L4-5 and L5-S1 z-joints were identified on AP view. At this point in time, the patient's skin was anesthetized with 1 to 2 cc of 1% PF lidocaine without epinephrine over each joint site. Then, 5 inch, 22-gauge spinal needles were inserted and directed towards the bilateral L4-5 and L5-S1 z-joints . When it felt to be in good position, bilateral anterior oblique fluoroscopy was used to advance the needle into the correct z-joint. At this point in time, Omnipaque-240 contrast was used to obtain a good athrogram indicating correct needle placement. Then, aspiration was performed. No blood, fluid, or air was aspirated and each joint was injected with a 1 cc solution of 1/2 cc of 40 mg/cc of Kenalog and 1/2 cc of 1% PF lidocaine without epinephrine. After this, the needles were removed. The patient's skin was cleaned. Band-Aids were applied. The patient was transferred to the cart and into [facility]. The patient was given discharge instructions and will follow up in the clinic as scheduled.   Throughout the whole procedure, the patient's pulse oximetry and vital signs were monitored and they remained completely stable. Also, throughout the whole procedure, prior to injection of any medication, aspiration was performed. No blood, fluid, or air was aspirated at anytime.     Evelyn Credit , FAAPMR & CAQSM  Physical Medicine and Rehabilitation/Sports Medicine  MEDICAL CENTER [facility]

## 2023-06-22 ENCOUNTER — HOSPITAL ENCOUNTER (OUTPATIENT)
Dept: ULTRASOUND IMAGING | Facility: HOSPITAL | Age: 68
Discharge: HOME | End: 2023-06-22
Attending: INTERNAL MEDICINE
Payer: MEDICARE

## 2023-06-22 DIAGNOSIS — R59.0 LEFT CERVICAL LYMPHADENOPATHY: ICD-10-CM

## 2023-06-22 PROCEDURE — 76536 US EXAM OF HEAD AND NECK: CPT | Performed by: INTERNAL MEDICINE

## 2023-06-23 ENCOUNTER — APPOINTMENT (OUTPATIENT)
Dept: WOUND CARE | Facility: HOSPITAL | Age: 68
End: 2023-06-23
Attending: NURSE PRACTITIONER
Payer: MEDICARE

## 2023-06-30 ENCOUNTER — APPOINTMENT (OUTPATIENT)
Dept: WOUND CARE | Facility: HOSPITAL | Age: 68
End: 2023-06-30
Attending: NURSE PRACTITIONER
Payer: MEDICARE

## 2023-07-05 ENCOUNTER — TELEPHONE (OUTPATIENT)
Dept: PHYSICAL MEDICINE AND REHAB | Facility: CLINIC | Age: 68
End: 2023-07-05

## 2023-07-05 ENCOUNTER — TELEMEDICINE (OUTPATIENT)
Dept: PHYSICAL MEDICINE AND REHAB | Facility: CLINIC | Age: 68
End: 2023-07-05
Payer: MEDICARE

## 2023-07-05 DIAGNOSIS — I48.20 CHRONIC ATRIAL FIBRILLATION (HCC): ICD-10-CM

## 2023-07-05 DIAGNOSIS — I63.9 CEREBROVASCULAR ACCIDENT (CVA), UNSPECIFIED MECHANISM (HCC): ICD-10-CM

## 2023-07-05 DIAGNOSIS — M17.0 PRIMARY OSTEOARTHRITIS OF KNEES, BILATERAL: Primary | ICD-10-CM

## 2023-07-05 DIAGNOSIS — M47.816 LUMBAR FACET ARTHROPATHY: ICD-10-CM

## 2023-07-05 DIAGNOSIS — I10 BENIGN ESSENTIAL HTN: ICD-10-CM

## 2023-07-05 DIAGNOSIS — M48.061 SPINAL STENOSIS AT L4-L5 LEVEL: ICD-10-CM

## 2023-07-05 PROCEDURE — 99214 OFFICE O/P EST MOD 30 MIN: CPT | Performed by: PHYSICAL MEDICINE & REHABILITATION

## 2023-07-05 NOTE — TELEPHONE ENCOUNTER
Per CMS Guidelines -no authorization is required for Bilateral knee aspiration and injection with corticosteroid CPT 99513-89, [id number]     Status: Authorization is not required however may be subject to review once claim is submitted-Covered Benefit     Patient scheduled 7/13/23 at [facility]

## 2023-07-06 ENCOUNTER — LAB ENCOUNTER (OUTPATIENT)
Dept: LAB | Facility: HOSPITAL | Age: 68
End: 2023-07-06
Attending: INTERNAL MEDICINE
Payer: MEDICARE

## 2023-07-06 DIAGNOSIS — I48.20 CHRONIC ATRIAL FIBRILLATION (HCC): ICD-10-CM

## 2023-07-06 DIAGNOSIS — I63.9 CEREBROVASCULAR ACCIDENT (CVA), UNSPECIFIED MECHANISM (HCC): ICD-10-CM

## 2023-07-06 LAB
INR BLD: 2.14 (ref 0.85–1.16)
PROTHROMBIN TIME: 23.5 SECONDS (ref 11.6–14.8)

## 2023-07-06 PROCEDURE — 85610 PROTHROMBIN TIME: CPT

## 2023-07-06 PROCEDURE — 36415 COLL VENOUS BLD VENIPUNCTURE: CPT

## 2023-07-06 NOTE — PROGRESS NOTES
[address]    Telemedicine Visit - Follow Up Evaluation    Telehealth Verbal Consent   I conducted a telehealth visit with Violeta Litten today, 07/05/23, which was completed using two-way, real-time interactive audio and video communication. This has been done in good brianda to provide continuity of care in the best interest of the provider-patient relationship, due to the COVID -19 public health crisis/national emergency where restrictions of face-to-face office visits are ongoing. Every conscious effort was taken to allow for sufficient and adequate time to complete the visit. The patient was made aware of the limitations of the telehealth visit, including treatment limitations as no physical exam could be performed. The patient was advised to call 911 or to go to the ER in case there was an emergency. The patient was also advised of the potential privacy & security concerns related to the telehealth platform. The patient was made aware of where to find [facility] notice of privacy practices, telehealth consent form and other related consent forms and documents. which are located on the [facility] website. The patient verbally agreed to telehealth consent form, related consents and the risks discussed. Lastly, the patient confirmed that they were in [place]. Included in this visit, time may have been spent reviewing labs, medications, radiology tests and decision making. Appropriate medical decision-making and tests are ordered as detailed in the plan of care above. Coding/billing information is submitted for this visit based on complexity of care and/or time spent for the visit. Chief Complaint: Back and knee pain    HISTORY OF PRESENT ILLNESS:     Patient is following up after lumbar facet injection and bilateral knee injection with hyaluronic acid.   He states that the low back pain has improved greatly however the knee pain has not responded well to the gel injection. He states that the cortisone was much more effective for him. He will be talking to his cardiologist soon to see if he could be a candidate to have knee replacement surgery from a holding anticoagulation standpoint. With regards to the knee pain it is worse with increased activity ambulation associated with swelling. He last had a corticosteroid injection in February which seemed to help him more than the gel. Patient would like to repeat that. He denies any changes in strength or bowel bladder. He continues gabapentin 800 mg 3 times daily in addition to tramadol       PAST MEDICAL HISTORY:     Past Medical History:   Diagnosis Date    Acute perforated appendicitis 11/23/2019    Acute, but ill-defined, cerebrovascular disease 12/2019    Arrhythmia     afib    Atrial fibrillation ([address]     ablation (2004); CV (May 2009)    High blood pressure     High cholesterol     History of vein stripping     Osteoarthrosis, unspecified whether generalized or localized, unspecified site     Sleep apnea     Stroke [address] [address]     2019    UGI bleed     Unspecified essential hypertension     Venous insufficiency     RT greater saphenous vein ablation 2007         PAST SURGICAL HISTORY:     Past Surgical History:   Procedure Laterality Date    APPENDECTOMY  12/04/2019    CARDIAC PACEMAKER PLACEMENT  11/30/2022    CATARACT  11/02/2022    right eye    COLONOSCOPY  10/2011    COLONOSCOPY N/A 11/16/2016    Procedure: COLONOSCOPY;  Surgeon: Paco Pierson MD;  Location: [address] [place] [address] ENDOSCOPY    COLONOSCOPY  03/01/2022    Diverticulosis, Hemorrhoids          Repeat 2027    COLONOSCOPY N/A 03/01/2022    Procedure: COLONOSCOPY,;  Surgeon: Aditi Mccabe MD;  Location: [address] [place] [address] REPLACEMENT SURGERY Left 05/2008    HIP REPLACEMENT SURGERY Right 06/2017    MOLES Left 2011    removed from L upper quadrant. Biopsy (pre-cancerous?  -- Patient unsure if cancerous)    TOTAL HIP REPLACEMENT Bilateral VALVE REPAIR  2002    mitral valve repair         CURRENT MEDICATIONS:     Current Outpatient Medications   Medication Sig Dispense Refill    simvastatin 40 MG Oral Tab Take 1 tablet (40 mg total) by mouth nightly. Tadalafil (CIALIS) 20 MG Oral Tab Cialis 20 mg tablet      amLODIPine 10 MG Oral Tab Take 1 tablet (10 mg total) by mouth daily. 90 tablet 3    celecoxib 200 MG Oral Cap Take 1 capsule (200 mg total) by mouth daily as needed. 90 capsule 3    warfarin 2.5 MG Oral Tab Take 1 tablet (2.5 mg total) by mouth daily. 90 tablet 3    Losartan Potassium-HCTZ 100-12.5 MG Oral Tab Take 1 tablet by mouth daily. 90 tablet 3    simvastatin 20 MG Oral Tab Take 1 tablet (20 mg total) by mouth nightly. 90 tablet 3    gabapentin 400 MG Oral Cap Take 2 capsules (800 mg total) by mouth 3 (three) times daily. 540 capsule 3    Sildenafil Citrate 100 MG Oral Tab Take 1 tablet (100 mg total) by mouth daily as needed for Erectile Dysfunction. 10 tablet 11    tramadol-acetaminophen 37.5-325 MG Oral Tab Take 2 tablets by mouth 2 (two) times daily as needed for Pain. 360 tablet 1    Glucosamine-Chondroit-Vit C-Mn (GLUCOSAMINE-CHONDROITIN) Oral Cap Take 1 tablet by mouth daily. omega-3 fatty acids (FISH OIL) 1000 MG Oral Cap Take 1,000 mg by mouth daily. (Patient not taking: Reported on 4/19/2023)           ALLERGIES:     Adhesive Tape               Comment:Other reaction(s): rash      FAMILY HISTORY:     Family History   Problem Relation Age of Onset    Colon Cancer Father     Arthritis Other     Hypertension Other           SOCIAL HISTORY:     Social History     Socioeconomic History    Marital status:    Tobacco Use    Smoking status: Never    Smokeless tobacco: Never   Vaping Use    Vaping Use: Never used   Substance and Sexual Activity    Alcohol use:  Yes     Alcohol/week: 15.0 standard drinks of alcohol     Types: 15 Glasses of wine per week     Comment: 2 glasses of wine/day    Drug use: No   Other Topics Concern    Caffeine Concern No    Pt has a pacemaker No    Pt has a defibrillator No    Reaction to local anesthetic No   Social History Narrative    The patient does not use an assistive device. .      The patient does live in a home with stairs.           REVIEW OF SYSTEMS:   As noted in HPI      PHYSICAL EXAM:   General: No immediate distress  Head: Normocephalic/ Atraumatic  Eyes: Extra-occular movements intact  Ears/Nose/Throat:  External appearance identifies normal appearance without obvious deformity  Cardiovascular: No cyanosis, clubbing or edema  Respiratory: Non-labored respirations  Skin: No lesions noted   Neurological: alert & oriented x 3, attentive, able to follow commands, comprehention intact, spontaneous speech intact  Psychiatric: Mood and affect appropriate    Musculoskeletal Exam:  Patient is sitting with some discomfort        LABS:     Lab Results   Component Value Date    A1C 5.6 04/16/2018     Lab Results   Component Value Date    WBC 5.0 04/14/2023    RBC 4.76 04/14/2023    HGB 14.4 04/14/2023    HCT 44.0 04/14/2023    MCV 92.4 04/14/2023    MCH 30.3 04/14/2023    MCHC 32.7 04/14/2023    RDW 14.4 04/14/2023    .0 04/14/2023    MPV 8.1 06/29/2018     Lab Results   Component Value Date    GLU 98 04/14/2023    BUN 24 (H) 04/14/2023    BUNCREA 31.6 (H) 04/14/2023    CREATSERUM 0.76 04/14/2023    ANIONGAP 6 04/14/2023    GFRNAA 87 04/11/2022    GFRAA 100 04/11/2022    CA 8.9 04/14/2023    OSMOCALC 294 04/14/2023    ALKPHO 71 04/14/2023    AST 21 04/14/2023    ALT 41 04/14/2023    BILT 0.6 04/14/2023    TP 7.2 04/14/2023    ALB 3.8 04/14/2023    GLOBULIN 3.4 04/14/2023     04/14/2023    K 3.7 04/14/2023     04/14/2023    CO2 26.0 04/14/2023     Lab Results   Component Value Date    PTP 28.0 (H) 05/26/2023    PT 18.4 (H) 02/03/2016    INR 2.68 (H) 05/26/2023     Lab Results   Component Value Date    VITD 24.1 (L) 04/14/2023       IMAGING:   No new imaging    All imaging results were reviewed and discussed with patient. ASSESSMENT:     1. Primary osteoarthritis of knees, bilateral    2. Lumbar facet arthropathy    3. Spinal stenosis at L4-L5 level    4. Chronic atrial fibrillation (HCC)    5. Cerebrovascular accident (CVA), unspecified mechanism ([address]    6. Benign essential HTN          PLAN:   Ana Lilia Lopez is a pleasant 59-year-old male following up for low back and bilateral knee pain. I have recommended bilateral knee aspiration injection with corticosteroid as he did not have significant improvement with the hyaluronic acid. We discussed that he can follow-up with his cardiologist as well to discuss anticoagulation treatment may benefit from orthopedic evaluation for consideration of knee replacement surgery. He will continue tramadol and gabapentin for now. Follow-up:   For injection    We discussed that a telemedicine visit is in place of an office visit; however, this limits the ability to perform a thorough physical examination which may affect objective findings related to a specific condition and can affect treatment. We also discussed that NSAIDs may mask or worsen COVID-19 infection symptoms. The patient was also informed that corticosteroids, in any form, may significantly decrease immune response and may increase risk and complications of infection. The patient was advised that given the current situation with COVID-19, it is in his/her best interest to socially distance his/herself. Given this, we are not recommending any elective procedures or office visits at the outpatient surgery center or in the office respectively unless deemed necessary. My staff will be reaching out to the patient for the elective procedure when it is considered appropriate and the patient can follow-up in office as well when appropriate. In the meantime, I will be available for telephone and video encounter.       The patient verbalized understanding with this plan and was in agreement. There are no barriers to learning. All questions were answered. Evelyn Credit D. [id number] [facility]  Physical Medicine and Rehabilitation/Sports Medicine

## 2023-07-07 ENCOUNTER — APPOINTMENT (OUTPATIENT)
Dept: WOUND CARE | Facility: HOSPITAL | Age: 68
End: 2023-07-07
Attending: NURSE PRACTITIONER
Payer: MEDICARE

## 2023-07-13 ENCOUNTER — OFFICE VISIT (OUTPATIENT)
Dept: PHYSICAL MEDICINE AND REHAB | Facility: CLINIC | Age: 68
End: 2023-07-13
Payer: MEDICARE

## 2023-07-13 DIAGNOSIS — M17.0 PRIMARY OSTEOARTHRITIS OF KNEES, BILATERAL: Primary | ICD-10-CM

## 2023-07-13 PROCEDURE — 20610 DRAIN/INJ JOINT/BURSA W/O US: CPT | Performed by: PHYSICAL MEDICINE & REHABILITATION

## 2023-07-13 RX ORDER — TRIAMCINOLONE ACETONIDE 40 MG/ML
80 INJECTION, SUSPENSION INTRA-ARTICULAR; INTRAMUSCULAR ONCE
Status: COMPLETED | OUTPATIENT
Start: 2023-07-13 | End: 2023-07-13

## 2023-07-13 RX ORDER — LIDOCAINE HYDROCHLORIDE 10 MG/ML
14 INJECTION, SOLUTION INFILTRATION; PERINEURAL ONCE
Status: COMPLETED | OUTPATIENT
Start: 2023-07-13 | End: 2023-07-13

## 2023-07-13 NOTE — PROGRESS NOTES
Steroid Injection Information  What to expect: The injection contains Lidocaine (which numbs the area) and Kenalog (a steroid which decreases inflammation). You may have pain relief within hours of the injection due to the Lidocaine. The Kenalog can take a couple days, up to a couple weeks, to reach the full effect. It is also possible to have a slight increase in symptoms over the first few days, but that should resolve fairly quickly. How long will the injection last?: The length of response to an injection is variable. Literally a couple weeks to a couple years. The injection will decrease the inflammation and the pain will return if/when the inflammation returns. Activity Recommendations: For the first 24 hours after injection, keep the area clean and dry. It is ok to shower but don't soak in a tub during that time. No vigorous activity such as running or heavy lifting for the first week but other than that you can gradually resume your normal activities immediately. If you have a significant decrease in pain, be careful not to do too much too soon. Again, the key is GRADUAL resumption of activites. Things to look out for: Common injection side effects include soreness at the injection site, bruising, flushing of the face or skin, and a temporary increase in your blood sugars and/or blood pressure. Infection is very rare but please notify my office [facility] for [address] [place] [phone]) if you develop any fevers, drainage from the injection site, or severe increase in pain. If it is the weekend, go to an Emergency Room. Manny Wallace, [address]   [phone]

## 2023-07-18 NOTE — PROCEDURES
Procedure:  Bilateral knee aspiration and injection with corticosteroid  The risks, benefits and anticipated outcomes of the procedure, the risks and benefits of the alternatives to the procedure, and the roles and tasks of the personnel to be involved, were discussed with the patient, and the patient consents to the procedure and agrees to proceed. UNIVERSAL PROTOCOL / SAFETY CHECKLIST  Sign in Communication: Completed  Time Out:  Team Confirms the Correct Patient, Correct Procedure, Correct Site and Site Marking, Correct Position (if applicable), Prep and Dry Time (if applicable). The procedure was carried out under sterile prep with  with sterile gel. A 27 ga 1&1/4in needle was introduced and advanced for skin anesthesia with 3 cc of 1% lidocaine. Then, a 18 gauge 1.5 in needle was advanced and 15 cc of synovial fluid was aspirated from the left knee and 1 cc of synovial fluid aspirated from the right knee. Following aspiration, a mixture of 4 cc of lidocaine and 1 cc of Kenalog (40mg/ml) was injected. The patient tolerated the procedure without complication and was instructed in post-procedure precautions.     Kenrick Choudhary DO, FAAPMR & CAQSM  Physical Medicine and Rehabilitation/Sports Medicine  MEDICAL CENTER [facility]

## 2023-07-28 ENCOUNTER — TELEPHONE (OUTPATIENT)
Dept: INTERNAL MEDICINE CLINIC | Facility: CLINIC | Age: 68
End: 2023-07-28

## 2023-07-28 NOTE — TELEPHONE ENCOUNTER
Patient is calling to clarify which dosage of Simvastatin he should be taking. Patient states MyChart shows two different strengths, one 20mg and one 40mg. Please call patient to clarify.       # [phone]

## 2023-07-28 NOTE — TELEPHONE ENCOUNTER
Spoke with patient informed him that last refill from Dr. Anner Duane was for Simvastatin 20 mg daily. Patient verbalized understanding.

## 2023-08-14 ENCOUNTER — LAB ENCOUNTER (OUTPATIENT)
Dept: LAB | Facility: HOSPITAL | Age: 68
End: 2023-08-14
Attending: INTERNAL MEDICINE
Payer: MEDICARE

## 2023-08-14 DIAGNOSIS — I63.9 CEREBROVASCULAR ACCIDENT (CVA), UNSPECIFIED MECHANISM (HCC): ICD-10-CM

## 2023-08-14 DIAGNOSIS — I48.20 CHRONIC ATRIAL FIBRILLATION (HCC): ICD-10-CM

## 2023-08-14 LAB
INR BLD: 2.22 (ref 0.85–1.16)
PROTHROMBIN TIME: 24.2 SECONDS (ref 11.6–14.8)

## 2023-08-14 PROCEDURE — 36415 COLL VENOUS BLD VENIPUNCTURE: CPT

## 2023-08-14 PROCEDURE — 85610 PROTHROMBIN TIME: CPT

## 2023-09-04 NOTE — TELEPHONE ENCOUNTER
Order signed.  Thank you discussed)  None     Social Determinants affecting Dx or Tx: None    PROCEDURES   Unless otherwise noted above, none  Procedures      CRITICAL CARE TIME   Patient does not meet Critical Care Time, 0 minutes    ED FINAL IMPRESSION     1. Swelling of lymph node          DISPOSITION/PLAN   DISPOSITION Decision To Discharge 09/04/2023 01:24:29 AM    Discharge Note: The patient is stable for discharge home. The signs, symptoms, diagnosis, and discharge instructions have been discussed, understanding conveyed, and agreed upon. The patient is to follow up as recommended or return to ER should their symptoms worsen. PATIENT REFERRED TO:  Kwame Rey MD  [address]  [address] [facility]  [phone]    In 3 days          DISCHARGE MEDICATIONS:     Medication List        START taking these medications      amoxicillin-clavulanate 600-42.9 MG/5ML suspension  Commonly known as: AUGMENTIN-ES  Take 8.3 mLs by mouth 2 times daily for 7 days     ibuprofen 100 MG/5ML suspension  Commonly known as: Childrens Advil  Take 11 mLs by mouth every 6 hours as needed for Pain               Where to Get Your Medications        These medications were sent to [place] [address] [place] [address] [phone] [address] [address]      Phone: [phone]   amoxicillin-clavulanate 600-42.9 MG/5ML suspension  ibuprofen 100 MG/5ML suspension           DISCONTINUED MEDICATIONS:  Current Discharge Medication List          I am the Primary Clinician of Record. Alyson Ponce MD (electronically signed)    (Please note that parts of this dictation were completed with voice recognition software. Quite often unanticipated grammatical, syntax, homophones, and other interpretive errors are inadvertently transcribed by the computer software. Please disregards these errors.  Please excuse any errors that have escaped final proofreading.)       Nomi Pelayo

## 2023-09-19 ENCOUNTER — LAB ENCOUNTER (OUTPATIENT)
Dept: LAB | Age: 68
End: 2023-09-19
Attending: INTERNAL MEDICINE
Payer: MEDICARE

## 2023-09-19 DIAGNOSIS — I48.20 CHRONIC ATRIAL FIBRILLATION (HCC): Primary | ICD-10-CM

## 2023-09-19 LAB
INR BLD: 2.55 (ref 0.85–1.16)
PROTHROMBIN TIME: 26.9 SECONDS (ref 11.6–14.8)

## 2023-09-19 PROCEDURE — 36415 COLL VENOUS BLD VENIPUNCTURE: CPT

## 2023-09-19 PROCEDURE — 85610 PROTHROMBIN TIME: CPT

## 2023-09-26 NOTE — TELEPHONE ENCOUNTER
MD Alexia Stubbs LPN  Caller: Unspecified (6 days ago,  8:34 AM)  I sent her old HCTZ and metoprolol to her pharmacy to take if not pregnant. Yes stop labetalol     Thx     Estephania Jesus MD   9/25/2023       Patient notified. Patient scheduled 06/22/21. Gel one labeled and placed in med room.

## 2023-10-06 ENCOUNTER — OFFICE VISIT (OUTPATIENT)
Dept: WOUND CARE | Facility: HOSPITAL | Age: 68
End: 2023-10-06
Attending: FAMILY MEDICINE
Payer: MEDICARE

## 2023-10-06 VITALS
HEIGHT: 73 IN | SYSTOLIC BLOOD PRESSURE: 140 MMHG | DIASTOLIC BLOOD PRESSURE: 78 MMHG | RESPIRATION RATE: 18 BRPM | HEART RATE: 78 BPM | TEMPERATURE: 98 F | BODY MASS INDEX: 31.14 KG/M2 | OXYGEN SATURATION: 98 % | WEIGHT: 235 LBS

## 2023-10-06 DIAGNOSIS — L97.929 CHRONIC VENOUS HYPERTENSION W ULCER OF L LOW EXTREM (HCC): Primary | ICD-10-CM

## 2023-10-06 DIAGNOSIS — I87.312 CHRONIC VENOUS HYPERTENSION W ULCER OF L LOW EXTREM (HCC): Primary | ICD-10-CM

## 2023-10-06 DIAGNOSIS — I87.311 CHRONIC VENOUS HYPERTENSION (IDIOPATHIC) WITH ULCER OF RIGHT LOWER EXTREMITY (CODE) (HCC): ICD-10-CM

## 2023-10-06 PROCEDURE — 99215 OFFICE O/P EST HI 40 MIN: CPT | Performed by: FAMILY MEDICINE

## 2023-10-13 ENCOUNTER — OFFICE VISIT (OUTPATIENT)
Dept: WOUND CARE | Facility: HOSPITAL | Age: 68
End: 2023-10-13
Attending: FAMILY MEDICINE
Payer: MEDICARE

## 2023-10-13 VITALS
SYSTOLIC BLOOD PRESSURE: 134 MMHG | OXYGEN SATURATION: 95 % | TEMPERATURE: 98 F | RESPIRATION RATE: 18 BRPM | DIASTOLIC BLOOD PRESSURE: 84 MMHG | HEART RATE: 69 BPM

## 2023-10-13 DIAGNOSIS — I87.311 CHRONIC VENOUS HYPERTENSION (IDIOPATHIC) WITH ULCER OF RIGHT LOWER EXTREMITY (CODE) (HCC): ICD-10-CM

## 2023-10-13 DIAGNOSIS — I87.312 CHRONIC VENOUS HYPERTENSION W ULCER OF L LOW EXTREM (HCC): Primary | ICD-10-CM

## 2023-10-13 DIAGNOSIS — L97.929 CHRONIC VENOUS HYPERTENSION W ULCER OF L LOW EXTREM (HCC): Primary | ICD-10-CM

## 2023-10-13 PROCEDURE — 99214 OFFICE O/P EST MOD 30 MIN: CPT | Performed by: FAMILY MEDICINE

## 2023-10-16 ENCOUNTER — OFFICE VISIT (OUTPATIENT)
Dept: PHYSICAL MEDICINE AND REHAB | Facility: CLINIC | Age: 68
End: 2023-10-16
Payer: MEDICARE

## 2023-10-16 ENCOUNTER — TELEPHONE (OUTPATIENT)
Dept: PHYSICAL MEDICINE AND REHAB | Facility: CLINIC | Age: 68
End: 2023-10-16

## 2023-10-16 VITALS — RESPIRATION RATE: 18 BRPM | WEIGHT: 235 LBS | HEIGHT: 73 IN | BODY MASS INDEX: 31.14 KG/M2

## 2023-10-16 DIAGNOSIS — M48.062 SPINAL STENOSIS OF LUMBAR REGION WITH NEUROGENIC CLAUDICATION: ICD-10-CM

## 2023-10-16 DIAGNOSIS — M17.0 PRIMARY OSTEOARTHRITIS OF KNEES, BILATERAL: Primary | ICD-10-CM

## 2023-10-16 DIAGNOSIS — M47.816 LUMBAR FACET ARTHROPATHY: ICD-10-CM

## 2023-10-16 PROCEDURE — 20610 DRAIN/INJ JOINT/BURSA W/O US: CPT | Performed by: PHYSICAL MEDICINE & REHABILITATION

## 2023-10-16 PROCEDURE — 99214 OFFICE O/P EST MOD 30 MIN: CPT | Performed by: PHYSICAL MEDICINE & REHABILITATION

## 2023-10-16 RX ORDER — TRIAMCINOLONE ACETONIDE 40 MG/ML
80 INJECTION, SUSPENSION INTRA-ARTICULAR; INTRAMUSCULAR ONCE
Status: COMPLETED | OUTPATIENT
Start: 2023-10-16 | End: 2023-10-16

## 2023-10-16 RX ORDER — LIDOCAINE HYDROCHLORIDE 10 MG/ML
14 INJECTION, SOLUTION INFILTRATION; PERINEURAL ONCE
Status: COMPLETED | OUTPATIENT
Start: 2023-10-16 | End: 2023-10-16

## 2023-10-16 NOTE — TELEPHONE ENCOUNTER
Per CMS Guidelines -no authorization is required for   Bilateral knee aspiration and injection with corticosteroid CPT 90914-34, [id number]     Status: Authorization is not required based on medical necessity however may be subject to review once claim is submitted-Covered Benefit    Procedure done in office

## 2023-10-16 NOTE — PATIENT INSTRUCTIONS
MRI of the lumbar spine and follow up after  Video follow up after MRI    Steroid Injection Information  What to expect: The injection contains Lidocaine (which numbs the area) and Kenalog (a steroid which decreases inflammation). You may have pain relief within hours of the injection due to the Lidocaine. The Kenalog can take a couple days, up to a couple weeks, to reach the full effect. It is also possible to have a slight increase in symptoms over the first few days, but that should resolve fairly quickly. How long will the injection last?: The length of response to an injection is variable. Literally a couple weeks to a couple years. The injection will decrease the inflammation and the pain will return if/when the inflammation returns. Activity Recommendations: For the first 24 hours after injection, keep the area clean and dry. It is ok to shower but don't soak in a tub during that time. No vigorous activity such as running or heavy lifting for the first week but other than that you can gradually resume your normal activities immediately. If you have a significant decrease in pain, be careful not to do too much too soon. Again, the key is GRADUAL resumption of activites. Things to look out for: Common injection side effects include soreness at the injection site, bruising, flushing of the face or skin, and a temporary increase in your blood sugars and/or blood pressure. Infection is very rare but please notify my office [facility] for [address] [place] [phone]) if you develop any fevers, drainage from the injection site, or severe increase in pain. If it is the weekend, go to an Emergency Room. Keiry Alcocer.  Ann Martinez, [address]   [phone]

## 2023-10-17 PROBLEM — M47.816 LUMBAR FACET ARTHROPATHY: Status: ACTIVE | Noted: 2023-10-17

## 2023-10-17 NOTE — PROCEDURES
Procedure:  Bilateral knee aspiration and injection with corticosteroid  The risks, benefits and anticipated outcomes of the procedure, the risks and benefits of the alternatives to the procedure, and the roles and tasks of the personnel to be involved, were discussed with the patient, and the patient consents to the procedure and agrees to proceed. UNIVERSAL PROTOCOL / SAFETY CHECKLIST  Sign in Communication: Completed  Time Out:  Team Confirms the Correct Patient, Correct Procedure, Correct Site and Site Marking, Correct Position (if applicable), Prep and Dry Time (if applicable). The procedure was carried out under sterile prep with  with sterile gel. A 27 ga 1&1/4in needle was introduced and advanced for skin anesthesia with 3 cc of 1% lidocaine. Then, a 18 gauge 1.5 in needle was advanced and 0 cc of synovial fluid was aspirated from the left knee and 25 cc of synovial fluid aspirated from the right knee. Following aspiration, a mixture of 4 cc of lidocaine and 1 cc of Kenalog (40mg/ml) was injected. The patient tolerated the procedure without complication and was instructed in post-procedure precautions.     Josep Mercado DO, FAAPMR & CAQSM  Physical Medicine and Rehabilitation/Sports Medicine  MEDICAL [facility]

## 2023-10-20 ENCOUNTER — OFFICE VISIT (OUTPATIENT)
Dept: WOUND CARE | Facility: HOSPITAL | Age: 68
End: 2023-10-20
Attending: FAMILY MEDICINE
Payer: MEDICARE

## 2023-10-20 VITALS
HEART RATE: 69 BPM | DIASTOLIC BLOOD PRESSURE: 78 MMHG | OXYGEN SATURATION: 98 % | TEMPERATURE: 99 F | RESPIRATION RATE: 18 BRPM | SYSTOLIC BLOOD PRESSURE: 121 MMHG

## 2023-10-20 DIAGNOSIS — I87.311 CHRONIC VENOUS HYPERTENSION (IDIOPATHIC) WITH ULCER OF RIGHT LOWER EXTREMITY (CODE) (HCC): ICD-10-CM

## 2023-10-20 DIAGNOSIS — L97.929 CHRONIC VENOUS HYPERTENSION (IDIOPATHIC) WITH ULCER OF LEFT LOWER EXTREMITY (HCC): Primary | ICD-10-CM

## 2023-10-20 DIAGNOSIS — I87.312 CHRONIC VENOUS HYPERTENSION (IDIOPATHIC) WITH ULCER OF LEFT LOWER EXTREMITY (HCC): Primary | ICD-10-CM

## 2023-10-20 PROCEDURE — 99214 OFFICE O/P EST MOD 30 MIN: CPT | Performed by: FAMILY MEDICINE

## 2023-10-27 ENCOUNTER — LAB ENCOUNTER (OUTPATIENT)
Dept: LAB | Facility: HOSPITAL | Age: 68
End: 2023-10-27
Attending: INTERNAL MEDICINE

## 2023-10-27 ENCOUNTER — OFFICE VISIT (OUTPATIENT)
Dept: WOUND CARE | Facility: HOSPITAL | Age: 68
End: 2023-10-27
Attending: FAMILY MEDICINE

## 2023-10-27 VITALS
DIASTOLIC BLOOD PRESSURE: 78 MMHG | RESPIRATION RATE: 18 BRPM | TEMPERATURE: 99 F | HEART RATE: 70 BPM | SYSTOLIC BLOOD PRESSURE: 122 MMHG | OXYGEN SATURATION: 93 %

## 2023-10-27 DIAGNOSIS — I87.312 CHRONIC VENOUS HYPERTENSION (IDIOPATHIC) WITH ULCER OF LEFT LOWER EXTREMITY (HCC): Primary | ICD-10-CM

## 2023-10-27 DIAGNOSIS — L97.929 CHRONIC VENOUS HYPERTENSION (IDIOPATHIC) WITH ULCER OF LEFT LOWER EXTREMITY (HCC): Primary | ICD-10-CM

## 2023-10-27 DIAGNOSIS — I48.20 CHRONIC ATRIAL FIBRILLATION (HCC): Primary | ICD-10-CM

## 2023-10-27 DIAGNOSIS — Z79.01 LONG TERM CURRENT USE OF ANTICOAGULANT: ICD-10-CM

## 2023-10-27 DIAGNOSIS — Z98.890 HISTORY OF MITRAL VALVE REPAIR: ICD-10-CM

## 2023-10-27 DIAGNOSIS — I87.311 CHRONIC VENOUS HYPERTENSION (IDIOPATHIC) WITH ULCER OF RIGHT LOWER EXTREMITY (CODE) (HCC): ICD-10-CM

## 2023-10-27 LAB
INR BLD: 2.55 (ref 0.8–1.2)
PROTHROMBIN TIME: 29 SECONDS (ref 11.6–14.8)

## 2023-10-27 PROCEDURE — 99215 OFFICE O/P EST HI 40 MIN: CPT | Performed by: FAMILY MEDICINE

## 2023-10-27 PROCEDURE — 85610 PROTHROMBIN TIME: CPT

## 2023-10-27 PROCEDURE — 36415 COLL VENOUS BLD VENIPUNCTURE: CPT

## 2023-11-03 ENCOUNTER — OFFICE VISIT (OUTPATIENT)
Dept: WOUND CARE | Facility: HOSPITAL | Age: 68
End: 2023-11-03
Attending: FAMILY MEDICINE
Payer: MEDICARE

## 2023-11-03 VITALS
SYSTOLIC BLOOD PRESSURE: 123 MMHG | DIASTOLIC BLOOD PRESSURE: 76 MMHG | TEMPERATURE: 98 F | HEART RATE: 70 BPM | RESPIRATION RATE: 20 BRPM | OXYGEN SATURATION: 97 %

## 2023-11-03 DIAGNOSIS — I83.213 VARICOSE VEINS OF RIGHT LOWER EXTREMITY WITH INFLAMMATION, WITH ULCER OF ANKLE WITH FAT LAYER EXPOSED (HCC): ICD-10-CM

## 2023-11-03 DIAGNOSIS — I87.333 CHRONIC VENOUS HYPERTENSION (IDIOPATHIC) WITH ULCER AND INFLAMMATION OF BILATERAL LOWER EXTREMITY (HCC): Primary | ICD-10-CM

## 2023-11-03 DIAGNOSIS — L97.312 VARICOSE VEINS OF RIGHT LOWER EXTREMITY WITH INFLAMMATION, WITH ULCER OF ANKLE WITH FAT LAYER EXPOSED (HCC): ICD-10-CM

## 2023-11-03 PROCEDURE — 99215 OFFICE O/P EST HI 40 MIN: CPT | Performed by: FAMILY MEDICINE

## 2023-11-10 ENCOUNTER — OFFICE VISIT (OUTPATIENT)
Dept: WOUND CARE | Facility: HOSPITAL | Age: 68
End: 2023-11-10
Attending: FAMILY MEDICINE
Payer: MEDICARE

## 2023-11-10 VITALS
SYSTOLIC BLOOD PRESSURE: 131 MMHG | OXYGEN SATURATION: 97 % | RESPIRATION RATE: 18 BRPM | TEMPERATURE: 98 F | HEART RATE: 60 BPM | DIASTOLIC BLOOD PRESSURE: 76 MMHG

## 2023-11-10 DIAGNOSIS — I87.333 CHRONIC VENOUS HYPERTENSION (IDIOPATHIC) WITH ULCER AND INFLAMMATION OF BILATERAL LOWER EXTREMITY (HCC): Primary | ICD-10-CM

## 2023-11-10 PROCEDURE — 97597 DBRDMT OPN WND 1ST 20 CM/<: CPT | Performed by: FAMILY MEDICINE

## 2023-11-10 PROCEDURE — 11042 DBRDMT SUBQ TIS 1ST 20SQCM/<: CPT | Performed by: FAMILY MEDICINE

## 2023-11-10 NOTE — PROGRESS NOTES
Patient ID: Amilcar Cleaning is a 70year old male. Debridement Venous Ulcer Right;Medial Ankle   Wound 10/06/23 1 Ankle Right;Medial    Performed by: Mert Narayan MD  Authorized by: Mert Narayan MD      Consent   Consent obtained? verbal  Consent given by: patient  Time out called at 11/10/2023 11:41 AM  Immediately prior to the procedure a time out was called and the performing provider verified the correct patient, procedure, equipment, support staff, and site/side marked as required.     Debridement Details  Performed by: physician  Debridement type: conservative sharp    Pre-debridement measurements  Length (cm): 0.7  Width (cm): 0.6  Depth (cm): 0.1  Surface Area (cm^2): 0.42    Post-debridement measurements  Length (cm): 1  Width (cm): 0.4  Depth (cm): 0.1  Percent debrided: 100%  Surface Area (cm^2): 0.4  Area Debrided (cm^2): 0.4  Volume (cm^3): 0.04    Tissue and other material debrided: epidermis  Devitalized tissue debrided: callus  Instrument(s) utilized: forceps and curette  Bleeding: none  Hemostasis obtained with: not applicable  Procedural pain (0-10): 0  Post-procedural pain: 0   Response to treatment: procedure was tolerated well

## 2023-11-10 NOTE — PROGRESS NOTES
Patient ID: Can Mclain is a 70year old male. Debridement Venous Ulcer Left;Medial Leg   Wound 10/06/23 2 Leg Left;Medial    Performed by: Juliana Shannon MD  Authorized by: Juliana Shannon MD      Consent   Consent obtained? verbal  Consent given by: patient  Risks discussed? procedural risks discussed  Time out called at 11/10/2023 11:45 AM  Immediately prior to the procedure a time out was called and the performing provider verified the correct patient, procedure, equipment, support staff, and site/side marked as required.     Debridement Details  Performed by: physician  Debridement type: surgical  Level of debridement: subcutaneous tissue  Pain control: none    Pre-debridement measurements  Length (cm): 0.1  Width (cm): 0.1  Depth (cm): 0.1  Surface Area (cm^2): 0.01    Post-debridement measurements  Length (cm): 0.1  Width (cm): 0.1  Depth (cm): 0.1  Percent debrided: 100%  Surface Area (cm^2): 0.01  Area Debrided (cm^2): 0.01  Volume (cm^3): 0    Tissue and other material debrided: epidermis  Devitalized tissue debrided: callus  Instrument(s) utilized: curette and forceps  Bleeding: none  Hemostasis obtained with: not applicable  Procedural pain (0-10): 0  Post-procedural pain: 0   Response to treatment: procedure was tolerated well

## 2023-11-13 ENCOUNTER — OFFICE VISIT (OUTPATIENT)
Dept: WOUND CARE | Facility: HOSPITAL | Age: 68
End: 2023-11-13
Attending: FAMILY MEDICINE
Payer: MEDICARE

## 2023-11-13 VITALS
RESPIRATION RATE: 18 BRPM | DIASTOLIC BLOOD PRESSURE: 68 MMHG | TEMPERATURE: 98 F | SYSTOLIC BLOOD PRESSURE: 121 MMHG | OXYGEN SATURATION: 98 % | HEART RATE: 71 BPM

## 2023-11-13 DIAGNOSIS — I87.333 CHRONIC VENOUS HYPERTENSION (IDIOPATHIC) WITH ULCER AND INFLAMMATION OF BILATERAL LOWER EXTREMITY (HCC): Primary | ICD-10-CM

## 2023-11-13 PROCEDURE — 99215 OFFICE O/P EST HI 40 MIN: CPT | Performed by: FAMILY MEDICINE

## 2023-11-13 NOTE — PROGRESS NOTES
Weekly Wound Education Note    Teaching Provided To: Patient  Training Topics: Dressing;Cleasing and general instructions; Compression  Training Method: Explain/Verbal  Training Response: Patient responds and understands        Notes: bilateral legs-  foam dressing and 3 layer comprilan

## 2023-11-17 ENCOUNTER — OFFICE VISIT (OUTPATIENT)
Dept: WOUND CARE | Facility: HOSPITAL | Age: 68
End: 2023-11-17
Attending: FAMILY MEDICINE
Payer: MEDICARE

## 2023-11-17 ENCOUNTER — TELEPHONE (OUTPATIENT)
Dept: WOUND CARE | Facility: HOSPITAL | Age: 68
End: 2023-11-17

## 2023-11-17 VITALS
TEMPERATURE: 98 F | RESPIRATION RATE: 17 BRPM | DIASTOLIC BLOOD PRESSURE: 79 MMHG | HEART RATE: 76 BPM | OXYGEN SATURATION: 94 % | SYSTOLIC BLOOD PRESSURE: 131 MMHG

## 2023-11-17 DIAGNOSIS — I87.333 CHRONIC VENOUS HYPERTENSION (IDIOPATHIC) WITH ULCER AND INFLAMMATION OF BILATERAL LOWER EXTREMITY (HCC): Primary | ICD-10-CM

## 2023-11-17 PROCEDURE — 99214 OFFICE O/P EST MOD 30 MIN: CPT | Performed by: FAMILY MEDICINE

## 2023-11-17 NOTE — PATIENT INSTRUCTIONS
PATIENT INSTRUCTIONS      FOR Yadira Kirkland , JOHS 1955    DATE OF SERVICE: 2023    We will discharge from wound clinic at this time    Wear the compression socks that you have for the time being    When you receive your Velcro compression garments start wearing them on a regular basis    You may remove at bedtime when legs are elevated    If any reoccurrence of the wounds then return to wound clinic

## 2023-11-24 ENCOUNTER — APPOINTMENT (OUTPATIENT)
Dept: WOUND CARE | Facility: HOSPITAL | Age: 68
End: 2023-11-24
Attending: FAMILY MEDICINE
Payer: MEDICARE

## 2023-12-01 ENCOUNTER — APPOINTMENT (OUTPATIENT)
Dept: WOUND CARE | Facility: HOSPITAL | Age: 68
End: 2023-12-01
Attending: FAMILY MEDICINE
Payer: MEDICARE

## 2023-12-08 ENCOUNTER — APPOINTMENT (OUTPATIENT)
Dept: WOUND CARE | Facility: HOSPITAL | Age: 68
End: 2023-12-08
Attending: FAMILY MEDICINE
Payer: MEDICARE

## 2023-12-12 ENCOUNTER — LAB ENCOUNTER (OUTPATIENT)
Dept: LAB | Facility: HOSPITAL | Age: 68
End: 2023-12-12
Attending: INTERNAL MEDICINE
Payer: MEDICARE

## 2023-12-12 DIAGNOSIS — Z98.890 HISTORY OF MITRAL VALVE REPAIR: ICD-10-CM

## 2023-12-12 DIAGNOSIS — Z79.01 LONG TERM CURRENT USE OF ANTICOAGULANT: ICD-10-CM

## 2023-12-12 DIAGNOSIS — I48.20 CHRONIC ATRIAL FIBRILLATION (HCC): ICD-10-CM

## 2023-12-12 LAB
INR BLD: 3.18 (ref 0.8–1.2)
PROTHROMBIN TIME: 34.6 SECONDS (ref 11.6–14.8)

## 2023-12-12 PROCEDURE — 85610 PROTHROMBIN TIME: CPT

## 2023-12-12 PROCEDURE — 36415 COLL VENOUS BLD VENIPUNCTURE: CPT

## 2023-12-15 ENCOUNTER — APPOINTMENT (OUTPATIENT)
Dept: WOUND CARE | Facility: HOSPITAL | Age: 68
End: 2023-12-15
Attending: FAMILY MEDICINE
Payer: MEDICARE

## 2023-12-22 ENCOUNTER — APPOINTMENT (OUTPATIENT)
Dept: WOUND CARE | Facility: HOSPITAL | Age: 68
End: 2023-12-22
Attending: FAMILY MEDICINE
Payer: MEDICARE

## 2023-12-22 ENCOUNTER — LAB ENCOUNTER (OUTPATIENT)
Dept: LAB | Facility: HOSPITAL | Age: 68
End: 2023-12-22
Attending: INTERNAL MEDICINE
Payer: MEDICARE

## 2023-12-22 DIAGNOSIS — Z79.01 LONG TERM CURRENT USE OF ANTICOAGULANT: ICD-10-CM

## 2023-12-22 DIAGNOSIS — Z98.890 HISTORY OF MITRAL VALVE REPAIR: ICD-10-CM

## 2023-12-22 DIAGNOSIS — I48.20 CHRONIC ATRIAL FIBRILLATION (HCC): ICD-10-CM

## 2023-12-22 LAB
INR BLD: 2.63 (ref 0.8–1.2)
PROTHROMBIN TIME: 29.8 SECONDS (ref 11.6–14.8)

## 2023-12-22 PROCEDURE — 36415 COLL VENOUS BLD VENIPUNCTURE: CPT

## 2023-12-22 PROCEDURE — 85610 PROTHROMBIN TIME: CPT

## 2023-12-29 ENCOUNTER — APPOINTMENT (OUTPATIENT)
Dept: WOUND CARE | Facility: HOSPITAL | Age: 68
End: 2023-12-29
Attending: FAMILY MEDICINE
Payer: MEDICARE

## 2024-01-05 ENCOUNTER — APPOINTMENT (OUTPATIENT)
Dept: WOUND CARE | Facility: HOSPITAL | Age: 69
End: 2024-01-05
Attending: FAMILY MEDICINE
Payer: MEDICARE

## 2024-01-08 ENCOUNTER — TELEPHONE (OUTPATIENT)
Dept: INTERNAL MEDICINE CLINIC | Facility: CLINIC | Age: 69
End: 2024-01-08

## 2024-01-08 DIAGNOSIS — G62.9 PERIPHERAL POLYNEUROPATHY: ICD-10-CM

## 2024-01-08 DIAGNOSIS — M15.9 PRIMARY OSTEOARTHRITIS INVOLVING MULTIPLE JOINTS: Primary | ICD-10-CM

## 2024-01-08 DIAGNOSIS — M48.062 SPINAL STENOSIS OF LUMBAR REGION WITH NEUROGENIC CLAUDICATION: ICD-10-CM

## 2024-01-10 NOTE — TELEPHONE ENCOUNTER
To Dr. CHRISTINA-    The above refill request is for a controlled substance.  Please review pended medication order.  LOV: 4/2023  Prescribed: 360 with 1 3/23  : n/a

## 2024-01-12 RX ORDER — SIMVASTATIN 40 MG
40 TABLET ORAL NIGHTLY
Qty: 90 TABLET | Refills: 0 | OUTPATIENT
Start: 2024-01-12

## 2024-01-12 NOTE — TELEPHONE ENCOUNTER
Current refill request refused due to refill is either a duplicate request or has active refills at the pharmacy.  Check previous templates.    Requested Prescriptions     Refused Prescriptions Disp Refills    SIMVASTATIN 40 MG Oral Tab [Pharmacy Med Name: SIMVASTATIN 40MG TABLETS] 90 tablet 0     Sig: TAKE 1 TABLET BY MOUTH NIGHTLY     Refused By: ALEX ANAYA     Reason for Refusal: Refill not appropriate

## 2024-01-24 ENCOUNTER — HOSPITAL ENCOUNTER (OUTPATIENT)
Dept: MRI IMAGING | Facility: HOSPITAL | Age: 69
Discharge: HOME | End: 2024-01-24
Attending: PHYSICAL MEDICINE & REHABILITATION
Payer: MEDICARE

## 2024-01-24 DIAGNOSIS — M48.062 SPINAL STENOSIS OF LUMBAR REGION WITH NEUROGENIC CLAUDICATION: ICD-10-CM

## 2024-01-24 DIAGNOSIS — M47.816 LUMBAR FACET ARTHROPATHY: ICD-10-CM

## 2024-01-24 PROCEDURE — 72148 MRI LUMBAR SPINE W/O DYE: CPT | Performed by: PHYSICAL MEDICINE & REHABILITATION

## 2024-01-24 NOTE — IMAGING NOTE
0750  Patient to MRI holding room. [employer] pacemaker representative present remotely to assist OCTAVIO Lorenzo and NAYELI Moseley RN in programming pacemaker to MRI safe mode. Hx afib with symptomatic bradycardia. Cardiac pacemaker placement in 2022.     0800  VSS  HR 85  /81  SPO2 98% ON RA. MRI SCAN BEGINS    0810  VS  HR 85  /75  SPO2 95%    0820  VS  HR 85  /73  SPO2 93%    0827 MRI SCAN COMPLETE. Patient to MRI holding room. [employer] pacemaker representative present remotely to assist in reprogramming patient's pacemaker to original settings. Patient hemodynamically stable at this time.     0831  Patient escorted to changing room by OCTAVIO Scott aid.

## 2024-02-01 ENCOUNTER — LAB ENCOUNTER (OUTPATIENT)
Dept: LAB | Age: 69
End: 2024-02-01
Attending: INTERNAL MEDICINE
Payer: MEDICARE

## 2024-02-01 DIAGNOSIS — I48.20 CHRONIC ATRIAL FIBRILLATION (HCC): ICD-10-CM

## 2024-02-01 DIAGNOSIS — Z98.890 HISTORY OF MITRAL VALVE REPAIR: ICD-10-CM

## 2024-02-01 DIAGNOSIS — Z79.01 LONG TERM CURRENT USE OF ANTICOAGULANT: ICD-10-CM

## 2024-02-01 LAB
INR BLD: 2.47 (ref 0.8–1.2)
PROTHROMBIN TIME: 28.3 SECONDS (ref 11.6–14.8)

## 2024-02-01 PROCEDURE — 36415 COLL VENOUS BLD VENIPUNCTURE: CPT

## 2024-02-01 PROCEDURE — 85610 PROTHROMBIN TIME: CPT

## 2024-02-07 ENCOUNTER — TELEPHONE (OUTPATIENT)
Dept: PHYSICAL MEDICINE AND REHAB | Facility: CLINIC | Age: 69
End: 2024-02-07

## 2024-02-07 ENCOUNTER — TELEMEDICINE (OUTPATIENT)
Dept: PHYSICAL MEDICINE AND REHAB | Facility: CLINIC | Age: 69
End: 2024-02-07
Payer: MEDICARE

## 2024-02-07 DIAGNOSIS — M17.0 PRIMARY OSTEOARTHRITIS OF KNEES, BILATERAL: Primary | ICD-10-CM

## 2024-02-07 DIAGNOSIS — M48.062 SPINAL STENOSIS OF LUMBAR REGION WITH NEUROGENIC CLAUDICATION: ICD-10-CM

## 2024-02-07 PROCEDURE — 99214 OFFICE O/P EST MOD 30 MIN: CPT | Performed by: PHYSICAL MEDICINE & REHABILITATION

## 2024-02-08 ENCOUNTER — TELEPHONE (OUTPATIENT)
Dept: INTERNAL MEDICINE CLINIC | Facility: CLINIC | Age: 69
End: 2024-02-08

## 2024-02-08 NOTE — TELEPHONE ENCOUNTER
Status of Anticoag  [x] Clearance pending to hold Warfarin for 3-5D prior to L5-S1 Interlaminar epidural steroid injection faxed to cardiologist Dr. Tommie Hoyos F: [phone] & Christie De Souza F: [phone]  [] Clearance approved  [] Clearance denied

## 2024-02-08 NOTE — TELEPHONE ENCOUNTER
Per CMS Guidelines -no authorization is required for Bilateral knee aspiration and injection with Gel One CPT 87155-36, [id number]    Viscosupplementation with Hyaluronans will only be covered for Osteoarthritis of the knee or shoulder joint when radiological evidence to support the diagnosis of osteoarthritis; and there is adequate documentation that simple pharmacologic therapy (ie aspirin), or exercise and physical therapy has been tried and the patient has failed to respond satisfactorily. FYI-Synvisc One is limited to Osteoarthritis of the knee    Status: Authorization is not required based on medical necessity however may be subject to review once claim is submitted-Covered Benefit (Buy&Bill)    Last Bilateral knee Gel One done 6/13/23  Next due now    AND      Per CMS Guidelines -no authorization is required for L5-S1 ILESI CPT 51522    Status: Authorization is not required based on medical necessity however may be subject to review once claim is submitted-Covered Benefit

## 2024-02-08 NOTE — TELEPHONE ENCOUNTER
Dr Yessica Cain office faxed over a request for clearance to hold medication (Warfarin)     Form placed in Dr De Souza mailbox

## 2024-02-08 NOTE — TELEPHONE ENCOUNTER
Call to patient. VM to call office back.      L5-S1 IESI approved.- patient will need to hold warfarin if currently taking. Anti-Coag letter pended to  Dr. Christie De Souza    Bilateral knee aspiration and injection with Gel One approved.

## 2024-02-12 ENCOUNTER — TELEPHONE (OUTPATIENT)
Dept: INTERNAL MEDICINE CLINIC | Facility: CLINIC | Age: 69
End: 2024-02-12

## 2024-02-12 DIAGNOSIS — E78.49 OTHER HYPERLIPIDEMIA: ICD-10-CM

## 2024-02-12 DIAGNOSIS — E55.9 VITAMIN D DEFICIENCY: ICD-10-CM

## 2024-02-12 DIAGNOSIS — I10 BENIGN ESSENTIAL HYPERTENSION: Primary | ICD-10-CM

## 2024-02-12 DIAGNOSIS — Z12.5 ENCOUNTER FOR PROSTATE CANCER SCREENING: ICD-10-CM

## 2024-02-12 NOTE — PROGRESS NOTES
[facility]    Telemedicine Visit - Follow Up Evaluation    Telehealth Verbal Consent   I conducted a telehealth visit with Russ Kirkland today, 02/7/24, which was completed using two-way, real-time interactive audio and video communication. This has been done in good brianda to provide continuity of care in the best interest of the provider-patient relationship, due to the COVID - public health crisis/national emergency where restrictions of face-to-face office visits are ongoing. Every conscious effort was taken to allow for sufficient and adequate time to complete the visit.  The patient was made aware of the limitations of the telehealth visit, including treatment limitations as no physical exam could be performed.  The patient was advised to call 911 or to go to the ER in case there was an emergency.  The patient was also advised of the potential privacy & security concerns related to the telehealth platform.   The patient was made aware of where to find [facility]'s notice of privacy practices, telehealth consent form and other related consent forms and documents.  which are located on the [facility] website. The patient verbally agreed to telehealth consent form, related consents and the risks discussed.    Lastly, the patient confirmed that they were in [place].   Included in this visit, time may have been spent reviewing labs, medications, radiology tests and decision making. Appropriate medical decision-making and tests are ordered as detailed in the plan of care above.  Coding/billing information is submitted for this visit based on complexity of care and/or time spent for the visit.      HISTORY OF PRESENT ILLNESS:     Patient is following up low back and bilateral knee pain.  Since last visit both symptoms have worsened.  Pain radiates from the low back down the legs in the lower extremities and he has very limited exercise tolerance as result of the pain.  Pain in  the knees is bilaterally aching throbbing in nature worse with increased activity.  He had MRI imaging of the lumbar spine in January 2024 as noted below.      IMAGING:   MRI lumbar spine completed on 1/24/2024 was personally reviewed which is notable for multilevel spine degenerative changes with severe central canal narrowing at L4-5 and moderate at L5-S1 and moderate central canal narrowing at L2-3 and L3-4.  There is moderate foraminal narrowing at multiple levels throughout the lumbar spine.    All imaging results were reviewed and discussed with patient.      ASSESSMENT:     1. Primary osteoarthritis of knees, bilateral    2. Spinal stenosis of lumbar region with neurogenic claudication          PLAN:   Russ Kirkland is a 68 year old male following up for bilateral knee pain and low back pain with spinal stenosis and neurogenic claudication.  He has severe osteoarthritis in bilateral knees as well.  Recommend L5-S1 interlaminar epidural steroid injection nephroscopy guidance under local anesthesia.  Advised patient we will need approval to hold anticoagulation treatment as we have previously.  Recommend that we perform bilateral knee gel injection once approved as well.  Advised patient my office reach out to him to schedule both procedures.      Follow-up:   For injection     We discussed that a telemedicine visit is in place of an office visit; however, this limits the ability to perform a thorough physical examination which may affect objective findings related to a specific condition and can affect treatment.    The patient verbalized understanding with this plan and was in agreement.  There are no barriers to learning.  All questions were answered.  Please note Dragon dictation software was used to dictate this note which may result in inadvertent typos.    Yessica Cain D.O. FAAPMR & CAQSM  Physical Medicine and Rehabilitation/Sports Medicine    PAST MEDICAL HISTORY:     Past Medical History:    Diagnosis Date    Acute perforated appendicitis 11/23/2019    Acute, but ill-defined, cerebrovascular disease 12/2019    Arrhythmia     afib    Atrial fibrillation (HCC)     ablation (2004); CV (May 2009)    High blood pressure     High cholesterol     History of vein stripping     Osteoarthrosis, unspecified whether generalized or localized, unspecified site     Sleep apnea     Stroke (HCC)     2019    UGI bleed     Unspecified essential hypertension     Venous insufficiency     RT greater saphenous vein ablation 2007         PAST SURGICAL HISTORY:     Past Surgical History:   Procedure Laterality Date    APPENDECTOMY  12/04/2019    CARDIAC PACEMAKER PLACEMENT  11/30/2022    CATARACT  11/02/2022    right eye    COLONOSCOPY  10/2011    COLONOSCOPY N/A 11/16/2016    Procedure: COLONOSCOPY;  Surgeon: Edis Roberts MD;  Location: [facility] ENDOSCOPY    COLONOSCOPY  03/01/2022    Diverticulosis, Hemorrhoids          Repeat 2027    COLONOSCOPY N/A 03/01/2022    Procedure: COLONOSCOPY,;  Surgeon: Nicola Juarez MD;  [facility]    HERNIA SURGERY      HIP REPLACEMENT SURGERY Left 05/2008    HIP REPLACEMENT SURGERY Right 06/2017    MOLES Left 2011    removed from L upper quadrant. Biopsy (pre-cancerous? -- Patient unsure if cancerous)    TOTAL HIP REPLACEMENT Bilateral     VALVE REPAIR  2002    mitral valve repair         CURRENT MEDICATIONS:     Current Outpatient Medications   Medication Sig Dispense Refill    tramadol-acetaminophen 37.5-325 MG Oral Tab Take 2 tablets by mouth 2 (two) times daily as needed for Pain. 360 tablet 1    simvastatin 40 MG Oral Tab Take 1 tablet (40 mg total) by mouth nightly.      Tadalafil (CIALIS) 20 MG Oral Tab Cialis 20 mg tablet      amLODIPine 10 MG Oral Tab Take 1 tablet (10 mg total) by mouth daily. 90 tablet 3    celecoxib 200 MG Oral Cap Take 1 capsule (200 mg total) by mouth daily as needed. 90 capsule 3    warfarin 2.5 MG Oral Tab Take 1 tablet (2.5 mg total)  by mouth daily. 90 tablet 3    Losartan Potassium-HCTZ 100-12.5 MG Oral Tab Take 1 tablet by mouth daily. 90 tablet 3    simvastatin 20 MG Oral Tab Take 1 tablet (20 mg total) by mouth nightly. 90 tablet 3    gabapentin 400 MG Oral Cap Take 2 capsules (800 mg total) by mouth 3 (three) times daily. 540 capsule 3    Sildenafil Citrate 100 MG Oral Tab Take 1 tablet (100 mg total) by mouth daily as needed for Erectile Dysfunction. 10 tablet 11    Glucosamine-Chondroit-Vit C-Mn (GLUCOSAMINE-CHONDROITIN) Oral Cap Take 1 tablet by mouth daily.      omega-3 fatty acids (FISH OIL) 1000 MG Oral Cap Take 1,000 mg by mouth daily. (Patient not taking: Reported on 4/19/2023)           ALLERGIES:     Allergies   Allergen Reactions    Adhesive Tape      Other reaction(s): rash         FAMILY HISTORY:     Family History   Problem Relation Age of Onset    Colon Cancer Father     Arthritis Other     Hypertension Other           SOCIAL HISTORY:     Social History     Socioeconomic History    Marital status:    Tobacco Use    Smoking status: Never    Smokeless tobacco: Never   Vaping Use    Vaping Use: Never used   Substance and Sexual Activity    Alcohol use: Yes     Alcohol/week: 15.0 standard drinks of alcohol     Types: 15 Glasses of wine per week     Comment: 2 glasses of wine/day    Drug use: No   Other Topics Concern    Caffeine Concern No    Pt has a pacemaker No    Pt has a defibrillator No    Reaction to local anesthetic No   Social History Narrative    The patient does not use an assistive device..      The patient does live in a home with stairs.          REVIEW OF SYSTEMS:   As noted in HPI      PHYSICAL EXAM:   General: No immediate distress  Head: Normocephalic/ Atraumatic  Eyes: Extra-occular movements intact  Ears/Nose/Throat:  External appearance identifies normal appearance without obvious deformity  Cardiovascular: No cyanosis, clubbing or edema  Respiratory: Non-labored respirations  Skin: No lesions noted    Neurological: alert & oriented x 3, attentive, able to follow commands, comprehention intact, spontaneous speech intact  Psychiatric: Mood and affect appropriate  Musculoskeletal Exam:  No change since last exam        LABS:     Lab Results   Component Value Date    A1C 5.6 04/16/2018     Lab Results   Component Value Date    WBC 5.0 04/14/2023    RBC 4.76 04/14/2023    HGB 14.4 04/14/2023    HCT 44.0 04/14/2023    MCV 92.4 04/14/2023    MCH 30.3 04/14/2023    MCHC 32.7 04/14/2023    RDW 14.4 04/14/2023    .0 04/14/2023    MPV 8.1 06/29/2018     Lab Results   Component Value Date    GLU 98 04/14/2023    BUN 24 (H) 04/14/2023    BUNCREA 31.6 (H) 04/14/2023    CREATSERUM 0.76 04/14/2023    ANIONGAP 6 04/14/2023    GFRNAA 87 04/11/2022    GFRAA 100 04/11/2022    CA 8.9 04/14/2023    OSMOCALC 294 04/14/2023    ALKPHO 71 04/14/2023    AST 21 04/14/2023    ALT 41 04/14/2023    BILT 0.6 04/14/2023    TP 7.2 04/14/2023    ALB 3.8 04/14/2023    GLOBULIN 3.4 04/14/2023     04/14/2023    K 3.7 04/14/2023     04/14/2023    CO2 26.0 04/14/2023     Lab Results   Component Value Date    PTP 28.3 (H) 02/01/2024    PT 18.4 (H) 02/03/2016    INR 2.47 (H) 02/01/2024     Lab Results   Component Value Date    VITD 24.1 (L) 04/14/2023

## 2024-02-12 NOTE — TELEPHONE ENCOUNTER
Patient returning call from [facility], please call back at [phone].

## 2024-02-12 NOTE — TELEPHONE ENCOUNTER
Patient calling to request blood work prior to:    [x] physical    [] check-up      [] other    Patient uses:  [] [facility] [] [facility]       [facility] location:       Fax #:    Patient prefers to be notified once labs are placed by: [] phone call  [x] my chart message    Medicare annual on 4/10/24

## 2024-02-13 NOTE — TELEPHONE ENCOUNTER
Returned call to patient. Updated patient that our office is currently awaiting response from Dr. Christie De Souza prior to scheduling the L5-S1 Interlaminar epidural steroid injection     Bilateral knee aspiration and injection with Gel One scheduled.

## 2024-02-19 ENCOUNTER — LAB ENCOUNTER (OUTPATIENT)
Dept: LAB | Age: 69
End: 2024-02-19
Attending: INTERNAL MEDICINE
Payer: MEDICARE

## 2024-02-19 DIAGNOSIS — E55.9 VITAMIN D DEFICIENCY: ICD-10-CM

## 2024-02-19 DIAGNOSIS — E78.49 OTHER HYPERLIPIDEMIA: ICD-10-CM

## 2024-02-19 DIAGNOSIS — I10 BENIGN ESSENTIAL HYPERTENSION: ICD-10-CM

## 2024-02-19 DIAGNOSIS — Z12.5 ENCOUNTER FOR PROSTATE CANCER SCREENING: ICD-10-CM

## 2024-02-19 DIAGNOSIS — Z98.890 PERSONAL HISTORY OF SURGERY TO HEART AND GREAT VESSELS, PRESENTING HAZARDS TO HEALTH: ICD-10-CM

## 2024-02-19 DIAGNOSIS — I48.20 CHRONIC ATRIAL FIBRILLATION (HCC): Primary | ICD-10-CM

## 2024-02-19 DIAGNOSIS — Z79.01 LONG TERM (CURRENT) USE OF ANTICOAGULANTS: ICD-10-CM

## 2024-02-19 LAB
ALBUMIN SERPL-MCNC: 4.5 G/DL (ref 3.2–4.8)
ALBUMIN/GLOB SERPL: 1.7 {RATIO} (ref 1–2)
ALP LIVER SERPL-CCNC: 69 U/L
ALT SERPL-CCNC: 19 U/L
ANION GAP SERPL CALC-SCNC: 6 MMOL/L (ref 0–18)
AST SERPL-CCNC: 23 U/L (ref ?–34)
BASOPHILS # BLD AUTO: 0.06 X10(3) UL (ref 0–0.2)
BASOPHILS NFR BLD AUTO: 1 %
BILIRUB SERPL-MCNC: 0.7 MG/DL (ref 0.2–1.1)
BUN BLD-MCNC: 25 MG/DL (ref 9–23)
BUN/CREAT SERPL: 30.1 (ref 10–20)
CALCIUM BLD-MCNC: 9.5 MG/DL (ref 8.7–10.4)
CHLORIDE SERPL-SCNC: 109 MMOL/L (ref 98–112)
CO2 SERPL-SCNC: 26 MMOL/L (ref 21–32)
CREAT BLD-MCNC: 0.83 MG/DL
DEPRECATED RDW RBC AUTO: 49.2 FL (ref 35.1–46.3)
EGFRCR SERPLBLD CKD-EPI 2021: 95 ML/MIN/1.73M2 (ref 60–?)
EOSINOPHIL # BLD AUTO: 0.15 X10(3) UL (ref 0–0.7)
EOSINOPHIL NFR BLD AUTO: 2.4 %
ERYTHROCYTE [DISTWIDTH] IN BLOOD BY AUTOMATED COUNT: 14.5 % (ref 11–15)
FASTING STATUS PATIENT QL REPORTED: NO
GLOBULIN PLAS-MCNC: 2.6 G/DL (ref 2.8–4.4)
GLUCOSE BLD-MCNC: 88 MG/DL (ref 70–99)
HCT VFR BLD AUTO: 43.1 %
HGB BLD-MCNC: 14.2 G/DL
IMM GRANULOCYTES # BLD AUTO: 0.01 X10(3) UL (ref 0–1)
IMM GRANULOCYTES NFR BLD: 0.2 %
LYMPHOCYTES # BLD AUTO: 1.98 X10(3) UL (ref 1–4)
LYMPHOCYTES NFR BLD AUTO: 32.2 %
MCH RBC QN AUTO: 30.2 PG (ref 26–34)
MCHC RBC AUTO-ENTMCNC: 32.9 G/DL (ref 31–37)
MCV RBC AUTO: 91.7 FL
MONOCYTES # BLD AUTO: 0.46 X10(3) UL (ref 0.1–1)
MONOCYTES NFR BLD AUTO: 7.5 %
NEUTROPHILS # BLD AUTO: 3.48 X10 (3) UL (ref 1.5–7.7)
NEUTROPHILS # BLD AUTO: 3.48 X10(3) UL (ref 1.5–7.7)
NEUTROPHILS NFR BLD AUTO: 56.7 %
OSMOLALITY SERPL CALC.SUM OF ELEC: 296 MOSM/KG (ref 275–295)
PLATELET # BLD AUTO: 204 10(3)UL (ref 150–450)
POTASSIUM SERPL-SCNC: 3.9 MMOL/L (ref 3.5–5.1)
PROT SERPL-MCNC: 7.1 G/DL (ref 5.7–8.2)
RBC # BLD AUTO: 4.7 X10(6)UL
SODIUM SERPL-SCNC: 141 MMOL/L (ref 136–145)
WBC # BLD AUTO: 6.1 X10(3) UL (ref 4–11)

## 2024-02-19 PROCEDURE — 36415 COLL VENOUS BLD VENIPUNCTURE: CPT

## 2024-02-19 PROCEDURE — 85025 COMPLETE CBC W/AUTO DIFF WBC: CPT

## 2024-02-19 PROCEDURE — 80053 COMPREHEN METABOLIC PANEL: CPT

## 2024-02-24 ENCOUNTER — LAB ENCOUNTER (OUTPATIENT)
Dept: LAB | Facility: HOSPITAL | Age: 69
End: 2024-02-24
Attending: INTERNAL MEDICINE
Payer: MEDICARE

## 2024-02-24 DIAGNOSIS — Z79.01 LONG TERM CURRENT USE OF ANTICOAGULANT: ICD-10-CM

## 2024-02-24 DIAGNOSIS — Z98.890 HISTORY OF MITRAL VALVE REPAIR: ICD-10-CM

## 2024-02-24 DIAGNOSIS — I48.20 CHRONIC ATRIAL FIBRILLATION (HCC): ICD-10-CM

## 2024-02-24 LAB
INR BLD: 2.41 (ref 0.8–1.2)
PROTHROMBIN TIME: 27.7 SECONDS (ref 11.6–14.8)

## 2024-02-24 PROCEDURE — 85610 PROTHROMBIN TIME: CPT

## 2024-02-24 PROCEDURE — 36415 COLL VENOUS BLD VENIPUNCTURE: CPT

## 2024-02-26 ENCOUNTER — LAB ENCOUNTER (OUTPATIENT)
Dept: LAB | Facility: HOSPITAL | Age: 69
End: 2024-02-26
Attending: INTERNAL MEDICINE
Payer: MEDICARE

## 2024-02-26 DIAGNOSIS — Z98.890 HISTORY OF MITRAL VALVE REPAIR: ICD-10-CM

## 2024-02-26 DIAGNOSIS — I48.20 CHRONIC ATRIAL FIBRILLATION (HCC): ICD-10-CM

## 2024-02-26 DIAGNOSIS — Z79.01 LONG TERM CURRENT USE OF ANTICOAGULANT: ICD-10-CM

## 2024-02-26 LAB
INR BLD: 1.68 (ref 0.8–1.2)
PROTHROMBIN TIME: 20.8 SECONDS (ref 11.6–14.8)

## 2024-02-26 PROCEDURE — 36415 COLL VENOUS BLD VENIPUNCTURE: CPT

## 2024-02-26 PROCEDURE — 85610 PROTHROMBIN TIME: CPT

## 2024-02-26 NOTE — TELEPHONE ENCOUNTER
Spoke with pt and he was under the impression he was scheduled for the epidural injection tomorrow. Informed patient that tomorrow he is scheduled for the bilateral knee injections with gel.   Educated patient that currently we are waiting on clearance to hold Warfarin. Pt stated that he has been holding the warfarin since last week as advised by his cardiologist and he was starting Lovenox tonight.     We have not heard back from the cardiologist office.     Spoke with Dr Cain. He stated that per Dr Cain is ok to schedule patient next Monday if clearance is received.     Cardiologist's office is closed at this time and the phone won't go through. Need to follow up on this tomorrow during business hours.

## 2024-02-27 ENCOUNTER — OFFICE VISIT (OUTPATIENT)
Dept: PHYSICAL MEDICINE AND REHAB | Facility: CLINIC | Age: 69
End: 2024-02-27
Payer: MEDICARE

## 2024-02-27 ENCOUNTER — MED REC SCAN ONLY (OUTPATIENT)
Dept: PHYSICAL MEDICINE AND REHAB | Facility: CLINIC | Age: 69
End: 2024-02-27

## 2024-02-27 DIAGNOSIS — M17.0 PRIMARY OSTEOARTHRITIS OF KNEES, BILATERAL: Primary | ICD-10-CM

## 2024-02-27 NOTE — PATIENT INSTRUCTIONS
Hyaluronic Acid Injection  What to expect: The injection contains Lidocaine (which numbs the area) and hyaluronic acid.  You may have pain relief within hours of the injection due to the Lidocaine.  The Hyaluronic Acid can take a couple days, up to a couple weeks, to reach the full effect.  It is also possible to have a slight increase in symptoms over the first few days, but that should resolve fairly quickly.    How long will the injection last?: The length of response to an injection is variable.  Literally a couple weeks to a couple years.  The injection will decrease the inflammation and the pain will return if/when the inflammation returns.    Activity Recommendations: For the first 24 hours after injection, keep the area clean and dry.  It is ok to shower but don't soak in a tub during that time.  No vigorous activity such as running or heavy lifting for the first week but other than that you can gradually resume your normal activities immediately.  If you have a significant decrease in pain, be careful not to do too much too soon.  Again, the key is GRADUAL resumption of activites.    Things to look out for: Common injection side effects include soreness at the injection site, bruising, and flushing of the face or skin. Infection is very rare but please notify my office ([facility] [phone]) if you develop any fevers, drainage from the injection site, or severe increase in pain.  If it is the weekend, go to an Emergency Room.        Follow up post procedures

## 2024-02-27 NOTE — PROCEDURES
Procedure:  BILATERAL knee aspiration and injection with Hyaluronic acid  The risks, benefits and anticipated outcomes of the procedure, the risks and benefits of the alternatives to the procedure, and the roles and tasks of the personnel to be involved, were discussed with the patient, and the patient consents to the procedure and agrees to proceed.  UNIVERSAL PROTOCOL / SAFETY CHECKLIST  Sign in Communication: Completed  Time Out:  Team Confirms the Correct Patient, Correct Procedure, Correct Site and Site Marking, Correct Position (if applicable), Prep and Dry Time (if applicable).     The procedure was carried out under sterile prep with  with sterile gel.  A 27 ga 1&1/4in needle was introduced and advanced for skin anesthesia with 3 cc of 1% lidocaine.  Then, a 18 gauge 1.5 in needle was advanced in the suprapatellar bursa using the superior lateral knee injection approach and 21 cc of  synovial fluid was aspirated from the right knee and 5cc of synovial fluid aspirated from the left knee.  Following aspiration, Gel One was injected.  The patient tolerated the procedure without complication and was instructed in post-procedure precautions.  See patient instructions.    Yessica Cain DO, FAAPMR & CAQSM  Physical Medicine and Rehabilitation/Sports Medicine  [facility]

## 2024-03-01 ENCOUNTER — LAB ENCOUNTER (OUTPATIENT)
Dept: LAB | Facility: HOSPITAL | Age: 69
End: 2024-03-01
Attending: INTERNAL MEDICINE
Payer: MEDICARE

## 2024-03-01 DIAGNOSIS — Z79.01 LONG TERM CURRENT USE OF ANTICOAGULANT: ICD-10-CM

## 2024-03-01 DIAGNOSIS — I48.20 CHRONIC ATRIAL FIBRILLATION (HCC): ICD-10-CM

## 2024-03-01 DIAGNOSIS — Z98.890 HISTORY OF MITRAL VALVE REPAIR: ICD-10-CM

## 2024-03-01 LAB
INR BLD: 1.34 (ref 0.8–1.2)
PROTHROMBIN TIME: 17.4 SECONDS (ref 11.6–14.8)

## 2024-03-01 PROCEDURE — 36415 COLL VENOUS BLD VENIPUNCTURE: CPT

## 2024-03-01 PROCEDURE — 85610 PROTHROMBIN TIME: CPT

## 2024-03-04 ENCOUNTER — LAB ENCOUNTER (OUTPATIENT)
Dept: LAB | Facility: HOSPITAL | Age: 69
End: 2024-03-04
Attending: INTERNAL MEDICINE
Payer: MEDICARE

## 2024-03-04 DIAGNOSIS — I48.20 CHRONIC ATRIAL FIBRILLATION (HCC): ICD-10-CM

## 2024-03-04 DIAGNOSIS — Z98.890 HISTORY OF MITRAL VALVE REPAIR: ICD-10-CM

## 2024-03-04 DIAGNOSIS — Z79.01 LONG TERM CURRENT USE OF ANTICOAGULANT: ICD-10-CM

## 2024-03-04 LAB
INR BLD: 1.65 (ref 0.8–1.2)
PROTHROMBIN TIME: 20.5 SECONDS (ref 11.6–14.8)

## 2024-03-04 PROCEDURE — 85610 PROTHROMBIN TIME: CPT

## 2024-03-04 PROCEDURE — 36415 COLL VENOUS BLD VENIPUNCTURE: CPT

## 2024-03-04 RX ORDER — LIDOCAINE HYDROCHLORIDE 10 MG/ML
6 INJECTION, SOLUTION INFILTRATION; PERINEURAL ONCE
Status: COMPLETED | OUTPATIENT
Start: 2024-03-04 | End: 2024-03-04

## 2024-03-04 RX ADMIN — LIDOCAINE HYDROCHLORIDE 6 ML: 10 INJECTION, SOLUTION INFILTRATION; PERINEURAL at 17:21:00

## 2024-03-06 RX ORDER — AMLODIPINE BESYLATE 10 MG/1
10 TABLET ORAL DAILY
Qty: 90 TABLET | Refills: 0 | Status: SHIPPED | OUTPATIENT
Start: 2024-03-06

## 2024-03-06 NOTE — TELEPHONE ENCOUNTER
Refill request is for a maintenance medication and has met the criteria specified in the Ambulatory Medication Refill Standing Order for eligibility, visits, laboratory, alerts and was sent to the requested pharmacy.    Requested Prescriptions     Signed Prescriptions Disp Refills    AMLODIPINE 10 MG Oral Tab 90 tablet 0     Sig: TAKE 1 TABLET BY MOUTH DAILY     Authorizing Provider: MERVIN SALES     Ordering User: FRANCK BARTH     Pt has MA on 4/10. Patient requests only 90 day supply

## 2024-03-11 RX ORDER — SIMVASTATIN 20 MG
20 TABLET ORAL NIGHTLY
Qty: 90 TABLET | Refills: 3 | Status: SHIPPED | OUTPATIENT
Start: 2024-03-11

## 2024-03-11 NOTE — TELEPHONE ENCOUNTER
Refill request is for a maintenance medication and has met the criteria specified in the Ambulatory Medication Refill Standing Order for eligibility, visits, laboratory, alerts and was sent to the requested pharmacy.    Requested Prescriptions     Signed Prescriptions Disp Refills    SIMVASTATIN 20 MG Oral Tab 90 tablet 3     Sig: TAKE 1 TABLET BY MOUTH NIGHTLY     Authorizing Provider: MERVIN [employer]     Ordering User: GIULIANO BARNETT

## 2024-03-15 ENCOUNTER — LAB ENCOUNTER (OUTPATIENT)
Dept: LAB | Age: 69
End: 2024-03-15
Attending: INTERNAL MEDICINE
Payer: MEDICARE

## 2024-03-15 DIAGNOSIS — I48.20 CHRONIC ATRIAL FIBRILLATION (HCC): ICD-10-CM

## 2024-03-15 DIAGNOSIS — Z79.01 LONG TERM CURRENT USE OF ANTICOAGULANT: ICD-10-CM

## 2024-03-15 DIAGNOSIS — Z98.890 HISTORY OF MITRAL VALVE REPAIR: ICD-10-CM

## 2024-03-15 LAB
INR BLD: 2.44 (ref 0.8–1.2)
PROTHROMBIN TIME: 28 SECONDS (ref 11.6–14.8)

## 2024-03-15 PROCEDURE — 85610 PROTHROMBIN TIME: CPT

## 2024-03-15 PROCEDURE — 36415 COLL VENOUS BLD VENIPUNCTURE: CPT

## 2024-03-19 ENCOUNTER — TELEPHONE (OUTPATIENT)
Dept: INTERNAL MEDICINE CLINIC | Facility: CLINIC | Age: 69
End: 2024-03-19

## 2024-03-19 DIAGNOSIS — G60.9 IDIOPATHIC PERIPHERAL NEUROPATHY: ICD-10-CM

## 2024-03-20 RX ORDER — LOSARTAN POTASSIUM AND HYDROCHLOROTHIAZIDE 12.5; 1 MG/1; MG/1
1 TABLET ORAL DAILY
Qty: 90 TABLET | Refills: 3 | Status: SHIPPED | OUTPATIENT
Start: 2024-03-20

## 2024-03-20 RX ORDER — GABAPENTIN 400 MG/1
800 CAPSULE ORAL 3 TIMES DAILY
Qty: 540 CAPSULE | Refills: 3 | Status: SHIPPED | OUTPATIENT
Start: 2024-03-20

## 2024-03-20 NOTE — TELEPHONE ENCOUNTER
Upcoming MAP scheduled for 4/10/24.    Refill request is for a maintenance medication and has met the criteria specified in the Ambulatory Medication Refill Standing Order for eligibility, visits, laboratory, alerts and was sent to the requested pharmacy.    Requested Prescriptions     Pending Prescriptions Disp Refills    WARFARIN 2.5 MG Oral Tab [Pharmacy Med Name: WARFARIN SOD 2.5MG TABLETS (GREEN)] 90 tablet 3     Sig: TAKE 1 TABLET BY MOUTH DAILY     Signed Prescriptions Disp Refills    GABAPENTIN 400 MG Oral Cap 540 capsule 3     Sig: TAKE 2 CAPSULES BY MOUTH THREE TIMES DAILY     Authorizing Provider: MERVIN [employer]     Ordering User: MARIAM LYN    LOSARTAN POTASSIUM-HCTZ 100-12.5 MG Oral Tab 90 tablet 3     Sig: TAKE 1 TABLET BY MOUTH DAILY     Authorizing Provider: M[employer]IN [employer]     Ordering User: MARIAM LYN

## 2024-03-21 RX ORDER — WARFARIN SODIUM 2.5 MG/1
2.5 TABLET ORAL DAILY
Qty: 90 TABLET | Refills: 3 | Status: SHIPPED | OUTPATIENT
Start: 2024-03-21

## 2024-03-22 ENCOUNTER — LAB ENCOUNTER (OUTPATIENT)
Dept: LAB | Facility: HOSPITAL | Age: 69
End: 2024-03-22
Attending: INTERNAL MEDICINE
Payer: MEDICARE

## 2024-03-22 DIAGNOSIS — Z98.890 HISTORY OF MITRAL VALVE REPAIR: ICD-10-CM

## 2024-03-22 DIAGNOSIS — Z79.01 LONG TERM CURRENT USE OF ANTICOAGULANT: ICD-10-CM

## 2024-03-22 DIAGNOSIS — I48.20 CHRONIC ATRIAL FIBRILLATION (HCC): ICD-10-CM

## 2024-03-22 LAB
INR BLD: 2.01 (ref 0.8–1.2)
PROTHROMBIN TIME: 24 SECONDS (ref 11.6–14.8)

## 2024-03-22 PROCEDURE — 85610 PROTHROMBIN TIME: CPT

## 2024-03-22 PROCEDURE — 36415 COLL VENOUS BLD VENIPUNCTURE: CPT

## 2024-03-22 NOTE — DISCHARGE INSTRUCTIONS
STAB PHLEBECTOMY/SCLEROTHERAPY   DISCHARGE INSTRUCTIONS    Goals for post-operative management are:     1. Prevent blood from filling the treated vein and  2. Reduce swelling and inflammation in the leg.    Compression hose:  Wear the thigh high compression hose as follows:              Days 1-7:  Wear hose 24 hours a day (even while sleeping) except to shower.              Week 2 (Days 8-14):  Wear during waking hours, off at night to sleep.        Weeks 3 and 4:  Wear during exercise, long walks or when you expect to be on your feet for long    periods of time; as well as for an airplane trip or long car ride. The more you wear the compression hose, the better your results.     Keep the dressing on for 3 days. Dressing and gauze may be removed after 3 days, leave steri strips intact until they fall off.    3. No exercise for 2 weeks (including any water activities).     4. Keep the leg elevated as much as possible for the first 24 hours.  You can drive right after the procedure.    5. Return to work when you feel ready.  Avoid heavy lifting for one week, nothing over 15 lbs.     6. Bruising and inflammation are normal, may last up to 4 weeks depending on your healing capabilities. Bleeding through the gauze is also normal.  If needed for pain you can take Ibuprofen 200-600 mg, two-four tablets; or extra strength Tylenol, two tablets every six hours.    Please call the office at [phone] if you have any questions or concerns.          HOME INSTRUCTIONS  AMBSURG HOME CARE INSTRUCTIONS: POST-OP ANESTHESIA  The medication that you received for sedation or general anesthesia can last up to 24 hours. Your judgment and reflexes may be altered, even if you feel like your normal self.      We Recommend:   Do not drive any motor vehicle or bicycle   Avoid mowing the lawn, playing sports, or working with power tools/applicances (power saws, electric knives or mixers)   That you have someone stay with you on your first  night home   Do not drink alcohol or take sleeping pills or tranquilizers   Do not sign legal documents within 24 hours of your procedure   If you had a nerve block for your surgery, take extra care not to put any pressure on your arm or hand for 24 hours    It is normal:  For you to have a sore throat if you had a breathing tube during surgery (while you were asleep!). The sore throat should get better within 48 hours. You can gargle with warm salt water (1/2 tsp in 4 oz warm water) or use a throat lozenge for comfort  To feel muscle aches or soreness especially in the abdomen, chest or neck. The achy feeling should go away in the next 24 hours  To feel weak, sleepy or \"wiped out\". Your should start feeling better in the next 24 hours.   To experience mild discomforts such as sore lip or tongue, headache, cramps, gas pains or a bloated feeling in your abdomen.   To experience mild back pain or soreness for a day or two if you had spinal or epidural anesthesia.   If you had laparoscopic surgery, to feel shoulder pain or discomfort on the day of surgery.   For some patients to have nausea after surgery/anesthesia    If you feel nausea or experience vomiting:   Try to move around less.   Eat less than usual or drink only liquids until the next morning   Nausea should resolve in about 24 hours    If you have a problem when you are at home:    Call your surgeons office   Discharge Instructions: After Your Surgery  You’ve just had surgery. During surgery, you were given medicine called anesthesia to keep you relaxed and free of pain. After surgery, you may have some pain or nausea. This is common. Here are some tips for feeling better and getting well after surgery.   Going home  Your healthcare provider will show you how to take care of yourself when you go home. They'll also answer your questions. Have an adult family member or friend drive you home. For the first 24 hours after your surgery:   Don't drive or use heavy  equipment.  Don't make important decisions or sign legal papers.  Take medicines as directed.  Don't drink alcohol.  Have someone stay with you, if needed. They can watch for problems and help keep you safe.  Be sure to go to all follow-up visits with your healthcare provider. And rest after your surgery for as long as your provider tells you to.   Coping with pain  If you have pain after surgery, pain medicine will help you feel better. Take it as directed, before pain becomes severe. Also, ask your healthcare provider or pharmacist about other ways to control pain. This might be with heat, ice, or relaxation. And follow any other instructions your surgeon or nurse gives you.      Stay on schedule with your medicine.     Tips for taking pain medicine  To get the best relief possible, remember these points:   Pain medicines can upset your stomach. Taking them with a little food may help.  Most pain relievers taken by mouth need at least 20 to 30 minutes to start to work.  Don't wait till your pain becomes severe before you take your medicine. Try to time your medicine so that you can take it before starting an activity. This might be before you get dressed, go for a walk, or sit down for dinner.  Constipation is a common side effect of some pain medicines. Call your healthcare provider before taking any medicines such as laxatives or stool softeners to help ease constipation. Also ask if you should skip any foods. Drinking lots of fluids and eating foods such as fruits and vegetables that are high in fiber can also help. Remember, don't take laxatives unless your surgeon has prescribed them.  Drinking alcohol and taking pain medicine can cause dizziness and slow your breathing. It can even be deadly. Don't drink alcohol while taking pain medicine.  Pain medicine can make you react more slowly to things. Don't drive or run machinery while taking pain medicine.  Your healthcare provider may tell you to take  acetaminophen to help ease your pain. Ask them how much you're supposed to take each day. Acetaminophen or other pain relievers may interact with your prescription medicines or other over-the-counter (OTC) medicines. Some prescription medicines have acetaminophen and other ingredients in them. Using both prescription and OTC acetaminophen for pain can cause you to accidentally overdose. Read the labels on your OTC medicines with care. This will help you to clearly know the list of ingredients, how much to take, and any warnings. It may also help you not take too much acetaminophen. If you have questions or don't understand the information, ask your pharmacist or healthcare provider to explain it to you before you take the OTC medicine.   Managing nausea  Some people have an upset stomach (nausea) after surgery. This is often because of anesthesia, pain, or pain medicine, less movement of food in the stomach, or the stress of surgery. These tips will help you handle nausea and eat healthy foods as you get better. If you were on a special food plan before surgery, ask your healthcare provider if you should follow it while you get better. Check with your provider on how your eating should progress. It may depend on the surgery you had. These general tips may help:   Don't push yourself to eat. Your body will tell you when to eat and how much.  Start off with clear liquids and soup. They're easier to digest.  Next try semi-solid foods as you feel ready. These include mashed potatoes, applesauce, and gelatin.  Slowly move to solid foods. Don’t eat fatty, rich, or spicy foods at first.  Don't force yourself to have 3 large meals a day. Instead eat smaller amounts more often.  Take pain medicines with a small amount of solid food, such as crackers or toast. This helps prevent nausea.  When to call your healthcare provider  Call your healthcare provider right away if you have any of these:   You still have too much pain, or  the pain gets worse, after taking the medicine. The medicine may not be strong enough. Or there may be a complication from the surgery.  You feel too sleepy, dizzy, or groggy. The medicine may be too strong.  Side effects such as nausea or vomiting. Your healthcare provider may advise taking other medicines to .  Skin changes such as rash, itching, or hives. This may mean you have an allergic reaction. Your provider may advise taking other medicines.  The incision looks different (for instance, part of it opens up).  Bleeding or fluid leaking from the incision site, and weren't told to expect that.  Fever of 100.4°F (38°C) or higher, or as directed by your provider.  Call 911  Call 911 right away if you have:   Trouble breathing  Facial swelling    If you have obstructive sleep apnea   You were given anesthesia medicine during surgery to keep you comfortable and free of pain. After surgery, you may have more apnea spells because of this medicine and other medicines you were given. The spells may last longer than normal.    At home:  Keep using the continuous positive airway pressure (CPAP) device when you sleep. Unless your healthcare provider tells you not to, use it when you sleep, day or night. CPAP is a common device used to treat obstructive sleep apnea.  Talk with your provider before taking any pain medicine, muscle relaxants, or sedatives. Your provider will tell you about the possible dangers of taking these medicines.  Contact your provider if your sleeping changes a lot even when taking medicines as directed.  [employer] last reviewed this educational content on 10/1/2021  © 6519-5056 [employer]. All rights reserved. This information is not intended as a substitute for professional medical care. Always follow your healthcare professional's instructions.

## 2024-03-26 ENCOUNTER — ANESTHESIA EVENT (OUTPATIENT)
Dept: SURGERY | Facility: HOSPITAL | Age: 69
End: 2024-03-26
Payer: MEDICARE

## 2024-03-27 ENCOUNTER — HOSPITAL ENCOUNTER (OUTPATIENT)
Facility: HOSPITAL | Age: 69
Setting detail: HOSPITAL OUTPATIENT SURGERY
Discharge: HOME | End: 2024-03-27
Attending: SURGERY | Admitting: SURGERY
Payer: MEDICARE

## 2024-03-27 ENCOUNTER — ANESTHESIA (OUTPATIENT)
Dept: SURGERY | Facility: HOSPITAL | Age: 69
End: 2024-03-27
Payer: MEDICARE

## 2024-03-27 VITALS
SYSTOLIC BLOOD PRESSURE: 120 MMHG | HEIGHT: 73 IN | WEIGHT: 240 LBS | DIASTOLIC BLOOD PRESSURE: 67 MMHG | RESPIRATION RATE: 16 BRPM | BODY MASS INDEX: 31.81 KG/M2 | TEMPERATURE: 98 F | OXYGEN SATURATION: 95 % | HEART RATE: 72 BPM

## 2024-03-27 LAB
INR BLD: 1.8 (ref 0.8–1.2)
PROTHROMBIN TIME: 22 SECONDS (ref 11.6–14.8)

## 2024-03-27 PROCEDURE — 85610 PROTHROMBIN TIME: CPT | Performed by: SURGERY

## 2024-03-27 PROCEDURE — 06BY0ZZ EXCISION OF LOWER VEIN, OPEN APPROACH: ICD-10-PCS | Performed by: SURGERY

## 2024-03-27 RX ORDER — GLYCOPYRROLATE 0.2 MG/ML
INJECTION, SOLUTION INTRAMUSCULAR; INTRAVENOUS AS NEEDED
Status: DISCONTINUED | OUTPATIENT
Start: 2024-03-27 | End: 2024-03-27 | Stop reason: SURG

## 2024-03-27 RX ORDER — MORPHINE SULFATE 4 MG/ML
4 INJECTION, SOLUTION INTRAMUSCULAR; INTRAVENOUS EVERY 10 MIN PRN
Status: DISCONTINUED | OUTPATIENT
Start: 2024-03-27 | End: 2024-03-27

## 2024-03-27 RX ORDER — METOCLOPRAMIDE 10 MG/1
10 TABLET ORAL ONCE
Status: COMPLETED | OUTPATIENT
Start: 2024-03-27 | End: 2024-03-27

## 2024-03-27 RX ORDER — NALOXONE HYDROCHLORIDE 0.4 MG/ML
80 INJECTION, SOLUTION INTRAMUSCULAR; INTRAVENOUS; SUBCUTANEOUS AS NEEDED
Status: DISCONTINUED | OUTPATIENT
Start: 2024-03-27 | End: 2024-03-27

## 2024-03-27 RX ORDER — FAMOTIDINE 10 MG/ML
20 INJECTION, SOLUTION INTRAVENOUS ONCE
Status: COMPLETED | OUTPATIENT
Start: 2024-03-27 | End: 2024-03-27

## 2024-03-27 RX ORDER — TRAMADOL HYDROCHLORIDE 50 MG/1
50 TABLET ORAL EVERY 6 HOURS PRN
Status: CANCELLED | OUTPATIENT
Start: 2024-03-27

## 2024-03-27 RX ORDER — MORPHINE SULFATE 4 MG/ML
2 INJECTION, SOLUTION INTRAMUSCULAR; INTRAVENOUS EVERY 10 MIN PRN
Status: DISCONTINUED | OUTPATIENT
Start: 2024-03-27 | End: 2024-03-27

## 2024-03-27 RX ORDER — MORPHINE SULFATE 10 MG/ML
6 INJECTION, SOLUTION INTRAMUSCULAR; INTRAVENOUS EVERY 10 MIN PRN
Status: DISCONTINUED | OUTPATIENT
Start: 2024-03-27 | End: 2024-03-27

## 2024-03-27 RX ORDER — HYDROMORPHONE HYDROCHLORIDE 1 MG/ML
0.2 INJECTION, SOLUTION INTRAMUSCULAR; INTRAVENOUS; SUBCUTANEOUS EVERY 5 MIN PRN
Status: DISCONTINUED | OUTPATIENT
Start: 2024-03-27 | End: 2024-03-27

## 2024-03-27 RX ORDER — MIDAZOLAM HYDROCHLORIDE 1 MG/ML
INJECTION INTRAMUSCULAR; INTRAVENOUS AS NEEDED
Status: DISCONTINUED | OUTPATIENT
Start: 2024-03-27 | End: 2024-03-27 | Stop reason: SURG

## 2024-03-27 RX ORDER — CEFAZOLIN SODIUM/WATER 2 G/20 ML
2 SYRINGE (ML) INTRAVENOUS ONCE
Status: COMPLETED | OUTPATIENT
Start: 2024-03-27 | End: 2024-03-27

## 2024-03-27 RX ORDER — ENOXAPARIN SODIUM 150 MG/ML
1 INJECTION SUBCUTANEOUS 2 TIMES DAILY
COMMUNITY

## 2024-03-27 RX ORDER — METOCLOPRAMIDE HYDROCHLORIDE 5 MG/ML
10 INJECTION INTRAMUSCULAR; INTRAVENOUS ONCE
Status: COMPLETED | OUTPATIENT
Start: 2024-03-27 | End: 2024-03-27

## 2024-03-27 RX ORDER — HYDROMORPHONE HYDROCHLORIDE 1 MG/ML
0.6 INJECTION, SOLUTION INTRAMUSCULAR; INTRAVENOUS; SUBCUTANEOUS EVERY 5 MIN PRN
Status: DISCONTINUED | OUTPATIENT
Start: 2024-03-27 | End: 2024-03-27

## 2024-03-27 RX ORDER — SODIUM CHLORIDE, SODIUM LACTATE, POTASSIUM CHLORIDE, CALCIUM CHLORIDE 600; 310; 30; 20 MG/100ML; MG/100ML; MG/100ML; MG/100ML
INJECTION, SOLUTION INTRAVENOUS CONTINUOUS
Status: DISCONTINUED | OUTPATIENT
Start: 2024-03-27 | End: 2024-03-27

## 2024-03-27 RX ORDER — FAMOTIDINE 20 MG/1
20 TABLET, FILM COATED ORAL ONCE
Status: COMPLETED | OUTPATIENT
Start: 2024-03-27 | End: 2024-03-27

## 2024-03-27 RX ORDER — LIDOCAINE HYDROCHLORIDE 10 MG/ML
INJECTION, SOLUTION EPIDURAL; INFILTRATION; INTRACAUDAL; PERINEURAL AS NEEDED
Status: DISCONTINUED | OUTPATIENT
Start: 2024-03-27 | End: 2024-03-27 | Stop reason: SURG

## 2024-03-27 RX ORDER — KETAMINE HYDROCHLORIDE 50 MG/ML
INJECTION, SOLUTION INTRAMUSCULAR; INTRAVENOUS AS NEEDED
Status: DISCONTINUED | OUTPATIENT
Start: 2024-03-27 | End: 2024-03-27 | Stop reason: SURG

## 2024-03-27 RX ORDER — ACETAMINOPHEN 500 MG
1000 TABLET ORAL ONCE
Status: COMPLETED | OUTPATIENT
Start: 2024-03-27 | End: 2024-03-27

## 2024-03-27 RX ORDER — HYDROMORPHONE HYDROCHLORIDE 1 MG/ML
0.4 INJECTION, SOLUTION INTRAMUSCULAR; INTRAVENOUS; SUBCUTANEOUS EVERY 5 MIN PRN
Status: DISCONTINUED | OUTPATIENT
Start: 2024-03-27 | End: 2024-03-27

## 2024-03-27 RX ADMIN — MIDAZOLAM HYDROCHLORIDE 0.25 MG: 1 INJECTION INTRAMUSCULAR; INTRAVENOUS at 08:18:00

## 2024-03-27 RX ADMIN — SODIUM CHLORIDE, SODIUM LACTATE, POTASSIUM CHLORIDE, CALCIUM CHLORIDE: 600; 310; 30; 20 INJECTION, SOLUTION INTRAVENOUS at 09:40:00

## 2024-03-27 RX ADMIN — MIDAZOLAM HYDROCHLORIDE 1 MG: 1 INJECTION INTRAMUSCULAR; INTRAVENOUS at 07:38:00

## 2024-03-27 RX ADMIN — MIDAZOLAM HYDROCHLORIDE 0.25 MG: 1 INJECTION INTRAMUSCULAR; INTRAVENOUS at 08:05:00

## 2024-03-27 RX ADMIN — SODIUM CHLORIDE, SODIUM LACTATE, POTASSIUM CHLORIDE, CALCIUM CHLORIDE: 600; 310; 30; 20 INJECTION, SOLUTION INTRAVENOUS at 08:11:00

## 2024-03-27 RX ADMIN — SODIUM CHLORIDE, SODIUM LACTATE, POTASSIUM CHLORIDE, CALCIUM CHLORIDE: 600; 310; 30; 20 INJECTION, SOLUTION INTRAVENOUS at 07:35:00

## 2024-03-27 RX ADMIN — KETAMINE HYDROCHLORIDE 10 MG: 50 INJECTION, SOLUTION INTRAMUSCULAR; INTRAVENOUS at 07:38:00

## 2024-03-27 RX ADMIN — LIDOCAINE HYDROCHLORIDE 20 MG: 10 INJECTION, SOLUTION EPIDURAL; INFILTRATION; INTRACAUDAL; PERINEURAL at 07:39:00

## 2024-03-27 RX ADMIN — CEFAZOLIN SODIUM/WATER 2 G: 2 G/20 ML SYRINGE (ML) INTRAVENOUS at 07:42:00

## 2024-03-27 RX ADMIN — KETAMINE HYDROCHLORIDE 10 MG: 50 INJECTION, SOLUTION INTRAMUSCULAR; INTRAVENOUS at 08:09:00

## 2024-03-27 RX ADMIN — KETAMINE HYDROCHLORIDE 20 MG: 50 INJECTION, SOLUTION INTRAMUSCULAR; INTRAVENOUS at 07:52:00

## 2024-03-27 RX ADMIN — GLYCOPYRROLATE 0.2 MG: 0.2 INJECTION, SOLUTION INTRAMUSCULAR; INTRAVENOUS at 07:52:00

## 2024-03-27 NOTE — ANESTHESIA PREPROCEDURE EVALUATION
Anesthesia PreOp Note    HPI:     Russ Kirkland is a 68 year old male who presents for preoperative consultation requested by: Eleonora Capone MD    Date of Surgery: 3/27/2024    Procedure(s):  Right lower extremity STAB phlebectomy  Indication: Venous ulcer, right leg    Relevant Problems   No relevant active problems       NPO:                         History Review:  Patient Active Problem List    Diagnosis Date Noted    Lumbar facet arthropathy 10/17/2023    Primary osteoarthritis of knees, bilateral 12/02/2021    Bilateral primary osteoarthritis of knee 10/05/2020    Spinal stenosis at L4-L5 level 07/02/2020    Lumbosacral radiculopathy at L5 07/02/2020    Cerebrovascular accident (CVA) ([facility]) 07/02/2020    Chronic venous hypertension w ulcer of l low extrem ([facility]) 05/11/2020    Idiopathic peripheral neuropathy 03/31/2020    Lumbar spinal stenosis 03/31/2020    CVA (cerebral vascular accident) ([facility]) 11/30/2019    Benign essential HTN 11/25/2019    Obstructive sleep apnea syndrome 11/25/2019    H/O mitral valve repair 11/25/2019    Unspecified open wound, left ankle, initial encounter 12/04/2018    Special screening for malignant neoplasm of prostate 02/24/2015    Routine health maintenance 12/04/2014    Family hx of colon cancer 11/26/2013    Other hyperlipidemia 11/26/2013    Atrial fibrillation (HCC) 08/16/2012    Osteoarthrosis 08/16/2012    Venous (peripheral) insufficiency 06/08/2012       Past Medical History:   Diagnosis Date    Acute perforated appendicitis 11/23/2019    Acute, but ill-defined, cerebrovascular disease 12/2019    Arrhythmia     afib    Atrial fibrillation (HCC)     ablation (2004); CV (May 2009)    Cataract     Deep vein thrombosis (HCC)     can not remember which leg    Hearing impairment     bilateral hearing aids    High blood pressure     High cholesterol     History of blood transfusion     no reaction    History of vein stripping     Osteoarthrosis, unspecified whether generalized  or localized, unspecified site     Sleep apnea     CPAP device    Stroke (HCC)     2019    UGI bleed     Unspecified essential hypertension     Venous insufficiency     RT greater saphenous vein ablation 2007       Past Surgical History:   Procedure Laterality Date    APPENDECTOMY  12/04/2019    CARDIAC PACEMAKER PLACEMENT  11/30/2022    CATARACT  11/02/2022    right eye    COLONOSCOPY  10/2011    COLONOSCOPY N/A 11/16/2016    Procedure: COLONOSCOPY;  Surgeon: Edis Roberts MD;  Location: [facility] ENDOSCOPY    COLONOSCOPY  03/01/2022    Diverticulosis, Hemorrhoids          Repeat 2027    COLONOSCOPY N/A 03/01/2022    Procedure: COLONOSCOPY,;  Surgeon: Nicola Juarez MD;  [facility]    HERNIA SURGERY      HIP REPLACEMENT SURGERY Left 05/2008    HIP REPLACEMENT SURGERY Right 06/2017    MOLES Left 2011    removed from L upper quadrant. Biopsy (pre-cancerous? -- Patient unsure if cancerous)    TOTAL HIP REPLACEMENT Bilateral     VALVE REPAIR  2002    mitral valve repair       No medications prior to admission.     No current Epic-ordered facility-administered medications on file.     Current Outpatient Medications Ordered in Epic   Medication Sig Dispense Refill    WARFARIN 2.5 MG Oral Tab TAKE 1 TABLET BY MOUTH DAILY 90 tablet 3    GABAPENTIN 400 MG Oral Cap TAKE 2 CAPSULES BY MOUTH THREE TIMES DAILY 540 capsule 3    SIMVASTATIN 20 MG Oral Tab TAKE 1 TABLET BY MOUTH NIGHTLY 90 tablet 3    AMLODIPINE 10 MG Oral Tab TAKE 1 TABLET BY MOUTH DAILY (Patient taking differently: Take 1 tablet (10 mg total) by mouth at bedtime.) 90 tablet 0    tramadol-acetaminophen 37.5-325 MG Oral Tab Take 2 tablets by mouth 2 (two) times daily as needed for Pain. 360 tablet 1    Sildenafil Citrate 100 MG Oral Tab Take 1 tablet (100 mg total) by mouth daily as needed for Erectile Dysfunction. 10 tablet 11    Glucosamine-Chondroit-Vit C-Mn (GLUCOSAMINE-CHONDROITIN) Oral Cap Take 1 tablet by mouth daily.      omega-3 fatty  acids (FISH OIL) 1000 MG Oral Cap Take 1,000 mg by mouth daily.      LOSARTAN POTASSIUM-HCTZ 100-12.5 MG Oral Tab TAKE 1 TABLET BY MOUTH DAILY (Patient taking differently: Take 1 tablet by mouth at bedtime.) 90 tablet 3    celecoxib 200 MG Oral Cap Take 1 capsule (200 mg total) by mouth daily as needed. 90 capsule 3       Allergies   Allergen Reactions    Adhesive Tape RASH     Can use paper tape.       Family History   Problem Relation Age of Onset    Colon Cancer Father     Arthritis Other     Hypertension Other      Social History     Socioeconomic History    Marital status:    Tobacco Use    Smoking status: Never    Smokeless tobacco: Never   Vaping Use    Vaping Use: Never used   Substance and Sexual Activity    Alcohol use: Yes     Alcohol/week: 15.0 standard drinks of alcohol     Types: 15 Glasses of wine per week     Comment: 2 glasses of wine/day    Drug use: No   Other Topics Concern    Caffeine Concern No    Pt has a pacemaker No    Pt has a defibrillator No    Reaction to local anesthetic No       Available pre-op labs reviewed.  Lab Results   Component Value Date    WBC 6.1 02/19/2024    RBC 4.70 02/19/2024    HGB 14.2 02/19/2024    HCT 43.1 02/19/2024    MCV 91.7 02/19/2024    MCH 30.2 02/19/2024    MCHC 32.9 02/19/2024    RDW 14.5 02/19/2024    .0 02/19/2024     Lab Results   Component Value Date     02/19/2024    K 3.9 02/19/2024     02/19/2024    CO2 26.0 02/19/2024    BUN 25 (H) 02/19/2024    CREATSERUM 0.83 02/19/2024    GLU 88 02/19/2024    CA 9.5 02/19/2024     Lab Results   Component Value Date    INR 2.01 (H) 03/22/2024       Vital Signs:  Body mass index is 31.66 kg/m².   height is 1.854 m (6' 1\") and weight is 108.9 kg (240 lb).   Vitals:    03/23/24 0834   Weight: 108.9 kg (240 lb)   Height: 1.854 m (6' 1\")        Anesthesia Evaluation     Patient summary reviewed and Nursing notes reviewed    Airway   Mallampati: III  Dental - Dentition appears grossly intact      Pulmonary     breath sounds clear to auscultation  (+) sleep apnea  Cardiovascular - negative ROS  Exercise tolerance: good  (+) hypertension, valvular problems/murmurs MR, dysrhythmias (A fib)    ECG reviewed  Rhythm: irregular  Rate: normal  ROS comment: DVT    Neuro/Psych    (+)  neuromuscular disease, CVA (right hand dexterity) residual symptoms,        GI/Hepatic/Renal      Comments: 15 glasses of wine per week    Endo/Other    (+) blood dyscrasia  Abdominal                  Anesthesia Plan:   ASA:  3  Plan:   MAC and general  Plan Comments: MAC vs GA discussed with possible awareness and need to change approach.  Informed Consent Plan and Risks Discussed With:  Patient and spouse      I have informed Russ Kirkland and/or legal guardian or family member of the nature of the anesthetic plan, benefits, risks including possible dental damage if relevant, major complications, and any alternative forms of anesthetic management.   All of the patient's questions were answered to the best of my ability. The patient desires the anesthetic management as planned.  JOHN VALENTINO MD  3/26/2024 7:51 PM  Present on Admission:  **None**

## 2024-03-27 NOTE — H&P
Eleonora Capone MD.  [facility]   Vascular and Endovascular Surgery     VASCULAR SURGERY   H&P NOTE      Name: Russ Kirkland   :   1955  [id number]     3/27/2024       REFERRING PHYSICIAN: Eleonora Capone MD  PRIMARY CARE PHYSICIAN:  Christie De Souza MD    HISTORY OF PRESENT ILLNESS: Patient is a 68 year old male whom I have been asked to see regarding right leg varicose veins and pain. He has had numerous vein procedures in the past and frequently gets ulcers. He presents for an operative stab phlebectomy. He stopped his warfarin on Saturday and has been bridged with lovenox.     PAST MEDICAL HISTORY:    Past Medical History:   Diagnosis Date    Acute perforated appendicitis 2019    Acute, but ill-defined, cerebrovascular disease 2019    Arrhythmia     afib    Atrial fibrillation (HCC)     ablation (); CV (May 2009)    Cataract     Deep vein thrombosis (HCC)     can not remember which leg    Hearing impairment     bilateral hearing aids    High blood pressure     High cholesterol     History of blood transfusion     no reaction    History of vein stripping     Osteoarthrosis, unspecified whether generalized or localized, unspecified site     Sleep apnea     CPAP device    Stroke (HCC)         UGI bleed     Unspecified essential hypertension     Venous insufficiency     RT greater saphenous vein ablation        PAST SURGICAL HISTORY:   Past Surgical History:   Procedure Laterality Date    APPENDECTOMY  2019    CARDIAC PACEMAKER PLACEMENT  2022    CATARACT  2022    right eye    COLONOSCOPY  10/2011    COLONOSCOPY N/A 2016    Procedure: COLONOSCOPY;  Surgeon: Edis Roberts MD;  Location: [facility] ENDOSCOPY    COLONOSCOPY  2022    Diverticulosis, Hemorrhoids          Repeat     COLONOSCOPY N/A 2022    Procedure: COLONOSCOPY,;  Surgeon: Nicola Juarez MD;  [facility]: [facility]GICAL [facility]    HERNIA SURGERY      HIP REPLACEMENT SURGERY  Left 05/2008    HIP REPLACEMENT SURGERY Right 06/2017    MOLES Left 2011    removed from L upper quadrant. Biopsy (pre-cancerous? -- Patient unsure if cancerous)    TOTAL HIP REPLACEMENT Bilateral     VALVE REPAIR  2002    mitral valve repair       MEDICATIONS:     Current Facility-Administered Medications:     lactated ringers infusion, , Intravenous, Continuous    Facility-Administered Medications Ordered in Other Encounters:     midazolam (Versed) 2 MG/2ML injection, , Intravenous, PRN    fentaNYL (Sublimaze) 50 mcg/mL injection, , Intravenous, PRN    lidocaine PF (Xylocaine-MPF) 1% injection, , Intravenous, PRN    propofol (Diprivan) 10 mg/mL infusion premix, , Intravenous, Continuous PRN    ketamine (Ketalar) 50 MG/ML injection, , Intravenous, PRN    ALLERGIES:    He is allergic to adhesive tape.    SOCIAL HISTORY:    Patient  reports that he has never smoked. He has never used smokeless tobacco. He reports current alcohol use of about 15.0 standard drinks of alcohol per week. He reports that he does not use drugs.    FAMILY HISTORY:    Patient's family history includes Arthritis in an other family member; Colon Cancer in his father; Hypertension in an other family member.    ROS:    Comprehensive ROS reviewed and negative except for what's stated above.  Including negative for chest pain, shortness of breath, syncope.     EXAM:  /73 (BP Location: Right arm)   Pulse 72   Temp 97.3 °F (36.3 °C) (Oral)   Resp 16   Ht 6' 1\" (1.854 m)   Wt 240 lb (108.9 kg)   SpO2 100%   BMI 31.66 kg/m²   GENERAL: alert and oriented x3, in no apparent distress  PSYCH: Normal mood and affect  NEURO: Cranial nerves grossly intact. No sensory or motor deficits  HEENT: Ears and throat are clear  NECK: Supple, no lymphadenopathy, thyroid wnl  RESPIRATORY: Normal work of breathing  CARDIO: RRR   ABDOMEN: Soft, non-tender, non-distended  BACK: Normal, no tenderness  SKIN: No rashes, warm and dry  MUSCULOSKELETAL: Strength 5/5  throughout, sensation intact  EXTREMITIES: No edema. Numerous varicose veins. Marked on the right leg.   VASCULAR: Palpable distal pulses        Diagnostic Data:      LABS:  Recent Labs   Lab 03/22/24  1109 03/27/24  0556   INR 2.01* 1.80*       No results for input(s): \"NA\", \"K\", \"CL\", \"CO2\", \"BUN\", \"CREATSERUM\", \"GLU\", \"CA\", \"CAION\", \"MG\", \"PHOS\" in the last 168 hours.  Recent Labs   Lab 03/22/24  1109 03/27/24  0556   PTP 24.0* 22.0*   INR 2.01* 1.80*     No results for input(s): \"ALT\", \"AST\", \"ALB\", \"AMYLASE\", \"LIPASE\", \"LDH\" in the last 168 hours.    Invalid input(s): \"ALPHOS\", \"TBIL\", \"DBIL\", \"TPROT\"  No results for input(s): \"TROP\" in the last 168 hours.  No results found for: \"ANAS\", \"ROSS\", \"ANASCRN\"  No results for input(s): \"PCACT\", \"PSACT\", \"AT3ACT\", \"HIPAB\", \"PATHI\", \"STALA\", \"DRVVTRATIO\", \"DRVVT\", \"STACLOT\", \"CARIGG\", \"A9RV4JMQRW\", \"A7BE1SWZXG\", \"RA\", \"HAVIGM\", \"HBCIGM\", \"HCVAB\", \"HBSAG\", \"HBCAB\", \"HBVDNAINTERP\", \"ANAS\", \"C3\", \"C4\" in the last 168 hours.    Radiology: No results found.       ASSESSMENT AND PLAN:     The patient is a 68 year old male who presents for a right lower extremity stab phlebectomy. Risks and benefits discussed and he elects to proceed.    The patient indicated an understanding of these issues and agreed to the plan and all questions were answered.     Thank you for allowing me to participate in the patient's care.     Sincerely,  Eleonora Capone MD  03/27/24   7:46 AM

## 2024-03-27 NOTE — OPERATIVE REPORT
Eleonora Capone MD.  [facility]  Vascular and Endovascular Surgery    RIGHT LOWER EXTREMITY MULTIPLE STAB PHLEBECTOMIES      OPERATIVE REPORT        Name: Russ Kirkland   :   1955 (68 year old)  MR# [id number]     SURGEON: Eleonora Capone MD    ASSISTANT: Jovanni Hauser    ANESTHESIA: MAC with local     PRE-OP DIAGNOSIS: Symptomatic Varicose Veins     POST-OP DIAGNOSIS: Same    DATE OF PROCEDURE: 3/27/2024     PROCEDURE: Multiple Stab Phlebectomies of the Right lower extremity (medial thigh and calf)      The patient was brought to the operating room, he was placed supine on the operating table. He was sedated by the anesthesia team. The veins had been marked in the preop area. The skin of the leg was prepped in the usual sterile fashion. After skin infiltration with about 90cc of 1% lidocaine solution at the branch varicose veins throughout the leg, multiple micro-phlebectomy incisions were made in a  transverse fashion. The veins were removed by using a small hemostat. Hemostasis was achieved by holding compression. Steri-strips and sterile compression dressings were applied, followed by compression-graded stockings and an ACE bandage. The patient tolerated the procedure well and there were no complications. The patient was discharged to home in satisfactory condition.     Phlebectomy Incisions:  48    Surgeon Signature: Eleonora Capone MD

## 2024-03-27 NOTE — ANESTHESIA POSTPROCEDURE EVALUATION
Patient: Russ Kirkland    Procedure Summary       Date: 03/27/24 Room / Location: [facility] MAIN OR [address] [place] [address] MAIN OR    Anesthesia Start: 0734 Anesthesia Stop:     Procedure: Right lower extremity STAB phlebectomy (Right) Diagnosis: (Venous ulcer, right leg)    Surgeons: Eleonora Capone MD Anesthesiologist: John Laura MD    Anesthesia Type: general ASA Status: 3            Anesthesia Type: general    Vitals Value Taken Time   /84 03/27/24 0945   Temp 97.6 °F (36.4 °C) 03/27/24 0943   Pulse 71 03/27/24 0945   Resp 12 03/27/24 0945   SpO2 96 % 03/27/24 0945   Vitals shown include unfiled device data.    [facility] AN Post Evaluation:   Patient Evaluated in PACU  Patient Participation: complete - patient participated  Level of Consciousness: awake  Pain Score: 0  Pain Management: adequate  Airway Patency:patent  Dental exam unchanged from preop  Yes    Cardiovascular Status: acceptable  Respiratory Status: acceptable  Postoperative Hydration acceptable      JOHN LAURA MD  3/27/2024 9:45 AM

## 2024-03-30 ENCOUNTER — LAB ENCOUNTER (OUTPATIENT)
Dept: LAB | Facility: HOSPITAL | Age: 69
End: 2024-03-30
Attending: INTERNAL MEDICINE
Payer: MEDICARE

## 2024-03-30 DIAGNOSIS — Z79.01 LONG TERM CURRENT USE OF ANTICOAGULANT: ICD-10-CM

## 2024-03-30 DIAGNOSIS — Z98.890 HISTORY OF MITRAL VALVE REPAIR: ICD-10-CM

## 2024-03-30 DIAGNOSIS — I48.20 CHRONIC ATRIAL FIBRILLATION (HCC): ICD-10-CM

## 2024-03-30 LAB
INR BLD: 2.07 (ref 0.8–1.2)
PROTHROMBIN TIME: 24.5 SECONDS (ref 11.6–14.8)

## 2024-03-30 PROCEDURE — 85610 PROTHROMBIN TIME: CPT

## 2024-03-30 PROCEDURE — 36415 COLL VENOUS BLD VENIPUNCTURE: CPT

## 2024-04-08 ENCOUNTER — TELEPHONE (OUTPATIENT)
Dept: INTERNAL MEDICINE CLINIC | Facility: CLINIC | Age: 69
End: 2024-04-08

## 2024-04-08 ENCOUNTER — LAB ENCOUNTER (OUTPATIENT)
Dept: LAB | Facility: HOSPITAL | Age: 69
End: 2024-04-08
Attending: INTERNAL MEDICINE
Payer: MEDICARE

## 2024-04-08 DIAGNOSIS — E78.49 OTHER HYPERLIPIDEMIA: ICD-10-CM

## 2024-04-08 DIAGNOSIS — Z79.01 LONG TERM CURRENT USE OF ANTICOAGULANT: ICD-10-CM

## 2024-04-08 DIAGNOSIS — Z13.29 SCREENING FOR THYROID DISORDER: ICD-10-CM

## 2024-04-08 DIAGNOSIS — Z98.890 HISTORY OF MITRAL VALVE REPAIR: ICD-10-CM

## 2024-04-08 DIAGNOSIS — Z12.5 ENCOUNTER FOR PROSTATE CANCER SCREENING: ICD-10-CM

## 2024-04-08 DIAGNOSIS — I48.20 CHRONIC ATRIAL FIBRILLATION (HCC): ICD-10-CM

## 2024-04-08 DIAGNOSIS — I10 BENIGN ESSENTIAL HTN: ICD-10-CM

## 2024-04-08 DIAGNOSIS — E55.9 VITAMIN D DEFICIENCY: ICD-10-CM

## 2024-04-08 DIAGNOSIS — E55.9 VITAMIN D DEFICIENCY: Primary | ICD-10-CM

## 2024-04-08 LAB
ALBUMIN SERPL-MCNC: 4.4 G/DL (ref 3.2–4.8)
ALBUMIN/GLOB SERPL: 1.7 {RATIO} (ref 1–2)
ALP LIVER SERPL-CCNC: 76 U/L
ALT SERPL-CCNC: 18 U/L
ANION GAP SERPL CALC-SCNC: 4 MMOL/L (ref 0–18)
AST SERPL-CCNC: 17 U/L (ref ?–34)
BASOPHILS # BLD AUTO: 0.06 X10(3) UL (ref 0–0.2)
BASOPHILS NFR BLD AUTO: 1.2 %
BILIRUB SERPL-MCNC: 0.7 MG/DL (ref 0.2–1.1)
BUN BLD-MCNC: 21 MG/DL (ref 9–23)
BUN/CREAT SERPL: 24.7 (ref 10–20)
CALCIUM BLD-MCNC: 9.6 MG/DL (ref 8.7–10.4)
CHLORIDE SERPL-SCNC: 109 MMOL/L (ref 98–112)
CHOLEST SERPL-MCNC: 140 MG/DL (ref ?–200)
CO2 SERPL-SCNC: 28 MMOL/L (ref 21–32)
CREAT BLD-MCNC: 0.85 MG/DL
DEPRECATED RDW RBC AUTO: 48.5 FL (ref 35.1–46.3)
EGFRCR SERPLBLD CKD-EPI 2021: 94 ML/MIN/1.73M2 (ref 60–?)
EOSINOPHIL # BLD AUTO: 0.15 X10(3) UL (ref 0–0.7)
EOSINOPHIL NFR BLD AUTO: 3.1 %
ERYTHROCYTE [DISTWIDTH] IN BLOOD BY AUTOMATED COUNT: 14.2 % (ref 11–15)
FASTING PATIENT LIPID ANSWER: YES
FASTING STATUS PATIENT QL REPORTED: YES
GLOBULIN PLAS-MCNC: 2.6 G/DL (ref 2.8–4.4)
GLUCOSE BLD-MCNC: 99 MG/DL (ref 70–99)
HCT VFR BLD AUTO: 41 %
HDLC SERPL-MCNC: 65 MG/DL (ref 40–59)
HGB BLD-MCNC: 13.9 G/DL
IMM GRANULOCYTES # BLD AUTO: 0.01 X10(3) UL (ref 0–1)
IMM GRANULOCYTES NFR BLD: 0.2 %
INR BLD: 2.25 (ref 0.8–1.2)
LDLC SERPL CALC-MCNC: 64 MG/DL (ref ?–100)
LYMPHOCYTES # BLD AUTO: 1.46 X10(3) UL (ref 1–4)
LYMPHOCYTES NFR BLD AUTO: 30.4 %
MCH RBC QN AUTO: 31.5 PG (ref 26–34)
MCHC RBC AUTO-ENTMCNC: 33.9 G/DL (ref 31–37)
MCV RBC AUTO: 93 FL
MONOCYTES # BLD AUTO: 0.44 X10(3) UL (ref 0.1–1)
MONOCYTES NFR BLD AUTO: 9.1 %
NEUTROPHILS # BLD AUTO: 2.69 X10 (3) UL (ref 1.5–7.7)
NEUTROPHILS # BLD AUTO: 2.69 X10(3) UL (ref 1.5–7.7)
NEUTROPHILS NFR BLD AUTO: 56 %
NONHDLC SERPL-MCNC: 75 MG/DL (ref ?–130)
OSMOLALITY SERPL CALC.SUM OF ELEC: 295 MOSM/KG (ref 275–295)
PLATELET # BLD AUTO: 223 10(3)UL (ref 150–450)
POTASSIUM SERPL-SCNC: 4.2 MMOL/L (ref 3.5–5.1)
PROT SERPL-MCNC: 7 G/DL (ref 5.7–8.2)
PROTHROMBIN TIME: 26.3 SECONDS (ref 11.6–14.8)
RBC # BLD AUTO: 4.41 X10(6)UL
SODIUM SERPL-SCNC: 141 MMOL/L (ref 136–145)
TRIGL SERPL-MCNC: 47 MG/DL (ref 30–149)
TSI SER-ACNC: 1.33 MIU/ML (ref 0.55–4.78)
VIT D+METAB SERPL-MCNC: 18.1 NG/ML (ref 30–100)
VLDLC SERPL CALC-MCNC: 7 MG/DL (ref 0–30)
WBC # BLD AUTO: 4.8 X10(3) UL (ref 4–11)

## 2024-04-08 PROCEDURE — 36415 COLL VENOUS BLD VENIPUNCTURE: CPT

## 2024-04-08 PROCEDURE — 80053 COMPREHEN METABOLIC PANEL: CPT

## 2024-04-08 PROCEDURE — 85025 COMPLETE CBC W/AUTO DIFF WBC: CPT

## 2024-04-08 PROCEDURE — 82306 VITAMIN D 25 HYDROXY: CPT

## 2024-04-08 PROCEDURE — 85610 PROTHROMBIN TIME: CPT

## 2024-04-08 PROCEDURE — 80061 LIPID PANEL: CPT

## 2024-04-08 PROCEDURE — 84443 ASSAY THYROID STIM HORMONE: CPT

## 2024-04-08 NOTE — TELEPHONE ENCOUNTER
Received call from  that patient is at lab to have blood work done. Patient received [employer] message (see 2/12/24 TE) that orders were in, but orders are not in. Annual labs ordered.

## 2024-04-10 ENCOUNTER — OFFICE VISIT (OUTPATIENT)
Dept: INTERNAL MEDICINE CLINIC | Facility: CLINIC | Age: 69
End: 2024-04-10

## 2024-04-10 VITALS
WEIGHT: 240.19 LBS | DIASTOLIC BLOOD PRESSURE: 78 MMHG | OXYGEN SATURATION: 97 % | BODY MASS INDEX: 31.83 KG/M2 | SYSTOLIC BLOOD PRESSURE: 134 MMHG | HEART RATE: 70 BPM | TEMPERATURE: 99 F | HEIGHT: 73 IN

## 2024-04-10 DIAGNOSIS — R29.898 LEG WEAKNESS, BILATERAL: ICD-10-CM

## 2024-04-10 DIAGNOSIS — M48.061 SPINAL STENOSIS OF LUMBAR REGION, UNSPECIFIED WHETHER NEUROGENIC CLAUDICATION PRESENT: Primary | ICD-10-CM

## 2024-04-10 DIAGNOSIS — R53.81 PHYSICAL DECONDITIONING: ICD-10-CM

## 2024-04-10 NOTE — PROGRESS NOTES
Russ Kirkland is a 68 year old male  Chief Complaint   Patient presents with    [facility]     Medicare annual      HPI:     Russ Kirkland is a 68 year old male who presents for a [facility] physical exam and follow up of chronic dx.    Not a great year.  Having pain in back and knees.  Has known spinal stenosis -- seeing Dr. Yessica Cain (PM[facility]R).  The pain is going down both legs.  Has had issues for 15 years, but now much worse and impacting his functioning.  Has had injections in knees and spine over the past 2 years  Has an upcoming LESI scheduled.    He saw [facility] re the knees, which are getting worse -- was advised for TKR's but pt is nervous about it given his hx of periop CVA.    Has had recent echo and stress test after mentioning to Dr. Alfaro that he was considering knee replacement.  Dr. Alfaro is recommending a CTA coronaries.    Asking about doing PT for physical deconditioning    Asking about handicap placard for the knees, spine issues      Wt Readings from Last 6 Encounters:   04/10/24 240 lb 3.2 oz (109 kg)   03/27/24 240 lb (108.9 kg)   10/16/23 235 lb (106.6 kg)   10/06/23 235 lb (106.6 kg)   06/13/23 240 lb (108.9 kg)   04/19/23 243 lb 12.8 oz (110.6 kg)     Body mass index is 31.69 kg/m².       Current Outpatient Medications   Medication Sig Dispense Refill    METOPROLOL SUCCINATE OR Take by mouth. Daily (per )      WARFARIN 2.5 MG Oral Tab TAKE 1 TABLET BY MOUTH DAILY 90 tablet 3    GABAPENTIN 400 MG Oral Cap TAKE 2 CAPSULES BY MOUTH THREE TIMES DAILY 540 capsule 3    LOSARTAN POTASSIUM-HCTZ 100-12.5 MG Oral Tab TAKE 1 TABLET BY MOUTH DAILY (Patient taking differently: Take 1 tablet by mouth at bedtime.) 90 tablet 3    SIMVASTATIN 20 MG Oral Tab TAKE 1 TABLET BY MOUTH NIGHTLY 90 tablet 3    tramadol-acetaminophen 37.5-325 MG Oral Tab Take 2 tablets by mouth 2 (two) times daily as needed for Pain. 360 tablet 1    celecoxib 200 MG Oral Cap Take 1 capsule (200 mg total) by mouth daily as  needed. 90 capsule 3    Sildenafil Citrate 100 MG Oral Tab Take 1 tablet (100 mg total) by mouth daily as needed for Erectile Dysfunction. 10 tablet 11    Glucosamine-Chondroit-Vit C-Mn (GLUCOSAMINE-CHONDROITIN) Oral Cap Take 1 tablet by mouth daily.      omega-3 fatty acids (FISH OIL) 1000 MG Oral Cap Take 1,000 mg by mouth daily.      enoxaparin (LOVENOX) 120 MG/0.8ML Injection Solution Prefilled Syringe Inject 1 mg/kg into the skin 2 (two) times daily. (Patient not taking: Reported on 4/10/2024)      AMLODIPINE 10 MG Oral Tab TAKE 1 TABLET BY MOUTH DAILY (Patient not taking: Reported on 4/10/2024) 90 tablet 0      Past Medical History:    Acute perforated appendicitis    Acute, but ill-defined, cerebrovascular disease    Arrhythmia    afib    Atrial fibrillation (HCC)    ablation (2004); CV (May 2009)    Cataract    Deep vein thrombosis (HCC)    can not remember which leg    Hearing impairment    bilateral hearing aids    High blood pressure    High cholesterol    History of blood transfusion    no reaction    History of vein stripping    Osteoarthrosis, unspecified whether generalized or localized, unspecified site    Sleep apnea    CPAP device    Stroke (HCC)    2019    UGI bleed    Unspecified essential hypertension    Venous insufficiency    RT greater saphenous vein ablation 2007      Past Surgical History:   Procedure Laterality Date    Appendectomy  12/04/2019    Cardiac pacemaker placement  11/30/2022    Cataract  11/02/2022    right eye    Colonoscopy  10/2011    Colonoscopy N/A 11/16/2016    Procedure: COLONOSCOPY;  Surgeon: Edis Roberts MD;  Location: [facility] ENDOSCOPY    Colonoscopy  03/01/2022    Diverticulosis, Hemorrhoids          Repeat 2027    Colonoscopy N/A 03/01/2022    Procedure: COLONOSCOPY,;  Surgeon: Nicola Juarez MD;  [place] [facility] [place] [facility]    Hernia surgery      Hip replacement surgery Left 05/2008    Hip replacement surgery Right 06/2017    Moles Left 2011    removed from L  upper quadrant. Biopsy (pre-cancerous? -- Patient unsure if cancerous)    Total hip replacement Bilateral     Valve repair  2002    mitral valve repair      Family History   Problem Relation Age of Onset    Colon Cancer Father     Arthritis Other     Hypertension Other       Social History:  Social History     Socioeconomic History    Marital status:    Tobacco Use    Smoking status: Never    Smokeless tobacco: Never   Vaping Use    Vaping status: Never Used   Substance and Sexual Activity    Alcohol use: Yes     Alcohol/week: 15.0 standard drinks of alcohol     Types: 15 Glasses of wine per week     Comment: 2 glasses of wine/day    Drug use: No   Other Topics Concern    Caffeine Concern No    Pt has a pacemaker No    Pt has a defibrillator No    Reaction to local anesthetic No   Social History Narrative    The patient does not use an assistive device..      The patient does live in a home with stairs.     Social Determinants of Health      Received from [facility]    Social Connections    Received from [facility]    Housing Stability           General Health     In the past six months, have you lost more than 10 pounds without trying?: 2 - No    Has your appetite been poor?: No    Type of Diet: Balanced    How does the patient maintain a good energy level?: Daily Walks    How would you describe your daily physical activity?: Light    How would you describe your current health state?: Poor    How do you maintain positive mental well-being?: Visiting Friends;Visiting Family         Have you had any immunizations at another office such as Influenza, Hepatitis B, Tetanus, or Pneumococcal?: No     Functional Ability     Bathing or Showering: Able without help    Toileting: Able without help    Dressing: Able without help    Eating: Able without help    Driving: Able without help    Preparing your meals: Able without help    Managing money/bills: Able without help    Taking  medications as prescribed: Able without help    Are you able to afford your medications?: Yes    Hearing Problems?: Yes     Functional Status     Hearing Problems?: Yes    Vision Problems? : No    Difficulty walking?: Yes    Difficulty dressing or bathing?: Yes    Problems with daily activities? : No    Memory Problems?: No      Fall/Risk Assessment                                                              Depression Screening (PHQ-2/PHQ-9): Over the LAST 2 WEEKS                      Advance Directives     Do you have a healthcare power of ?: Yes    Do you have a living will?: Yes     Hearing Assessment (Required for AWV/SWV)      Hearing Screening    Screening Method: Questionnaire  I have a problem hearing over the telephone: Sometimes I have trouble following the conversations when two or more people are talking at the same time: Sometimes   I have trouble understanding things on the TV: Sometimes I have to strain to understand conversations: Sometimes   I have to worry about missing the telephone ring or doorbell: Sometimes I have trouble hearing conversations in a noisy background such as a crowded room or restaurant: Sometimes   I get confused about where sounds come from: Sometimes I misunderstand some words in a sentence and need to ask people to repeat themselves: Sometimes   I especially have trouble understanding the speech of women and children: Sometimes I have trouble understanding the speaker in a large room such as at a meeting or place of [employer]: Sometimes   Many people I talk to seem to mumble (or don't speak clearly): Sometimes People get annoyed because I misunderstand what they say: Sometimes   I misunderstand what others are saying and make inappropriate responses: No I avoid social activities because I cannot hear well and fear I will reply improperly: No   Family members and friends have told me they think I may have hearing loss: Sometimes             Visual Acuity                    Cognitive Assessment     What day of the week is this?: Correct    What month is it?: Correct    What year is it?: Correct    Recall \"Ball\": Correct    Recall \"Flag\": Correct    Recall \"Tree\": Correct        Russ Kirkland's SCREENING SCHEDULE   Tests on this list are recommended by your physician but may not be covered, or covered at this frequency, by your insurer. Please check with your insurance carrier before scheduling to verify coverage.    PREVENTATIVE SERVICES  INDICATIONS AND SCHEDULE Internal Lab or Procedure External Lab or Procedure   Diabetes Screening      HbgA1C   Annually Glycohemoglobin (HgA1c) (%)   Date Value   04/16/2018 5.6         No data to display                Fasting Blood Sugar (FSB) Annually Glucose (mg/dL)   Date Value   04/08/2024 99       Cardiovascular Disease Screening     LDL Annually LDL Cholesterol (mg/dL)   Date Value   04/08/2024 64        EKG One Time y    Colorectal Cancer Screening      Colonoscopy Screen every 10 years Health Maintenance   Topic Date Due    Colorectal Cancer Screening  03/01/2027    Update Health Maintenance if applicable    Flex Sigmoidoscopy Screen every 5 years No results found for this or any previous visit.      No data to display                 Fecal Occult Blood Annually No results found for: \"FOB\", \"OCCULTSTOOL\"      No data to display                Glaucoma Screening      Ophthalmology Visit Annually y    Prostate Cancer Screening      PSA  Annually Health Maintenance   Topic Date Due    PSA  04/14/2025     Update Health Maintenance if applicable   Immunizations      Influenza No orders found for this or any previous visit. Update Immunization Activity if applicable    Pneumococcal Orders placed or performed in visit on 03/31/20    PNEUMOCOCCAL IMM, 23    Update Immunization Activity if applicable    Hepatitis B No orders found for this or any previous visit. Update Immunization Activity if applicable    Tetanus Orders placed or performed  in visit on 02/28/17    TETANUS, DIPHTHERIA TOXOIDS AND ACELLULAR PERTUSIS VACCINE (TDAP), >7 YEARS, IM USE    Update Immunization Activity if applicable    Zoster (Not covered by Medicare Part B) No orders found for this or any previous visit. Update Immunization Activity if applicable     SPECIFIC DISEASE MONITORING Internal Lab or Procedure External Lab or Procedure   Annual Monitoring of Persistent     Medications (ACE/ARB, digoxin, diuretics)    Potassium  Annually Potassium (mmol/L)   Date Value   04/08/2024 4.2     POTASSIUM (P) (mmol/L)   Date Value   05/02/2016 4.5         No data to display                Creatinine  Annually Creatinine (mg/dL)   Date Value   04/08/2024 0.85         No data to display                Digoxin Serum Conc  Annually No results found for: \"DIGOXIN\"      No data to display                Diabetes      HgbA1C  Annually Glycohemoglobin (HgA1c) (%)   Date Value   04/16/2018 5.6         No data to display                Creat/alb ratio  Annually      LDL  Annually LDL Cholesterol (mg/dL)   Date Value   04/08/2024 64         No data to display                 Dilated Eye exam  Annually      No data to display                   No data to display                COPD      Spirometry Testing Annually No results found for this or any previous visit.      No data to display                        REVIEW OF SYSTEMS:   GENERAL: feels well otherwise  EYES:denies blurred vision or double vision  HEENT: denies nasal congestion, sinus pain or ST  LUNGS: denies shortness of breath or cough  CARDIOVASCULAR: denies chest pain or pressure or palpitations  GI: denies abdominal pain, N/V, diarrhea, constipation, hematochezia or melena  : denies nocturia or changes in stream  NEURO: denies headaches or dizziness      EXAM:   /78 (BP Location: Right arm, Patient Position: Sitting)   Pulse 70   Temp 99.2 °F (37.3 °C) (Oral)   Ht 6' 1\" (1.854 m)   Wt 240 lb 3.2 oz (109 kg)   SpO2 97%   BMI  31.69 kg/m²   GENERAL: well developed, well nourished, in no apparent distress  HEENT: normal oropharynx, normal TM's  EYES: PERRLA, EOMI, conjunctivae pink  NECK: supple, +slight enlargement of b/l anterior cervical LN's (approx 1.5cm) with mild tenderness over left ant neck, no bruits  LUNGS: clear to auscultation  CARDIO: RRR, normal S1S2, no gallops or murmurs  GI: soft, NT, ND, NABS, no HSM  EXTREMITIES: left leg wrapped; no edema RLE's      ASSESSMENT AND PLAN:     Hx of CVA with RUE weakness and facial droop  -MRI 12/1/19--acute infarct in L superior parietal and L posterior medial occipital lobe.   -likely 2/2 embolism from LA, as pt with AF and was off coumadin in anticipation of poss surgery. DAVEY revealed a very large amount of spontaneous echo contrast in left atrium, which makes him high risk for clot.    - 2D echo with LVEF 55-60%, mild MR and moderate MS, mild TR, small pericardial effusion, unable to r/o mitral valve vegetation;  DAVEY on 12/2 also revealed a possible small vegetation on atrial side of posterior leaflet per Dr. Hoyos; no intervention indicated. Clinically, pt without sx of infective endocarditis   -s/p inpt stay at  rehab  -Ambulating without difficulty.  -on coumadin    Atrial fibrillation  PPM  on coumadin; followed by [facility] [place] coumadin [facility]  PPM placed (Dr. Alfaro) in 11/2022 (noted to have sinus pauses on Holter)     Hypercholesterolemia  Cont fish oil; cont zocor 20mg at bedtime  LDL at goal     Varicose veins  Wears compression stocking daily.  Most recently had phlebectomy by [facility] surgeon Dr. Eleonora Capone      TEJINDER  Uses nasal CPAP     Bilateral calf weakness and R calf pain (preceded the CVA), due to peripheral neuropathy and lumbar stenosis  -Trial off zocor since 4/2019 did not help  -CK, aldolase, ESR, CRP in 4/2019 normal    -EMG 2/2020 showed a severe sensorimotor peripheral neuropathy in both legs -- likely contributing to his calf weakness.    EMG also showed a possible  right lumbar radiculopathy (from L5 or S1), so MRI ordered.  - MRI 3/2020 -- mod-severe foraminal stenosis at L4-5.    -seeing Dr. Yessica Cain ([facility])  -has had LESI's  -on gabapentin    OA multiple joints, bilateral rotator cuff tears  -on Ultracet and celebrex (qod)  -seeing physiatrist Dr. Yessica Cain for his knee OA    -has had cortisone injections in knees by Dr. Cain alternating with gel injections  -most recently saw [facility] who recommended TKR's; pt not yet ready  -handicap placard filled out     Erectile dysfunction  -on Viagra 100mg    Family hx of colon cancer (dad)  Last colonoscopy 11/16/16 (Dr. Roberts) -- no polyps.    Repeat colonoscopy 3/1/22 (Dr. Nicola Juarez) -- no polyps.    Repeat in another 5 years (3/2027)    Routine health maintenance  Had Shingrix shingles vaccine.    Tdap 2/28/17.    PPSV23 done 3/31/20; PCV 20 9/2022  PSA to be done (could not do with other labs this month b/c had to wait until after 4/14/24)    Hypertension  On amlodipine 10mg/d and losartan/hct 100/12.5mg/d    Vitamin D deficiency  Level low (18)  Restart vitamin D 4000u/day    Physical deconditioning  Due to knee OA and lumbar spinal stenosis      RTC 1 yr/prn

## 2024-04-14 DIAGNOSIS — G60.9 IDIOPATHIC PERIPHERAL NEUROPATHY: ICD-10-CM

## 2024-04-14 DIAGNOSIS — M15.0 PRIMARY OSTEOARTHRITIS INVOLVING MULTIPLE JOINTS: ICD-10-CM

## 2024-04-14 DIAGNOSIS — G62.9 PERIPHERAL POLYNEUROPATHY: ICD-10-CM

## 2024-04-14 DIAGNOSIS — M48.062 SPINAL STENOSIS OF LUMBAR REGION WITH NEUROGENIC CLAUDICATION: ICD-10-CM

## 2024-04-18 ENCOUNTER — ORDER TRANSCRIPTION (OUTPATIENT)
Dept: ADMINISTRATIVE | Facility: HOSPITAL | Age: 69
End: 2024-04-18

## 2024-04-18 DIAGNOSIS — I48.20 CHRONIC ATRIAL FIBRILLATION (HCC): ICD-10-CM

## 2024-04-18 DIAGNOSIS — R00.1 BRADYCARDIA: Primary | ICD-10-CM

## 2024-04-18 DIAGNOSIS — I34.0 MITRAL REGURGITATION: ICD-10-CM

## 2024-04-19 RX ORDER — GABAPENTIN 400 MG/1
800 CAPSULE ORAL 3 TIMES DAILY
Qty: 540 CAPSULE | Refills: 3 | OUTPATIENT
Start: 2024-04-19

## 2024-04-19 RX ORDER — SIMVASTATIN 20 MG
20 TABLET ORAL NIGHTLY
Qty: 90 TABLET | Refills: 3 | OUTPATIENT
Start: 2024-04-19

## 2024-04-19 RX ORDER — WARFARIN SODIUM 2.5 MG/1
2.5 TABLET ORAL DAILY
Qty: 90 TABLET | Refills: 3 | OUTPATIENT
Start: 2024-04-19

## 2024-04-19 RX ORDER — LOSARTAN POTASSIUM AND HYDROCHLOROTHIAZIDE 12.5; 1 MG/1; MG/1
1 TABLET ORAL DAILY
Qty: 90 TABLET | Refills: 3 | OUTPATIENT
Start: 2024-04-19

## 2024-04-19 RX ORDER — CELECOXIB 200 MG/1
200 CAPSULE ORAL
Qty: 90 CAPSULE | Refills: 3 | Status: SHIPPED | OUTPATIENT
Start: 2024-04-19

## 2024-04-19 NOTE — TELEPHONE ENCOUNTER
Current refill request refused due to refill is either a duplicate request or has active refills at the pharmacy.  Check previous templates.    Requested Prescriptions     Pending Prescriptions Disp Refills    celecoxib 200 MG Oral Cap 90 capsule 3     Sig: Take 1 capsule (200 mg total) by mouth daily as needed.    tramadol-acetaminophen 37.5-325 MG Oral Tab 360 tablet 1     Sig: Take 2 tablets by mouth 2 (two) times daily as needed for Pain.    warfarin 2.5 MG Oral Tab 90 tablet 3     Sig: Take 1 tablet (2.5 mg total) by mouth daily.     Refused Prescriptions Disp Refills    simvastatin 20 MG Oral Tab 90 tablet 3     Sig: Take 1 tablet (20 mg total) by mouth nightly.     Refused By: ROYCE FLAHERTY     Reason for Refusal: Request already responded to by other means (e.g. phone or fax)    gabapentin 400 MG Oral Cap 540 capsule 3     Sig: Take 2 capsules (800 mg total) by mouth 3 (three) times daily.     Refused By: ROYCE FLAHERTY     Reason for Refusal: Request already responded to by other means (e.g. phone or fax)    Losartan Potassium-HCTZ 100-12.5 MG Oral Tab 90 tablet 3     Sig: Take 1 tablet by mouth daily.     Refused By: ROYCE FLAHERTY     Reason for Refusal: Request already responded to by other means (e.g. phone or fax)

## 2024-04-22 ENCOUNTER — LAB ENCOUNTER (OUTPATIENT)
Dept: LAB | Age: 69
End: 2024-04-22
Attending: INTERNAL MEDICINE
Payer: MEDICARE

## 2024-04-22 DIAGNOSIS — I48.20 CHRONIC ATRIAL FIBRILLATION (HCC): ICD-10-CM

## 2024-04-22 DIAGNOSIS — Z98.890 HISTORY OF MITRAL VALVE REPAIR: ICD-10-CM

## 2024-04-22 DIAGNOSIS — Z79.01 LONG TERM CURRENT USE OF ANTICOAGULANT: ICD-10-CM

## 2024-04-22 LAB
COMPLEXED PSA SERPL-MCNC: 3.34 NG/ML (ref ?–4)
INR BLD: 2.52 (ref 0.8–1.2)
PROTHROMBIN TIME: 28.8 SECONDS (ref 11.6–14.8)

## 2024-04-22 PROCEDURE — 85610 PROTHROMBIN TIME: CPT

## 2024-04-22 PROCEDURE — 36415 COLL VENOUS BLD VENIPUNCTURE: CPT

## 2024-05-03 ENCOUNTER — LAB ENCOUNTER (OUTPATIENT)
Dept: LAB | Facility: HOSPITAL | Age: 69
End: 2024-05-03
Attending: PHYSICAL MEDICINE & REHABILITATION
Payer: MEDICARE

## 2024-05-03 DIAGNOSIS — I48.20 CHRONIC ATRIAL FIBRILLATION (HCC): ICD-10-CM

## 2024-05-03 LAB
INR BLD: 2.25 (ref 0.8–1.2)
PROTHROMBIN TIME: 26.3 SECONDS (ref 11.6–14.8)

## 2024-05-03 PROCEDURE — 85610 PROTHROMBIN TIME: CPT

## 2024-05-03 PROCEDURE — 36415 COLL VENOUS BLD VENIPUNCTURE: CPT

## 2024-05-04 ENCOUNTER — LAB ENCOUNTER (OUTPATIENT)
Dept: LAB | Facility: HOSPITAL | Age: 69
End: 2024-05-04
Attending: INTERNAL MEDICINE
Payer: MEDICARE

## 2024-05-04 DIAGNOSIS — Z98.890 HISTORY OF MITRAL VALVE REPAIR: ICD-10-CM

## 2024-05-04 DIAGNOSIS — Z79.01 LONG TERM CURRENT USE OF ANTICOAGULANT: ICD-10-CM

## 2024-05-04 DIAGNOSIS — I48.20 CHRONIC ATRIAL FIBRILLATION (HCC): ICD-10-CM

## 2024-05-04 LAB
INR BLD: 1.92 (ref 0.8–1.2)
PROTHROMBIN TIME: 23.2 SECONDS (ref 11.6–14.8)

## 2024-05-04 PROCEDURE — 36415 COLL VENOUS BLD VENIPUNCTURE: CPT

## 2024-05-04 PROCEDURE — 85610 PROTHROMBIN TIME: CPT

## 2024-05-06 ENCOUNTER — APPOINTMENT (OUTPATIENT)
Dept: SURGERY | Facility: CLINIC | Age: 69
End: 2024-05-06
Payer: MEDICARE

## 2024-05-09 ENCOUNTER — LAB ENCOUNTER (OUTPATIENT)
Dept: LAB | Facility: HOSPITAL | Age: 69
End: 2024-05-09
Attending: INTERNAL MEDICINE
Payer: MEDICARE

## 2024-05-09 DIAGNOSIS — Z79.01 LONG TERM CURRENT USE OF ANTICOAGULANT: ICD-10-CM

## 2024-05-09 DIAGNOSIS — Z98.890 HISTORY OF MITRAL VALVE REPAIR: ICD-10-CM

## 2024-05-09 DIAGNOSIS — I48.20 CHRONIC ATRIAL FIBRILLATION (HCC): ICD-10-CM

## 2024-05-09 LAB
INR BLD: 1.47 (ref 0.8–1.2)
PROTHROMBIN TIME: 18.7 SECONDS (ref 11.6–14.8)

## 2024-05-09 PROCEDURE — 36415 COLL VENOUS BLD VENIPUNCTURE: CPT

## 2024-05-09 PROCEDURE — 85610 PROTHROMBIN TIME: CPT

## 2024-05-10 PROBLEM — M19.90 ARTHRITIS: Status: ACTIVE | Noted: 2019-12-12

## 2024-05-10 PROBLEM — M17.0 BILATERAL PRIMARY OSTEOARTHRITIS OF KNEE: Status: ACTIVE | Noted: 2020-10-05

## 2024-05-10 PROBLEM — M48.061 SPINAL STENOSIS OF LUMBAR REGION: Status: ACTIVE | Noted: 2020-03-31

## 2024-05-10 PROBLEM — Z98.890 PERSONAL HISTORY OF SURGERY TO HEART AND GREAT VESSELS, PRESENTING HAZARDS TO HEALTH: Status: ACTIVE | Noted: 2019-11-25

## 2024-05-10 PROBLEM — R13.10 DYSPHAGIA: Status: ACTIVE | Noted: 2019-12-12

## 2024-05-10 PROBLEM — R41.840 ATTENTION AND CONCENTRATION DEFICIT: Status: ACTIVE | Noted: 2019-12-12

## 2024-05-10 PROBLEM — G60.9 IDIOPATHIC PERIPHERAL NEUROPATHY: Status: ACTIVE | Noted: 2020-03-31

## 2024-05-10 PROBLEM — R20.2 NUMBNESS AND TINGLING OF BOTH LEGS: Status: ACTIVE | Noted: 2020-02-19

## 2024-05-10 PROBLEM — K35.32 ACUTE PERFORATED APPENDICITIS: Status: ACTIVE | Noted: 2019-11-23

## 2024-05-10 PROBLEM — F43.22 ADJUSTMENT REACTION WITH ANXIETY: Status: ACTIVE | Noted: 2019-12-17

## 2024-05-10 PROBLEM — K35.32 RUPTURED APPENDIX: Status: ACTIVE | Noted: 2019-11-23

## 2024-05-10 PROBLEM — Z96.641 HISTORY OF TOTAL HIP REPLACEMENT, RIGHT: Status: ACTIVE | Noted: 2019-12-12

## 2024-05-10 PROBLEM — S91.002A UNSPECIFIED OPEN WOUND, LEFT ANKLE, INITIAL ENCOUNTER: Status: ACTIVE | Noted: 2018-12-04

## 2024-05-10 PROBLEM — Z95.1 HX OF CABG: Status: ACTIVE | Noted: 2019-12-12

## 2024-05-10 PROBLEM — I87.312 CHRONIC VENOUS HYPERTENSION W ULCER OF L LOW EXTREM (CMD): Status: ACTIVE | Noted: 2020-05-11

## 2024-05-10 PROBLEM — M54.17 LUMBOSACRAL RADICULOPATHY AT L5: Status: ACTIVE | Noted: 2020-07-02

## 2024-05-10 PROBLEM — E66.9 OBESITY (BMI 30-39.9): Status: ACTIVE | Noted: 2017-06-22

## 2024-05-10 PROBLEM — Z96.642 HISTORY OF TOTAL HIP REPLACEMENT, LEFT: Status: ACTIVE | Noted: 2019-12-12

## 2024-05-10 PROBLEM — L97.929 CHRONIC VENOUS HYPERTENSION W ULCER OF L LOW EXTREM (CMD): Status: ACTIVE | Noted: 2020-05-11

## 2024-05-10 PROBLEM — G47.30 SLEEP APNEA: Status: ACTIVE | Noted: 2019-11-25

## 2024-05-10 PROBLEM — R20.0 NUMBNESS AND TINGLING OF BOTH LEGS: Status: ACTIVE | Noted: 2020-02-19

## 2024-05-11 ENCOUNTER — LAB ENCOUNTER (OUTPATIENT)
Dept: LAB | Facility: HOSPITAL | Age: 69
End: 2024-05-11
Attending: INTERNAL MEDICINE

## 2024-05-11 DIAGNOSIS — Z79.01 LONG TERM CURRENT USE OF ANTICOAGULANT: ICD-10-CM

## 2024-05-11 DIAGNOSIS — Z98.890 HISTORY OF MITRAL VALVE REPAIR: ICD-10-CM

## 2024-05-11 DIAGNOSIS — I48.20 CHRONIC ATRIAL FIBRILLATION (HCC): ICD-10-CM

## 2024-05-11 LAB
INR BLD: 1.85 (ref 0.8–1.2)
PROTHROMBIN TIME: 22.5 SECONDS (ref 11.6–14.8)

## 2024-05-11 PROCEDURE — 85610 PROTHROMBIN TIME: CPT

## 2024-05-11 PROCEDURE — 36415 COLL VENOUS BLD VENIPUNCTURE: CPT

## 2024-05-13 ENCOUNTER — TELEPHONE (OUTPATIENT)
Dept: PHYSICAL MEDICINE AND REHAB | Facility: CLINIC | Age: 69
End: 2024-05-13

## 2024-05-13 ENCOUNTER — LAB ENCOUNTER (OUTPATIENT)
Dept: LAB | Facility: HOSPITAL | Age: 69
End: 2024-05-13
Attending: INTERNAL MEDICINE

## 2024-05-13 DIAGNOSIS — Z98.890 HISTORY OF MITRAL VALVE REPAIR: ICD-10-CM

## 2024-05-13 DIAGNOSIS — M17.0 PRIMARY OSTEOARTHRITIS OF KNEES, BILATERAL: Primary | ICD-10-CM

## 2024-05-13 DIAGNOSIS — Z79.01 LONG TERM CURRENT USE OF ANTICOAGULANT: ICD-10-CM

## 2024-05-13 DIAGNOSIS — I48.20 CHRONIC ATRIAL FIBRILLATION (HCC): ICD-10-CM

## 2024-05-13 PROBLEM — S81.801A OPEN WOUND OF RIGHT LOWER EXTREMITY: Status: ACTIVE | Noted: 2021-09-20

## 2024-05-13 PROBLEM — Z86.73 OLD CEREBROVASCULAR ACCIDENT (CVA) WITHOUT LATE EFFECT: Status: ACTIVE | Noted: 2024-05-13

## 2024-05-13 PROBLEM — G47.33 OBSTRUCTIVE SLEEP APNEA SYNDROME: Status: ACTIVE | Noted: 2019-11-25

## 2024-05-13 PROBLEM — L89.522: Status: ACTIVE | Noted: 2021-09-20

## 2024-05-13 PROBLEM — M47.816 LUMBAR FACET ARTHROPATHY: Status: ACTIVE | Noted: 2023-10-17

## 2024-05-13 PROBLEM — E66.01 MORBID OBESITY  (CMD): Status: ACTIVE | Noted: 2024-05-13

## 2024-05-13 LAB
INR BLD: 2.28 (ref 0.8–1.2)
PROTHROMBIN TIME: 26.5 SECONDS (ref 11.6–14.8)

## 2024-05-13 PROCEDURE — 36415 COLL VENOUS BLD VENIPUNCTURE: CPT

## 2024-05-13 PROCEDURE — 85610 PROTHROMBIN TIME: CPT

## 2024-05-13 NOTE — TELEPHONE ENCOUNTER
Patient requesting cortisone injection for both knees.    Per  at LOV 2/7/24: \"Recommend L5-S1 interlaminar epidural steroid injection nephroscopy guidance under local anesthesia. Advised patient we will need approval to hold anticoagulation treatment as we have previously. Recommend that we perform bilateral knee gel injection once approved as well.\"    LOV: 2/7/24- televisit  NOV: 5/21/24  Last injections:   5/6/24- L5-S1 Interlaminar epidural steroid injection   2/27/24- BILATERAL knee aspiration and injection with Hyaluronic acid     No mention of cortisone injection at last office visit or subsequent messages.     Message forwarded on to  to advise on injection or see if patient needs to follow up in the office first.

## 2024-05-20 ENCOUNTER — TELEPHONE (OUTPATIENT)
Dept: PHYSICAL MEDICINE AND REHAB | Facility: CLINIC | Age: 69
End: 2024-05-20

## 2024-05-20 ENCOUNTER — LAB ENCOUNTER (OUTPATIENT)
Dept: LAB | Age: 69
End: 2024-05-20
Attending: INTERNAL MEDICINE

## 2024-05-20 DIAGNOSIS — Z79.01 LONG TERM CURRENT USE OF ANTICOAGULANT: ICD-10-CM

## 2024-05-20 DIAGNOSIS — Z98.890 HISTORY OF MITRAL VALVE REPAIR: ICD-10-CM

## 2024-05-20 DIAGNOSIS — I48.20 CHRONIC ATRIAL FIBRILLATION (HCC): ICD-10-CM

## 2024-05-20 LAB
INR BLD: 2.94 (ref 0.8–1.2)
PROTHROMBIN TIME: 32.4 SECONDS (ref 11.6–14.8)

## 2024-05-20 PROCEDURE — 36415 COLL VENOUS BLD VENIPUNCTURE: CPT

## 2024-05-20 PROCEDURE — 85610 PROTHROMBIN TIME: CPT

## 2024-05-20 NOTE — TELEPHONE ENCOUNTER
Per CMS Guidelines -no authorization is required for Bilateral knee aspiration and injection with corticosteroid CPT 85364-53,  x2     Status: Authorization is not required based on medical necessity however is not a guarantee of payment and may be subject to review once claim is submitted-Covered Benefit

## 2024-05-21 ENCOUNTER — TELEMEDICINE (OUTPATIENT)
Dept: PHYSICAL MEDICINE AND REHAB | Facility: CLINIC | Age: 69
End: 2024-05-21

## 2024-05-21 DIAGNOSIS — M48.061 SPINAL STENOSIS AT L4-L5 LEVEL: ICD-10-CM

## 2024-05-21 DIAGNOSIS — M47.816 LUMBAR FACET ARTHROPATHY: ICD-10-CM

## 2024-05-21 DIAGNOSIS — I63.9 CEREBROVASCULAR ACCIDENT (CVA), UNSPECIFIED MECHANISM (HCC): ICD-10-CM

## 2024-05-21 DIAGNOSIS — M17.0 PRIMARY OSTEOARTHRITIS OF KNEES, BILATERAL: Primary | ICD-10-CM

## 2024-05-21 DIAGNOSIS — M48.062 SPINAL STENOSIS OF LUMBAR REGION WITH NEUROGENIC CLAUDICATION: ICD-10-CM

## 2024-05-21 DIAGNOSIS — I10 BENIGN ESSENTIAL HTN: ICD-10-CM

## 2024-05-21 DIAGNOSIS — I48.20 CHRONIC ATRIAL FIBRILLATION (HCC): ICD-10-CM

## 2024-05-21 RX ORDER — GABAPENTIN 800 MG/1
800 TABLET ORAL 3 TIMES DAILY
Qty: 270 TABLET | Refills: 0 | Status: SHIPPED | OUTPATIENT
Start: 2024-05-21

## 2024-05-27 NOTE — PROGRESS NOTES
[facility]    Telemedicine Visit - Follow Up Evaluation    Telehealth Verbal Consent   I conducted a telehealth visit with Russ Kirkland today, 05/21/24, which was completed using two-way, real-time interactive audio and video communication. This has been done in good brianda to provide continuity of care in the best interest of the provider-patient relationship, due to the COVID - public health crisis/national emergency where restrictions of face-to-face office visits are ongoing. Every conscious effort was taken to allow for sufficient and adequate time to complete the visit.  The patient was made aware of the limitations of the telehealth visit, including treatment limitations as no physical exam could be performed.  The patient was advised to call 911 or to go to the ER in case there was an emergency.  The patient was also advised of the potential privacy & security concerns related to the telehealth platform.   The patient was made aware of where to find [facility]'s notice of privacy practices, telehealth consent form and other related consent forms and documents.  which are located on the [facility] website. The patient verbally agreed to telehealth consent form, related consents and the risks discussed.    Lastly, the patient confirmed that they were in [place].   Included in this visit, time may have been spent reviewing labs, medications, radiology tests and decision making. Appropriate medical decision-making and tests are ordered as detailed in the plan of care above.  Coding/billing information is submitted for this visit based on complexity of care and/or time spent for the visit.      HISTORY OF PRESENT ILLNESS:     Patient is following up after lumbar epidural steroid injection.  He states he has noted good improvement this is not as significant of improvement as he has had in the past but it still meaningful in terms of improving his function.  He is limited by  his knee pain however and is wondering if he could have an injection for the knees.  He is scheduled for this soon.  He continues gabapentin 800 mg 3 times daily which she is tolerating well.      IMAGING:   No new imaging    All imaging results were reviewed and discussed with patient.      ASSESSMENT:     1. Primary osteoarthritis of knees, bilateral    2. Lumbar facet arthropathy    3. Chronic atrial fibrillation (HCC)    4. Spinal stenosis of lumbar region with neurogenic claudication    5. Spinal stenosis at L4-L5 level    6. Cerebrovascular accident (CVA), unspecified mechanism (HCC)    7. Benign essential HTN          PLAN:   Russ Kirkland is a 68 year old male following up after lumbar epidural steroid injection.  Patient has noted improvement in the leg symptoms however still is limited by his functional status with the knees.  He will be following up for the knee procedure.  Recommend that he continue gabapentin 800 mg 3 times daily with a home exercise plan.  We discussed that eventually if his symptoms are ongoing he may want to consider neurosurgical intervention however patient is somewhat hesitant given his history of stroke with holding anticoagulation treatment in the past.      Follow-up:   for knee injection     We discussed that a telemedicine visit is in place of an office visit; however, this limits the ability to perform a thorough physical examination which may affect objective findings related to a specific condition and can affect treatment.    The patient verbalized understanding with this plan and was in agreement.  There are no barriers to learning.  All questions were answered.  Please note Dragon dictation software was used to dictate this note which may result in inadvertent typos.    Yessica Cain D.O. FAAPMR & CAQSM  Physical Medicine and Rehabilitation/Sports Medicine    PAST MEDICAL HISTORY:     Past Medical History:    Acute perforated appendicitis    Acute, but ill-defined,  cerebrovascular disease    Arrhythmia    afib    Atrial fibrillation (HCC)    ablation (2004); CV (May 2009)    Cataract    Deep vein thrombosis (HCC)    can not remember which leg    Hearing impairment    bilateral hearing aids    High blood pressure    High cholesterol    History of blood transfusion    no reaction    History of vein stripping    Osteoarthrosis, unspecified whether generalized or localized, unspecified site    Sleep apnea    CPAP device    Stroke (HCC)    2019    UGI bleed    Unspecified essential hypertension    Venous insufficiency    RT greater saphenous vein ablation 2007         PAST SURGICAL HISTORY:     Past Surgical History:   Procedure Laterality Date    Appendectomy  12/04/2019    Cardiac pacemaker placement  11/30/2022    Cataract  11/02/2022    right eye    Colonoscopy  10/2011    Colonoscopy N/A 11/16/2016    Procedure: COLONOSCOPY;  Surgeon: Edis Roberts MD;  Location: [facility]    Colonoscopy  03/01/2022    Diverticulosis, Hemorrhoids          Repeat 2027    Colonoscopy N/A 03/01/2022    Procedure: COLONOSCOPY,;  Surgeon: Nicola Juarez MD;  [facility]    Hernia surgery      Hip replacement surgery Left 05/2008    Hip replacement surgery Right 06/2017    Moles Left 2011    removed from L upper quadrant. Biopsy (pre-cancerous? -- Patient unsure if cancerous)    Total hip replacement Bilateral     Valve repair  2002    mitral valve repair         CURRENT MEDICATIONS:     Current Outpatient Medications   Medication Sig Dispense Refill    gabapentin 800 MG Oral Tab Take 1 tablet (800 mg total) by mouth 3 (three) times daily. 270 tablet 0    celecoxib 200 MG Oral Cap Take 1 capsule (200 mg total) by mouth daily as needed. 90 capsule 3    METOPROLOL SUCCINATE OR Take by mouth. Daily (per )      enoxaparin (LOVENOX) 120 MG/0.8ML Injection Solution Prefilled Syringe Inject 1 mg/kg into the skin 2 (two) times daily.      WARFARIN 2.5 MG Oral Tab TAKE 1  TABLET BY MOUTH DAILY 90 tablet 3    LOSARTAN POTASSIUM-HCTZ 100-12.5 MG Oral Tab TAKE 1 TABLET BY MOUTH DAILY (Patient taking differently: Take 1 tablet by mouth at bedtime.) 90 tablet 3    SIMVASTATIN 20 MG Oral Tab TAKE 1 TABLET BY MOUTH NIGHTLY 90 tablet 3    tramadol-acetaminophen 37.5-325 MG Oral Tab Take 2 tablets by mouth 2 (two) times daily as needed for Pain. 360 tablet 1    Sildenafil Citrate 100 MG Oral Tab Take 1 tablet (100 mg total) by mouth daily as needed for Erectile Dysfunction. 10 tablet 11    Glucosamine-Chondroit-Vit C-Mn (GLUCOSAMINE-CHONDROITIN) Oral Cap Take 1 tablet by mouth daily.      omega-3 fatty acids (FISH OIL) 1000 MG Oral Cap Take 1,000 mg by mouth daily.           ALLERGIES:     Allergies   Allergen Reactions    Adhesive Tape RASH     Can use paper tape.         FAMILY HISTORY:     Family History   Problem Relation Age of Onset    Colon Cancer Father     Arthritis Other     Hypertension Other           SOCIAL HISTORY:     Social History     Socioeconomic History    Marital status:    Tobacco Use    Smoking status: Never    Smokeless tobacco: Never   Vaping Use    Vaping status: Never Used   Substance and Sexual Activity    Alcohol use: Yes     Alcohol/week: 15.0 standard drinks of alcohol     Types: 15 Glasses of wine per week     Comment: 2 glasses of wine/day    Drug use: No   Other Topics Concern    Caffeine Concern No    Pt has a pacemaker No    Pt has a defibrillator No    Reaction to local anesthetic No   Social History Narrative    The patient does not use an assistive device..      The patient does live in a home with stairs.     Social Determinants of Health      [facility]    Social Connections    [facility]    Housing Stability          REVIEW OF SYSTEMS:   As noted in HPI      PHYSICAL EXAM:   General: No immediate distress  Head:  Normocephalic/ Atraumatic  Eyes: Extra-occular movements intact  Ears/Nose/Throat:  External appearance identifies normal appearance without obvious deformity  Cardiovascular: No cyanosis, clubbing or edema  Respiratory: Non-labored respirations  Skin: No lesions noted   Neurological: alert & oriented x 3, attentive, able to follow commands, comprehention intact, spontaneous speech intact  Psychiatric: Mood and affect appropriate  Musculoskeletal Exam:  No change since last exam        LABS:     Lab Results   Component Value Date    A1C 5.6 04/16/2018     Lab Results   Component Value Date    WBC 4.8 04/08/2024    RBC 4.41 04/08/2024    HGB 13.9 04/08/2024    HCT 41.0 04/08/2024    MCV 93.0 04/08/2024    MCH 31.5 04/08/2024    MCHC 33.9 04/08/2024    RDW 14.2 04/08/2024    .0 04/08/2024    MPV 8.1 06/29/2018     Lab Results   Component Value Date    GLU 99 04/08/2024    BUN 21 04/08/2024    BUNCREA 24.7 (H) 04/08/2024    CREATSERUM 0.85 04/08/2024    ANIONGAP 4 04/08/2024    GFRNAA 87 04/11/2022    GFRAA 100 04/11/2022    CA 9.6 04/08/2024    OSMOCALC 295 04/08/2024    ALKPHO 76 04/08/2024    AST 17 04/08/2024    ALT 18 04/08/2024    BILT 0.7 04/08/2024    TP 7.0 04/08/2024    ALB 4.4 04/08/2024    GLOBULIN 2.6 (L) 04/08/2024     04/08/2024    K 4.2 04/08/2024     04/08/2024    CO2 28.0 04/08/2024     Lab Results   Component Value Date    PTP 32.4 (H) 05/20/2024    PT 18.4 (H) 02/03/2016    INR 2.94 (H) 05/20/2024     Lab Results   Component Value Date    VITD 18.1 (L) 04/08/2024

## 2024-05-29 NOTE — TELEPHONE ENCOUNTER
Per 4/10 office visit note:   \"Hypertension  On amlodipine 10mg/d and losartan/hct 100/12.5mg/d\"    Amlodipine was discontinued by surgical nurse on 5/6/24. Reason unclear.     To Dr. De Souza to please advise--

## 2024-05-31 ENCOUNTER — LAB ENCOUNTER (OUTPATIENT)
Dept: LAB | Facility: HOSPITAL | Age: 69
End: 2024-05-31
Attending: INTERNAL MEDICINE
Payer: MEDICARE

## 2024-05-31 DIAGNOSIS — Z98.890 HISTORY OF MITRAL VALVE REPAIR: ICD-10-CM

## 2024-05-31 DIAGNOSIS — I48.20 CHRONIC ATRIAL FIBRILLATION (HCC): ICD-10-CM

## 2024-05-31 DIAGNOSIS — Z79.01 LONG TERM CURRENT USE OF ANTICOAGULANT: ICD-10-CM

## 2024-05-31 LAB
INR BLD: 2.94 (ref 0.8–1.2)
PROTHROMBIN TIME: 32.4 SECONDS (ref 11.6–14.8)

## 2024-05-31 PROCEDURE — 85610 PROTHROMBIN TIME: CPT

## 2024-05-31 PROCEDURE — 36415 COLL VENOUS BLD VENIPUNCTURE: CPT

## 2024-06-06 ENCOUNTER — HOSPITAL ENCOUNTER (OUTPATIENT)
Dept: CT IMAGING | Facility: HOSPITAL | Age: 69
Discharge: HOME | End: 2024-06-06
Attending: INTERNAL MEDICINE
Payer: MEDICARE

## 2024-06-06 VITALS — BODY MASS INDEX: 31.81 KG/M2 | WEIGHT: 240 LBS | HEIGHT: 73 IN

## 2024-06-06 DIAGNOSIS — R00.1 BRADYCARDIA: ICD-10-CM

## 2024-06-06 DIAGNOSIS — I48.20 CHRONIC ATRIAL FIBRILLATION (HCC): ICD-10-CM

## 2024-06-06 DIAGNOSIS — I34.0 MITRAL REGURGITATION: ICD-10-CM

## 2024-06-06 LAB
CREAT BLD-MCNC: 0.9 MG/DL
EGFRCR SERPLBLD CKD-EPI 2021: 92 ML/MIN/1.73M2 (ref 60–?)

## 2024-06-06 PROCEDURE — 75574 CT ANGIO HRT W/3D IMAGE: CPT | Performed by: INTERNAL MEDICINE

## 2024-06-06 PROCEDURE — 82565 ASSAY OF CREATININE: CPT

## 2024-06-06 PROCEDURE — 75580 N-INVAS EST C FFR SW ALY CTA: CPT | Performed by: INTERNAL MEDICINE

## 2024-06-06 RX ORDER — IOHEXOL 350 MG/ML
80 INJECTION, SOLUTION INTRAVENOUS
Status: COMPLETED | OUTPATIENT
Start: 2024-06-06 | End: 2024-06-06

## 2024-06-06 RX ORDER — METOPROLOL TARTRATE 1 MG/ML
5 INJECTION, SOLUTION INTRAVENOUS SEE ADMIN INSTRUCTIONS
Status: DISCONTINUED | OUTPATIENT
Start: 2024-06-06 | End: 2024-06-08

## 2024-06-06 RX ORDER — DILTIAZEM HYDROCHLORIDE 5 MG/ML
5 INJECTION INTRAVENOUS SEE ADMIN INSTRUCTIONS
Status: DISCONTINUED | OUTPATIENT
Start: 2024-06-06 | End: 2024-06-08

## 2024-06-06 RX ORDER — NITROGLYCERIN 0.4 MG/1
0.4 TABLET SUBLINGUAL ONCE
Status: COMPLETED | OUTPATIENT
Start: 2024-06-06 | End: 2024-06-06

## 2024-06-06 RX ADMIN — IOHEXOL 70 ML: 350 INJECTION, SOLUTION INTRAVENOUS at 10:37:00

## 2024-06-06 RX ADMIN — NITROGLYCERIN 0.4 MG: 0.4 TABLET SUBLINGUAL at 10:34:00

## 2024-06-06 NOTE — IMAGING NOTE
TO RAD HOLDING AT 0954      HX TAKEN: abnormal stress per pt    PT CONSENTED AT 0958     BASELINE VITAL SIGNS: HR 72  /87 BMI 31.7    CTA ORDERED BY DR WAITE WAS PT GIVEN CTA PREMEDS: NO    HR SETTINGS LOWERED TO 55 WITH KENDRA FROM [employer] USING REMOTE EQUIPMENT.    18 GAUGE IV STARTED AT 1013 POC TESTING COMPLETED GFR = 92   CREATINE = 0.9    TO CT TABLE @ 1031    CONNECT TO MONITOR:  HR reading 31 on ct monitor but pt is paced at 55. Different leads attempted. /63      NITROGLYCERIN 0.4 MILLIGRAMS SUBLINGUAL GIVEN AT 1034     INJECTION STARTED AT 1041 HR 31 DURING SCAN PROCEDURE COMPLETE    POST SCAN HR reads 31 /64 AT 1044    PT TO HOLDING AREA: Maite from [employer] remotely changed pt back to original settings    HR 70 /73     AVS  PROVIDED      1111 DISCHARGED HOME

## 2024-06-10 ENCOUNTER — OFFICE VISIT (OUTPATIENT)
Dept: PHYSICAL MEDICINE AND REHAB | Facility: CLINIC | Age: 69
End: 2024-06-10
Payer: MEDICARE

## 2024-06-10 DIAGNOSIS — M17.0 PRIMARY OSTEOARTHRITIS OF KNEES, BILATERAL: Primary | ICD-10-CM

## 2024-06-10 RX ORDER — TRIAMCINOLONE ACETONIDE 40 MG/ML
80 INJECTION, SUSPENSION INTRA-ARTICULAR; INTRAMUSCULAR ONCE
Status: COMPLETED | OUTPATIENT
Start: 2024-06-10 | End: 2024-06-10

## 2024-06-10 RX ORDER — LIDOCAINE HYDROCHLORIDE 10 MG/ML
14 INJECTION, SOLUTION INFILTRATION; PERINEURAL ONCE
Status: COMPLETED | OUTPATIENT
Start: 2024-06-10 | End: 2024-06-10

## 2024-06-10 NOTE — PATIENT INSTRUCTIONS
Steroid Injection Information  What to expect: The injection contains Lidocaine (which numbs the area) and Kenalog (a steroid which decreases inflammation).  You may have pain relief within hours of the injection due to the Lidocaine.  The Kenalog can take a couple days, up to a couple weeks, to reach the full effect.  It is also possible to have a slight increase in symptoms over the first few days, but that should resolve fairly quickly.    How long will the injection last?: The length of response to an injection is variable.  Literally a couple weeks to a couple years.  The injection will decrease the inflammation and the pain will return if/when the inflammation returns.    Activity Recommendations: For the first 24 hours after injection, keep the area clean and dry.  It is ok to shower but don't soak in a tub during that time.  No vigorous activity such as running or heavy lifting for the first week but other than that you can gradually resume your normal activities immediately.  If you have a significant decrease in pain, be careful not to do too much too soon.  Again, the key is GRADUAL resumption of activites.    Things to look out for: Common injection side effects include soreness at the injection site, bruising, flushing of the face or skin, and a temporary increase in your blood sugars and/or blood pressure.  Infection is very rare but please notify my office ([facility] [phone]) if you develop any fevers, drainage from the injection site, or severe increase in pain.  If it is the weekend, go to an Emergency Room.     Yessica Cain, DO  Physiatry   [phone]

## 2024-06-10 NOTE — PROCEDURES
Procedure:  Bilateral knee aspiration and injection with corticosteroid  The risks, benefits and anticipated outcomes of the procedure, the risks and benefits of the alternatives to the procedure, and the roles and tasks of the personnel to be involved, were discussed with the patient, and the patient consents to the procedure and agrees to proceed.  UNIVERSAL PROTOCOL / SAFETY CHECKLIST  Sign in Communication: Completed  Time Out:  Team Confirms the Correct Patient, Correct Procedure, Correct Site and Site Marking, Correct Position (if applicable), Prep and Dry Time (if applicable).      The procedure was carried out under sterile prep with  with sterile gel.  A 27 ga 1&1/4in needle was introduced and advanced for skin anesthesia with 3 cc of 1% lidocaine.  Then, a 18 gauge 1.5 in needle was advanced and 24 cc of synovial fluid was aspirated from the right knee and 14 cc of synovial fluid was aspirated from the left knee..  Following aspiration, a mixture of 4 cc of lidocaine and 1 cc of Kenalog (40mg/ml) was injected.   The patient tolerated the procedure without complication and was instructed in post-procedure precautions.  See patient instructions.    Yessica Cain DO, FAAPMR & CAQSM  Physical Medicine and Rehabilitation/Sports Medicine  [facility]

## 2024-06-20 RX ORDER — CELECOXIB 200 MG/1
200 CAPSULE ORAL DAILY PRN
Qty: 90 CAPSULE | Refills: 3 | OUTPATIENT
Start: 2024-06-20

## 2024-06-20 NOTE — TELEPHONE ENCOUNTER
1 year was sent in April     Current refill request refused due to refill is either a duplicate request or has active refills at the pharmacy.  Check previous templates.    Requested Prescriptions     Refused Prescriptions Disp Refills    CELECOXIB 200 MG Oral Cap [Pharmacy Med Name: CELECOXIB 200MG CAPSULES] 90 capsule 3     Sig: TAKE 1 CAPSULE BY MOUTH DAILY AS NEEDED     Refused By: ALEX ANAYA     Reason for Refusal: Request already responded to by other means (e.g. phone or fax)

## 2024-06-24 ENCOUNTER — OFFICE VISIT (OUTPATIENT)
Dept: WOUND CARE | Facility: HOSPITAL | Age: 69
End: 2024-06-24
Attending: FAMILY MEDICINE

## 2024-06-24 VITALS
RESPIRATION RATE: 18 BRPM | BODY MASS INDEX: 31.81 KG/M2 | TEMPERATURE: 99 F | OXYGEN SATURATION: 94 % | DIASTOLIC BLOOD PRESSURE: 81 MMHG | HEIGHT: 73 IN | SYSTOLIC BLOOD PRESSURE: 124 MMHG | WEIGHT: 240 LBS | HEART RATE: 71 BPM

## 2024-06-24 DIAGNOSIS — S51.002A OPEN WOUND OF LEFT ELBOW, INITIAL ENCOUNTER: Primary | ICD-10-CM

## 2024-06-24 DIAGNOSIS — T78.40XA ALLERGIC RASH PRESENT ON EXAMINATION: ICD-10-CM

## 2024-06-24 PROCEDURE — 99215 OFFICE O/P EST HI 40 MIN: CPT | Performed by: FAMILY MEDICINE

## 2024-06-24 RX ORDER — AMOXICILLIN AND CLAVULANATE POTASSIUM 875; 125 MG/1; MG/1
1 TABLET, FILM COATED ORAL 2 TIMES DAILY
Qty: 14 TABLET | Refills: 0 | Status: SHIPPED | OUTPATIENT
Start: 2024-06-24 | End: 2024-07-01

## 2024-06-24 RX ORDER — PREDNISONE 20 MG/1
20 TABLET ORAL 2 TIMES DAILY
Qty: 10 TABLET | Refills: 0 | Status: SHIPPED | OUTPATIENT
Start: 2024-06-24 | End: 2024-06-29

## 2024-06-24 RX ORDER — ASPIRIN 81 MG/1
81 TABLET ORAL DAILY
COMMUNITY

## 2024-06-24 NOTE — PROGRESS NOTES
Chief Complaint   Patient presents with    Wound Care     Left elbow        HPI:     68-year-old new patient here for evaluation of a wound on the left elbow with rash which happened about 10 days ago.  He was doing some work in his basement in the laundry room and it was a concrete floor and he was trying to move on the floor with his elbows down on the concrete and he developed a couple scrapes on the right forearm which healed however on the left forearm he developed open wound and broke out in a rash.  He has been putting Neosporin ointment to the wound.  He is able to bend and extend the elbow without difficulty.  Denies any fever or chills or any chest pain or shortness of breath.    Lab Results   Component Value Date    BUN 21 04/08/2024    CREATSERUM 0.85 04/08/2024    ALB 4.4 04/08/2024    TP 7.0 04/08/2024    A1C 5.6 04/16/2018         Current Outpatient Medications:     aspirin 81 MG Oral Tab EC, Take 1 tablet (81 mg total) by mouth daily., Disp: , Rfl:     amoxicillin clavulanate 875-125 MG Oral Tab, Take 1 tablet by mouth 2 (two) times daily for 7 days. TAKE WITH FOOD, Disp: 14 tablet, Rfl: 0    predniSONE 20 MG Oral Tab, Take 1 tablet (20 mg total) by mouth 2 (two) times daily for 5 days., Disp: 10 tablet, Rfl: 0    gabapentin 800 MG Oral Tab, Take 1 tablet (800 mg total) by mouth 3 (three) times daily., Disp: 270 tablet, Rfl: 0    celecoxib 200 MG Oral Cap, Take 1 capsule (200 mg total) by mouth daily as needed., Disp: 90 capsule, Rfl: 3    METOPROLOL SUCCINATE OR, Take by mouth. Daily (per ), Disp: , Rfl:     enoxaparin (LOVENOX) 120 MG/0.8ML Injection Solution Prefilled Syringe, Inject 1 mg/kg into the skin 2 (two) times daily., Disp: , Rfl:     WARFARIN 2.5 MG Oral Tab, TAKE 1 TABLET BY MOUTH DAILY, Disp: 90 tablet, Rfl: 3    LOSARTAN POTASSIUM-HCTZ 100-12.5 MG Oral Tab, TAKE 1 TABLET BY MOUTH DAILY (Patient taking differently: Take 1 tablet by mouth at bedtime.), Disp: 90 tablet, Rfl: 3     SIMVASTATIN 20 MG Oral Tab, TAKE 1 TABLET BY MOUTH NIGHTLY, Disp: 90 tablet, Rfl: 3    tramadol-acetaminophen 37.5-325 MG Oral Tab, Take 2 tablets by mouth 2 (two) times daily as needed for Pain., Disp: 360 tablet, Rfl: 1    Sildenafil Citrate 100 MG Oral Tab, Take 1 tablet (100 mg total) by mouth daily as needed for Erectile Dysfunction., Disp: 10 tablet, Rfl: 11    Glucosamine-Chondroit-Vit C-Mn (GLUCOSAMINE-CHONDROITIN) Oral Cap, Take 1 tablet by mouth daily., Disp: , Rfl:     omega-3 fatty acids (FISH OIL) 1000 MG Oral Cap, Take 1,000 mg by mouth daily., Disp: , Rfl:     Allergies   Allergen Reactions    Adhesive Tape RASH     Can use paper tape.            REVIEW OF SYSTEMS:     Review of Systems (ROS)  This information was obtained from the patient, chart    A comprehensive 10 point review of systems was completed.  Pertinent positives and negatives noted in the the HPI.     Past medical, surgical, family and social history updated where appropriate.    PHYSICAL EXAM:   /81   Pulse 71   Temp 98.6 °F (37 °C) (Oral)   Resp 18   Ht 73\"   Wt 240 lb   SpO2 94%   BMI 31.66 kg/m²    Estimated body mass index is 31.66 kg/m² as calculated from the following:    Height as of this encounter: 73\".    Weight as of this encounter: 240 lb.   Vital signs reviewed.Appears stated age, well groomed.      Awake, alert, in no acute distress  HENT:  normocephalic, atraumatic, external ear canals clear bilaterally, tympanic membranes appear opaque, non-bulging, non-erythematous, nasal turbinates are non-inflamed and not swollen, oropharynx without erythema, swelling, or exudate, gingiva and lips without any apparent lesions.   Cardiac:  S1 S2 regular rate and rhythm, no murmur, gallop, or rub  Respiratory:  Bilaterally clear to auscultation, no chest tenderness to palpation, no wheezing, no rhonchi, no rales, air entry is adequate, no accessory respiratory muscle use, no chest asymmetry, normal excursion.  GI:  bowel  sound positive in all four quadrants, abdomen is soft, non-tender, non-distended, no hepatosplenomegaly, no abnormal aortic pulsation.   He does have a macular vesicular blistering rash noted more so on the extensor elbow and a little bit on the flexor elbow.  There is a superficial open wound noted on the extensor elbow over the olecranon process.  There is no effusion noted over the olecranon bursa.    Calf                      Ankle                            Foot                            Wound 06/24/24 1 Elbow Left (Active)   Date First Assessed: 06/24/24    Wound Number (Wound Clinic Only): 1  Location: Elbow  Wound Location Orientation: Left      Assessments 6/24/2024  1:17 PM   Wound Image       Site Assessment Moist;Red   Closure Not approximated   Drainage Amount Moderate   Drainage Description Clear   Dressing Changed New   Dressing Status Clean;Dry;Intact   Wound Length (cm) 2 cm   Wound Width (cm) 1.2 cm   Wound Surface Area (cm^2) 2.4 cm^2   Wound Depth (cm) 0.1 cm   Wound Volume (cm^3) 0.24 cm^3   Margins Attached edges   Non-staged Wound Description Full thickness   Mary-wound Assessment Non-blanchable erythema;Moist   Wound Granulation Tissue Red   Wound Bed Granulation (%) 100 %   Wound Bed Epithelium (%) 0 %   Wound Bed Slough (%) 0 %   Wound Bed Eschar (%) 0 %   Wound Bed Fibrin (%) 0 %   State of Healing Non-healing   Wound Odor None       No associated orders.          ASSESSMENT AND PLAN:      1. Open wound of left elbow, initial encounter  - Anaerobic Culture; Future  - Aerobic Bacterial Culture; Future    I had a long discussion with patient regarding the nature of the injury and that it could be representing an allergic reaction of some sort either to bandages he was using or Neosporin or something that was on the concrete.  A wound culture was done today.  I would recommend betamethasone cream to the areas of rash every other day and will use silver alginate over the rash as it is weeping  and there is a small open wound on the olecranon area.  Gauze roll applied.  Also start prednisone 20 mg twice a day for 5 days, Augmentin 875 mg twice a day for 7 days.  Risk and benefit of meds discussed.  Take with food.    I would like him to do a close follow-up with me in 3 to 4 days.  If his wound or rash worsens he should seek medical attention immediately through the ER.      Risks, benefits, and alternatives of current treatment plan discussed in detail.  Questions and concerns addressed. Red flags to RTC or ED reviewed.  Patient (or parent) agrees to plan.      No follow-ups on file.    Also refer to patient instructions.     I spent 40 minutes with the patient. This time included:  preparing to see the patient (eg, review notes and recent diagnostics), seeing the patient, performing a medically appropriate examination and/or evaluation, counseling and educating the patient, and documenting in the record.    I agree with staff documentation except for any changes made in my note.       Valentin Correa M.D., Wound Care Physician  [facility]  6/24/2024

## 2024-06-24 NOTE — PATIENT INSTRUCTIONS
PATIENT INSTRUCTIONS      FOR Russ JOSH Borrego 1955    DATE OF SERVICE: 2024    On Wednesday, 2024 do the following:    Apply betamethasone cream to the area of rash  Apply silver alginate dressing  Apply ABD pad  Cover with gauze roll and secure with tape    Change the dressing more often if drainage goes through the gauze roll.    Start taking Augmentin 875 mg twice a day before meals    Start taking prednisone 20 mg twice a day after meals    If the wound or rash worsens seek medical attention through the ER.    Follow up with Dr. Correa

## 2024-06-24 NOTE — PROGRESS NOTES
Weekly Wound Education Note    Teaching Provided To: Patient  Training Topics: Cleasing and general instructions;Dressing;Nutrition;Signs and symptoms of infection;Skin care  Training Method: Explain/Verbal;Demonstration  Training Response: Patient responds and understands        Notes: Left Elbow- betamethasone to rash, silver alginate to wound bed, ABD pad, Kerlix roll, Tape

## 2024-06-26 ENCOUNTER — TELEPHONE (OUTPATIENT)
Dept: WOUND CARE | Facility: HOSPITAL | Age: 69
End: 2024-06-26

## 2024-06-28 ENCOUNTER — OFFICE VISIT (OUTPATIENT)
Dept: WOUND CARE | Facility: HOSPITAL | Age: 69
End: 2024-06-28
Attending: FAMILY MEDICINE

## 2024-06-28 VITALS
OXYGEN SATURATION: 97 % | DIASTOLIC BLOOD PRESSURE: 93 MMHG | SYSTOLIC BLOOD PRESSURE: 144 MMHG | HEART RATE: 80 BPM | TEMPERATURE: 97 F | RESPIRATION RATE: 17 BRPM

## 2024-06-28 DIAGNOSIS — S51.002D OPEN WOUND OF LEFT ELBOW, SUBSEQUENT ENCOUNTER: Primary | ICD-10-CM

## 2024-06-28 DIAGNOSIS — T78.40XA ALLERGIC RASH PRESENT ON EXAMINATION: ICD-10-CM

## 2024-06-28 DIAGNOSIS — I87.312 CHRONIC VENOUS HYPERTENSION (IDIOPATHIC) WITH ULCER OF LEFT LOWER EXTREMITY (HCC): ICD-10-CM

## 2024-06-28 DIAGNOSIS — L97.929 CHRONIC VENOUS HYPERTENSION (IDIOPATHIC) WITH ULCER OF LEFT LOWER EXTREMITY (HCC): ICD-10-CM

## 2024-06-28 PROCEDURE — 99215 OFFICE O/P EST HI 40 MIN: CPT | Performed by: FAMILY MEDICINE

## 2024-06-28 PROCEDURE — 17250 CHEM CAUT OF GRANLTJ TISSUE: CPT | Performed by: FAMILY MEDICINE

## 2024-06-28 NOTE — PATIENT INSTRUCTIONS
PATIENT INSTRUCTIONS      FOR Russ Kirkland ,  1955    DATE OF SERVICE: 2024    Hydrofera Blue to the left foot wound  Comprilan compression wrap to left lower extremity    DO NOT GET WRAP WET        Nurse visit on  and   Follow up with Dr. Correa on         Managing your edema:    Avoid prolonged standing in one place. It is better to have your calf muscles moving to pump fluid out of the legs.  Elevate leg(s) above the level of the heart when sitting or as much as possible.  Take you diuretics as directed by your physician. Do not skip doses or change doses unless instructed to do so by your physician.  Decrease salt intake, follow recommended 2 grams daily.  Do not get leg(s) with compression wrap wet. If wraps are too tight as indicated by pain, numbness/tingling or discoloration of toes remove wrap completely and call the wound center @ [phone]       The treatment plan has been discussed at length between you and your provider. Follow all instructions carefully, it is very important. If you do not follow all instructions you are at risk of your wound not healing, infection, possible loss of limb and even loss of life.    COMPRESSION WRAP PATIENT CARE INSTRUCTIONS  DO NOT get compression wrap wet. When showering, use a shower boot.   Please contact wound clinic and if not able to reach, then go to emergency room if ANY of the following occur:   Tingling or numbness in the foot or toes on leg with compression wrap.  Severe pain that cannot be relieved with elevation and any medication instructed per your doctor.  A fever of 100.4°F (38°C) or higher.  Swelling, coldness, or blue-gray discoloration of the toes.  If the compression wrap feels too tight or too loose.  If the compression wrap is damaged or has rough edges that hurt.  If the compression wrap gets wet, you must cut wrap off.   Drainage from compression wrap that smells different than usual.

## 2024-06-28 NOTE — PROGRESS NOTES
Chief Complaint   Patient presents with    Wound Recheck     Lt elbow       HPI:     Patient here for follow-up of left elbow wound.  He is doing very well with that and it has healed completely.  He has developed a new wound on the outer aspect of his left foot.  He is not sure how it came about.  He does have a history of chronic venous insufficiency.    Lab Results   Component Value Date    BUN 21 04/08/2024    CREATSERUM 0.85 04/08/2024    ALB 4.4 04/08/2024    TP 7.0 04/08/2024    A1C 5.6 04/16/2018         Current Outpatient Medications:     aspirin 81 MG Oral Tab EC, Take 1 tablet (81 mg total) by mouth daily., Disp: , Rfl:     amoxicillin clavulanate 875-125 MG Oral Tab, Take 1 tablet by mouth 2 (two) times daily for 7 days. TAKE WITH FOOD, Disp: 14 tablet, Rfl: 0    predniSONE 20 MG Oral Tab, Take 1 tablet (20 mg total) by mouth 2 (two) times daily for 5 days., Disp: 10 tablet, Rfl: 0    betamethasone 0.1 % External Cream, Apply to area of rash with dressing change, Disp: 15 g, Rfl: 0    gabapentin 800 MG Oral Tab, Take 1 tablet (800 mg total) by mouth 3 (three) times daily., Disp: 270 tablet, Rfl: 0    celecoxib 200 MG Oral Cap, Take 1 capsule (200 mg total) by mouth daily as needed., Disp: 90 capsule, Rfl: 3    METOPROLOL SUCCINATE OR, Take by mouth. Daily (per ), Disp: , Rfl:     enoxaparin (LOVENOX) 120 MG/0.8ML Injection Solution Prefilled Syringe, Inject 1 mg/kg into the skin 2 (two) times daily., Disp: , Rfl:     WARFARIN 2.5 MG Oral Tab, TAKE 1 TABLET BY MOUTH DAILY, Disp: 90 tablet, Rfl: 3    LOSARTAN POTASSIUM-HCTZ 100-12.5 MG Oral Tab, TAKE 1 TABLET BY MOUTH DAILY (Patient taking differently: Take 1 tablet by mouth at bedtime.), Disp: 90 tablet, Rfl: 3    SIMVASTATIN 20 MG Oral Tab, TAKE 1 TABLET BY MOUTH NIGHTLY, Disp: 90 tablet, Rfl: 3    tramadol-acetaminophen 37.5-325 MG Oral Tab, Take 2 tablets by mouth 2 (two) times daily as needed for Pain., Disp: 360 tablet, Rfl: 1    Sildenafil  Citrate 100 MG Oral Tab, Take 1 tablet (100 mg total) by mouth daily as needed for Erectile Dysfunction., Disp: 10 tablet, Rfl: 11    Glucosamine-Chondroit-Vit C-Mn (GLUCOSAMINE-CHONDROITIN) Oral Cap, Take 1 tablet by mouth daily., Disp: , Rfl:     omega-3 fatty acids (FISH OIL) 1000 MG Oral Cap, Take 1,000 mg by mouth daily., Disp: , Rfl:     Allergies   Allergen Reactions    Adhesive Tape RASH     Can use paper tape.            REVIEW OF SYSTEMS:     Review of Systems (ROS)  This information was obtained from the patient, chart    A comprehensive 10 point review of systems was completed.  Pertinent positives and negatives noted in the the HPI.     Past medical, surgical, family and social history updated where appropriate.    PHYSICAL EXAM:   There were no vitals taken for this visit.   Estimated body mass index is 31.66 kg/m² as calculated from the following:    Height as of 6/24/24: 73\".    Weight as of 6/24/24: 240 lb.   Vital signs reviewed.Appears stated age, well groomed.      Awake, alert, in no acute distress  HENT:  normocephalic, atraumatic, external ear canals clear bilaterally, tympanic membranes appear opaque, non-bulging, non-erythematous, nasal turbinates are non-inflamed and not swollen, oropharynx without erythema, swelling, or exudate, gingiva and lips without any apparent lesions.   Cardiac:  S1 S2 regular rate and rhythm, no murmur, gallop, or rub  Respiratory:  Bilaterally clear to auscultation, no chest tenderness to palpation, no wheezing, no rhonchi, no rales, air entry is adequate, no accessory respiratory muscle use, no chest asymmetry, normal excursion.  GI:  bowel sound positive in all four quadrants, abdomen is soft, non-tender, non-distended, no hepatosplenomegaly, no abnormal aortic pulsation.     Calf                      Ankle                            Foot                            Wound 06/24/24 1 Elbow Left (Active)   Date First Assessed: 06/24/24    Wound Number (Wound Clinic  Only): 1  Location: Elbow  Wound Location Orientation: Left      Assessments 6/24/2024  1:17 PM   Wound Image       Site Assessment Moist;Red   Closure Not approximated   Drainage Amount Moderate   Drainage Description Clear   Treatments Cleansed;Site Care   Dressing Kerlix roll;Tape   Dressing Changed New   Dressing Status Clean;Dry;Intact   Wound Length (cm) 2 cm   Wound Width (cm) 1.2 cm   Wound Surface Area (cm^2) 2.4 cm^2   Wound Depth (cm) 0.1 cm   Wound Volume (cm^3) 0.24 cm^3   Margins Attached edges   Non-staged Wound Description Full thickness   Mary-wound Assessment Non-blanchable erythema;Moist   Wound Granulation Tissue Red   Wound Bed Granulation (%) 100 %   Wound Bed Epithelium (%) 0 %   Wound Bed Slough (%) 0 %   Wound Bed Eschar (%) 0 %   Wound Bed Fibrin (%) 0 %   State of Healing Non-healing   Wound Odor None       No associated orders.          ASSESSMENT AND PLAN:      1. Open wound of left elbow, subsequent encounter    2. Allergic rash present on examination    The elbow wound appears to be completely healed on the left side the rashes have resolved there is some dry skin and he can use Aquaphor.  No further dressings needed.  The wound is actually healed on the left olecranon area.  He does have a new wound on the left outer foot and this was debrided with curette it seems to be well granulated superficial silver nitrate was applied to area of hypergranulation we will have him use Hydrofera Blue dressing and Comprilan compression wrap to left lower extremity.  I had treated him previously for a wound on the medial left leg which took a long time to heal due to his underlying venous disease.  He will do a nurse visit on 7/5 and 7/12 and follow-up with me on 7/19.      Risks, benefits, and alternatives of current treatment plan discussed in detail.  Questions and concerns addressed. Red flags to RTC or ED reviewed.  Patient (or parent) agrees to plan.      No follow-ups on file.    Also refer  to patient instructions.     I spent 40 minutes with the patient. This time included:  preparing to see the patient (eg, review notes and recent diagnostics), seeing the patient, performing a medically appropriate examination and/or evaluation, counseling and educating the patient, and documenting in the record.    I agree with staff documentation except for any changes made in my note.       Valentin Correa M.D., Wound Care Physician  [facility]  6/28/2024

## 2024-06-28 NOTE — PROGRESS NOTES
Weekly Wound Education Note    Teaching Provided To: Patient  Training Topics: Cleasing and general instructions;Dressing;Nutrition;Signs and symptoms of infection;Skin care  Training Method: Explain/Verbal;Demonstration  Training Response: Patient responds and understands        Notes: Left Elbow- healed just moisterize. Left foot- hydrofera blue ready, kenia and 3 layer comprilan wrap

## 2024-07-01 RX ORDER — AMLODIPINE BESYLATE 10 MG/1
10 TABLET ORAL DAILY
Qty: 90 TABLET | Refills: 3 | OUTPATIENT
Start: 2024-07-01

## 2024-07-02 ENCOUNTER — TELEPHONE (OUTPATIENT)
Dept: INTERNAL MEDICINE CLINIC | Facility: CLINIC | Age: 69
End: 2024-07-02

## 2024-07-02 DIAGNOSIS — G62.9 PERIPHERAL POLYNEUROPATHY: ICD-10-CM

## 2024-07-02 DIAGNOSIS — M15.0 PRIMARY OSTEOARTHRITIS INVOLVING MULTIPLE JOINTS: ICD-10-CM

## 2024-07-02 DIAGNOSIS — M48.062 SPINAL STENOSIS OF LUMBAR REGION WITH NEUROGENIC CLAUDICATION: ICD-10-CM

## 2024-07-02 RX ORDER — GABAPENTIN 800 MG/1
800 TABLET ORAL 3 TIMES DAILY
Qty: 270 TABLET | Refills: 0 | Status: SHIPPED | OUTPATIENT
Start: 2024-08-01

## 2024-07-02 NOTE — TELEPHONE ENCOUNTER
Refill Request    Medication request: gabapentin 800 MG Oral Tab. Take 1 tablet (800 mg total) by mouth 3 (three) times daily.     LOV:6/10/2024 Yessica Cain DO   Due back to clinic per last office note: f/u post injection protocol  NOV: 9/10/2024 Yessica Cain, DO      [place] 05/23/2024 #270 - 90 day supply  Order edited for closer to earliest fill date of: 08/21/2024    Urine drug screen (if applicable): n/a  Pain contract: n/a    LOV plan (if weaning or changing medications): Not mentioned in LOV note. Per Televisit 05/21/2024: \"Recommend that he continue gabapentin 800 mg 3 times daily with a home exercise plan.\"

## 2024-07-05 ENCOUNTER — LAB ENCOUNTER (OUTPATIENT)
Dept: LAB | Facility: HOSPITAL | Age: 69
End: 2024-07-05
Attending: INTERNAL MEDICINE
Payer: MEDICARE

## 2024-07-05 ENCOUNTER — NURSE ONLY (OUTPATIENT)
Dept: WOUND CARE | Facility: HOSPITAL | Age: 69
End: 2024-07-05
Attending: FAMILY MEDICINE
Payer: MEDICARE

## 2024-07-05 VITALS
HEART RATE: 70 BPM | OXYGEN SATURATION: 94 % | RESPIRATION RATE: 17 BRPM | TEMPERATURE: 98 F | SYSTOLIC BLOOD PRESSURE: 122 MMHG | DIASTOLIC BLOOD PRESSURE: 79 MMHG

## 2024-07-05 DIAGNOSIS — S91.002A UNSPECIFIED OPEN WOUND, LEFT ANKLE, INITIAL ENCOUNTER: Primary | ICD-10-CM

## 2024-07-05 DIAGNOSIS — I87.2 VENOUS (PERIPHERAL) INSUFFICIENCY: ICD-10-CM

## 2024-07-05 LAB — INR BLDC: 3.1 (ref 0.9–1.1)

## 2024-07-05 PROCEDURE — 85610 PROTHROMBIN TIME: CPT

## 2024-07-05 PROCEDURE — 99213 OFFICE O/P EST LOW 20 MIN: CPT | Performed by: NURSE PRACTITIONER

## 2024-07-05 PROCEDURE — 36415 COLL VENOUS BLD VENIPUNCTURE: CPT

## 2024-07-05 NOTE — PROGRESS NOTES
Subjective   Russ Kirkland is a 68 year old male.    Chief Complaint   Patient presents with    Wound Recheck       Left elbow         HPI  7/5/2024: Patient is well known to our wound care clinic for his various wounds specially venous ulceration of lower extremities. He has been seen Dr. Correa since 6/24/2024 for his left ankle/foot wound. Today I am covering for Dr. Correa asked to see him.     5/11/2020: Patient is known to our clinic for his venous ulcers, he stated he bumped his legs a week ago and his scar tissue on the previous wound re-opened. Patient has stroke with right sided weakness which is improving  Patient has been wearing compression stockings 30-40mmHg.     HPI 1/27/16: Pt has a long history of venous ulcers on both legs, L > R.  This L medial ankle wound has been present since early November, but in the last several weeks pt noted more swelling and drainage.  He has had many venous procedures done in the past and actually had a R LE EVLT performed on 1/8/16 with Dr. Rolon.  He is scheduled for L LE EVLT on 2/5.  States that he wears 30-40mmHg compression stockings always when he works, and does get two pairs every  months.     6/4/2018 Procedure Performed: Injection sclerotherapy of non-compounded sclerosant - varithena     HPI 2/6/2018 PMH of venous ulcers with past treatments in the [facility] with compression and specialty dressings. L medical ankle wound present for 3 wks. Pt reports wound started with weeping and increased clear drainage that ended up forming a ulcer. Pt has a venous ablation procedure pending in several weeks. Pt has been wearing 30-40 mmHg compression stockings when he works in the [employer] shop.  PMH: Unspecified venous (peripheral) insufficiency, Varicose veins with ulcer and inflammation (HCC), Atrial fibrillation (HCC), Hypercholesterolemia, Musculoskeletal, Osteoarthrosis, Other, Family hx of colon cancer  HPI 12/2018: L lat foot/ankle wound began ~ 1 month ago when pt  was in [place] on vacation.  Wearing compression stockings on and off- noticed increased edema.  Pt sched to see vascular surgeon.     5/6/2019 Open wound started up on R medial ankle about 3 wks ago pt is also scheduled for a vein stripping procedure for the LLE with Doctor Rolon with [facility] in the next couple of weeks. Pt is looking into the same procedure for his RLE when medical progress allows. 7/9/2019 Pt reports he has a new wound on L medial ankle that started about 1.5 wks ago and pt has asked his MD's office for a new referral to include treatment of this wound which is also venous in nature.    Review of Systems   Constitutional:  Negative for activity change, chills and fever.   HENT:  Negative for congestion.    Eyes:  Positive for visual disturbance.   Respiratory:  Negative for cough and shortness of breath.    Cardiovascular:  Negative for chest pain.   Gastrointestinal:  Negative for abdominal distention.   Musculoskeletal:  Positive for arthralgias.   Skin:  Positive for wound.   Neurological:  Negative for weakness.   Psychiatric/Behavioral:  Negative for agitation.      Objective   Physical Exam  Vitals reviewed.   Constitutional:       Appearance: Normal appearance.   HENT:      Head: Normocephalic.   Cardiovascular:      Rate and Rhythm: Normal rate. Rhythm irregular.      Pulses: Normal pulses.   Pulmonary:      Effort: Pulmonary effort is normal.   Abdominal:      General: Bowel sounds are normal.   Musculoskeletal:      Cervical back: Normal range of motion.      Right lower leg: No edema.      Left lower leg: No edema.   Skin:     General: Skin is warm and dry.      Capillary Refill: Capillary refill takes 2 to 3 seconds.      Findings: No erythema or rash.      Comments: Bilateral lower ext with varicose veins and hemosiderin staining    Neurological:      Mental Status: He is alert and oriented to person, place, and time.     Wound Assessment  Wound 06/28/24 2 Foot Lateral;Left (Active)   Date  First Assessed/Time First Assessed: 06/28/24 0945    Wound Number (Wound Clinic Only): 2  Location: Foot  Wound Location Orientation: Lateral;Left      Assessments 7/5/2024  9:46 AM   Wound Image     Site Assessment Moist;Pink;Yellow   Closure Not approximated   Drainage Amount Small   Drainage Description Serosanguineous   Treatments Cleansed;Compression;Wound    Dressing Kerlix roll;Aquacel   Dressing Changed Changed   Dressing Status Dressing Changed   Wound Length (cm) 1.3 cm   Wound Width (cm) 2.6 cm   Wound Surface Area (cm^2) 3.38 cm^2   Wound Depth (cm) 0.1 cm   Wound Volume (cm^3) 0.338 cm^3   Wound Healing % -590   Margins Attached edges   Non-staged Wound Description Full thickness   Mary-wound Assessment Atrophy rogelio;Hyperpigmented   Wound Granulation Tissue Pink   Wound Bed Granulation (%) 50 %   Wound Bed Epithelium (%) 0 %   Wound Bed Slough (%) 50 %   Wound Bed Eschar (%) 0 %   Wound Bed Fibrin (%) 0 %   State of Healing Non-healing;Early/partial granulation   Wound Odor None       Inactive Orders   Date Order Priority Status Authorizing Provider   07/05/24 1139 OP Wound Dressing Routine Completed Willie Redd APRN     - Dressing:    Alginate     - Dressing:    Iodosorb     - Additional Wound Dressing Information:    zinc periwound, nystatin powder to skin under wraps     - Cleansing:    Cleanse with normal saline or wound cleanser     - Frequency:    Change dressing weekly       Compression Wrap 06/28/24 Dorsal;Left (Active)   Placement Date/Time: 06/28/24 1028   Wound Location Orientation: Dorsal;Left      Assessments 7/5/2024  9:46 AM   Response to Treatment Well tolerated   Compression Layers 3   Compression Product Type Artiflex 10cm;Artiflex 15cm;Comprilan 8cm;Comprilan 10cm;Comprilan 12cm;Stockinette 3in   Dressing Applied Yes   Compression Wrap Location Toes to Knee   Compression Wrap Status Clean;Dry;Intact       Inactive Orders   Date Order Priority Status Authorizing Provider    07/05/24 1139 OP Wound Dressing Routine Completed Willie Redd APRN     - Dressing:    Alginate     - Dressing:    Iodosorb     - Additional Wound Dressing Information:    zinc periwound, nystatin powder to skin under wraps     - Cleansing:    Cleanse with normal saline or wound cleanser     - Frequency:    Change dressing weekly     Vital Signs    07/05/24 0938   BP: 122/79   Pulse: 70   Resp: 17   Temp: 98 °F (36.7 °C)   PainSc: 3 - (Mild)   PainLoc: Leg     Allergies  Allergies   Allergen Reactions    Adhesive Tape RASH     Can use paper tape.   Assessment   Left ankle heel wound, venous ulcer with re-occurrence:  -granulation with slough,   -on scar tissue of previous wounds   -no erythema or other S&S of soft tissue infection  Warm feet with less than 3 sec cap refill indicating adequate circulation to heal these wounds.    Reviewed note, lab, imaging in [facility] Every [facility].  Recent labs:   Office Visit on 06/24/2024   Component Date Value Ref Range Status    Aerobic Culture Result 06/24/2024 Mixture of organisms suggestive of normal skin se   Final    Aerobic Culture Result 06/24/2024 No Staph aureus, Beta Strep or Pseudo aeruginosa isolated   Final    Aerobic Smear 06/24/2024 1+ Gram Positive Cocci   Final    Aerobic Smear 06/24/2024 1+ WBCs seen   Final      Encounter Diagnosis  1. Unspecified open wound, left ankle, initial encounter    2. Venous (peripheral) insufficiency      Problem List  Patient Active Problem List   Diagnosis    Venous (peripheral) insufficiency    Atrial fibrillation (HCC)    Family hx of colon cancer    Other hyperlipidemia    Osteoarthrosis    Routine health maintenance    Special screening for malignant neoplasm of prostate    Unspecified open wound, left ankle, initial encounter    Benign essential HTN    Obstructive sleep apnea syndrome    H/O mitral valve repair    CVA (cerebral vascular accident) (HCC)    Idiopathic peripheral neuropathy    Lumbar spinal stenosis     Chronic venous hypertension w ulcer of l low extrem (HCC)    Spinal stenosis at L4-L5 level    Lumbosacral radiculopathy at L5    Cerebrovascular accident (CVA) (HCC)    Bilateral primary osteoarthritis of knee    Primary osteoarthritis of knees, bilateral    Lumbar facet arthropathy     Plan  Orders  Orders Placed This Encounter   Procedures    OP Wound Dressing   Discussed the etiology of the chronic venous insufficiency wound and treatment and management of this wound may including advanced dressing, compression therapy.  Initiated Iodosorb gel to reduce bioburden and alginate for management of drainage with multi layer compression therapy.   Discussed lifelong compression therapy as the main management of venous insufficiency and prevention of the ulcerations.  Discussed leg elevation and daily walking/regular exercises to manage edema in addition to compression therapy.  Discussed signs and symptoms of infection, encourage patient to assess skin daily.  Discussed moisturizing skin to prevent dermatitis, refrain from itching or scratching the skin.  Discussed nutrition for wound healing and encourage reduce carbohydrate intake, increase protein intake, and low calorie diet.   Discussed the use of Cast Cover to protect the ulcer and dressing from shower and possible contamination. Addressed dressing change after shower or when the dressing become wet/contaminated.  Discussed the treatment plan with patient, patient verbalized understanding and agreed to these plan.  Total face to face time was 30 min, more than 50% of the time was spent in counseling and/or coordination of care related to leg wound.   Follow-Up  Return as scheduled in 1 week.

## 2024-07-12 ENCOUNTER — NURSE ONLY (OUTPATIENT)
Dept: WOUND CARE | Facility: HOSPITAL | Age: 69
End: 2024-07-12
Attending: FAMILY MEDICINE
Payer: MEDICARE

## 2024-07-12 ENCOUNTER — LAB ENCOUNTER (OUTPATIENT)
Dept: LAB | Facility: HOSPITAL | Age: 69
End: 2024-07-12
Attending: INTERNAL MEDICINE
Payer: MEDICARE

## 2024-07-12 VITALS
TEMPERATURE: 99 F | RESPIRATION RATE: 20 BRPM | SYSTOLIC BLOOD PRESSURE: 128 MMHG | OXYGEN SATURATION: 96 % | HEART RATE: 70 BPM | DIASTOLIC BLOOD PRESSURE: 80 MMHG

## 2024-07-12 DIAGNOSIS — I87.2 VENOUS (PERIPHERAL) INSUFFICIENCY: ICD-10-CM

## 2024-07-12 DIAGNOSIS — S91.002A UNSPECIFIED OPEN WOUND, LEFT ANKLE, INITIAL ENCOUNTER: Primary | ICD-10-CM

## 2024-07-12 LAB
INR BLD: 3.37 (ref 0.8–1.2)
PROTHROMBIN TIME: 36.2 SECONDS (ref 11.6–14.8)

## 2024-07-12 PROCEDURE — 85610 PROTHROMBIN TIME: CPT

## 2024-07-12 PROCEDURE — 29581 APPL MULTLAYER CMPRN SYS LEG: CPT

## 2024-07-12 PROCEDURE — 36415 COLL VENOUS BLD VENIPUNCTURE: CPT

## 2024-07-12 NOTE — PROGRESS NOTES
Patient was seen today for wound assessment and dressing change.  Patient was instructed on size of the wound and dressings changes.  Patient instructed on increase protein in the diet.  Patient will return in a week for assessment.        07/12/24 0957   Wound 06/28/24 2 Foot Lateral;Left   Date First Assessed/Time First Assessed: 06/28/24 0945    Wound Number (Wound Clinic Only): 2  Location: Foot  Wound Location Orientation: Lateral;Left   Wound Image    Site Assessment Moist;Pink;Yellow   Closure Not approximated   Drainage Amount Large   Drainage Description Sanguineous;Brown   Treatments Cleansed;Compression;Wound ;Vashe   Dressing Kerlix roll;Aquacel  (iodosorb)   Dressing Changed Changed   Dressing Status Dressing Changed   Wound Length (cm) 2 cm   Wound Width (cm) 4.6 cm   Wound Surface Area (cm^2) 9.2 cm^2   Wound Depth (cm) 0.1 cm   Wound Volume (cm^3) 0.92 cm^3   Wound Healing % -1778   Margins Attached edges;Poorly defined   Non-staged Wound Description Full thickness   Mary-wound Assessment Atrophy rogelio;Hyperpigmented;Maceration;Blanchable erythema   Wound Granulation Tissue Pink;Red   Wound Bed Granulation (%) 80 %   Wound Bed Slough (%) 20 %   State of Healing Non-healing;Early/partial granulation   Wound Odor Mild   Compression Wrap 06/28/24 Dorsal;Left   Placement Date/Time: 06/28/24 1028   Wound Location Orientation: Dorsal;Left   Response to Treatment Well tolerated   Compression Layers 3   Compression Product Type Artiflex 10cm;Artiflex 15cm;Comprilan 8cm;Comprilan 10cm;Comprilan 12cm;Stockinette 3in   Dressing Applied Yes   Compression Wrap Location Toes to Knee   Compression Wrap Status Clean;Dry;Intact

## 2024-07-17 RX ORDER — AMLODIPINE BESYLATE 10 MG/1
10 TABLET ORAL DAILY
Qty: 90 TABLET | Refills: 3 | OUTPATIENT
Start: 2024-07-17

## 2024-07-17 RX ORDER — LOSARTAN POTASSIUM AND HYDROCHLOROTHIAZIDE 12.5; 1 MG/1; MG/1
1 TABLET ORAL DAILY
Qty: 90 TABLET | Refills: 3 | OUTPATIENT
Start: 2024-07-17

## 2024-07-17 RX ORDER — CELECOXIB 200 MG/1
200 CAPSULE ORAL
Qty: 90 CAPSULE | Refills: 3 | OUTPATIENT
Start: 2024-07-17

## 2024-07-17 RX ORDER — SIMVASTATIN 20 MG
20 TABLET ORAL NIGHTLY
Qty: 90 TABLET | Refills: 3 | OUTPATIENT
Start: 2024-07-17

## 2024-07-17 RX ORDER — WARFARIN SODIUM 2.5 MG/1
2.5 TABLET ORAL DAILY
Qty: 90 TABLET | Refills: 3 | OUTPATIENT
Start: 2024-07-17

## 2024-07-17 NOTE — TELEPHONE ENCOUNTER
Per 5/29/24 refill encounter,  discontinued amlodipine. Refill request denied.     Requested Prescriptions     Refused Prescriptions Disp Refills    AMLODIPINE 10 MG Oral Tab [Pharmacy Med Name: AMLODIPINE BESYLATE 10MG TABLETS] 90 tablet 3     Sig: TAKE 1 TABLET BY MOUTH DAILY     Refused By: GIULIANO BARNETT     Reason for Refusal: Refill not appropriate

## 2024-07-17 NOTE — TELEPHONE ENCOUNTER
See 5/29/2024 refill encounter.  discontinued amlodipine. Refill request denied.    Requested Prescriptions     Refused Prescriptions Disp Refills    AMLODIPINE 10 MG Oral Tab [Pharmacy Med Name: AMLODIPINE BESYLATE 10MG TABLETS] 90 tablet 3     Sig: TAKE 1 TABLET BY MOUTH DAILY     Refused By: GIULIANO BARNETT     Reason for Refusal: Refill not appropriate

## 2024-07-17 NOTE — TELEPHONE ENCOUNTER
Celecoxib, Simvastatin, Warfarin, Losartan-hydrochlorothiazide refill requests denied as patient should still have refills remaining at the pharmacy.   Metoprolol succinate refill request denied as  is the prescriber.  To  to please advise on the Tramadol-acetaminophen (pending)    To MD:  The above refill request is for a controlled substance.  Please review pended medication order.   Print and sign for staff to fax to pharmacy or prescribe electronically.    Last office visit: 4/10/2024  Last time refill sent and quantity/refills: 1/10/2024 #360 with 1 refill

## 2024-07-19 ENCOUNTER — OFFICE VISIT (OUTPATIENT)
Dept: WOUND CARE | Facility: HOSPITAL | Age: 69
End: 2024-07-19
Attending: FAMILY MEDICINE
Payer: MEDICARE

## 2024-07-19 ENCOUNTER — LAB ENCOUNTER (OUTPATIENT)
Dept: LAB | Facility: HOSPITAL | Age: 69
End: 2024-07-19
Attending: INTERNAL MEDICINE
Payer: MEDICARE

## 2024-07-19 VITALS
SYSTOLIC BLOOD PRESSURE: 135 MMHG | TEMPERATURE: 98 F | DIASTOLIC BLOOD PRESSURE: 71 MMHG | OXYGEN SATURATION: 94 % | HEART RATE: 74 BPM

## 2024-07-19 DIAGNOSIS — I87.312 CHRONIC VENOUS HYPERTENSION (IDIOPATHIC) WITH ULCER OF LEFT LOWER EXTREMITY (HCC): Primary | ICD-10-CM

## 2024-07-19 DIAGNOSIS — Z79.01 LONG TERM CURRENT USE OF ANTICOAGULANT: ICD-10-CM

## 2024-07-19 DIAGNOSIS — Z98.890 HISTORY OF MITRAL VALVE REPAIR: ICD-10-CM

## 2024-07-19 DIAGNOSIS — L97.929 CHRONIC VENOUS HYPERTENSION (IDIOPATHIC) WITH ULCER OF LEFT LOWER EXTREMITY (HCC): Primary | ICD-10-CM

## 2024-07-19 DIAGNOSIS — I10 BENIGN ESSENTIAL HTN: ICD-10-CM

## 2024-07-19 DIAGNOSIS — I48.20 CHRONIC ATRIAL FIBRILLATION (HCC): ICD-10-CM

## 2024-07-19 LAB
INR BLD: 2.76 (ref 0.8–1.2)
PROTHROMBIN TIME: 30.9 SECONDS (ref 11.6–14.8)

## 2024-07-19 PROCEDURE — 99215 OFFICE O/P EST HI 40 MIN: CPT | Performed by: FAMILY MEDICINE

## 2024-07-19 PROCEDURE — 36415 COLL VENOUS BLD VENIPUNCTURE: CPT

## 2024-07-19 PROCEDURE — 85610 PROTHROMBIN TIME: CPT

## 2024-07-19 NOTE — PROGRESS NOTES
Weekly Wound Education Note    Teaching Provided To: Patient  Training Topics: Cleasing and general instructions;Compression;Dressing;Edema control;Nutrition;Skin care;Safe and effective use of medical equipement and /or supplies;Signs and symptoms of infection  Training Method: Demonstration  Training Response: Reinforcement needed        Notes: left ankle- zinc paste to periwound, endoform, hydrofera blue transfer, kerramax, kerlix, comprilan x 3.

## 2024-07-19 NOTE — PATIENT INSTRUCTIONS
PATIENT INSTRUCTIONS      FOR Russ Kirkland ,  1955    DATE OF SERVICE: 2024      Endoform  Hydrofera Blue Transfer to the left foot wound  Zinc oxide barrier cream  Comprilan compression wrap to left lower extremity    DO NOT GET WRAP WET      Follow up with Dr. Correa on         Managing your edema:    Avoid prolonged standing in one place. It is better to have your calf muscles moving to pump fluid out of the legs.  Elevate leg(s) above the level of the heart when sitting or as much as possible.  Take you diuretics as directed by your physician. Do not skip doses or change doses unless instructed to do so by your physician.  Decrease salt intake, follow recommended 2 grams daily.  Do not get leg(s) with compression wrap wet. If wraps are too tight as indicated by pain, numbness/tingling or discoloration of toes remove wrap completely and call the wound center @ [phone]       The treatment plan has been discussed at length between you and your provider. Follow all instructions carefully, it is very important. If you do not follow all instructions you are at risk of your wound not healing, infection, possible loss of limb and even loss of life.    COMPRESSION WRAP PATIENT CARE INSTRUCTIONS  DO NOT get compression wrap wet. When showering, use a shower boot.   Please contact wound clinic and if not able to reach, then go to emergency room if ANY of the following occur:   Tingling or numbness in the foot or toes on leg with compression wrap.  Severe pain that cannot be relieved with elevation and any medication instructed per your doctor.  A fever of 100.4°F (38°C) or higher.  Swelling, coldness, or blue-gray discoloration of the toes.  If the compression wrap feels too tight or too loose.  If the compression wrap is damaged or has rough edges that hurt.  If the compression wrap gets wet, you must cut wrap off.   Drainage from compression wrap that smells different than  usual.

## 2024-07-19 NOTE — PROGRESS NOTES
Chief Complaint   Patient presents with    Wound Recheck     Left lateral foot        HPI:     Patient here for follow-up of left lateral foot wound.  He is doing fine and has no new complaints.  Tolerating Comprilan wrap.    Lab Results   Component Value Date    BUN 21 04/08/2024    CREATSERUM 0.85 04/08/2024    ALB 4.4 04/08/2024    TP 7.0 04/08/2024    A1C 5.6 04/16/2018         Current Outpatient Medications:     tramadol-acetaminophen 37.5-325 MG Oral Tab, Take 2 tablets by mouth 2 (two) times daily as needed for Pain., Disp: 360 tablet, Rfl: 1    [START ON 8/1/2024] gabapentin 800 MG Oral Tab, Take 1 tablet (800 mg total) by mouth 3 (three) times daily., Disp: 270 tablet, Rfl: 0    aspirin 81 MG Oral Tab EC, Take 1 tablet (81 mg total) by mouth daily., Disp: , Rfl:     betamethasone 0.1 % External Cream, Apply to area of rash with dressing change, Disp: 15 g, Rfl: 0    celecoxib 200 MG Oral Cap, Take 1 capsule (200 mg total) by mouth daily as needed., Disp: 90 capsule, Rfl: 3    enoxaparin (LOVENOX) 120 MG/0.8ML Injection Solution Prefilled Syringe, Inject 1 mg/kg into the skin 2 (two) times daily., Disp: , Rfl:     WARFARIN 2.5 MG Oral Tab, TAKE 1 TABLET BY MOUTH DAILY, Disp: 90 tablet, Rfl: 3    LOSARTAN POTASSIUM-HCTZ 100-12.5 MG Oral Tab, TAKE 1 TABLET BY MOUTH DAILY (Patient taking differently: Take 1 tablet by mouth at bedtime.), Disp: 90 tablet, Rfl: 3    SIMVASTATIN 20 MG Oral Tab, TAKE 1 TABLET BY MOUTH NIGHTLY, Disp: 90 tablet, Rfl: 3    Sildenafil Citrate 100 MG Oral Tab, Take 1 tablet (100 mg total) by mouth daily as needed for Erectile Dysfunction., Disp: 10 tablet, Rfl: 11    Glucosamine-Chondroit-Vit C-Mn (GLUCOSAMINE-CHONDROITIN) Oral Cap, Take 1 tablet by mouth daily., Disp: , Rfl:     omega-3 fatty acids (FISH OIL) 1000 MG Oral Cap, Take 1,000 mg by mouth daily., Disp: , Rfl:     METOPROLOL SUCCINATE OR, Take by mouth. Daily (per ), Disp: , Rfl:     Allergies   Allergen Reactions     Adhesive Tape RASH     Can use paper tape.            REVIEW OF SYSTEMS:     Review of Systems (ROS)  This information was obtained from the patient, chart    A comprehensive 10 point review of systems was completed.  Pertinent positives and negatives noted in the the HPI.     Past medical, surgical, family and social history updated where appropriate.    PHYSICAL EXAM:   /71   Pulse 74   Temp 98.2 °F (36.8 °C)   SpO2 94%    Estimated body mass index is 31.66 kg/m² as calculated from the following:    Height as of 6/24/24: 73\".    Weight as of 6/24/24: 240 lb.   Vital signs reviewed.Appears stated age, well groomed.      Awake, alert, in no acute distress  HENT:  normocephalic, atraumatic, external ear canals clear bilaterally, tympanic membranes appear opaque, non-bulging, non-erythematous, nasal turbinates are non-inflamed and not swollen, oropharynx without erythema, swelling, or exudate, gingiva and lips without any apparent lesions.   Cardiac:  S1 S2 regular rate and rhythm, no murmur, gallop, or rub  Respiratory:  Bilaterally clear to auscultation, no chest tenderness to palpation, no wheezing, no rhonchi, no rales, air entry is adequate, no accessory respiratory muscle use, no chest asymmetry, normal excursion.  GI:  bowel sound positive in all four quadrants, abdomen is soft, non-tender, non-distended, no hepatosplenomegaly, no abnormal aortic pulsation.     Calf  Point of Measurement - Left Calf: 30     Left Calf from:: Heel  Calf Left cm:: 39          Ankle  Point of Measurement - Left Ankle: 10     Left Ankle from:: Heel  Left Ankle cm:: 24.5                Foot  Point of Measurement - Left Foot: 12  Left Foot from:: Great toe  Left Foot cm:: 25                   Wound 06/28/24 2 Foot Lateral;Left (Active)   Date First Assessed/Time First Assessed: 06/28/24 0945    Wound Number (Wound Clinic Only): 2  Location: Foot  Wound Location Orientation: Lateral;Left      Assessments 6/28/2024  9:40 AM  7/19/2024  9:44 AM   Wound Image       Site Assessment Moist;Pink;Yellow Moist;Pink;Yellow;Red   Closure Not approximated Not approximated   Drainage Amount Small Large   Drainage Description Serosanguineous Brown;Sanguineous   Treatments Cleansed;Compression Cleansed;Compression;Wound ;Vashe   Dressing Hydrofera ready;Martha Kerlix roll;Aquacel   Dressing Changed New Changed   Dressing Status Clean;Dry;Intact Dressing Changed   Wound Length (cm) 0.7 cm 1 cm   Wound Width (cm) 0.7 cm 4.4 cm   Wound Surface Area (cm^2) 0.49 cm^2 4.4 cm^2   Wound Depth (cm) 0.1 cm 0.1 cm   Wound Volume (cm^3) 0.049 cm^3 0.44 cm^3   Wound Healing % -- -798   Margins Attached edges Attached edges;Poorly defined   Non-staged Wound Description Full thickness Full thickness   Mary-wound Assessment Atrophy rogelio;Hyperpigmented Maceration;Hyperpigmented;Non-blanchable erythema   Wound Granulation Tissue Pink Pink;Red   Wound Bed Granulation (%) 25 % 80 %   Wound Bed Epithelium (%) 0 % 10 %   Wound Bed Slough (%) 75 % 10 %   Wound Bed Eschar (%) 0 % 0 %   Wound Bed Fibrin (%) 0 % 0 %   State of Healing Non-healing;Early/partial granulation Fully granulated   Wound Odor None None       Active Orders   Date Order Priority Status Authorizing Provider   07/08/24 1624 OP Wound Dressing Routine Active Valentin Correa MD     - Cleansing:    Cleanse with normal saline or wound cleanser     - Additional Wound Dressing Information:    Left foot- iodosorb, aquacel, kerlix and antifungal powder, and comprilan     - Frequency:    Change dressing weekly       Inactive Orders   Date Order Priority Status Authorizing Provider   07/05/24 1139 OP Wound Dressing Routine Completed Willie Redd APRN     - Dressing:    Alginate     - Dressing:    Iodosorb     - Additional Wound Dressing Information:    zinc periwound, nystatin powder to skin under wraps     - Cleansing:    Cleanse with normal saline or wound cleanser     - Frequency:    Change dressing  weekly       Compression Wrap 06/28/24 Dorsal;Left (Active)   Placement Date/Time: 06/28/24 1028   Wound Location Orientation: Dorsal;Left      Assessments 6/28/2024  9:40 AM 7/19/2024  9:44 AM   Response to Treatment Well tolerated Well tolerated   Compression Layers 3 3   Compression Product Type Artiflex 10cm;Artiflex 15cm;Comprilan 8cm;Comprilan 10cm;Comprilan 12cm;Stockinette 3in Artiflex 10cm;Artiflex 15cm;Comprilan 8cm;Comprilan 10cm;Comprilan 12cm;Stockinette 3in   Dressing Applied Yes Yes   Compression Wrap Location Toes to Knee Toes to Knee   Compression Wrap Status Clean;Dry;Intact Clean;Dry;Intact       Inactive Orders   Date Order Priority Status Authorizing Provider   07/05/24 1139 OP Wound Dressing Routine Completed Willie Redd APRN     - Dressing:    Alginate     - Dressing:    Iodosorb     - Additional Wound Dressing Information:    zinc periwound, nystatin powder to skin under wraps     - Cleansing:    Cleanse with normal saline or wound cleanser     - Frequency:    Change dressing weekly          ASSESSMENT AND PLAN:      1. Chronic venous hypertension (idiopathic) with ulcer of left lower extremity (HCC)    2. Benign essential HTN    The wound does appear to be getting better.  Will continue with endoform collagen to the wound base and Hydrofera Blue transfer, Kerramax, Comprilan 3 layer compression wrap.  Zinc oxide to the periwound.  Patient does have significant and venous disease.    Risks, benefits, and alternatives of current treatment plan discussed in detail.  Questions and concerns addressed. Red flags to RTC or ED reviewed.  Patient (or parent) agrees to plan.      No follow-ups on file.    Also refer to patient instructions.     I spent 40 minutes with the patient. This time included:  preparing to see the patient (eg, review notes and recent diagnostics), seeing the patient, performing a medically appropriate examination and/or evaluation, counseling and educating the patient, and  documenting in the record.    I agree with staff documentation except for any changes made in my note.       Valentin Correa M.D., Wound Care Physician  [facility]  7/19/2024

## 2024-07-26 ENCOUNTER — APPOINTMENT (OUTPATIENT)
Dept: WOUND CARE | Facility: HOSPITAL | Age: 69
End: 2024-07-26
Attending: FAMILY MEDICINE
Payer: MEDICARE

## 2024-07-26 VITALS
SYSTOLIC BLOOD PRESSURE: 133 MMHG | RESPIRATION RATE: 18 BRPM | OXYGEN SATURATION: 96 % | HEART RATE: 73 BPM | DIASTOLIC BLOOD PRESSURE: 86 MMHG | TEMPERATURE: 98 F

## 2024-07-26 DIAGNOSIS — I87.312 CHRONIC VENOUS HYPERTENSION (IDIOPATHIC) WITH ULCER OF LEFT LOWER EXTREMITY (HCC): Primary | ICD-10-CM

## 2024-07-26 DIAGNOSIS — I10 BENIGN ESSENTIAL HTN: ICD-10-CM

## 2024-07-26 DIAGNOSIS — L97.929 CHRONIC VENOUS HYPERTENSION (IDIOPATHIC) WITH ULCER OF LEFT LOWER EXTREMITY (HCC): Primary | ICD-10-CM

## 2024-07-26 DIAGNOSIS — S91.002A UNSPECIFIED OPEN WOUND, LEFT ANKLE, INITIAL ENCOUNTER: ICD-10-CM

## 2024-07-26 PROCEDURE — 99215 OFFICE O/P EST HI 40 MIN: CPT | Performed by: FAMILY MEDICINE

## 2024-07-26 NOTE — PATIENT INSTRUCTIONS
PATIENT INSTRUCTIONS      FOR Russ Kirkland ,  1955    DATE OF SERVICE: 2024      Endoform  Hydrofera Blue Transfer to the left foot wound  Zinc oxide barrier cream  Comprilan compression wrap to left lower extremity    DO NOT GET WRAP WET      Follow up with Dr. Correa in 1 week        Managing your edema:    Avoid prolonged standing in one place. It is better to have your calf muscles moving to pump fluid out of the legs.  Elevate leg(s) above the level of the heart when sitting or as much as possible.  Take you diuretics as directed by your physician. Do not skip doses or change doses unless instructed to do so by your physician.  Decrease salt intake, follow recommended 2 grams daily.  Do not get leg(s) with compression wrap wet. If wraps are too tight as indicated by pain, numbness/tingling or discoloration of toes remove wrap completely and call the wound center @ [phone]       The treatment plan has been discussed at length between you and your provider. Follow all instructions carefully, it is very important. If you do not follow all instructions you are at risk of your wound not healing, infection, possible loss of limb and even loss of life.    COMPRESSION WRAP PATIENT CARE INSTRUCTIONS  DO NOT get compression wrap wet. When showering, use a shower boot.   Please contact wound clinic and if not able to reach, then go to emergency room if ANY of the following occur:   Tingling or numbness in the foot or toes on leg with compression wrap.  Severe pain that cannot be relieved with elevation and any medication instructed per your doctor.  A fever of 100.4°F (38°C) or higher.  Swelling, coldness, or blue-gray discoloration of the toes.  If the compression wrap feels too tight or too loose.  If the compression wrap is damaged or has rough edges that hurt.  If the compression wrap gets wet, you must cut wrap off.   Drainage from compression wrap that smells different than  usual.

## 2024-07-26 NOTE — PROGRESS NOTES
Chief Complaint   Patient presents with    Wound Recheck       Left lateral foot           HPI:     Patient here for follow-up of a wound on the left lateral foot.  He is doing well and has no new concerns.  Tolerating compression wrap.    Lab Results   Component Value Date    BUN 21 04/08/2024    CREATSERUM 0.85 04/08/2024    ALB 4.4 04/08/2024    TP 7.0 04/08/2024    A1C 5.6 04/16/2018         Current Outpatient Medications:     tramadol-acetaminophen 37.5-325 MG Oral Tab, Take 2 tablets by mouth 2 (two) times daily as needed for Pain., Disp: 360 tablet, Rfl: 1    [START ON 8/1/2024] gabapentin 800 MG Oral Tab, Take 1 tablet (800 mg total) by mouth 3 (three) times daily., Disp: 270 tablet, Rfl: 0    aspirin 81 MG Oral Tab EC, Take 1 tablet (81 mg total) by mouth daily., Disp: , Rfl:     betamethasone 0.1 % External Cream, Apply to area of rash with dressing change, Disp: 15 g, Rfl: 0    celecoxib 200 MG Oral Cap, Take 1 capsule (200 mg total) by mouth daily as needed., Disp: 90 capsule, Rfl: 3    METOPROLOL SUCCINATE OR, Take by mouth. Daily (per ), Disp: , Rfl:     enoxaparin (LOVENOX) 120 MG/0.8ML Injection Solution Prefilled Syringe, Inject 1 mg/kg into the skin 2 (two) times daily., Disp: , Rfl:     WARFARIN 2.5 MG Oral Tab, TAKE 1 TABLET BY MOUTH DAILY, Disp: 90 tablet, Rfl: 3    LOSARTAN POTASSIUM-HCTZ 100-12.5 MG Oral Tab, TAKE 1 TABLET BY MOUTH DAILY (Patient taking differently: Take 1 tablet by mouth at bedtime.), Disp: 90 tablet, Rfl: 3    SIMVASTATIN 20 MG Oral Tab, TAKE 1 TABLET BY MOUTH NIGHTLY, Disp: 90 tablet, Rfl: 3    Sildenafil Citrate 100 MG Oral Tab, Take 1 tablet (100 mg total) by mouth daily as needed for Erectile Dysfunction., Disp: 10 tablet, Rfl: 11    Glucosamine-Chondroit-Vit C-Mn (GLUCOSAMINE-CHONDROITIN) Oral Cap, Take 1 tablet by mouth daily., Disp: , Rfl:     omega-3 fatty acids (FISH OIL) 1000 MG Oral Cap, Take 1,000 mg by mouth daily., Disp: , Rfl:     Allergies   Allergen  Reactions    Adhesive Tape RASH     Can use paper tape.            REVIEW OF SYSTEMS:     Review of Systems (ROS)  This information was obtained from the patient, chart    A comprehensive 10 point review of systems was completed.  Pertinent positives and negatives noted in the the HPI.     Past medical, surgical, family and social history updated where appropriate.    PHYSICAL EXAM:   /86   Pulse 73   Temp 98 °F (36.7 °C) (Temporal)   Resp 18   SpO2 96%    Estimated body mass index is 31.66 kg/m² as calculated from the following:    Height as of 6/24/24: 73\".    Weight as of 6/24/24: 240 lb.   Vital signs reviewed.Appears stated age, well groomed.      Awake, alert, in no acute distress  HENT:  normocephalic, atraumatic, external ear canals clear bilaterally, tympanic membranes appear opaque, non-bulging, non-erythematous, nasal turbinates are non-inflamed and not swollen, oropharynx without erythema, swelling, or exudate, gingiva and lips without any apparent lesions.   Cardiac:  S1 S2 regular rate and rhythm, no murmur, gallop, or rub  Respiratory:  Bilaterally clear to auscultation, no chest tenderness to palpation, no wheezing, no rhonchi, no rales, air entry is adequate, no accessory respiratory muscle use, no chest asymmetry, normal excursion.  GI:  bowel sound positive in all four quadrants, abdomen is soft, non-tender, non-distended, no hepatosplenomegaly, no abnormal aortic pulsation.     Calf  Point of Measurement - Left Calf: 30     Left Calf from:: Heel  Calf Left cm:: 39          Ankle  Point of Measurement - Left Ankle: 10     Left Ankle from:: Heel  Left Ankle cm:: 24.8                Foot  Point of Measurement - Left Foot: 12  Left Foot from:: Great toe  Left Foot cm:: 26                   Wound 06/28/24 2 Foot Lateral;Left (Active)   Date First Assessed/Time First Assessed: 06/28/24 0950    Wound Number (Wound Clinic Only): 2  Location: Foot  Wound Location Orientation: Lateral;Left       Assessments 6/28/2024  9:40 AM 7/26/2024  1:13 PM   Wound Image       Site Assessment Moist;Pink;Yellow Moist;Pink;Tan   Closure Not approximated Not approximated   Drainage Amount Small Moderate   Drainage Description Serosanguineous Brown;Serosanguineous   Treatments Cleansed;Compression Cleansed;Compression   Dressing Hydrofera ready;Martha --   Dressing Changed New Changed   Dressing Status Clean;Dry;Intact Dressing Changed   Wound Length (cm) 0.7 cm 0.5 cm   Wound Width (cm) 0.7 cm 2.9 cm   Wound Surface Area (cm^2) 0.49 cm^2 1.45 cm^2   Wound Depth (cm) 0.1 cm 0.1 cm   Wound Volume (cm^3) 0.049 cm^3 0.145 cm^3   Wound Healing % -- -196   Margins Attached edges Attached edges;Poorly defined   Non-staged Wound Description Full thickness Full thickness   Mary-wound Assessment Atrophy rogelio;Hyperpigmented Maceration;Hyperpigmented   Wound Granulation Tissue Pink Pink   Wound Bed Granulation (%) 25 % 50 %   Wound Bed Epithelium (%) 0 % 40 %   Wound Bed Slough (%) 75 % 10 %   Wound Bed Eschar (%) 0 % 0 %   Wound Bed Fibrin (%) 0 % 0 %   State of Healing Non-healing;Early/partial granulation Early/partial granulation;Epithelialized   Wound Odor None None       Active Orders   Date Order Priority Status Authorizing Provider   07/19/24 1004 OP Wound Dressing Routine Active Valentin Correa MD     - Cleansing:    Cleanse with normal saline or wound cleanser     - Dressing:    Endoform collagen     - Dressing:    Hydrofera transfer     - Dressing:    Kerramax/Super absorbent     - Dressing:    Kerlix     - Additional Wound Dressing Information:    3 layer comprilan     - Frequency:    Change dressing weekly   07/08/24 1624 OP Wound Dressing Routine Active Valentin Correa MD     - Cleansing:    Cleanse with normal saline or wound cleanser     - Additional Wound Dressing Information:    Left foot- iodosorb, aquacel, kerlix and antifungal powder, and comprilan     - Frequency:    Change dressing weekly       Inactive  Orders   Date Order Priority Status Authorizing Provider   07/05/24 1139 OP Wound Dressing Routine Completed Willie Redd APRN     - Dressing:    Alginate     - Dressing:    Iodosorb     - Additional Wound Dressing Information:    zinc periwound, nystatin powder to skin under wraps     - Cleansing:    Cleanse with normal saline or wound cleanser     - Frequency:    Change dressing weekly       Compression Wrap 06/28/24 Dorsal;Left (Active)   Placement Date/Time: 06/28/24 1028   Wound Location Orientation: Dorsal;Left      Assessments 6/28/2024  9:40 AM 7/26/2024  1:13 PM   Response to Treatment Well tolerated Well tolerated   Compression Layers 3 3   Compression Product Type Artiflex 10cm;Artiflex 15cm;Comprilan 8cm;Comprilan 10cm;Comprilan 12cm;Stockinette 3in Artiflex 10cm;Artiflex 15cm;Comprilan 8cm;Comprilan 10cm;Comprilan 12cm;Stockinette 3in   Dressing Applied Yes Yes   Compression Wrap Location Toes to Knee Toes to Knee   Compression Wrap Status Clean;Dry;Intact Clean;Dry;Intact       Inactive Orders   Date Order Priority Status Authorizing Provider   07/05/24 1139 OP Wound Dressing Routine Completed Willie Redd APRN     - Dressing:    Alginate     - Dressing:    Iodosorb     - Additional Wound Dressing Information:    zinc periwound, nystatin powder to skin under wraps     - Cleansing:    Cleanse with normal saline or wound cleanser     - Frequency:    Change dressing weekly          ASSESSMENT AND PLAN:      1. Chronic venous hypertension (idiopathic) with ulcer of left lower extremity (HCC)  - OP Wound Dressing  - OP Wound Dressing    2. Benign essential HTN  - OP Wound Dressing    The wound was debrided with curette patient tolerating compression wrap and the wound does appear to be improving.  Will apply endoform collagen, Hydrofera Blue transfer, zinc oxide barrier cream and continue with Comprilan compression wrap.  Risks, benefits, and alternatives of current treatment plan discussed in detail.   Questions and concerns addressed. Red flags to RTC or ED reviewed.  Patient (or parent) agrees to plan.      Return in about 1 week (around 8/2/2024) for MD visit for 30 min.    Also refer to patient instructions.     I spent 40 minutes with the patient. This time included:  preparing to see the patient (eg, review notes and recent diagnostics), seeing the patient, performing a medically appropriate examination and/or evaluation, counseling and educating the patient, and documenting in the record.    I agree with staff documentation except for any changes made in my note.       Valentin Correa M.D., Wound Care Physician  [facility]  7/26/2024

## 2024-07-26 NOTE — PROGRESS NOTES
Weekly Wound Education Note    Teaching Provided To: Patient  Training Topics: Cleasing and general instructions;Compression;Dressing;Edema control;Nutrition;Skin care;Safe and effective use of medical equipement and /or supplies;Signs and symptoms of infection  Training Method: Demonstration  Training Response: Reinforcement needed        Notes: Left leg- endoform, hydrofera transfer, kerramax half and kerlix. comprilan x 3

## 2024-08-02 ENCOUNTER — APPOINTMENT (OUTPATIENT)
Dept: WOUND CARE | Facility: HOSPITAL | Age: 69
End: 2024-08-02
Attending: FAMILY MEDICINE
Payer: MEDICARE

## 2024-08-02 ENCOUNTER — LAB ENCOUNTER (OUTPATIENT)
Dept: LAB | Facility: HOSPITAL | Age: 69
End: 2024-08-02
Attending: INTERNAL MEDICINE
Payer: MEDICARE

## 2024-08-02 VITALS
RESPIRATION RATE: 18 BRPM | TEMPERATURE: 98 F | HEART RATE: 69 BPM | SYSTOLIC BLOOD PRESSURE: 146 MMHG | DIASTOLIC BLOOD PRESSURE: 90 MMHG | OXYGEN SATURATION: 94 %

## 2024-08-02 DIAGNOSIS — Z98.890 HISTORY OF MITRAL VALVE REPAIR: ICD-10-CM

## 2024-08-02 DIAGNOSIS — L97.929 CHRONIC VENOUS HYPERTENSION (IDIOPATHIC) WITH ULCER OF LEFT LOWER EXTREMITY (HCC): Primary | ICD-10-CM

## 2024-08-02 DIAGNOSIS — Z79.01 LONG TERM CURRENT USE OF ANTICOAGULANT: ICD-10-CM

## 2024-08-02 DIAGNOSIS — I87.312 CHRONIC VENOUS HYPERTENSION (IDIOPATHIC) WITH ULCER OF LEFT LOWER EXTREMITY (HCC): Primary | ICD-10-CM

## 2024-08-02 DIAGNOSIS — I10 BENIGN ESSENTIAL HTN: ICD-10-CM

## 2024-08-02 DIAGNOSIS — I48.20 CHRONIC ATRIAL FIBRILLATION (HCC): ICD-10-CM

## 2024-08-02 LAB
INR BLD: 2.7 (ref 0.8–1.2)
PROTHROMBIN TIME: 30.4 SECONDS (ref 11.6–14.8)

## 2024-08-02 PROCEDURE — 99215 OFFICE O/P EST HI 40 MIN: CPT | Performed by: FAMILY MEDICINE

## 2024-08-02 PROCEDURE — 85610 PROTHROMBIN TIME: CPT

## 2024-08-02 PROCEDURE — 36415 COLL VENOUS BLD VENIPUNCTURE: CPT

## 2024-08-02 NOTE — PROGRESS NOTES
Weekly Wound Education Note    Teaching Provided To: Patient  Training Topics: Cleasing and general instructions;Compression;Dressing;Edema control;Nutrition;Skin care;Safe and effective use of medical equipement and /or supplies;Signs and symptoms of infection  Training Method: Demonstration  Training Response: Reinforcement needed        Notes: Left leg- endoform, mepitol, hydrofera blue and kerlix. comprilan x 3

## 2024-08-02 NOTE — PATIENT INSTRUCTIONS
PATIENT INSTRUCTIONS      FOR Russ Kirkland ,  1955    DATE OF SERVICE: 2024    Endoform  Mepitel contact layer  Hydrofera Blue Transfer to the left foot wound  Zinc oxide barrier cream  Comprilan compression wrap to left lower extremity    DO NOT GET WRAP WET      Follow up with Dr. Correa in 1 week        Managing your edema:    Avoid prolonged standing in one place. It is better to have your calf muscles moving to pump fluid out of the legs.  Elevate leg(s) above the level of the heart when sitting or as much as possible.  Take you diuretics as directed by your physician. Do not skip doses or change doses unless instructed to do so by your physician.  Decrease salt intake, follow recommended 2 grams daily.  Do not get leg(s) with compression wrap wet. If wraps are too tight as indicated by pain, numbness/tingling or discoloration of toes remove wrap completely and call the wound center @ [phone]       The treatment plan has been discussed at length between you and your provider. Follow all instructions carefully, it is very important. If you do not follow all instructions you are at risk of your wound not healing, infection, possible loss of limb and even loss of life.    COMPRESSION WRAP PATIENT CARE INSTRUCTIONS  DO NOT get compression wrap wet. When showering, use a shower boot.   Please contact wound clinic and if not able to reach, then go to emergency room if ANY of the following occur:   Tingling or numbness in the foot or toes on leg with compression wrap.  Severe pain that cannot be relieved with elevation and any medication instructed per your doctor.  A fever of 100.4°F (38°C) or higher.  Swelling, coldness, or blue-gray discoloration of the toes.  If the compression wrap feels too tight or too loose.  If the compression wrap is damaged or has rough edges that hurt.  If the compression wrap gets wet, you must cut wrap off.   Drainage from compression wrap that smells different  than usual.

## 2024-08-05 NOTE — PROGRESS NOTES
Chief Complaint   Patient presents with    Wound Recheck       Left lateral foot           HPI:     Patient here for follow-up of left lateral ankle wound.  He is doing well and has no new complaints.  Tolerated compression wrap without difficulty.    Lab Results   Component Value Date    BUN 21 04/08/2024    CREATSERUM 0.85 04/08/2024    ALB 4.4 04/08/2024    TP 7.0 04/08/2024    A1C 5.6 04/16/2018         Current Outpatient Medications:     tramadol-acetaminophen 37.5-325 MG Oral Tab, Take 2 tablets by mouth 2 (two) times daily as needed for Pain., Disp: 360 tablet, Rfl: 1    gabapentin 800 MG Oral Tab, Take 1 tablet (800 mg total) by mouth 3 (three) times daily., Disp: 270 tablet, Rfl: 0    aspirin 81 MG Oral Tab EC, Take 1 tablet (81 mg total) by mouth daily., Disp: , Rfl:     betamethasone 0.1 % External Cream, Apply to area of rash with dressing change, Disp: 15 g, Rfl: 0    celecoxib 200 MG Oral Cap, Take 1 capsule (200 mg total) by mouth daily as needed., Disp: 90 capsule, Rfl: 3    METOPROLOL SUCCINATE OR, Take by mouth. Daily (per ), Disp: , Rfl:     enoxaparin (LOVENOX) 120 MG/0.8ML Injection Solution Prefilled Syringe, Inject 1 mg/kg into the skin 2 (two) times daily., Disp: , Rfl:     WARFARIN 2.5 MG Oral Tab, TAKE 1 TABLET BY MOUTH DAILY, Disp: 90 tablet, Rfl: 3    LOSARTAN POTASSIUM-HCTZ 100-12.5 MG Oral Tab, TAKE 1 TABLET BY MOUTH DAILY (Patient taking differently: Take 1 tablet by mouth at bedtime.), Disp: 90 tablet, Rfl: 3    SIMVASTATIN 20 MG Oral Tab, TAKE 1 TABLET BY MOUTH NIGHTLY, Disp: 90 tablet, Rfl: 3    Sildenafil Citrate 100 MG Oral Tab, Take 1 tablet (100 mg total) by mouth daily as needed for Erectile Dysfunction., Disp: 10 tablet, Rfl: 11    Glucosamine-Chondroit-Vit C-Mn (GLUCOSAMINE-CHONDROITIN) Oral Cap, Take 1 tablet by mouth daily., Disp: , Rfl:     omega-3 fatty acids (FISH OIL) 1000 MG Oral Cap, Take 1,000 mg by mouth daily., Disp: , Rfl:     Allergies   Allergen Reactions     Adhesive Tape RASH     Can use paper tape.            REVIEW OF SYSTEMS:     Review of Systems (ROS)  This information was obtained from the patient, chart    A comprehensive 10 point review of systems was completed.  Pertinent positives and negatives noted in the the HPI.     Past medical, surgical, family and social history updated where appropriate.    PHYSICAL EXAM:   /90   Pulse 69   Temp 97.5 °F (36.4 °C) (Temporal)   Resp 18   SpO2 94%    Estimated body mass index is 31.66 kg/m² as calculated from the following:    Height as of 6/24/24: 73\".    Weight as of 6/24/24: 240 lb.   Vital signs reviewed.Appears stated age, well groomed.      Awake, alert, in no acute distress  HENT:  normocephalic, atraumatic, external ear canals clear bilaterally, tympanic membranes appear opaque, non-bulging, non-erythematous, nasal turbinates are non-inflamed and not swollen, oropharynx without erythema, swelling, or exudate, gingiva and lips without any apparent lesions.   Cardiac:  S1 S2 regular rate and rhythm, no murmur, gallop, or rub  Respiratory:  Bilaterally clear to auscultation, no chest tenderness to palpation, no wheezing, no rhonchi, no rales, air entry is adequate, no accessory respiratory muscle use, no chest asymmetry, normal excursion.  GI:  bowel sound positive in all four quadrants, abdomen is soft, non-tender, non-distended, no hepatosplenomegaly, no abnormal aortic pulsation.     Calf                      Ankle                            Foot                            Wound 06/28/24 2 Foot Lateral;Left (Active)   Date First Assessed/Time First Assessed: 06/28/24 0945    Wound Number (Wound Clinic Only): 2  Location: Foot  Wound Location Orientation: Lateral;Left      Assessments 6/28/2024  9:40 AM 8/2/2024 12:07 PM   Wound Image       Site Assessment Moist;Pink;Yellow Pink;Tan   Closure Not approximated Not approximated   Drainage Amount Small Moderate   Drainage Description Serosanguineous  Brown;Serosanguineous   Treatments Cleansed;Compression Cleansed;Compression   Dressing Hydrofera ready;Martha --   Dressing Changed New Changed   Dressing Status Clean;Dry;Intact Dressing Changed   Wound Length (cm) 0.7 cm 0.9 cm   Wound Width (cm) 0.7 cm 3.9 cm   Wound Surface Area (cm^2) 0.49 cm^2 3.51 cm^2   Wound Depth (cm) 0.1 cm 0.1 cm   Wound Volume (cm^3) 0.049 cm^3 0.351 cm^3   Wound Healing % -- -616   Margins Attached edges Attached edges;Poorly defined   Non-staged Wound Description Full thickness Full thickness   Mary-wound Assessment Atrophy rogelio;Hyperpigmented Hyperpigmented;Callous;Dry   Wound Granulation Tissue Pink Pink;Red   Wound Bed Granulation (%) 25 % 80 %   Wound Bed Epithelium (%) 0 % 20 %   Wound Bed Slough (%) 75 % 0 %   Wound Bed Eschar (%) 0 % 0 %   Wound Bed Fibrin (%) 0 % 0 %   State of Healing Non-healing;Early/partial granulation Epithelialized;Fully granulated   Wound Odor None None       Active Orders   Date Order Priority Status Authorizing Provider   07/19/24 1004 OP Wound Dressing Routine Active Valentin Correa MD     - Cleansing:    Cleanse with normal saline or wound cleanser     - Dressing:    Endoform collagen     - Dressing:    Hydrofera transfer     - Dressing:    Kerramax/Super absorbent     - Dressing:    Kerlix     - Additional Wound Dressing Information:    3 layer comprilan     - Frequency:    Change dressing weekly   07/08/24 1624 OP Wound Dressing Routine Active Valentin Correa MD     - Cleansing:    Cleanse with normal saline or wound cleanser     - Additional Wound Dressing Information:    Left foot- iodosorb, aquacel, kerlix and antifungal powder, and comprilan     - Frequency:    Change dressing weekly       Inactive Orders   Date Order Priority Status Authorizing Provider   08/05/24 1046 OP Wound Dressing Routine Completed Valentin Correa MD     - Cleansing:    Cleanse with normal saline or wound cleanser     - Dressing:    Endoform collagen     -  Additional Wound Dressing Information:    mepitol contact layer, hydrofera blue ready, kenia and comprilan x 3     - Frequency:    Change dressing weekly   07/05/24 1139 OP Wound Dressing Routine Completed Willie Redd APRN     - Dressing:    Alginate     - Dressing:    Iodosorb     - Additional Wound Dressing Information:    zinc periwound, nystatin powder to skin under wraps     - Cleansing:    Cleanse with normal saline or wound cleanser     - Frequency:    Change dressing weekly       Compression Wrap 06/28/24 Dorsal;Left (Active)   Placement Date/Time: 06/28/24 1028   Wound Location Orientation: Dorsal;Left      Assessments 6/28/2024  9:40 AM 8/2/2024 12:07 PM   Response to Treatment Well tolerated Well tolerated   Compression Layers 3 3   Compression Product Type Artiflex 10cm;Artiflex 15cm;Comprilan 8cm;Comprilan 10cm;Comprilan 12cm;Stockinette 3in Artiflex 10cm;Artiflex 15cm;Comprilan 8cm;Comprilan 10cm;Comprilan 12cm;Stockinette 3in   Dressing Applied Yes Yes   Compression Wrap Location Toes to Knee Toes to Knee   Compression Wrap Status Clean;Dry;Intact Clean;Dry;Intact       Inactive Orders   Date Order Priority Status Authorizing Provider   07/05/24 1139 OP Wound Dressing Routine Completed Willie Redd APRN     - Dressing:    Alginate     - Dressing:    Iodosorb     - Additional Wound Dressing Information:    zinc periwound, nystatin powder to skin under wraps     - Cleansing:    Cleanse with normal saline or wound cleanser     - Frequency:    Change dressing weekly          ASSESSMENT AND PLAN:      1. Chronic venous hypertension (idiopathic) with ulcer of left lower extremity (HCC)  - OP Wound Dressing    2. Benign essential HTN  - OP Wound Dressing    Clinically the wound appears to be improving.  Will apply endoform collagen to the wound with Mepitel contact layer and then Hydrofera Blue transfer and zinc oxide to the periwound.  Continue Comprilan compression wrap to left lower  extremity.      Risks, benefits, and alternatives of current treatment plan discussed in detail.  Questions and concerns addressed. Red flags to RTC or ED reviewed.  Patient (or parent) agrees to plan.      Return in about 1 week (around 8/9/2024) for MD visit for 30 min.    Also refer to patient instructions.     I spent 40 minutes with the patient. This time included:  preparing to see the patient (eg, review notes and recent diagnostics), seeing the patient, performing a medically appropriate examination and/or evaluation, counseling and educating the patient, and documenting in the record.    I agree with staff documentation except for any changes made in my note.       Valentin Correa M.D., Wound Care Physician  [facility]  8/5/2024

## 2024-08-09 ENCOUNTER — OFFICE VISIT (OUTPATIENT)
Dept: WOUND CARE | Facility: HOSPITAL | Age: 69
End: 2024-08-09
Attending: FAMILY MEDICINE
Payer: MEDICARE

## 2024-08-09 VITALS
SYSTOLIC BLOOD PRESSURE: 125 MMHG | OXYGEN SATURATION: 97 % | HEART RATE: 71 BPM | DIASTOLIC BLOOD PRESSURE: 77 MMHG | TEMPERATURE: 100 F | RESPIRATION RATE: 18 BRPM

## 2024-08-09 DIAGNOSIS — I87.312 CHRONIC VENOUS HYPERTENSION (IDIOPATHIC) WITH ULCER OF LEFT LOWER EXTREMITY (HCC): Primary | ICD-10-CM

## 2024-08-09 DIAGNOSIS — L97.929 CHRONIC VENOUS HYPERTENSION (IDIOPATHIC) WITH ULCER OF LEFT LOWER EXTREMITY (HCC): Primary | ICD-10-CM

## 2024-08-09 DIAGNOSIS — I10 BENIGN ESSENTIAL HTN: ICD-10-CM

## 2024-08-09 PROCEDURE — 99215 OFFICE O/P EST HI 40 MIN: CPT | Performed by: FAMILY MEDICINE

## 2024-08-09 NOTE — PROGRESS NOTES
Chief Complaint   Patient presents with    Wound Recheck     Left lateral foot        HPI:     Patient here for follow-up of left lateral ankle wound.  He is doing well and the wound is improving.  He has no new concerns.  Tolerating compression wrap.    Lab Results   Component Value Date    BUN 21 04/08/2024    CREATSERUM 0.85 04/08/2024    ALB 4.4 04/08/2024    TP 7.0 04/08/2024    A1C 5.6 04/16/2018         Current Outpatient Medications:     tramadol-acetaminophen 37.5-325 MG Oral Tab, Take 2 tablets by mouth 2 (two) times daily as needed for Pain., Disp: 360 tablet, Rfl: 1    gabapentin 800 MG Oral Tab, Take 1 tablet (800 mg total) by mouth 3 (three) times daily., Disp: 270 tablet, Rfl: 0    aspirin 81 MG Oral Tab EC, Take 1 tablet (81 mg total) by mouth daily., Disp: , Rfl:     betamethasone 0.1 % External Cream, Apply to area of rash with dressing change, Disp: 15 g, Rfl: 0    celecoxib 200 MG Oral Cap, Take 1 capsule (200 mg total) by mouth daily as needed., Disp: 90 capsule, Rfl: 3    enoxaparin (LOVENOX) 120 MG/0.8ML Injection Solution Prefilled Syringe, Inject 1 mg/kg into the skin 2 (two) times daily., Disp: , Rfl:     WARFARIN 2.5 MG Oral Tab, TAKE 1 TABLET BY MOUTH DAILY, Disp: 90 tablet, Rfl: 3    LOSARTAN POTASSIUM-HCTZ 100-12.5 MG Oral Tab, TAKE 1 TABLET BY MOUTH DAILY (Patient taking differently: Take 1 tablet by mouth at bedtime.), Disp: 90 tablet, Rfl: 3    SIMVASTATIN 20 MG Oral Tab, TAKE 1 TABLET BY MOUTH NIGHTLY, Disp: 90 tablet, Rfl: 3    Sildenafil Citrate 100 MG Oral Tab, Take 1 tablet (100 mg total) by mouth daily as needed for Erectile Dysfunction., Disp: 10 tablet, Rfl: 11    Glucosamine-Chondroit-Vit C-Mn (GLUCOSAMINE-CHONDROITIN) Oral Cap, Take 1 tablet by mouth daily., Disp: , Rfl:     omega-3 fatty acids (FISH OIL) 1000 MG Oral Cap, Take 1,000 mg by mouth daily., Disp: , Rfl:     METOPROLOL SUCCINATE OR, Take by mouth. Daily (per ), Disp: , Rfl:     Allergies   Allergen  Reactions    Adhesive Tape RASH     Can use paper tape.            REVIEW OF SYSTEMS:     Review of Systems (ROS)  This information was obtained from the patient, chart    A comprehensive 10 point review of systems was completed.  Pertinent positives and negatives noted in the the HPI.     Past medical, surgical, family and social history updated where appropriate.    PHYSICAL EXAM:   /77   Pulse 71   Temp 99.9 °F (37.7 °C)   Resp 18   SpO2 97%    Estimated body mass index is 31.66 kg/m² as calculated from the following:    Height as of 6/24/24: 73\".    Weight as of 6/24/24: 240 lb (108.9 kg).   Vital signs reviewed.Appears stated age, well groomed.      Awake, alert, in no acute distress  HENT:  normocephalic, atraumatic, external ear canals clear bilaterally, tympanic membranes appear opaque, non-bulging, non-erythematous, nasal turbinates are non-inflamed and not swollen, oropharynx without erythema, swelling, or exudate, gingiva and lips without any apparent lesions.   Cardiac:  S1 S2 regular rate and rhythm, no murmur, gallop, or rub  Respiratory:  Bilaterally clear to auscultation, no chest tenderness to palpation, no wheezing, no rhonchi, no rales, air entry is adequate, no accessory respiratory muscle use, no chest asymmetry, normal excursion.  GI:  bowel sound positive in all four quadrants, abdomen is soft, non-tender, non-distended, no hepatosplenomegaly, no abnormal aortic pulsation.     Calf                      Ankle                            Foot                            Wound 06/28/24 2 Foot Lateral;Left (Active)   Date First Assessed/Time First Assessed: 06/28/24 0945    Wound Number (Wound Clinic Only): 2  Location: Foot  Wound Location Orientation: Lateral;Left      Assessments 6/28/2024  9:40 AM 8/9/2024 11:46 AM   Wound Image       Site Assessment Moist;Pink;Yellow Pink;Tan;Moist;Red   Closure Not approximated Not approximated   Drainage Amount Small Moderate   Drainage Description  Serosanguineous Brown;Serosanguineous   Treatments Cleansed;Compression Cleansed;Compression   Dressing Hydrofera ready;Martha --   Dressing Changed New Changed   Dressing Status Clean;Dry;Intact Dressing Changed   Wound Length (cm) 0.7 cm 0.3 cm   Wound Width (cm) 0.7 cm 2.6 cm   Wound Surface Area (cm^2) 0.49 cm^2 0.78 cm^2   Wound Depth (cm) 0.1 cm 0.1 cm   Wound Volume (cm^3) 0.049 cm^3 0.078 cm^3   Wound Healing % -- -59   Margins Attached edges Attached edges   Non-staged Wound Description Full thickness Full thickness   Mary-wound Assessment Atrophy rogelio;Hyperpigmented Hyperpigmented;Dry   Wound Granulation Tissue Pink Pink;Red   Wound Bed Granulation (%) 25 % 60 %   Wound Bed Epithelium (%) 0 % 40 %   Wound Bed Slough (%) 75 % 0 %   Wound Bed Eschar (%) 0 % 0 %   Wound Bed Fibrin (%) 0 % 0 %   State of Healing Non-healing;Early/partial granulation Epithelialized;Fully granulated   Wound Odor None None       Active Orders   Date Order Priority Status Authorizing Provider   07/19/24 1004 OP Wound Dressing Routine Active Valentin Correa MD     - Cleansing:    Cleanse with normal saline or wound cleanser     - Dressing:    Endoform collagen     - Dressing:    Hydrofera transfer     - Dressing:    Kerramax/Super absorbent     - Dressing:    Kerlix     - Additional Wound Dressing Information:    3 layer comprilan     - Frequency:    Change dressing weekly   07/08/24 1624 OP Wound Dressing Routine Active Valentin Correa MD     - Cleansing:    Cleanse with normal saline or wound cleanser     - Additional Wound Dressing Information:    Left foot- iodosorb, aquacel, kerlix and antifungal powder, and comprilan     - Frequency:    Change dressing weekly       Inactive Orders   Date Order Priority Status Authorizing Provider   08/05/24 1046 OP Wound Dressing Routine Completed Valentin Correa MD     - Cleansing:    Cleanse with normal saline or wound cleanser     - Dressing:    Endoform collagen     - Additional  Wound Dressing Information:    mepitol contact layer, hydrofera blue ready, kenia and comprilan x 3     - Frequency:    Change dressing weekly   07/05/24 1139 OP Wound Dressing Routine Completed Willie Redd APRN     - Dressing:    Alginate     - Dressing:    Iodosorb     - Additional Wound Dressing Information:    zinc periwound, nystatin powder to skin under wraps     - Cleansing:    Cleanse with normal saline or wound cleanser     - Frequency:    Change dressing weekly       Compression Wrap 06/28/24 Dorsal;Left (Active)   Placement Date/Time: 06/28/24 1028   Wound Location Orientation: Dorsal;Left      Assessments 6/28/2024  9:40 AM 8/9/2024 11:46 AM   Response to Treatment Well tolerated Well tolerated   Compression Layers 3 3   Compression Product Type Artiflex 10cm;Artiflex 15cm;Comprilan 8cm;Comprilan 10cm;Comprilan 12cm;Stockinette 3in Artiflex 10cm;Artiflex 15cm;Comprilan 8cm;Comprilan 10cm;Comprilan 12cm;Stockinette 3in   Dressing Applied Yes Yes   Compression Wrap Location Toes to Knee Toes to Knee   Compression Wrap Status Clean;Dry;Intact Clean;Dry;Intact       Inactive Orders   Date Order Priority Status Authorizing Provider   07/05/24 1139 OP Wound Dressing Routine Completed Willie Redd APRN     - Dressing:    Alginate     - Dressing:    Iodosorb     - Additional Wound Dressing Information:    zinc periwound, nystatin powder to skin under wraps     - Cleansing:    Cleanse with normal saline or wound cleanser     - Frequency:    Change dressing weekly          ASSESSMENT AND PLAN:      1. Chronic venous hypertension (idiopathic) with ulcer of left lower extremity (HCC)    2. Benign essential HTN    Clinically patient is tolerating compression wrap and the wound is improving the wound was stimulated with a curette and there was slight bleeding controlled with gauze pressure.  Will continue with endoform and Hydrofera Blue dressing and Comprilan 3 layer compression wrap.  Patient to follow-up with  me in 1 week.  Risks, benefits, and alternatives of current treatment plan discussed in detail.  Questions and concerns addressed. Red flags to RTC or ED reviewed.  Patient (or parent) agrees to plan.      Return in about 1 week (around 8/16/2024) for MD visit for 30 min.    Also refer to patient instructions.     I spent 40 minutes with the patient. This time included:  preparing to see the patient (eg, review notes and recent diagnostics), seeing the patient, performing a medically appropriate examination and/or evaluation, counseling and educating the patient, and documenting in the record.    I agree with staff documentation except for any changes made in my note.       Valentin Correa M.D., Wound Care Physician  [facility]  8/9/2024

## 2024-08-12 PROBLEM — E66.01 MORBID OBESITY  (CMD): Status: RESOLVED | Noted: 2024-05-13 | Resolved: 2024-08-12

## 2024-08-12 PROBLEM — Z96.649 HISTORY OF HIP REPLACEMENT: Status: ACTIVE | Noted: 2024-08-12

## 2024-08-12 PROBLEM — I48.20 CHRONIC ATRIAL FIBRILLATION  (CMD): Status: ACTIVE | Noted: 2024-08-12

## 2024-08-16 ENCOUNTER — OFFICE VISIT (OUTPATIENT)
Dept: WOUND CARE | Facility: HOSPITAL | Age: 69
End: 2024-08-16
Attending: FAMILY MEDICINE
Payer: MEDICARE

## 2024-08-16 VITALS
RESPIRATION RATE: 18 BRPM | OXYGEN SATURATION: 93 % | TEMPERATURE: 98 F | SYSTOLIC BLOOD PRESSURE: 138 MMHG | DIASTOLIC BLOOD PRESSURE: 83 MMHG | HEART RATE: 70 BPM

## 2024-08-16 DIAGNOSIS — L97.929 CHRONIC VENOUS HYPERTENSION (IDIOPATHIC) WITH ULCER OF LEFT LOWER EXTREMITY (HCC): Primary | ICD-10-CM

## 2024-08-16 DIAGNOSIS — I87.312 CHRONIC VENOUS HYPERTENSION (IDIOPATHIC) WITH ULCER OF LEFT LOWER EXTREMITY (HCC): Primary | ICD-10-CM

## 2024-08-16 DIAGNOSIS — I10 BENIGN ESSENTIAL HTN: ICD-10-CM

## 2024-08-16 PROCEDURE — 99215 OFFICE O/P EST HI 40 MIN: CPT | Performed by: FAMILY MEDICINE

## 2024-08-16 NOTE — PATIENT INSTRUCTIONS
PATIENT INSTRUCTIONS      FOR Russ Kirkland ,  1955    DATE OF SERVICE: 2024    Hydrofera Blue Transfer to the left foot wound  Zinc oxide barrier cream  Comprilan compression wrap to left lower extremity    DO NOT GET WRAP WET      Follow up with Dr. Correa in 1 week        Managing your edema:    Avoid prolonged standing in one place. It is better to have your calf muscles moving to pump fluid out of the legs.  Elevate leg(s) above the level of the heart when sitting or as much as possible.  Take you diuretics as directed by your physician. Do not skip doses or change doses unless instructed to do so by your physician.  Decrease salt intake, follow recommended 2 grams daily.  Do not get leg(s) with compression wrap wet. If wraps are too tight as indicated by pain, numbness/tingling or discoloration of toes remove wrap completely and call the wound center @ [phone]       The treatment plan has been discussed at length between you and your provider. Follow all instructions carefully, it is very important. If you do not follow all instructions you are at risk of your wound not healing, infection, possible loss of limb and even loss of life.    COMPRESSION WRAP PATIENT CARE INSTRUCTIONS  DO NOT get compression wrap wet. When showering, use a shower boot.   Please contact wound clinic and if not able to reach, then go to emergency room if ANY of the following occur:   Tingling or numbness in the foot or toes on leg with compression wrap.  Severe pain that cannot be relieved with elevation and any medication instructed per your doctor.  A fever of 100.4°F (38°C) or higher.  Swelling, coldness, or blue-gray discoloration of the toes.  If the compression wrap feels too tight or too loose.  If the compression wrap is damaged or has rough edges that hurt.  If the compression wrap gets wet, you must cut wrap off.   Drainage from compression wrap that smells different than usual.         normal...

## 2024-08-16 NOTE — PROGRESS NOTES
Weekly Wound Education Note    Teaching Provided To: Patient  Training Topics: Cleasing and general instructions;Compression;Dressing;Edema control;Nutrition;Skin care;Safe and effective use of medical equipement and /or supplies;Signs and symptoms of infection  Training Method: Demonstration  Training Response: Reinforcement needed        Notes: Left leg- silver nitrate used, hydrofera blue and kerlix. comprilan x 3

## 2024-08-17 NOTE — PROGRESS NOTES
Chief Complaint   Patient presents with    Wound Recheck     Left lateral foot        HPI:     Patient is here for follow-up of left lateral foot wound. He is doing well and tolerating compression wrap without difficulty.    Lab Results   Component Value Date    BUN 21 04/08/2024    CREATSERUM 0.85 04/08/2024    ALB 4.4 04/08/2024    TP 7.0 04/08/2024    A1C 5.6 04/16/2018         Current Outpatient Medications:     tramadol-acetaminophen 37.5-325 MG Oral Tab, Take 2 tablets by mouth 2 (two) times daily as needed for Pain., Disp: 360 tablet, Rfl: 1    gabapentin 800 MG Oral Tab, Take 1 tablet (800 mg total) by mouth 3 (three) times daily., Disp: 270 tablet, Rfl: 0    aspirin 81 MG Oral Tab EC, Take 1 tablet (81 mg total) by mouth daily., Disp: , Rfl:     betamethasone 0.1 % External Cream, Apply to area of rash with dressing change, Disp: 15 g, Rfl: 0    celecoxib 200 MG Oral Cap, Take 1 capsule (200 mg total) by mouth daily as needed., Disp: 90 capsule, Rfl: 3    enoxaparin (LOVENOX) 120 MG/0.8ML Injection Solution Prefilled Syringe, Inject 1 mg/kg into the skin 2 (two) times daily., Disp: , Rfl:     WARFARIN 2.5 MG Oral Tab, TAKE 1 TABLET BY MOUTH DAILY, Disp: 90 tablet, Rfl: 3    LOSARTAN POTASSIUM-HCTZ 100-12.5 MG Oral Tab, TAKE 1 TABLET BY MOUTH DAILY (Patient taking differently: Take 1 tablet by mouth at bedtime.), Disp: 90 tablet, Rfl: 3    SIMVASTATIN 20 MG Oral Tab, TAKE 1 TABLET BY MOUTH NIGHTLY, Disp: 90 tablet, Rfl: 3    Sildenafil Citrate 100 MG Oral Tab, Take 1 tablet (100 mg total) by mouth daily as needed for Erectile Dysfunction., Disp: 10 tablet, Rfl: 11    Glucosamine-Chondroit-Vit C-Mn (GLUCOSAMINE-CHONDROITIN) Oral Cap, Take 1 tablet by mouth daily., Disp: , Rfl:     omega-3 fatty acids (FISH OIL) 1000 MG Oral Cap, Take 1,000 mg by mouth daily., Disp: , Rfl:     METOPROLOL SUCCINATE OR, Take by mouth. Daily (per ), Disp: , Rfl:     Allergies   Allergen Reactions    Adhesive Tape RASH      Can use paper tape.            REVIEW OF SYSTEMS:     Review of Systems (ROS)  This information was obtained from the patient, chart    A comprehensive 10 point review of systems was completed.  Pertinent positives and negatives noted in the the HPI.     Past medical, surgical, family and social history updated where appropriate.    PHYSICAL EXAM:   /83   Pulse 70   Temp 98.3 °F (36.8 °C)   Resp 18   SpO2 93%    Estimated body mass index is 31.66 kg/m² as calculated from the following:    Height as of 6/24/24: 73\".    Weight as of 6/24/24: 240 lb.   Vital signs reviewed.Appears stated age, well groomed.      Awake, alert, in no acute distress  HENT:  normocephalic, atraumatic, external ear canals clear bilaterally, tympanic membranes appear opaque, non-bulging, non-erythematous, nasal turbinates are non-inflamed and not swollen, oropharynx without erythema, swelling, or exudate, gingiva and lips without any apparent lesions.   Cardiac:  S1 S2 regular rate and rhythm, no murmur, gallop, or rub  Respiratory:  Bilaterally clear to auscultation, no chest tenderness to palpation, no wheezing, no rhonchi, no rales, air entry is adequate, no accessory respiratory muscle use, no chest asymmetry, normal excursion.  GI:  bowel sound positive in all four quadrants, abdomen is soft, non-tender, non-distended, no hepatosplenomegaly, no abnormal aortic pulsation.     Calf  Point of Measurement - Left Calf: 30     Left Calf from:: Heel  Calf Left cm:: 38.5          Ankle  Point of Measurement - Left Ankle: 10     Left Ankle from:: Heel  Left Ankle cm:: 24                Foot  Point of Measurement - Left Foot: 12  Left Foot from:: Great toe  Left Foot cm:: 26.3                   Wound 06/28/24 2 Foot Lateral;Left (Active)   Date First Assessed/Time First Assessed: 06/28/24 0945    Wound Number (Wound Clinic Only): 2  Location: Foot  Wound Location Orientation: Lateral;Left      Assessments 6/28/2024  9:40 AM 8/16/2024  11:23 AM   Wound Image       Site Assessment Moist;Pink;Yellow Moist;Red;Pink   Closure Not approximated Not approximated   Drainage Amount Small Moderate   Drainage Description Serosanguineous Brown;Serosanguineous   Treatments Cleansed;Compression Cleansed;Compression   Dressing Hydrofera ready;Martha --   Dressing Changed New Changed   Dressing Status Clean;Dry;Intact Dressing Changed   Wound Length (cm) 0.7 cm 0.5 cm   Wound Width (cm) 0.7 cm 3 cm   Wound Surface Area (cm^2) 0.49 cm^2 1.5 cm^2   Wound Depth (cm) 0.1 cm 0.1 cm   Wound Volume (cm^3) 0.049 cm^3 0.15 cm^3   Wound Healing % -- -206   Margins Attached edges Poorly defined;Attached edges   Non-staged Wound Description Full thickness Full thickness   Mary-wound Assessment Atrophy rogelio;Hyperpigmented Hyperpigmented;Dry;Pink   Wound Granulation Tissue Pink Red   Wound Bed Granulation (%) 25 % 80 %   Wound Bed Epithelium (%) 0 % 20 %   Wound Bed Slough (%) 75 % 0 %   Wound Bed Eschar (%) 0 % 0 %   Wound Bed Fibrin (%) 0 % 0 %   State of Healing Non-healing;Early/partial granulation Fully granulated   Wound Odor None None       Active Orders   Date Order Priority Status Authorizing Provider   08/17/24 0956 OP Wound Dressing Routine Active Valentin Correa MD     - Cleansing:    Cleanse with normal saline or wound cleanser     - Dressing:    Hydrofera transfer     - Dressing:    Conforming roll     - Additional Wound Dressing Information:    comprilan x 3   07/19/24 1004 OP Wound Dressing Routine Active Valentin Correa MD     - Cleansing:    Cleanse with normal saline or wound cleanser     - Dressing:    Endoform collagen     - Dressing:    Hydrofera transfer     - Dressing:    Kerramax/Super absorbent     - Dressing:    Kerlix     - Additional Wound Dressing Information:    3 layer comprilan     - Frequency:    Change dressing weekly   07/08/24 1624 OP Wound Dressing Routine Active Valentin Correa MD     - Cleansing:    Cleanse with normal saline or  wound cleanser     - Additional Wound Dressing Information:    Left foot- iodosorb, aquacel, kerlix and antifungal powder, and comprilan     - Frequency:    Change dressing weekly       Inactive Orders   Date Order Priority Status Authorizing Provider   08/05/24 1046 OP Wound Dressing Routine Completed Valentin Correa MD     - Cleansing:    Cleanse with normal saline or wound cleanser     - Dressing:    Endoform collagen     - Additional Wound Dressing Information:    mepitol contact layer, hydrofera blue ready, kenia and comprilan x 3     - Frequency:    Change dressing weekly   07/05/24 1139 OP Wound Dressing Routine Completed Willie Redd APRN     - Dressing:    Alginate     - Dressing:    Iodosorb     - Additional Wound Dressing Information:    zinc periwound, nystatin powder to skin under wraps     - Cleansing:    Cleanse with normal saline or wound cleanser     - Frequency:    Change dressing weekly       Compression Wrap 06/28/24 Dorsal;Left (Active)   Placement Date/Time: 06/28/24 1028   Wound Location Orientation: Dorsal;Left      Assessments 6/28/2024  9:40 AM 8/16/2024 11:23 AM   Response to Treatment Well tolerated Well tolerated   Compression Layers 3 3   Compression Product Type Artiflex 10cm;Artiflex 15cm;Comprilan 8cm;Comprilan 10cm;Comprilan 12cm;Stockinette 3in Artiflex 10cm;Artiflex 15cm;Comprilan 8cm;Comprilan 10cm;Comprilan 12cm;Stockinette 3in   Dressing Applied Yes Yes   Compression Wrap Location Toes to Knee Toes to Knee   Compression Wrap Status Clean;Dry;Intact Clean;Dry;Intact       Inactive Orders   Date Order Priority Status Authorizing Provider   07/05/24 1139 OP Wound Dressing Routine Completed Willie Redd APRN     - Dressing:    Alginate     - Dressing:    Iodosorb     - Additional Wound Dressing Information:    zinc periwound, nystatin powder to skin under wraps     - Cleansing:    Cleanse with normal saline or wound cleanser     - Frequency:    Change dressing weekly           ASSESSMENT AND PLAN:      1. Chronic venous hypertension (idiopathic) with ulcer of left lower extremity (HCC)  - OP Wound Dressing; Future    2. Benign essential HTN  - OP Wound Dressing; Future    Clinically the wound is getting smaller still.  Silver nitrate applied to the wound base.  Will continue just with Hydrofera Blue transfer now without collagen and zinc oxide to the periwound.  Continue Comprilan compression wrap to left lower extremity patient tolerating well and he did go over compression wrap precautions of once again.  This is done with each visit.  Risks, benefits, and alternatives of current treatment plan discussed in detail.  Questions and concerns addressed. Red flags to RTC or ED reviewed.  Patient (or parent) agrees to plan.      Return in about 1 week (around 8/23/2024) for MD visit for 30 min.    Also refer to patient instructions.     I spent 40 minutes with the patient. This time included:  preparing to see the patient (eg, review notes and recent diagnostics), seeing the patient, performing a medically appropriate examination and/or evaluation, counseling and educating the patient, and documenting in the record.    I agree with staff documentation except for any changes made in my note.       Valentin Correa M.D., Wound Care Physician  [facility]  8/17/2024

## 2024-08-23 ENCOUNTER — OFFICE VISIT (OUTPATIENT)
Dept: WOUND CARE | Facility: HOSPITAL | Age: 69
End: 2024-08-23
Attending: FAMILY MEDICINE
Payer: MEDICARE

## 2024-08-23 VITALS
TEMPERATURE: 97 F | HEART RATE: 70 BPM | SYSTOLIC BLOOD PRESSURE: 146 MMHG | OXYGEN SATURATION: 97 % | DIASTOLIC BLOOD PRESSURE: 83 MMHG | RESPIRATION RATE: 20 BRPM

## 2024-08-23 DIAGNOSIS — L97.929 CHRONIC VENOUS HYPERTENSION (IDIOPATHIC) WITH ULCER OF LEFT LOWER EXTREMITY (HCC): Primary | ICD-10-CM

## 2024-08-23 DIAGNOSIS — I87.312 CHRONIC VENOUS HYPERTENSION (IDIOPATHIC) WITH ULCER OF LEFT LOWER EXTREMITY (HCC): Primary | ICD-10-CM

## 2024-08-23 DIAGNOSIS — I10 BENIGN ESSENTIAL HTN: ICD-10-CM

## 2024-08-23 PROCEDURE — 99215 OFFICE O/P EST HI 40 MIN: CPT | Performed by: FAMILY MEDICINE

## 2024-08-24 NOTE — PROGRESS NOTES
Chief Complaint   Patient presents with    Wound Care     Follow up visit for left leg.       HPI:     Patient here for follow-up of left lateral ankle wound.  He is doing well and has no new concerns.    Lab Results   Component Value Date    BUN 21 04/08/2024    CREATSERUM 0.85 04/08/2024    ALB 4.4 04/08/2024    TP 7.0 04/08/2024    A1C 5.6 04/16/2018         Current Outpatient Medications:     tramadol-acetaminophen 37.5-325 MG Oral Tab, Take 2 tablets by mouth 2 (two) times daily as needed for Pain., Disp: 360 tablet, Rfl: 1    gabapentin 800 MG Oral Tab, Take 1 tablet (800 mg total) by mouth 3 (three) times daily., Disp: 270 tablet, Rfl: 0    aspirin 81 MG Oral Tab EC, Take 1 tablet (81 mg total) by mouth daily., Disp: , Rfl:     betamethasone 0.1 % External Cream, Apply to area of rash with dressing change, Disp: 15 g, Rfl: 0    celecoxib 200 MG Oral Cap, Take 1 capsule (200 mg total) by mouth daily as needed., Disp: 90 capsule, Rfl: 3    METOPROLOL SUCCINATE OR, Take by mouth. Daily (per ), Disp: , Rfl:     enoxaparin (LOVENOX) 120 MG/0.8ML Injection Solution Prefilled Syringe, Inject 1 mg/kg into the skin 2 (two) times daily., Disp: , Rfl:     WARFARIN 2.5 MG Oral Tab, TAKE 1 TABLET BY MOUTH DAILY, Disp: 90 tablet, Rfl: 3    LOSARTAN POTASSIUM-HCTZ 100-12.5 MG Oral Tab, TAKE 1 TABLET BY MOUTH DAILY (Patient taking differently: Take 1 tablet by mouth at bedtime.), Disp: 90 tablet, Rfl: 3    SIMVASTATIN 20 MG Oral Tab, TAKE 1 TABLET BY MOUTH NIGHTLY, Disp: 90 tablet, Rfl: 3    Sildenafil Citrate 100 MG Oral Tab, Take 1 tablet (100 mg total) by mouth daily as needed for Erectile Dysfunction., Disp: 10 tablet, Rfl: 11    Glucosamine-Chondroit-Vit C-Mn (GLUCOSAMINE-CHONDROITIN) Oral Cap, Take 1 tablet by mouth daily., Disp: , Rfl:     omega-3 fatty acids (FISH OIL) 1000 MG Oral Cap, Take 1,000 mg by mouth daily., Disp: , Rfl:     Allergies   Allergen Reactions    Adhesive Tape RASH     Can use paper tape.             REVIEW OF SYSTEMS:     Review of Systems (ROS)  This information was obtained from the patient, chart    A comprehensive 10 point review of systems was completed.  Pertinent positives and negatives noted in the the HPI.     Past medical, surgical, family and social history updated where appropriate.    PHYSICAL EXAM:   /83   Pulse 70   Temp 97.3 °F (36.3 °C) (Oral)   Resp 20   SpO2 97%    Estimated body mass index is 31.66 kg/m² as calculated from the following:    Height as of 6/24/24: 73\".    Weight as of 6/24/24: 240 lb.   Vital signs reviewed.Appears stated age, well groomed.      Awake, alert, in no acute distress  HENT:  normocephalic, atraumatic, external ear canals clear bilaterally, tympanic membranes appear opaque, non-bulging, non-erythematous, nasal turbinates are non-inflamed and not swollen, oropharynx without erythema, swelling, or exudate, gingiva and lips without any apparent lesions.   Cardiac:  S1 S2 regular rate and rhythm, no murmur, gallop, or rub  Respiratory:  Bilaterally clear to auscultation, no chest tenderness to palpation, no wheezing, no rhonchi, no rales, air entry is adequate, no accessory respiratory muscle use, no chest asymmetry, normal excursion.  GI:  bowel sound positive in all four quadrants, abdomen is soft, non-tender, non-distended, no hepatosplenomegaly, no abnormal aortic pulsation.     Calf                      Ankle                            Foot                            Wound 06/28/24 2 Foot Lateral;Left (Active)   Date First Assessed/Time First Assessed: 06/28/24 0945    Wound Number (Wound Clinic Only): 2  Location: Foot  Wound Location Orientation: Lateral;Left      Assessments 6/28/2024  9:40 AM 8/23/2024 11:33 AM   Wound Image       Site Assessment Moist;Pink;Yellow Pink;Epithelialization;Clean;Moist   Closure Not approximated Not approximated   Drainage Amount Small Small   Drainage Description Serosanguineous Brown   Treatments  Cleansed;Compression Cleansed;Compression   Dressing Hydrofera ready;Martha --   Dressing Changed New Changed   Dressing Status Clean;Dry;Intact Dressing Changed   Wound Length (cm) 0.7 cm 0.2 cm   Wound Width (cm) 0.7 cm 0.5 cm   Wound Surface Area (cm^2) 0.49 cm^2 0.1 cm^2   Wound Depth (cm) 0.1 cm 0.1 cm   Wound Volume (cm^3) 0.049 cm^3 0.01 cm^3   Wound Healing % -- 80   Margins Attached edges Poorly defined;Attached edges   Non-staged Wound Description Full thickness Partial thickness   Mary-wound Assessment Atrophy rogelio;Hyperpigmented Hyperpigmented;Dry;Pink   Wound Granulation Tissue Pink --   Wound Bed Granulation (%) 25 % 0 %   Wound Bed Epithelium (%) 0 % 90 %   Wound Bed Slough (%) 75 % 0 %   Wound Bed Eschar (%) 0 % 0 %   Wound Bed Fibrin (%) 0 % 0 %   State of Healing Non-healing;Early/partial granulation Epithelialized   Wound Odor None None       Active Orders   Date Order Priority Status Authorizing Provider   08/17/24 0956 OP Wound Dressing Routine Active Valentin Correa MD     - Cleansing:    Cleanse with normal saline or wound cleanser     - Dressing:    Hydrofera transfer     - Dressing:    Conforming roll     - Additional Wound Dressing Information:    comprilan x 3   07/19/24 1004 OP Wound Dressing Routine Active Valentin Correa MD     - Cleansing:    Cleanse with normal saline or wound cleanser     - Dressing:    Endoform collagen     - Dressing:    Hydrofera transfer     - Dressing:    Kerramax/Super absorbent     - Dressing:    Kerlix     - Additional Wound Dressing Information:    3 layer comprilan     - Frequency:    Change dressing weekly   07/08/24 1624 OP Wound Dressing Routine Active Valentin Correa MD     - Cleansing:    Cleanse with normal saline or wound cleanser     - Additional Wound Dressing Information:    Left foot- iodosorb, aquacel, kerlix and antifungal powder, and comprilan     - Frequency:    Change dressing weekly       Inactive Orders   Date Order Priority Status  Authorizing Provider   08/05/24 1046 OP Wound Dressing Routine Completed Valentin Correa MD     - Cleansing:    Cleanse with normal saline or wound cleanser     - Dressing:    Endoform collagen     - Additional Wound Dressing Information:    mepitol contact layer, hydrofera blue ready, kenia and comprilan x 3     - Frequency:    Change dressing weekly   07/05/24 1139 OP Wound Dressing Routine Completed Willie Redd APRN     - Dressing:    Alginate     - Dressing:    Iodosorb     - Additional Wound Dressing Information:    zinc periwound, nystatin powder to skin under wraps     - Cleansing:    Cleanse with normal saline or wound cleanser     - Frequency:    Change dressing weekly       Compression Wrap 06/28/24 Dorsal;Left (Active)   Placement Date/Time: 06/28/24 1028   Wound Location Orientation: Dorsal;Left      Assessments 6/28/2024  9:40 AM 8/23/2024 11:33 AM   Response to Treatment Well tolerated Well tolerated   Compression Layers 3 3   Compression Product Type Artiflex 10cm;Artiflex 15cm;Comprilan 8cm;Comprilan 10cm;Comprilan 12cm;Stockinette 3in Artiflex 10cm;Artiflex 15cm;Comprilan 8cm;Comprilan 10cm;Comprilan 12cm;Stockinette 3in   Dressing Applied Yes Yes   Compression Wrap Location Toes to Knee Toes to Knee   Compression Wrap Status Clean;Dry;Intact Clean;Dry;Intact       Inactive Orders   Date Order Priority Status Authorizing Provider   07/05/24 1139 OP Wound Dressing Routine Completed Willie Redd APRN     - Dressing:    Alginate     - Dressing:    Iodosorb     - Additional Wound Dressing Information:    zinc periwound, nystatin powder to skin under wraps     - Cleansing:    Cleanse with normal saline or wound cleanser     - Frequency:    Change dressing weekly          ASSESSMENT AND PLAN:      1. Chronic venous hypertension (idiopathic) with ulcer of left lower extremity (HCC)    2. Benign essential HTN    The wound is significantly better and the very small opening now in the posterior aspect  of the wound.  This is about 1 x 3 mm.  Silver nitrate applied and will continue Hydrofera Blue and Comprilan 3 layer compression wrap.  Patient tolerated compression wrap well and we did go over compression wrap precautions once again.  Risks, benefits, and alternatives of current treatment plan discussed in detail.  Questions and concerns addressed. Red flags to RTC or ED reviewed.  Patient (or parent) agrees to plan.      Return in about 1 week (around 8/30/2024) for MD visit for 30 min.    Also refer to patient instructions.     I spent 40 minutes with the patient. This time included:  preparing to see the patient (eg, review notes and recent diagnostics), seeing the patient, performing a medically appropriate examination and/or evaluation, counseling and educating the patient, and documenting in the record.    I agree with staff documentation except for any changes made in my note.       Valentin Correa M.D., Wound Care Physician  [facility]  8/24/2024

## 2024-08-30 ENCOUNTER — OFFICE VISIT (OUTPATIENT)
Dept: WOUND CARE | Facility: HOSPITAL | Age: 69
End: 2024-08-30
Attending: FAMILY MEDICINE
Payer: MEDICARE

## 2024-08-30 VITALS
SYSTOLIC BLOOD PRESSURE: 146 MMHG | HEART RATE: 72 BPM | OXYGEN SATURATION: 97 % | TEMPERATURE: 98 F | RESPIRATION RATE: 18 BRPM | DIASTOLIC BLOOD PRESSURE: 84 MMHG

## 2024-08-30 DIAGNOSIS — I10 BENIGN ESSENTIAL HTN: ICD-10-CM

## 2024-08-30 DIAGNOSIS — I87.312 CHRONIC VENOUS HYPERTENSION (IDIOPATHIC) WITH ULCER OF LEFT LOWER EXTREMITY (HCC): Primary | ICD-10-CM

## 2024-08-30 DIAGNOSIS — L97.929 CHRONIC VENOUS HYPERTENSION (IDIOPATHIC) WITH ULCER OF LEFT LOWER EXTREMITY (HCC): Primary | ICD-10-CM

## 2024-08-30 PROCEDURE — 99215 OFFICE O/P EST HI 40 MIN: CPT | Performed by: FAMILY MEDICINE

## 2024-08-30 NOTE — PROGRESS NOTES
Chief Complaint   Patient presents with    Wound Recheck     Left lateral foot       HPI:     Patient here for follow-up of left lateral ankle wound.  He is doing fine and tolerating compression wrap.  He has no new complaints.    Lab Results   Component Value Date    BUN 21 04/08/2024    CREATSERUM 0.85 04/08/2024    ALB 4.4 04/08/2024    TP 7.0 04/08/2024    A1C 5.6 04/16/2018         Current Outpatient Medications:     tramadol-acetaminophen 37.5-325 MG Oral Tab, Take 2 tablets by mouth 2 (two) times daily as needed for Pain., Disp: 360 tablet, Rfl: 1    gabapentin 800 MG Oral Tab, Take 1 tablet (800 mg total) by mouth 3 (three) times daily., Disp: 270 tablet, Rfl: 0    aspirin 81 MG Oral Tab EC, Take 1 tablet (81 mg total) by mouth daily., Disp: , Rfl:     betamethasone 0.1 % External Cream, Apply to area of rash with dressing change, Disp: 15 g, Rfl: 0    celecoxib 200 MG Oral Cap, Take 1 capsule (200 mg total) by mouth daily as needed., Disp: 90 capsule, Rfl: 3    METOPROLOL SUCCINATE OR, Take by mouth. Daily (per ), Disp: , Rfl:     enoxaparin (LOVENOX) 120 MG/0.8ML Injection Solution Prefilled Syringe, Inject 1 mg/kg into the skin 2 (two) times daily., Disp: , Rfl:     WARFARIN 2.5 MG Oral Tab, TAKE 1 TABLET BY MOUTH DAILY, Disp: 90 tablet, Rfl: 3    LOSARTAN POTASSIUM-HCTZ 100-12.5 MG Oral Tab, TAKE 1 TABLET BY MOUTH DAILY (Patient taking differently: Take 1 tablet by mouth at bedtime.), Disp: 90 tablet, Rfl: 3    SIMVASTATIN 20 MG Oral Tab, TAKE 1 TABLET BY MOUTH NIGHTLY, Disp: 90 tablet, Rfl: 3    Sildenafil Citrate 100 MG Oral Tab, Take 1 tablet (100 mg total) by mouth daily as needed for Erectile Dysfunction., Disp: 10 tablet, Rfl: 11    Glucosamine-Chondroit-Vit C-Mn (GLUCOSAMINE-CHONDROITIN) Oral Cap, Take 1 tablet by mouth daily., Disp: , Rfl:     omega-3 fatty acids (FISH OIL) 1000 MG Oral Cap, Take 1,000 mg by mouth daily., Disp: , Rfl:     Allergies   Allergen Reactions    Adhesive Tape  RASH     Can use paper tape.            REVIEW OF SYSTEMS:     Review of Systems (ROS)  This information was obtained from the patient, chart    A comprehensive 10 point review of systems was completed.  Pertinent positives and negatives noted in the the HPI.     Past medical, surgical, family and social history updated where appropriate.    PHYSICAL EXAM:   /84 (BP Location: Right arm, Patient Position: Sitting, Cuff Size: adult)   Pulse 72   Temp 98.1 °F (36.7 °C) (Temporal)   Resp 18   SpO2 97%    Estimated body mass index is 31.66 kg/m² as calculated from the following:    Height as of 6/24/24: 73\".    Weight as of 6/24/24: 240 lb (108.9 kg).   Vital signs reviewed.Appears stated age, well groomed.      Awake, alert, in no acute distress  HENT:  normocephalic, atraumatic, external ear canals clear bilaterally, tympanic membranes appear opaque, non-bulging, non-erythematous, nasal turbinates are non-inflamed and not swollen, oropharynx without erythema, swelling, or exudate, gingiva and lips without any apparent lesions.   Cardiac:  S1 S2 regular rate and rhythm, no murmur, gallop, or rub  Respiratory:  Bilaterally clear to auscultation, no chest tenderness to palpation, no wheezing, no rhonchi, no rales, air entry is adequate, no accessory respiratory muscle use, no chest asymmetry, normal excursion.  GI:  bowel sound positive in all four quadrants, abdomen is soft, non-tender, non-distended, no hepatosplenomegaly, no abnormal aortic pulsation.     Calf                      Ankle                            Foot                            Wound 06/28/24 2 Foot Lateral;Left (Active)   Date First Assessed/Time First Assessed: 06/28/24 0945    Wound Number (Wound Clinic Only): 2  Location: Foot  Wound Location Orientation: Lateral;Left      Assessments 6/28/2024  9:40 AM 8/23/2024 11:33 AM   Wound Image       Site Assessment Moist;Pink;Yellow Pink;Epithelialization;Clean;Moist   Closure Not approximated  Not approximated   Drainage Amount Small Small   Drainage Description Serosanguineous Brown   Treatments Cleansed;Compression Cleansed;Compression   Dressing Hydrofera ready;Martha --   Dressing Changed New Changed   Dressing Status Clean;Dry;Intact Dressing Changed   Wound Length (cm) 0.7 cm 0.2 cm   Wound Width (cm) 0.7 cm 0.5 cm   Wound Surface Area (cm^2) 0.49 cm^2 0.1 cm^2   Wound Depth (cm) 0.1 cm 0.1 cm   Wound Volume (cm^3) 0.049 cm^3 0.01 cm^3   Wound Healing % -- 80   Margins Attached edges Poorly defined;Attached edges   Non-staged Wound Description Full thickness Partial thickness   Mary-wound Assessment Atrophy rogelio;Hyperpigmented Hyperpigmented;Dry;Pink   Wound Granulation Tissue Pink --   Wound Bed Granulation (%) 25 % 0 %   Wound Bed Epithelium (%) 0 % 90 %   Wound Bed Slough (%) 75 % 0 %   Wound Bed Eschar (%) 0 % 0 %   Wound Bed Fibrin (%) 0 % 0 %   State of Healing Non-healing;Early/partial granulation Epithelialized   Wound Odor None None       Active Orders   Date Order Priority Status Authorizing Provider   08/17/24 0956 OP Wound Dressing Routine Active Valentin Correa MD     - Cleansing:    Cleanse with normal saline or wound cleanser     - Dressing:    Hydrofera transfer     - Dressing:    Conforming roll     - Additional Wound Dressing Information:    comprilan x 3   07/19/24 1004 OP Wound Dressing Routine Active Valentin Correa MD     - Cleansing:    Cleanse with normal saline or wound cleanser     - Dressing:    Endoform collagen     - Dressing:    Hydrofera transfer     - Dressing:    Kerramax/Super absorbent     - Dressing:    Kerlix     - Additional Wound Dressing Information:    3 layer comprilan     - Frequency:    Change dressing weekly   07/08/24 1624 OP Wound Dressing Routine Active Valentin Correa MD     - Cleansing:    Cleanse with normal saline or wound cleanser     - Additional Wound Dressing Information:    Left foot- iodosorb, aquacel, kerlix and antifungal powder, and  comprilan     - Frequency:    Change dressing weekly       Inactive Orders   Date Order Priority Status Authorizing Provider   08/05/24 1046 OP Wound Dressing Routine Completed Valentin Correa MD     - Cleansing:    Cleanse with normal saline or wound cleanser     - Dressing:    Endoform collagen     - Additional Wound Dressing Information:    mepitol contact layer, hydrofera blue ready, kenia and comprilan x 3     - Frequency:    Change dressing weekly   07/05/24 1139 OP Wound Dressing Routine Completed Willie Redd APRN     - Dressing:    Alginate     - Dressing:    Iodosorb     - Additional Wound Dressing Information:    zinc periwound, nystatin powder to skin under wraps     - Cleansing:    Cleanse with normal saline or wound cleanser     - Frequency:    Change dressing weekly       Compression Wrap 06/28/24 Dorsal;Left (Active)   Placement Date/Time: 06/28/24 1028   Wound Location Orientation: Dorsal;Left      Assessments 6/28/2024  9:40 AM 8/23/2024 11:33 AM   Response to Treatment Well tolerated Well tolerated   Compression Layers 3 3   Compression Product Type Artiflex 10cm;Artiflex 15cm;Comprilan 8cm;Comprilan 10cm;Comprilan 12cm;Stockinette 3in Artiflex 10cm;Artiflex 15cm;Comprilan 8cm;Comprilan 10cm;Comprilan 12cm;Stockinette 3in   Dressing Applied Yes Yes   Compression Wrap Location Toes to Knee Toes to Knee   Compression Wrap Status Clean;Dry;Intact Clean;Dry;Intact       Inactive Orders   Date Order Priority Status Authorizing Provider   07/05/24 1139 OP Wound Dressing Routine Completed Willie Redd APRN     - Dressing:    Alginate     - Dressing:    Iodosorb     - Additional Wound Dressing Information:    zinc periwound, nystatin powder to skin under wraps     - Cleansing:    Cleanse with normal saline or wound cleanser     - Frequency:    Change dressing weekly          ASSESSMENT AND PLAN:      1. Chronic venous hypertension (idiopathic) with ulcer of left lower extremity (HCC)    2. Benign  essential HTN    The wound has shown some improvement however it is still not completely closed.  Will have him continue with Hydrofera Blue transfer but will also use collagen and terms of Fibracol plus to the wound base.  Continue with Comprilan 3 layer compression wrap to left lower extremity.  We did  the patient regarding compression wrap precautions.  Risks, benefits, and alternatives of current treatment plan discussed in detail.  Questions and concerns addressed. Red flags to RTC or ED reviewed.  Patient (or parent) agrees to plan.      No follow-ups on file.    Also refer to patient instructions.     I spent 40 minutes with the patient. This time included:  preparing to see the patient (eg, review notes and recent diagnostics), seeing the patient, performing a medically appropriate examination and/or evaluation, counseling and educating the patient, and documenting in the record.    I agree with staff documentation except for any changes made in my note.       Valentin Correa M.D., Wound Care Physician  [facility]  8/30/2024

## 2024-08-30 NOTE — PROGRESS NOTES
Weekly Wound Education Note    Teaching Provided To: Patient  Training Topics: Cleasing and general instructions;Compression;Dressing;Edema control;Nutrition;Skin care;Safe and effective use of medical equipement and /or supplies;Signs and symptoms of infection  Training Method: Demonstration  Training Response: Reinforcement needed        Notes: Left leg- fibracol, hydrofera blue transfer, kenia. Jenn x 3

## 2024-08-30 NOTE — PATIENT INSTRUCTIONS
PATIENT INSTRUCTIONS      FOR Russ Kirkland ,  1955    DATE OF SERVICE: 2024    Fibracol Plus collagen  Hydrofera Blue Transfer to the left foot wound  Zinc oxide barrier cream  Comprilan compression wrap to left lower extremity    DO NOT GET WRAP WET      Follow up with Dr. Correa in 1 week        Managing your edema:    Avoid prolonged standing in one place. It is better to have your calf muscles moving to pump fluid out of the legs.  Elevate leg(s) above the level of the heart when sitting or as much as possible.  Take you diuretics as directed by your physician. Do not skip doses or change doses unless instructed to do so by your physician.  Decrease salt intake, follow recommended 2 grams daily.  Do not get leg(s) with compression wrap wet. If wraps are too tight as indicated by pain, numbness/tingling or discoloration of toes remove wrap completely and call the wound center @ [phone]       The treatment plan has been discussed at length between you and your provider. Follow all instructions carefully, it is very important. If you do not follow all instructions you are at risk of your wound not healing, infection, possible loss of limb and even loss of life.    COMPRESSION WRAP PATIENT CARE INSTRUCTIONS  DO NOT get compression wrap wet. When showering, use a shower boot.   Please contact wound clinic and if not able to reach, then go to emergency room if ANY of the following occur:   Tingling or numbness in the foot or toes on leg with compression wrap.  Severe pain that cannot be relieved with elevation and any medication instructed per your doctor.  A fever of 100.4°F (38°C) or higher.  Swelling, coldness, or blue-gray discoloration of the toes.  If the compression wrap feels too tight or too loose.  If the compression wrap is damaged or has rough edges that hurt.  If the compression wrap gets wet, you must cut wrap off.   Drainage from compression wrap that smells different than  usual.

## 2024-09-06 ENCOUNTER — OFFICE VISIT (OUTPATIENT)
Dept: WOUND CARE | Facility: HOSPITAL | Age: 69
End: 2024-09-06
Attending: FAMILY MEDICINE
Payer: MEDICARE

## 2024-09-06 ENCOUNTER — LAB ENCOUNTER (OUTPATIENT)
Dept: LAB | Facility: HOSPITAL | Age: 69
End: 2024-09-06
Attending: INTERNAL MEDICINE
Payer: MEDICARE

## 2024-09-06 VITALS
HEART RATE: 70 BPM | TEMPERATURE: 98 F | OXYGEN SATURATION: 94 % | SYSTOLIC BLOOD PRESSURE: 127 MMHG | RESPIRATION RATE: 20 BRPM | DIASTOLIC BLOOD PRESSURE: 83 MMHG

## 2024-09-06 DIAGNOSIS — I63.9 CEREBROVASCULAR ACCIDENT (CVA), UNSPECIFIED MECHANISM (HCC): ICD-10-CM

## 2024-09-06 DIAGNOSIS — I10 BENIGN ESSENTIAL HTN: ICD-10-CM

## 2024-09-06 DIAGNOSIS — I48.20 CHRONIC ATRIAL FIBRILLATION (HCC): ICD-10-CM

## 2024-09-06 DIAGNOSIS — L97.929 CHRONIC VENOUS HYPERTENSION (IDIOPATHIC) WITH ULCER OF LEFT LOWER EXTREMITY (HCC): Primary | ICD-10-CM

## 2024-09-06 DIAGNOSIS — I87.312 CHRONIC VENOUS HYPERTENSION (IDIOPATHIC) WITH ULCER OF LEFT LOWER EXTREMITY (HCC): Primary | ICD-10-CM

## 2024-09-06 LAB — INR BLDC: 2.6 (ref 0.9–1.1)

## 2024-09-06 PROCEDURE — 36415 COLL VENOUS BLD VENIPUNCTURE: CPT

## 2024-09-06 PROCEDURE — 99215 OFFICE O/P EST HI 40 MIN: CPT | Performed by: FAMILY MEDICINE

## 2024-09-06 PROCEDURE — 85610 PROTHROMBIN TIME: CPT

## 2024-09-06 NOTE — PROGRESS NOTES
No chief complaint on file.      HPI:     Patient here for follow-up of left lateral foot wound.  He is doing well and tolerating compression wrap.  He has no new concerns.    Lab Results   Component Value Date    BUN 21 04/08/2024    CREATSERUM 0.85 04/08/2024    ALB 4.4 04/08/2024    TP 7.0 04/08/2024    A1C 5.6 04/16/2018         Current Outpatient Medications:     tramadol-acetaminophen 37.5-325 MG Oral Tab, Take 2 tablets by mouth 2 (two) times daily as needed for Pain., Disp: 360 tablet, Rfl: 1    gabapentin 800 MG Oral Tab, Take 1 tablet (800 mg total) by mouth 3 (three) times daily., Disp: 270 tablet, Rfl: 0    aspirin 81 MG Oral Tab EC, Take 1 tablet (81 mg total) by mouth daily., Disp: , Rfl:     betamethasone 0.1 % External Cream, Apply to area of rash with dressing change, Disp: 15 g, Rfl: 0    celecoxib 200 MG Oral Cap, Take 1 capsule (200 mg total) by mouth daily as needed., Disp: 90 capsule, Rfl: 3    METOPROLOL SUCCINATE OR, Take by mouth. Daily (per ), Disp: , Rfl:     enoxaparin (LOVENOX) 120 MG/0.8ML Injection Solution Prefilled Syringe, Inject 1 mg/kg into the skin 2 (two) times daily., Disp: , Rfl:     WARFARIN 2.5 MG Oral Tab, TAKE 1 TABLET BY MOUTH DAILY, Disp: 90 tablet, Rfl: 3    LOSARTAN POTASSIUM-HCTZ 100-12.5 MG Oral Tab, TAKE 1 TABLET BY MOUTH DAILY (Patient taking differently: Take 1 tablet by mouth at bedtime.), Disp: 90 tablet, Rfl: 3    SIMVASTATIN 20 MG Oral Tab, TAKE 1 TABLET BY MOUTH NIGHTLY, Disp: 90 tablet, Rfl: 3    Sildenafil Citrate 100 MG Oral Tab, Take 1 tablet (100 mg total) by mouth daily as needed for Erectile Dysfunction., Disp: 10 tablet, Rfl: 11    Glucosamine-Chondroit-Vit C-Mn (GLUCOSAMINE-CHONDROITIN) Oral Cap, Take 1 tablet by mouth daily., Disp: , Rfl:     omega-3 fatty acids (FISH OIL) 1000 MG Oral Cap, Take 1,000 mg by mouth daily., Disp: , Rfl:     Allergies   Allergen Reactions    Adhesive Tape RASH     Can use paper tape.            REVIEW OF SYSTEMS:      Review of Systems (ROS)  This information was obtained from the patient, chart    A comprehensive 10 point review of systems was completed.  Pertinent positives and negatives noted in the the HPI.     Past medical, surgical, family and social history updated where appropriate.    PHYSICAL EXAM:   /83   Pulse 70   Temp 98.2 °F (36.8 °C) (Oral)   Resp 20   SpO2 94%    Estimated body mass index is 31.66 kg/m² as calculated from the following:    Height as of 6/24/24: 73\".    Weight as of 6/24/24: 240 lb.   Vital signs reviewed.Appears stated age, well groomed.      Awake, alert, in no acute distress  HENT:  normocephalic, atraumatic, external ear canals clear bilaterally, tympanic membranes appear opaque, non-bulging, non-erythematous, nasal turbinates are non-inflamed and not swollen, oropharynx without erythema, swelling, or exudate, gingiva and lips without any apparent lesions.   Cardiac:  S1 S2 regular rate and rhythm, no murmur, gallop, or rub  Respiratory:  Bilaterally clear to auscultation, no chest tenderness to palpation, no wheezing, no rhonchi, no rales, air entry is adequate, no accessory respiratory muscle use, no chest asymmetry, normal excursion.  GI:  bowel sound positive in all four quadrants, abdomen is soft, non-tender, non-distended, no hepatosplenomegaly, no abnormal aortic pulsation.     Calf                      Ankle                            Foot                            Wound 06/28/24 2 Foot Lateral;Left (Active)   Date First Assessed/Time First Assessed: 06/28/24 0945    Wound Number (Wound Clinic Only): 2  Location: Foot  Wound Location Orientation: Lateral;Left      Assessments 6/28/2024  9:40 AM 8/30/2024 11:43 AM   Wound Image       Site Assessment Moist;Pink;Yellow Moist;Red   Closure Not approximated Not approximated   Drainage Amount Small None   Drainage Description Serosanguineous --   Treatments Cleansed;Compression Wound    Dressing Hydrofera ready;Martha --    Dressing Changed New Changed   Dressing Status Clean;Dry;Intact Dressing Changed   Wound Length (cm) 0.7 cm 0.9 cm   Wound Width (cm) 0.7 cm 0.3 cm   Wound Surface Area (cm^2) 0.49 cm^2 0.27 cm^2   Wound Depth (cm) 0.1 cm 0.1 cm   Wound Volume (cm^3) 0.049 cm^3 0.027 cm^3   Wound Healing % -- 45   Margins Attached edges Poorly defined;Attached edges   Non-staged Wound Description Full thickness Full thickness   Mary-wound Assessment Atrophy rogelio;Hyperpigmented Hyperpigmented;Dry;Pink   Wound Granulation Tissue Pink Red   Wound Bed Granulation (%) 25 % 100 %   Wound Bed Epithelium (%) 0 % --   Wound Bed Slough (%) 75 % --   Wound Bed Eschar (%) 0 % --   Wound Bed Fibrin (%) 0 % --   State of Healing Non-healing;Early/partial granulation Fully granulated   Wound Odor None None       Active Orders   Date Order Priority Status Authorizing Provider   08/30/24 1208 OP Wound Dressing Routine Active Valentin Correa MD     - Cleansing:    Cleanse with normal saline or wound cleanser     - Dressing:    Collagen     - Dressing:    Hydrofera ready     - Additional Wound Dressing Information:    comprilan x 3     - Frequency:    Change dressing weekly   08/17/24 0956 OP Wound Dressing Routine Active Valentin Correa MD     - Cleansing:    Cleanse with normal saline or wound cleanser     - Dressing:    Hydrofera transfer     - Dressing:    Conforming roll     - Additional Wound Dressing Information:    comprilan x 3   07/19/24 1004 OP Wound Dressing Routine Active Valentin Correa MD     - Cleansing:    Cleanse with normal saline or wound cleanser     - Dressing:    Endoform collagen     - Dressing:    Hydrofera transfer     - Dressing:    Kerramax/Super absorbent     - Dressing:    Kerlix     - Additional Wound Dressing Information:    3 layer comprilan     - Frequency:    Change dressing weekly   07/08/24 1624 OP Wound Dressing Routine Active Valentin Correa MD     - Cleansing:    Cleanse with normal saline or wound  cleanser     - Additional Wound Dressing Information:    Left foot- iodosorb, aquacel, kerlix and antifungal powder, and comprilan     - Frequency:    Change dressing weekly       Inactive Orders   Date Order Priority Status Authorizing Provider   08/05/24 1046 OP Wound Dressing Routine Completed Valentin Correa MD     - Cleansing:    Cleanse with normal saline or wound cleanser     - Dressing:    Endoform collagen     - Additional Wound Dressing Information:    mepitol contact layer, hydrofera blue ready, kenia and comprilan x 3     - Frequency:    Change dressing weekly   07/05/24 1139 OP Wound Dressing Routine Completed Willie Redd APRN     - Dressing:    Alginate     - Dressing:    Iodosorb     - Additional Wound Dressing Information:    zinc periwound, nystatin powder to skin under wraps     - Cleansing:    Cleanse with normal saline or wound cleanser     - Frequency:    Change dressing weekly       Compression Wrap 06/28/24 Dorsal;Left (Active)   Placement Date/Time: 06/28/24 1028   Wound Location Orientation: Dorsal;Left      Assessments 6/28/2024  9:40 AM 8/30/2024 11:43 AM   Response to Treatment Well tolerated Well tolerated   Compression Layers 3 3   Compression Product Type Artiflex 10cm;Artiflex 15cm;Comprilan 8cm;Comprilan 10cm;Comprilan 12cm;Stockinette 3in Artiflex 10cm;Artiflex 15cm;Comprilan 8cm;Comprilan 10cm;Comprilan 12cm;Stockinette 3in   Dressing Applied Yes Yes   Compression Wrap Location Toes to Knee Toes to Knee   Compression Wrap Status Clean;Dry;Intact Clean;Dry;Intact       Inactive Orders   Date Order Priority Status Authorizing Provider   07/05/24 1139 OP Wound Dressing Routine Completed Willie Redd APRN     - Dressing:    Alginate     - Dressing:    Iodosorb     - Additional Wound Dressing Information:    zinc periwound, nystatin powder to skin under wraps     - Cleansing:    Cleanse with normal saline or wound cleanser     - Frequency:    Change dressing weekly           ASSESSMENT AND PLAN:      1. Chronic venous hypertension (idiopathic) with ulcer of left lower extremity (HCC)    2. Benign essential HTN    There is an area of scab formation in the posterior aspect of the wound but the original wound is completely healed.  I did remove the scab and there is underlying granulated wound.  Will apply collagen and Hydrofera Blue to this area and continue with Comprilan compression wrap to left lower extremity.  Patient tolerating well.  He will do a nurse visit in 1 week and follow-up with me in 2 weeks.  Risks, benefits, and alternatives of current treatment plan discussed in detail.  Questions and concerns addressed. Red flags to RTC or ED reviewed.  Patient (or parent) agrees to plan.      No follow-ups on file.    Also refer to patient instructions.     I spent 40 minutes with the patient. This time included:  preparing to see the patient (eg, review notes and recent diagnostics), seeing the patient, performing a medically appropriate examination and/or evaluation, counseling and educating the patient, and documenting in the record.    I agree with staff documentation except for any changes made in my note.       Valentin Correa M.D., Wound Care Physician  [facility]  9/6/2024

## 2024-09-06 NOTE — PATIENT INSTRUCTIONS
PATIENT INSTRUCTIONS      FOR Russ Kirkland ,  1955    DATE OF SERVICE: 2024    Fibracol Plus collagen  Hydrofera Blue Transfer to the left foot wound  Zinc oxide barrier cream  Comprilan compression wrap to left lower extremity    DO NOT GET WRAP WET    Nurse visit in 1 week    Follow up with Dr. Correa in 2 weeks        Managing your edema:    Avoid prolonged standing in one place. It is better to have your calf muscles moving to pump fluid out of the legs.  Elevate leg(s) above the level of the heart when sitting or as much as possible.  Take you diuretics as directed by your physician. Do not skip doses or change doses unless instructed to do so by your physician.  Decrease salt intake, follow recommended 2 grams daily.  Do not get leg(s) with compression wrap wet. If wraps are too tight as indicated by pain, numbness/tingling or discoloration of toes remove wrap completely and call the wound center @ [phone]       The treatment plan has been discussed at length between you and your provider. Follow all instructions carefully, it is very important. If you do not follow all instructions you are at risk of your wound not healing, infection, possible loss of limb and even loss of life.    COMPRESSION WRAP PATIENT CARE INSTRUCTIONS  DO NOT get compression wrap wet. When showering, use a shower boot.   Please contact wound clinic and if not able to reach, then go to emergency room if ANY of the following occur:   Tingling or numbness in the foot or toes on leg with compression wrap.  Severe pain that cannot be relieved with elevation and any medication instructed per your doctor.  A fever of 100.4°F (38°C) or higher.  Swelling, coldness, or blue-gray discoloration of the toes.  If the compression wrap feels too tight or too loose.  If the compression wrap is damaged or has rough edges that hurt.  If the compression wrap gets wet, you must cut wrap off.   Drainage from compression wrap that  smells different than usual.

## 2024-09-13 ENCOUNTER — OFFICE VISIT (OUTPATIENT)
Dept: SURGERY | Facility: CLINIC | Age: 69
End: 2024-09-13

## 2024-09-13 ENCOUNTER — NURSE ONLY (OUTPATIENT)
Dept: WOUND CARE | Facility: HOSPITAL | Age: 69
End: 2024-09-13
Attending: FAMILY MEDICINE
Payer: MEDICARE

## 2024-09-13 VITALS
RESPIRATION RATE: 20 BRPM | OXYGEN SATURATION: 96 % | TEMPERATURE: 97 F | DIASTOLIC BLOOD PRESSURE: 89 MMHG | SYSTOLIC BLOOD PRESSURE: 151 MMHG | HEART RATE: 73 BPM

## 2024-09-13 DIAGNOSIS — L97.929 CHRONIC VENOUS HYPERTENSION (IDIOPATHIC) WITH ULCER OF LEFT LOWER EXTREMITY (HCC): ICD-10-CM

## 2024-09-13 DIAGNOSIS — N52.9 ERECTILE DYSFUNCTION OF ORGANIC ORIGIN: Primary | ICD-10-CM

## 2024-09-13 DIAGNOSIS — I87.312 CHRONIC VENOUS HYPERTENSION (IDIOPATHIC) WITH ULCER OF LEFT LOWER EXTREMITY (HCC): ICD-10-CM

## 2024-09-13 PROCEDURE — 29581 APPL MULTLAYER CMPRN SYS LEG: CPT

## 2024-09-13 PROCEDURE — 99204 OFFICE O/P NEW MOD 45 MIN: CPT | Performed by: SURGERY

## 2024-09-13 RX ORDER — ACETAMINOPHEN 160 MG
TABLET,DISINTEGRATING ORAL
COMMUNITY
Start: 2023-07-10

## 2024-09-13 RX ORDER — SILDENAFIL 100 MG/1
100 TABLET, FILM COATED ORAL
Qty: 30 TABLET | Refills: 11 | Status: SHIPPED | OUTPATIENT
Start: 2024-09-13

## 2024-09-13 NOTE — PROGRESS NOTES
Urology Clinic Note - New Patient    Referring Provider:  No referring provider defined for this encounter.     Primary Care Provider:  Christie De Souza MD     Chief Complaint:   Erectile dysfunction    HPI:   Russ Kirkland is a 69 year old male with history of atrial fibrillation, DVT, hypertension, hyperlipidemia, TEJINDER, CVA referred for erectile dysfunction.    He is on Viagra 100 mg as needed.  This only provides about 50% erections.  He has tried Cialis in the past as well.  He has had ED for many years.    Testosterone:  No results found for: \"TESTOSTERONE\"     Hemoglobin A1c:  Lab Results   Component Value Date    A1C 5.6 04/16/2018       PSA:  Lab Results   Component Value Date    PSA 2.70 04/11/2022    PSA 3.36 01/27/2021    PSA 4.63 (H) 12/11/2020    PSA 3.10 05/26/2020    PSA 1.72 04/12/2019    PSA 1.3 03/24/2018    PSA 1.5 02/13/2017    PSAS 3.34 04/22/2024    PSAS 3.41 04/14/2023    PSAS 3.29 03/27/2020    PSAS 2.05 03/09/2019    TOTPSASCREEN 1.4 05/02/2016    TOTPSASCREEN 1.9 02/28/2015    TOTPSASCREEN 1.4 01/27/2014    TOTPSASCREEN 1.2 11/03/2012        History:     Past Medical History:    Acute perforated appendicitis    Acute, but ill-defined, cerebrovascular disease    Arrhythmia    afib    Atrial fibrillation (HCC)    ablation (2004); CV (May 2009)    Cataract    Deep vein thrombosis (HCC)    can not remember which leg    Hearing impairment    bilateral hearing aids    High blood pressure    High cholesterol    History of blood transfusion    no reaction    History of vein stripping    Osteoarthrosis, unspecified whether generalized or localized, unspecified site    Sleep apnea    CPAP device    Stroke (HCC)    2019    UGI bleed    Unspecified essential hypertension    Venous insufficiency    RT greater saphenous vein ablation 2007       Past Surgical History:   Procedure Laterality Date    Appendectomy  12/04/2019    Cardiac pacemaker placement  11/30/2022    Cataract  11/02/2022    right eye     Colonoscopy  10/2011    Colonoscopy N/A 11/16/2016    Procedure: COLONOSCOPY;  Surgeon: Edis Roberts MD;  Location: [facility] ENDOSCOPY    Colonoscopy  03/01/2022    Diverticulosis, Hemorrhoids          Repeat 2027    Colonoscopy N/A 03/01/2022    Procedure: COLONOSCOPY,;  Surgeon: Nicola Juarez MD;  [facility]    Hernia surgery      Hip replacement surgery Left 05/2008    Hip replacement surgery Right 06/2017    Moles Left 2011    removed from L upper quadrant. Biopsy (pre-cancerous? -- Patient unsure if cancerous)    Total hip replacement Bilateral     Valve repair  2002    mitral valve repair       Family History   Problem Relation Age of Onset    Colon Cancer Father     Arthritis Other     Hypertension Other        Social History     Socioeconomic History    Marital status:    Tobacco Use    Smoking status: Never    Smokeless tobacco: Never   Vaping Use    Vaping status: Never Used   Substance and Sexual Activity    Alcohol use: Yes     Alcohol/week: 15.0 standard drinks of alcohol     Types: 15 Glasses of wine per week     Comment: 2 glasses of wine/day    Drug use: No   Other Topics Concern    Caffeine Concern No    Pt has a pacemaker No    Pt has a defibrillator No    Reaction to local anesthetic No   Social History Narrative    The patient does not use an assistive device..      The patient does live in a home with stairs.     Social Determinants of Health      [facility]    Social Connections    Received from [facility], [facility]    Housing Stability       Medications (Active prior to today's visit):  Current Outpatient Medications   Medication Sig Dispense Refill    cholecalciferol 50 MCG (2000 UT) Oral Cap cholecalciferol (vitamin D3) 50 mcg (2,000 unit) capsule, [RxNorm: 371576]      tramadol-acetaminophen 37.5-325 MG Oral Tab Take 2 tablets by mouth 2 (two) times  daily as needed for Pain. 360 tablet 1    gabapentin 800 MG Oral Tab Take 1 tablet (800 mg total) by mouth 3 (three) times daily. 270 tablet 0    aspirin 81 MG Oral Tab EC Take 1 tablet (81 mg total) by mouth daily.      betamethasone 0.1 % External Cream Apply to area of rash with dressing change 15 g 0    celecoxib 200 MG Oral Cap Take 1 capsule (200 mg total) by mouth daily as needed. 90 capsule 3    enoxaparin (LOVENOX) 120 MG/0.8ML Injection Solution Prefilled Syringe Inject 1 mg/kg into the skin 2 (two) times daily.      WARFARIN 2.5 MG Oral Tab TAKE 1 TABLET BY MOUTH DAILY 90 tablet 3    LOSARTAN POTASSIUM-HCTZ 100-12.5 MG Oral Tab TAKE 1 TABLET BY MOUTH DAILY (Patient taking differently: Take 1 tablet by mouth at bedtime.) 90 tablet 3    SIMVASTATIN 20 MG Oral Tab TAKE 1 TABLET BY MOUTH NIGHTLY 90 tablet 3    Sildenafil Citrate 100 MG Oral Tab Take 1 tablet (100 mg total) by mouth daily as needed for Erectile Dysfunction. 10 tablet 11    Glucosamine-Chondroit-Vit C-Mn (GLUCOSAMINE-CHONDROITIN) Oral Cap Take 1 tablet by mouth daily.      omega-3 fatty acids (FISH OIL) 1000 MG Oral Cap Take 1,000 mg by mouth daily.      METOPROLOL SUCCINATE OR Take by mouth. Daily (per )         Allergies:  Allergies   Allergen Reactions    Adhesive Tape RASH     Can use paper tape.         Review of Systems:   A comprehensive 10-point review of systems was completed.  Pertinent positives and negatives are noted in the the HPI.    Physical Exam:   CONSTITUTIONAL: Well developed, well nourished, in no acute distress  NEUROLOGIC: Alert and oriented  HEAD: Normocephalic, atraumatic  EYES: Sclera non-icteric  ENT: Hearing intact, moist mucous membranes  NECK: No obvious goiter or masses  RESPIRATORY: Normal respiratory effort  SKIN: No evident rashes  ABDOMEN: Soft, non-tender, non-distended    Assessment & Plan:   Russ Kirkland is a 69 year old male with history of atrial fibrillation, DVT, hypertension, hyperlipidemia,  TEJINDER, CVA referred for erectile dysfunction.    He is on Viagra 100 mg as needed.  This only provides about 50% erections.  He has tried Cialis in the past as well.  He has had ED for many years.    -Discussed options of Viagra 150 mg as needed versus intracavernosal injections of Trimix  -He wants to trial Viagra 150 mg and if this does not work then he is potentially interested in trying Trimix injections    Thank you for this consult.    I have personally reviewed all relevant medical records, labs, and imaging.    Medical Decision Making  ED: Undiagnosed new problem    Amount and/or Complexity of Data Reviewed  External Data Reviewed: labs and notes.    Risk  Prescription drug management.          Kevin George MD  Staff Urologist  [facility]  Office: [phone]

## 2024-09-13 NOTE — PROGRESS NOTES
09/13/24 0813   Wound 06/28/24 2 Foot Lateral;Left   Date First Assessed/Time First Assessed: 06/28/24 0945    Wound Number (Wound Clinic Only): 2  Location: Foot  Wound Location Orientation: Lateral;Left   Wound Image    Site Assessment Moist;Red   Closure Not approximated   Drainage Amount Scant   Drainage Description Brown;Scabbed   Treatments Wound    Dressing   (fibracol, hydrofera blue ready, kenia)   Dressing Changed Changed   Dressing Status Dressing Changed   Wound Length (cm) 0.3 cm   Wound Width (cm) 0.3 cm   Wound Surface Area (cm^2) 0.09 cm^2   Wound Depth (cm) 0.1 cm   Wound Volume (cm^3) 0.009 cm^3   Wound Healing % 82   Margins Poorly defined;Attached edges   Non-staged Wound Description Full thickness   Mary-wound Assessment Hyperpigmented;Dry;Pink   Wound Granulation Tissue Red   Wound Bed Granulation (%) 100 %   Wound Bed Epithelium (%) 0 %   Wound Bed Slough (%) 0 %   Wound Bed Eschar (%) 0 %   Wound Bed Fibrin (%) 0 %   State of Healing Fully granulated   Wound Odor None   Compression Wrap 06/28/24 Dorsal;Left   Placement Date/Time: 06/28/24 1028   Wound Location Orientation: Dorsal;Left   Response to Treatment Well tolerated   Compression Layers 3   Compression Product Type Artiflex 10cm;Artiflex 15cm;Comprilan 8cm;Comprilan 10cm;Comprilan 12cm;Stockinette 3in   Dressing Applied Yes   Compression Wrap Location Toes to Knee   Compression Wrap Status Clean;Dry;Intact     Weekly Wound Education Note    Teaching Provided To: Patient  Training Topics: Cleasing and general instructions;Compression;Dressing;Edema control;Nutrition;Skin care;Safe and effective use of medical equipement and /or supplies;Signs and symptoms of infection  Training Method: Demonstration  Training Response: Reinforcement needed        Notes: Left leg-fibracol, hydrofera blue, cling and comprilan x 3

## 2024-09-20 ENCOUNTER — OFFICE VISIT (OUTPATIENT)
Dept: WOUND CARE | Facility: HOSPITAL | Age: 69
End: 2024-09-20
Attending: FAMILY MEDICINE
Payer: MEDICARE

## 2024-09-20 VITALS
TEMPERATURE: 98 F | SYSTOLIC BLOOD PRESSURE: 135 MMHG | HEART RATE: 67 BPM | OXYGEN SATURATION: 96 % | RESPIRATION RATE: 18 BRPM | DIASTOLIC BLOOD PRESSURE: 84 MMHG

## 2024-09-20 DIAGNOSIS — I87.312 CHRONIC VENOUS HYPERTENSION (IDIOPATHIC) WITH ULCER OF LEFT LOWER EXTREMITY (HCC): Primary | ICD-10-CM

## 2024-09-20 DIAGNOSIS — I10 BENIGN ESSENTIAL HTN: ICD-10-CM

## 2024-09-20 DIAGNOSIS — L97.312 VARICOSE VEINS OF RIGHT LOWER EXTREMITY WITH INFLAMMATION, WITH ULCER OF ANKLE WITH FAT LAYER EXPOSED (HCC): ICD-10-CM

## 2024-09-20 DIAGNOSIS — L97.929 CHRONIC VENOUS HYPERTENSION (IDIOPATHIC) WITH ULCER OF LEFT LOWER EXTREMITY (HCC): Primary | ICD-10-CM

## 2024-09-20 DIAGNOSIS — I83.213 VARICOSE VEINS OF RIGHT LOWER EXTREMITY WITH INFLAMMATION, WITH ULCER OF ANKLE WITH FAT LAYER EXPOSED (HCC): ICD-10-CM

## 2024-09-20 PROCEDURE — 99215 OFFICE O/P EST HI 40 MIN: CPT | Performed by: FAMILY MEDICINE

## 2024-09-20 NOTE — PATIENT INSTRUCTIONS
PATIENT INSTRUCTIONS      FOR Russ Kirkland ,  1955    DATE OF SERVICE: 2024    Fibracol Plus collagen  Hydrofera Blue Transfer to the left foot wound  Zinc oxide barrier cream  Comprilan compression wrap to left lower extremity    DO NOT GET WRAP WET      Follow up with Dr. Correa in 1 week        Managing your edema:    Avoid prolonged standing in one place. It is better to have your calf muscles moving to pump fluid out of the legs.  Elevate leg(s) above the level of the heart when sitting or as much as possible.  Take you diuretics as directed by your physician. Do not skip doses or change doses unless instructed to do so by your physician.  Decrease salt intake, follow recommended 2 grams daily.  Do not get leg(s) with compression wrap wet. If wraps are too tight as indicated by pain, numbness/tingling or discoloration of toes remove wrap completely and call the wound center @ [phone]       The treatment plan has been discussed at length between you and your provider. Follow all instructions carefully, it is very important. If you do not follow all instructions you are at risk of your wound not healing, infection, possible loss of limb and even loss of life.    COMPRESSION WRAP PATIENT CARE INSTRUCTIONS  DO NOT get compression wrap wet. When showering, use a shower boot.   Please contact wound clinic and if not able to reach, then go to emergency room if ANY of the following occur:   Tingling or numbness in the foot or toes on leg with compression wrap.  Severe pain that cannot be relieved with elevation and any medication instructed per your doctor.  A fever of 100.4°F (38°C) or higher.  Swelling, coldness, or blue-gray discoloration of the toes.  If the compression wrap feels too tight or too loose.  If the compression wrap is damaged or has rough edges that hurt.  If the compression wrap gets wet, you must cut wrap off.   Drainage from compression wrap that smells different than  usual.

## 2024-09-20 NOTE — PROGRESS NOTES
Weekly Wound Education Note    Teaching Provided To: Patient  Training Topics: Cleasing and general instructions;Compression;Dressing;Edema control;Nutrition;Skin care;Safe and effective use of medical equipement and /or supplies;Signs and symptoms of infection  Training Method: Demonstration  Training Response: Reinforcement needed           Left leg- fibracol collagen, hydrofera blue ready, kenia and comprilan x 3

## 2024-09-23 NOTE — PROGRESS NOTES
Chief Complaint   Patient presents with    Wound Recheck     Left lateral foot       HPI:     Patient here for follow-up of right lateral ankle wound.  He is doing fine and has no new complaints.  Tolerating compression wrap.    Lab Results   Component Value Date    BUN 21 04/08/2024    CREATSERUM 0.85 04/08/2024    ALB 4.4 04/08/2024    TP 7.0 04/08/2024    A1C 5.6 04/16/2018         Current Outpatient Medications:     cholecalciferol 50 MCG (2000 UT) Oral Cap, cholecalciferol (vitamin D3) 50 mcg (2,000 unit) capsule, [RxNorm: 852585], Disp: , Rfl:     Sildenafil Citrate 100 MG Oral Tab, Take 1 tablet (100 mg total) by mouth daily as needed for Erectile Dysfunction. Take ~ one hour prior to sexual activity on an empty stomach, Disp: 30 tablet, Rfl: 11    tramadol-acetaminophen 37.5-325 MG Oral Tab, Take 2 tablets by mouth 2 (two) times daily as needed for Pain., Disp: 360 tablet, Rfl: 1    gabapentin 800 MG Oral Tab, Take 1 tablet (800 mg total) by mouth 3 (three) times daily., Disp: 270 tablet, Rfl: 0    aspirin 81 MG Oral Tab EC, Take 1 tablet (81 mg total) by mouth daily., Disp: , Rfl:     betamethasone 0.1 % External Cream, Apply to area of rash with dressing change, Disp: 15 g, Rfl: 0    celecoxib 200 MG Oral Cap, Take 1 capsule (200 mg total) by mouth daily as needed., Disp: 90 capsule, Rfl: 3    METOPROLOL SUCCINATE OR, Take by mouth. Daily (per ), Disp: , Rfl:     enoxaparin (LOVENOX) 120 MG/0.8ML Injection Solution Prefilled Syringe, Inject 1 mg/kg into the skin 2 (two) times daily., Disp: , Rfl:     WARFARIN 2.5 MG Oral Tab, TAKE 1 TABLET BY MOUTH DAILY, Disp: 90 tablet, Rfl: 3    LOSARTAN POTASSIUM-HCTZ 100-12.5 MG Oral Tab, TAKE 1 TABLET BY MOUTH DAILY (Patient taking differently: Take 1 tablet by mouth at bedtime.), Disp: 90 tablet, Rfl: 3    SIMVASTATIN 20 MG Oral Tab, TAKE 1 TABLET BY MOUTH NIGHTLY, Disp: 90 tablet, Rfl: 3    Sildenafil Citrate 100 MG Oral Tab, Take 1 tablet (100 mg total) by  mouth daily as needed for Erectile Dysfunction., Disp: 10 tablet, Rfl: 11    Glucosamine-Chondroit-Vit C-Mn (GLUCOSAMINE-CHONDROITIN) Oral Cap, Take 1 tablet by mouth daily., Disp: , Rfl:     omega-3 fatty acids (FISH OIL) 1000 MG Oral Cap, Take 1,000 mg by mouth daily., Disp: , Rfl:     Allergies   Allergen Reactions    Adhesive Tape RASH     Can use paper tape.            REVIEW OF SYSTEMS:     Review of Systems (ROS)  This information was obtained from the patient, chart    A comprehensive 10 point review of systems was completed.  Pertinent positives and negatives noted in the the HPI.     Past medical, surgical, family and social history updated where appropriate.    PHYSICAL EXAM:   /84 (BP Location: Right arm, Patient Position: Sitting, Cuff Size: adult)   Pulse 67   Temp 98 °F (36.7 °C) (Oral)   Resp 18   SpO2 96%    Estimated body mass index is 31.66 kg/m² as calculated from the following:    Height as of 6/24/24: 73\".    Weight as of 6/24/24: 240 lb.   Vital signs reviewed.Appears stated age, well groomed.      Awake, alert, in no acute distress  HENT:  normocephalic, atraumatic, external ear canals clear bilaterally, tympanic membranes appear opaque, non-bulging, non-erythematous, nasal turbinates are non-inflamed and not swollen, oropharynx without erythema, swelling, or exudate, gingiva and lips without any apparent lesions.   Cardiac:  S1 S2 regular rate and rhythm, no murmur, gallop, or rub  Respiratory:  Bilaterally clear to auscultation, no chest tenderness to palpation, no wheezing, no rhonchi, no rales, air entry is adequate, no accessory respiratory muscle use, no chest asymmetry, normal excursion.  GI:  bowel sound positive in all four quadrants, abdomen is soft, non-tender, non-distended, no hepatosplenomegaly, no abnormal aortic pulsation.     Calf                      Ankle                            Foot                              Wound 06/28/24 2 Foot Lateral;Left (Active)    Date First Assessed/Time First Assessed: 06/28/24 0945    Wound Number (Wound Clinic Only): 2  Location: Foot  Wound Location Orientation: Lateral;Left      Assessments 9/20/2024 11:37 AM   Wound Image     Site Assessment Moist;Red   Closure Not approximated   Drainage Amount Scant   Drainage Description Serosanguineous   Treatments Cleansed;Saline;Wound    Dressing Changed Changed   Dressing Status Dressing Changed   Wound Length (cm) 0.3 cm   Wound Width (cm) 0.3 cm   Wound Surface Area (cm^2) 0.09 cm^2   Wound Depth (cm) 0.1 cm   Wound Volume (cm^3) 0.009 cm^3   Wound Healing % 82   Margins Attached edges   Non-staged Wound Description Full thickness   Mary-wound Assessment Hyperpigmented;Dry;Pink   Wound Granulation Tissue Red   Wound Bed Granulation (%) 100 %   Wound Bed Epithelium (%) 0 %   Wound Bed Slough (%) 0 %   Wound Bed Eschar (%) 0 %   Wound Bed Fibrin (%) 0 %   State of Healing Fully granulated   Wound Odor None       Active Orders   Date Order Priority Status Authorizing Provider   08/17/24 0956 OP Wound Dressing Routine Active Valentin Correa MD     - Cleansing:    Cleanse with normal saline or wound cleanser     - Dressing:    Hydrofera transfer     - Dressing:    Conforming roll     - Additional Wound Dressing Information:    comprilan x 3   07/19/24 1004 OP Wound Dressing Routine Active Valentin Correa MD     - Cleansing:    Cleanse with normal saline or wound cleanser     - Dressing:    Endoform collagen     - Dressing:    Hydrofera transfer     - Dressing:    Kerramax/Super absorbent     - Dressing:    Kerlix     - Additional Wound Dressing Information:    3 layer comprilan     - Frequency:    Change dressing weekly   07/08/24 1624 OP Wound Dressing Routine Active Valentin Correa MD     - Cleansing:    Cleanse with normal saline or wound cleanser     - Additional Wound Dressing Information:    Left foot- iodosorb, aquacel, kerlix and antifungal powder, and comprilan     - Frequency:     Change dressing weekly       Inactive Orders   Date Order Priority Status Authorizing Provider   08/30/24 1208 OP Wound Dressing Routine Completed Valentin Correa MD     - Cleansing:    Cleanse with normal saline or wound cleanser     - Dressing:    Collagen     - Dressing:    Hydrofera ready     - Additional Wound Dressing Information:    comprilan x 3     - Frequency:    Change dressing weekly   08/05/24 1046 OP Wound Dressing Routine Completed Valentin Correa MD     - Cleansing:    Cleanse with normal saline or wound cleanser     - Dressing:    Endoform collagen     - Additional Wound Dressing Information:    mepitol contact layer, hydrofera blue ready, kenia and comprilan x 3     - Frequency:    Change dressing weekly   07/05/24 1139 OP Wound Dressing Routine Completed Willie Redd APRN     - Dressing:    Alginate     - Dressing:    Iodosorb     - Additional Wound Dressing Information:    zinc periwound, nystatin powder to skin under wraps     - Cleansing:    Cleanse with normal saline or wound cleanser     - Frequency:    Change dressing weekly       Compression Wrap 06/28/24 Dorsal;Left (Active)   Placement Date/Time: 06/28/24 1028   Wound Location Orientation: Dorsal;Left      Assessments 9/20/2024 11:37 AM   Response to Treatment Well tolerated   Compression Layers 3   Compression Product Type Artiflex 10cm;Artiflex 15cm;Comprilan 8cm;Comprilan 10cm;Comprilan 12cm;Stockinette 3in   Dressing Applied Yes   Compression Wrap Location Toes to Knee   Compression Wrap Status Clean;Dry;Intact       Inactive Orders   Date Order Priority Status Authorizing Provider   07/05/24 1139 OP Wound Dressing Routine Completed Willie Redd APRN     - Dressing:    Alginate     - Dressing:    Iodosorb     - Additional Wound Dressing Information:    zinc periwound, nystatin powder to skin under wraps     - Cleansing:    Cleanse with normal saline or wound cleanser     - Frequency:    Change dressing weekly               ASSESSMENT AND PLAN:      1. Chronic venous hypertension (idiopathic) with ulcer of left lower extremity (HCC)    2. Benign essential HTN    3. 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  Will continue with collagen and Hydrofera Blue dressing and Comprilan 3 layer compression wrap.  Risks, benefits, and alternatives of current treatment plan discussed in detail.  Questions and concerns addressed. Red flags to RTC or ED reviewed.  Patient (or parent) agrees to plan.      Return in about 1 week (around 9/27/2024) for MD visit for 30 min.    Also refer to patient instructions.     I spent 40 minutes with the patient. This time included:  preparing to see the patient (eg, review notes and recent diagnostics), seeing the patient, performing a medically appropriate examination and/or evaluation, counseling and educating the patient, and documenting in the record.    I agree with staff documentation except for any changes made in my note.       Valentin Correa M.D., Wound Care Physician  [facility]  9/23/2024

## 2024-09-27 ENCOUNTER — OFFICE VISIT (OUTPATIENT)
Dept: WOUND CARE | Facility: HOSPITAL | Age: 69
End: 2024-09-27
Attending: FAMILY MEDICINE
Payer: MEDICARE

## 2024-09-27 VITALS
OXYGEN SATURATION: 98 % | TEMPERATURE: 97 F | HEART RATE: 70 BPM | DIASTOLIC BLOOD PRESSURE: 89 MMHG | SYSTOLIC BLOOD PRESSURE: 152 MMHG | RESPIRATION RATE: 18 BRPM

## 2024-09-27 DIAGNOSIS — L97.929 CHRONIC VENOUS HYPERTENSION (IDIOPATHIC) WITH ULCER OF LEFT LOWER EXTREMITY (HCC): Primary | ICD-10-CM

## 2024-09-27 DIAGNOSIS — I10 BENIGN ESSENTIAL HTN: ICD-10-CM

## 2024-09-27 DIAGNOSIS — I87.312 CHRONIC VENOUS HYPERTENSION (IDIOPATHIC) WITH ULCER OF LEFT LOWER EXTREMITY (HCC): Primary | ICD-10-CM

## 2024-09-27 PROCEDURE — 99214 OFFICE O/P EST MOD 30 MIN: CPT | Performed by: FAMILY MEDICINE

## 2024-09-27 NOTE — PATIENT INSTRUCTIONS
PATIENT INSTRUCTIONS      FOR Russ Kirkland , JOSH 1955    DATE OF SERVICE: 2024      Apply a piece of Acticoat with silver side down onto the wound base    Cover with gauze and gauze roll    Wear your compression sock    Change the dressing every third day        Follow up with Dr. Correa 2 WEEKS

## 2024-09-27 NOTE — PROGRESS NOTES
Weekly Wound Education Note    Teaching Provided To: Patient  Training Topics: Cleasing and general instructions;Compression;Dressing;Edema control;Nutrition;Skin care;Safe and effective use of medical equipement and /or supplies;Signs and symptoms of infection  Training Method: Demonstration  Training Response: Reinforcement needed        Notes: Left leg- acticoat, gauze, kenia. Patient's own compression sock.

## 2024-09-30 NOTE — PROGRESS NOTES
Chief Complaint   Patient presents with    Wound Recheck     Left lateral foot       HPI:     Patient here for follow-up of left lateral ankle wound.  He is doing well and has no new complaints.  Tolerating compression wrap.  Lab Results   Component Value Date    BUN 21 04/08/2024    CREATSERUM 0.85 04/08/2024    ALB 4.4 04/08/2024    TP 7.0 04/08/2024    A1C 5.6 04/16/2018         Current Outpatient Medications:     cholecalciferol 50 MCG (2000 UT) Oral Cap, cholecalciferol (vitamin D3) 50 mcg (2,000 unit) capsule, [RxNorm: 161431], Disp: , Rfl:     Sildenafil Citrate 100 MG Oral Tab, Take 1 tablet (100 mg total) by mouth daily as needed for Erectile Dysfunction. Take ~ one hour prior to sexual activity on an empty stomach, Disp: 30 tablet, Rfl: 11    tramadol-acetaminophen 37.5-325 MG Oral Tab, Take 2 tablets by mouth 2 (two) times daily as needed for Pain., Disp: 360 tablet, Rfl: 1    gabapentin 800 MG Oral Tab, Take 1 tablet (800 mg total) by mouth 3 (three) times daily., Disp: 270 tablet, Rfl: 0    aspirin 81 MG Oral Tab EC, Take 1 tablet (81 mg total) by mouth daily., Disp: , Rfl:     betamethasone 0.1 % External Cream, Apply to area of rash with dressing change, Disp: 15 g, Rfl: 0    celecoxib 200 MG Oral Cap, Take 1 capsule (200 mg total) by mouth daily as needed., Disp: 90 capsule, Rfl: 3    METOPROLOL SUCCINATE OR, Take by mouth. Daily (per ), Disp: , Rfl:     enoxaparin (LOVENOX) 120 MG/0.8ML Injection Solution Prefilled Syringe, Inject 1 mg/kg into the skin 2 (two) times daily., Disp: , Rfl:     WARFARIN 2.5 MG Oral Tab, TAKE 1 TABLET BY MOUTH DAILY, Disp: 90 tablet, Rfl: 3    LOSARTAN POTASSIUM-HCTZ 100-12.5 MG Oral Tab, TAKE 1 TABLET BY MOUTH DAILY (Patient taking differently: Take 1 tablet by mouth at bedtime.), Disp: 90 tablet, Rfl: 3    SIMVASTATIN 20 MG Oral Tab, TAKE 1 TABLET BY MOUTH NIGHTLY, Disp: 90 tablet, Rfl: 3    Sildenafil Citrate 100 MG Oral Tab, Take 1 tablet (100 mg total) by  mouth daily as needed for Erectile Dysfunction., Disp: 10 tablet, Rfl: 11    Glucosamine-Chondroit-Vit C-Mn (GLUCOSAMINE-CHONDROITIN) Oral Cap, Take 1 tablet by mouth daily., Disp: , Rfl:     omega-3 fatty acids (FISH OIL) 1000 MG Oral Cap, Take 1,000 mg by mouth daily., Disp: , Rfl:     Allergies   Allergen Reactions    Adhesive Tape RASH     Can use paper tape.            REVIEW OF SYSTEMS:     Review of Systems (ROS)  This information was obtained from the patient, chart    A comprehensive 10 point review of systems was completed.  Pertinent positives and negatives noted in the the HPI.     Past medical, surgical, family and social history updated where appropriate.    PHYSICAL EXAM:   /89 (BP Location: Right arm, Patient Position: Sitting, Cuff Size: adult)   Pulse 70   Temp 97.3 °F (36.3 °C) (Temporal)   Resp 18   SpO2 98%    Estimated body mass index is 31.66 kg/m² as calculated from the following:    Height as of 6/24/24: 73\".    Weight as of 6/24/24: 240 lb (108.9 kg).   Vital signs reviewed.Appears stated age, well groomed.      Awake, alert, in no acute distress  HENT:  normocephalic, atraumatic, external ear canals clear bilaterally, tympanic membranes appear opaque, non-bulging, non-erythematous, nasal turbinates are non-inflamed and not swollen, oropharynx without erythema, swelling, or exudate, gingiva and lips without any apparent lesions.   Cardiac:  S1 S2 regular rate and rhythm, no murmur, gallop, or rub  Respiratory:  Bilaterally clear to auscultation, no chest tenderness to palpation, no wheezing, no rhonchi, no rales, air entry is adequate, no accessory respiratory muscle use, no chest asymmetry, normal excursion.  GI:  bowel sound positive in all four quadrants, abdomen is soft, non-tender, non-distended, no hepatosplenomegaly, no abnormal aortic pulsation.     Calf  Point of Measurement - Left Calf: 34     Left Calf from:: Heel  Calf Left cm:: 39.7          Ankle  Point of  Measurement - Left Ankle: 10     Left Ankle from:: Heel  Left Ankle cm:: 25.7                Foot  Point of Measurement - Left Foot: 12  Left Foot from:: Great toe  Left Foot cm:: 26.8                     Wound 06/28/24 2 Foot Lateral;Left (Active)   Date First Assessed/Time First Assessed: 06/28/24 0945    Wound Number (Wound Clinic Only): 2  Location: Foot  Wound Location Orientation: Lateral;Left      Assessments 9/27/2024  1:43 PM   Wound Image     Site Assessment Moist;Red   Closure Not approximated   Drainage Amount Small   Drainage Description Serosanguineous   Treatments Cleansed;Saline   Dressing Changed New   Dressing Status Clean;Dry;Intact   Wound Length (cm) 0.3 cm   Wound Width (cm) 0.3 cm   Wound Surface Area (cm^2) 0.09 cm^2   Wound Depth (cm) 0.1 cm   Wound Volume (cm^3) 0.009 cm^3   Wound Healing % 82   Margins Attached edges   Non-staged Wound Description Full thickness   Mary-wound Assessment Hyperpigmented;Dry;Pink   Wound Granulation Tissue Red   Wound Bed Granulation (%) 100 %   State of Healing Fully granulated   Wound Odor None       Active Orders   Date Order Priority Status Authorizing Provider   09/30/24 0929 OP Wound Dressing Routine Active Valentin Correa MD     - Cleansing:    Cleanse with normal saline or wound cleanser     - Dressing:    Acticoat     - Dressing:    Dry gauze     - Dressing:    Conforming roll     - Additional Wound Dressing Information:    compression socks     - Frequency:    Change dressing three times per week   09/23/24 0835 OP Wound Dressing Routine Active Valentin Correa MD     - Cleansing:    Cleanse with normal saline or wound cleanser     - Dressing:    Collagen     - Dressing:    Hydrofera ready     - Dressing:    Conforming roll     - Additional Wound Dressing Information:    comprilan x 3   08/17/24 0956 OP Wound Dressing Routine Active Valentin Correa MD     - Cleansing:    Cleanse with normal saline or wound cleanser     - Dressing:    Hydrofera  transfer     - Dressing:    Conforming roll     - Additional Wound Dressing Information:    comprilan x 3   07/19/24 1004 OP Wound Dressing Routine Active Valentin Correa MD     - Cleansing:    Cleanse with normal saline or wound cleanser     - Dressing:    Endoform collagen     - Dressing:    Hydrofera transfer     - Dressing:    Kerramax/Super absorbent     - Dressing:    Kerlix     - Additional Wound Dressing Information:    3 layer comprilan     - Frequency:    Change dressing weekly   07/08/24 1624 OP Wound Dressing Routine Active Valentin Correa MD     - Cleansing:    Cleanse with normal saline or wound cleanser     - Additional Wound Dressing Information:    Left foot- iodosorb, aquacel, kerlix and antifungal powder, and comprilan     - Frequency:    Change dressing weekly       Inactive Orders   Date Order Priority Status Authorizing Provider   08/30/24 1208 OP Wound Dressing Routine Completed Valentin Correa MD     - Cleansing:    Cleanse with normal saline or wound cleanser     - Dressing:    Collagen     - Dressing:    Hydrofera ready     - Additional Wound Dressing Information:    comprilan x 3     - Frequency:    Change dressing weekly   08/05/24 1046 OP Wound Dressing Routine Completed Valentin Correa MD     - Cleansing:    Cleanse with normal saline or wound cleanser     - Dressing:    Endoform collagen     - Additional Wound Dressing Information:    mepitol contact layer, hydrofera blue ready, kenia and comprilan x 3     - Frequency:    Change dressing weekly   07/05/24 1139 OP Wound Dressing Routine Completed Willie Redd APRN     - Dressing:    Alginate     - Dressing:    Iodosorb     - Additional Wound Dressing Information:    zinc periwound, nystatin powder to skin under wraps     - Cleansing:    Cleanse with normal saline or wound cleanser     - Frequency:    Change dressing weekly       Compression Wrap 06/28/24 Dorsal;Left (Active)   Placement Date/Time: 06/28/24 1028   Wound Location  Orientation: Dorsal;Left      Assessments 9/27/2024  1:43 PM   Response to Treatment Well tolerated   Dressing Applied Yes   Compression Wrap Location Toes to Knee   Compression Wrap Status Clean;Dry;Intact       Inactive Orders   Date Order Priority Status Authorizing Provider   07/05/24 1139 OP Wound Dressing Routine Completed ShyamjonydanielaWillieJHONATAN     - Dressing:    Alginate     - Dressing:    Iodosorb     - Additional Wound Dressing Information:    zinc periwound, nystatin powder to skin under wraps     - Cleansing:    Cleanse with normal saline or wound cleanser     - Frequency:    Change dressing weekly              ASSESSMENT AND PLAN:      1. Chronic venous hypertension (idiopathic) with ulcer of left lower extremity (HCC)  - OP Wound Dressing; Future    2. Benign essential HTN  - OP Wound Dressing; Future    Clinically the wound is a bit smaller.  Will have patient use Acticoat to the wound and change every third day.  He will wear his own compression sock due to more frequent dressing changes.  Risks, benefits, and alternatives of current treatment plan discussed in detail.  Questions and concerns addressed. Red flags to RTC or ED reviewed.  Patient (or parent) agrees to plan.      Return in about 2 weeks (around 10/11/2024) for MD visit for 30 min.    Also refer to patient instructions.     I spent 30 minutes with the patient. This time included:  preparing to see the patient (eg, review notes and recent diagnostics), seeing the patient, performing a medically appropriate examination and/or evaluation, counseling and educating the patient, and documenting in the record.    I agree with staff documentation except for any changes made in my note.       Valentin Correa M.D., Wound Care Physician  [facility]  9/30/2024

## 2024-10-02 ENCOUNTER — TELEPHONE (OUTPATIENT)
Dept: WOUND CARE | Facility: HOSPITAL | Age: 69
End: 2024-10-02

## 2024-10-02 NOTE — TELEPHONE ENCOUNTER
Pt is calling states he would like to come in sooner than his appt on 10/11. States his wound is getting worse. Says ist getting larger, has a lot of drainage, and painful. Says he's having a hard time keeping the dressing in place with the compression socks. Would like to know what to do.

## 2024-10-03 NOTE — TELEPHONE ENCOUNTER
Pt wound is getting bigger, possibly infected, he will need to be seen tomorrow, please double book for tomorrow on my schedule.      Or maybe patient can be seen by Willie tomorrow, she has an opening.

## 2024-10-04 ENCOUNTER — OFFICE VISIT (OUTPATIENT)
Dept: WOUND CARE | Facility: HOSPITAL | Age: 69
End: 2024-10-04
Attending: FAMILY MEDICINE
Payer: MEDICARE

## 2024-10-04 VITALS
HEART RATE: 67 BPM | TEMPERATURE: 98 F | OXYGEN SATURATION: 94 % | DIASTOLIC BLOOD PRESSURE: 86 MMHG | SYSTOLIC BLOOD PRESSURE: 136 MMHG

## 2024-10-04 DIAGNOSIS — L97.929 CHRONIC VENOUS HYPERTENSION (IDIOPATHIC) WITH ULCER OF LEFT LOWER EXTREMITY (HCC): Primary | ICD-10-CM

## 2024-10-04 DIAGNOSIS — I87.312 CHRONIC VENOUS HYPERTENSION (IDIOPATHIC) WITH ULCER OF LEFT LOWER EXTREMITY (HCC): Primary | ICD-10-CM

## 2024-10-04 DIAGNOSIS — L97.312 VARICOSE VEINS OF RIGHT LOWER EXTREMITY WITH INFLAMMATION, WITH ULCER OF ANKLE WITH FAT LAYER EXPOSED (HCC): ICD-10-CM

## 2024-10-04 DIAGNOSIS — I83.213 VARICOSE VEINS OF RIGHT LOWER EXTREMITY WITH INFLAMMATION, WITH ULCER OF ANKLE WITH FAT LAYER EXPOSED (HCC): ICD-10-CM

## 2024-10-04 DIAGNOSIS — I10 BENIGN ESSENTIAL HTN: ICD-10-CM

## 2024-10-04 PROCEDURE — 99215 OFFICE O/P EST HI 40 MIN: CPT | Performed by: FAMILY MEDICINE

## 2024-10-04 NOTE — PROGRESS NOTES
Weekly Wound Education Note    Teaching Provided To: Patient  Training Topics: Cleasing and general instructions;Compression;Dressing;Edema control;Nutrition;Skin care;Safe and effective use of medical equipement and /or supplies;Signs and symptoms of infection  Training Method: Demonstration  Training Response: Reinforcement needed        Notes: Left leg- endoform, hydrofera blue ready, kenia, comprilan x 3

## 2024-10-04 NOTE — PROGRESS NOTES
Chief Complaint   Patient presents with    Wound Recheck     Pt reports pain is 3/10 on pain scale regarding his left leg.        HPI:     Patient here for follow-up of left lateral ankle wound.  He states that the wound got slightly bigger and he thinks there might be infection but not sure.  He would like to be seen sooner than his next regular follow-up appointment thus I had squeezed him in to be seen today.  He denies any fever or chills.    Lab Results   Component Value Date    BUN 21 04/08/2024    CREATSERUM 0.85 04/08/2024    ALB 4.4 04/08/2024    TP 7.0 04/08/2024    A1C 5.6 04/16/2018         Current Outpatient Medications:     cholecalciferol 50 MCG (2000 UT) Oral Cap, cholecalciferol (vitamin D3) 50 mcg (2,000 unit) capsule, [RxNorm: 150455], Disp: , Rfl:     Sildenafil Citrate 100 MG Oral Tab, Take 1 tablet (100 mg total) by mouth daily as needed for Erectile Dysfunction. Take ~ one hour prior to sexual activity on an empty stomach, Disp: 30 tablet, Rfl: 11    tramadol-acetaminophen 37.5-325 MG Oral Tab, Take 2 tablets by mouth 2 (two) times daily as needed for Pain., Disp: 360 tablet, Rfl: 1    gabapentin 800 MG Oral Tab, Take 1 tablet (800 mg total) by mouth 3 (three) times daily., Disp: 270 tablet, Rfl: 0    aspirin 81 MG Oral Tab EC, Take 1 tablet (81 mg total) by mouth daily., Disp: , Rfl:     betamethasone 0.1 % External Cream, Apply to area of rash with dressing change, Disp: 15 g, Rfl: 0    celecoxib 200 MG Oral Cap, Take 1 capsule (200 mg total) by mouth daily as needed., Disp: 90 capsule, Rfl: 3    enoxaparin (LOVENOX) 120 MG/0.8ML Injection Solution Prefilled Syringe, Inject 1 mg/kg into the skin 2 (two) times daily., Disp: , Rfl:     WARFARIN 2.5 MG Oral Tab, TAKE 1 TABLET BY MOUTH DAILY, Disp: 90 tablet, Rfl: 3    LOSARTAN POTASSIUM-HCTZ 100-12.5 MG Oral Tab, TAKE 1 TABLET BY MOUTH DAILY (Patient taking differently: Take 1 tablet by mouth at bedtime.), Disp: 90 tablet, Rfl: 3    SIMVASTATIN  20 MG Oral Tab, TAKE 1 TABLET BY MOUTH NIGHTLY, Disp: 90 tablet, Rfl: 3    Sildenafil Citrate 100 MG Oral Tab, Take 1 tablet (100 mg total) by mouth daily as needed for Erectile Dysfunction., Disp: 10 tablet, Rfl: 11    Glucosamine-Chondroit-Vit C-Mn (GLUCOSAMINE-CHONDROITIN) Oral Cap, Take 1 tablet by mouth daily., Disp: , Rfl:     omega-3 fatty acids (FISH OIL) 1000 MG Oral Cap, Take 1,000 mg by mouth daily., Disp: , Rfl:     METOPROLOL SUCCINATE OR, Take by mouth. Daily (per ), Disp: , Rfl:     Allergies   Allergen Reactions    Adhesive Tape RASH     Can use paper tape.            REVIEW OF SYSTEMS:     Review of Systems (ROS)  This information was obtained from the patient, chart    A comprehensive 10 point review of systems was completed.  Pertinent positives and negatives noted in the the HPI.     Past medical, surgical, family and social history updated where appropriate.    PHYSICAL EXAM:   /86   Pulse 67   Temp 97.5 °F (36.4 °C)   SpO2 94%    Estimated body mass index is 31.66 kg/m² as calculated from the following:    Height as of 6/24/24: 73\".    Weight as of 6/24/24: 240 lb (108.9 kg).   Vital signs reviewed.Appears stated age, well groomed.      Awake, alert, in no acute distress  HENT:  normocephalic, atraumatic, external ear canals clear bilaterally, tympanic membranes appear opaque, non-bulging, non-erythematous, nasal turbinates are non-inflamed and not swollen, oropharynx without erythema, swelling, or exudate, gingiva and lips without any apparent lesions.   Cardiac:  S1 S2 regular rate and rhythm, no murmur, gallop, or rub  Respiratory:  Bilaterally clear to auscultation, no chest tenderness to palpation, no wheezing, no rhonchi, no rales, air entry is adequate, no accessory respiratory muscle use, no chest asymmetry, normal excursion.  GI:  bowel sound positive in all four quadrants, abdomen is soft, non-tender, non-distended, no hepatosplenomegaly, no abnormal aortic pulsation.      Calf  Point of Measurement - Left Calf: 34     Left Calf from:: Heel  Calf Left cm:: 40.2          Ankle  Point of Measurement - Left Ankle: 10     Left Ankle from:: Heel  Left Ankle cm:: 24.3                Foot  Point of Measurement - Left Foot: 12  Left Foot from:: Great toe  Left Foot cm:: 27.1                     Wound 06/28/24 2 Foot Lateral;Left (Active)   Date First Assessed/Time First Assessed: 06/28/24 0945    Wound Number (Wound Clinic Only): 2  Location: Foot  Wound Location Orientation: Lateral;Left      Assessments 6/28/2024  9:40 AM 10/4/2024 10:16 AM   Wound Image       Site Assessment Moist;Pink;Yellow Moist;Red   Closure Not approximated Not approximated   Drainage Amount Small Scant   Drainage Description Serosanguineous Serosanguineous   Treatments Cleansed;Compression Cleansed;Saline   Dressing Hydrofera ready;Martha --   Dressing Changed New Changed   Dressing Status Clean;Dry;Intact Clean;Dry;Intact   Wound Length (cm) 0.7 cm 0.1 cm   Wound Width (cm) 0.7 cm 0.5 cm   Wound Surface Area (cm^2) 0.49 cm^2 0.05 cm^2   Wound Depth (cm) 0.1 cm 0.1 cm   Wound Volume (cm^3) 0.049 cm^3 0.005 cm^3   Wound Healing % -- 90   Margins Attached edges Attached edges   Non-staged Wound Description Full thickness Full thickness   Mary-wound Assessment Atrophy rogelio;Hyperpigmented Hyperpigmented;Dry;Pink;Atrophy rogelio   Wound Granulation Tissue Pink Red   Wound Bed Granulation (%) 25 % 100 %   Wound Bed Epithelium (%) 0 % --   Wound Bed Slough (%) 75 % --   Wound Bed Eschar (%) 0 % --   Wound Bed Fibrin (%) 0 % 100 %   State of Healing Non-healing;Early/partial granulation Epithelialized   Wound Odor None None       Active Orders   Date Order Priority Status Authorizing Provider   09/30/24 0929 OP Wound Dressing Routine Active Valentin Correa MD     - Cleansing:    Cleanse with normal saline or wound cleanser     - Dressing:    Acticoat     - Dressing:    Dry gauze     - Dressing:    Conforming roll      - Additional Wound Dressing Information:    compression socks     - Frequency:    Change dressing three times per week   09/23/24 0835 OP Wound Dressing Routine Active Valentin Correa MD     - Cleansing:    Cleanse with normal saline or wound cleanser     - Dressing:    Collagen     - Dressing:    Hydrofera ready     - Dressing:    Conforming roll     - Additional Wound Dressing Information:    raoilan x 3   08/17/24 0956 OP Wound Dressing Routine Active Valentin Correa MD     - Cleansing:    Cleanse with normal saline or wound cleanser     - Dressing:    Hydrofera transfer     - Dressing:    Conforming roll     - Additional Wound Dressing Information:    comprilan x 3   07/19/24 1004 OP Wound Dressing Routine Active Valentin Correa MD     - Cleansing:    Cleanse with normal saline or wound cleanser     - Dressing:    Endoform collagen     - Dressing:    Hydrofera transfer     - Dressing:    Kerramax/Super absorbent     - Dressing:    Kerlix     - Additional Wound Dressing Information:    3 layer comprilan     - Frequency:    Change dressing weekly   07/08/24 1624 OP Wound Dressing Routine Active Valentin Correa MD     - Cleansing:    Cleanse with normal saline or wound cleanser     - Additional Wound Dressing Information:    Left foot- iodosorb, aquacel, kerlix and antifungal powder, and comprilan     - Frequency:    Change dressing weekly       Inactive Orders   Date Order Priority Status Authorizing Provider   08/30/24 1208 OP Wound Dressing Routine Completed Valentin Correa MD     - Cleansing:    Cleanse with normal saline or wound cleanser     - Dressing:    Collagen     - Dressing:    Hydrofera ready     - Additional Wound Dressing Information:    quirino x 3     - Frequency:    Change dressing weekly   08/05/24 1046 OP Wound Dressing Routine Completed Valentin Correa MD     - Cleansing:    Cleanse with normal saline or wound cleanser     - Dressing:    Endoform collagen     - Additional Wound  Dressing Information:    mepitol contact layer, hydrofera blue ready, kenia and comprilan x 3     - Frequency:    Change dressing weekly   07/05/24 1139 OP Wound Dressing Routine Completed Willie Redd APRN     - Dressing:    Alginate     - Dressing:    Iodosorb     - Additional Wound Dressing Information:    zinc periwound, nystatin powder to skin under wraps     - Cleansing:    Cleanse with normal saline or wound cleanser     - Frequency:    Change dressing weekly       Compression Wrap 06/28/24 Dorsal;Left (Active)   Placement Date/Time: 06/28/24 1028   Wound Location Orientation: Dorsal;Left      Assessments 6/28/2024  9:40 AM 9/27/2024  1:43 PM   Response to Treatment Well tolerated Well tolerated   Compression Layers 3 --   Compression Product Type Artiflex 10cm;Artiflex 15cm;Comprilan 8cm;Comprilan 10cm;Comprilan 12cm;Stockinette 3in --   Dressing Applied Yes Yes   Compression Wrap Location Toes to Knee Toes to Knee   Compression Wrap Status Clean;Dry;Intact Clean;Dry;Intact       Inactive Orders   Date Order Priority Status Authorizing Provider   07/05/24 1139 OP Wound Dressing Routine Completed Willie Redd APRN     - Dressing:    Alginate     - Dressing:    Iodosorb     - Additional Wound Dressing Information:    zinc periwound, nystatin powder to skin under wraps     - Cleansing:    Cleanse with normal saline or wound cleanser     - Frequency:    Change dressing weekly              ASSESSMENT AND PLAN:      1. Chronic venous hypertension (idiopathic) with ulcer of left lower extremity (HCC)    2. Benign essential HTN    3. 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  Will apply betamethasone to this red area.  To the wound itself that was gently stimulated with a curette and there is some depth of a few  millimeters and will apply Sherrie collagen and Hydrofera Blue dressing and will initiate back into Comprilan wrap until the wound is healed now as the dressing that was used previously Acticoat would keep sliding when he tries to put his own compression garment on.      Risks, benefits, and alternatives of current treatment plan discussed in detail.  Questions and concerns addressed. Red flags to RTC or ED reviewed.  Patient (or parent) agrees to plan.      Return in about 1 week (around 10/11/2024).    Also refer to patient instructions.     I spent 40 minutes with the patient. This time included:  preparing to see the patient (eg, review notes and recent diagnostics), seeing the patient, performing a medically appropriate examination and/or evaluation, counseling and educating the patient, and documenting in the record.    I agree with staff documentation except for any changes made in my note.       Valentin Correa M.D., Wound Care Physician  [facility]  10/4/2024

## 2024-10-04 NOTE — PATIENT INSTRUCTIONS
PATIENT INSTRUCTIONS      FOR Russ Kirkland ,  1955    DATE OF SERVICE: 10/4/2024    Sherrie collagen  Hydrofera Blue Transfer to the left foot wound  Betamethasone cream to the red areas around the wound  Comprilan compression wrap to left lower extremity    DO NOT GET WRAP WET      Follow up with Dr. Correa in 1 week        Managing your edema:    Avoid prolonged standing in one place. It is better to have your calf muscles moving to pump fluid out of the legs.  Elevate leg(s) above the level of the heart when sitting or as much as possible.  Take you diuretics as directed by your physician. Do not skip doses or change doses unless instructed to do so by your physician.  Decrease salt intake, follow recommended 2 grams daily.  Do not get leg(s) with compression wrap wet. If wraps are too tight as indicated by pain, numbness/tingling or discoloration of toes remove wrap completely and call the wound center @ [phone]       The treatment plan has been discussed at length between you and your provider. Follow all instructions carefully, it is very important. If you do not follow all instructions you are at risk of your wound not healing, infection, possible loss of limb and even loss of life.    COMPRESSION WRAP PATIENT CARE INSTRUCTIONS  DO NOT get compression wrap wet. When showering, use a shower boot.   Please contact wound clinic and if not able to reach, then go to emergency room if ANY of the following occur:   Tingling or numbness in the foot or toes on leg with compression wrap.  Severe pain that cannot be relieved with elevation and any medication instructed per your doctor.  A fever of 100.4°F (38°C) or higher.  Swelling, coldness, or blue-gray discoloration of the toes.  If the compression wrap feels too tight or too loose.  If the compression wrap is damaged or has rough edges that hurt.  If the compression wrap gets wet, you must cut wrap off.   Drainage from compression wrap that  smells different than usual.

## 2024-10-11 ENCOUNTER — LAB ENCOUNTER (OUTPATIENT)
Dept: LAB | Facility: HOSPITAL | Age: 69
End: 2024-10-11
Attending: INTERNAL MEDICINE
Payer: MEDICARE

## 2024-10-11 ENCOUNTER — OFFICE VISIT (OUTPATIENT)
Dept: WOUND CARE | Facility: HOSPITAL | Age: 69
End: 2024-10-11
Attending: FAMILY MEDICINE
Payer: MEDICARE

## 2024-10-11 VITALS
OXYGEN SATURATION: 97 % | DIASTOLIC BLOOD PRESSURE: 74 MMHG | SYSTOLIC BLOOD PRESSURE: 124 MMHG | TEMPERATURE: 98 F | HEART RATE: 70 BPM

## 2024-10-11 DIAGNOSIS — I48.20 CHRONIC ATRIAL FIBRILLATION (HCC): ICD-10-CM

## 2024-10-11 DIAGNOSIS — I10 BENIGN ESSENTIAL HTN: ICD-10-CM

## 2024-10-11 DIAGNOSIS — I87.312 CHRONIC VENOUS HYPERTENSION (IDIOPATHIC) WITH ULCER OF LEFT LOWER EXTREMITY (HCC): Primary | ICD-10-CM

## 2024-10-11 DIAGNOSIS — I63.9 CEREBROVASCULAR ACCIDENT (CVA), UNSPECIFIED MECHANISM (HCC): ICD-10-CM

## 2024-10-11 DIAGNOSIS — L97.929 CHRONIC VENOUS HYPERTENSION (IDIOPATHIC) WITH ULCER OF LEFT LOWER EXTREMITY (HCC): Primary | ICD-10-CM

## 2024-10-11 PROCEDURE — 85610 PROTHROMBIN TIME: CPT

## 2024-10-11 PROCEDURE — 99215 OFFICE O/P EST HI 40 MIN: CPT | Performed by: FAMILY MEDICINE

## 2024-10-11 PROCEDURE — 36415 COLL VENOUS BLD VENIPUNCTURE: CPT

## 2024-10-11 NOTE — PROGRESS NOTES
Weekly Wound Education Note    Teaching Provided To: Patient  Training Topics: Cleasing and general instructions;Compression;Dressing;Edema control;Nutrition;Skin care;Safe and effective use of medical equipement and /or supplies;Signs and symptoms of infection  Training Method: Demonstration  Training Response: Reinforcement needed        Notes: Left leg- hydrofera blue ready, kenia, comprilan x 3

## 2024-10-11 NOTE — PATIENT INSTRUCTIONS
PATIENT INSTRUCTIONS      FOR Russ Kirkland ,  1955    DATE OF SERVICE: 10/11/2024      Hydrofera Blue Transfer to the left foot wound  Betamethasone cream to the red areas around the wound  Comprilan compression wrap to left lower extremity    DO NOT GET WRAP WET      Follow up with Dr. Correa in 1 week        Managing your edema:    Avoid prolonged standing in one place. It is better to have your calf muscles moving to pump fluid out of the legs.  Elevate leg(s) above the level of the heart when sitting or as much as possible.  Take you diuretics as directed by your physician. Do not skip doses or change doses unless instructed to do so by your physician.  Decrease salt intake, follow recommended 2 grams daily.  Do not get leg(s) with compression wrap wet. If wraps are too tight as indicated by pain, numbness/tingling or discoloration of toes remove wrap completely and call the wound center @ [phone]       The treatment plan has been discussed at length between you and your provider. Follow all instructions carefully, it is very important. If you do not follow all instructions you are at risk of your wound not healing, infection, possible loss of limb and even loss of life.    COMPRESSION WRAP PATIENT CARE INSTRUCTIONS  DO NOT get compression wrap wet. When showering, use a shower boot.   Please contact wound clinic and if not able to reach, then go to emergency room if ANY of the following occur:   Tingling or numbness in the foot or toes on leg with compression wrap.  Severe pain that cannot be relieved with elevation and any medication instructed per your doctor.  A fever of 100.4°F (38°C) or higher.  Swelling, coldness, or blue-gray discoloration of the toes.  If the compression wrap feels too tight or too loose.  If the compression wrap is damaged or has rough edges that hurt.  If the compression wrap gets wet, you must cut wrap off.   Drainage from compression wrap that smells different  than usual.

## 2024-10-14 NOTE — PROGRESS NOTES
Chief Complaint   Patient presents with    Wound Recheck     Pt presents today for a left lateral foot wound check stating pain is 0/10 on pain scale.        HPI:     Patient here for follow-up of left lateral ankle wound.  He is doing well and tolerating compression wrap.  No new difficulties.    Lab Results   Component Value Date    BUN 21 04/08/2024    CREATSERUM 0.85 04/08/2024    ALB 4.4 04/08/2024    TP 7.0 04/08/2024    A1C 5.6 04/16/2018         Current Outpatient Medications:     cholecalciferol 50 MCG (2000 UT) Oral Cap, cholecalciferol (vitamin D3) 50 mcg (2,000 unit) capsule, [RxNorm: 460092], Disp: , Rfl:     Sildenafil Citrate 100 MG Oral Tab, Take 1 tablet (100 mg total) by mouth daily as needed for Erectile Dysfunction. Take ~ one hour prior to sexual activity on an empty stomach, Disp: 30 tablet, Rfl: 11    tramadol-acetaminophen 37.5-325 MG Oral Tab, Take 2 tablets by mouth 2 (two) times daily as needed for Pain., Disp: 360 tablet, Rfl: 1    gabapentin 800 MG Oral Tab, Take 1 tablet (800 mg total) by mouth 3 (three) times daily., Disp: 270 tablet, Rfl: 0    aspirin 81 MG Oral Tab EC, Take 1 tablet (81 mg total) by mouth daily., Disp: , Rfl:     betamethasone 0.1 % External Cream, Apply to area of rash with dressing change, Disp: 15 g, Rfl: 0    celecoxib 200 MG Oral Cap, Take 1 capsule (200 mg total) by mouth daily as needed., Disp: 90 capsule, Rfl: 3    METOPROLOL SUCCINATE OR, Take by mouth. Daily (per ), Disp: , Rfl:     enoxaparin (LOVENOX) 120 MG/0.8ML Injection Solution Prefilled Syringe, Inject 1 mg/kg into the skin 2 (two) times daily., Disp: , Rfl:     WARFARIN 2.5 MG Oral Tab, TAKE 1 TABLET BY MOUTH DAILY, Disp: 90 tablet, Rfl: 3    LOSARTAN POTASSIUM-HCTZ 100-12.5 MG Oral Tab, TAKE 1 TABLET BY MOUTH DAILY (Patient taking differently: Take 1 tablet by mouth at bedtime.), Disp: 90 tablet, Rfl: 3    SIMVASTATIN 20 MG Oral Tab, TAKE 1 TABLET BY MOUTH NIGHTLY, Disp: 90 tablet, Rfl: 3     Sildenafil Citrate 100 MG Oral Tab, Take 1 tablet (100 mg total) by mouth daily as needed for Erectile Dysfunction., Disp: 10 tablet, Rfl: 11    Glucosamine-Chondroit-Vit C-Mn (GLUCOSAMINE-CHONDROITIN) Oral Cap, Take 1 tablet by mouth daily., Disp: , Rfl:     omega-3 fatty acids (FISH OIL) 1000 MG Oral Cap, Take 1,000 mg by mouth daily., Disp: , Rfl:     Allergies[1]         REVIEW OF SYSTEMS:     Review of Systems (ROS)  This information was obtained from the patient, chart    A comprehensive 10 point review of systems was completed.  Pertinent positives and negatives noted in the the HPI.     Past medical, surgical, family and social history updated where appropriate.    PHYSICAL EXAM:   /74   Pulse 70   Temp 98.4 °F (36.9 °C)   SpO2 97%    Estimated body mass index is 31.66 kg/m² as calculated from the following:    Height as of 6/24/24: 73\".    Weight as of 6/24/24: 240 lb (108.9 kg).   Vital signs reviewed.Appears stated age, well groomed.      Awake, alert, in no acute distress  HENT:  normocephalic, atraumatic, external ear canals clear bilaterally, tympanic membranes appear opaque, non-bulging, non-erythematous, nasal turbinates are non-inflamed and not swollen, oropharynx without erythema, swelling, or exudate, gingiva and lips without any apparent lesions.   Cardiac:  S1 S2 regular rate and rhythm, no murmur, gallop, or rub  Respiratory:  Bilaterally clear to auscultation, no chest tenderness to palpation, no wheezing, no rhonchi, no rales, air entry is adequate, no accessory respiratory muscle use, no chest asymmetry, normal excursion.  GI:  bowel sound positive in all four quadrants, abdomen is soft, non-tender, non-distended, no hepatosplenomegaly, no abnormal aortic pulsation.     Calf                      Ankle                            Foot                              Wound 06/28/24 2 Foot Lateral;Left (Active)   Date First Assessed/Time First Assessed: 06/28/24 9760    Wound Number  (Wound Clinic Only): 2  Location: Foot  Wound Location Orientation: Lateral;Left      Assessments 6/28/2024  9:40 AM 10/11/2024 11:29 AM   Wound Image       Site Assessment Moist;Pink;Yellow Moist;Red   Closure Not approximated Not approximated   Drainage Amount Small Scant   Drainage Description Serosanguineous Serosanguineous   Treatments Cleansed;Compression Cleansed;Saline   Dressing Hydrofera ready;Martha Hydrofera transfer;Martha (silver nitrate used)   Dressing Changed New Changed   Dressing Status Clean;Dry;Intact Clean;Dry;Intact   Wound Length (cm) 0.7 cm 0.2 cm   Wound Width (cm) 0.7 cm 0.2 cm   Wound Surface Area (cm^2) 0.49 cm^2 0.04 cm^2   Wound Depth (cm) 0.1 cm 0.1 cm   Wound Volume (cm^3) 0.049 cm^3 0.004 cm^3   Wound Healing % -- 92   Margins Attached edges Attached edges   Non-staged Wound Description Full thickness Partial thickness   Mary-wound Assessment Atrophy rogelio;Hyperpigmented Hyperpigmented;Dry;Pink;Atrophy rogelio   Wound Granulation Tissue Pink --   Wound Bed Granulation (%) 25 % --   Wound Bed Epithelium (%) 0 % 90 %   Wound Bed Slough (%) 75 % 0 %   Wound Bed Eschar (%) 0 % 0 %   Wound Bed Fibrin (%) 0 % --   State of Healing Non-healing;Early/partial granulation Epithelialized   Wound Odor None None       Active Orders   Date Order Priority Status Authorizing Provider   10/14/24 1111 OP Wound Dressing Routine Active Valentin Correa MD     - Cleansing:    Cleanse with normal saline or wound cleanser     - Dressing:    Hydrofera ready     - Dressing:    Conforming roll     - Additional Wound Dressing Information:    comprilan x 3     - Frequency:    Change dressing weekly   10/04/24 1120 OP Wound Dressing Routine Active Valentin Correa MD     - Cleansing:    Cleanse with normal saline or wound cleanser     - Dressing:    Endoform collagen     - Dressing:    Hydrofera transfer     - Dressing:    Conforming roll     - Additional Wound Dressing Information:    comprilan x 3     -  Frequency:    Change dressing weekly   09/30/24 0929 OP Wound Dressing Routine Active Valentin Correa MD     - Cleansing:    Cleanse with normal saline or wound cleanser     - Dressing:    Acticoat     - Dressing:    Dry gauze     - Dressing:    Conforming roll     - Additional Wound Dressing Information:    compression socks     - Frequency:    Change dressing three times per week   09/23/24 0835 OP Wound Dressing Routine Active Valentin Correa MD     - Cleansing:    Cleanse with normal saline or wound cleanser     - Dressing:    Collagen     - Dressing:    Hydrofera ready     - Dressing:    Conforming roll     - Additional Wound Dressing Information:    comprilan x 3   08/17/24 0956 OP Wound Dressing Routine Active Valentin Correa MD     - Cleansing:    Cleanse with normal saline or wound cleanser     - Dressing:    Hydrofera transfer     - Dressing:    Conforming roll     - Additional Wound Dressing Information:    comprilan x 3   07/19/24 1004 OP Wound Dressing Routine Active Valentin Correa MD     - Cleansing:    Cleanse with normal saline or wound cleanser     - Dressing:    Endoform collagen     - Dressing:    Hydrofera transfer     - Dressing:    Kerramax/Super absorbent     - Dressing:    Kerlix     - Additional Wound Dressing Information:    3 layer comprilan     - Frequency:    Change dressing weekly   07/08/24 1624 OP Wound Dressing Routine Active Valentin Correa MD     - Cleansing:    Cleanse with normal saline or wound cleanser     - Additional Wound Dressing Information:    Left foot- iodosorb, aquacel, kerlix and antifungal powder, and comprilan     - Frequency:    Change dressing weekly       Inactive Orders   Date Order Priority Status Authorizing Provider   08/30/24 1208 OP Wound Dressing Routine Completed Valentin Correa MD     - Cleansing:    Cleanse with normal saline or wound cleanser     - Dressing:    Collagen     - Dressing:    Hydrofera ready     - Additional Wound Dressing  Information:    comprilan x 3     - Frequency:    Change dressing weekly   08/05/24 1046 OP Wound Dressing Routine Completed Valentin Correa MD     - Cleansing:    Cleanse with normal saline or wound cleanser     - Dressing:    Endoform collagen     - Additional Wound Dressing Information:    mepitol contact layer, hydrofera blue ready, kenia and comprilan x 3     - Frequency:    Change dressing weekly   07/05/24 1139 OP Wound Dressing Routine Completed Willie Redd APRN     - Dressing:    Alginate     - Dressing:    Iodosorb     - Additional Wound Dressing Information:    zinc periwound, nystatin powder to skin under wraps     - Cleansing:    Cleanse with normal saline or wound cleanser     - Frequency:    Change dressing weekly       Compression Wrap 06/28/24 Dorsal;Left (Active)   Placement Date/Time: 06/28/24 1028   Wound Location Orientation: Dorsal;Left      Assessments 6/28/2024  9:40 AM 10/11/2024 11:29 AM   Response to Treatment Well tolerated Well tolerated   Compression Layers 3 3   Compression Product Type Artiflex 10cm;Artiflex 15cm;Comprilan 8cm;Comprilan 10cm;Comprilan 12cm;[place]inette 3in Artiflex 10cm;Artiflex 15cm;Comprilan 8cm;Comprilan 10cm;Comprilan 12cm;Stockinette 3in   Dressing Applied Yes Yes   Compression Wrap Location Toes to Knee Toes to Knee   Compression Wrap Status Clean;Dry;Intact Clean;Dry;Intact       Inactive Orders   Date Order Priority Status Authorizing Provider   07/05/24 1139 OP Wound Dressing Routine Completed Willie Redd APRN     - Dressing:    Alginate     - Dressing:    Iodosorb     - Additional Wound Dressing Information:    zinc periwound, nystatin powder to skin under wraps     - Cleansing:    Cleanse with normal saline or wound cleanser     - Frequency:    Change dressing weekly              ASSESSMENT AND PLAN:      1. Chronic venous hypertension (idiopathic) with ulcer of left lower extremity (HCC)  - OP Wound Dressing; Future    2. Benign essential HTN  - OP  Wound Dressing; Future    Clinically the wound is very small and almost healed completely.  Please Hydrofera Blue and gauze roll.  Continue Comprilan 3 layer compression wrap.  Risks, benefits, and alternatives of current treatment plan discussed in detail.  Questions and concerns addressed. Red flags to RTC or ED reviewed.  Patient (or parent) agrees to plan.      Return in about 1 week (around 10/18/2024) for MD visit for 30 min.    Also refer to patient instructions.     I spent 40 minutes with the patient. This time included:  preparing to see the patient (eg, review notes and recent diagnostics), seeing the patient, performing a medically appropriate examination and/or evaluation, counseling and educating the patient, and documenting in the record.    I agree with staff documentation except for any changes made in my note.       Valentin Correa M.D., Wound Care Physician  [facility]  10/14/2024               [1]   Allergies  Allergen Reactions    Adhesive Tape RASH     Can use paper tape.

## 2024-10-15 RX ORDER — GABAPENTIN 800 MG/1
800 TABLET ORAL 3 TIMES DAILY
Qty: 270 TABLET | Refills: 0 | Status: SHIPPED | OUTPATIENT
Start: 2024-10-15

## 2024-10-15 NOTE — TELEPHONE ENCOUNTER
Refill Request    Medication request: gabapentin 800 MG Oral Tab Take 1 tablet (800 mg total) by mouth 3 (three) times daily.     LOV:6/10/2024-inj, 5/21/24-televisit Yessica Cain, DO   Due back to clinic per last office note:  \"Follow-up:   for knee injection \"  NOV: Visit date not found      [place] 7/24/24 #270- 90 day suply    Urine drug screen (if applicable): n/a  Pain contract: n/a    LOV plan (if weaning or changing medications): Per  at last office visit: \"Recommend that he continue gabapentin 800 mg 3 times daily with a home exercise plan. \"

## 2024-10-18 ENCOUNTER — OFFICE VISIT (OUTPATIENT)
Dept: WOUND CARE | Facility: HOSPITAL | Age: 69
End: 2024-10-18
Attending: FAMILY MEDICINE
Payer: MEDICARE

## 2024-10-18 ENCOUNTER — LAB ENCOUNTER (OUTPATIENT)
Dept: LAB | Facility: HOSPITAL | Age: 69
End: 2024-10-18
Attending: INTERNAL MEDICINE
Payer: MEDICARE

## 2024-10-18 VITALS
DIASTOLIC BLOOD PRESSURE: 78 MMHG | HEART RATE: 78 BPM | TEMPERATURE: 97 F | SYSTOLIC BLOOD PRESSURE: 140 MMHG | RESPIRATION RATE: 18 BRPM

## 2024-10-18 DIAGNOSIS — I10 BENIGN ESSENTIAL HTN: ICD-10-CM

## 2024-10-18 DIAGNOSIS — I87.2 VENOUS (PERIPHERAL) INSUFFICIENCY: ICD-10-CM

## 2024-10-18 DIAGNOSIS — L97.929 CHRONIC VENOUS HYPERTENSION (IDIOPATHIC) WITH ULCER OF LEFT LOWER EXTREMITY (HCC): Primary | ICD-10-CM

## 2024-10-18 DIAGNOSIS — I48.20 CHRONIC ATRIAL FIBRILLATION (HCC): ICD-10-CM

## 2024-10-18 DIAGNOSIS — Z79.01 LONG TERM CURRENT USE OF ANTICOAGULANT: ICD-10-CM

## 2024-10-18 DIAGNOSIS — I87.312 CHRONIC VENOUS HYPERTENSION (IDIOPATHIC) WITH ULCER OF LEFT LOWER EXTREMITY (HCC): Primary | ICD-10-CM

## 2024-10-18 DIAGNOSIS — Z98.890 HISTORY OF MITRAL VALVE REPAIR: ICD-10-CM

## 2024-10-18 LAB
INR BLD: 2.63 (ref 0.8–1.2)
PROTHROMBIN TIME: 29.7 SECONDS (ref 11.6–14.8)

## 2024-10-18 PROCEDURE — 36415 COLL VENOUS BLD VENIPUNCTURE: CPT

## 2024-10-18 PROCEDURE — 99214 OFFICE O/P EST MOD 30 MIN: CPT | Performed by: FAMILY MEDICINE

## 2024-10-18 PROCEDURE — 85610 PROTHROMBIN TIME: CPT

## 2024-10-18 NOTE — PROGRESS NOTES
Weekly Wound Education Note    Teaching Provided To: Patient  Training Topics: Cleasing and general instructions;Skin care;Diabetes;Dressing  Training Method: Explain/Verbal;Demonstration  Training Response: Patient responds and understands        Notes: Left leg- triad, gauze, kenia and patient's own compression garment.

## 2024-10-18 NOTE — PATIENT INSTRUCTIONS
PATIENT INSTRUCTIONS      FOR Russ Kirkland ,  1955    DATE OF SERVICE: 10/18/2024      Apply Coloplast Triad paste to the wound base on both of the wounds    Cover with gauze roll and secure with tape or you can use a silicone bordered bandage    Reapply the Coloplast Triad paste on a daily basis    Wear your own compression garment        Follow up with Dr. Correa 1 WEEK

## 2024-10-18 NOTE — PROGRESS NOTES
Chief Complaint   Patient presents with    Wound Recheck     Left lateral foot       HPI:     Patient here for follow-up of left lateral ankle wound.  He is doing well and tolerating compression wrap.  He has no new complaints.    Lab Results   Component Value Date    BUN 21 04/08/2024    CREATSERUM 0.85 04/08/2024    ALB 4.4 04/08/2024    TP 7.0 04/08/2024    A1C 5.6 04/16/2018         Current Outpatient Medications:     GABAPENTIN 800 MG Oral Tab, TAKE 1 TABLET BY MOUTH THREE TIMES DAILY, Disp: 270 tablet, Rfl: 0    cholecalciferol 50 MCG (2000 UT) Oral Cap, cholecalciferol (vitamin D3) 50 mcg (2,000 unit) capsule, [RxNorm: 282885], Disp: , Rfl:     Sildenafil Citrate 100 MG Oral Tab, Take 1 tablet (100 mg total) by mouth daily as needed for Erectile Dysfunction. Take ~ one hour prior to sexual activity on an empty stomach, Disp: 30 tablet, Rfl: 11    tramadol-acetaminophen 37.5-325 MG Oral Tab, Take 2 tablets by mouth 2 (two) times daily as needed for Pain., Disp: 360 tablet, Rfl: 1    aspirin 81 MG Oral Tab EC, Take 1 tablet (81 mg total) by mouth daily., Disp: , Rfl:     betamethasone 0.1 % External Cream, Apply to area of rash with dressing change, Disp: 15 g, Rfl: 0    celecoxib 200 MG Oral Cap, Take 1 capsule (200 mg total) by mouth daily as needed., Disp: 90 capsule, Rfl: 3    METOPROLOL SUCCINATE OR, Take by mouth. Daily (per ), Disp: , Rfl:     enoxaparin (LOVENOX) 120 MG/0.8ML Injection Solution Prefilled Syringe, Inject 1 mg/kg into the skin 2 (two) times daily., Disp: , Rfl:     WARFARIN 2.5 MG Oral Tab, TAKE 1 TABLET BY MOUTH DAILY, Disp: 90 tablet, Rfl: 3    LOSARTAN POTASSIUM-HCTZ 100-12.5 MG Oral Tab, TAKE 1 TABLET BY MOUTH DAILY (Patient taking differently: Take 1 tablet by mouth at bedtime.), Disp: 90 tablet, Rfl: 3    SIMVASTATIN 20 MG Oral Tab, TAKE 1 TABLET BY MOUTH NIGHTLY, Disp: 90 tablet, Rfl: 3    Sildenafil Citrate 100 MG Oral Tab, Take 1 tablet (100 mg total) by mouth daily as  needed for Erectile Dysfunction., Disp: 10 tablet, Rfl: 11    Glucosamine-Chondroit-Vit C-Mn (GLUCOSAMINE-CHONDROITIN) Oral Cap, Take 1 tablet by mouth daily., Disp: , Rfl:     omega-3 fatty acids (FISH OIL) 1000 MG Oral Cap, Take 1,000 mg by mouth daily., Disp: , Rfl:     Allergies[1]         REVIEW OF SYSTEMS:     Review of Systems (ROS)  This information was obtained from the patient, chart    A comprehensive 10 point review of systems was completed.  Pertinent positives and negatives noted in the the HPI.     Past medical, surgical, family and social history updated where appropriate.    PHYSICAL EXAM:   /78 (BP Location: Right arm, Patient Position: Sitting, Cuff Size: adult)   Pulse 78   Temp 97.3 °F (36.3 °C) (Temporal)   Resp 18    Estimated body mass index is 31.66 kg/m² as calculated from the following:    Height as of 6/24/24: 73\".    Weight as of 6/24/24: 240 lb (108.9 kg).   Vital signs reviewed.Appears stated age, well groomed.      Awake, alert, in no acute distress  HENT:  normocephalic, atraumatic, external ear canals clear bilaterally, tympanic membranes appear opaque, non-bulging, non-erythematous, nasal turbinates are non-inflamed and not swollen, oropharynx without erythema, swelling, or exudate, gingiva and lips without any apparent lesions.   Cardiac:  S1 S2 regular rate and rhythm, no murmur, gallop, or rub  Respiratory:  Bilaterally clear to auscultation, no chest tenderness to palpation, no wheezing, no rhonchi, no rales, air entry is adequate, no accessory respiratory muscle use, no chest asymmetry, normal excursion.  GI:  bowel sound positive in all four quadrants, abdomen is soft, non-tender, non-distended, no hepatosplenomegaly, no abnormal aortic pulsation.     Calf                      Ankle                            Foot                              Wound 06/28/24 2 Foot Lateral;Left (Active)   Date First Assessed/Time First Assessed: 06/28/24 1880    Wound Number (Wound  Clinic Only): 2  Location: Foot  Wound Location Orientation: Lateral;Left      Assessments 6/28/2024  9:40 AM 10/18/2024 11:41 AM   Wound Image       Site Assessment Moist;Pink;Yellow Moist;Red   Closure Not approximated Not approximated   Drainage Amount Small Scant   Drainage Description Serosanguineous Serosanguineous   Treatments Cleansed;Compression Cleansed;Topical (Barrier/Moisturizer/Ointment)   Dressing Hydrofera ready;Martha 4x4s;Martha (triad)   Dressing Changed New New   Dressing Status Clean;Dry;Intact Dry;Intact;Clean   Wound Length (cm) 0.7 cm 0.2 cm   Wound Width (cm) 0.7 cm 0.2 cm   Wound Surface Area (cm^2) 0.49 cm^2 0.04 cm^2   Wound Depth (cm) 0.1 cm 0.1 cm   Wound Volume (cm^3) 0.049 cm^3 0.004 cm^3   Wound Healing % -- 92   Margins Attached edges Attached edges   Non-staged Wound Description Full thickness Full thickness   Mary-wound Assessment Atrophy rogelio;Hyperpigmented Hyperpigmented;Dry;Pink;Atrophy rogelio   Wound Granulation Tissue Pink Red   Wound Bed Granulation (%) 25 % 100 %   Wound Bed Epithelium (%) 0 % 0 %   Wound Bed Slough (%) 75 % 0 %   Wound Bed Eschar (%) 0 % 0 %   Wound Bed Fibrin (%) 0 % --   State of Healing Non-healing;Early/partial granulation Fully granulated   Wound Odor None None       Active Orders   Date Order Priority Status Authorizing Provider   10/18/24 1309 OP Wound Dressing Routine Active Valentin Correa MD     - Cleansing:    Cleanse with normal saline or wound cleanser     - Additional Wound Dressing Information:    triad, gauze martha     - Frequency:    Change dressing daily and PRN   10/14/24 1111 OP Wound Dressing Routine Active Valentin Correa MD     - Cleansing:    Cleanse with normal saline or wound cleanser     - Dressing:    Hydrofera ready     - Dressing:    Conforming roll     - Additional Wound Dressing Information:    comprilan x 3     - Frequency:    Change dressing weekly   10/04/24 1120 OP Wound Dressing Routine Active Valentin Correa MD      - Cleansing:    Cleanse with normal saline or wound cleanser     - Dressing:    Endoform collagen     - Dressing:    Hydrofera transfer     - Dressing:    Conforming roll     - Additional Wound Dressing Information:    raoilan x 3     - Frequency:    Change dressing weekly   09/30/24 0929 OP Wound Dressing Routine Active Valentin Correa MD     - Cleansing:    Cleanse with normal saline or wound cleanser     - Dressing:    Acticoat     - Dressing:    Dry gauze     - Dressing:    Conforming roll     - Additional Wound Dressing Information:    compression socks     - Frequency:    Change dressing three times per week   09/23/24 0835 OP Wound Dressing Routine Active Valentin Correa MD     - Cleansing:    Cleanse with normal saline or wound cleanser     - Dressing:    Collagen     - Dressing:    Hydrofera ready     - Dressing:    Conforming roll     - Additional Wound Dressing Information:    quirino x 3   08/17/24 0956 OP Wound Dressing Routine Active Valentin Correa MD     - Cleansing:    Cleanse with normal saline or wound cleanser     - Dressing:    Hydrofera transfer     - Dressing:    Conforming roll     - Additional Wound Dressing Information:    quirino x 3   07/19/24 1004 OP Wound Dressing Routine Active Valentin Correa MD     - Cleansing:    Cleanse with normal saline or wound cleanser     - Dressing:    Endoform collagen     - Dressing:    Hydrofera transfer     - Dressing:    Kerramax/Super absorbent     - Dressing:    Kerlix     - Additional Wound Dressing Information:    3 layer comprilan     - Frequency:    Change dressing weekly   07/08/24 1624 OP Wound Dressing Routine Active Valentin Correa MD     - Cleansing:    Cleanse with normal saline or wound cleanser     - Additional Wound Dressing Information:    Left foot- iodosorb, aquacel, kerlix and antifungal powder, and comprilan     - Frequency:    Change dressing weekly       Inactive Orders   Date Order Priority Status Authorizing Provider    08/30/24 1208 OP Wound Dressing Routine Completed Valentin Correa MD     - Cleansing:    Cleanse with normal saline or wound cleanser     - Dressing:    Collagen     - Dressing:    Hydrofera ready     - Additional Wound Dressing Information:    comprilan x 3     - Frequency:    Change dressing weekly   08/05/24 1046 OP Wound Dressing Routine Completed Valentin Correa MD     - Cleansing:    Cleanse with normal saline or wound cleanser     - Dressing:    Endoform collagen     - Additional Wound Dressing Information:    mepitol contact layer, hydrofera blue ready, kenia and comprilan x 3     - Frequency:    Change dressing weekly   07/05/24 1139 OP Wound Dressing Routine Completed Willei Redd APRN     - Dressing:    Alginate     - Dressing:    Iodosorb     - Additional Wound Dressing Information:    zinc periwound, nystatin powder to skin under wraps     - Cleansing:    Cleanse with normal saline or wound cleanser     - Frequency:    Change dressing weekly       Compression Wrap 06/28/24 Dorsal;Left (Active)   Placement Date/Time: 06/28/24 1028   Wound Location Orientation: Dorsal;Left      Assessments 6/28/2024  9:40 AM 10/18/2024 11:41 AM   Response to Treatment Well tolerated Well tolerated   Compression Layers 3 Single   Compression Product Type Artiflex 10cm;Artiflex 15cm;Comprilan 8cm;Comprilan 10cm;Comprilan 12cm;Stockinette 3in Tubigrip/Spanda   Dressing Applied Yes Yes   Compression Wrap Location Toes to Knee Toes to Knee   Compression Wrap Status Clean;Dry;Intact Clean;Dry;Intact       Inactive Orders   Date Order Priority Status Authorizing Provider   07/05/24 1139 OP Wound Dressing Routine Completed Willie Redd APRN     - Dressing:    Alginate     - Dressing:    Iodosorb     - Additional Wound Dressing Information:    zinc periwound, nystatin powder to skin under wraps     - Cleansing:    Cleanse with normal saline or wound cleanser     - Frequency:    Change dressing weekly               ASSESSMENT AND PLAN:      1. Chronic venous hypertension (idiopathic) with ulcer of left lower extremity (HCC)  - OP Wound Dressing; Future    2. Benign essential HTN  - OP Wound Dressing; Future    3. Venous (peripheral) insufficiency  - OP Wound Dressing; Future    Clinically the wound is slightly better than previous visit there possibly 2 wounds adjacent to each other about centimeter part very superficial.  I would like him to try Coloplast Triad paste to the wounds now on a daily basis and cover with gauze type dressing or silicone border dressing as he has problems with adhesives.  He can use his own compression garment right now.  I will follow-up with him in 1 week.  We have been using Comprilan and collagen and Hydrofera Blue for the past 1 month and the wound has changed very little.  Risks, benefits, and alternatives of current treatment plan discussed in detail.  Questions and concerns addressed. Red flags to RTC or ED reviewed.  Patient (or parent) agrees to plan.      Return in about 1 week (around 10/25/2024) for MD visit for 30 min.    Also refer to patient instructions.     I spent 30 minutes with the patient. This time included:  preparing to see the patient (eg, review notes and recent diagnostics), seeing the patient, performing a medically appropriate examination and/or evaluation, counseling and educating the patient, and documenting in the record.    I agree with staff documentation except for any changes made in my note.       Valentin Correa M.D., Wound Care Physician  [facility]  10/18/2024               [1]   Allergies  Allergen Reactions    Adhesive Tape RASH     Can use paper tape.

## 2024-10-21 LAB — INR BLDC: 3.1 (ref 0.9–1.1)

## 2024-10-25 ENCOUNTER — OFFICE VISIT (OUTPATIENT)
Dept: WOUND CARE | Facility: HOSPITAL | Age: 69
End: 2024-10-25
Attending: FAMILY MEDICINE
Payer: MEDICARE

## 2024-10-25 VITALS
DIASTOLIC BLOOD PRESSURE: 91 MMHG | BODY MASS INDEX: 33 KG/M2 | HEART RATE: 74 BPM | SYSTOLIC BLOOD PRESSURE: 162 MMHG | OXYGEN SATURATION: 97 % | WEIGHT: 248.81 LBS | TEMPERATURE: 98 F

## 2024-10-25 DIAGNOSIS — I10 BENIGN ESSENTIAL HTN: ICD-10-CM

## 2024-10-25 DIAGNOSIS — L97.929 CHRONIC VENOUS HYPERTENSION (IDIOPATHIC) WITH ULCER OF LEFT LOWER EXTREMITY (HCC): Primary | ICD-10-CM

## 2024-10-25 DIAGNOSIS — I87.2 VENOUS (PERIPHERAL) INSUFFICIENCY: ICD-10-CM

## 2024-10-25 DIAGNOSIS — I87.312 CHRONIC VENOUS HYPERTENSION (IDIOPATHIC) WITH ULCER OF LEFT LOWER EXTREMITY (HCC): Primary | ICD-10-CM

## 2024-10-25 PROCEDURE — 99215 OFFICE O/P EST HI 40 MIN: CPT | Performed by: FAMILY MEDICINE

## 2024-10-25 NOTE — PROGRESS NOTES
Chief Complaint   Patient presents with    Wound Recheck     Here for left lateral foot wound care and has little burning pain.       HPI:     Patient here for follow-up of left lateral ankle wound.  He is doing fine has no new complaints.  Has been using Coloplast Triad paste for the past 1 week.  Has been using his own Velcro compression garment.    Lab Results   Component Value Date    BUN 21 04/08/2024    CREATSERUM 0.85 04/08/2024    ALB 4.4 04/08/2024    TP 7.0 04/08/2024    A1C 5.6 04/16/2018         Current Outpatient Medications:     GABAPENTIN 800 MG Oral Tab, TAKE 1 TABLET BY MOUTH THREE TIMES DAILY, Disp: 270 tablet, Rfl: 0    cholecalciferol 50 MCG (2000 UT) Oral Cap, cholecalciferol (vitamin D3) 50 mcg (2,000 unit) capsule, [RxNorm: 553253], Disp: , Rfl:     Sildenafil Citrate 100 MG Oral Tab, Take 1 tablet (100 mg total) by mouth daily as needed for Erectile Dysfunction. Take ~ one hour prior to sexual activity on an empty stomach, Disp: 30 tablet, Rfl: 11    tramadol-acetaminophen 37.5-325 MG Oral Tab, Take 2 tablets by mouth 2 (two) times daily as needed for Pain., Disp: 360 tablet, Rfl: 1    aspirin 81 MG Oral Tab EC, Take 1 tablet (81 mg total) by mouth daily., Disp: , Rfl:     betamethasone 0.1 % External Cream, Apply to area of rash with dressing change, Disp: 15 g, Rfl: 0    celecoxib 200 MG Oral Cap, Take 1 capsule (200 mg total) by mouth daily as needed., Disp: 90 capsule, Rfl: 3    METOPROLOL SUCCINATE OR, Take by mouth. Daily (per ), Disp: , Rfl:     enoxaparin (LOVENOX) 120 MG/0.8ML Injection Solution Prefilled Syringe, Inject 1 mg/kg into the skin 2 (two) times daily., Disp: , Rfl:     WARFARIN 2.5 MG Oral Tab, TAKE 1 TABLET BY MOUTH DAILY, Disp: 90 tablet, Rfl: 3    LOSARTAN POTASSIUM-HCTZ 100-12.5 MG Oral Tab, TAKE 1 TABLET BY MOUTH DAILY (Patient taking differently: Take 1 tablet by mouth at bedtime.), Disp: 90 tablet, Rfl: 3    SIMVASTATIN 20 MG Oral Tab, TAKE 1 TABLET BY MOUTH  NIGHTLY, Disp: 90 tablet, Rfl: 3    Sildenafil Citrate 100 MG Oral Tab, Take 1 tablet (100 mg total) by mouth daily as needed for Erectile Dysfunction., Disp: 10 tablet, Rfl: 11    Glucosamine-Chondroit-Vit C-Mn (GLUCOSAMINE-CHONDROITIN) Oral Cap, Take 1 tablet by mouth daily., Disp: , Rfl:     omega-3 fatty acids (FISH OIL) 1000 MG Oral Cap, Take 1,000 mg by mouth daily., Disp: , Rfl:     Allergies[1]         REVIEW OF SYSTEMS:     Review of Systems (ROS)  This information was obtained from the patient, chart    A comprehensive 10 point review of systems was completed.  Pertinent positives and negatives noted in the the HPI.     Past medical, surgical, family and social history updated where appropriate.    PHYSICAL EXAM:   BP (!) 162/91   Pulse 74   Temp 98.3 °F (36.8 °C)   Wt 248 lb 12.8 oz (112.9 kg)   SpO2 97%   BMI 32.83 kg/m²    Estimated body mass index is 32.83 kg/m² as calculated from the following:    Height as of 6/24/24: 73\".    Weight as of this encounter: 248 lb 12.8 oz (112.9 kg).   Vital signs reviewed.Appears stated age, well groomed.      Awake, alert, in no acute distress  HENT:  normocephalic, atraumatic, external ear canals clear bilaterally, tympanic membranes appear opaque, non-bulging, non-erythematous, nasal turbinates are non-inflamed and not swollen, oropharynx without erythema, swelling, or exudate, gingiva and lips without any apparent lesions.   Cardiac:  S1 S2 regular rate and rhythm, no murmur, gallop, or rub  Respiratory:  Bilaterally clear to auscultation, no chest tenderness to palpation, no wheezing, no rhonchi, no rales, air entry is adequate, no accessory respiratory muscle use, no chest asymmetry, normal excursion.  GI:  bowel sound positive in all four quadrants, abdomen is soft, non-tender, non-distended, no hepatosplenomegaly, no abnormal aortic pulsation.     Calf                      Ankle                            Foot                              Wound 06/28/24 2  Foot Lateral;Left (Active)   Date First Assessed/Time First Assessed: 06/28/24 0945    Wound Number (Wound Clinic Only): 2  Location: Foot  Wound Location Orientation: Lateral;Left      Assessments 6/28/2024  9:40 AM 10/25/2024 11:38 AM   Wound Image       Site Assessment Moist;Pink;Yellow Moist;Red   Closure Not approximated Not approximated   Drainage Amount Small Scant   Drainage Description Serosanguineous Serosanguineous   Treatments Cleansed;Compression Cleansed;Topical (Barrier/Moisturizer/Ointment)   Dressing Hydrofera ready;Martha 4x4s;Martha (betamethasone)   Dressing Changed New New   Dressing Status Clean;Dry;Intact Clean;Dry;Intact   Wound Length (cm) 0.7 cm 0.3 cm   Wound Width (cm) 0.7 cm 0.2 cm   Wound Surface Area (cm^2) 0.49 cm^2 0.06 cm^2   Wound Depth (cm) 0.1 cm 0.1 cm   Wound Volume (cm^3) 0.049 cm^3 0.006 cm^3   Wound Healing % -- 88   Margins Attached edges Attached edges   Non-staged Wound Description Full thickness Full thickness   Mary-wound Assessment Atrophy rogelio;Hyperpigmented Hyperpigmented;Dry;Pink;Atrophy rogelio   Wound Granulation Tissue Pink Red   Wound Bed Granulation (%) 25 % 100 %   Wound Bed Epithelium (%) 0 % 0 %   Wound Bed Slough (%) 75 % 0 %   Wound Bed Eschar (%) 0 % 0 %   Wound Bed Fibrin (%) 0 % --   State of Healing Non-healing;Early/partial granulation Fully granulated   Wound Odor None None       Active Orders   Date Order Priority Status Authorizing Provider   10/25/24 1436 OP Wound Dressing Routine Active Valentin Correa MD     - Cleansing:    Cleanse with normal saline or wound cleanser     - Additional Wound Dressing Information:    alternate between gentamycin and mupricin; aquacel foam     - Frequency:    Change dressing daily and PRN   10/18/24 1309 OP Wound Dressing Routine Active Valentin Correa MD     - Cleansing:    Cleanse with normal saline or wound cleanser     - Additional Wound Dressing Information:    triad, gauze marhta     - Frequency:    Change  dressing daily and PRN   10/14/24 1111 OP Wound Dressing Routine Active Valentin Correa MD     - Cleansing:    Cleanse with normal saline or wound cleanser     - Dressing:    Hydrofera ready     - Dressing:    Conforming roll     - Additional Wound Dressing Information:    frederickn x 3     - Frequency:    Change dressing weekly   10/04/24 1120 OP Wound Dressing Routine Active Valentin Correa MD     - Cleansing:    Cleanse with normal saline or wound cleanser     - Dressing:    Endoform collagen     - Dressing:    Hydrofera transfer     - Dressing:    Conforming roll     - Additional Wound Dressing Information:    raoilan x 3     - Frequency:    Change dressing weekly   09/30/24 0929 OP Wound Dressing Routine Active Valentin Correa MD     - Cleansing:    Cleanse with normal saline or wound cleanser     - Dressing:    Acticoat     - Dressing:    Dry gauze     - Dressing:    Conforming roll     - Additional Wound Dressing Information:    compression socks     - Frequency:    Change dressing three times per week   09/23/24 0835 OP Wound Dressing Routine Active Valentin Corrae MD     - Cleansing:    Cleanse with normal saline or wound cleanser     - Dressing:    Collagen     - Dressing:    Hydrofera ready     - Dressing:    Conforming roll     - Additional Wound Dressing Information:    quirino x 3   08/17/24 0956 OP Wound Dressing Routine Active Valentin Correa MD     - Cleansing:    Cleanse with normal saline or wound cleanser     - Dressing:    Hydrofera transfer     - Dressing:    Conforming roll     - Additional Wound Dressing Information:    quirino x 3   07/19/24 1004 OP Wound Dressing Routine Active Valentin Correa MD     - Cleansing:    Cleanse with normal saline or wound cleanser     - Dressing:    Endoform collagen     - Dressing:    Hydrofera transfer     - Dressing:    Kerramax/Super absorbent     - Dressing:    Kerlix     - Additional Wound Dressing Information:    3 layer quirino     -  Frequency:    Change dressing weekly   07/08/24 1624 OP Wound Dressing Routine Active Valentin Correa MD     - Cleansing:    Cleanse with normal saline or wound cleanser     - Additional Wound Dressing Information:    Left foot- iodosorb, aquacel, kerlix and antifungal powder, and comprilan     - Frequency:    Change dressing weekly       Inactive Orders   Date Order Priority Status Authorizing Provider   08/30/24 1208 OP Wound Dressing Routine Completed Valentin Correa MD     - Cleansing:    Cleanse with normal saline or wound cleanser     - Dressing:    Collagen     - Dressing:    Hydrofera ready     - Additional Wound Dressing Information:    comprilan x 3     - Frequency:    Change dressing weekly   08/05/24 1046 OP Wound Dressing Routine Completed Valentin Correa MD     - Cleansing:    Cleanse with normal saline or wound cleanser     - Dressing:    Endoform collagen     - Additional Wound Dressing Information:    mepitol contact layer, hydrofera blue ready, kenia and comprilan x 3     - Frequency:    Change dressing weekly   07/05/24 1139 OP Wound Dressing Routine Completed Willie Redd APRN     - Dressing:    Alginate     - Dressing:    Iodosorb     - Additional Wound Dressing Information:    zinc periwound, nystatin powder to skin under wraps     - Cleansing:    Cleanse with normal saline or wound cleanser     - Frequency:    Change dressing weekly       Compression Wrap 06/28/24 Dorsal;Left (Active)   Placement Date/Time: 06/28/24 1028   Wound Location Orientation: Dorsal;Left      Assessments 6/28/2024  9:40 AM 10/25/2024 11:38 AM   Response to Treatment Well tolerated Well tolerated   Compression Layers 3 Single   Compression Product Type Artiflex 10cm;Artiflex 15cm;Comprilan 8cm;Comprilan 10cm;Comprilan 12cm;Stockinette 3in --   Dressing Applied Yes Yes   Compression Wrap Location Toes to Knee Toes to Knee   Compression Wrap Status Clean;Dry;Intact Clean;Dry;Intact       Inactive Orders   Date Order  Priority Status Authorizing Provider   07/05/24 1139 OP Wound Dressing Routine Completed Willie ReddJHONATAN     - Dressing:    Alginate     - Dressing:    Iodosorb     - Additional Wound Dressing Information:    zinc periwound, nystatin powder to skin under wraps     - Cleansing:    Cleanse with normal saline or wound cleanser     - Frequency:    Change dressing weekly              ASSESSMENT AND PLAN:      1. Chronic venous hypertension (idiopathic) with ulcer of left lower extremity (HCC)  - OP Wound Dressing; Future    2. Benign essential HTN  - OP Wound Dressing; Future    3. Venous (peripheral) insufficiency  - OP Wound Dressing; Future    Wound is about the same and there has been no difference from the Triad paste essentially.  I will have him use betamethasone cream twice a day alternating with gentamicin cream or ointment twice a day on a daily basis.  Continue his own Velcro compression garment.  Risks, benefits, and alternatives of current treatment plan discussed in detail.  Questions and concerns addressed. Red flags to RTC or ED reviewed.  Patient (or parent) agrees to plan.      Return in about 1 week (around 11/1/2024) for MD visit for 30 min.    Also refer to patient instructions.     I spent 40 minutes with the patient. This time included:  preparing to see the patient (eg, review notes and recent diagnostics), seeing the patient, performing a medically appropriate examination and/or evaluation, counseling and educating the patient, and documenting in the record.    I agree with staff documentation except for any changes made in my note.       Valentin Correa M.D., Wound Care Physician  [facility]  10/25/2024               [1]   Allergies  Allergen Reactions    Adhesive Tape RASH     Can use paper tape.

## 2024-10-25 NOTE — PROGRESS NOTES
Weekly Wound Education Note    Teaching Provided To: Patient  Training Topics: Cleasing and general instructions;Skin care;Diabetes;Dressing  Training Method: Explain/Verbal;Demonstration  Training Response: Patient responds and understands        Notes: Left leg-betamethasone, aquacelf foam. Patient will alternate between steroid cream and gentamicin cream.

## 2024-10-25 NOTE — PATIENT INSTRUCTIONS
PATIENT INSTRUCTIONS      FOR Russ Kirkland , JOSH 1955    DATE OF SERVICE: 10/25/2024      Apply betamethasone cream twice a day to the wounds  Alternate this on a daily basis with gentamicin ointment twice a day to the wounds    I will send gentamicin and betamethasone to your pharmacy    Cover with gauze roll and secure with tape or you can use a silicone bordered bandage    Wear your own compression garment        Follow up with Dr. Correa 1 WEEK

## 2024-10-28 ENCOUNTER — TELEPHONE (OUTPATIENT)
Dept: WOUND CARE | Facility: HOSPITAL | Age: 69
End: 2024-10-28

## 2024-10-28 RX ORDER — BETAMETHASONE VALERATE 1.2 MG/G
CREAM TOPICAL
Qty: 15 G | Refills: 0 | Status: SHIPPED | OUTPATIENT
Start: 2024-10-28

## 2024-10-28 RX ORDER — GENTAMICIN SULFATE 1 MG/G
OINTMENT TOPICAL
Qty: 15 G | Refills: 0 | Status: SHIPPED | OUTPATIENT
Start: 2024-10-28

## 2024-10-28 NOTE — TELEPHONE ENCOUNTER
Pt contacted clinic and informed that two rx creams that were supposed to be sent to the pharmacy, however when the pt presented to the pharmacy, they did not have any record of prescriptions. Pt notified that there were not sent as of 10/25/24. .    Routing to Santa DIAZ to address.

## 2024-11-01 ENCOUNTER — OFFICE VISIT (OUTPATIENT)
Dept: WOUND CARE | Facility: HOSPITAL | Age: 69
End: 2024-11-01
Attending: FAMILY MEDICINE
Payer: MEDICARE

## 2024-11-01 VITALS
HEART RATE: 70 BPM | DIASTOLIC BLOOD PRESSURE: 79 MMHG | OXYGEN SATURATION: 99 % | BODY MASS INDEX: 32 KG/M2 | WEIGHT: 246 LBS | TEMPERATURE: 97 F | SYSTOLIC BLOOD PRESSURE: 140 MMHG

## 2024-11-01 DIAGNOSIS — I10 BENIGN ESSENTIAL HTN: ICD-10-CM

## 2024-11-01 DIAGNOSIS — I87.312 CHRONIC VENOUS HYPERTENSION (IDIOPATHIC) WITH ULCER OF LEFT LOWER EXTREMITY (HCC): Primary | ICD-10-CM

## 2024-11-01 DIAGNOSIS — L97.929 CHRONIC VENOUS HYPERTENSION (IDIOPATHIC) WITH ULCER OF LEFT LOWER EXTREMITY (HCC): Primary | ICD-10-CM

## 2024-11-01 DIAGNOSIS — I87.2 VENOUS (PERIPHERAL) INSUFFICIENCY: ICD-10-CM

## 2024-11-01 PROCEDURE — 99215 OFFICE O/P EST HI 40 MIN: CPT | Performed by: FAMILY MEDICINE

## 2024-11-01 NOTE — PROGRESS NOTES
Weekly Wound Education Note    Teaching Provided To: Patient  Training Topics: Cleasing and general instructions;Skin care;Diabetes;Dressing  Training Method: Explain/Verbal;Demonstration  Training Response: Patient responds and understands        Notes: Left leg- gentamycin cream and hydrofera blue on medial, gentamycin cream and gauze on lateral. Patient's own compression sock.

## 2024-11-01 NOTE — PROGRESS NOTES
Chief Complaint   Patient presents with    Wound Recheck     Pt reports L lateral foot wound is nearly healed however there is a new wound that has developed on the left medial ankle. Pt denies any pain at the site of either wounds.        HPI:     Patient here for follow-up of left lateral ankle wound.  He has developed a new wound on the left medial ankle since last visit.  He is not sure how it occurred.  He does not recall any trauma to the area.  He does use a compression stocking Catie which she has used for many years now.    Lab Results   Component Value Date    BUN 21 04/08/2024    CREATSERUM 0.85 04/08/2024    ALB 4.4 04/08/2024    TP 7.0 04/08/2024    A1C 5.6 04/16/2018         Current Outpatient Medications:     betamethasone 0.1 % External Cream, Apply to wound twice a day, Disp: 15 g, Rfl: 0    gentamicin 0.1 % External Ointment, Apply to wound twice a day, Disp: 15 g, Rfl: 0    GABAPENTIN 800 MG Oral Tab, TAKE 1 TABLET BY MOUTH THREE TIMES DAILY, Disp: 270 tablet, Rfl: 0    cholecalciferol 50 MCG (2000 UT) Oral Cap, cholecalciferol (vitamin D3) 50 mcg (2,000 unit) capsule, [RxNorm: 686352], Disp: , Rfl:     Sildenafil Citrate 100 MG Oral Tab, Take 1 tablet (100 mg total) by mouth daily as needed for Erectile Dysfunction. Take ~ one hour prior to sexual activity on an empty stomach, Disp: 30 tablet, Rfl: 11    tramadol-acetaminophen 37.5-325 MG Oral Tab, Take 2 tablets by mouth 2 (two) times daily as needed for Pain., Disp: 360 tablet, Rfl: 1    aspirin 81 MG Oral Tab EC, Take 1 tablet (81 mg total) by mouth daily., Disp: , Rfl:     betamethasone 0.1 % External Cream, Apply to area of rash with dressing change, Disp: 15 g, Rfl: 0    celecoxib 200 MG Oral Cap, Take 1 capsule (200 mg total) by mouth daily as needed., Disp: 90 capsule, Rfl: 3    METOPROLOL SUCCINATE OR, Take by mouth. Daily (per ), Disp: , Rfl:     enoxaparin (LOVENOX) 120 MG/0.8ML Injection Solution Prefilled Syringe, Inject 1  mg/kg into the skin 2 (two) times daily., Disp: , Rfl:     WARFARIN 2.5 MG Oral Tab, TAKE 1 TABLET BY MOUTH DAILY, Disp: 90 tablet, Rfl: 3    LOSARTAN POTASSIUM-HCTZ 100-12.5 MG Oral Tab, TAKE 1 TABLET BY MOUTH DAILY (Patient taking differently: Take 1 tablet by mouth at bedtime.), Disp: 90 tablet, Rfl: 3    SIMVASTATIN 20 MG Oral Tab, TAKE 1 TABLET BY MOUTH NIGHTLY, Disp: 90 tablet, Rfl: 3    Sildenafil Citrate 100 MG Oral Tab, Take 1 tablet (100 mg total) by mouth daily as needed for Erectile Dysfunction., Disp: 10 tablet, Rfl: 11    Glucosamine-Chondroit-Vit C-Mn (GLUCOSAMINE-CHONDROITIN) Oral Cap, Take 1 tablet by mouth daily., Disp: , Rfl:     omega-3 fatty acids (FISH OIL) 1000 MG Oral Cap, Take 1,000 mg by mouth daily., Disp: , Rfl:     Allergies[1]         REVIEW OF SYSTEMS:     Review of Systems (ROS)  This information was obtained from the patient, chart    A comprehensive 10 point review of systems was completed.  Pertinent positives and negatives noted in the the HPI.     Past medical, surgical, family and social history updated where appropriate.    PHYSICAL EXAM:   /79   Pulse 70   Temp 97.3 °F (36.3 °C)   Wt 246 lb   SpO2 99%   BMI 32.46 kg/m²    Estimated body mass index is 32.46 kg/m² as calculated from the following:    Height as of 6/24/24: 73\".    Weight as of this encounter: 246 lb.   Vital signs reviewed.Appears stated age, well groomed.      Awake, alert, in no acute distress  HENT:  normocephalic, atraumatic, external ear canals clear bilaterally, tympanic membranes appear opaque, non-bulging, non-erythematous, nasal turbinates are non-inflamed and not swollen, oropharynx without erythema, swelling, or exudate, gingiva and lips without any apparent lesions.   Cardiac:  S1 S2 regular rate and rhythm, no murmur, gallop, or rub  Respiratory:  Bilaterally clear to auscultation, no chest tenderness to palpation, no wheezing, no rhonchi, no rales, air entry is adequate, no accessory  respiratory muscle use, no chest asymmetry, normal excursion.  GI:  bowel sound positive in all four quadrants, abdomen is soft, non-tender, non-distended, no hepatosplenomegaly, no abnormal aortic pulsation.     Calf                      Ankle                            Foot                              Wound 06/28/24 2 Foot Lateral;Left (Active)   Date First Assessed/Time First Assessed: 06/28/24 0945    Wound Number (Wound Clinic Only): 2  Location: Foot  Wound Location Orientation: Lateral;Left      Assessments 6/28/2024  9:40 AM 11/1/2024 11:34 AM   Wound Image       Site Assessment Moist;Pink;Yellow Red;Dry   Closure Not approximated Not approximated   Drainage Amount Small Scant   Drainage Description Serosanguineous Serosanguineous   Treatments Cleansed;Compression Cleansed;Topical (Barrier/Moisturizer/Ointment)   Dressing Hydrofera ready;Martha 4x4s;Martha (gentamycin cream)   Dressing Changed New New   Dressing Status Clean;Dry;Intact Intact;Clean;Dry   Wound Length (cm) 0.7 cm 0.2 cm   Wound Width (cm) 0.7 cm 0.1 cm   Wound Surface Area (cm^2) 0.49 cm^2 0.02 cm^2   Wound Depth (cm) 0.1 cm 0.1 cm   Wound Volume (cm^3) 0.049 cm^3 0.002 cm^3   Wound Healing % -- 96   Margins Attached edges Attached edges   Non-staged Wound Description Full thickness Full thickness   Mary-wound Assessment Atrophy rogelio;Hyperpigmented Hyperpigmented;Dry;Pink;Atrophy rogelio   Wound Granulation Tissue Pink Red   Wound Bed Granulation (%) 25 % 100 %   Wound Bed Epithelium (%) 0 % 0 %   Wound Bed Slough (%) 75 % 0 %   Wound Bed Eschar (%) 0 % 0 %   Wound Bed Fibrin (%) 0 % 0 %   State of Healing Non-healing;Early/partial granulation Fully granulated   Wound Odor None None       Active Orders   Date Order Priority Status Authorizing Provider   11/01/24 1431 OP Wound Dressing Routine Active Valentin Correa MD     - Cleansing:    Cleanse with normal saline or wound cleanser     - Dressing:    Dry gauze     - Dressing:    Hydrofera  transfer     - Dressing:    Conforming roll     - Additional Wound Dressing Information:    gentamycin cream     - Frequency:    Change dressing daily and PRN   10/25/24 1436 OP Wound Dressing Routine Active Valentin Correa MD     - Cleansing:    Cleanse with normal saline or wound cleanser     - Additional Wound Dressing Information:    alternate between gentamycin and mupricin; aquacel foam     - Frequency:    Change dressing daily and PRN   10/18/24 1309 OP Wound Dressing Routine Active Valentin Correa MD     - Cleansing:    Cleanse with normal saline or wound cleanser     - Additional Wound Dressing Information:    triad, gauze kenia     - Frequency:    Change dressing daily and PRN   10/14/24 1111 OP Wound Dressing Routine Active Valentin Correa MD     - Cleansing:    Cleanse with normal saline or wound cleanser     - Dressing:    Hydrofera ready     - Dressing:    Conforming roll     - Additional Wound Dressing Information:    comprilan x 3     - Frequency:    Change dressing weekly   10/04/24 1120 OP Wound Dressing Routine Active Valentin Correa MD     - Cleansing:    Cleanse with normal saline or wound cleanser     - Dressing:    Endoform collagen     - Dressing:    Hydrofera transfer     - Dressing:    Conforming roll     - Additional Wound Dressing Information:    comprilan x 3     - Frequency:    Change dressing weekly   09/30/24 0929 OP Wound Dressing Routine Active Valentin Correa MD     - Cleansing:    Cleanse with normal saline or wound cleanser     - Dressing:    Acticoat     - Dressing:    Dry gauze     - Dressing:    Conforming roll     - Additional Wound Dressing Information:    compression socks     - Frequency:    Change dressing three times per week   09/23/24 0835 OP Wound Dressing Routine Active Valentin Correa MD     - Cleansing:    Cleanse with normal saline or wound cleanser     - Dressing:    Collagen     - Dressing:    Hydrofera ready     - Dressing:    Conforming roll     -  Additional Wound Dressing Information:    comprilan x 3   08/17/24 0956 OP Wound Dressing Routine Active Valentin Correa MD     - Cleansing:    Cleanse with normal saline or wound cleanser     - Dressing:    Hydrofera transfer     - Dressing:    Conforming roll     - Additional Wound Dressing Information:    comprilan x 3   07/19/24 1004 OP Wound Dressing Routine Active Valentin Correa MD     - Cleansing:    Cleanse with normal saline or wound cleanser     - Dressing:    Endoform collagen     - Dressing:    Hydrofera transfer     - Dressing:    Kerramax/Super absorbent     - Dressing:    Kerlix     - Additional Wound Dressing Information:    3 layer comprilan     - Frequency:    Change dressing weekly   07/08/24 1624 OP Wound Dressing Routine Active Valentin Correa MD     - Cleansing:    Cleanse with normal saline or wound cleanser     - Additional Wound Dressing Information:    Left foot- iodosorb, aquacel, kerlix and antifungal powder, and comprilan     - Frequency:    Change dressing weekly       Inactive Orders   Date Order Priority Status Authorizing Provider   08/30/24 1208 OP Wound Dressing Routine Completed Valentin Correa MD     - Cleansing:    Cleanse with normal saline or wound cleanser     - Dressing:    Collagen     - Dressing:    Hydrofera ready     - Additional Wound Dressing Information:    comprilan x 3     - Frequency:    Change dressing weekly   08/05/24 1046 OP Wound Dressing Routine Completed Valentin Correa MD     - Cleansing:    Cleanse with normal saline or wound cleanser     - Dressing:    Endoform collagen     - Additional Wound Dressing Information:    mepitol contact layer, hydrofera blue ready, kenia and comprilan x 3     - Frequency:    Change dressing weekly   07/05/24 1139 OP Wound Dressing Routine Completed Willie Redd APRN     - Dressing:    Alginate     - Dressing:    Iodosorb     - Additional Wound Dressing Information:    zinc periwound, nystatin powder to skin under  wraps     - Cleansing:    Cleanse with normal saline or wound cleanser     - Frequency:    Change dressing weekly       Compression Wrap 06/28/24 Dorsal;Left (Active)   Placement Date/Time: 06/28/24 1028   Wound Location Orientation: Dorsal;Left      Assessments 6/28/2024  9:40 AM 10/25/2024 11:38 AM   Response to Treatment Well tolerated Well tolerated   Compression Layers 3 Single   Compression Product Type Artiflex 10cm;Artiflex 15cm;Comprilan 8cm;Comprilan 10cm;Comprilan 12cm;Stockinette 3in --   Dressing Applied Yes Yes   Compression Wrap Location Toes to Knee Toes to Knee   Compression Wrap Status Clean;Dry;Intact Clean;Dry;Intact       Inactive Orders   Date Order Priority Status Authorizing Provider   07/05/24 1139 OP Wound Dressing Routine Completed Willie Redd APRN     - Dressing:    Alginate     - Dressing:    Iodosorb     - Additional Wound Dressing Information:    zinc periwound, nystatin powder to skin under wraps     - Cleansing:    Cleanse with normal saline or wound cleanser     - Frequency:    Change dressing weekly       Wound 11/01/24 2 Ankle Left;Medial (Active)   Date First Assessed/Time First Assessed: 11/01/24 1131    Wound Number (Wound Clinic Only): 2  Primary Wound Type: Venous Ulcer  Location: Ankle  Wound Location Orientation: Left;Medial      Assessments 11/1/2024 11:34 AM   Wound Image     Site Assessment Dry;Red;Yellow   Closure Not approximated   Drainage Amount Scant   Drainage Description Serosanguineous;Yellow   Treatments Cleansed;Saline   Dressing Hydrofera transfer (gentamycin cream, patient's own compression sock)   Dressing Changed New   Dressing Status Dry;Intact;Clean   Wound Length (cm) 1.5 cm   Wound Width (cm) 0.7 cm   Wound Surface Area (cm^2) 1.05 cm^2   Wound Depth (cm) 0.1 cm   Wound Volume (cm^3) 0.105 cm^3   Margins Attached edges   Non-staged Wound Description Full thickness   Mary-wound Assessment Dry;Hemosiderin staining;Atrophy rogelio   Wound Granulation  Tissue Pink   Wound Bed Granulation (%) 90 %   Wound Bed Epithelium (%) 0 %   Wound Bed Slough (%) 10 %   Wound Bed Eschar (%) 0 %   Wound Bed Fibrin (%) 0 %   State of Healing Non-healing   Wound Odor None       Active Orders   Date Order Priority Status Authorizing Provider   11/01/24 1431 OP Wound Dressing Routine Active Valentin Correa MD     - Cleansing:    Cleanse with normal saline or wound cleanser     - Dressing:    Dry gauze     - Dressing:    Hydrofera transfer     - Dressing:    Conforming roll     - Additional Wound Dressing Information:    gentamycin cream     - Frequency:    Change dressing daily and PRN              ASSESSMENT AND PLAN:      1. Chronic venous hypertension (idiopathic) with ulcer of left lower extremity (HCC)  - OP Wound Dressing; Future    2. Benign essential HTN  - OP Wound Dressing; Future    3. Venous (peripheral) insufficiency  - OP Wound Dressing; Future    The lateral left ankle wound is doing better and is almost completely healed we will continue with gentamicin twice a day and alternate with betamethasone twice a day.  Will also do the same treatment to the medial leg wound over the next week.  Will use Hydrofera Blue dressing over the medial leg wound.  Change that dressing on a twice a day basis right now.  Continue wearing his compression stockings.  Risks, benefits, and alternatives of current treatment plan discussed in detail.  Questions and concerns addressed. Red flags to RTC or ED reviewed.  Patient (or parent) agrees to plan.      Return in about 1 week (around 11/8/2024) for MD visit for 30 min.    Also refer to patient instructions.     I spent 40 minutes with the patient. This time included:  preparing to see the patient (eg, review notes and recent diagnostics), seeing the patient, performing a medically appropriate examination and/or evaluation, counseling and educating the patient, and documenting in the record.    I agree with staff documentation except for  any changes made in my note.       Valentin Correa M.D., Wound Care Physician  [facility]  11/1/2024               [1]   Allergies  Allergen Reactions    Adhesive Tape RASH     Can use paper tape.

## 2024-11-01 NOTE — PATIENT INSTRUCTIONS
PATIENT INSTRUCTIONS      FOR Russ Kirkland , JOSH 1955    DATE OF SERVICE: 2024      Apply betamethasone cream twice a day to the wounds  Alternate this on a daily basis with gentamicin ointment twice a day to the wounds    Cover with gauze roll and secure with tape or you can use a silicone bordered bandage    Wear your own compression garment        Follow up with Dr. Correa 1 WEEK

## 2024-11-08 ENCOUNTER — LAB ENCOUNTER (OUTPATIENT)
Dept: LAB | Facility: HOSPITAL | Age: 69
End: 2024-11-08
Attending: STUDENT IN AN ORGANIZED HEALTH CARE EDUCATION/TRAINING PROGRAM
Payer: MEDICARE

## 2024-11-08 ENCOUNTER — OFFICE VISIT (OUTPATIENT)
Dept: WOUND CARE | Facility: HOSPITAL | Age: 69
End: 2024-11-08
Attending: FAMILY MEDICINE
Payer: MEDICARE

## 2024-11-08 VITALS
HEART RATE: 70 BPM | OXYGEN SATURATION: 97 % | DIASTOLIC BLOOD PRESSURE: 80 MMHG | SYSTOLIC BLOOD PRESSURE: 137 MMHG | TEMPERATURE: 98 F

## 2024-11-08 DIAGNOSIS — I10 BENIGN ESSENTIAL HTN: ICD-10-CM

## 2024-11-08 DIAGNOSIS — I63.9 CEREBROVASCULAR ACCIDENT (CVA), UNSPECIFIED MECHANISM (HCC): ICD-10-CM

## 2024-11-08 DIAGNOSIS — I87.312 CHRONIC VENOUS HYPERTENSION (IDIOPATHIC) WITH ULCER OF LEFT LOWER EXTREMITY (HCC): Primary | ICD-10-CM

## 2024-11-08 DIAGNOSIS — I87.2 VENOUS (PERIPHERAL) INSUFFICIENCY: ICD-10-CM

## 2024-11-08 DIAGNOSIS — I48.20 CHRONIC ATRIAL FIBRILLATION (HCC): ICD-10-CM

## 2024-11-08 DIAGNOSIS — L97.929 CHRONIC VENOUS HYPERTENSION (IDIOPATHIC) WITH ULCER OF LEFT LOWER EXTREMITY (HCC): Primary | ICD-10-CM

## 2024-11-08 LAB — INR BLDC: 3.3 (ref 0.9–1.1)

## 2024-11-08 PROCEDURE — 85610 PROTHROMBIN TIME: CPT

## 2024-11-08 PROCEDURE — 36415 COLL VENOUS BLD VENIPUNCTURE: CPT

## 2024-11-08 PROCEDURE — 99215 OFFICE O/P EST HI 40 MIN: CPT | Performed by: FAMILY MEDICINE

## 2024-11-08 NOTE — PATIENT INSTRUCTIONS
PATIENT INSTRUCTIONS      FOR Russ Kirkland ,  1955    DATE OF SERVICE: 2024      Apply Coloplast Triad paste to the lateral ankle wound  Cover with Hydrofera Blue dressing    Apply Enluxtra to the medial ankle wound  Cover with ABD pad    Comprilan 3 layer compression wrap to left lower extremity      Follow up with Dr. Correa 1 WEEK      Managing your edema:    Avoid prolonged standing in one place. It is better to have your calf muscles moving to pump fluid out of the legs.  Elevate leg(s) above the level of the heart when sitting or as much as possible.  Take you diuretics as directed by your physician. Do not skip doses or change doses unless instructed to do so by your physician.  Decrease salt intake, follow recommended 2 grams daily.  Do not get leg(s) with compression wrap wet. If wraps are too tight as indicated by pain, numbness/tingling or discoloration of toes remove wrap completely and call the wound center @ [phone].       The treatment plan has been discussed at length between you and your provider. Follow all instructions carefully, it is very important. If you do not follow all instructions you are at risk of your wound not healing, infection, possible loss of limb and even loss of life.    COMPRESSION WRAP PATIENT CARE INSTRUCTIONS  DO NOT get compression wrap wet. When showering, use a shower boot.   Please contact wound clinic and if not able to reach, then go to emergency room if ANY of the following occur:   Tingling or numbness in the foot or toes on leg with compression wrap.  Severe pain that cannot be relieved with elevation and any medication instructed per your doctor.  A fever of 100.4°F (38°C) or higher.  Swelling, coldness, or blue-gray discoloration of the toes.  If the compression wrap feels too tight or too loose.  If the compression wrap is damaged or has rough edges that hurt.  If the compression wrap gets wet, you must cut wrap off.   Drainage from  compression wrap that smells different than usual.

## 2024-11-08 NOTE — PROGRESS NOTES
Weekly Wound Education Note    Teaching Provided To: Patient  Training Topics: Cleasing and general instructions;Skin care;Diabetes;Dressing  Training Method: Explain/Verbal;Demonstration  Training Response: Patient responds and understands        Left leg- enluxtra on medial wound and triad and hydrofera blue on lateral wound. 3 layers comrpilan

## 2024-11-11 NOTE — PROGRESS NOTES
Chief Complaint   Patient presents with    Wound Recheck     Here for left lateral foot and medial ankle wound care follow up.       HPI:     Patient here for follow-up of left lateral ankle wound and left medial ankle wound.  He is doing fine and has no new complaints.    Lab Results   Component Value Date    BUN 21 04/08/2024    CREATSERUM 0.85 04/08/2024    ALB 4.4 04/08/2024    TP 7.0 04/08/2024    A1C 5.6 04/16/2018         Current Outpatient Medications:     betamethasone 0.1 % External Cream, Apply to wound twice a day, Disp: 15 g, Rfl: 0    gentamicin 0.1 % External Ointment, Apply to wound twice a day, Disp: 15 g, Rfl: 0    GABAPENTIN 800 MG Oral Tab, TAKE 1 TABLET BY MOUTH THREE TIMES DAILY, Disp: 270 tablet, Rfl: 0    cholecalciferol 50 MCG (2000 UT) Oral Cap, cholecalciferol (vitamin D3) 50 mcg (2,000 unit) capsule, [RxNorm: 050402], Disp: , Rfl:     Sildenafil Citrate 100 MG Oral Tab, Take 1 tablet (100 mg total) by mouth daily as needed for Erectile Dysfunction. Take ~ one hour prior to sexual activity on an empty stomach, Disp: 30 tablet, Rfl: 11    tramadol-acetaminophen 37.5-325 MG Oral Tab, Take 2 tablets by mouth 2 (two) times daily as needed for Pain., Disp: 360 tablet, Rfl: 1    aspirin 81 MG Oral Tab EC, Take 1 tablet (81 mg total) by mouth daily., Disp: , Rfl:     betamethasone 0.1 % External Cream, Apply to area of rash with dressing change, Disp: 15 g, Rfl: 0    celecoxib 200 MG Oral Cap, Take 1 capsule (200 mg total) by mouth daily as needed., Disp: 90 capsule, Rfl: 3    METOPROLOL SUCCINATE OR, Take by mouth. Daily (per ), Disp: , Rfl:     enoxaparin (LOVENOX) 120 MG/0.8ML Injection Solution Prefilled Syringe, Inject 1 mg/kg into the skin 2 (two) times daily., Disp: , Rfl:     WARFARIN 2.5 MG Oral Tab, TAKE 1 TABLET BY MOUTH DAILY, Disp: 90 tablet, Rfl: 3    LOSARTAN POTASSIUM-HCTZ 100-12.5 MG Oral Tab, TAKE 1 TABLET BY MOUTH DAILY (Patient taking differently: Take 1 tablet by mouth  at bedtime.), Disp: 90 tablet, Rfl: 3    SIMVASTATIN 20 MG Oral Tab, TAKE 1 TABLET BY MOUTH NIGHTLY, Disp: 90 tablet, Rfl: 3    Sildenafil Citrate 100 MG Oral Tab, Take 1 tablet (100 mg total) by mouth daily as needed for Erectile Dysfunction., Disp: 10 tablet, Rfl: 11    Glucosamine-Chondroit-Vit C-Mn (GLUCOSAMINE-CHONDROITIN) Oral Cap, Take 1 tablet by mouth daily., Disp: , Rfl:     omega-3 fatty acids (FISH OIL) 1000 MG Oral Cap, Take 1,000 mg by mouth daily., Disp: , Rfl:     Allergies[1]         REVIEW OF SYSTEMS:     Review of Systems (ROS)  This information was obtained from the patient, chart    A comprehensive 10 point review of systems was completed.  Pertinent positives and negatives noted in the the HPI.     Past medical, surgical, family and social history updated where appropriate.    PHYSICAL EXAM:   /80   Pulse 70   Temp 98.1 °F (36.7 °C)   SpO2 97%    Estimated body mass index is 32.46 kg/m² as calculated from the following:    Height as of 6/24/24: 73\".    Weight as of 11/1/24: 246 lb.   Vital signs reviewed.Appears stated age, well groomed.      Awake, alert, in no acute distress  HENT:  normocephalic, atraumatic, external ear canals clear bilaterally, tympanic membranes appear opaque, non-bulging, non-erythematous, nasal turbinates are non-inflamed and not swollen, oropharynx without erythema, swelling, or exudate, gingiva and lips without any apparent lesions.   Cardiac:  S1 S2 regular rate and rhythm, no murmur, gallop, or rub  Respiratory:  Bilaterally clear to auscultation, no chest tenderness to palpation, no wheezing, no rhonchi, no rales, air entry is adequate, no accessory respiratory muscle use, no chest asymmetry, normal excursion.  GI:  bowel sound positive in all four quadrants, abdomen is soft, non-tender, non-distended, no hepatosplenomegaly, no abnormal aortic pulsation.     Calf                      Ankle                            Foot                              Wound  06/28/24 2 Foot Lateral;Left (Active)   Date First Assessed/Time First Assessed: 06/28/24 0945    Wound Number (Wound Clinic Only): 2  Location: Foot  Wound Location Orientation: Lateral;Left      Assessments 6/28/2024  9:40 AM 11/8/2024 11:38 AM   Wound Image       Site Assessment Moist;Pink;Yellow Dry;Yellow   Closure Not approximated Not approximated   Drainage Amount Small None   Drainage Description Serosanguineous Scabbed   Treatments Cleansed;Compression Cleansed;Topical (Barrier/Moisturizer/Ointment)   Dressing Hydrofera ready;Martha Hydrofera ready (triad; kerlix)   Dressing Changed New Changed   Dressing Status Clean;Dry;Intact Intact;Clean;Dry   Wound Length (cm) 0.7 cm 0.2 cm   Wound Width (cm) 0.7 cm 0.1 cm   Wound Surface Area (cm^2) 0.49 cm^2 0.02 cm^2   Wound Depth (cm) 0.1 cm 0.1 cm   Wound Volume (cm^3) 0.049 cm^3 0.002 cm^3   Wound Healing % -- 96   Margins Attached edges Attached edges   Non-staged Wound Description Full thickness Full thickness   Mary-wound Assessment Atrophy rogelio;Hyperpigmented Hyperpigmented;Dry;Pink;Atrophy rogelio   Wound Granulation Tissue Pink --   Wound Bed Granulation (%) 25 % 0 %   Wound Bed Epithelium (%) 0 % 0 %   Wound Bed Slough (%) 75 % 100 %   Wound Bed Eschar (%) 0 % 0 %   Wound Bed Fibrin (%) 0 % 0 %   State of Healing Non-healing;Early/partial granulation Non-healing   Wound Odor None None       Active Orders   Date Order Priority Status Authorizing Provider   11/01/24 1431 OP Wound Dressing Routine Active Valentin Correa MD     - Cleansing:    Cleanse with normal saline or wound cleanser     - Dressing:    Dry gauze     - Dressing:    Hydrofera transfer     - Dressing:    Conforming roll     - Additional Wound Dressing Information:    gentamycin cream     - Frequency:    Change dressing daily and PRN   10/25/24 1436 OP Wound Dressing Routine Active Valentin Correa MD     - Cleansing:    Cleanse with normal saline or wound cleanser     - Additional Wound  Dressing Information:    alternate between gentamycin and mupricin; aquacel foam     - Frequency:    Change dressing daily and PRN   10/18/24 1309 OP Wound Dressing Routine Active Valentin Correa MD     - Cleansing:    Cleanse with normal saline or wound cleanser     - Additional Wound Dressing Information:    triad, gauze kenia     - Frequency:    Change dressing daily and PRN   10/14/24 1111 OP Wound Dressing Routine Active Valentin Correa MD     - Cleansing:    Cleanse with normal saline or wound cleanser     - Dressing:    Hydrofera ready     - Dressing:    Conforming roll     - Additional Wound Dressing Information:    quirino elizabeth 3     - Frequency:    Change dressing weekly   10/04/24 1120 OP Wound Dressing Routine Active Valentin Correa MD     - Cleansing:    Cleanse with normal saline or wound cleanser     - Dressing:    Endoform collagen     - Dressing:    Hydrofera transfer     - Dressing:    Conforming roll     - Additional Wound Dressing Information:    quirino elizabeth 3     - Frequency:    Change dressing weekly   09/30/24 0929 OP Wound Dressing Routine Active Valentin Correa MD     - Cleansing:    Cleanse with normal saline or wound cleanser     - Dressing:    Acticoat     - Dressing:    Dry gauze     - Dressing:    Conforming roll     - Additional Wound Dressing Information:    compression socks     - Frequency:    Change dressing three times per week   09/23/24 0835 OP Wound Dressing Routine Active Valentin Correa MD     - Cleansing:    Cleanse with normal saline or wound cleanser     - Dressing:    Collagen     - Dressing:    Hydrofera ready     - Dressing:    Conforming roll     - Additional Wound Dressing Information:    quirino elizabeth 3   08/17/24 0956 OP Wound Dressing Routine Active Valentin Correa MD     - Cleansing:    Cleanse with normal saline or wound cleanser     - Dressing:    Hydrofera transfer     - Dressing:    Conforming roll     - Additional Wound Dressing Information:    quirino elizabeth  3   07/19/24 1004 OP Wound Dressing Routine Active Valentin Correa MD     - Cleansing:    Cleanse with normal saline or wound cleanser     - Dressing:    Endoform collagen     - Dressing:    Hydrofera transfer     - Dressing:    Kerramax/Super absorbent     - Dressing:    Kerlix     - Additional Wound Dressing Information:    3 layer comprilan     - Frequency:    Change dressing weekly   07/08/24 1624 OP Wound Dressing Routine Active Valentin Correa MD     - Cleansing:    Cleanse with normal saline or wound cleanser     - Additional Wound Dressing Information:    Left foot- iodosorb, aquacel, kerlix and antifungal powder, and comprilan     - Frequency:    Change dressing weekly       Inactive Orders   Date Order Priority Status Authorizing Provider   08/30/24 1208 OP Wound Dressing Routine Completed Valentin Correa MD     - Cleansing:    Cleanse with normal saline or wound cleanser     - Dressing:    Collagen     - Dressing:    Hydrofera ready     - Additional Wound Dressing Information:    comprilan x 3     - Frequency:    Change dressing weekly   08/05/24 1046 OP Wound Dressing Routine Completed Valentin Crorea MD     - Cleansing:    Cleanse with normal saline or wound cleanser     - Dressing:    Endoform collagen     - Additional Wound Dressing Information:    mepitol contact layer, hydrofera blue ready, kenia and comprilan x 3     - Frequency:    Change dressing weekly   07/05/24 1139 OP Wound Dressing Routine Completed Willie Redd APRN     - Dressing:    Alginate     - Dressing:    Iodosorb     - Additional Wound Dressing Information:    zinc periwound, nystatin powder to skin under wraps     - Cleansing:    Cleanse with normal saline or wound cleanser     - Frequency:    Change dressing weekly       Compression Wrap 06/28/24 Dorsal;Left (Active)   Placement Date/Time: 06/28/24 1028   Wound Location Orientation: Dorsal;Left      Assessments 6/28/2024  9:40 AM 11/8/2024 11:38 AM   Response to Treatment  Well tolerated Well tolerated   Compression Layers 3 Multilayer   Compression Product Type Artiflex 10cm;Artiflex 15cm;Comprilan 8cm;Comprilan 10cm;Comprilan 12cm;Stockinette 3in Artiflex 10cm;Artiflex 15cm;Comprilan 8cm;Comprilan 10cm;Comprilan 12cm   Dressing Applied Yes Yes   Compression Wrap Location Toes to Knee Toes to Knee   Compression Wrap Status Clean;Dry;Intact Clean;Dry;Intact       Inactive Orders   Date Order Priority Status Authorizing Provider   07/05/24 1139 OP Wound Dressing Routine Completed Willie Redd APRN     - Dressing:    Alginate     - Dressing:    Iodosorb     - Additional Wound Dressing Information:    zinc periwound, nystatin powder to skin under wraps     - Cleansing:    Cleanse with normal saline or wound cleanser     - Frequency:    Change dressing weekly       Wound 11/01/24 2 Ankle Left;Medial (Active)   Date First Assessed/Time First Assessed: 11/01/24 1131    Wound Number (Wound Clinic Only): 2  Primary Wound Type: Venous Ulcer  Location: Ankle  Wound Location Orientation: Left;Medial      Assessments 11/1/2024 11:34 AM 11/8/2024 11:38 AM   Wound Image       Site Assessment Dry;Red;Yellow Dry;Yellow;Red   Closure Not approximated Not approximated   Drainage Amount Scant Scant   Drainage Description Serosanguineous;Yellow Yellow   Treatments Cleansed;Saline Cleansed;Saline   Dressing Hydrofera transfer (gentamycin cream, patient's own compression sock) Kerlix roll (enluxtra,)   Dressing Changed New Changed   Dressing Status Dry;Intact;Clean Dry;Intact;Clean   Wound Length (cm) 1.5 cm 1.4 cm   Wound Width (cm) 0.7 cm 0.7 cm   Wound Surface Area (cm^2) 1.05 cm^2 0.98 cm^2   Wound Depth (cm) 0.1 cm 0.1 cm   Wound Volume (cm^3) 0.105 cm^3 0.098 cm^3   Wound Healing % -- 7   Margins Attached edges Attached edges   Non-staged Wound Description Full thickness Full thickness   Mary-wound Assessment Dry;Hemosiderin staining;Atrophy rogelio Dry;Hemosiderin staining;Atrophy rogelio   Wound  Granulation Tissue Pink Pink   Wound Bed Granulation (%) 90 % 0 %   Wound Bed Epithelium (%) 0 % 0 %   Wound Bed Slough (%) 10 % 100 %   Wound Bed Eschar (%) 0 % 0 %   Wound Bed Fibrin (%) 0 % 0 %   State of Healing Non-healing Non-healing   Wound Odor None None       Active Orders   Date Order Priority Status Authorizing Provider   11/01/24 1431 OP Wound Dressing Routine Active Valentin Correa MD     - Cleansing:    Cleanse with normal saline or wound cleanser     - Dressing:    Dry gauze     - Dressing:    Hydrofera transfer     - Dressing:    Conforming roll     - Additional Wound Dressing Information:    gentamycin cream     - Frequency:    Change dressing daily and PRN              ASSESSMENT AND PLAN:      1. Chronic venous hypertension (idiopathic) with ulcer of left lower extremity (HCC)    2. Benign essential HTN    3. Venous (peripheral) insufficiency    Clinically the lateral left ankle wound is almost completely healed now.  Left medial wound is somewhat better.  Will use Triad paste to the left lateral ankle wound and cover with Hydrofera Blue dressing and use Enluxtra to the medial ankle wound and cover with ABD pad.  Will initiate Comprilan 3 layer compression wrap once again to help with the medial wound mostly.  He has tolerated this in the past.  We did go over compression wrap precautions.      Risks, benefits, and alternatives of current treatment plan discussed in detail.  Questions and concerns addressed. Red flags to RTC or ED reviewed.  Patient (or parent) agrees to plan.      Return in about 1 week (around 11/15/2024) for MD visit for 30 min.    Also refer to patient instructions.     I spent 40 minutes with the patient. This time included:  preparing to see the patient (eg, review notes and recent diagnostics), seeing the patient, performing a medically appropriate examination and/or evaluation, counseling and educating the patient, and documenting in the record.    I agree with staff  documentation except for any changes made in my note.       Valentin Correa M.D., Wound Care Physician  [facility]  11/11/2024               [1]   Allergies  Allergen Reactions    Adhesive Tape RASH     Can use paper tape.

## 2024-11-14 ENCOUNTER — OFFICE VISIT (OUTPATIENT)
Dept: INTERNAL MEDICINE CLINIC | Facility: CLINIC | Age: 69
End: 2024-11-14

## 2024-11-14 ENCOUNTER — TELEPHONE (OUTPATIENT)
Dept: INTERNAL MEDICINE CLINIC | Facility: CLINIC | Age: 69
End: 2024-11-14

## 2024-11-14 VITALS
BODY MASS INDEX: 33.4 KG/M2 | HEART RATE: 80 BPM | SYSTOLIC BLOOD PRESSURE: 128 MMHG | TEMPERATURE: 99 F | OXYGEN SATURATION: 95 % | HEIGHT: 73 IN | WEIGHT: 252 LBS | DIASTOLIC BLOOD PRESSURE: 78 MMHG

## 2024-11-14 DIAGNOSIS — Z01.818 PREOP EXAMINATION: Primary | ICD-10-CM

## 2024-11-14 DIAGNOSIS — D68.9 COAGULOPATHY (HCC): ICD-10-CM

## 2024-11-14 PROCEDURE — G0008 ADMIN INFLUENZA VIRUS VAC: HCPCS | Performed by: INTERNAL MEDICINE

## 2024-11-14 PROCEDURE — 90662 IIV NO PRSV INCREASED AG IM: CPT | Performed by: INTERNAL MEDICINE

## 2024-11-14 PROCEDURE — 99214 OFFICE O/P EST MOD 30 MIN: CPT | Performed by: INTERNAL MEDICINE

## 2024-11-14 RX ORDER — METOPROLOL SUCCINATE 50 MG/1
50 TABLET, EXTENDED RELEASE ORAL DAILY
COMMUNITY
Start: 2024-10-05

## 2024-11-14 NOTE — TELEPHONE ENCOUNTER
Dr. De Souza gave instruction to fax today's ov note (Pre-op clearance) to Dr. Unger ([facility]).  Visit note faxed to [phone] & [phone] per clearance fax request.     Awaiting pt completion & MD review of lab results.

## 2024-11-14 NOTE — PROGRESS NOTES
Russ Kirkland is a 69 year old male.  Chief Complaint   Patient presents with    Surgical/procedure Clearance     Scheduled for Rt knee replacement 12/12/24 with Dr. Unger  Saw cardiology Tuesday and had EKG done     HPI:     Preop eval for R TKR on 12/12/24 by Dr. Unger.  Worsening pain in R knee.  Hx of R and L CAMERON several years ago.  Strong family hx of OA     Saw Dr. Alfaro on Tuesday and was cleared for surgery.  No CP or SOB.  Trying to ride a stationary bike to keep moving    Seeing wound clinic for a tiny left lower leg venous stasis ulcer; almost healed      Current Outpatient Medications   Medication Sig Dispense Refill    metoprolol succinate ER 50 MG Oral Tablet 24 Hr Take 1 tablet (50 mg total) by mouth daily.      GABAPENTIN 800 MG Oral Tab TAKE 1 TABLET BY MOUTH THREE TIMES DAILY 270 tablet 0    cholecalciferol 50 MCG (2000 UT) Oral Cap cholecalciferol (vitamin D3) 50 mcg (2,000 unit) capsule, [RxNorm: 103778]      Sildenafil Citrate 100 MG Oral Tab Take 1 tablet (100 mg total) by mouth daily as needed for Erectile Dysfunction. Take ~ one hour prior to sexual activity on an empty stomach 30 tablet 11    tramadol-acetaminophen 37.5-325 MG Oral Tab Take 2 tablets by mouth 2 (two) times daily as needed for Pain. 360 tablet 1    aspirin 81 MG Oral Tab EC Take 1 tablet (81 mg total) by mouth daily.      celecoxib 200 MG Oral Cap Take 1 capsule (200 mg total) by mouth daily as needed. 90 capsule 3    WARFARIN 2.5 MG Oral Tab TAKE 1 TABLET BY MOUTH DAILY (Patient taking differently: Take 1 tablet (2.5 mg total) by mouth daily. As directed) 90 tablet 3    LOSARTAN POTASSIUM-HCTZ 100-12.5 MG Oral Tab TAKE 1 TABLET BY MOUTH DAILY 90 tablet 3    SIMVASTATIN 20 MG Oral Tab TAKE 1 TABLET BY MOUTH NIGHTLY 90 tablet 3    Sildenafil Citrate 100 MG Oral Tab Take 1 tablet (100 mg total) by mouth daily as needed for Erectile Dysfunction. 10 tablet 11    Glucosamine-Chondroit-Vit C-Mn (GLUCOSAMINE-CHONDROITIN) Oral  Cap Take 1 tablet by mouth daily.      omega-3 fatty acids (FISH OIL) 1000 MG Oral Cap Take 1,000 mg by mouth daily.      betamethasone 0.1 % External Cream Apply to wound twice a day 15 g 0    enoxaparin (LOVENOX) 120 MG/0.8ML Injection Solution Prefilled Syringe Inject 1 mg/kg into the skin 2 (two) times daily. (Patient not taking: Reported on 11/14/2024)        Past Medical History:    Acute perforated appendicitis    Acute, but ill-defined, cerebrovascular disease    Arrhythmia    afib    Atrial fibrillation ([facility])    ablation (2004); CV (May 2009)    Cataract    Deep vein thrombosis ([facility])    can not remember which leg    Hearing impairment    bilateral hearing aids    High blood pressure    High cholesterol    History of blood transfusion    no reaction    History of vein stripping    Osteoarthrosis, unspecified whether generalized or localized, unspecified site    Sleep apnea    CPAP device    Stroke ([facility])    2019    UGI bleed    Unspecified essential hypertension    Venous insufficiency    RT greater saphenous vein ablation 2007      Social History:  Social History     Socioeconomic History    Marital status:    Tobacco Use    Smoking status: Never    Smokeless tobacco: Never   Vaping Use    Vaping status: Never Used   Substance and Sexual Activity    Alcohol use: Yes     Alcohol/week: 15.0 standard drinks of alcohol     Types: 15 Glasses of wine per week     Comment: 2 glasses of wine/day    Drug use: No   Other Topics Concern    Caffeine Concern No    Pt has a pacemaker No    Pt has a defibrillator No    Reaction to local anesthetic No   Social History Narrative    The patient does not use an assistive device..      The patient does live in a home with stairs.     Social Drivers of Health      [facility]    Social Connections    [facility]    Housing Stability         REVIEW OF SYSTEMS:   GENERAL HEALTH: feels well otherwise  RESPIRATORY: no SOB  CARDIOVASCULAR: no chest pain/pressure  GI: no nausea, vomiting, diarrhea    Wt Readings from Last 5 Encounters:   11/14/24 252 lb (114.3 kg)   11/01/24 246 lb (111.6 kg)   10/25/24 248 lb 12.8 oz (112.9 kg)   06/24/24 240 lb (108.9 kg)   06/06/24 240 lb (108.9 kg)     Body mass index is 33.25 kg/m².      EXAM:   /78   Pulse 80   Temp 98.6 °F (37 °C) (Oral)   Ht 6' 1\" (1.854 m)   Wt 252 lb (114.3 kg)   SpO2 95%   BMI 33.25 kg/m²   GENERAL: well developed, well nourished, in no apparent distress    NECK: supple, no adenopathy, no bruits  LUNGS: clear to auscultation  CARDIO: irreg irreg, normal S1S2, without murmur or gallop  GI: soft, NT, ND, NABS  EXTREMITIES: no RLE edema; L leg bandaged    ASSESSMENT AND PLAN:     Preop examination  R knee OA  -Pt is asymptomatic from a cardiopulmonary standpoint  -currently on warfarin for AF; pt is high risk for CVA (had CVA in 2019 when off warfarin for his appendectomy), so recommend bridging with lovenox -- pt has already met with [facility] coumadin clinic and has lovenox bridging arranged  -CTA coronaries in 6/2024 -- calcified plaque prox LAD and ostial LCx with <50% stenosis (normal FFRct analysis with no evidence for hemodynamically significant lesions)  -saw Dr. Alfaro on 11/12 -- cleared to proceed with surgery  -pt acceptable surgical risk from my perspective; scheduled for R TKA on 12/12/24  -check CBC, CMP, PT, PTT          The patient indicates understanding of these issues and agrees to the plan.    Christie De Souza MD, 11/14/24, 3:32 PM

## 2024-11-15 ENCOUNTER — LAB ENCOUNTER (OUTPATIENT)
Dept: LAB | Facility: HOSPITAL | Age: 69
End: 2024-11-15
Attending: INTERNAL MEDICINE
Payer: MEDICARE

## 2024-11-15 ENCOUNTER — OFFICE VISIT (OUTPATIENT)
Dept: WOUND CARE | Facility: HOSPITAL | Age: 69
End: 2024-11-15
Attending: FAMILY MEDICINE
Payer: MEDICARE

## 2024-11-15 VITALS
DIASTOLIC BLOOD PRESSURE: 77 MMHG | HEART RATE: 71 BPM | OXYGEN SATURATION: 98 % | TEMPERATURE: 99 F | SYSTOLIC BLOOD PRESSURE: 142 MMHG

## 2024-11-15 DIAGNOSIS — L97.929 CHRONIC VENOUS HYPERTENSION (IDIOPATHIC) WITH ULCER OF LEFT LOWER EXTREMITY (HCC): Primary | ICD-10-CM

## 2024-11-15 DIAGNOSIS — I87.312 CHRONIC VENOUS HYPERTENSION (IDIOPATHIC) WITH ULCER OF LEFT LOWER EXTREMITY (HCC): Primary | ICD-10-CM

## 2024-11-15 DIAGNOSIS — I48.20 CHRONIC ATRIAL FIBRILLATION (HCC): ICD-10-CM

## 2024-11-15 DIAGNOSIS — I63.9 CEREBROVASCULAR ACCIDENT (CVA), UNSPECIFIED MECHANISM (HCC): ICD-10-CM

## 2024-11-15 DIAGNOSIS — I10 BENIGN ESSENTIAL HTN: ICD-10-CM

## 2024-11-15 DIAGNOSIS — L97.312 VARICOSE VEINS OF RIGHT LOWER EXTREMITY WITH INFLAMMATION, WITH ULCER OF ANKLE WITH FAT LAYER EXPOSED (HCC): ICD-10-CM

## 2024-11-15 DIAGNOSIS — I87.2 VENOUS (PERIPHERAL) INSUFFICIENCY: ICD-10-CM

## 2024-11-15 DIAGNOSIS — I83.213 VARICOSE VEINS OF RIGHT LOWER EXTREMITY WITH INFLAMMATION, WITH ULCER OF ANKLE WITH FAT LAYER EXPOSED (HCC): ICD-10-CM

## 2024-11-15 LAB — INR BLDC: 2.8 (ref 0.9–1.1)

## 2024-11-15 PROCEDURE — 99215 OFFICE O/P EST HI 40 MIN: CPT | Performed by: FAMILY MEDICINE

## 2024-11-15 PROCEDURE — 36415 COLL VENOUS BLD VENIPUNCTURE: CPT

## 2024-11-15 PROCEDURE — 85610 PROTHROMBIN TIME: CPT

## 2024-11-15 NOTE — PATIENT INSTRUCTIONS
PATIENT INSTRUCTIONS      FOR Russ Kirkland ,  1955    DATE OF SERVICE: 11/15/2024    LEFT LATERAL ANKLE:  Apply Sherrie collagen  Cover with Hydrofera Blue dressing    LEFT MEDIAL ANKLE:  Apply Enluxtra to the medial ankle wound  Cover with ABD pad    Comprilan 3 layer compression wrap to left lower extremity      Follow up with Dr. Correa 1 WEEK      Managing your edema:    Avoid prolonged standing in one place. It is better to have your calf muscles moving to pump fluid out of the legs.  Elevate leg(s) above the level of the heart when sitting or as much as possible.  Take you diuretics as directed by your physician. Do not skip doses or change doses unless instructed to do so by your physician.  Decrease salt intake, follow recommended 2 grams daily.  Do not get leg(s) with compression wrap wet. If wraps are too tight as indicated by pain, numbness/tingling or discoloration of toes remove wrap completely and call the wound center @ [phone].       The treatment plan has been discussed at length between you and your provider. Follow all instructions carefully, it is very important. If you do not follow all instructions you are at risk of your wound not healing, infection, possible loss of limb and even loss of life.    COMPRESSION WRAP PATIENT CARE INSTRUCTIONS  DO NOT get compression wrap wet. When showering, use a shower boot.   Please contact wound clinic and if not able to reach, then go to emergency room if ANY of the following occur:   Tingling or numbness in the foot or toes on leg with compression wrap.  Severe pain that cannot be relieved with elevation and any medication instructed per your doctor.  A fever of 100.4°F (38°C) or higher.  Swelling, coldness, or blue-gray discoloration of the toes.  If the compression wrap feels too tight or too loose.  If the compression wrap is damaged or has rough edges that hurt.  If the compression wrap gets wet, you must cut wrap off.   Drainage from  compression wrap that smells different than usual.

## 2024-11-15 NOTE — PROGRESS NOTES
Chief Complaint   Patient presents with    Wound Recheck     Here for left lateral leg and medial ankle wound care follow up,no pain today.       HPI:     The patient here for follow-up of left medial and lateral ankle wounds.  He is doing well and tolerating compression wrap with Comprilan.  He has seen orthopedic surgeon and they are planning to do a total knee replacement on the left side but would like to see pictures of the wound.      Lab Results   Component Value Date    BUN 21 04/08/2024    CREATSERUM 0.85 04/08/2024    ALB 4.4 04/08/2024    TP 7.0 04/08/2024    A1C 5.6 04/16/2018         Current Outpatient Medications:     metoprolol succinate ER 50 MG Oral Tablet 24 Hr, Take 1 tablet (50 mg total) by mouth daily., Disp: , Rfl:     GABAPENTIN 800 MG Oral Tab, TAKE 1 TABLET BY MOUTH THREE TIMES DAILY, Disp: 270 tablet, Rfl: 0    cholecalciferol 50 MCG (2000 UT) Oral Cap, cholecalciferol (vitamin D3) 50 mcg (2,000 unit) capsule, [RxNorm: 502440], Disp: , Rfl:     Sildenafil Citrate 100 MG Oral Tab, Take 1 tablet (100 mg total) by mouth daily as needed for Erectile Dysfunction. Take ~ one hour prior to sexual activity on an empty stomach, Disp: 30 tablet, Rfl: 11    tramadol-acetaminophen 37.5-325 MG Oral Tab, Take 2 tablets by mouth 2 (two) times daily as needed for Pain., Disp: 360 tablet, Rfl: 1    aspirin 81 MG Oral Tab EC, Take 1 tablet (81 mg total) by mouth daily., Disp: , Rfl:     celecoxib 200 MG Oral Cap, Take 1 capsule (200 mg total) by mouth daily as needed., Disp: 90 capsule, Rfl: 3    enoxaparin (LOVENOX) 120 MG/0.8ML Injection Solution Prefilled Syringe, Inject 1 mg/kg into the skin 2 (two) times daily., Disp: , Rfl:     WARFARIN 2.5 MG Oral Tab, TAKE 1 TABLET BY MOUTH DAILY (Patient taking differently: Take 1 tablet (2.5 mg total) by mouth daily. As directed), Disp: 90 tablet, Rfl: 3    LOSARTAN POTASSIUM-HCTZ 100-12.5 MG Oral Tab, TAKE 1 TABLET BY MOUTH DAILY, Disp: 90 tablet, Rfl: 3     SIMVASTATIN 20 MG Oral Tab, TAKE 1 TABLET BY MOUTH NIGHTLY, Disp: 90 tablet, Rfl: 3    Sildenafil Citrate 100 MG Oral Tab, Take 1 tablet (100 mg total) by mouth daily as needed for Erectile Dysfunction., Disp: 10 tablet, Rfl: 11    Glucosamine-Chondroit-Vit C-Mn (GLUCOSAMINE-CHONDROITIN) Oral Cap, Take 1 tablet by mouth daily., Disp: , Rfl:     omega-3 fatty acids (FISH OIL) 1000 MG Oral Cap, Take 1,000 mg by mouth daily., Disp: , Rfl:     betamethasone 0.1 % External Cream, Apply to wound twice a day, Disp: 15 g, Rfl: 0    Allergies[1]         REVIEW OF SYSTEMS:     Review of Systems (ROS)  This information was obtained from the patient, chart    A comprehensive 10 point review of systems was completed.  Pertinent positives and negatives noted in the the HPI.     Past medical, surgical, family and social history updated where appropriate.    PHYSICAL EXAM:   /77   Pulse 71   Temp 99.3 °F (37.4 °C)   SpO2 98%    Estimated body mass index is 33.25 kg/m² as calculated from the following:    Height as of 11/14/24: 73\".    Weight as of 11/14/24: 252 lb (114.3 kg).   Vital signs reviewed.Appears stated age, well groomed.      Awake, alert, in no acute distress  HENT:  normocephalic, atraumatic, external ear canals clear bilaterally, tympanic membranes appear opaque, non-bulging, non-erythematous, nasal turbinates are non-inflamed and not swollen, oropharynx without erythema, swelling, or exudate, gingiva and lips without any apparent lesions.   Cardiac:  S1 S2 regular rate and rhythm, no murmur, gallop, or rub  Respiratory:  Bilaterally clear to auscultation, no chest tenderness to palpation, no wheezing, no rhonchi, no rales, air entry is adequate, no accessory respiratory muscle use, no chest asymmetry, normal excursion.  GI:  bowel sound positive in all four quadrants, abdomen is soft, non-tender, non-distended, no hepatosplenomegaly, no abnormal aortic pulsation.     Calf                      Ankle                             Foot                              Wound 06/28/24 2 Foot Lateral;Left (Active)   Date First Assessed/Time First Assessed: 06/28/24 0945    Wound Number (Wound Clinic Only): 2  Location: Foot  Wound Location Orientation: Lateral;Left      Assessments 6/28/2024  9:40 AM 11/8/2024 11:38 AM   Wound Image       Site Assessment Moist;Pink;Yellow Dry;Yellow   Closure Not approximated Not approximated   Drainage Amount Small None   Drainage Description Serosanguineous Scabbed   Treatments Cleansed;Compression Cleansed;Topical (Barrier/Moisturizer/Ointment)   Dressing Hydrofera ready;Martha Hydrofera ready (triad; kerlix)   Dressing Changed New Changed   Dressing Status Clean;Dry;Intact Intact;Clean;Dry   Wound Length (cm) 0.7 cm 0.2 cm   Wound Width (cm) 0.7 cm 0.1 cm   Wound Surface Area (cm^2) 0.49 cm^2 0.02 cm^2   Wound Depth (cm) 0.1 cm 0.1 cm   Wound Volume (cm^3) 0.049 cm^3 0.002 cm^3   Wound Healing % -- 96   Margins Attached edges Attached edges   Non-staged Wound Description Full thickness Full thickness   Mary-wound Assessment Atrophy rogelio;Hyperpigmented Hyperpigmented;Dry;Pink;Atrophy rogelio   Wound Granulation Tissue Pink --   Wound Bed Granulation (%) 25 % 0 %   Wound Bed Epithelium (%) 0 % 0 %   Wound Bed Slough (%) 75 % 100 %   Wound Bed Eschar (%) 0 % 0 %   Wound Bed Fibrin (%) 0 % 0 %   State of Healing Non-healing;Early/partial granulation Non-healing   Wound Odor None None       Active Orders   Date Order Priority Status Authorizing Provider   11/11/24 1115 OP Wound Dressing Routine Active Valentin Correa MD     - Cleansing:    Cleanse with normal saline or wound cleanser     - Dressing:    Enluxtra humidifiber     - Dressing:    Kerlix     - Additional Wound Dressing Information:    3 layers comprilan; hydrofera ready on latearl wound with triad     - Frequency:    Change dressing weekly   11/01/24 1431 OP Wound Dressing Routine Active Valentin Correa MD     - Cleansing:    Cleanse  with normal saline or wound cleanser     - Dressing:    Dry gauze     - Dressing:    Hydrofera transfer     - Dressing:    Conforming roll     - Additional Wound Dressing Information:    gentamycin cream     - Frequency:    Change dressing daily and PRN   10/25/24 1436 OP Wound Dressing Routine Active Valentin Correa MD     - Cleansing:    Cleanse with normal saline or wound cleanser     - Additional Wound Dressing Information:    alternate between gentamycin and mupricin; aquacel foam     - Frequency:    Change dressing daily and PRN   10/18/24 1309 OP Wound Dressing Routine Active Valentin Correa MD     - Cleansing:    Cleanse with normal saline or wound cleanser     - Additional Wound Dressing Information:    triad, gauze kenia     - Frequency:    Change dressing daily and PRN   10/14/24 1111 OP Wound Dressing Routine Active Valentin Correa MD     - Cleansing:    Cleanse with normal saline or wound cleanser     - Dressing:    Hydrofera ready     - Dressing:    Conforming roll     - Additional Wound Dressing Information:    comprilan x 3     - Frequency:    Change dressing weekly   10/04/24 1120 OP Wound Dressing Routine Active Valentin Correa MD     - Cleansing:    Cleanse with normal saline or wound cleanser     - Dressing:    Endoform collagen     - Dressing:    Hydrofera transfer     - Dressing:    Conforming roll     - Additional Wound Dressing Information:    comprilan x 3     - Frequency:    Change dressing weekly   09/30/24 0929 OP Wound Dressing Routine Active Valentin Correa MD     - Cleansing:    Cleanse with normal saline or wound cleanser     - Dressing:    Acticoat     - Dressing:    Dry gauze     - Dressing:    Conforming roll     - Additional Wound Dressing Information:    compression socks     - Frequency:    Change dressing three times per week   09/23/24 0835 OP Wound Dressing Routine Active Valentin Correa MD     - Cleansing:    Cleanse with normal saline or wound cleanser     - Dressing:     Collagen     - Dressing:    Hydrofera ready     - Dressing:    Conforming roll     - Additional Wound Dressing Information:    comprilan x 3   08/17/24 0956 OP Wound Dressing Routine Active Valenitn Correa MD     - Cleansing:    Cleanse with normal saline or wound cleanser     - Dressing:    Hydrofera transfer     - Dressing:    Conforming roll     - Additional Wound Dressing Information:    comprilan x 3   07/19/24 1004 OP Wound Dressing Routine Active Valentin Correa MD     - Cleansing:    Cleanse with normal saline or wound cleanser     - Dressing:    Endoform collagen     - Dressing:    Hydrofera transfer     - Dressing:    Kerramax/Super absorbent     - Dressing:    Kerlix     - Additional Wound Dressing Information:    3 layer comprilan     - Frequency:    Change dressing weekly   07/08/24 1624 OP Wound Dressing Routine Active Valentin Correa MD     - Cleansing:    Cleanse with normal saline or wound cleanser     - Additional Wound Dressing Information:    Left foot- iodosorb, aquacel, kerlix and antifungal powder, and comprilan     - Frequency:    Change dressing weekly       Inactive Orders   Date Order Priority Status Authorizing Provider   08/30/24 1208 OP Wound Dressing Routine Completed Valentin Correa MD     - Cleansing:    Cleanse with normal saline or wound cleanser     - Dressing:    Collagen     - Dressing:    Hydrofera ready     - Additional Wound Dressing Information:    comprilan x 3     - Frequency:    Change dressing weekly   08/05/24 1046 OP Wound Dressing Routine Completed Valentin Correa MD     - Cleansing:    Cleanse with normal saline or wound cleanser     - Dressing:    Endoform collagen     - Additional Wound Dressing Information:    mepitol contact layer, hydrofera blue ready, kenia and comprilan x 3     - Frequency:    Change dressing weekly   07/05/24 1139 OP Wound Dressing Routine Completed Willie Redd APRN     - Dressing:    Alginate     - Dressing:    Iodosorb     -  Additional Wound Dressing Information:    zinc periwound, nystatin powder to skin under wraps     - Cleansing:    Cleanse with normal saline or wound cleanser     - Frequency:    Change dressing weekly       Compression Wrap 06/28/24 Dorsal;Left (Active)   Placement Date/Time: 06/28/24 1028   Wound Location Orientation: Dorsal;Left      Assessments 6/28/2024  9:40 AM 11/8/2024 11:38 AM   Response to Treatment Well tolerated Well tolerated   Compression Layers 3 Multilayer   Compression Product Type Artiflex 10cm;Artiflex 15cm;Comprilan 8cm;Comprilan 10cm;Comprilan 12cm;Stockinette 3in Artiflex 10cm;Artiflex 15cm;Comprilan 8cm;Comprilan 10cm;Comprilan 12cm   Dressing Applied Yes Yes   Compression Wrap Location Toes to Knee Toes to Knee   Compression Wrap Status Clean;Dry;Intact Clean;Dry;Intact       Inactive Orders   Date Order Priority Status Authorizing Provider   07/05/24 1139 OP Wound Dressing Routine Completed Willie Redd APRN     - Dressing:    Alginate     - Dressing:    Iodosorb     - Additional Wound Dressing Information:    zinc periwound, nystatin powder to skin under wraps     - Cleansing:    Cleanse with normal saline or wound cleanser     - Frequency:    Change dressing weekly       Wound 11/01/24 2 Ankle Left;Medial (Active)   Date First Assessed/Time First Assessed: 11/01/24 1131    Wound Number (Wound Clinic Only): 2  Primary Wound Type: Venous Ulcer  Location: Ankle  Wound Location Orientation: Left;Medial      Assessments 11/1/2024 11:34 AM 11/8/2024 11:38 AM   Wound Image       Site Assessment Dry;Red;Yellow Dry;Yellow;Red   Closure Not approximated Not approximated   Drainage Amount Scant Scant   Drainage Description Serosanguineous;Yellow Yellow   Treatments Cleansed;Saline Cleansed;Saline   Dressing Hydrofera transfer (gentamycin cream, patient's own compression sock) Kerlix roll (enluxtra,)   Dressing Changed New Changed   Dressing Status Dry;Intact;Clean Dry;Intact;Clean   Wound Length  (cm) 1.5 cm 1.4 cm   Wound Width (cm) 0.7 cm 0.7 cm   Wound Surface Area (cm^2) 1.05 cm^2 0.98 cm^2   Wound Depth (cm) 0.1 cm 0.1 cm   Wound Volume (cm^3) 0.105 cm^3 0.098 cm^3   Wound Healing % -- 7   Margins Attached edges Attached edges   Non-staged Wound Description Full thickness Full thickness   Mary-wound Assessment Dry;Hemosiderin staining;Atrophy rogelio Dry;Hemosiderin staining;Atrophy rogelio   Wound Granulation Tissue Pink Pink   Wound Bed Granulation (%) 90 % 0 %   Wound Bed Epithelium (%) 0 % 0 %   Wound Bed Slough (%) 10 % 100 %   Wound Bed Eschar (%) 0 % 0 %   Wound Bed Fibrin (%) 0 % 0 %   State of Healing Non-healing Non-healing   Wound Odor None None       Active Orders   Date Order Priority Status Authorizing Provider   11/11/24 1115 OP Wound Dressing Routine Active Valentin Correa MD     - Cleansing:    Cleanse with normal saline or wound cleanser     - Dressing:    Enluxtra humidifiber     - Dressing:    Kerlix     - Additional Wound Dressing Information:    3 layers comprilan; hydrofera ready on latearl wound with triad     - Frequency:    Change dressing weekly   11/01/24 1431 OP Wound Dressing Routine Active Valentin Correa MD     - Cleansing:    Cleanse with normal saline or wound cleanser     - Dressing:    Dry gauze     - Dressing:    Hydrofera transfer     - Dressing:    Conforming roll     - Additional Wound Dressing Information:    gentamycin cream     - Frequency:    Change dressing daily and PRN              ASSESSMENT AND PLAN:      1. Chronic venous hypertension (idiopathic) with ulcer of left lower extremity (HCC)    2. Benign essential HTN    3. Venous (peripheral) insufficiency    4. 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    Clinically the medial ankle wound is doing better and a light scraping was done with curette.  There is fibrin mixed  in with granulation tissue.  The lateral ankle wound is almost completely healed in about 2 mm does have thin epithelium over it.  Will switch to Sherrie collagen and Hydrofera Blue to make the skin thicker over the wound.  Continue Comprilan 3 layer compression wrap.      Risks, benefits, and alternatives of current treatment plan discussed in detail.  Questions and concerns addressed. Red flags to RTC or ED reviewed.  Patient (or parent) agrees to plan.      No follow-ups on file.    Also refer to patient instructions.     I spent 40 minutes with the patient. This time included:  preparing to see the patient (eg, review notes and recent diagnostics), seeing the patient, performing a medically appropriate examination and/or evaluation, counseling and educating the patient, and documenting in the record.    I agree with staff documentation except for any changes made in my note.       Valentin Correa M.D., Wound Care Physician  [facility]  11/15/2024               [1]   Allergies  Allergen Reactions    Adhesive Tape RASH     Can use paper tape.

## 2024-11-18 NOTE — TELEPHONE ENCOUNTER
[facility]    Patient called and is requesting a call back once the lab order has been entered in the system    Paperwork placed in Dr De Souza mail slot

## 2024-11-20 ENCOUNTER — LAB ENCOUNTER (OUTPATIENT)
Dept: LAB | Facility: HOSPITAL | Age: 69
End: 2024-11-20
Attending: INTERNAL MEDICINE
Payer: MEDICARE

## 2024-11-20 DIAGNOSIS — Z01.818 PREOP EXAMINATION: ICD-10-CM

## 2024-11-20 DIAGNOSIS — D68.9 COAGULOPATHY (HCC): ICD-10-CM

## 2024-11-20 LAB
ALBUMIN SERPL-MCNC: 4.7 G/DL (ref 3.2–4.8)
ALBUMIN/GLOB SERPL: 1.6 {RATIO} (ref 1–2)
ALP LIVER SERPL-CCNC: 83 U/L
ALT SERPL-CCNC: 35 U/L
ANION GAP SERPL CALC-SCNC: 6 MMOL/L (ref 0–18)
APTT PPP: 36.8 SECONDS (ref 23–36)
AST SERPL-CCNC: 32 U/L (ref ?–34)
BASOPHILS # BLD AUTO: 0.05 X10(3) UL (ref 0–0.2)
BASOPHILS NFR BLD AUTO: 1.1 %
BILIRUB SERPL-MCNC: 0.7 MG/DL (ref 0.2–1.1)
BUN BLD-MCNC: 33 MG/DL (ref 9–23)
BUN/CREAT SERPL: 41.3 (ref 10–20)
CALCIUM BLD-MCNC: 10.1 MG/DL (ref 8.7–10.4)
CHLORIDE SERPL-SCNC: 107 MMOL/L (ref 98–112)
CO2 SERPL-SCNC: 28 MMOL/L (ref 21–32)
CREAT BLD-MCNC: 0.8 MG/DL
DEPRECATED RDW RBC AUTO: 47.5 FL (ref 35.1–46.3)
EGFRCR SERPLBLD CKD-EPI 2021: 95 ML/MIN/1.73M2 (ref 60–?)
EOSINOPHIL # BLD AUTO: 0.14 X10(3) UL (ref 0–0.7)
EOSINOPHIL NFR BLD AUTO: 3 %
ERYTHROCYTE [DISTWIDTH] IN BLOOD BY AUTOMATED COUNT: 13.2 % (ref 11–15)
FASTING STATUS PATIENT QL REPORTED: YES
GLOBULIN PLAS-MCNC: 3 G/DL (ref 2–3.5)
GLUCOSE BLD-MCNC: 94 MG/DL (ref 70–99)
HCT VFR BLD AUTO: 44.1 %
HGB BLD-MCNC: 14.9 G/DL
IMM GRANULOCYTES # BLD AUTO: 0.02 X10(3) UL (ref 0–1)
IMM GRANULOCYTES NFR BLD: 0.4 %
INR BLD: 2.28 (ref 0.8–1.2)
LYMPHOCYTES # BLD AUTO: 1.8 X10(3) UL (ref 1–4)
LYMPHOCYTES NFR BLD AUTO: 38.7 %
MCH RBC QN AUTO: 32.5 PG (ref 26–34)
MCHC RBC AUTO-ENTMCNC: 33.8 G/DL (ref 31–37)
MCV RBC AUTO: 96.1 FL
MONOCYTES # BLD AUTO: 0.45 X10(3) UL (ref 0.1–1)
MONOCYTES NFR BLD AUTO: 9.7 %
NEUTROPHILS # BLD AUTO: 2.19 X10 (3) UL (ref 1.5–7.7)
NEUTROPHILS # BLD AUTO: 2.19 X10(3) UL (ref 1.5–7.7)
NEUTROPHILS NFR BLD AUTO: 47.1 %
OSMOLALITY SERPL CALC.SUM OF ELEC: 299 MOSM/KG (ref 275–295)
PLATELET # BLD AUTO: 209 10(3)UL (ref 150–450)
POTASSIUM SERPL-SCNC: 4.4 MMOL/L (ref 3.5–5.1)
PROT SERPL-MCNC: 7.7 G/DL (ref 5.7–8.2)
PROTHROMBIN TIME: 26.3 SECONDS (ref 11.6–14.8)
RBC # BLD AUTO: 4.59 X10(6)UL
SODIUM SERPL-SCNC: 141 MMOL/L (ref 136–145)
WBC # BLD AUTO: 4.7 X10(3) UL (ref 4–11)

## 2024-11-20 PROCEDURE — 36415 COLL VENOUS BLD VENIPUNCTURE: CPT

## 2024-11-20 PROCEDURE — 80053 COMPREHEN METABOLIC PANEL: CPT

## 2024-11-20 PROCEDURE — 85730 THROMBOPLASTIN TIME PARTIAL: CPT

## 2024-11-20 PROCEDURE — 85025 COMPLETE CBC W/AUTO DIFF WBC: CPT

## 2024-11-20 PROCEDURE — 85610 PROTHROMBIN TIME: CPT

## 2024-11-22 ENCOUNTER — OFFICE VISIT (OUTPATIENT)
Dept: WOUND CARE | Facility: HOSPITAL | Age: 69
End: 2024-11-22
Attending: FAMILY MEDICINE
Payer: MEDICARE

## 2024-11-22 VITALS
TEMPERATURE: 98 F | HEART RATE: 77 BPM | DIASTOLIC BLOOD PRESSURE: 77 MMHG | SYSTOLIC BLOOD PRESSURE: 126 MMHG | OXYGEN SATURATION: 98 %

## 2024-11-22 DIAGNOSIS — L97.929 CHRONIC VENOUS HYPERTENSION (IDIOPATHIC) WITH ULCER OF LEFT LOWER EXTREMITY (HCC): Primary | ICD-10-CM

## 2024-11-22 DIAGNOSIS — I87.2 VENOUS (PERIPHERAL) INSUFFICIENCY: ICD-10-CM

## 2024-11-22 DIAGNOSIS — I10 BENIGN ESSENTIAL HTN: ICD-10-CM

## 2024-11-22 DIAGNOSIS — I83.213 VARICOSE VEINS OF RIGHT LOWER EXTREMITY WITH INFLAMMATION, WITH ULCER OF ANKLE WITH FAT LAYER EXPOSED (HCC): ICD-10-CM

## 2024-11-22 DIAGNOSIS — I87.312 CHRONIC VENOUS HYPERTENSION (IDIOPATHIC) WITH ULCER OF LEFT LOWER EXTREMITY (HCC): Primary | ICD-10-CM

## 2024-11-22 DIAGNOSIS — L97.312 VARICOSE VEINS OF RIGHT LOWER EXTREMITY WITH INFLAMMATION, WITH ULCER OF ANKLE WITH FAT LAYER EXPOSED (HCC): ICD-10-CM

## 2024-11-22 PROCEDURE — 29581 APPL MULTLAYER CMPRN SYS LEG: CPT

## 2024-11-22 NOTE — PATIENT INSTRUCTIONS
PATIENT INSTRUCTIONS      FOR Russ Kirkland ,  1955    DATE OF SERVICE: 2024    LEFT LATERAL ANKLE:  Xeroform gauze  gauze    LEFT MEDIAL ANKLE:  Apply Enluxtra to the medial ankle wound  Cover with ABD pad    Comprilan 3 layer compression wrap to left lower extremity      Follow up with Dr. Correa 1 WEEK      Managing your edema:    Avoid prolonged standing in one place. It is better to have your calf muscles moving to pump fluid out of the legs.  Elevate leg(s) above the level of the heart when sitting or as much as possible.  Take you diuretics as directed by your physician. Do not skip doses or change doses unless instructed to do so by your physician.  Decrease salt intake, follow recommended 2 grams daily.  Do not get leg(s) with compression wrap wet. If wraps are too tight as indicated by pain, numbness/tingling or discoloration of toes remove wrap completely and call the wound center @ [phone].       The treatment plan has been discussed at length between you and your provider. Follow all instructions carefully, it is very important. If you do not follow all instructions you are at risk of your wound not healing, infection, possible loss of limb and even loss of life.    COMPRESSION WRAP PATIENT CARE INSTRUCTIONS  DO NOT get compression wrap wet. When showering, use a shower boot.   Please contact wound clinic and if not able to reach, then go to emergency room if ANY of the following occur:   Tingling or numbness in the foot or toes on leg with compression wrap.  Severe pain that cannot be relieved with elevation and any medication instructed per your doctor.  A fever of 100.4°F (38°C) or higher.  Swelling, coldness, or blue-gray discoloration of the toes.  If the compression wrap feels too tight or too loose.  If the compression wrap is damaged or has rough edges that hurt.  If the compression wrap gets wet, you must cut wrap off.   Drainage from compression wrap that smells  different than usual.

## 2024-11-22 NOTE — PROGRESS NOTES
Chief Complaint   Patient presents with    Wound Recheck     Pt presents today for L foot and ankle wound recheck and denies any new acute sx's of concern.         HPI:     Patient here for follow-up of left lateral and medial ankle wound.  He is tolerating Comprilan compression wrap without difficulties.    Lab Results   Component Value Date    BUN 33 (H) 11/20/2024    CREATSERUM 0.80 11/20/2024    ALB 4.7 11/20/2024    TP 7.7 11/20/2024    A1C 5.6 04/16/2018         Current Outpatient Medications:     metoprolol succinate ER 50 MG Oral Tablet 24 Hr, Take 1 tablet (50 mg total) by mouth daily., Disp: , Rfl:     GABAPENTIN 800 MG Oral Tab, TAKE 1 TABLET BY MOUTH THREE TIMES DAILY, Disp: 270 tablet, Rfl: 0    cholecalciferol 50 MCG (2000 UT) Oral Cap, cholecalciferol (vitamin D3) 50 mcg (2,000 unit) capsule, [RxNorm: 113708], Disp: , Rfl:     Sildenafil Citrate 100 MG Oral Tab, Take 1 tablet (100 mg total) by mouth daily as needed for Erectile Dysfunction. Take ~ one hour prior to sexual activity on an empty stomach, Disp: 30 tablet, Rfl: 11    tramadol-acetaminophen 37.5-325 MG Oral Tab, Take 2 tablets by mouth 2 (two) times daily as needed for Pain., Disp: 360 tablet, Rfl: 1    aspirin 81 MG Oral Tab EC, Take 1 tablet (81 mg total) by mouth daily., Disp: , Rfl:     celecoxib 200 MG Oral Cap, Take 1 capsule (200 mg total) by mouth daily as needed., Disp: 90 capsule, Rfl: 3    enoxaparin (LOVENOX) 120 MG/0.8ML Injection Solution Prefilled Syringe, Inject 1 mg/kg into the skin 2 (two) times daily., Disp: , Rfl:     WARFARIN 2.5 MG Oral Tab, TAKE 1 TABLET BY MOUTH DAILY (Patient taking differently: Take 1 tablet (2.5 mg total) by mouth daily. As directed), Disp: 90 tablet, Rfl: 3    LOSARTAN POTASSIUM-HCTZ 100-12.5 MG Oral Tab, TAKE 1 TABLET BY MOUTH DAILY, Disp: 90 tablet, Rfl: 3    SIMVASTATIN 20 MG Oral Tab, TAKE 1 TABLET BY MOUTH NIGHTLY, Disp: 90 tablet, Rfl: 3    Sildenafil Citrate 100 MG Oral Tab, Take 1 tablet  (100 mg total) by mouth daily as needed for Erectile Dysfunction., Disp: 10 tablet, Rfl: 11    Glucosamine-Chondroit-Vit C-Mn (GLUCOSAMINE-CHONDROITIN) Oral Cap, Take 1 tablet by mouth daily., Disp: , Rfl:     omega-3 fatty acids (FISH OIL) 1000 MG Oral Cap, Take 1,000 mg by mouth daily., Disp: , Rfl:     betamethasone 0.1 % External Cream, Apply to wound twice a day, Disp: 15 g, Rfl: 0    Allergies[1]         REVIEW OF SYSTEMS:     Review of Systems (ROS)  This information was obtained from the patient, chart    A comprehensive 10 point review of systems was completed.  Pertinent positives and negatives noted in the the HPI.     Past medical, surgical, family and social history updated where appropriate.    PHYSICAL EXAM:   /77   Pulse 77   Temp 97.7 °F (36.5 °C)   SpO2 98%    Estimated body mass index is 33.25 kg/m² as calculated from the following:    Height as of 11/14/24: 73\".    Weight as of 11/14/24: 252 lb (114.3 kg).   Vital signs reviewed.Appears stated age, well groomed.      Awake, alert, in no acute distress  HENT:  normocephalic, atraumatic, external ear canals clear bilaterally, tympanic membranes appear opaque, non-bulging, non-erythematous, nasal turbinates are non-inflamed and not swollen, oropharynx without erythema, swelling, or exudate, gingiva and lips without any apparent lesions.   Cardiac:  S1 S2 regular rate and rhythm, no murmur, gallop, or rub  Respiratory:  Bilaterally clear to auscultation, no chest tenderness to palpation, no wheezing, no rhonchi, no rales, air entry is adequate, no accessory respiratory muscle use, no chest asymmetry, normal excursion.  GI:  bowel sound positive in all four quadrants, abdomen is soft, non-tender, non-distended, no hepatosplenomegaly, no abnormal aortic pulsation.     Calf                      Ankle                            Foot                              Wound 06/28/24 2 Foot Lateral;Left (Active)   Date First Assessed/Time First  Assessed: 06/28/24 0945    Wound Number (Wound Clinic Only): 2  Location: Foot  Wound Location Orientation: Lateral;Left      Assessments 6/28/2024  9:40 AM 11/22/2024 11:44 AM   Wound Image       Site Assessment Moist;Pink;Yellow Dry;Black   Closure Not approximated Not approximated   Drainage Amount Small None   Drainage Description Serosanguineous Scabbed   Treatments Cleansed;Compression Cleansed;Topical (Barrier/Moisturizer/Ointment)   Dressing Hydrofera ready;Martha Xeroform   Dressing Changed New New   Dressing Status Clean;Dry;Intact Dry;Intact;Clean   Wound Length (cm) 0.7 cm 0.1 cm   Wound Width (cm) 0.7 cm 0.1 cm   Wound Surface Area (cm^2) 0.49 cm^2 0.01 cm^2   Wound Depth (cm) 0.1 cm 0.1 cm   Wound Volume (cm^3) 0.049 cm^3 0.001 cm^3   Wound Healing % -- 98   Margins Attached edges Attached edges   Non-staged Wound Description Full thickness Partial thickness   Mary-wound Assessment Atrophy rogelio;Hyperpigmented Atrophy rogelio;Hemosiderin staining   Wound Granulation Tissue Pink --   Wound Bed Granulation (%) 25 % --   Wound Bed Epithelium (%) 0 % --   Wound Bed Slough (%) 75 % --   Wound Bed Eschar (%) 0 % --   Wound Bed Fibrin (%) 0 % 100 %   State of Healing Non-healing;Early/partial granulation --   Wound Odor None --       Active Orders   Date Order Priority Status Authorizing Provider   11/15/24 0959 OP Wound Dressing Routine Active Valentin Correa MD     - Cleansing:    Cleanse with normal saline or wound cleanser     - Dressing:    Collagen     - Dressing:    Hydrofera transfer     - Dressing:    Enluxtra humidifiber     - Additional Wound Dressing Information:    collagen and hydrofera blue transfer, enluxtra, kerlix, comprilan x 3     - Frequency:    Change dressing weekly   11/11/24 1115 OP Wound Dressing Routine Active Valentin Correa MD     - Cleansing:    Cleanse with normal saline or wound cleanser     - Dressing:    Enluxtra humidifiber     - Dressing:    Kerlix     - Additional Wound  Dressing Information:    3 layers comprilan; hydrofera ready on latearl wound with triad     - Frequency:    Change dressing weekly   11/01/24 1431 OP Wound Dressing Routine Active Valentin Correa MD     - Cleansing:    Cleanse with normal saline or wound cleanser     - Dressing:    Dry gauze     - Dressing:    Hydrofera transfer     - Dressing:    Conforming roll     - Additional Wound Dressing Information:    gentamycin cream     - Frequency:    Change dressing daily and PRN   10/25/24 1436 OP Wound Dressing Routine Active Valentin Correa MD     - Cleansing:    Cleanse with normal saline or wound cleanser     - Additional Wound Dressing Information:    alternate between gentamycin and mupricin; aquacel foam     - Frequency:    Change dressing daily and PRN   10/18/24 1309 OP Wound Dressing Routine Active Valentin Correa MD     - Cleansing:    Cleanse with normal saline or wound cleanser     - Additional Wound Dressing Information:    triad, gauze kenia     - Frequency:    Change dressing daily and PRN   10/14/24 1111 OP Wound Dressing Routine Active Valentin Correa MD     - Cleansing:    Cleanse with normal saline or wound cleanser     - Dressing:    Hydrofera ready     - Dressing:    Conforming roll     - Additional Wound Dressing Information:    comprilan x 3     - Frequency:    Change dressing weekly   10/04/24 1120 OP Wound Dressing Routine Active Valentin Correa MD     - Cleansing:    Cleanse with normal saline or wound cleanser     - Dressing:    Endoform collagen     - Dressing:    Hydrofera transfer     - Dressing:    Conforming roll     - Additional Wound Dressing Information:    raoilan x 3     - Frequency:    Change dressing weekly   09/30/24 0929 OP Wound Dressing Routine Active Valentin Correa MD     - Cleansing:    Cleanse with normal saline or wound cleanser     - Dressing:    Acticoat     - Dressing:    Dry gauze     - Dressing:    Conforming roll     - Additional Wound Dressing  Information:    compression socks     - Frequency:    Change dressing three times per week   09/23/24 0835 OP Wound Dressing Routine Active Valentin Correa MD     - Cleansing:    Cleanse with normal saline or wound cleanser     - Dressing:    Collagen     - Dressing:    Hydrofera ready     - Dressing:    Conforming roll     - Additional Wound Dressing Information:    frederickn x 3   08/17/24 0956 OP Wound Dressing Routine Active Valentin Correa MD     - Cleansing:    Cleanse with normal saline or wound cleanser     - Dressing:    Hydrofera transfer     - Dressing:    Conforming roll     - Additional Wound Dressing Information:    frederickn x 3   07/19/24 1004 OP Wound Dressing Routine Active Valentin Correa MD     - Cleansing:    Cleanse with normal saline or wound cleanser     - Dressing:    Endoform collagen     - Dressing:    Hydrofera transfer     - Dressing:    Kerramax/Super absorbent     - Dressing:    Kerlix     - Additional Wound Dressing Information:    3 layer comprilan     - Frequency:    Change dressing weekly   07/08/24 1624 OP Wound Dressing Routine Active Valentin Correa MD     - Cleansing:    Cleanse with normal saline or wound cleanser     - Additional Wound Dressing Information:    Left foot- iodosorb, aquacel, kerlix and antifungal powder, and comprilan     - Frequency:    Change dressing weekly       Inactive Orders   Date Order Priority Status Authorizing Provider   08/30/24 1208 OP Wound Dressing Routine Completed Valentin Correa MD     - Cleansing:    Cleanse with normal saline or wound cleanser     - Dressing:    Collagen     - Dressing:    Hydrofera ready     - Additional Wound Dressing Information:    quirino x 3     - Frequency:    Change dressing weekly   08/05/24 1046 OP Wound Dressing Routine Completed Valentin Corera MD     - Cleansing:    Cleanse with normal saline or wound cleanser     - Dressing:    Endoform collagen     - Additional Wound Dressing Information:    mepitol  contact layer, hydrofera blue ready, kenia and comprilan x 3     - Frequency:    Change dressing weekly   07/05/24 1139 OP Wound Dressing Routine Completed Willie Redd APRN     - Dressing:    Alginate     - Dressing:    Iodosorb     - Additional Wound Dressing Information:    zinc periwound, nystatin powder to skin under wraps     - Cleansing:    Cleanse with normal saline or wound cleanser     - Frequency:    Change dressing weekly       Compression Wrap 06/28/24 Dorsal;Left (Active)   Placement Date/Time: 06/28/24 1028   Wound Location Orientation: Dorsal;Left      Assessments 6/28/2024  9:40 AM 11/22/2024 11:44 AM   Response to Treatment Well tolerated Well tolerated   Compression Layers 3 Multilayer   Compression Product Type Artiflex 10cm;Artiflex 15cm;Comprilan 8cm;Comprilan 10cm;Comprilan 12cm;Stockinette 3in Artiflex 10cm;Artiflex 15cm;Comprilan 8cm;Comprilan 10cm;Comprilan 12cm   Dressing Applied Yes Yes   Compression Wrap Location Toes to Knee Toes to Knee   Compression Wrap Status Clean;Dry;Intact Clean;Dry;Intact       Inactive Orders   Date Order Priority Status Authorizing Provider   07/05/24 1139 OP Wound Dressing Routine Completed Willie Redd APRN     - Dressing:    Alginate     - Dressing:    Iodosorb     - Additional Wound Dressing Information:    zinc periwound, nystatin powder to skin under wraps     - Cleansing:    Cleanse with normal saline or wound cleanser     - Frequency:    Change dressing weekly       Wound 11/01/24 2 Ankle Left;Medial (Active)   Date First Assessed/Time First Assessed: 11/01/24 1131    Wound Number (Wound Clinic Only): 2  Primary Wound Type: Venous Ulcer  Location: Ankle  Wound Location Orientation: Left;Medial      Assessments 11/1/2024 11:34 AM 11/22/2024 11:44 AM   Wound Image       Site Assessment Dry;Red;Yellow Moist;Pink;Yellow   Closure Not approximated Not approximated   Drainage Amount Scant Small   Drainage Description Serosanguineous;Yellow Yellow    Treatments Cleansed;Saline Cleansed;Saline   Dressing Hydrofera transfer (gentamycin cream, patient's own compression sock) Kerlix roll (enluxtra)   Dressing Changed New Changed   Dressing Status Dry;Intact;Clean Dry;Intact;Clean   Wound Length (cm) 1.5 cm 1 cm   Wound Width (cm) 0.7 cm 0.9 cm   Wound Surface Area (cm^2) 1.05 cm^2 0.9 cm^2   Wound Depth (cm) 0.1 cm 0.1 cm   Wound Volume (cm^3) 0.105 cm^3 0.09 cm^3   Wound Healing % -- 14   Margins Attached edges Attached edges   Non-staged Wound Description Full thickness Full thickness   Mary-wound Assessment Dry;Hemosiderin staining;Atrophy rogelio Atrophy rogelio;Hemosiderin staining   Wound Granulation Tissue Pink Pink   Wound Bed Granulation (%) 90 % 80 %   Wound Bed Epithelium (%) 0 % 10 %   Wound Bed Slough (%) 10 % 10 %   Wound Bed Eschar (%) 0 % 0 %   Wound Bed Fibrin (%) 0 % 0 %   State of Healing Non-healing Early/partial granulation   Wound Odor None None       Active Orders   Date Order Priority Status Authorizing Provider   11/15/24 0959 OP Wound Dressing Routine Active Valentin Correa MD     - Cleansing:    Cleanse with normal saline or wound cleanser     - Dressing:    Collagen     - Dressing:    Hydrofera transfer     - Dressing:    Enluxtra humidifiber     - Additional Wound Dressing Information:    collagen and hydrofera blue transfer, enluxtra, kerlix, comprilan x 3     - Frequency:    Change dressing weekly   11/11/24 1115 OP Wound Dressing Routine Active Valentin Correa MD     - Cleansing:    Cleanse with normal saline or wound cleanser     - Dressing:    Enluxtra humidifiber     - Dressing:    Kerlix     - Additional Wound Dressing Information:    3 layers comprilan; hydrofera ready on latearl wound with triad     - Frequency:    Change dressing weekly   11/01/24 1431 OP Wound Dressing Routine Active Valentin Correa MD     - Cleansing:    Cleanse with normal saline or wound cleanser     - Dressing:    Dry gauze     - Dressing:     Hydrofera transfer     - Dressing:    Conforming roll     - Additional Wound Dressing Information:    gentamycin cream     - Frequency:    Change dressing daily and PRN              ASSESSMENT AND PLAN:      1. Chronic venous hypertension (idiopathic) with ulcer of left lower extremity (HCC)    2. Benign essential HTN    3. Venous (peripheral) insufficiency    4. 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  There is a dried blood scab area will apply Xeroform gauze and gauze to that.  The medial ankle wound is significantly better as well, continue Enluxtra and ABD pad.  Continue Comprilan 3 layer compression wrap to left lower extremity.  Patient tolerating well.  We did go over compression wrap precautions written instructions given.  Risks, benefits, and alternatives of current treatment plan discussed in detail.  Questions and concerns addressed. Red flags to RTC or ED reviewed.  Patient (or parent) agrees to plan.      Return in about 1 week (around 11/29/2024) for MD visit for 30 min.    Also refer to patient instructions.     I spent 40 minutes with the patient. This time included:  preparing to see the patient (eg, review notes and recent diagnostics), seeing the patient, performing a medically appropriate examination and/or evaluation, counseling and educating the patient, and documenting in the record.    I agree with staff documentation except for any changes made in my note.       Valentin Correa M.D., Wound Care Physician  [facility]  11/22/2024               [1]   Allergies  Allergen Reactions    Adhesive Tape RASH     Can use paper tape.

## 2024-11-29 ENCOUNTER — APPOINTMENT (OUTPATIENT)
Dept: WOUND CARE | Facility: HOSPITAL | Age: 69
End: 2024-11-29
Attending: FAMILY MEDICINE
Payer: MEDICARE

## 2024-11-29 VITALS
RESPIRATION RATE: 18 BRPM | DIASTOLIC BLOOD PRESSURE: 76 MMHG | TEMPERATURE: 97 F | SYSTOLIC BLOOD PRESSURE: 132 MMHG | OXYGEN SATURATION: 99 % | HEART RATE: 70 BPM

## 2024-11-29 DIAGNOSIS — I83.213 VARICOSE VEINS OF RIGHT LOWER EXTREMITY WITH INFLAMMATION, WITH ULCER OF ANKLE WITH FAT LAYER EXPOSED (HCC): ICD-10-CM

## 2024-11-29 DIAGNOSIS — I87.2 VENOUS (PERIPHERAL) INSUFFICIENCY: ICD-10-CM

## 2024-11-29 DIAGNOSIS — I87.312 CHRONIC VENOUS HYPERTENSION (IDIOPATHIC) WITH ULCER OF LEFT LOWER EXTREMITY (HCC): Primary | ICD-10-CM

## 2024-11-29 DIAGNOSIS — L97.312 VARICOSE VEINS OF RIGHT LOWER EXTREMITY WITH INFLAMMATION, WITH ULCER OF ANKLE WITH FAT LAYER EXPOSED (HCC): ICD-10-CM

## 2024-11-29 DIAGNOSIS — I10 BENIGN ESSENTIAL HTN: ICD-10-CM

## 2024-11-29 DIAGNOSIS — L97.929 CHRONIC VENOUS HYPERTENSION (IDIOPATHIC) WITH ULCER OF LEFT LOWER EXTREMITY (HCC): Primary | ICD-10-CM

## 2024-11-29 PROCEDURE — 99215 OFFICE O/P EST HI 40 MIN: CPT | Performed by: FAMILY MEDICINE

## 2024-11-29 NOTE — PROGRESS NOTES
Chief Complaint   Patient presents with    Wound Recheck     Left foot and ankle.       HPI:     Patient here for follow-up of left medial and lateral ankle wound.  He is doing well and tolerating compression wrap without difficulty.    Lab Results   Component Value Date    BUN 33 (H) 11/20/2024    CREATSERUM 0.80 11/20/2024    ALB 4.7 11/20/2024    TP 7.7 11/20/2024    A1C 5.6 04/16/2018         Current Outpatient Medications:     metoprolol succinate ER 50 MG Oral Tablet 24 Hr, Take 1 tablet (50 mg total) by mouth daily., Disp: , Rfl:     GABAPENTIN 800 MG Oral Tab, TAKE 1 TABLET BY MOUTH THREE TIMES DAILY, Disp: 270 tablet, Rfl: 0    cholecalciferol 50 MCG (2000 UT) Oral Cap, cholecalciferol (vitamin D3) 50 mcg (2,000 unit) capsule, [RxNorm: 706425], Disp: , Rfl:     Sildenafil Citrate 100 MG Oral Tab, Take 1 tablet (100 mg total) by mouth daily as needed for Erectile Dysfunction. Take ~ one hour prior to sexual activity on an empty stomach, Disp: 30 tablet, Rfl: 11    tramadol-acetaminophen 37.5-325 MG Oral Tab, Take 2 tablets by mouth 2 (two) times daily as needed for Pain., Disp: 360 tablet, Rfl: 1    aspirin 81 MG Oral Tab EC, Take 1 tablet (81 mg total) by mouth daily., Disp: , Rfl:     celecoxib 200 MG Oral Cap, Take 1 capsule (200 mg total) by mouth daily as needed., Disp: 90 capsule, Rfl: 3    enoxaparin (LOVENOX) 120 MG/0.8ML Injection Solution Prefilled Syringe, Inject 1 mg/kg into the skin 2 (two) times daily., Disp: , Rfl:     WARFARIN 2.5 MG Oral Tab, TAKE 1 TABLET BY MOUTH DAILY (Patient taking differently: Take 1 tablet (2.5 mg total) by mouth daily. As directed), Disp: 90 tablet, Rfl: 3    LOSARTAN POTASSIUM-HCTZ 100-12.5 MG Oral Tab, TAKE 1 TABLET BY MOUTH DAILY, Disp: 90 tablet, Rfl: 3    SIMVASTATIN 20 MG Oral Tab, TAKE 1 TABLET BY MOUTH NIGHTLY, Disp: 90 tablet, Rfl: 3    Sildenafil Citrate 100 MG Oral Tab, Take 1 tablet (100 mg total) by mouth daily as needed for Erectile Dysfunction., Disp:  10 tablet, Rfl: 11    Glucosamine-Chondroit-Vit C-Mn (GLUCOSAMINE-CHONDROITIN) Oral Cap, Take 1 tablet by mouth daily., Disp: , Rfl:     omega-3 fatty acids (FISH OIL) 1000 MG Oral Cap, Take 1,000 mg by mouth daily., Disp: , Rfl:     betamethasone 0.1 % External Cream, Apply to wound twice a day, Disp: 15 g, Rfl: 0    Allergies[1]         REVIEW OF SYSTEMS:     Review of Systems (ROS)  This information was obtained from the patient, chart    A comprehensive 10 point review of systems was completed.  Pertinent positives and negatives noted in the the HPI.     Past medical, surgical, family and social history updated where appropriate.    PHYSICAL EXAM:   /76   Pulse 70   Temp 96.9 °F (36.1 °C)   Resp 18   SpO2 99%    Estimated body mass index is 33.25 kg/m² as calculated from the following:    Height as of 11/14/24: 73\".    Weight as of 11/14/24: 252 lb.   Vital signs reviewed.Appears stated age, well groomed.      Awake, alert, in no acute distress  HENT:  normocephalic, atraumatic, external ear canals clear bilaterally, tympanic membranes appear opaque, non-bulging, non-erythematous, nasal turbinates are non-inflamed and not swollen, oropharynx without erythema, swelling, or exudate, gingiva and lips without any apparent lesions.   Cardiac:  S1 S2 regular rate and rhythm, no murmur, gallop, or rub  Respiratory:  Bilaterally clear to auscultation, no chest tenderness to palpation, no wheezing, no rhonchi, no rales, air entry is adequate, no accessory respiratory muscle use, no chest asymmetry, normal excursion.  GI:  bowel sound positive in all four quadrants, abdomen is soft, non-tender, non-distended, no hepatosplenomegaly, no abnormal aortic pulsation.     Calf                      Ankle                            Foot                              Wound 06/28/24 2 Foot Lateral;Left (Active)   Date First Assessed/Time First Assessed: 06/28/24 0929    Wound Number (Wound Clinic Only): 2  Location: Foot   Wound Location Orientation: Lateral;Left      Assessments 6/28/2024  9:40 AM 11/29/2024 11:44 AM   Wound Image       Site Assessment Moist;Pink;Yellow Dry;Epithelialization   Closure Not approximated Approximated   Drainage Amount Small None   Drainage Description Serosanguineous Scabbed   Treatments Cleansed;Compression Saline;Wound    Dressing Hydrofera ready;Martha Open to air   Dressing Changed New Changed   Dressing Status Clean;Dry;Intact Dry;Intact;Clean   Wound Length (cm) 0.7 cm 0 cm   Wound Width (cm) 0.7 cm 0 cm   Wound Surface Area (cm^2) 0.49 cm^2 0 cm^2   Wound Depth (cm) 0.1 cm 0 cm   Wound Volume (cm^3) 0.049 cm^3 0 cm^3   Wound Healing % -- 100   Margins Attached edges Attached edges   Non-staged Wound Description Full thickness Not applicable   Mary-wound Assessment Atrophy rogelio;Hyperpigmented Atrophy rogelio;Hemosiderin staining   Wound Granulation Tissue Pink --   Wound Bed Granulation (%) 25 % 0 %   Wound Bed Epithelium (%) 0 % 100 %   Wound Bed Slough (%) 75 % 0 %   Wound Bed Eschar (%) 0 % 0 %   Wound Bed Fibrin (%) 0 % 0 %   State of Healing Non-healing;Early/partial granulation Epithelialized   Wound Odor None None       Active Orders   Date Order Priority Status Authorizing Provider   11/22/24 1452 OP Wound Dressing Routine Active Valentin Correa MD     - Cleansing:    Cleanse with normal saline or wound cleanser     - Dressing:    Enluxtra humidifiber     - Dressing:    Conforming roll     - Additional Wound Dressing Information:    xeroform on lateral wound; 3 layer comprilan     - Frequency:    Change dressing weekly   11/15/24 0959 OP Wound Dressing Routine Active Valentin Correa MD     - Cleansing:    Cleanse with normal saline or wound cleanser     - Dressing:    Collagen     - Dressing:    Hydrofera transfer     - Dressing:    Enluxtra humidifiber     - Additional Wound Dressing Information:    collagen and hydrofera blue transfer, enluxtra, kerlix, comprilan x 3     -  Frequency:    Change dressing weekly   11/11/24 1115 OP Wound Dressing Routine Active Valentin Correa MD     - Cleansing:    Cleanse with normal saline or wound cleanser     - Dressing:    Enluxtra humidifiber     - Dressing:    Kerlix     - Additional Wound Dressing Information:    3 layers comprilan; hydrofera ready on latearl wound with triad     - Frequency:    Change dressing weekly   11/01/24 1431 OP Wound Dressing Routine Active Valentin Correa MD     - Cleansing:    Cleanse with normal saline or wound cleanser     - Dressing:    Dry gauze     - Dressing:    Hydrofera transfer     - Dressing:    Conforming roll     - Additional Wound Dressing Information:    gentamycin cream     - Frequency:    Change dressing daily and PRN   10/25/24 1436 OP Wound Dressing Routine Active Valentin Correa MD     - Cleansing:    Cleanse with normal saline or wound cleanser     - Additional Wound Dressing Information:    alternate between gentamycin and mupricin; aquacel foam     - Frequency:    Change dressing daily and PRN   10/18/24 1309 OP Wound Dressing Routine Active Valentin Correa MD     - Cleansing:    Cleanse with normal saline or wound cleanser     - Additional Wound Dressing Information:    triad, gauze kenia     - Frequency:    Change dressing daily and PRN   10/14/24 1111 OP Wound Dressing Routine Active Valentin Correa MD     - Cleansing:    Cleanse with normal saline or wound cleanser     - Dressing:    Hydrofera ready     - Dressing:    Conforming roll     - Additional Wound Dressing Information:    comprilan x 3     - Frequency:    Change dressing weekly   10/04/24 1120 OP Wound Dressing Routine Active Valentin Correa MD     - Cleansing:    Cleanse with normal saline or wound cleanser     - Dressing:    Endoform collagen     - Dressing:    Hydrofera transfer     - Dressing:    Conforming roll     - Additional Wound Dressing Information:    comprilan x 3     - Frequency:    Change dressing weekly    09/30/24 0929 OP Wound Dressing Routine Active Valentin Correa MD     - Cleansing:    Cleanse with normal saline or wound cleanser     - Dressing:    Acticoat     - Dressing:    Dry gauze     - Dressing:    Conforming roll     - Additional Wound Dressing Information:    compression socks     - Frequency:    Change dressing three times per week   09/23/24 0835 OP Wound Dressing Routine Active Valentin Correa MD     - Cleansing:    Cleanse with normal saline or wound cleanser     - Dressing:    Collagen     - Dressing:    Hydrofera ready     - Dressing:    Conforming roll     - Additional Wound Dressing Information:    comprilan x 3   08/17/24 0956 OP Wound Dressing Routine Active Valentin Correa MD     - Cleansing:    Cleanse with normal saline or wound cleanser     - Dressing:    Hydrofera transfer     - Dressing:    Conforming roll     - Additional Wound Dressing Information:    comprilan x 3   07/19/24 1004 OP Wound Dressing Routine Active Valentin Correa MD     - Cleansing:    Cleanse with normal saline or wound cleanser     - Dressing:    Endoform collagen     - Dressing:    Hydrofera transfer     - Dressing:    Kerramax/Super absorbent     - Dressing:    Kerlix     - Additional Wound Dressing Information:    3 layer comprilan     - Frequency:    Change dressing weekly   07/08/24 1624 OP Wound Dressing Routine Active Valentin Correa MD     - Cleansing:    Cleanse with normal saline or wound cleanser     - Additional Wound Dressing Information:    Left foot- iodosorb, aquacel, kerlix and antifungal powder, and comprilan     - Frequency:    Change dressing weekly       Inactive Orders   Date Order Priority Status Authorizing Provider   08/30/24 1208 OP Wound Dressing Routine Completed Valentin Correa MD     - Cleansing:    Cleanse with normal saline or wound cleanser     - Dressing:    Collagen     - Dressing:    Hydrofera ready     - Additional Wound Dressing Information:    comprilan x 3     - Frequency:     Change dressing weekly   08/05/24 1046 OP Wound Dressing Routine Completed Valentin Correa MD     - Cleansing:    Cleanse with normal saline or wound cleanser     - Dressing:    Endoform collagen     - Additional Wound Dressing Information:    mepitol contact layer, hydrofera blue ready, kenia and comprilan x 3     - Frequency:    Change dressing weekly   07/05/24 1139 OP Wound Dressing Routine Completed Willie Redd APRN     - Dressing:    Alginate     - Dressing:    Iodosorb     - Additional Wound Dressing Information:    zinc periwound, nystatin powder to skin under wraps     - Cleansing:    Cleanse with normal saline or wound cleanser     - Frequency:    Change dressing weekly       Compression Wrap 06/28/24 Dorsal;Left (Active)   Placement Date/Time: 06/28/24 1028   Wound Location Orientation: Dorsal;Left      Assessments 6/28/2024  9:40 AM 11/29/2024 11:44 AM   Response to Treatment Well tolerated Well tolerated   Compression Layers 3 Multilayer   Compression Product Type Artiflex 10cm;Artiflex 15cm;Comprilan 8cm;Comprilan 10cm;Comprilan 12cm;Stockinette 3in Artiflex 10cm;Artiflex 15cm;Comprilan 8cm;Comprilan 10cm;Comprilan 12cm   Dressing Applied Yes Yes   Compression Wrap Location Toes to Knee Toes to Knee   Compression Wrap Status Clean;Dry;Intact Clean;Dry;Intact       Inactive Orders   Date Order Priority Status Authorizing Provider   07/05/24 1139 OP Wound Dressing Routine Completed Willie Redd APRN     - Dressing:    Alginate     - Dressing:    Iodosorb     - Additional Wound Dressing Information:    zinc periwound, nystatin powder to skin under wraps     - Cleansing:    Cleanse with normal saline or wound cleanser     - Frequency:    Change dressing weekly       Wound 11/01/24 2 Ankle Left;Medial (Active)   Date First Assessed/Time First Assessed: 11/01/24 1131    Wound Number (Wound Clinic Only): 2  Primary Wound Type: Venous Ulcer  Location: Ankle  Wound Location Orientation: Left;Medial       Assessments 11/1/2024 11:34 AM 11/29/2024 11:44 AM   Wound Image       Site Assessment Dry;Red;Yellow Moist;Red;Yellow   Closure Not approximated Not approximated   Drainage Amount Scant Moderate   Drainage Description Serosanguineous;Yellow Yellow   Treatments Cleansed;Saline Cleansed;Saline;Wound    Dressing Hydrofera transfer (gentamycin cream, patient's own compression sock) Hydrofera transfer;ABD Pad;Martha;Tape   Dressing Changed New Changed   Dressing Status Dry;Intact;Clean Dry;Intact;Clean   Wound Length (cm) 1.5 cm 2.6 cm   Wound Width (cm) 0.7 cm 3.8 cm   Wound Surface Area (cm^2) 1.05 cm^2 9.88 cm^2   Wound Depth (cm) 0.1 cm 0.1 cm   Wound Volume (cm^3) 0.105 cm^3 0.988 cm^3   Wound Healing % -- -841   Margins Attached edges Attached edges   Non-staged Wound Description Full thickness Full thickness   Mary-wound Assessment Dry;Hemosiderin staining;Atrophy rogelio Pink;Maceration;White;Fragile   Wound Granulation Tissue Pink Red   Wound Bed Granulation (%) 90 % 60 %   Wound Bed Epithelium (%) 0 % 0 %   Wound Bed Slough (%) 10 % 40 %   Wound Bed Eschar (%) 0 % 0 %   Wound Bed Fibrin (%) 0 % 0 %   State of Healing Non-healing Early/partial granulation   Wound Odor None None       Active Orders   Date Order Priority Status Authorizing Provider   11/22/24 1452 OP Wound Dressing Routine Active Valentin Correa MD     - Cleansing:    Cleanse with normal saline or wound cleanser     - Dressing:    Enluxtra humidifiber     - Dressing:    Conforming roll     - Additional Wound Dressing Information:    xeroform on lateral wound; 3 layer comprilan     - Frequency:    Change dressing weekly   11/15/24 0959 OP Wound Dressing Routine Active Valentin Correa MD     - Cleansing:    Cleanse with normal saline or wound cleanser     - Dressing:    Collagen     - Dressing:    Hydrofera transfer     - Dressing:    Enluxtra humidifiber     - Additional Wound Dressing Information:    collagen and hydrofera blue  transfer, enluxtra, kerlix, comprilan x 3     - Frequency:    Change dressing weekly   11/11/24 1115 OP Wound Dressing Routine Active Valentin Correa MD     - Cleansing:    Cleanse with normal saline or wound cleanser     - Dressing:    Enluxtra humidifiber     - Dressing:    Kerlix     - Additional Wound Dressing Information:    3 layers comprilan; hydrofera ready on latearl wound with triad     - Frequency:    Change dressing weekly   11/01/24 1431 OP Wound Dressing Routine Active Valentin Correa MD     - Cleansing:    Cleanse with normal saline or wound cleanser     - Dressing:    Dry gauze     - Dressing:    Hydrofera transfer     - Dressing:    Conforming roll     - Additional Wound Dressing Information:    gentamycin cream     - Frequency:    Change dressing daily and PRN              ASSESSMENT AND PLAN:      1. Chronic venous hypertension (idiopathic) with ulcer of left lower extremity (HCC)    2. Benign essential HTN    3. Venous (peripheral) insufficiency    4. 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  The lateral ankle wound is completely healed now no further dressing needed at that site.  To the medial ankle wound will apply Hydrofera Blue transfer, Kerramax, continue Comprilan 3 layer compression wrap.  We did discuss compression wrap precautions at length.  Follow-up in 1 week.  Risks, benefits, and alternatives of current treatment plan discussed in detail.  Questions and concerns addressed. Red flags to RTC or ED reviewed.  Patient (or parent) agrees to plan.      Return in about 1 week (around 12/6/2024) for apn visit for 30 min.    Also refer to patient instructions.     I spent 40 minutes with the patient. This time included:  preparing to see the patient (eg, review notes  and recent diagnostics), seeing the patient, performing a medically appropriate examination and/or evaluation, counseling and educating the patient, and documenting in the record.    I agree with staff documentation except for any changes made in my note.       Valentin Correa M.D., Wound Care Physician  [facility]  11/29/2024               [1]   Allergies  Allergen Reactions    Adhesive Tape RASH     Can use paper tape.

## 2024-11-29 NOTE — PATIENT INSTRUCTIONS
PATIENT INSTRUCTIONS      FOR Russ Kirkland ,  1955    DATE OF SERVICE: 2024    LEFT MEDIAL ANKLE:  Hydrofera Blue  Cover with ABD pad    Comprilan 3 layer compression wrap to left lower extremity      Follow up with Dr. Correa 1 WEEK      Managing your edema:    Avoid prolonged standing in one place. It is better to have your calf muscles moving to pump fluid out of the legs.  Elevate leg(s) above the level of the heart when sitting or as much as possible.  Take you diuretics as directed by your physician. Do not skip doses or change doses unless instructed to do so by your physician.  Decrease salt intake, follow recommended 2 grams daily.  Do not get leg(s) with compression wrap wet. If wraps are too tight as indicated by pain, numbness/tingling or discoloration of toes remove wrap completely and call the wound center @ [phone].       The treatment plan has been discussed at length between you and your provider. Follow all instructions carefully, it is very important. If you do not follow all instructions you are at risk of your wound not healing, infection, possible loss of limb and even loss of life.    COMPRESSION WRAP PATIENT CARE INSTRUCTIONS  DO NOT get compression wrap wet. When showering, use a shower boot.   Please contact wound clinic and if not able to reach, then go to emergency room if ANY of the following occur:   Tingling or numbness in the foot or toes on leg with compression wrap.  Severe pain that cannot be relieved with elevation and any medication instructed per your doctor.  A fever of 100.4°F (38°C) or higher.  Swelling, coldness, or blue-gray discoloration of the toes.  If the compression wrap feels too tight or too loose.  If the compression wrap is damaged or has rough edges that hurt.  If the compression wrap gets wet, you must cut wrap off.   Drainage from compression wrap that smells different than usual.

## 2024-12-03 ENCOUNTER — LAB ENCOUNTER (OUTPATIENT)
Dept: LAB | Age: 69
End: 2024-12-03
Attending: STUDENT IN AN ORGANIZED HEALTH CARE EDUCATION/TRAINING PROGRAM
Payer: MEDICARE

## 2024-12-03 DIAGNOSIS — I48.21 PERMANENT ATRIAL FIBRILLATION (HCC): ICD-10-CM

## 2024-12-03 LAB
INR BLD: 2 (ref 0.8–1.2)
PROTHROMBIN TIME: 23.7 SECONDS (ref 11.6–14.8)

## 2024-12-03 PROCEDURE — 85610 PROTHROMBIN TIME: CPT

## 2024-12-03 PROCEDURE — 36415 COLL VENOUS BLD VENIPUNCTURE: CPT

## 2024-12-06 ENCOUNTER — OFFICE VISIT (OUTPATIENT)
Dept: WOUND CARE | Facility: HOSPITAL | Age: 69
End: 2024-12-06
Attending: FAMILY MEDICINE
Payer: MEDICARE

## 2024-12-06 VITALS
OXYGEN SATURATION: 97 % | BODY MASS INDEX: 33 KG/M2 | HEART RATE: 70 BPM | WEIGHT: 248 LBS | TEMPERATURE: 98 F | SYSTOLIC BLOOD PRESSURE: 145 MMHG | DIASTOLIC BLOOD PRESSURE: 85 MMHG

## 2024-12-06 DIAGNOSIS — I87.312 CHRONIC VENOUS HYPERTENSION (IDIOPATHIC) WITH ULCER OF LEFT LOWER EXTREMITY (HCC): Primary | ICD-10-CM

## 2024-12-06 DIAGNOSIS — L97.929 CHRONIC VENOUS HYPERTENSION (IDIOPATHIC) WITH ULCER OF LEFT LOWER EXTREMITY (HCC): Primary | ICD-10-CM

## 2024-12-06 DIAGNOSIS — I87.2 VENOUS (PERIPHERAL) INSUFFICIENCY: ICD-10-CM

## 2024-12-06 DIAGNOSIS — I10 BENIGN ESSENTIAL HTN: ICD-10-CM

## 2024-12-06 PROCEDURE — 99215 OFFICE O/P EST HI 40 MIN: CPT | Performed by: FAMILY MEDICINE

## 2024-12-06 NOTE — PROGRESS NOTES
12/06/24 1149   Wound 11/01/24 2 Ankle Left;Medial   Date First Assessed/Time First Assessed: 11/01/24 1131    Wound Number (Wound Clinic Only): 2  Primary Wound Type: Venous Ulcer  Location: Ankle  Wound Location Orientation: Left;Medial   Wound Image    Site Assessment Moist;Pink   Closure Not approximated   Drainage Amount Moderate   Drainage Description Serous   Treatments Cleansed;Saline;Wound    Dressing Hydrofera ready;Martha   Dressing Changed New   Dressing Status Dry;Intact;Clean   Wound Length (cm) 0.3 cm   Wound Width (cm) 0.3 cm   Wound Surface Area (cm^2) 0.09 cm^2   Wound Depth (cm) 0.1 cm   Wound Volume (cm^3) 0.009 cm^3   Wound Healing % 91   Margins Attached edges   Non-staged Wound Description Full thickness   Mary-wound Assessment Clean;Dry;Intact;Atrophy rogelio;Hemosiderin staining   Wound Bed Epithelium (%) 90 %   State of Healing Epithelialized   Compression Wrap 06/28/24 Dorsal;Left   Placement Date/Time: 06/28/24 1028   Wound Location Orientation: Dorsal;Left   Response to Treatment Well tolerated   Compression Layers Multilayer   Compression Product Type Artiflex 10cm;Artiflex 15cm;Comprilan 8cm;Comprilan 10cm;Comprilan 12cm   Dressing Applied Yes   Compression Wrap Location Toes to Knee   Compression Wrap Status Clean;Dry;Intact

## 2024-12-06 NOTE — PATIENT INSTRUCTIONS
PATIENT INSTRUCTIONS      FOR Russ Kirkland ,  1955    DATE OF SERVICE: 2024    LEFT MEDIAL ANKLE:  Hydrofera Blue  Cover with ABD pad    Comprilan 3 layer compression wrap to left lower extremity    Cut the wrap off before your surgery and use your Velcro compression garment after that.    Change the Hydrofera Blue dressing every 3 days.    Follow up with Dr. Correa 1/3/25      Managing your edema:    Avoid prolonged standing in one place. It is better to have your calf muscles moving to pump fluid out of the legs.  Elevate leg(s) above the level of the heart when sitting or as much as possible.  Take you diuretics as directed by your physician. Do not skip doses or change doses unless instructed to do so by your physician.  Decrease salt intake, follow recommended 2 grams daily.  Do not get leg(s) with compression wrap wet. If wraps are too tight as indicated by pain, numbness/tingling or discoloration of toes remove wrap completely and call the wound center @ [phone].       The treatment plan has been discussed at length between you and your provider. Follow all instructions carefully, it is very important. If you do not follow all instructions you are at risk of your wound not healing, infection, possible loss of limb and even loss of life.    COMPRESSION WRAP PATIENT CARE INSTRUCTIONS  DO NOT get compression wrap wet. When showering, use a shower boot.   Please contact wound clinic and if not able to reach, then go to emergency room if ANY of the following occur:   Tingling or numbness in the foot or toes on leg with compression wrap.  Severe pain that cannot be relieved with elevation and any medication instructed per your doctor.  A fever of 100.4°F (38°C) or higher.  Swelling, coldness, or blue-gray discoloration of the toes.  If the compression wrap feels too tight or too loose.  If the compression wrap is damaged or has rough edges that hurt.  If the compression wrap gets wet, you  must cut wrap off.   Drainage from compression wrap that smells different than usual.

## 2024-12-06 NOTE — PROGRESS NOTES
Chief Complaint   Patient presents with    Wound Recheck     Here for left lateral and medial leg wound care follow up       HPI:     Patient here for follow-up of left medial and lateral ankle wound.  He is doing well and has no new complaints.  Tolerating compression wrap.    Lab Results   Component Value Date    BUN 33 (H) 11/20/2024    CREATSERUM 0.80 11/20/2024    ALB 4.7 11/20/2024    TP 7.7 11/20/2024    A1C 5.6 04/16/2018         Current Outpatient Medications:     metoprolol succinate ER 50 MG Oral Tablet 24 Hr, Take 1 tablet (50 mg total) by mouth daily., Disp: , Rfl:     GABAPENTIN 800 MG Oral Tab, TAKE 1 TABLET BY MOUTH THREE TIMES DAILY, Disp: 270 tablet, Rfl: 0    cholecalciferol 50 MCG (2000 UT) Oral Cap, cholecalciferol (vitamin D3) 50 mcg (2,000 unit) capsule, [RxNorm: 980455], Disp: , Rfl:     Sildenafil Citrate 100 MG Oral Tab, Take 1 tablet (100 mg total) by mouth daily as needed for Erectile Dysfunction. Take ~ one hour prior to sexual activity on an empty stomach, Disp: 30 tablet, Rfl: 11    tramadol-acetaminophen 37.5-325 MG Oral Tab, Take 2 tablets by mouth 2 (two) times daily as needed for Pain., Disp: 360 tablet, Rfl: 1    aspirin 81 MG Oral Tab EC, Take 1 tablet (81 mg total) by mouth daily., Disp: , Rfl:     celecoxib 200 MG Oral Cap, Take 1 capsule (200 mg total) by mouth daily as needed., Disp: 90 capsule, Rfl: 3    enoxaparin (LOVENOX) 120 MG/0.8ML Injection Solution Prefilled Syringe, Inject 1 mg/kg into the skin 2 (two) times daily., Disp: , Rfl:     WARFARIN 2.5 MG Oral Tab, TAKE 1 TABLET BY MOUTH DAILY (Patient taking differently: Take 1 tablet (2.5 mg total) by mouth daily. As directed), Disp: 90 tablet, Rfl: 3    LOSARTAN POTASSIUM-HCTZ 100-12.5 MG Oral Tab, TAKE 1 TABLET BY MOUTH DAILY, Disp: 90 tablet, Rfl: 3    SIMVASTATIN 20 MG Oral Tab, TAKE 1 TABLET BY MOUTH NIGHTLY, Disp: 90 tablet, Rfl: 3    Sildenafil Citrate 100 MG Oral Tab, Take 1 tablet (100 mg total) by mouth daily  as needed for Erectile Dysfunction., Disp: 10 tablet, Rfl: 11    Glucosamine-Chondroit-Vit C-Mn (GLUCOSAMINE-CHONDROITIN) Oral Cap, Take 1 tablet by mouth daily., Disp: , Rfl:     omega-3 fatty acids (FISH OIL) 1000 MG Oral Cap, Take 1,000 mg by mouth daily., Disp: , Rfl:     betamethasone 0.1 % External Cream, Apply to wound twice a day, Disp: 15 g, Rfl: 0    Allergies[1]         REVIEW OF SYSTEMS:     Review of Systems (ROS)  This information was obtained from the patient, chart    A comprehensive 10 point review of systems was completed.  Pertinent positives and negatives noted in the the HPI.     Past medical, surgical, family and social history updated where appropriate.    PHYSICAL EXAM:   /85   Pulse 70   Temp 97.5 °F (36.4 °C)   Wt 248 lb   SpO2 97%   BMI 32.72 kg/m²    Estimated body mass index is 32.72 kg/m² as calculated from the following:    Height as of 11/14/24: 73\".    Weight as of this encounter: 248 lb.   Vital signs reviewed.Appears stated age, well groomed.      Awake, alert, in no acute distress  HENT:  normocephalic, atraumatic, external ear canals clear bilaterally, tympanic membranes appear opaque, non-bulging, non-erythematous, nasal turbinates are non-inflamed and not swollen, oropharynx without erythema, swelling, or exudate, gingiva and lips without any apparent lesions.   Cardiac:  S1 S2 regular rate and rhythm, no murmur, gallop, or rub  Respiratory:  Bilaterally clear to auscultation, no chest tenderness to palpation, no wheezing, no rhonchi, no rales, air entry is adequate, no accessory respiratory muscle use, no chest asymmetry, normal excursion.  GI:  bowel sound positive in all four quadrants, abdomen is soft, non-tender, non-distended, no hepatosplenomegaly, no abnormal aortic pulsation.     Calf                      Ankle                            Foot                              Compression Wrap 06/28/24 Dorsal;Left (Active)   Placement Date/Time: 06/28/24 1022    Wound Location Orientation: Dorsal;Left      Assessments 6/28/2024  9:40 AM 12/6/2024 11:49 AM   Response to Treatment Well tolerated Well tolerated   Compression Layers 3 Multilayer   Compression Product Type Artiflex 10cm;Artiflex 15cm;Comprilan 8cm;Comprilan 10cm;Comprilan 12cm;Stockinette 3in Artiflex 10cm;Artiflex 15cm;Comprilan 8cm;Comprilan 10cm;Comprilan 12cm   Dressing Applied Yes Yes   Compression Wrap Location Toes to Knee Toes to Knee   Compression Wrap Status Clean;Dry;Intact Clean;Dry;Intact       Active Orders   Date Order Priority Status Authorizing Provider   11/29/24 1317 OP Wound Dressing Routine Active Valentin Correa MD     - Cleansing:    Cleanse with normal saline or wound cleanser     - Dressing:    Hydrofera transfer     - Dressing:    ABD pad     - Additional Wound Dressing Information:    Cleanser, hydrofera transfer, abd pad, martha, tape, gauze       Inactive Orders   Date Order Priority Status Authorizing Provider   07/05/24 1139 OP Wound Dressing Routine Completed Willie Redd APRN     - Dressing:    Alginate     - Dressing:    Iodosorb     - Additional Wound Dressing Information:    zinc periwound, nystatin powder to skin under wraps     - Cleansing:    Cleanse with normal saline or wound cleanser     - Frequency:    Change dressing weekly       Wound 11/01/24 2 Ankle Left;Medial (Active)   Date First Assessed/Time First Assessed: 11/01/24 1131    Wound Number (Wound Clinic Only): 2  Primary Wound Type: Venous Ulcer  Location: Ankle  Wound Location Orientation: Left;Medial      Assessments 11/1/2024 11:34 AM 12/6/2024 11:49 AM   Wound Image       Site Assessment Dry;Red;Yellow Moist;Pink   Closure Not approximated Not approximated   Drainage Amount Scant Moderate   Drainage Description Serosanguineous;Yellow Serous   Treatments Cleansed;Saline Cleansed;Saline;Wound    Dressing Hydrofera transfer (gentamycin cream, patient's own compression sock) Hydrofera ready;Martha    Dressing Changed New New   Dressing Status Dry;Intact;Clean Dry;Intact;Clean   Wound Length (cm) 1.5 cm 0.3 cm   Wound Width (cm) 0.7 cm 0.3 cm   Wound Surface Area (cm^2) 1.05 cm^2 0.09 cm^2   Wound Depth (cm) 0.1 cm 0.1 cm   Wound Volume (cm^3) 0.105 cm^3 0.009 cm^3   Wound Healing % -- 91   Margins Attached edges Attached edges   Non-staged Wound Description Full thickness Full thickness   Mary-wound Assessment Dry;Hemosiderin staining;Atrophy rogelio Clean;Dry;Intact;Atrophy rogelio;Hemosiderin staining   Wound Granulation Tissue Pink --   Wound Bed Granulation (%) 90 % --   Wound Bed Epithelium (%) 0 % 90 %   Wound Bed Slough (%) 10 % --   Wound Bed Eschar (%) 0 % --   Wound Bed Fibrin (%) 0 % --   State of Healing Non-healing Epithelialized   Wound Odor None --       Active Orders   Date Order Priority Status Authorizing Provider   12/06/24 1319 OP Wound Dressing Routine Active Valentin Correa MD     - Cleansing:    Cleanse with normal saline or wound cleanser     - Dressing:    Hydrofera transfer     - Dressing:    Conforming roll     - Frequency:    Change dressing weekly   11/29/24 1317 OP Wound Dressing Routine Active Valentin Correa MD     - Cleansing:    Cleanse with normal saline or wound cleanser     - Dressing:    Hydrofera transfer     - Dressing:    ABD pad     - Additional Wound Dressing Information:    Cleanser, hydrofera transfer, abd pad, kenia, tape, gauze   11/22/24 1452 OP Wound Dressing Routine Active Valentin Correa MD     - Cleansing:    Cleanse with normal saline or wound cleanser     - Dressing:    Enluxtra humidifiber     - Dressing:    Conforming roll     - Additional Wound Dressing Information:    xeroform on lateral wound; 3 layer comprilan     - Frequency:    Change dressing weekly   11/15/24 0959 OP Wound Dressing Routine Active Valentin Correa MD     - Cleansing:    Cleanse with normal saline or wound cleanser     - Dressing:    Collagen     - Dressing:    Hydrofera transfer      - Dressing:    Enluxtra humidifiber     - Additional Wound Dressing Information:    collagen and hydrofera blue transfer, enluxtra, kerlix, comprilan x 3     - Frequency:    Change dressing weekly   11/11/24 1115 OP Wound Dressing Routine Active Valentin Correa MD     - Cleansing:    Cleanse with normal saline or wound cleanser     - Dressing:    Enluxtra humidifiber     - Dressing:    Kerlix     - Additional Wound Dressing Information:    3 layers comprilan; hydrofera ready on latearl wound with triad     - Frequency:    Change dressing weekly   11/01/24 1431 OP Wound Dressing Routine Active Valentin Correa MD     - Cleansing:    Cleanse with normal saline or wound cleanser     - Dressing:    Dry gauze     - Dressing:    Hydrofera transfer     - Dressing:    Conforming roll     - Additional Wound Dressing Information:    gentamycin cream     - Frequency:    Change dressing daily and PRN              ASSESSMENT AND PLAN:      1. Chronic venous hypertension (idiopathic) with ulcer of left lower extremity (HCC)  - OP Wound Dressing; Future    2. Benign essential HTN  - OP Wound Dressing; Future    3. Venous (peripheral) insufficiency  - OP Wound Dressing; Future    Clinically the lateral ankle wound on the left remains healed.  The medial ankle wound is significantly better than previous visit.  Silver nitrate was applied for cauterization.  Will continue now with Hydrofera Blue dressing, Kerramax, Comprilan 3 layer compression wrap to left lower extremity.  We did discuss compression wrap precautions and written instructions given.   patient has planned total knee replacement surgery for the left side next week in about 6 days from now at which time he will cut off his compression garment.  After that he will use Hydrofera Blue to the medial ankle wound and change every third day and use his own Velcro compression garment until he sees me back which will be on 1/3/2025.  Risks, benefits, and alternatives of  current treatment plan discussed in detail.  Questions and concerns addressed. Red flags to RTC or ED reviewed.  Patient (or parent) agrees to plan.      Return in about 4 weeks (around 1/3/2025) for MD visit for 30 min.    Also refer to patient instructions.     I spent 40 minutes with the patient. This time included:  preparing to see the patient (eg, review notes and recent diagnostics), seeing the patient, performing a medically appropriate examination and/or evaluation, counseling and educating the patient, and documenting in the record.    I agree with staff documentation except for any changes made in my note.       Valentin Correa M.D., Wound Care Physician  [facility]  12/6/2024               [1]   Allergies  Allergen Reactions    Adhesive Tape RASH     Can use paper tape.

## 2024-12-12 ENCOUNTER — LAB ENCOUNTER (OUTPATIENT)
Dept: LAB | Facility: HOSPITAL | Age: 69
End: 2024-12-12
Attending: ORTHOPAEDIC SURGERY
Payer: MEDICARE

## 2024-12-12 DIAGNOSIS — Z01.812 PRE-OPERATIVE LABORATORY EXAMINATION: Primary | ICD-10-CM

## 2024-12-12 LAB
INR BLD: 1.43 (ref 0.8–1.2)
PROTHROMBIN TIME: 18.3 SECONDS (ref 11.6–14.8)

## 2024-12-12 PROCEDURE — 36415 COLL VENOUS BLD VENIPUNCTURE: CPT

## 2024-12-12 PROCEDURE — 85610 PROTHROMBIN TIME: CPT

## 2024-12-13 ENCOUNTER — APPOINTMENT (OUTPATIENT)
Dept: WOUND CARE | Facility: HOSPITAL | Age: 69
End: 2024-12-13
Attending: FAMILY MEDICINE
Payer: MEDICARE

## 2024-12-20 ENCOUNTER — LAB ENCOUNTER (OUTPATIENT)
Dept: LAB | Facility: HOSPITAL | Age: 69
End: 2024-12-20
Attending: STUDENT IN AN ORGANIZED HEALTH CARE EDUCATION/TRAINING PROGRAM
Payer: MEDICARE

## 2024-12-20 ENCOUNTER — APPOINTMENT (OUTPATIENT)
Dept: WOUND CARE | Facility: HOSPITAL | Age: 69
End: 2024-12-20
Attending: FAMILY MEDICINE
Payer: MEDICARE

## 2024-12-20 DIAGNOSIS — I48.21 PERMANENT ATRIAL FIBRILLATION (HCC): ICD-10-CM

## 2024-12-20 LAB
INR BLD: 2.77 (ref 0.8–1.2)
INR BLDC: 4.5 (ref 0.9–1.1)
PROTHROMBIN TIME: 30.6 SECONDS (ref 11.6–14.8)

## 2024-12-20 PROCEDURE — 85610 PROTHROMBIN TIME: CPT

## 2024-12-20 PROCEDURE — 36415 COLL VENOUS BLD VENIPUNCTURE: CPT

## 2024-12-27 ENCOUNTER — LAB ENCOUNTER (OUTPATIENT)
Dept: LAB | Age: 69
End: 2024-12-27
Attending: INTERNAL MEDICINE
Payer: MEDICARE

## 2024-12-27 DIAGNOSIS — I48.21 PERMANENT ATRIAL FIBRILLATION (HCC): ICD-10-CM

## 2024-12-27 LAB
INR BLD: 2.63 (ref 0.8–1.2)
PROTHROMBIN TIME: 29.4 SECONDS (ref 11.6–14.8)

## 2024-12-27 PROCEDURE — 36415 COLL VENOUS BLD VENIPUNCTURE: CPT

## 2024-12-27 PROCEDURE — 85610 PROTHROMBIN TIME: CPT

## 2025-01-03 ENCOUNTER — OFFICE VISIT (OUTPATIENT)
Dept: WOUND CARE | Facility: HOSPITAL | Age: 70
End: 2025-01-03
Attending: FAMILY MEDICINE
Payer: MEDICARE

## 2025-01-03 VITALS
WEIGHT: 249 LBS | HEART RATE: 66 BPM | TEMPERATURE: 98 F | DIASTOLIC BLOOD PRESSURE: 83 MMHG | SYSTOLIC BLOOD PRESSURE: 126 MMHG | BODY MASS INDEX: 33 KG/M2 | OXYGEN SATURATION: 99 %

## 2025-01-03 DIAGNOSIS — I10 BENIGN ESSENTIAL HTN: ICD-10-CM

## 2025-01-03 DIAGNOSIS — L97.929 CHRONIC VENOUS HYPERTENSION (IDIOPATHIC) WITH ULCER OF LEFT LOWER EXTREMITY (HCC): Primary | ICD-10-CM

## 2025-01-03 DIAGNOSIS — I87.312 CHRONIC VENOUS HYPERTENSION (IDIOPATHIC) WITH ULCER OF LEFT LOWER EXTREMITY (HCC): Primary | ICD-10-CM

## 2025-01-03 PROCEDURE — 99214 OFFICE O/P EST MOD 30 MIN: CPT | Performed by: FAMILY MEDICINE

## 2025-01-03 NOTE — PROGRESS NOTES
Weekly Wound Education Note    Teaching Provided To: Patient  Training Topics: Cleasing and general instructions;Skin care;Compression;Nutrition;Signs and symptoms of infection;Discharge instructions  Training Method: Explain/Verbal  Training Response: Patient responds and understands        Notes: Left Leg: no dressing needed, wear compressions 20-30 mmhg regularly to prevent furthre issues   Open to air, Vaseline applied. Continue to wear compression at home. Discharged by wound care physician, please call the clinic with any concerns in the future.

## 2025-01-03 NOTE — PATIENT INSTRUCTIONS
PATIENT INSTRUCTIONS      FOR JOSH Ziegler 1955    DATE OF SERVICE: 1/3/2025      Your wound is healed.     No further appointment needed in wound clinic.        Follow up with Dr. Correa as needed

## 2025-01-03 NOTE — PROGRESS NOTES
Chief Complaint   Patient presents with    Wound Recheck     Pt presents today for L LE and foot wound recheck and denies any new acute sx's of concern.         HPI:     Patient here for follow-up of left medial ankle wound.  He is doing well and thinks wound might be healed.  He has no pain or drainage.    Lab Results   Component Value Date    BUN 33 (H) 11/20/2024    CREATSERUM 0.80 11/20/2024    ALB 4.7 11/20/2024    TP 7.7 11/20/2024    A1C 5.6 04/16/2018         Current Outpatient Medications:     gabapentin 800 MG Oral Tab, Take 1 tablet (800 mg total) by mouth 3 (three) times daily., Disp: 270 tablet, Rfl: 0    metoprolol succinate ER 50 MG Oral Tablet 24 Hr, Take 1 tablet (50 mg total) by mouth daily., Disp: , Rfl:     cholecalciferol 50 MCG (2000 UT) Oral Cap, cholecalciferol (vitamin D3) 50 mcg (2,000 unit) capsule, [RxNorm: 148921], Disp: , Rfl:     Sildenafil Citrate 100 MG Oral Tab, Take 1 tablet (100 mg total) by mouth daily as needed for Erectile Dysfunction. Take ~ one hour prior to sexual activity on an empty stomach, Disp: 30 tablet, Rfl: 11    tramadol-acetaminophen 37.5-325 MG Oral Tab, Take 2 tablets by mouth 2 (two) times daily as needed for Pain., Disp: 360 tablet, Rfl: 1    aspirin 81 MG Oral Tab EC, Take 1 tablet (81 mg total) by mouth daily., Disp: , Rfl:     celecoxib 200 MG Oral Cap, Take 1 capsule (200 mg total) by mouth daily as needed., Disp: 90 capsule, Rfl: 3    enoxaparin (LOVENOX) 120 MG/0.8ML Injection Solution Prefilled Syringe, Inject 1 mg/kg into the skin 2 (two) times daily., Disp: , Rfl:     WARFARIN 2.5 MG Oral Tab, TAKE 1 TABLET BY MOUTH DAILY (Patient taking differently: Take 1 tablet (2.5 mg total) by mouth daily. As directed), Disp: 90 tablet, Rfl: 3    LOSARTAN POTASSIUM-HCTZ 100-12.5 MG Oral Tab, TAKE 1 TABLET BY MOUTH DAILY, Disp: 90 tablet, Rfl: 3    SIMVASTATIN 20 MG Oral Tab, TAKE 1 TABLET BY MOUTH NIGHTLY, Disp: 90 tablet, Rfl: 3    Sildenafil Citrate 100 MG Oral  Tab, Take 1 tablet (100 mg total) by mouth daily as needed for Erectile Dysfunction., Disp: 10 tablet, Rfl: 11    Glucosamine-Chondroit-Vit C-Mn (GLUCOSAMINE-CHONDROITIN) Oral Cap, Take 1 tablet by mouth daily., Disp: , Rfl:     omega-3 fatty acids (FISH OIL) 1000 MG Oral Cap, Take 1,000 mg by mouth daily., Disp: , Rfl:     betamethasone 0.1 % External Cream, Apply to wound twice a day, Disp: 15 g, Rfl: 0    Allergies[1]         REVIEW OF SYSTEMS:     Review of Systems (ROS)  This information was obtained from the patient, chart    A comprehensive 10 point review of systems was completed.  Pertinent positives and negatives noted in the the HPI.     Past medical, surgical, family and social history updated where appropriate.    PHYSICAL EXAM:   /83   Pulse 66   Temp 97.6 °F (36.4 °C)   Wt 249 lb   SpO2 99%   BMI 32.85 kg/m²    Estimated body mass index is 32.85 kg/m² as calculated from the following:    Height as of 11/14/24: 73\".    Weight as of this encounter: 249 lb.   Vital signs reviewed.Appears stated age, well groomed.      Awake, alert, in no acute distress  HENT:  normocephalic, atraumatic, external ear canals clear bilaterally, tympanic membranes appear opaque, non-bulging, non-erythematous, nasal turbinates are non-inflamed and not swollen, oropharynx without erythema, swelling, or exudate, gingiva and lips without any apparent lesions.   Cardiac:  S1 S2 regular rate and rhythm, no murmur, gallop, or rub  Respiratory:  Bilaterally clear to auscultation, no chest tenderness to palpation, no wheezing, no rhonchi, no rales, air entry is adequate, no accessory respiratory muscle use, no chest asymmetry, normal excursion.  GI:  bowel sound positive in all four quadrants, abdomen is soft, non-tender, non-distended, no hepatosplenomegaly, no abnormal aortic pulsation.     Calf  Point of Measurement - Left Calf: 34     Left Calf from:: Heel  Calf Left cm:: 39.1          Ankle  Point of Measurement -  Left Ankle: 10     Left Ankle from:: Heel  Left Ankle cm:: 25.2                Foot  Point of Measurement - Left Foot: 12  Left Foot from:: Great toe  Left Foot cm:: 25.5                     Compression Wrap 06/28/24 Dorsal;Left (Active)   Placement Date/Time: 06/28/24 1028   Wound Location Orientation: Dorsal;Left      Assessments 6/28/2024  9:40 AM 12/6/2024 11:49 AM   Response to Treatment Well tolerated Well tolerated   Compression Layers 3 Multilayer   Compression Product Type Artiflex 10cm;Artiflex 15cm;Comprilan 8cm;Comprilan 10cm;Comprilan 12cm;Stockinette 3in Artiflex 10cm;Artiflex 15cm;Comprilan 8cm;Comprilan 10cm;Comprilan 12cm   Dressing Applied Yes Yes   Compression Wrap Location Toes to Knee Toes to Knee   Compression Wrap Status Clean;Dry;Intact Clean;Dry;Intact       Active Orders   Date Order Priority Status Authorizing Provider   11/29/24 1317 OP Wound Dressing Routine Active Valentin Correa MD     - Cleansing:    Cleanse with normal saline or wound cleanser     - Dressing:    Hydrofera transfer     - Dressing:    ABD pad     - Additional Wound Dressing Information:    Cleanser, hydrofera transfer, abd pad, kenia, tape, gauze       Inactive Orders   Date Order Priority Status Authorizing Provider   07/05/24 1139 OP Wound Dressing Routine Completed Willie Redd APRN     - Dressing:    Alginate     - Dressing:    Iodosorb     - Additional Wound Dressing Information:    zinc periwound, nystatin powder to skin under wraps     - Cleansing:    Cleanse with normal saline or wound cleanser     - Frequency:    Change dressing weekly       Wound 11/01/24 2 Ankle Left;Medial (Active)   Date First Assessed/Time First Assessed: 11/01/24 1131    Wound Number (Wound Clinic Only): 2  Primary Wound Type: Venous Ulcer  Location: Ankle  Wound Location Orientation: Left;Medial      Assessments 11/1/2024 11:34 AM 1/3/2025 11:39 AM   Wound Image       Site Assessment Dry;Red;Yellow Clean;Dry;Intact;Epithelialization    Closure Not approximated Approximated   Drainage Amount Scant None   Drainage Description Serosanguineous;Yellow --   Treatments Cleansed;Saline Cleansed;Wound    Dressing Hydrofera transfer (gentamycin cream, patient's own compression sock) Open to air (Vaseline)   Dressing Changed New --   Dressing Status Dry;Intact;Clean Removed   Wound Length (cm) 1.5 cm 0 cm   Wound Width (cm) 0.7 cm 0 cm   Wound Surface Area (cm^2) 1.05 cm^2 0 cm^2   Wound Depth (cm) 0.1 cm 0 cm   Wound Volume (cm^3) 0.105 cm^3 0 cm^3   Wound Healing % -- 100   Margins Attached edges Flat and Intact   Non-staged Wound Description Full thickness Not applicable   Mary-wound Assessment Dry;Hemosiderin staining;Atrophy rogelio Clean;Dry;Intact;Atrophy rogelio   Wound Granulation Tissue Pink --   Wound Bed Granulation (%) 90 % 0 %   Wound Bed Epithelium (%) 0 % 100 %   Wound Bed Slough (%) 10 % 0 %   Wound Bed Eschar (%) 0 % 0 %   Wound Bed Fibrin (%) 0 % 0 %   State of Healing Non-healing Epithelialized   Wound Odor None None       Active Orders   Date Order Priority Status Authorizing Provider   12/06/24 1319 OP Wound Dressing Routine Active Valentin Correa MD     - Cleansing:    Cleanse with normal saline or wound cleanser     - Dressing:    Hydrofera transfer     - Dressing:    Conforming roll     - Frequency:    Change dressing weekly   11/29/24 1317 OP Wound Dressing Routine Active Valentin Correa MD     - Cleansing:    Cleanse with normal saline or wound cleanser     - Dressing:    Hydrofera transfer     - Dressing:    ABD pad     - Additional Wound Dressing Information:    Cleanser, hydrofera transfer, abd pad, kenia, tape, gauze   11/22/24 1452 OP Wound Dressing Routine Active Valentin Correa MD     - Cleansing:    Cleanse with normal saline or wound cleanser     - Dressing:    Enluxtra humidifiber     - Dressing:    Conforming roll     - Additional Wound Dressing Information:    xeroform on lateral wound; 3 layer comprilan      - Frequency:    Change dressing weekly   11/15/24 0959 OP Wound Dressing Routine Active Valentin Correa MD     - Cleansing:    Cleanse with normal saline or wound cleanser     - Dressing:    Collagen     - Dressing:    Hydrofera transfer     - Dressing:    Enluxtra humidifiber     - Additional Wound Dressing Information:    collagen and hydrofera blue transfer, enluxtra, kerlix, comprilan x 3     - Frequency:    Change dressing weekly   11/11/24 1115 OP Wound Dressing Routine Active Valentin Correa MD     - Cleansing:    Cleanse with normal saline or wound cleanser     - Dressing:    Enluxtra humidifiber     - Dressing:    Kerlix     - Additional Wound Dressing Information:    3 layers comprilan; hydrofera ready on latearl wound with triad     - Frequency:    Change dressing weekly   11/01/24 1431 OP Wound Dressing Routine Active Valentin Correa MD     - Cleansing:    Cleanse with normal saline or wound cleanser     - Dressing:    Dry gauze     - Dressing:    Hydrofera transfer     - Dressing:    Conforming roll     - Additional Wound Dressing Information:    gentamycin cream     - Frequency:    Change dressing daily and PRN              ASSESSMENT AND PLAN:      1. Chronic venous hypertension (idiopathic) with ulcer of left lower extremity (HCC)    2. Benign essential HTN    Clinically the wound is completely healed now with good skin formation.  He will wear his own compression socks at home and use should be on a daily basis and lifelong.  He did have right knee replacement surgery done about 3 weeks ago which went well and planning to have his left knee replaced now.  I think at some point it would be good for him to see a vein specialist in the near future to see if any further intervention is needed for helping correct any vein problems in the left leg as he seems to get recurring wounds in the left ankle area.    Will discharge from wound clinic at this time.      Risks, benefits, and alternatives of  current treatment plan discussed in detail.  Questions and concerns addressed. Red flags to RTC or ED reviewed.  Patient (or parent) agrees to plan.      Return for discharged from wound care .    Also refer to patient instructions.     I spent 30 minutes with the patient. This time included:  preparing to see the patient (eg, review notes and recent diagnostics), seeing the patient, performing a medically appropriate examination and/or evaluation, counseling and educating the patient, and documenting in the record.    I agree with staff documentation except for any changes made in my note.       Valentin Correa M.D., Wound Care Physician  [facility]  1/3/2025               [1]   Allergies  Allergen Reactions    Adhesive Tape RASH     Can use paper tape.

## 2025-01-06 RX ORDER — GABAPENTIN 800 MG/1
800 TABLET ORAL 3 TIMES DAILY
Qty: 270 TABLET | Refills: 0 | Status: SHIPPED | OUTPATIENT
Start: 2025-01-06

## 2025-01-06 NOTE — TELEPHONE ENCOUNTER
Refill Request    Medication request: gabapentin 800 MG Oral Tab. Take 1 tablet (800 mg total) by mouth 3 (three) times daily.      LOV:6/10/2024 Yessica Cain,    Due back to clinic per last office note:  not mentioned  NOV: Visit date not found      [place] 10/15/24 #270 (90 days)    Urine drug screen (if applicable): N/A  Pain contract: N/A    LOV plan (if weaning or changing medications): not mentioned         Protocol passed. Patient is on a stable dose of the medication.

## 2025-01-10 ENCOUNTER — APPOINTMENT (OUTPATIENT)
Dept: WOUND CARE | Facility: HOSPITAL | Age: 70
End: 2025-01-10
Attending: FAMILY MEDICINE
Payer: MEDICARE

## 2025-01-17 ENCOUNTER — APPOINTMENT (OUTPATIENT)
Dept: WOUND CARE | Facility: HOSPITAL | Age: 70
End: 2025-01-17
Attending: FAMILY MEDICINE
Payer: MEDICARE

## 2025-01-21 ENCOUNTER — TELEPHONE (OUTPATIENT)
Dept: INTERNAL MEDICINE CLINIC | Facility: CLINIC | Age: 70
End: 2025-01-21

## 2025-01-21 DIAGNOSIS — M48.062 SPINAL STENOSIS OF LUMBAR REGION WITH NEUROGENIC CLAUDICATION: ICD-10-CM

## 2025-01-21 DIAGNOSIS — M15.0 PRIMARY OSTEOARTHRITIS INVOLVING MULTIPLE JOINTS: ICD-10-CM

## 2025-01-21 DIAGNOSIS — G62.9 PERIPHERAL POLYNEUROPATHY: ICD-10-CM

## 2025-01-22 NOTE — TELEPHONE ENCOUNTER
To MD:  The above refill request is for a controlled substance.  Please review pended medication order.   Print and sign for staff to fax to pharmacy or prescribe electronically.    Last office visit: 11/14/2024  Last time refill sent and quantity/refills:  Last ordered on 7/17/2024, #360/1  Per [place]  - last dispensed on 10/11/2024, #360/0

## 2025-01-28 ENCOUNTER — LAB ENCOUNTER (OUTPATIENT)
Dept: LAB | Age: 70
End: 2025-01-28
Attending: INTERNAL MEDICINE
Payer: MEDICARE

## 2025-01-28 ENCOUNTER — OFFICE VISIT (OUTPATIENT)
Dept: INTERNAL MEDICINE CLINIC | Facility: CLINIC | Age: 70
End: 2025-01-28

## 2025-01-28 VITALS
WEIGHT: 245 LBS | HEIGHT: 73 IN | OXYGEN SATURATION: 97 % | DIASTOLIC BLOOD PRESSURE: 82 MMHG | HEART RATE: 70 BPM | BODY MASS INDEX: 32.47 KG/M2 | SYSTOLIC BLOOD PRESSURE: 136 MMHG

## 2025-01-28 DIAGNOSIS — D68.9 COAGULOPATHY (HCC): ICD-10-CM

## 2025-01-28 DIAGNOSIS — Z01.818 PREOP EXAMINATION: ICD-10-CM

## 2025-01-28 DIAGNOSIS — Z01.818 PREOP EXAMINATION: Primary | ICD-10-CM

## 2025-01-28 DIAGNOSIS — M17.12 PRIMARY OSTEOARTHRITIS OF LEFT KNEE: ICD-10-CM

## 2025-01-28 LAB
ATRIAL RATE: 70 BPM
P-R INTERVAL: 274 MS
Q-T INTERVAL: 488 MS
QRS DURATION: 206 MS
QTC CALCULATION (BEZET): 527 MS
R AXIS: -56 DEGREES
T AXIS: 100 DEGREES
VENTRICULAR RATE: 70 BPM

## 2025-01-28 PROCEDURE — 99214 OFFICE O/P EST MOD 30 MIN: CPT | Performed by: INTERNAL MEDICINE

## 2025-01-28 PROCEDURE — 93010 ELECTROCARDIOGRAM REPORT: CPT | Performed by: INTERNAL MEDICINE

## 2025-01-28 PROCEDURE — 93005 ELECTROCARDIOGRAM TRACING: CPT

## 2025-01-28 NOTE — PROGRESS NOTES
Russ Kirkland is a 69 year old male.  Chief Complaint   Patient presents with    Surgical/procedure Clearance     Pre-operative H&P; left a message with Dr. Alfaro's office regarding Cardiac Clearance     HPI:     Preop eval for L TKR on 3/8/25 at [place].    Hx R TKR on 12/12/24 by Dr. Unger  Hx of R and L CAMERON several years ago.  Strong family hx of OA      No CP or SOB.  Able to climb a flight of stairs  Currently doing PT for the R knee; started doing the stationary bike.      Current Outpatient Medications   Medication Sig Dispense Refill    tramadol-acetaminophen 37.5-325 MG Oral Tab Take 2 tablets by mouth 2 (two) times daily as needed for Pain. 360 tablet 1    GABAPENTIN 800 MG Oral Tab TAKE 1 TABLET BY MOUTH THREE TIMES DAILY 270 tablet 0    metoprolol succinate ER 50 MG Oral Tablet 24 Hr Take 1 tablet (50 mg total) by mouth daily.      betamethasone 0.1 % External Cream Apply to wound twice a day 15 g 0    cholecalciferol 50 MCG (2000 UT) Oral Cap cholecalciferol (vitamin D3) 50 mcg (2,000 unit) capsule, [RxNorm: 425766]      Sildenafil Citrate 100 MG Oral Tab Take 1 tablet (100 mg total) by mouth daily as needed for Erectile Dysfunction. Take ~ one hour prior to sexual activity on an empty stomach 30 tablet 11    aspirin 81 MG Oral Tab EC Take 1 tablet (81 mg total) by mouth daily.      celecoxib 200 MG Oral Cap Take 1 capsule (200 mg total) by mouth daily as needed. 90 capsule 3    enoxaparin (LOVENOX) 120 MG/0.8ML Injection Solution Prefilled Syringe Inject 1 mg/kg into the skin 2 (two) times daily.      WARFARIN 2.5 MG Oral Tab TAKE 1 TABLET BY MOUTH DAILY (Patient taking differently: Take 1 tablet (2.5 mg total) by mouth daily. As directed) 90 tablet 3    LOSARTAN POTASSIUM-HCTZ 100-12.5 MG Oral Tab TAKE 1 TABLET BY MOUTH DAILY 90 tablet 3    SIMVASTATIN 20 MG Oral Tab TAKE 1 TABLET BY MOUTH NIGHTLY 90 tablet 3    Sildenafil Citrate 100 MG Oral Tab Take 1 tablet (100 mg total) by mouth daily as needed  for Erectile Dysfunction. 10 tablet 11    Glucosamine-Chondroit-Vit C-Mn (GLUCOSAMINE-CHONDROITIN) Oral Cap Take 1 tablet by mouth daily.      omega-3 fatty acids (FISH OIL) 1000 MG Oral Cap Take 1,000 mg by mouth daily.        Past Medical History:    Acute perforated appendicitis    Acute, but ill-defined, cerebrovascular disease    Arrhythmia    afib    Atrial fibrillation (HCC)    ablation (2004); CV (May 2009)    Cataract    Deep vein thrombosis (HCC)    can not remember which leg    Hearing impairment    bilateral hearing aids    High blood pressure    High cholesterol    History of blood transfusion    no reaction    History of vein stripping    Osteoarthrosis, unspecified whether generalized or localized, unspecified site    Sleep apnea    CPAP device    Stroke ([facility])    2019    UGI bleed    Unspecified essential hypertension    Venous insufficiency    RT greater saphenous vein ablation 2007      Social History:  Social History     Socioeconomic History    Marital status:    Tobacco Use    Smoking status: Never    Smokeless tobacco: Never   Vaping Use    Vaping status: Never Used   Substance and Sexual Activity    Alcohol use: Yes     Alcohol/week: 15.0 standard drinks of alcohol     Types: 15 Glasses of wine per week     Comment: 2 glasses of wine/day    Drug use: No   Other Topics Concern    Caffeine Concern No    Pt has a pacemaker No    Pt has a defibrillator No    Reaction to local anesthetic No   Social History Narrative    The patient does not use an assistive device..      The patient does live in a home with stairs.     Social Drivers of Health      [facility]    Social Connections    [facility], [facility]    Housing Stability        REVIEW OF SYSTEMS:   GENERAL HEALTH: feels well otherwise  RESPIRATORY: no SOB  CARDIOVASCULAR: no chest pain/pressure  GI: no nausea,  vomiting, diarrhea    Wt Readings from Last 5 Encounters:   01/28/25 245 lb (111.1 kg)   01/03/25 249 lb (112.9 kg)   12/06/24 248 lb (112.5 kg)   11/14/24 252 lb (114.3 kg)   11/01/24 246 lb (111.6 kg)     Body mass index is 32.32 kg/m².      EXAM:   /82   Pulse 70   Ht 6' 1\" (1.854 m)   Wt 245 lb (111.1 kg)   SpO2 97%   BMI 32.32 kg/m²   GENERAL: well developed, well nourished, in no apparent distress  NECK: supple, no adenopathy, no bruits  LUNGS: clear to auscultation  CARDIO: RRR, normal S1S2, without murmur or gallop  GI: soft, NT, ND, NABS  EXTREMITIES: trace BLE edema    ASSESSMENT AND PLAN:     Preop examination  L knee OA  -Pt is asymptomatic from a cardiopulmonary standpoint  -currently on warfarin for AF; pt is high risk for CVA (had CVA in 2019 when off warfarin for his appendectomy), so recommend bridging with lovenox -- pt will arrange bridging w/lovenox with [facility] coumadin clinic (did same thing for L TKR)  -CTA coronaries in 6/2024 -- calcified plaque prox LAD and ostial LCx with <50% stenosis (normal FFRct analysis with no evidence for hemodynamically significant lesions)  -check EKG  -pt acceptable surgical risk from my perspective  -pt has a call out to cardiologist Dr. Alfaro's office re cardiac clearance.  -check CBC, CMP; will do INR with coumadin clinic    The patient indicates understanding of these issues and agrees to the plan.    Christie De Souza MD, 01/28/25, 2:41 PM    Addendum:    EKG paced rhythm  CMP normal  CBC w/mild anemia (Hgb 12.4) -- suspect due to blood loss from recent R TKA.  Pt started on FeSO4 325mg/day.  Will repeat another CBC in 2-3 weeks.    Christie De Souza MD, 02/03/25, 11:55 AM

## 2025-01-31 ENCOUNTER — TELEPHONE (OUTPATIENT)
Dept: INTERNAL MEDICINE CLINIC | Facility: CLINIC | Age: 70
End: 2025-01-31

## 2025-01-31 ENCOUNTER — LAB ENCOUNTER (OUTPATIENT)
Dept: LAB | Facility: HOSPITAL | Age: 70
End: 2025-01-31
Attending: INTERNAL MEDICINE
Payer: MEDICARE

## 2025-01-31 DIAGNOSIS — D62 ACUTE BLOOD LOSS AS CAUSE OF POSTOPERATIVE ANEMIA: Primary | ICD-10-CM

## 2025-01-31 DIAGNOSIS — Z01.818 PREOP EXAMINATION: ICD-10-CM

## 2025-01-31 DIAGNOSIS — I48.21 PERMANENT ATRIAL FIBRILLATION (HCC): ICD-10-CM

## 2025-01-31 LAB
ALBUMIN SERPL-MCNC: 4.7 G/DL (ref 3.2–4.8)
ALBUMIN/GLOB SERPL: 1.7 {RATIO} (ref 1–2)
ALP LIVER SERPL-CCNC: 101 U/L
ALT SERPL-CCNC: 18 U/L
ANION GAP SERPL CALC-SCNC: 9 MMOL/L (ref 0–18)
AST SERPL-CCNC: 22 U/L (ref ?–34)
BASOPHILS # BLD AUTO: 0.06 X10(3) UL (ref 0–0.2)
BASOPHILS NFR BLD AUTO: 1.3 %
BILIRUB SERPL-MCNC: 0.7 MG/DL (ref 0.2–1.1)
BUN BLD-MCNC: 26 MG/DL (ref 9–23)
BUN/CREAT SERPL: 29.2 (ref 10–20)
CALCIUM BLD-MCNC: 9.3 MG/DL (ref 8.7–10.4)
CHLORIDE SERPL-SCNC: 106 MMOL/L (ref 98–112)
CO2 SERPL-SCNC: 24 MMOL/L (ref 21–32)
CREAT BLD-MCNC: 0.89 MG/DL
DEPRECATED RDW RBC AUTO: 51.4 FL (ref 35.1–46.3)
EGFRCR SERPLBLD CKD-EPI 2021: 92 ML/MIN/1.73M2 (ref 60–?)
EOSINOPHIL # BLD AUTO: 0.18 X10(3) UL (ref 0–0.7)
EOSINOPHIL NFR BLD AUTO: 3.8 %
ERYTHROCYTE [DISTWIDTH] IN BLOOD BY AUTOMATED COUNT: 15 % (ref 11–15)
FASTING STATUS PATIENT QL REPORTED: NO
GLOBULIN PLAS-MCNC: 2.7 G/DL (ref 2–3.5)
GLUCOSE BLD-MCNC: 93 MG/DL (ref 70–99)
HCT VFR BLD AUTO: 39.1 %
HGB BLD-MCNC: 12.4 G/DL
IMM GRANULOCYTES # BLD AUTO: 0.02 X10(3) UL (ref 0–1)
IMM GRANULOCYTES NFR BLD: 0.4 %
INR BLD: 2.9 (ref 0.8–1.2)
LYMPHOCYTES # BLD AUTO: 1.69 X10(3) UL (ref 1–4)
LYMPHOCYTES NFR BLD AUTO: 35.9 %
MCH RBC QN AUTO: 29.5 PG (ref 26–34)
MCHC RBC AUTO-ENTMCNC: 31.7 G/DL (ref 31–37)
MCV RBC AUTO: 92.9 FL
MONOCYTES # BLD AUTO: 0.41 X10(3) UL (ref 0.1–1)
MONOCYTES NFR BLD AUTO: 8.7 %
NEUTROPHILS # BLD AUTO: 2.35 X10 (3) UL (ref 1.5–7.7)
NEUTROPHILS # BLD AUTO: 2.35 X10(3) UL (ref 1.5–7.7)
NEUTROPHILS NFR BLD AUTO: 49.9 %
OSMOLALITY SERPL CALC.SUM OF ELEC: 292 MOSM/KG (ref 275–295)
PLATELET # BLD AUTO: 240 10(3)UL (ref 150–450)
POTASSIUM SERPL-SCNC: 4.6 MMOL/L (ref 3.5–5.1)
PROT SERPL-MCNC: 7.4 G/DL (ref 5.7–8.2)
PROTHROMBIN TIME: 31.7 SECONDS (ref 11.6–14.8)
RBC # BLD AUTO: 4.21 X10(6)UL
SODIUM SERPL-SCNC: 139 MMOL/L (ref 136–145)
WBC # BLD AUTO: 4.7 X10(3) UL (ref 4–11)

## 2025-01-31 PROCEDURE — 85610 PROTHROMBIN TIME: CPT

## 2025-01-31 PROCEDURE — 85025 COMPLETE CBC W/AUTO DIFF WBC: CPT

## 2025-01-31 PROCEDURE — 36415 COLL VENOUS BLD VENIPUNCTURE: CPT

## 2025-01-31 PROCEDURE — 80053 COMPREHEN METABOLIC PANEL: CPT

## 2025-01-31 RX ORDER — SIMVASTATIN 20 MG
20 TABLET ORAL NIGHTLY
Qty: 90 TABLET | Refills: 1 | Status: SHIPPED | OUTPATIENT
Start: 2025-01-31

## 2025-01-31 NOTE — TELEPHONE ENCOUNTER
could you please review patient's Hgb results in 's absence? Hgb today 12.4 and on 11/20/2024 14.9.  Ok to wait until  returns?

## 2025-01-31 NOTE — TELEPHONE ENCOUNTER
Refill request is for a maintenance medication and has met the criteria specified in the Ambulatory Medication Refill Standing Order for eligibility, visits, laboratory, alerts and was sent to the requested pharmacy.    Requested Prescriptions     Signed Prescriptions Disp Refills    simvastatin 20 MG Oral Tab 90 tablet 1     Sig: Take 1 tablet (20 mg total) by mouth nightly.     Authorizing Provider: MERVIN SALES     Ordering User: GIULIANO BARNETT

## 2025-01-31 NOTE — TELEPHONE ENCOUNTER
Please call lab at extension [phone].  Please ask if they can add iron and percent saturation plus a ferritin to labs they currently have.    I can place order if they wish.    (Left total knee replacement scheduled March 8 at [facility].)

## 2025-02-02 NOTE — TELEPHONE ENCOUNTER
Pt with mild anemia -- Hgb 12.4 (was 14.9 on 11/20/24).    Pt had R TKR on 12/12/24 -- I suspect this is the source of the blood loss/anemia.    Please ask pt to start taking OTC ferrous sulfate 325mg/day.    Repeat CBC in 2-3 weeks (ordered, along with iron studies)

## 2025-02-03 ENCOUNTER — TELEPHONE (OUTPATIENT)
Dept: INTERNAL MEDICINE CLINIC | Facility: CLINIC | Age: 70
End: 2025-02-03

## 2025-02-03 NOTE — TELEPHONE ENCOUNTER
Pre op paperwork for upcoming Left total knee replacement on 3/8/2025 at [place] with Dr. Unger.    Faxed to:  both [phone] and [phone]   Fax confirmations X2 received.

## 2025-02-04 NOTE — TELEPHONE ENCOUNTER
Patient contacted and relayed 's message below. Patient verbalized understanding but requested that a [employer] message be sent to him with the instructions.   MyChart sent.

## 2025-02-11 PROBLEM — Z96.651 HISTORY OF TOTAL RIGHT KNEE REPLACEMENT: Status: ACTIVE | Noted: 2025-02-11

## 2025-02-14 RX ORDER — LOSARTAN POTASSIUM AND HYDROCHLOROTHIAZIDE 12.5; 1 MG/1; MG/1
1 TABLET ORAL DAILY
Qty: 90 TABLET | Refills: 3 | Status: SHIPPED | OUTPATIENT
Start: 2025-02-14

## 2025-02-14 NOTE — TELEPHONE ENCOUNTER
Refill request is for a maintenance medication and has met the criteria specified in the Ambulatory Medication Refill Standing Order for eligibility, visits, laboratory, alerts and was sent to the requested pharmacy.    Requested Prescriptions     Pending Prescriptions Disp Refills    WARFARIN 2.5 MG Oral Tab [Pharmacy Med Name: WARFARIN SOD 2.5MG TABLETS (GREEN)] 90 tablet 3     Sig: TAKE 1 TABLET BY MOUTH DAILY     Signed Prescriptions Disp Refills    LOSARTAN POTASSIUM-HCTZ 100-12.5 MG Oral Tab 90 tablet 3     Sig: TAKE 1 TABLET BY MOUTH DAILY     Authorizing Provider: [employer]     Ordering User: ROYCE FLAHERTY     Warfarin to [facility]

## 2025-02-16 RX ORDER — WARFARIN SODIUM 2.5 MG/1
2.5 TABLET ORAL DAILY
Qty: 90 TABLET | Refills: 3 | OUTPATIENT
Start: 2025-02-16

## 2025-02-26 ENCOUNTER — LAB ENCOUNTER (OUTPATIENT)
Dept: LAB | Facility: HOSPITAL | Age: 70
End: 2025-02-26
Attending: INTERNAL MEDICINE
Payer: MEDICARE

## 2025-02-26 DIAGNOSIS — D62 ACUTE BLOOD LOSS AS CAUSE OF POSTOPERATIVE ANEMIA: ICD-10-CM

## 2025-02-26 DIAGNOSIS — I48.21 PERMANENT ATRIAL FIBRILLATION (HCC): ICD-10-CM

## 2025-02-26 LAB
BASOPHILS # BLD AUTO: 0.05 X10(3) UL (ref 0–0.2)
BASOPHILS NFR BLD AUTO: 1 %
DEPRECATED HBV CORE AB SER IA-ACNC: 65 NG/ML
DEPRECATED RDW RBC AUTO: 50.3 FL (ref 35.1–46.3)
EOSINOPHIL # BLD AUTO: 0.13 X10(3) UL (ref 0–0.7)
EOSINOPHIL NFR BLD AUTO: 2.6 %
ERYTHROCYTE [DISTWIDTH] IN BLOOD BY AUTOMATED COUNT: 15.4 % (ref 11–15)
HCT VFR BLD AUTO: 42.3 %
HGB BLD-MCNC: 13.6 G/DL
IMM GRANULOCYTES # BLD AUTO: 0.01 X10(3) UL (ref 0–1)
IMM GRANULOCYTES NFR BLD: 0.2 %
INR BLD: 2.2 (ref 0.8–1.2)
IRON SATN MFR SERPL: 19 %
IRON SERPL-MCNC: 81 UG/DL
LYMPHOCYTES # BLD AUTO: 1.85 X10(3) UL (ref 1–4)
LYMPHOCYTES NFR BLD AUTO: 37.4 %
MCH RBC QN AUTO: 29.1 PG (ref 26–34)
MCHC RBC AUTO-ENTMCNC: 32.2 G/DL (ref 31–37)
MCV RBC AUTO: 90.6 FL
MONOCYTES # BLD AUTO: 0.41 X10(3) UL (ref 0.1–1)
MONOCYTES NFR BLD AUTO: 8.3 %
NEUTROPHILS # BLD AUTO: 2.49 X10 (3) UL (ref 1.5–7.7)
NEUTROPHILS # BLD AUTO: 2.49 X10(3) UL (ref 1.5–7.7)
NEUTROPHILS NFR BLD AUTO: 50.5 %
PLATELET # BLD AUTO: 215 10(3)UL (ref 150–450)
PROTHROMBIN TIME: 25.5 SECONDS (ref 11.6–14.8)
RBC # BLD AUTO: 4.67 X10(6)UL
TOTAL IRON BINDING CAPACITY: 424 UG/DL (ref 250–425)
TRANSFERRIN SERPL-MCNC: 322 MG/DL (ref 215–365)
WBC # BLD AUTO: 4.9 X10(3) UL (ref 4–11)

## 2025-02-26 PROCEDURE — 85610 PROTHROMBIN TIME: CPT

## 2025-02-26 PROCEDURE — 83540 ASSAY OF IRON: CPT

## 2025-02-26 PROCEDURE — 85025 COMPLETE CBC W/AUTO DIFF WBC: CPT

## 2025-02-26 PROCEDURE — 36415 COLL VENOUS BLD VENIPUNCTURE: CPT

## 2025-02-26 PROCEDURE — 82728 ASSAY OF FERRITIN: CPT

## 2025-02-26 PROCEDURE — 84466 ASSAY OF TRANSFERRIN: CPT

## 2025-02-27 ENCOUNTER — TELEPHONE (OUTPATIENT)
Dept: INTERNAL MEDICINE CLINIC | Facility: CLINIC | Age: 70
End: 2025-02-27

## 2025-02-27 NOTE — TELEPHONE ENCOUNTER
Patient had labs done yesterday    Asks if he needs to continue taking iron pills    Call back # [phone]

## 2025-02-28 NOTE — TELEPHONE ENCOUNTER
Spoke with patient and relayed Dr. De Souza' message. Patient verbalized an understanding and agreement to plan.

## 2025-03-08 ENCOUNTER — LAB ENCOUNTER (OUTPATIENT)
Dept: LAB | Facility: HOSPITAL | Age: 70
End: 2025-03-08
Attending: STUDENT IN AN ORGANIZED HEALTH CARE EDUCATION/TRAINING PROGRAM
Payer: MEDICARE

## 2025-03-08 DIAGNOSIS — I48.21 PERMANENT ATRIAL FIBRILLATION (HCC): ICD-10-CM

## 2025-03-08 LAB
INR BLD: 1.73 (ref 0.8–1.2)
PROTHROMBIN TIME: 21.2 SECONDS (ref 11.6–14.8)

## 2025-03-08 PROCEDURE — 36415 COLL VENOUS BLD VENIPUNCTURE: CPT

## 2025-03-08 PROCEDURE — 85610 PROTHROMBIN TIME: CPT

## 2025-03-11 ENCOUNTER — LAB ENCOUNTER (OUTPATIENT)
Dept: LAB | Facility: HOSPITAL | Age: 70
End: 2025-03-11
Attending: STUDENT IN AN ORGANIZED HEALTH CARE EDUCATION/TRAINING PROGRAM
Payer: MEDICARE

## 2025-03-11 DIAGNOSIS — I48.21 PERMANENT ATRIAL FIBRILLATION (HCC): ICD-10-CM

## 2025-03-11 LAB
INR BLD: 2.59 (ref 0.8–1.2)
PROTHROMBIN TIME: 29 SECONDS (ref 11.6–14.8)

## 2025-03-11 PROCEDURE — 36415 COLL VENOUS BLD VENIPUNCTURE: CPT

## 2025-03-11 PROCEDURE — 85610 PROTHROMBIN TIME: CPT

## 2025-03-14 ENCOUNTER — LAB ENCOUNTER (OUTPATIENT)
Dept: LAB | Facility: HOSPITAL | Age: 70
End: 2025-03-14
Attending: STUDENT IN AN ORGANIZED HEALTH CARE EDUCATION/TRAINING PROGRAM
Payer: MEDICARE

## 2025-03-14 DIAGNOSIS — I48.21 PERMANENT ATRIAL FIBRILLATION (HCC): ICD-10-CM

## 2025-03-14 LAB
INR BLD: 1.84 (ref 0.8–1.2)
PROTHROMBIN TIME: 22.2 SECONDS (ref 11.6–14.8)

## 2025-03-14 PROCEDURE — 85610 PROTHROMBIN TIME: CPT

## 2025-03-14 PROCEDURE — 36415 COLL VENOUS BLD VENIPUNCTURE: CPT

## 2025-03-21 ENCOUNTER — LAB ENCOUNTER (OUTPATIENT)
Dept: LAB | Facility: HOSPITAL | Age: 70
End: 2025-03-21
Attending: STUDENT IN AN ORGANIZED HEALTH CARE EDUCATION/TRAINING PROGRAM
Payer: MEDICARE

## 2025-03-21 DIAGNOSIS — I48.21 PERMANENT ATRIAL FIBRILLATION (HCC): ICD-10-CM

## 2025-03-21 LAB
INR BLD: 2.31 (ref 0.8–1.2)
PROTHROMBIN TIME: 26.5 SECONDS (ref 11.6–14.8)

## 2025-03-21 PROCEDURE — 85610 PROTHROMBIN TIME: CPT

## 2025-03-21 PROCEDURE — 36415 COLL VENOUS BLD VENIPUNCTURE: CPT

## 2025-03-25 RX ORDER — GABAPENTIN 800 MG/1
800 TABLET ORAL 3 TIMES DAILY
Qty: 270 TABLET | Refills: 0 | Status: SHIPPED | OUTPATIENT
Start: 2025-03-25

## 2025-03-25 NOTE — TELEPHONE ENCOUNTER
Refill Request    Medication request: GABAPENTIN 800 MG Oral Tab.  TAKE 1 TABLET BY MOUTH THREE TIMES DAILY      LOV:6/10/2024 Yessica Cain, DO   Due back to clinic per last office note:  post injection follow up  NOV: Visit date not found      [place] 1/3/25 #270 (90 days)    Urine drug screen (if applicable): N/A  Pain contract: N/A    LOV plan (if weaning or changing medications): on 5/21/24\"Recommend that he continue gabapentin 800 mg 3 times daily with a home exercise plan. \"

## 2025-03-28 ENCOUNTER — LAB ENCOUNTER (OUTPATIENT)
Dept: LAB | Facility: HOSPITAL | Age: 70
End: 2025-03-28
Attending: STUDENT IN AN ORGANIZED HEALTH CARE EDUCATION/TRAINING PROGRAM
Payer: MEDICARE

## 2025-03-28 DIAGNOSIS — I48.21 PERMANENT ATRIAL FIBRILLATION (HCC): ICD-10-CM

## 2025-03-28 LAB
INR BLD: 2.63 (ref 0.8–1.2)
PROTHROMBIN TIME: 29.3 SECONDS (ref 11.6–14.8)

## 2025-03-28 PROCEDURE — 36415 COLL VENOUS BLD VENIPUNCTURE: CPT

## 2025-03-28 PROCEDURE — 85610 PROTHROMBIN TIME: CPT

## 2025-04-11 ENCOUNTER — LAB ENCOUNTER (OUTPATIENT)
Dept: LAB | Facility: HOSPITAL | Age: 70
End: 2025-04-11
Attending: STUDENT IN AN ORGANIZED HEALTH CARE EDUCATION/TRAINING PROGRAM
Payer: MEDICARE

## 2025-04-11 DIAGNOSIS — I48.21 PERMANENT ATRIAL FIBRILLATION (HCC): ICD-10-CM

## 2025-04-11 LAB
INR BLD: 2.17 (ref 0.8–1.2)
PROTHROMBIN TIME: 25.3 SECONDS (ref 11.6–14.8)

## 2025-04-11 PROCEDURE — 36415 COLL VENOUS BLD VENIPUNCTURE: CPT

## 2025-04-11 PROCEDURE — 85610 PROTHROMBIN TIME: CPT

## 2025-04-24 ENCOUNTER — TELEPHONE (OUTPATIENT)
Dept: INTERNAL MEDICINE CLINIC | Facility: CLINIC | Age: 70
End: 2025-04-24

## 2025-04-24 DIAGNOSIS — Z00.00 ANNUAL WELLNESS VISIT: Primary | ICD-10-CM

## 2025-04-24 DIAGNOSIS — E55.9 VITAMIN D DEFICIENCY: ICD-10-CM

## 2025-04-24 DIAGNOSIS — I10 BENIGN ESSENTIAL HTN: ICD-10-CM

## 2025-04-24 DIAGNOSIS — Z12.5 ENCOUNTER FOR PROSTATE CANCER SCREENING: ICD-10-CM

## 2025-04-24 DIAGNOSIS — E78.49 OTHER HYPERLIPIDEMIA: ICD-10-CM

## 2025-04-24 NOTE — TELEPHONE ENCOUNTER
Patient calling to request blood work prior to:    [x] physical    [] check-up      [] other    Patient uses:  [] [facility] [] [facility]       [facility] location:       Fax #:    Patient prefers to be notified once labs are placed by: [] phone call  [x] my chart message    Medicare Annual 4/30/25

## 2025-04-26 ENCOUNTER — TELEPHONE (OUTPATIENT)
Dept: INTERNAL MEDICINE CLINIC | Facility: CLINIC | Age: 70
End: 2025-04-26

## 2025-04-28 RX ORDER — CELECOXIB 200 MG/1
200 CAPSULE ORAL DAILY PRN
Qty: 90 CAPSULE | Refills: 0 | Status: SHIPPED | OUTPATIENT
Start: 2025-04-28

## 2025-04-29 ENCOUNTER — LAB ENCOUNTER (OUTPATIENT)
Dept: LAB | Facility: HOSPITAL | Age: 70
End: 2025-04-29
Attending: INTERNAL MEDICINE
Payer: MEDICARE

## 2025-04-29 DIAGNOSIS — E55.9 VITAMIN D DEFICIENCY: ICD-10-CM

## 2025-04-29 DIAGNOSIS — Z12.5 ENCOUNTER FOR PROSTATE CANCER SCREENING: ICD-10-CM

## 2025-04-29 DIAGNOSIS — Z00.00 ANNUAL WELLNESS VISIT: ICD-10-CM

## 2025-04-29 DIAGNOSIS — E78.49 OTHER HYPERLIPIDEMIA: ICD-10-CM

## 2025-04-29 DIAGNOSIS — I10 BENIGN ESSENTIAL HTN: ICD-10-CM

## 2025-04-29 LAB
ALBUMIN SERPL-MCNC: 4.4 G/DL (ref 3.2–4.8)
ALBUMIN/GLOB SERPL: 1.5 {RATIO} (ref 1–2)
ALP LIVER SERPL-CCNC: 110 U/L (ref 45–117)
ALT SERPL-CCNC: 15 U/L (ref 10–49)
ANION GAP SERPL CALC-SCNC: 6 MMOL/L (ref 0–18)
AST SERPL-CCNC: 18 U/L (ref ?–34)
BASOPHILS # BLD AUTO: 0.06 X10(3) UL (ref 0–0.2)
BASOPHILS NFR BLD AUTO: 1.3 %
BILIRUB SERPL-MCNC: 0.8 MG/DL (ref 0.2–1.1)
BUN BLD-MCNC: 24 MG/DL (ref 9–23)
BUN/CREAT SERPL: 26.4 (ref 10–20)
CALCIUM BLD-MCNC: 9.2 MG/DL (ref 8.7–10.4)
CHLORIDE SERPL-SCNC: 106 MMOL/L (ref 98–112)
CHOLEST SERPL-MCNC: 143 MG/DL (ref ?–200)
CO2 SERPL-SCNC: 27 MMOL/L (ref 21–32)
COMPLEXED PSA SERPL-MCNC: 3.12 NG/ML (ref ?–4)
CREAT BLD-MCNC: 0.91 MG/DL (ref 0.7–1.3)
DEPRECATED RDW RBC AUTO: 55.8 FL (ref 35.1–46.3)
EGFRCR SERPLBLD CKD-EPI 2021: 91 ML/MIN/1.73M2 (ref 60–?)
EOSINOPHIL # BLD AUTO: 0.16 X10(3) UL (ref 0–0.7)
EOSINOPHIL NFR BLD AUTO: 3.3 %
ERYTHROCYTE [DISTWIDTH] IN BLOOD BY AUTOMATED COUNT: 16.5 % (ref 11–15)
FASTING PATIENT LIPID ANSWER: NO
FASTING STATUS PATIENT QL REPORTED: NO
GLOBULIN PLAS-MCNC: 2.9 G/DL (ref 2–3.5)
GLUCOSE BLD-MCNC: 98 MG/DL (ref 70–99)
HCT VFR BLD AUTO: 37.9 % (ref 39–53)
HDLC SERPL-MCNC: 60 MG/DL (ref 40–59)
HGB BLD-MCNC: 12.4 G/DL (ref 13–17.5)
IMM GRANULOCYTES # BLD AUTO: 0.01 X10(3) UL (ref 0–1)
IMM GRANULOCYTES NFR BLD: 0.2 %
LDLC SERPL CALC-MCNC: 69 MG/DL (ref ?–100)
LYMPHOCYTES # BLD AUTO: 1.52 X10(3) UL (ref 1–4)
LYMPHOCYTES NFR BLD AUTO: 31.7 %
MCH RBC QN AUTO: 29.8 PG (ref 26–34)
MCHC RBC AUTO-ENTMCNC: 32.7 G/DL (ref 31–37)
MCV RBC AUTO: 91.1 FL (ref 80–100)
MONOCYTES # BLD AUTO: 0.46 X10(3) UL (ref 0.1–1)
MONOCYTES NFR BLD AUTO: 9.6 %
NEUTROPHILS # BLD AUTO: 2.58 X10 (3) UL (ref 1.5–7.7)
NEUTROPHILS # BLD AUTO: 2.58 X10(3) UL (ref 1.5–7.7)
NEUTROPHILS NFR BLD AUTO: 53.9 %
NONHDLC SERPL-MCNC: 83 MG/DL (ref ?–130)
OSMOLALITY SERPL CALC.SUM OF ELEC: 292 MOSM/KG (ref 275–295)
PLATELET # BLD AUTO: 237 10(3)UL (ref 150–450)
POTASSIUM SERPL-SCNC: 4.1 MMOL/L (ref 3.5–5.1)
PROT SERPL-MCNC: 7.3 G/DL (ref 5.7–8.2)
RBC # BLD AUTO: 4.16 X10(6)UL (ref 3.8–5.8)
SODIUM SERPL-SCNC: 139 MMOL/L (ref 136–145)
TRIGL SERPL-MCNC: 67 MG/DL (ref 30–149)
TSI SER-ACNC: 1.47 UIU/ML (ref 0.55–4.78)
VIT D+METAB SERPL-MCNC: 28.2 NG/ML (ref 30–100)
VLDLC SERPL CALC-MCNC: 10 MG/DL (ref 0–30)
WBC # BLD AUTO: 4.8 X10(3) UL (ref 4–11)

## 2025-04-29 PROCEDURE — 36415 COLL VENOUS BLD VENIPUNCTURE: CPT

## 2025-04-29 PROCEDURE — 85025 COMPLETE CBC W/AUTO DIFF WBC: CPT

## 2025-04-29 PROCEDURE — 80053 COMPREHEN METABOLIC PANEL: CPT

## 2025-04-29 PROCEDURE — 82306 VITAMIN D 25 HYDROXY: CPT

## 2025-04-29 PROCEDURE — 80061 LIPID PANEL: CPT

## 2025-04-29 PROCEDURE — 84443 ASSAY THYROID STIM HORMONE: CPT

## 2025-04-30 ENCOUNTER — OFFICE VISIT (OUTPATIENT)
Dept: INTERNAL MEDICINE CLINIC | Facility: CLINIC | Age: 70
End: 2025-04-30

## 2025-04-30 VITALS
DIASTOLIC BLOOD PRESSURE: 68 MMHG | TEMPERATURE: 98 F | OXYGEN SATURATION: 96 % | RESPIRATION RATE: 16 BRPM | WEIGHT: 245 LBS | BODY MASS INDEX: 32.47 KG/M2 | HEIGHT: 73 IN | HEART RATE: 77 BPM | SYSTOLIC BLOOD PRESSURE: 128 MMHG

## 2025-04-30 DIAGNOSIS — G47.33 OBSTRUCTIVE SLEEP APNEA SYNDROME: ICD-10-CM

## 2025-04-30 DIAGNOSIS — G60.9 IDIOPATHIC PERIPHERAL NEUROPATHY: ICD-10-CM

## 2025-04-30 DIAGNOSIS — Z98.890 H/O MITRAL VALVE REPAIR: ICD-10-CM

## 2025-04-30 DIAGNOSIS — M48.061 SPINAL STENOSIS OF LUMBAR REGION, UNSPECIFIED WHETHER NEUROGENIC CLAUDICATION PRESENT: ICD-10-CM

## 2025-04-30 DIAGNOSIS — I87.2 VENOUS (PERIPHERAL) INSUFFICIENCY: ICD-10-CM

## 2025-04-30 DIAGNOSIS — Z86.73 HISTORY OF CVA (CEREBROVASCULAR ACCIDENT): ICD-10-CM

## 2025-04-30 DIAGNOSIS — M16.11 PRIMARY OSTEOARTHRITIS OF RIGHT HIP: ICD-10-CM

## 2025-04-30 DIAGNOSIS — I10 BENIGN ESSENTIAL HTN: ICD-10-CM

## 2025-04-30 DIAGNOSIS — I48.20 CHRONIC ATRIAL FIBRILLATION (HCC): Primary | ICD-10-CM

## 2025-04-30 DIAGNOSIS — E78.49 OTHER HYPERLIPIDEMIA: ICD-10-CM

## 2025-04-30 DIAGNOSIS — D62 ACUTE BLOOD LOSS AS CAUSE OF POSTOPERATIVE ANEMIA: ICD-10-CM

## 2025-04-30 DIAGNOSIS — Z00.00 ROUTINE HEALTH MAINTENANCE: ICD-10-CM

## 2025-04-30 DIAGNOSIS — Z80.0 FAMILY HX OF COLON CANCER: ICD-10-CM

## 2025-04-30 PROBLEM — M17.0 PRIMARY OSTEOARTHRITIS OF KNEES, BILATERAL: Status: RESOLVED | Noted: 2021-12-02 | Resolved: 2025-04-30

## 2025-04-30 PROBLEM — L97.929 CHRONIC VENOUS HYPERTENSION W ULCER OF L LOW EXTREM (HCC): Status: RESOLVED | Noted: 2020-05-11 | Resolved: 2025-04-30

## 2025-04-30 PROBLEM — S91.002A UNSPECIFIED OPEN WOUND, LEFT ANKLE, INITIAL ENCOUNTER: Status: RESOLVED | Noted: 2018-12-04 | Resolved: 2025-04-30

## 2025-04-30 PROBLEM — I87.312 CHRONIC VENOUS HYPERTENSION W ULCER OF L LOW EXTREM (HCC): Status: RESOLVED | Noted: 2020-05-11 | Resolved: 2025-04-30

## 2025-04-30 PROBLEM — M17.0 BILATERAL PRIMARY OSTEOARTHRITIS OF KNEE: Status: RESOLVED | Noted: 2020-10-05 | Resolved: 2025-04-30

## 2025-04-30 PROBLEM — M47.816 LUMBAR FACET ARTHROPATHY: Status: RESOLVED | Noted: 2023-10-17 | Resolved: 2025-04-30

## 2025-04-30 PROBLEM — I63.9 CEREBROVASCULAR ACCIDENT (CVA) (HCC): Status: RESOLVED | Noted: 2020-07-02 | Resolved: 2025-04-30

## 2025-04-30 RX ORDER — BETAMETHASONE VALERATE 1.2 MG/G
CREAM TOPICAL 2 TIMES DAILY
COMMUNITY

## 2025-04-30 RX ORDER — OXYCODONE HYDROCHLORIDE 5 MG/1
TABLET ORAL
COMMUNITY
Start: 2025-03-26 | End: 2025-04-30 | Stop reason: ALTCHOICE

## 2025-04-30 RX ORDER — GENTAMICIN SULFATE 1 MG/G
OINTMENT TOPICAL 2 TIMES DAILY
COMMUNITY

## 2025-04-30 RX ORDER — DOCUSATE SODIUM 100 MG/1
100 CAPSULE, LIQUID FILLED ORAL 2 TIMES DAILY PRN
COMMUNITY
Start: 2025-02-25

## 2025-04-30 RX ORDER — ONDANSETRON 4 MG/1
4 TABLET, ORALLY DISINTEGRATING ORAL EVERY 6 HOURS PRN
COMMUNITY
Start: 2025-02-25 | End: 2025-04-30 | Stop reason: ALTCHOICE

## 2025-04-30 RX ORDER — DIAZEPAM 5 MG/1
5 TABLET ORAL NIGHTLY PRN
COMMUNITY
Start: 2024-12-05 | End: 2025-04-30 | Stop reason: ALTCHOICE

## 2025-04-30 RX ORDER — SIMVASTATIN 20 MG
20 TABLET ORAL NIGHTLY
Qty: 90 TABLET | Refills: 3 | Status: SHIPPED | OUTPATIENT
Start: 2025-04-30

## 2025-04-30 RX ORDER — FERROUS SULFATE 325(65) MG
325 TABLET ORAL
COMMUNITY
Start: 2025-02-11

## 2025-04-30 NOTE — PROGRESS NOTES
Russ Kirkland is a 69 year old male  Chief Complaint   Patient presents with    Physical     MEDICARE ANNUAL  Reports dizziness when he bends down, worse post knee replacement, less frequent now     HPI:     Russ Kirkland is a 69 year old male who presents for a [facility] physical exam and follow up of chronic dx.    Still doing PT for the L TKR  Notes occ dizzy spell (5 seconds) after bending over (since his TKR); seems to be getting better      Wt Readings from Last 6 Encounters:   04/30/25 245 lb (111.1 kg)   01/28/25 245 lb (111.1 kg)   01/03/25 249 lb (112.9 kg)   12/06/24 248 lb (112.5 kg)   11/14/24 252 lb (114.3 kg)   11/01/24 246 lb (111.6 kg)     Body mass index is 32.32 kg/m².       Current Outpatient Medications   Medication Sig Dispense Refill    docusate sodium 100 MG Oral Cap Take 1 capsule (100 mg total) by mouth 2 (two) times daily as needed.      Ferrous Sulfate 325 (65 Fe) MG Oral Tab Take 1 tablet (325 mg total) by mouth daily with breakfast.      celecoxib 200 MG Oral Cap TAKE ONE CAPSULE BY MOUTH DAILY AS NEEDED GENERIC EQUIVALENT FOR CELEBREX 90 capsule 0    GABAPENTIN 800 MG Oral Tab TAKE 1 TABLET BY MOUTH 3 TIMES DAILY 270 tablet 0    LOSARTAN POTASSIUM-HCTZ 100-12.5 MG Oral Tab TAKE 1 TABLET BY MOUTH DAILY 90 tablet 3    simvastatin 20 MG Oral Tab Take 1 tablet (20 mg total) by mouth nightly. 90 tablet 1    metoprolol succinate ER 50 MG Oral Tablet 24 Hr Take 1 tablet (50 mg total) by mouth daily.      cholecalciferol 50 MCG (2000 UT) Oral Cap cholecalciferol (vitamin D3) 50 mcg (2,000 unit) capsule, [RxNorm: 501089]      Sildenafil Citrate 100 MG Oral Tab Take 1 tablet (100 mg total) by mouth daily as needed for Erectile Dysfunction. Take ~ one hour prior to sexual activity on an empty stomach 30 tablet 11    aspirin 81 MG Oral Tab EC Take 1 tablet (81 mg total) by mouth daily.      WARFARIN 2.5 MG Oral Tab TAKE 1 TABLET BY MOUTH DAILY 90 tablet 3    Glucosamine-Chondroit-Vit C-Mn  (GLUCOSAMINE-CHONDROITIN) Oral Cap Take 1 tablet by mouth daily.      omega-3 fatty acids (FISH OIL) 1000 MG Oral Cap Take 1,000 mg by mouth daily.      betamethasone 0.1 % External Cream Apply topically 2 (two) times daily. (Patient not taking: Reported on 4/30/2025)      gentamicin 0.1 % External Ointment Apply topically in the morning and before bedtime. (Patient not taking: Reported on 4/30/2025)        Past Medical History:    Acute perforated appendicitis    Acute, but ill-defined, cerebrovascular disease    Arrhythmia    afib    Atrial fibrillation (HCC)    ablation (2004); CV (May 2009)    Cataract    Deep vein thrombosis (HCC)    can not remember which leg    Hearing impairment    bilateral hearing aids    High blood pressure    High cholesterol    History of blood transfusion    no reaction    History of vein stripping    Osteoarthrosis, unspecified whether generalized or localized, unspecified site    Sleep apnea    CPAP device    Stroke (HCC)    2019    UGI bleed    Unspecified essential hypertension    Venous insufficiency    RT greater saphenous vein ablation 2007      Past Surgical History:   Procedure Laterality Date    Appendectomy  12/04/2019    Cardiac pacemaker placement  11/30/2022    Cataract  11/02/2022    right eye    Colonoscopy  10/2011    Colonoscopy N/A 11/16/2016    Procedure: COLONOSCOPY;  Surgeon: Edis Roberts MD;  Location: [facility] ENDOSCOPY    Colonoscopy  03/01/2022    Diverticulosis, Hemorrhoids          Repeat 2027    Colonoscopy N/A 03/01/2022    Procedure: COLONOSCOPY,;  Surgeon: Nicola Juarez MD;  [facility]    Hernia surgery      Hip replacement surgery Left 05/2008    Hip replacement surgery Right 06/2017    Moles Left 2011    removed from L upper quadrant. Biopsy (pre-cancerous? -- Patient unsure if cancerous)    Total hip replacement Bilateral     Total knee replacement Left 03/12/2025    Valve repair  2002    mitral valve repair      Family History    Problem Relation Age of Onset    Colon Cancer Father     Arthritis Other     Hypertension Other       Social History:  Social History     Socioeconomic History    Marital status:    Tobacco Use    Smoking status: Never    Smokeless tobacco: Never   Vaping Use    Vaping status: Never Used   Substance and Sexual Activity    Alcohol use: Yes     Alcohol/week: 15.0 standard drinks of alcohol     Types: 15 Glasses of wine per week     Comment: 2 glasses of wine/day    Drug use: No   Other Topics Concern    Caffeine Concern No    Pt has a pacemaker No    Pt has a defibrillator No    Reaction to local anesthetic No   Social History Narrative    The patient does not use an assistive device..      The patient does live in a home with stairs.     Social Drivers of Health      Received from [facility]    Housing Stability           General Health     In the past six months, have you lost more than 10 pounds without trying?: 2 - No    Has your appetite been poor?: No    Type of Diet: Balanced    How does the patient maintain a good energy level?: Appropriate Exercise    How would you describe your daily physical activity?: Light    How would you describe your current health state?: Fair    How do you maintain positive mental well-being?: Social Interaction, Visiting Friends, Visiting Family         Have you had any immunizations at another office such as Influenza, Hepatitis B, Tetanus, or Pneumococcal?: No     Functional Ability     Bathing or Showering: Able without help    Toileting: Able without help    Dressing: Need some help    Eating: Able without help    Driving: Able without help    Preparing your meals: Able without help    Managing money/bills: Able without help    Taking medications as prescribed: Able without help    Are you able to afford your medications?: Yes    Hearing Problems?: Yes     Functional Status     Hearing Problems?: Yes    Vision Problems? : Yes    Difficulty walking?:  Yes    Difficulty dressing or bathing?: No    Problems with daily activities? : No    Memory Problems?: No      Fall/Risk Assessment                                                              Depression Screening (PHQ-2/PHQ-9): Over the LAST 2 WEEKS                      Advance Directives     Do you have a healthcare power of ?: Yes    Do you have a living will?: Yes     Hearing Assessment (Required for AWV/SWV)      Hearing Screening    Time taken: 4/30/2025 11:08 AM  Screening Method: Whisper Test  Whisper Test Result: Pass         Visual Acuity     Right Eye Visual Acuity: Corrected Left Eye Visual Acuity: Corrected   Right Eye Chart Acuity: 20/20 Left Eye Chart Acuity: 20/20     Cognitive Assessment     What day of the week is this?: Correct    What month is it?: Correct    What year is it?: Correct    Recall \"Ball\": Correct    Recall \"Flag\": Correct    Recall \"Tree\": Correct        Russ Kirkland's SCREENING SCHEDULE   Tests on this list are recommended by your physician but may not be covered, or covered at this frequency, by your insurer. Please check with your insurance carrier before scheduling to verify coverage.    PREVENTATIVE SERVICES  INDICATIONS AND SCHEDULE Internal Lab or Procedure External Lab or Procedure   Diabetes Screening      HbgA1C   Annually Glycohemoglobin (HgA1c) (%)   Date Value   04/16/2018 5.6         No data to display                Fasting Blood Sugar (FSB) Annually Glucose (mg/dL)   Date Value   04/29/2025 98       Cardiovascular Disease Screening     LDL Annually LDL Cholesterol (mg/dL)   Date Value   04/29/2025 69        EKG One Time y    Colorectal Cancer Screening      Colonoscopy Screen every 10 years Health Maintenance   Topic Date Due    Colorectal Cancer Screening  03/01/2027    Update Health Maintenance if applicable    Flex Sigmoidoscopy Screen every 5 years No results found. However, due to the size of the patient record, not all encounters were searched.  Please check Results Review for a complete set of results.      No data to display                 Fecal Occult Blood Annually No results found for: \"FOB\", \"OCCULTSTOOL\"      No data to display                Glaucoma Screening      Ophthalmology Visit Annually y    Prostate Cancer Screening      PSA  Annually Health Maintenance   Topic Date Due    PSA  04/29/2027     Update Health Maintenance if applicable   Immunizations      Influenza No results found. However, due to the size of the patient record, not all encounters were searched. Please check Results Review for a complete set of results. Update Immunization Activity if applicable    Pneumococcal Orders placed or performed in visit on 03/31/20    PNEUMOCOCCAL IMM, 23     *Note: Due to a large number of results and/or encounters for the requested time period, some results have not been displayed. A complete set of results can be found in Results Review.    Update Immunization Activity if applicable    Hepatitis B No results found. However, due to the size of the patient record, not all encounters were searched. Please check Results Review for a complete set of results. Update Immunization Activity if applicable    Tetanus Orders placed or performed in visit on 02/28/17    TETANUS, DIPHTHERIA TOXOIDS AND ACELLULAR PERTUSIS VACCINE (TDAP), >7 YEARS, IM USE     *Note: Due to a large number of results and/or encounters for the requested time period, some results have not been displayed. A complete set of results can be found in Results Review.    Update Immunization Activity if applicable    Zoster (Not covered by Medicare Part B) No results found. However, due to the size of the patient record, not all encounters were searched. Please check Results Review for a complete set of results. Update Immunization Activity if applicable     SPECIFIC DISEASE MONITORING Internal Lab or Procedure External Lab or Procedure   Annual Monitoring of Persistent     Medications  (ACE/ARB, digoxin, diuretics)    Potassium  Annually Potassium (mmol/L)   Date Value   04/29/2025 4.1     POTASSIUM (P) (mmol/L)   Date Value   05/02/2016 4.5         No data to display                Creatinine  Annually Creatinine (mg/dL)   Date Value   04/29/2025 0.91         No data to display                Digoxin Serum Conc  Annually No results found for: \"DIGOXIN\"      No data to display                Diabetes      HgbA1C  Annually Glycohemoglobin (HgA1c) (%)   Date Value   04/16/2018 5.6         No data to display                Creat/alb ratio  Annually      LDL  Annually LDL Cholesterol (mg/dL)   Date Value   04/29/2025 69         No data to display                 Dilated Eye exam  Annually      No data to display                   No data to display                COPD      Spirometry Testing Annually No results found. However, due to the size of the patient record, not all encounters were searched. Please check Results Review for a complete set of results.      No data to display                        REVIEW OF SYSTEMS:   GENERAL: feels well otherwise  EYES:denies blurred vision or double vision  HEENT: denies nasal congestion, sinus pain or ST  LUNGS: denies shortness of breath or cough  CARDIOVASCULAR: denies chest pain or pressure or palpitations  GI: denies abdominal pain, N/V, diarrhea, constipation, hematochezia or melena  : denies nocturia or changes in stream  NEURO: denies headaches or dizziness      EXAM:   /68   Pulse 77   Temp 98.4 °F (36.9 °C) (Oral)   Resp 16   Ht 6' 1\" (1.854 m)   Wt 245 lb (111.1 kg)   SpO2 96%   BMI 32.32 kg/m²   GENERAL: well developed, well nourished, in no apparent distress  HEENT: normal oropharynx, normal TM's  EYES: PERRLA, EOMI, conjunctivae pink  NECK: supple, no LAD, no bruits  LUNGS: clear to auscultation  CARDIO: RRR, normal S1S2, no gallops or murmurs  GI: soft, NT, ND, NABS, no HSM  EXTREMITIES: left leg wrapped; no edema  RLE's      ASSESSMENT AND PLAN:     Atrial fibrillation  PPM  -on coumadin; followed by [facility] coumadin clinic  PPM placed (Dr. Alfaro) in 11/2022 (sinus pauses on Holter)    Nonobstructive CAD  -CTA coronaries in 6/2024 -- calcified plaque prox LAD and ostial LCx with <50% stenosis (normal FFRct analysis with no evidence for hemodynamically significant lesions)   -on simvastatin 20mg/day, ASA 81mg/day  -lipids at goal; LDL 69    Hx of CVA with RUE weakness and facial droop 12/2019  -likely embolic (off coumadin for appendectomy)  -DAVEY revealed a very large amount of spontaneous echo contrast in left atrium, which conferred high risk for clot.   -MRI 12/1/19--acute infarct in L superior parietal and L posterior medial occipital lobe.   -s/p rehab; full recovery     Hypercholesterolemia  -on fish oil 1000mg/day  -simvastatin 20mg/day  -LDL at goal    Hypertension  On losartan/hct 100/12.5mg/d and metoprolol ER 50mg/day  (Dr. Alfaro stopped amlodipine in 7/2024)    Vitamin D deficiency  Level low (28)  Rec vitamin D 1000u/day     Varicose veins  Wears compression stocking daily.  -hx of phlebectomy by vasc surgeon Dr. Eleonora Capone      TEJINDER  -Uses nasal CPAP     Bilateral calf weakness and R calf pain (preceded the CVA), due to peripheral neuropathy and lumbar stenosis  -not related to statin (trial off simvastatin unhelpful)  -CK, aldolase, ESR, CRP in 4/2019 normal    -EMG 2/2020 showed a severe sensorimotor peripheral neuropathy in both legs -- likely contributing to his calf weakness.    EMG also showed a possible right lumbar radiculopathy (from L5 or S1)  - MRI L-spine 3/2020 -- mod-severe foraminal stenosis at L4-5.    -seeing Dr. Yessica Cain ([facility])  -has had LESI's  -on gabapentin 800mg TID    OA multiple joints, bilateral rotator cuff tears  -s/p R TKR on 12/12/24 (Dr. Unger)  -s/p L TKR on 3/8/25 at [facility] (Dr. Unger)      Erectile dysfunction  -on Viagra 100mg    Family hx of colon cancer (dad)  Colonoscopy  11/16/16 (Dr. Roberts) -- no polyps.    Colonoscopy 3/1/22 (Dr. Nicola Juarez) -- no polyps.    Repeat in another 5 years (3/2027)    Routine health maintenance  Had Shingrix shingles vaccine.    Tdap 2/28/17  PPSV23 done 3/31/20; PCV 20 9/2022  PSA normal/stable 3.12 (4/29/25)    Anemia  -post-op s/p TKR last month  -Hgb 12.4  -unable to tolerate oral iron due to constipation; encouraged increase in dietary iron      RTC 1 yr/prn    Christie De Souza MD, 04/30/25, 11:05 AM

## 2025-05-15 ENCOUNTER — LAB ENCOUNTER (OUTPATIENT)
Dept: LAB | Facility: HOSPITAL | Age: 70
End: 2025-05-15
Attending: STUDENT IN AN ORGANIZED HEALTH CARE EDUCATION/TRAINING PROGRAM
Payer: MEDICARE

## 2025-05-15 DIAGNOSIS — I48.21 PERMANENT ATRIAL FIBRILLATION (HCC): ICD-10-CM

## 2025-05-15 LAB
INR BLD: 2.61 (ref 0.8–1.2)
PROTHROMBIN TIME: 29.2 SECONDS (ref 11.6–14.8)

## 2025-05-15 PROCEDURE — 36415 COLL VENOUS BLD VENIPUNCTURE: CPT

## 2025-05-15 PROCEDURE — 85610 PROTHROMBIN TIME: CPT

## 2025-06-05 ENCOUNTER — OFFICE VISIT (OUTPATIENT)
Dept: WOUND CARE | Facility: HOSPITAL | Age: 70
End: 2025-06-05
Attending: FAMILY MEDICINE
Payer: MEDICARE

## 2025-06-05 VITALS
TEMPERATURE: 98 F | WEIGHT: 245 LBS | DIASTOLIC BLOOD PRESSURE: 78 MMHG | BODY MASS INDEX: 34.3 KG/M2 | SYSTOLIC BLOOD PRESSURE: 132 MMHG | RESPIRATION RATE: 14 BRPM | HEIGHT: 71 IN | HEART RATE: 71 BPM

## 2025-06-05 DIAGNOSIS — I87.312 CHRONIC VENOUS HYPERTENSION (IDIOPATHIC) WITH ULCER OF LEFT LOWER EXTREMITY (HCC): Primary | ICD-10-CM

## 2025-06-05 DIAGNOSIS — L97.929 CHRONIC VENOUS HYPERTENSION (IDIOPATHIC) WITH ULCER OF LEFT LOWER EXTREMITY (HCC): Primary | ICD-10-CM

## 2025-06-05 DIAGNOSIS — L97.312 VARICOSE VEINS OF RIGHT LOWER EXTREMITY WITH INFLAMMATION, WITH ULCER OF ANKLE WITH FAT LAYER EXPOSED (HCC): ICD-10-CM

## 2025-06-05 DIAGNOSIS — I83.213 VARICOSE VEINS OF RIGHT LOWER EXTREMITY WITH INFLAMMATION, WITH ULCER OF ANKLE WITH FAT LAYER EXPOSED (HCC): ICD-10-CM

## 2025-06-05 PROCEDURE — 29581 APPL MULTLAYER CMPRN SYS LEG: CPT

## 2025-06-05 NOTE — PROGRESS NOTES
Chief Complaint   Patient presents with    Wound Care     Arrives for initial visit. Has been going to the [facility]. States he had a left knee replacement recently and his left leg had swelled up which he believes had caused the wound. States he has had the wound for about 2 weeks. Normally wears compression socks but has been holding off wearing compression sock on the left side.       HPI:     69-year-old new patient here for evaluation of wound on the left medial ankle region.  I had seen him previously at [facility] for chronic venous insufficiency wounds on the medial left ankle in the past.  He had tolerated Comprilan in the past without any difficulty and the last time I had seen him and healed his wound was in January 2025.  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  He thinks he had increased swelling in the leg and that led to an open wound.    Lab Results   Component Value Date    BUN 24 (H) 04/29/2025    CREATSERUM 0.91 04/29/2025    ALB 4.4 04/29/2025    TP 7.3 04/29/2025    A1C 5.6 04/16/2018       Medications - Current[1]    Allergies[2]         REVIEW OF SYSTEMS:     Review of Systems (ROS)  This information was obtained from the patient, chart    A comprehensive 10 point review of systems was completed.  Pertinent positives and negatives noted in the the HPI.     Past medical, surgical, family and social history updated where appropriate.    PHYSICAL EXAM:   /78   Pulse 71   Temp 98.3 °F (36.8 °C)   Resp 14   Ht 71\"   Wt 245 lb (111.1 kg)   BMI 34.17 kg/m²    Estimated body mass index is 34.17 kg/m² as calculated from the following:    Height as of this encounter: 71\".    Weight as of this encounter: 245 lb (111.1 kg).   Vital signs reviewed.Appears stated age, well groomed.        Calf  Point of Measurement - Left Calf: 38  Point of Measurement - Right  Calf: 38  Left Calf from:: Heel  Calf Left cm:: 46.2  Right Calf from:: Heel  Right Calf cm:: 40.2    Ankle  Point of Measurement - Left Ankle: 12  Point of Measurement - Right Ankle: 12  Left Ankle from:: Heel  Left Ankle cm:: 25.8     Right Ankle from:: Heel  Right Ankle cm:: 24       Foot           Left Heel to Knee: 47           Right Heel to Knee: 47       Wound 06/05/25 #1 Ankle Left;Medial (Active)   Date First Assessed/Time First Assessed: 06/05/25 1547    Wound Number (Wound Clinic Only): #1  Primary Wound Type: Edema  Location: Ankle  Wound Location Orientation: Left;Medial      Assessments 6/5/2025  3:59 PM   Wound Image     Drainage Amount Small   Drainage Description Serosanguineous   Treatments Compression (comprilian 8cm, 10cm, 12cm, cellona (x2), coban)   Wound Length (cm) 2.1 cm (slight angle)   Wound Width (cm) 1.8 cm   Wound Surface Area (cm^2) 2.97 cm^2   Wound Depth (cm) 0.1 cm   Wound Volume (cm^3) 0.198 cm^3   Margins Well-defined edges   Non-staged Wound Description Full thickness   Mary-wound Assessment Dry;Edema;Pink   Wound Granulation Tissue Pink;Firm   Wound Bed Granulation (%) 25 %   Wound Bed Slough (%) 75 %   Wound Odor None   Tunneling? No   Undermining? No   Sinus Tracts? No       No associated orders.       Compression Wrap 06/05/25 Ankle Anterior;Left (Active)   Placement Date/Time: 06/05/25 1650   Location: Ankle  Wound Location Orientation: Anterior;Left      Assessments 6/5/2025  3:59 PM   Response to Treatment Well tolerated   Compression Layers Multilayer   Compression Product Type Comprilan 8cm;Comprilan 10cm;Comprilan 12cm;Stockinette 4in;Coban (comprilian 8cm, 10cm, 12cm, cellona (x2), coban)   Dressing Applied Yes (hydrofera transfer, kerramax, conforming gauze, tape)   Compression Wrap Location Toes to Knee   Compression Wrap Status Clean;Dry;Intact       No associated orders.              ASSESSMENT AND PLAN:      1. Chronic venous hypertension (idiopathic) with ulcer  of left lower extremity (HCC)    2. 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  He tolerated this in the past but we did go on compression wrap precautions and written instructions given.  Importance of using compression discussed in detail.  Patient has had venous disease treatment in the past.    Risks, benefits, and alternatives of current treatment plan discussed in detail.  Questions and concerns addressed. Red flags to RTC or ED reviewed.  Patient (or parent) agrees to plan.      Return in about 1 week (around 2025).    Also refer to patient instructions.     I spent a total of 60 minutes with this patient's care.  This time included preparing to see the patient (eg, review notes and recent diagnostics), seeing the patient, performing a medically appropriate examination and/or evaluation, counseling and educating the patient, and documenting in the record.          Valentin Correa M.D.   [facility]   2025    Patient Instructions       PATIENT INSTRUCTIONS      FOR Russ Diallo Isael ,  1955    DATE OF SERVICE: 2025    Hydrofera Blue transfer  Kerramax  Comprilan 3 layer compression wrap to left lower extremity    Follow up with Dr. Correa 1 WEEK      Managing your edema:    Avoid prolonged standing in one place. It is better to have your calf muscles moving to pump fluid out of the legs.  Elevate leg(s) above the level of the heart when sitting or as much as possible.  Take you diuretics as directed by your physician. Do not skip doses or change doses unless instructed to do so by your physician.  Decrease salt intake, follow recommended 2 grams daily.  Do not get leg(s) with compression wrap wet. If wraps are too tight as indicated by pain, numbness/tingling or discoloration of toes  remove wrap completely and call the wound center @ [phone].       The treatment plan has been discussed at length between you and your provider. Follow all instructions carefully, it is very important. If you do not follow all instructions you are at risk of your wound not healing, infection, possible loss of limb and even loss of life.    COMPRESSION WRAP PATIENT CARE INSTRUCTIONS  DO NOT get compression wrap wet. When showering, use a shower boot.   Please contact wound clinic and if not able to reach, then go to emergency room if ANY of the following occur:   Tingling or numbness in the foot or toes on leg with compression wrap.  Severe pain that cannot be relieved with elevation and any medication instructed per your doctor.  A fever of 100.4°F (38°C) or higher.  Swelling, coldness, or blue-gray discoloration of the toes.  If the compression wrap feels too tight or too loose.  If the compression wrap is damaged or has rough edges that hurt.  If the compression wrap gets wet, you must cut wrap off.   Drainage from compression wrap that smells different than usual.                  MISCELLANEOUS INSTRUCTIONS  Supplement with a daily multivitamin   Low salt diet  Intense blood sugar control - Goal Blood sugar below 180 at all times recommended.  Increase protein intake / consider protein supplements - see below  Elevate extremities at all times when sitting / laying down.  No tobacco use     DIETARY MODIFICATIONS TO HELP WITH WOUND HEALING:          Please follow any restrictions to diet given by your other doctors     Protein: meats, beans, eggs, milk and yogurt particularly Greek yogurt), tofu, soy nuts, soy protein products     Vitamin C: citrus fruits and juices, strawberries, tomatoes, tomato juice, peppers, baked potatoes, spinach, broccoli, cauliflower, [place] sprouts, cabbage     Vitamin A: dark greens, leafy vegetables, orange or yellow vegetables, cantaloupe, fortified dairy products, liver,  fortified cereals     Zinc: fortified cereals, red meats, seafood     Consider Azeem by [employer] (These are essential branch chain amino acids that help with tissue building and wound healing) and take 2 packets/day. You can order online at [employer] or [employer]     ADDITIONAL REMINDERS:     The treatment plan has been discussed at length with you and your provider. Follow all instructions carefully, it is very important. If you do not follow all instructions, you are at risk of your wound not healing, infection, possible loss of limb and even loss of life.  Please call the clinic at [phone] during regular business hours ( 7:30 AM - 5:30 PM) if you notice increased redness, warmth, pain or pus like drainage or start running a fever greater than 100.3.    For after hour emergencies, please call your primary physician or go to the nearest emergency room.  If your blood pressure is elevated, follow up with your primary care doctor and/or cardiologist as soon as possible.     FOR LEG SWELLING,  LOWER EXTREMITY WOUNDS AND IF USING COMPRESSION:   Managing your edema:    Avoid prolonged standing in one place. It is better to have your calf muscles moving to pump fluid out of the legs.  Elevate leg(s) above the level of the heart when sitting or as much as possible.  Take you diuretics as directed by your physician. Do not skip doses or change doses unless instructed to do so by your physician.  Decrease salt intake, follow recommended 2 grams daily.  Do not get leg(s) with compression wrap wet. If wraps are too tight as indicated by pain, numbness/tingling or discoloration of toes remove wrap completely and call the wound center @ [phone].       The treatment plan has been discussed at length between you and your provider. Follow all instructions carefully, it is very important. If you do not follow all instructions you are at risk of your wound not healing, infection, possible loss of limb and even loss of  life.    COMPRESSION WRAP PATIENT CARE INSTRUCTIONS  DO NOT get compression wrap wet. When showering, use a shower boot.   Please contact wound clinic and if not able to reach, then go to emergency room if ANY of the following occur:   Tingling or numbness in the foot or toes on leg with compression wrap.  Severe pain that cannot be relieved with elevation and any medication instructed per your doctor.  A fever of 100.4°F (38°C) or higher.  Swelling, coldness, or blue-gray discoloration of the toes.  If the compression wrap feels too tight or too loose.  If the compression wrap is damaged or has rough edges that hurt.  If the compression wrap gets wet, you must cut wrap off.   Drainage from compression wrap that smells different than usual.                        [1]   Current Outpatient Medications:     Ferrous Sulfate 325 (65 Fe) MG Oral Tab, Take 1 tablet (325 mg total) by mouth daily with breakfast., Disp: , Rfl:     simvastatin 20 MG Oral Tab, Take 1 tablet (20 mg total) by mouth nightly., Disp: 90 tablet, Rfl: 3    GABAPENTIN 800 MG Oral Tab, TAKE 1 TABLET BY MOUTH 3 TIMES DAILY, Disp: 270 tablet, Rfl: 0    LOSARTAN POTASSIUM-HCTZ 100-12.5 MG Oral Tab, TAKE 1 TABLET BY MOUTH DAILY, Disp: 90 tablet, Rfl: 3    metoprolol succinate ER 50 MG Oral Tablet 24 Hr, Take 1 tablet (50 mg total) by mouth daily., Disp: , Rfl:     cholecalciferol 50 MCG (2000 UT) Oral Cap, cholecalciferol (vitamin D3) 50 mcg (2,000 unit) capsule, [RxNorm: 732446], Disp: , Rfl:     Sildenafil Citrate 100 MG Oral Tab, Take 1 tablet (100 mg total) by mouth daily as needed for Erectile Dysfunction. Take ~ one hour prior to sexual activity on an empty stomach, Disp: 30 tablet, Rfl: 11    aspirin 81 MG Oral Tab EC, Take 1 tablet (81 mg total) by mouth daily., Disp: , Rfl:     WARFARIN 2.5 MG Oral Tab, TAKE 1 TABLET BY MOUTH DAILY, Disp: 90 tablet, Rfl: 3    omega-3 fatty acids (FISH OIL) 1000 MG Oral Cap, Take 1,000 mg by mouth daily., Disp: ,  Rfl:     betamethasone 0.1 % External Cream, Apply topically 2 (two) times daily. (Patient not taking: Reported on 6/5/2025), Disp: , Rfl:     docusate sodium 100 MG Oral Cap, Take 1 capsule (100 mg total) by mouth 2 (two) times daily as needed. (Patient not taking: Reported on 6/5/2025), Disp: , Rfl:     gentamicin 0.1 % External Ointment, Apply topically in the morning and before bedtime. (Patient not taking: Reported on 6/5/2025), Disp: , Rfl:     celecoxib 200 MG Oral Cap, TAKE ONE CAPSULE BY MOUTH DAILY AS NEEDED GENERIC EQUIVALENT FOR CELEBREX, Disp: 90 capsule, Rfl: 0    Glucosamine-Chondroit-Vit C-Mn (GLUCOSAMINE-CHONDROITIN) Oral Cap, Take 1 tablet by mouth daily., Disp: , Rfl:   [2]   Allergies  Allergen Reactions    Adhesive Tape RASH     Can use paper tape.

## 2025-06-05 NOTE — PROGRESS NOTES
Weekly Wound Education Note    Teaching Provided To: Patient  Training Topics: Cleasing and general instructions, Discharge instructions, Dressing  Training Method: Demonstration, Explain/Verbal  Training Response: Reinforcement needed        Notes: Patient here for inital consult to left medial ankle wound.  Alice states he has been going to the [facility] clinic. States he had a left knee replacement recently and his left leg had swelled up which he believes had caused the wound. States he has had the wound for about 2 weeks. Normally wears compression socks but has been holding off wearing compression sock on the left side. Provider recommending hydrofera transfer, kerramax, conforming gauze, tape. Applied comprilian 8cm, 10cm, 12cm, cellona (x2), coban. Patient to follow up with provider in 1 week. Pt states he orders his own compression through the company religiously- denies need for staff to order. Pt has also been placed in comprilain wraps in the past.

## 2025-06-05 NOTE — PATIENT INSTRUCTIONS
PATIENT INSTRUCTIONS      FOR Russ Kirkland ,  1955    DATE OF SERVICE: 2025    Hydrofera Blue transfer  Kerramisidro  Anupamilan 3 layer compression wrap to left lower extremity    Follow up with Dr. Correa 1 WEEK      Managing your edema:    Avoid prolonged standing in one place. It is better to have your calf muscles moving to pump fluid out of the legs.  Elevate leg(s) above the level of the heart when sitting or as much as possible.  Take you diuretics as directed by your physician. Do not skip doses or change doses unless instructed to do so by your physician.  Decrease salt intake, follow recommended 2 grams daily.  Do not get leg(s) with compression wrap wet. If wraps are too tight as indicated by pain, numbness/tingling or discoloration of toes remove wrap completely and call the wound center @ [phone].       The treatment plan has been discussed at length between you and your provider. Follow all instructions carefully, it is very important. If you do not follow all instructions you are at risk of your wound not healing, infection, possible loss of limb and even loss of life.    COMPRESSION WRAP PATIENT CARE INSTRUCTIONS  DO NOT get compression wrap wet. When showering, use a shower boot.   Please contact wound clinic and if not able to reach, then go to emergency room if ANY of the following occur:   Tingling or numbness in the foot or toes on leg with compression wrap.  Severe pain that cannot be relieved with elevation and any medication instructed per your doctor.  A fever of 100.4°F (38°C) or higher.  Swelling, coldness, or blue-gray discoloration of the toes.  If the compression wrap feels too tight or too loose.  If the compression wrap is damaged or has rough edges that hurt.  If the compression wrap gets wet, you must cut wrap off.   Drainage from compression wrap that smells different than usual.

## 2025-06-09 ENCOUNTER — APPOINTMENT (OUTPATIENT)
Dept: WOUND CARE | Facility: HOSPITAL | Age: 70
End: 2025-06-09
Attending: FAMILY MEDICINE
Payer: MEDICARE

## 2025-06-12 ENCOUNTER — OFFICE VISIT (OUTPATIENT)
Dept: WOUND CARE | Facility: HOSPITAL | Age: 70
End: 2025-06-12
Attending: FAMILY MEDICINE
Payer: MEDICARE

## 2025-06-12 VITALS
HEART RATE: 70 BPM | DIASTOLIC BLOOD PRESSURE: 84 MMHG | RESPIRATION RATE: 16 BRPM | SYSTOLIC BLOOD PRESSURE: 141 MMHG | TEMPERATURE: 99 F

## 2025-06-12 DIAGNOSIS — L97.312 VARICOSE VEINS OF RIGHT LOWER EXTREMITY WITH INFLAMMATION, WITH ULCER OF ANKLE WITH FAT LAYER EXPOSED (HCC): ICD-10-CM

## 2025-06-12 DIAGNOSIS — I87.312 CHRONIC VENOUS HYPERTENSION (IDIOPATHIC) WITH ULCER OF LEFT LOWER EXTREMITY (HCC): Primary | ICD-10-CM

## 2025-06-12 DIAGNOSIS — I83.213 VARICOSE VEINS OF RIGHT LOWER EXTREMITY WITH INFLAMMATION, WITH ULCER OF ANKLE WITH FAT LAYER EXPOSED (HCC): ICD-10-CM

## 2025-06-12 DIAGNOSIS — I10 BENIGN ESSENTIAL HTN: ICD-10-CM

## 2025-06-12 DIAGNOSIS — L97.929 CHRONIC VENOUS HYPERTENSION (IDIOPATHIC) WITH ULCER OF LEFT LOWER EXTREMITY (HCC): Primary | ICD-10-CM

## 2025-06-12 PROCEDURE — 99215 OFFICE O/P EST HI 40 MIN: CPT | Performed by: FAMILY MEDICINE

## 2025-06-12 NOTE — PATIENT INSTRUCTIONS
PATIENT INSTRUCTIONS      FOR Russ Kirkland ,  1955    DATE OF SERVICE: 2025    Hydrofera Blue transfer  Rosannaramisidro  Anupamilan 3 layer compression wrap to left lower extremity    Follow up with Dr. Correa 1 WEEK      Managing your edema:    Avoid prolonged standing in one place. It is better to have your calf muscles moving to pump fluid out of the legs.  Elevate leg(s) above the level of the heart when sitting or as much as possible.  Take you diuretics as directed by your physician. Do not skip doses or change doses unless instructed to do so by your physician.  Decrease salt intake, follow recommended 2 grams daily.  Do not get leg(s) with compression wrap wet. If wraps are too tight as indicated by pain, numbness/tingling or discoloration of toes remove wrap completely and call the wound center @ [phone].       The treatment plan has been discussed at length between you and your provider. Follow all instructions carefully, it is very important. If you do not follow all instructions you are at risk of your wound not healing, infection, possible loss of limb and even loss of life.    COMPRESSION WRAP PATIENT CARE INSTRUCTIONS  DO NOT get compression wrap wet. When showering, use a shower boot.   Please contact wound clinic and if not able to reach, then go to emergency room if ANY of the following occur:   Tingling or numbness in the foot or toes on leg with compression wrap.  Severe pain that cannot be relieved with elevation and any medication instructed per your doctor.  A fever of 100.4°F (38°C) or higher.  Swelling, coldness, or blue-gray discoloration of the toes.  If the compression wrap feels too tight or too loose.  If the compression wrap is damaged or has rough edges that hurt.  If the compression wrap gets wet, you must cut wrap off.   Drainage from compression wrap that smells different than usual.

## 2025-06-12 NOTE — PROGRESS NOTES
Chief Complaint   Patient presents with    Wound Care     Arrives for follow up. Denies new concerns for MD.       HPI:     Patient here for follow-up of left medial ankle wound.  He is doing well has no new concerns.    Lab Results   Component Value Date    BUN 24 (H) 04/29/2025    CREATSERUM 0.91 04/29/2025    ALB 4.4 04/29/2025    TP 7.3 04/29/2025    A1C 5.6 04/16/2018       Medications - Current[1]    Allergies[2]         REVIEW OF SYSTEMS:     Review of Systems (ROS)  This information was obtained from the patient, chart    A comprehensive 10 point review of systems was completed.  Pertinent positives and negatives noted in the the HPI.     Past medical, surgical, family and social history updated where appropriate.    PHYSICAL EXAM:   /84   Pulse 70   Temp 98.7 °F (37.1 °C)   Resp 16    Estimated body mass index is 34.17 kg/m² as calculated from the following:    Height as of 6/5/25: 71\".    Weight as of 6/5/25: 245 lb (111.1 kg).   Vital signs reviewed.Appears stated age, well groomed.        Calf  Point of Measurement - Left Calf: 38     Left Calf from:: Heel  Calf Left cm:: 40          Ankle  Point of Measurement - Left Ankle: 12     Left Ankle from:: Heel  Left Ankle cm:: 24.2                Foot                               Wound 06/05/25 #1 Ankle Left;Medial (Active)   Date First Assessed/Time First Assessed: 06/05/25 1547    Wound Number (Wound Clinic Only): #1  Primary Wound Type: Edema  Location: Ankle  Wound Location Orientation: Left;Medial      Assessments 6/5/2025  3:59 PM 6/12/2025  9:52 AM   Wound Image       Drainage Amount Small Small   Drainage Description Serosanguineous Serosanguineous   Treatments Compression (comprilian 8cm, 10cm, 12cm, cellona (x2), coban) Compression (comprilian 8cm, 10cm, 12cm, cellona (x2), coban)   Wound Length (cm) 2.1 cm (slight angle) 1.5 cm   Wound Width (cm) 1.8 cm 1.5 cm   Wound Surface Area (cm^2) 2.97 cm^2 1.77 cm^2   Wound Depth (cm) 0.1 cm 0.1 cm    Wound Volume (cm^3) 0.198 cm^3 0.118 cm^3   Wound Healing % -- 40   Margins Well-defined edges Well-defined edges   Non-staged Wound Description Full thickness Full thickness   Mary-wound Assessment Dry;Edema;Pink Dry;Edema;Pink   Wound Granulation Tissue Pink;Firm Firm;Pale Grey;Pink   Wound Bed Granulation (%) 25 % 90 %   Wound Bed Slough (%) 75 % 10 %   Wound Odor None None   Tunneling? No No   Undermining? No No   Sinus Tracts? No No       No associated orders.       Compression Wrap 06/05/25 Ankle Anterior;Left (Active)   Placement Date/Time: 06/05/25 1650   Location: Ankle  Wound Location Orientation: Anterior;Left      Assessments 6/5/2025  3:59 PM 6/12/2025  9:52 AM   Response to Treatment Well tolerated Well tolerated   Compression Layers Multilayer Multilayer   Compression Product Type Comprilan 8cm;Comprilan 10cm;Comprilan 12cm;Stockinette 4in;Coban (comprilian 8cm, 10cm, 12cm, cellona (x2), coban) Comprilan 8cm;Comprilan 10cm;Comprilan 12cm;Stockinette 4in;Coban (comprilian 8cm, 10cm, 12cm, cellona (x2), coban)   Dressing Applied Yes (hydrofera transfer, kerramax, conforming gauze, tape) Yes (hydrofera transfer, kerramax, conforming gauze, tape)   Compression Wrap Location Toes to Knee Toes to Knee   Compression Wrap Status Clean;Dry;Intact Clean;Dry;Intact       No associated orders.              ASSESSMENT AND PLAN:      1. Chronic venous hypertension (idiopathic) with ulcer of left lower extremity (HCC)    2. 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 Benign essential HTN    Clinically the wound is measuring smaller.  Silver nitrate applied.  Continue Hydrofera Blue transfer, Kerramax, Comprilan 3 layer compression wrap.  Patient tolerating compression wrap well.  We did go over compression wrap precautions and written instruction given and this will be done at each visit.  Risks, benefits, and alternatives of current treatment  plan discussed in detail.  Questions and concerns addressed. Red flags to RTC or ED reviewed.  Patient (or parent) agrees to plan.      Return in about 1 week (around 2025).    Also refer to patient instructions.     I spent a total of 40 minutes with this patient's care.  This time included preparing to see the patient (eg, review notes and recent diagnostics), seeing the patient, performing a medically appropriate examination and/or evaluation, counseling and educating the patient, and documenting in the record.          Valentin Correa M.D.   [facility]   2025    Patient Instructions       PATIENT INSTRUCTIONS      FOR Russ Kirkland ,  1955    DATE OF SERVICE: 2025    Hydrofera Blue transfer  Kerramax  Comprilan 3 layer compression wrap to left lower extremity    Follow up with Dr. Correa 1 WEEK      Managing your edema:    Avoid prolonged standing in one place. It is better to have your calf muscles moving to pump fluid out of the legs.  Elevate leg(s) above the level of the heart when sitting or as much as possible.  Take you diuretics as directed by your physician. Do not skip doses or change doses unless instructed to do so by your physician.  Decrease salt intake, follow recommended 2 grams daily.  Do not get leg(s) with compression wrap wet. If wraps are too tight as indicated by pain, numbness/tingling or discoloration of toes remove wrap completely and call the wound center @ [phone].       The treatment plan has been discussed at length between you and your provider. Follow all instructions carefully, it is very important. If you do not follow all instructions you are at risk of your wound not healing, infection, possible loss of limb and even loss of life.    COMPRESSION WRAP PATIENT CARE INSTRUCTIONS  DO NOT get compression wrap wet. When showering, use a shower boot.   Please contact wound clinic and if not able to reach, then go to emergency room  if ANY of the following occur:   Tingling or numbness in the foot or toes on leg with compression wrap.  Severe pain that cannot be relieved with elevation and any medication instructed per your doctor.  A fever of 100.4°F (38°C) or higher.  Swelling, coldness, or blue-gray discoloration of the toes.  If the compression wrap feels too tight or too loose.  If the compression wrap is damaged or has rough edges that hurt.  If the compression wrap gets wet, you must cut wrap off.   Drainage from compression wrap that smells different than usual.                MISCELLANEOUS INSTRUCTIONS  Supplement with a daily multivitamin   Low salt diet  Intense blood sugar control - Goal Blood sugar below 180 at all times recommended.  Increase protein intake / consider protein supplements - see below  Elevate extremities at all times when sitting / laying down.  No tobacco use     DIETARY MODIFICATIONS TO HELP WITH WOUND HEALING:          Please follow any restrictions to diet given by your other doctors     Protein: meats, beans, eggs, milk and yogurt particularly Greek yogurt), tofu, soy nuts, soy protein products     Vitamin C: citrus fruits and juices, strawberries, tomatoes, tomato juice, peppers, baked potatoes, spinach, broccoli, cauliflower, [place] sprouts, cabbage     Vitamin A: dark greens, leafy vegetables, orange or yellow vegetables, cantaloupe, fortified dairy products, liver, fortified cereals     Zinc: fortified cereals, red meats, seafood     Consider Azeem by [employer] (These are essential branch chain amino acids that help with tissue building and wound healing) and take 2 packets/day. You can order online at [employer] or [employer]     ADDITIONAL REMINDERS:     The treatment plan has been discussed at length with you and your provider. Follow all instructions carefully, it is very important. If you do not follow all instructions, you are at risk of your wound not healing, infection, possible loss of limb and even  loss of life.  Please call the clinic at [phone] during regular business hours ( 7:30 AM - 5:30 PM) if you notice increased redness, warmth, pain or pus like drainage or start running a fever greater than 100.3.    For after hour emergencies, please call your primary physician or go to the nearest emergency room.  If your blood pressure is elevated, follow up with your primary care doctor and/or cardiologist as soon as possible.     FOR LEG SWELLING,  LOWER EXTREMITY WOUNDS AND IF USING COMPRESSION:   Managing your edema:    Avoid prolonged standing in one place. It is better to have your calf muscles moving to pump fluid out of the legs.  Elevate leg(s) above the level of the heart when sitting or as much as possible.  Take you diuretics as directed by your physician. Do not skip doses or change doses unless instructed to do so by your physician.  Decrease salt intake, follow recommended 2 grams daily.  Do not get leg(s) with compression wrap wet. If wraps are too tight as indicated by pain, numbness/tingling or discoloration of toes remove wrap completely and call the wound center @ [phone].       The treatment plan has been discussed at length between you and your provider. Follow all instructions carefully, it is very important. If you do not follow all instructions you are at risk of your wound not healing, infection, possible loss of limb and even loss of life.    COMPRESSION WRAP PATIENT CARE INSTRUCTIONS  DO NOT get compression wrap wet. When showering, use a shower boot.   Please contact wound clinic and if not able to reach, then go to emergency room if ANY of the following occur:   Tingling or numbness in the foot or toes on leg with compression wrap.  Severe pain that cannot be relieved with elevation and any medication instructed per your doctor.  A fever of 100.4°F (38°C) or higher.  Swelling, coldness, or blue-gray discoloration of the toes.  If the compression wrap feels too tight or too  loose.  If the compression wrap is damaged or has rough edges that hurt.  If the compression wrap gets wet, you must cut wrap off.   Drainage from compression wrap that smells different than usual.                        [1]   Current Outpatient Medications:     betamethasone 0.1 % External Cream, Apply topically 2 (two) times daily. (Patient not taking: Reported on 6/5/2025), Disp: , Rfl:     docusate sodium 100 MG Oral Cap, Take 1 capsule (100 mg total) by mouth 2 (two) times daily as needed. (Patient not taking: Reported on 6/5/2025), Disp: , Rfl:     Ferrous Sulfate 325 (65 Fe) MG Oral Tab, Take 1 tablet (325 mg total) by mouth daily with breakfast., Disp: , Rfl:     gentamicin 0.1 % External Ointment, Apply topically in the morning and before bedtime. (Patient not taking: Reported on 6/5/2025), Disp: , Rfl:     simvastatin 20 MG Oral Tab, Take 1 tablet (20 mg total) by mouth nightly., Disp: 90 tablet, Rfl: 3    celecoxib 200 MG Oral Cap, TAKE ONE CAPSULE BY MOUTH DAILY AS NEEDED GENERIC EQUIVALENT FOR CELEBREX, Disp: 90 capsule, Rfl: 0    GABAPENTIN 800 MG Oral Tab, TAKE 1 TABLET BY MOUTH 3 TIMES DAILY, Disp: 270 tablet, Rfl: 0    LOSARTAN POTASSIUM-HCTZ 100-12.5 MG Oral Tab, TAKE 1 TABLET BY MOUTH DAILY, Disp: 90 tablet, Rfl: 3    metoprolol succinate ER 50 MG Oral Tablet 24 Hr, Take 1 tablet (50 mg total) by mouth daily., Disp: , Rfl:     cholecalciferol 50 MCG (2000 UT) Oral Cap, cholecalciferol (vitamin D3) 50 mcg (2,000 unit) capsule, [RxNorm: 714167], Disp: , Rfl:     Sildenafil Citrate 100 MG Oral Tab, Take 1 tablet (100 mg total) by mouth daily as needed for Erectile Dysfunction. Take ~ one hour prior to sexual activity on an empty stomach, Disp: 30 tablet, Rfl: 11    aspirin 81 MG Oral Tab EC, Take 1 tablet (81 mg total) by mouth daily., Disp: , Rfl:     WARFARIN 2.5 MG Oral Tab, TAKE 1 TABLET BY MOUTH DAILY, Disp: 90 tablet, Rfl: 3    Glucosamine-Chondroit-Vit C-Mn (GLUCOSAMINE-CHONDROITIN) Oral Cap,  Take 1 tablet by mouth daily., Disp: , Rfl:     omega-3 fatty acids (FISH OIL) 1000 MG Oral Cap, Take 1,000 mg by mouth daily., Disp: , Rfl:   [2]   Allergies  Allergen Reactions    Adhesive Tape RASH     Can use paper tape.

## 2025-06-12 NOTE — PROGRESS NOTES
Weekly Wound Education Note    Teaching Provided To: Patient  Training Topics: Compression, Cleasing and general instructions, Discharge instructions, Dressing, Edema control  Training Method: Demonstration, Explain/Verbal  Training Response: Patient responds and understands, Reinforcement needed        Notes: Patient here for follow up wound care visit to left medial ankle. Edema measurement decreased, wound measurement decreased. Provider recommending to continue using hydrofera transfer, kerramax, conforming gauze, tape.Applied comprilian 8cm, 10cm, 12cm, cellona (x2), coban. Patient to follow up with provider in 1 week.

## 2025-06-13 ENCOUNTER — LAB ENCOUNTER (OUTPATIENT)
Dept: LAB | Facility: HOSPITAL | Age: 70
End: 2025-06-13
Attending: STUDENT IN AN ORGANIZED HEALTH CARE EDUCATION/TRAINING PROGRAM
Payer: MEDICARE

## 2025-06-13 DIAGNOSIS — I48.21 PERMANENT ATRIAL FIBRILLATION (HCC): ICD-10-CM

## 2025-06-13 LAB
INR BLD: 2.4 (ref 0.8–1.2)
PROTHROMBIN TIME: 27.3 SECONDS (ref 11.6–14.8)

## 2025-06-13 PROCEDURE — 36415 COLL VENOUS BLD VENIPUNCTURE: CPT

## 2025-06-13 PROCEDURE — 85610 PROTHROMBIN TIME: CPT

## 2025-06-16 ENCOUNTER — APPOINTMENT (OUTPATIENT)
Dept: WOUND CARE | Facility: HOSPITAL | Age: 70
End: 2025-06-16
Attending: FAMILY MEDICINE
Payer: MEDICARE

## 2025-06-16 NOTE — TELEPHONE ENCOUNTER
Refill Request    Medication request: GABAPENTIN 800 MG Oral Tab     LOV:6/10/2024 Yessica Cain DO   Due back to clinic per last office note:  n/a  NOV: Visit date not found      [place] 03/26/2025 #270 (90 day supply)    Do not dispense until 6/24/25    Urine drug screen (if applicable): n/a  Pain contract: n/a    LOV plan (if weaning or changing medications): \"Recommend that he continues gabapentin 800 mg 3 times daily with a home exercise program.\"

## 2025-06-17 RX ORDER — GABAPENTIN 800 MG/1
800 TABLET ORAL 3 TIMES DAILY
Qty: 270 TABLET | Refills: 0 | Status: SHIPPED | OUTPATIENT
Start: 2025-06-17

## 2025-06-19 ENCOUNTER — OFFICE VISIT (OUTPATIENT)
Dept: WOUND CARE | Facility: HOSPITAL | Age: 70
End: 2025-06-19
Attending: FAMILY MEDICINE
Payer: MEDICARE

## 2025-06-19 VITALS
RESPIRATION RATE: 18 BRPM | DIASTOLIC BLOOD PRESSURE: 73 MMHG | SYSTOLIC BLOOD PRESSURE: 132 MMHG | TEMPERATURE: 98 F | HEART RATE: 70 BPM

## 2025-06-19 DIAGNOSIS — L97.312 VARICOSE VEINS OF RIGHT LOWER EXTREMITY WITH INFLAMMATION, WITH ULCER OF ANKLE WITH FAT LAYER EXPOSED (HCC): ICD-10-CM

## 2025-06-19 DIAGNOSIS — I83.213 VARICOSE VEINS OF RIGHT LOWER EXTREMITY WITH INFLAMMATION, WITH ULCER OF ANKLE WITH FAT LAYER EXPOSED (HCC): ICD-10-CM

## 2025-06-19 DIAGNOSIS — I87.312 CHRONIC VENOUS HYPERTENSION (IDIOPATHIC) WITH ULCER OF LEFT LOWER EXTREMITY (HCC): Primary | ICD-10-CM

## 2025-06-19 DIAGNOSIS — L97.929 CHRONIC VENOUS HYPERTENSION (IDIOPATHIC) WITH ULCER OF LEFT LOWER EXTREMITY (HCC): Primary | ICD-10-CM

## 2025-06-19 PROCEDURE — 99215 OFFICE O/P EST HI 40 MIN: CPT | Performed by: FAMILY MEDICINE

## 2025-06-19 NOTE — PROGRESS NOTES
Chief Complaint   Patient presents with    Wound Care     Arrives for follow up. Comprilan wraps in place.       HPI:     Patient here for follow-up of left medial ankle wound.  He is doing fine and tolerating compression wrap without difficulty.    Lab Results   Component Value Date    BUN 24 (H) 04/29/2025    CREATSERUM 0.91 04/29/2025    ALB 4.4 04/29/2025    TP 7.3 04/29/2025    A1C 5.6 04/16/2018       Medications - Current[1]    Allergies[2]         REVIEW OF SYSTEMS:     Review of Systems (ROS)  This information was obtained from the patient, chart    A comprehensive 10 point review of systems was completed.  Pertinent positives and negatives noted in the the HPI.     Past medical, surgical, family and social history updated where appropriate.    PHYSICAL EXAM:   /73   Pulse 70   Temp 98 °F (36.7 °C)   Resp 18    Estimated body mass index is 34.17 kg/m² as calculated from the following:    Height as of 6/5/25: 71\".    Weight as of 6/5/25: 245 lb (111.1 kg).   Vital signs reviewed.Appears stated age, well groomed.        Calf  Point of Measurement - Left Calf: 38     Left Calf from:: Heel  Calf Left cm:: 38.3          Ankle  Point of Measurement - Left Ankle: 12     Left Ankle from:: Heel  Left Ankle cm:: 24.8                Foot                               Wound 06/05/25 #1 Ankle Left;Medial (Active)   Date First Assessed/Time First Assessed: 06/05/25 1547    Wound Number (Wound Clinic Only): #1  Primary Wound Type: Edema  Location: Ankle  Wound Location Orientation: Left;Medial      Assessments 6/5/2025  3:59 PM 6/19/2025  4:10 PM   Wound Image       Drainage Amount Small Small   Drainage Description Serosanguineous Serosanguineous   Treatments Compression (comprilian 8cm, 10cm, 12cm, cellona (x2), coban) Compression (comprilan 8cm, 10cm, 12cm, cellona (x2), coban)   Wound Length (cm) 2.1 cm (slight angle) 1.5 cm   Wound Width (cm) 1.8 cm 1.3 cm   Wound Surface Area (cm^2) 2.97 cm^2 1.53 cm^2    Wound Depth (cm) 0.1 cm 0.1 cm   Wound Volume (cm^3) 0.198 cm^3 0.102 cm^3   Wound Healing % -- 48   Margins Well-defined edges Well-defined edges   Non-staged Wound Description Full thickness Full thickness   Mary-wound Assessment Dry;Edema;Pink Dry;Edema;Hemosiderin staining   Wound Granulation Tissue Pink;Firm Firm;Pale Grey;Pink   Wound Bed Granulation (%) 25 % 100 %   Wound Bed Slough (%) 75 % --   Wound Odor None None   Tunneling? No --   Undermining? No --   Sinus Tracts? No --       No associated orders.       Compression Wrap 06/05/25 Ankle Anterior;Left (Active)   Placement Date/Time: 06/05/25 1650   Location: Ankle  Wound Location Orientation: Anterior;Left      Assessments 6/5/2025  3:59 PM 6/19/2025  4:10 PM   Response to Treatment Well tolerated Well tolerated   Compression Layers Multilayer Multilayer   Compression Product Type Comprilan 8cm;Comprilan 10cm;Comprilan 12cm;[place]inette 4in;Coban (comprilian 8cm, 10cm, 12cm, cellona (x2), coban) Comprilan 8cm;Comprilan 10cm;Comprilan 12cm;Stockinette 4in;Coban (comprilan 8cm, 10cm, 12cm, cellona (x2), coban)   Dressing Applied Yes (hydrofera transfer, kerramax, conforming gauze, tape) Yes (silver alginate, kerramax, conforming gauze, tape)   Compression Wrap Location Toes to Knee Toes to Knee   Compression Wrap Status Clean;Dry;Intact Clean;Dry;Intact       No associated orders.              ASSESSMENT AND PLAN:      1. Chronic venous hypertension (idiopathic) with ulcer of left lower extremity (HCC)    2. 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  Will switch from Hydrofera Blue to silver alginate, Kerramax, continue Comprilan 3 layer wrap.  Patient tolerating well.  See patient instructions.  He will follow-up with me in 1 week to reassess.  Leg swelling seems to be under control.  Risks, benefits, and  alternatives of current treatment plan discussed in detail.  Questions and concerns addressed. Red flags to RTC or ED reviewed.  Patient (or parent) agrees to plan.      Return in about 1 week (around 2025).    Also refer to patient instructions.     I spent a total of 40 minutes with this patient's care.  This time included preparing to see the patient (eg, review notes and recent diagnostics), seeing the patient, performing a medically appropriate examination and/or evaluation, counseling and educating the patient, and documenting in the record.          Valentin Correa M.D.   [facility]   2025    Patient Instructions       PATIENT INSTRUCTIONS      FOR Russ Kirkland , JOSH 1955    DATE OF SERVICE: 2025    Silver alginate  Kerramax  Comprilan 3 layer compression wrap to left lower extremity    Follow up with Dr. Correa 1 WEEK      Managing your edema:    Avoid prolonged standing in one place. It is better to have your calf muscles moving to pump fluid out of the legs.  Elevate leg(s) above the level of the heart when sitting or as much as possible.  Take you diuretics as directed by your physician. Do not skip doses or change doses unless instructed to do so by your physician.  Decrease salt intake, follow recommended 2 grams daily.  Do not get leg(s) with compression wrap wet. If wraps are too tight as indicated by pain, numbness/tingling or discoloration of toes remove wrap completely and call the wound center @ [phone].       The treatment plan has been discussed at length between you and your provider. Follow all instructions carefully, it is very important. If you do not follow all instructions you are at risk of your wound not healing, infection, possible loss of limb and even loss of life.    COMPRESSION WRAP PATIENT CARE INSTRUCTIONS  DO NOT get compression wrap wet. When showering, use a shower boot.   Please contact wound clinic and if not able to reach,  then go to emergency room if ANY of the following occur:   Tingling or numbness in the foot or toes on leg with compression wrap.  Severe pain that cannot be relieved with elevation and any medication instructed per your doctor.  A fever of 100.4°F (38°C) or higher.  Swelling, coldness, or blue-gray discoloration of the toes.  If the compression wrap feels too tight or too loose.  If the compression wrap is damaged or has rough edges that hurt.  If the compression wrap gets wet, you must cut wrap off.   Drainage from compression wrap that smells different than usual.              MISCELLANEOUS INSTRUCTIONS  Supplement with a daily multivitamin   Low salt diet  Intense blood sugar control - Goal Blood sugar below 180 at all times recommended.  Increase protein intake / consider protein supplements - see below  Elevate extremities at all times when sitting / laying down.  No tobacco use     DIETARY MODIFICATIONS TO HELP WITH WOUND HEALING:          Please follow any restrictions to diet given by your other doctors     Protein: meats, beans, eggs, milk and yogurt particularly Greek yogurt), tofu, soy nuts, soy protein products     Vitamin C: citrus fruits and juices, strawberries, tomatoes, tomato juice, peppers, baked potatoes, spinach, broccoli, cauliflower, [place] sprouts, cabbage     Vitamin A: dark greens, leafy vegetables, orange or yellow vegetables, cantaloupe, fortified dairy products, liver, fortified cereals     Zinc: fortified cereals, red meats, seafood     Consider Azeem by [employer] (These are essential branch chain amino acids that help with tissue building and wound healing) and take 2 packets/day. You can order online at [employer] or [employer]     ADDITIONAL REMINDERS:     The treatment plan has been discussed at length with you and your provider. Follow all instructions carefully, it is very important. If you do not follow all instructions, you are at risk of your wound not healing, infection, possible  loss of limb and even loss of life.  Please call the clinic at [phone] during regular business hours ( 7:30 AM - 5:30 PM) if you notice increased redness, warmth, pain or pus like drainage or start running a fever greater than 100.3.    For after hour emergencies, please call your primary physician or go to the nearest emergency room.  If your blood pressure is elevated, follow up with your primary care doctor and/or cardiologist as soon as possible.     FOR LEG SWELLING,  LOWER EXTREMITY WOUNDS AND IF USING COMPRESSION:   Managing your edema:    Avoid prolonged standing in one place. It is better to have your calf muscles moving to pump fluid out of the legs.  Elevate leg(s) above the level of the heart when sitting or as much as possible.  Take you diuretics as directed by your physician. Do not skip doses or change doses unless instructed to do so by your physician.  Decrease salt intake, follow recommended 2 grams daily.  Do not get leg(s) with compression wrap wet. If wraps are too tight as indicated by pain, numbness/tingling or discoloration of toes remove wrap completely and call the wound center @ [phone].       The treatment plan has been discussed at length between you and your provider. Follow all instructions carefully, it is very important. If you do not follow all instructions you are at risk of your wound not healing, infection, possible loss of limb and even loss of life.    COMPRESSION WRAP PATIENT CARE INSTRUCTIONS  DO NOT get compression wrap wet. When showering, use a shower boot.   Please contact wound clinic and if not able to reach, then go to emergency room if ANY of the following occur:   Tingling or numbness in the foot or toes on leg with compression wrap.  Severe pain that cannot be relieved with elevation and any medication instructed per your doctor.  A fever of 100.4°F (38°C) or higher.  Swelling, coldness, or blue-gray discoloration of the toes.  If the compression wrap  feels too tight or too loose.  If the compression wrap is damaged or has rough edges that hurt.  If the compression wrap gets wet, you must cut wrap off.   Drainage from compression wrap that smells different than usual.                        [1]   Current Outpatient Medications:     GABAPENTIN 800 MG Oral Tab, TAKE 1 TABLET BY MOUTH THREE TIMES DAILY, Disp: 270 tablet, Rfl: 0    betamethasone 0.1 % External Cream, Apply topically 2 (two) times daily. (Patient not taking: Reported on 6/5/2025), Disp: , Rfl:     docusate sodium 100 MG Oral Cap, Take 1 capsule (100 mg total) by mouth 2 (two) times daily as needed. (Patient not taking: Reported on 6/5/2025), Disp: , Rfl:     Ferrous Sulfate 325 (65 Fe) MG Oral Tab, Take 1 tablet (325 mg total) by mouth daily with breakfast., Disp: , Rfl:     gentamicin 0.1 % External Ointment, Apply topically in the morning and before bedtime. (Patient not taking: Reported on 6/5/2025), Disp: , Rfl:     simvastatin 20 MG Oral Tab, Take 1 tablet (20 mg total) by mouth nightly., Disp: 90 tablet, Rfl: 3    celecoxib 200 MG Oral Cap, TAKE ONE CAPSULE BY MOUTH DAILY AS NEEDED GENERIC EQUIVALENT FOR CELEBREX, Disp: 90 capsule, Rfl: 0    LOSARTAN POTASSIUM-HCTZ 100-12.5 MG Oral Tab, TAKE 1 TABLET BY MOUTH DAILY, Disp: 90 tablet, Rfl: 3    metoprolol succinate ER 50 MG Oral Tablet 24 Hr, Take 1 tablet (50 mg total) by mouth daily., Disp: , Rfl:     cholecalciferol 50 MCG (2000 UT) Oral Cap, cholecalciferol (vitamin D3) 50 mcg (2,000 unit) capsule, [RxNorm: 664065], Disp: , Rfl:     Sildenafil Citrate 100 MG Oral Tab, Take 1 tablet (100 mg total) by mouth daily as needed for Erectile Dysfunction. Take ~ one hour prior to sexual activity on an empty stomach, Disp: 30 tablet, Rfl: 11    aspirin 81 MG Oral Tab EC, Take 1 tablet (81 mg total) by mouth daily., Disp: , Rfl:     WARFARIN 2.5 MG Oral Tab, TAKE 1 TABLET BY MOUTH DAILY, Disp: 90 tablet, Rfl: 3    Glucosamine-Chondroit-Vit C-Mn  (GLUCOSAMINE-CHONDROITIN) Oral Cap, Take 1 tablet by mouth daily., Disp: , Rfl:     omega-3 fatty acids (FISH OIL) 1000 MG Oral Cap, Take 1,000 mg by mouth daily., Disp: , Rfl:   [2]   Allergies  Allergen Reactions    Adhesive Tape RASH     Can use paper tape.

## 2025-06-19 NOTE — PATIENT INSTRUCTIONS
PATIENT INSTRUCTIONS      FOR Russ Kirkland ,  1955    DATE OF SERVICE: 2025    Silver alginate  Kerramax  Comprilan 3 layer compression wrap to left lower extremity    Follow up with Dr. Correa 1 WEEK      Managing your edema:    Avoid prolonged standing in one place. It is better to have your calf muscles moving to pump fluid out of the legs.  Elevate leg(s) above the level of the heart when sitting or as much as possible.  Take you diuretics as directed by your physician. Do not skip doses or change doses unless instructed to do so by your physician.  Decrease salt intake, follow recommended 2 grams daily.  Do not get leg(s) with compression wrap wet. If wraps are too tight as indicated by pain, numbness/tingling or discoloration of toes remove wrap completely and call the wound center @ [phone].       The treatment plan has been discussed at length between you and your provider. Follow all instructions carefully, it is very important. If you do not follow all instructions you are at risk of your wound not healing, infection, possible loss of limb and even loss of life.    COMPRESSION WRAP PATIENT CARE INSTRUCTIONS  DO NOT get compression wrap wet. When showering, use a shower boot.   Please contact wound clinic and if not able to reach, then go to emergency room if ANY of the following occur:   Tingling or numbness in the foot or toes on leg with compression wrap.  Severe pain that cannot be relieved with elevation and any medication instructed per your doctor.  A fever of 100.4°F (38°C) or higher.  Swelling, coldness, or blue-gray discoloration of the toes.  If the compression wrap feels too tight or too loose.  If the compression wrap is damaged or has rough edges that hurt.  If the compression wrap gets wet, you must cut wrap off.   Drainage from compression wrap that smells different than usual.

## 2025-06-19 NOTE — PROGRESS NOTES
Weekly Wound Education Note    Teaching Provided To: Patient  Training Topics: Dressing, Cleasing and general instructions, Edema control, Compression, Discharge instructions  Training Method: Demonstration, Explain/Verbal  Training Response: Reinforcement needed        Notes: Patient here for follow up wound care visit to left medial ankle wound. Wound measurement decreased, edema measurement decreased. Provider stimulated wound at visit today, recommending to change treatment to silver alginate, kerramax, conforming gauze, tape. Applied comprilian 8cm, 10cm, 12cm, cellona (x2), coban. Patient to follow up with provider in 1 week.

## 2025-06-26 ENCOUNTER — OFFICE VISIT (OUTPATIENT)
Dept: WOUND CARE | Facility: HOSPITAL | Age: 70
End: 2025-06-26
Attending: FAMILY MEDICINE
Payer: MEDICARE

## 2025-06-26 VITALS
TEMPERATURE: 97 F | DIASTOLIC BLOOD PRESSURE: 76 MMHG | HEART RATE: 66 BPM | SYSTOLIC BLOOD PRESSURE: 130 MMHG | RESPIRATION RATE: 16 BRPM

## 2025-06-26 DIAGNOSIS — L97.312 VARICOSE VEINS OF RIGHT LOWER EXTREMITY WITH INFLAMMATION, WITH ULCER OF ANKLE WITH FAT LAYER EXPOSED (HCC): ICD-10-CM

## 2025-06-26 DIAGNOSIS — I83.213 VARICOSE VEINS OF RIGHT LOWER EXTREMITY WITH INFLAMMATION, WITH ULCER OF ANKLE WITH FAT LAYER EXPOSED (HCC): ICD-10-CM

## 2025-06-26 DIAGNOSIS — I87.312 CHRONIC VENOUS HYPERTENSION (IDIOPATHIC) WITH ULCER OF LEFT LOWER EXTREMITY (HCC): Primary | ICD-10-CM

## 2025-06-26 DIAGNOSIS — L97.929 CHRONIC VENOUS HYPERTENSION (IDIOPATHIC) WITH ULCER OF LEFT LOWER EXTREMITY (HCC): Primary | ICD-10-CM

## 2025-06-26 PROCEDURE — 99214 OFFICE O/P EST MOD 30 MIN: CPT | Performed by: FAMILY MEDICINE

## 2025-06-26 NOTE — PROGRESS NOTES
Weekly Wound Education Note    Teaching Provided To: Patient  Training Topics: Cleasing and general instructions, Discharge instructions, Edema control, Compression, Dressing  Training Method: Demonstration, Explain/Verbal  Training Response: Reinforcement needed        Notes: Patient here for follow up wound care visit to left medial ankle wound. Wound measurement decreased, edema measurement slightly increased in calf. Provider stimulated wound at visit, recommending to continue using silver alginate, kerramax, conforming gauze, tape. Applied comprilian 8cm, 10cm, 12cm, cellona (x2), coban. Patient to follow up with provider in 1 week.

## 2025-06-26 NOTE — PATIENT INSTRUCTIONS
PATIENT INSTRUCTIONS      FOR Russ Kirkland ,  1955    DATE OF SERVICE: 2025    Silver alginate  Kerramax  Comprilan 3 layer compression wrap to left lower extremity    Follow up with Dr. Correa 1 WEEK      Managing your edema:    Avoid prolonged standing in one place. It is better to have your calf muscles moving to pump fluid out of the legs.  Elevate leg(s) above the level of the heart when sitting or as much as possible.  Take you diuretics as directed by your physician. Do not skip doses or change doses unless instructed to do so by your physician.  Decrease salt intake, follow recommended 2 grams daily.  Do not get leg(s) with compression wrap wet. If wraps are too tight as indicated by pain, numbness/tingling or discoloration of toes remove wrap completely and call the wound center @ [phone].       The treatment plan has been discussed at length between you and your provider. Follow all instructions carefully, it is very important. If you do not follow all instructions you are at risk of your wound not healing, infection, possible loss of limb and even loss of life.    COMPRESSION WRAP PATIENT CARE INSTRUCTIONS  DO NOT get compression wrap wet. When showering, use a shower boot.   Please contact wound clinic and if not able to reach, then go to emergency room if ANY of the following occur:   Tingling or numbness in the foot or toes on leg with compression wrap.  Severe pain that cannot be relieved with elevation and any medication instructed per your doctor.  A fever of 100.4°F (38°C) or higher.  Swelling, coldness, or blue-gray discoloration of the toes.  If the compression wrap feels too tight or too loose.  If the compression wrap is damaged or has rough edges that hurt.  If the compression wrap gets wet, you must cut wrap off.   Drainage from compression wrap that smells different than usual.

## 2025-06-26 NOTE — PROGRESS NOTES
Chief Complaint   Patient presents with    Wound Care     Patient is here for a wound care follow up. He denies any pain or new wound concerns.       HPI:     Patient here for follow-up of left medial ankle wound.  Tolerating Comprilan wrap without difficulty.    Lab Results   Component Value Date    BUN 24 (H) 04/29/2025    CREATSERUM 0.91 04/29/2025    ALB 4.4 04/29/2025    TP 7.3 04/29/2025    A1C 5.6 04/16/2018       Medications - Current[1]    Allergies[2]         REVIEW OF SYSTEMS:     Review of Systems (ROS)  This information was obtained from the patient, chart    A comprehensive 10 point review of systems was completed.  Pertinent positives and negatives noted in the the HPI.     Past medical, surgical, family and social history updated where appropriate.  Past Medical History[3]  PHYSICAL EXAM:   /76   Pulse 66   Temp 97.4 °F (36.3 °C)   Resp 16    Estimated body mass index is 34.17 kg/m² as calculated from the following:    Height as of 6/5/25: 71\".    Weight as of 6/5/25: 245 lb (111.1 kg).   Vital signs reviewed.Appears stated age, well groomed.        Calf  Point of Measurement - Left Calf: 38     Left Calf from:: Heel  Calf Left cm:: 39          Ankle  Point of Measurement - Left Ankle: 12     Left Ankle from:: Heel  Left Ankle cm:: 24                Foot                               Wound 06/05/25 #1 Ankle Left;Medial (Active)   Date First Assessed/Time First Assessed: 06/05/25 1547    Wound Number (Wound Clinic Only): #1  Primary Wound Type: Edema  Location: Ankle  Wound Location Orientation: Left;Medial      Assessments 6/5/2025  3:59 PM 6/26/2025  2:56 PM   Wound Image       Drainage Amount Small --   Drainage Description Serosanguineous --   Treatments Compression (comprilian 8cm, 10cm, 12cm, cellona (x2), coban) --   Wound Length (cm) 2.1 cm (slight angle) --   Wound Width (cm) 1.8 cm --   Wound Surface Area (cm^2) 2.97 cm^2 --   Wound Depth (cm) 0.1 cm --   Wound Volume (cm^3) 0.198  cm^3 --   Margins Well-defined edges --   Non-staged Wound Description Full thickness --   Mary-wound Assessment Dry;Edema;Pink --   Wound Granulation Tissue Pink;Firm --   Wound Bed Granulation (%) 25 % --   Wound Bed Slough (%) 75 % --   Wound Odor None --   Tunneling? No --   Undermining? No --   Sinus Tracts? No --       No associated orders.       Compression Wrap 06/05/25 Ankle Anterior;Left (Active)   Placement Date/Time: 06/05/25 1650   Location: Ankle  Wound Location Orientation: Anterior;Left      Assessments 6/5/2025  3:59 PM 6/26/2025  2:11 PM   Response to Treatment Well tolerated Well tolerated   Compression Layers Multilayer Multilayer   Compression Product Type Comprilan 8cm;Comprilan 10cm;Comprilan 12cm;Stockinette 4in;Coban (comprilian 8cm, 10cm, 12cm, cellona (x2), coban) Comprilan 8cm;Comprilan 10cm;Comprilan 12cm;Stockinette 4in;Coban (comprilian 8cm, 10cm, 12cm, cellona (x2) , coban)   Dressing Applied Yes (hydrofera transfer, kerramax, conforming gauze, tape) Yes (silver alginate, kerramax, conforming gauze, tape)   Compression Wrap Location Toes to Knee Toes to Knee   Compression Wrap Status Clean;Dry;Intact Clean;Dry;Intact       No associated orders.              ASSESSMENT AND PLAN:      1. Chronic venous hypertension (idiopathic) with ulcer of left lower extremity (HCC)    2. 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  Will apply silver alginate and continue that dressing for now.  Kerramax conforming gauze and Comprilan, 8/10/12 cm layer compression wrap.  Continue compression wrap precautions as previously discussed.    Risks, benefits, and alternatives of current treatment plan discussed in detail.  Questions and concerns addressed.  Red flags to RTC or ED reviewed.  Patient (or parent) agrees to plan.      Return in about 1 week (around 7/3/2025).    Also refer to patient instructions.     I spent a total of 30 minutes with this patient's care.  This time included preparing to see the patient (eg, review notes and recent diagnostics), seeing the patient, performing a medically appropriate examination and/or evaluation, counseling and educating the patient, and documenting in the record.          Valentin Correa M.D.   [facility]   2025    Patient Instructions       PATIENT INSTRUCTIONS      FOR Russ Kirkland ,  1955    DATE OF SERVICE: 2025    Silver alginate  Kerramax  Comprilan 3 layer compression wrap to left lower extremity    Follow up with Dr. Correa 1 WEEK      Managing your edema:    Avoid prolonged standing in one place. It is better to have your calf muscles moving to pump fluid out of the legs.  Elevate leg(s) above the level of the heart when sitting or as much as possible.  Take you diuretics as directed by your physician. Do not skip doses or change doses unless instructed to do so by your physician.  Decrease salt intake, follow recommended 2 grams daily.  Do not get leg(s) with compression wrap wet. If wraps are too tight as indicated by pain, numbness/tingling or discoloration of toes remove wrap completely and call the wound center @ [phone].       The treatment plan has been discussed at length between you and your provider. Follow all instructions carefully, it is very important. If you do not follow all instructions you are at risk of your wound not healing, infection, possible loss of limb and even loss of life.    COMPRESSION WRAP PATIENT CARE INSTRUCTIONS  DO NOT get compression wrap wet. When showering, use a shower boot.   Please contact wound clinic and if not able to reach, then go to emergency room if ANY of the following occur:   Tingling or numbness in the foot or  toes on leg with compression wrap.  Severe pain that cannot be relieved with elevation and any medication instructed per your doctor.  A fever of 100.4°F (38°C) or higher.  Swelling, coldness, or blue-gray discoloration of the toes.  If the compression wrap feels too tight or too loose.  If the compression wrap is damaged or has rough edges that hurt.  If the compression wrap gets wet, you must cut wrap off.   Drainage from compression wrap that smells different than usual.            MISCELLANEOUS INSTRUCTIONS  Supplement with a daily multivitamin   Low salt diet  Intense blood sugar control - Goal Blood sugar below 180 at all times recommended.  Increase protein intake / consider protein supplements - see below  Elevate extremities at all times when sitting / laying down.  No tobacco use     DIETARY MODIFICATIONS TO HELP WITH WOUND HEALING:          Please follow any restrictions to diet given by your other doctors     Protein: meats, beans, eggs, milk and yogurt particularly Greek yogurt), tofu, soy nuts, soy protein products     Vitamin C: citrus fruits and juices, strawberries, tomatoes, tomato juice, peppers, baked potatoes, spinach, broccoli, cauliflower, [place] sprouts, cabbage     Vitamin A: dark greens, leafy vegetables, orange or yellow vegetables, cantaloupe, fortified dairy products, liver, fortified cereals     Zinc: fortified cereals, red meats, seafood     Consider Azeem by [employer] (These are essential branch chain amino acids that help with tissue building and wound healing) and take 2 packets/day. You can order online at [employer] or [employer]     ADDITIONAL REMINDERS:     The treatment plan has been discussed at length with you and your provider. Follow all instructions carefully, it is very important. If you do not follow all instructions, you are at risk of your wound not healing, infection, possible loss of limb and even loss of life.  Please call the clinic at [phone] during regular  business hours ( 7:30 AM - 5:30 PM) if you notice increased redness, warmth, pain or pus like drainage or start running a fever greater than 100.3.    For after hour emergencies, please call your primary physician or go to the nearest emergency room.  If your blood pressure is elevated, follow up with your primary care doctor and/or cardiologist as soon as possible.     FOR LEG SWELLING,  LOWER EXTREMITY WOUNDS AND IF USING COMPRESSION:   Managing your edema:    Avoid prolonged standing in one place. It is better to have your calf muscles moving to pump fluid out of the legs.  Elevate leg(s) above the level of the heart when sitting or as much as possible.  Take you diuretics as directed by your physician. Do not skip doses or change doses unless instructed to do so by your physician.  Decrease salt intake, follow recommended 2 grams daily.  Do not get leg(s) with compression wrap wet. If wraps are too tight as indicated by pain, numbness/tingling or discoloration of toes remove wrap completely and call the wound center @ [phone].       The treatment plan has been discussed at length between you and your provider. Follow all instructions carefully, it is very important. If you do not follow all instructions you are at risk of your wound not healing, infection, possible loss of limb and even loss of life.    COMPRESSION WRAP PATIENT CARE INSTRUCTIONS  DO NOT get compression wrap wet. When showering, use a shower boot.   Please contact wound clinic and if not able to reach, then go to emergency room if ANY of the following occur:   Tingling or numbness in the foot or toes on leg with compression wrap.  Severe pain that cannot be relieved with elevation and any medication instructed per your doctor.  A fever of 100.4°F (38°C) or higher.  Swelling, coldness, or blue-gray discoloration of the toes.  If the compression wrap feels too tight or too loose.  If the compression wrap is damaged or has rough edges that  hurt.  If the compression wrap gets wet, you must cut wrap off.   Drainage from compression wrap that smells different than usual.                        [1]   Current Outpatient Medications:     GABAPENTIN 800 MG Oral Tab, TAKE 1 TABLET BY MOUTH THREE TIMES DAILY, Disp: 270 tablet, Rfl: 0    betamethasone 0.1 % External Cream, Apply topically 2 (two) times daily. (Patient not taking: Reported on 6/5/2025), Disp: , Rfl:     docusate sodium 100 MG Oral Cap, Take 1 capsule (100 mg total) by mouth 2 (two) times daily as needed. (Patient not taking: Reported on 6/5/2025), Disp: , Rfl:     Ferrous Sulfate 325 (65 Fe) MG Oral Tab, Take 1 tablet (325 mg total) by mouth daily with breakfast., Disp: , Rfl:     gentamicin 0.1 % External Ointment, Apply topically in the morning and before bedtime. (Patient not taking: Reported on 6/5/2025), Disp: , Rfl:     simvastatin 20 MG Oral Tab, Take 1 tablet (20 mg total) by mouth nightly., Disp: 90 tablet, Rfl: 3    celecoxib 200 MG Oral Cap, TAKE ONE CAPSULE BY MOUTH DAILY AS NEEDED GENERIC EQUIVALENT FOR CELEBREX, Disp: 90 capsule, Rfl: 0    LOSARTAN POTASSIUM-HCTZ 100-12.5 MG Oral Tab, TAKE 1 TABLET BY MOUTH DAILY, Disp: 90 tablet, Rfl: 3    metoprolol succinate ER 50 MG Oral Tablet 24 Hr, Take 1 tablet (50 mg total) by mouth daily., Disp: , Rfl:     cholecalciferol 50 MCG (2000 UT) Oral Cap, cholecalciferol (vitamin D3) 50 mcg (2,000 unit) capsule, [RxNorm: 846053], Disp: , Rfl:     Sildenafil Citrate 100 MG Oral Tab, Take 1 tablet (100 mg total) by mouth daily as needed for Erectile Dysfunction. Take ~ one hour prior to sexual activity on an empty stomach, Disp: 30 tablet, Rfl: 11    aspirin 81 MG Oral Tab EC, Take 1 tablet (81 mg total) by mouth daily., Disp: , Rfl:     WARFARIN 2.5 MG Oral Tab, TAKE 1 TABLET BY MOUTH DAILY, Disp: 90 tablet, Rfl: 3    Glucosamine-Chondroit-Vit C-Mn (GLUCOSAMINE-CHONDROITIN) Oral Cap, Take 1 tablet by mouth daily., Disp: , Rfl:     omega-3 fatty  acids (FISH OIL) 1000 MG Oral Cap, Take 1,000 mg by mouth daily., Disp: , Rfl:   [2]   Allergies  Allergen Reactions    Adhesive Tape RASH     Can use paper tape.   [3]   Past Medical History:   Acute perforated appendicitis    Acute, but ill-defined, cerebrovascular disease    Arrhythmia    afib    Atrial fibrillation (HCC)    ablation (2004); CV (May 2009)    Cataract    Deep vein thrombosis (HCC)    can not remember which leg    Hearing impairment    bilateral hearing aids    High blood pressure    High cholesterol    History of blood transfusion    no reaction    History of vein stripping    Osteoarthrosis, unspecified whether generalized or localized, unspecified site    Sleep apnea    CPAP device    Stroke (HCC)    2019    UGI bleed    Unspecified essential hypertension    Venous insufficiency    RT greater saphenous vein ablation 2007

## 2025-07-03 ENCOUNTER — OFFICE VISIT (OUTPATIENT)
Dept: WOUND CARE | Facility: HOSPITAL | Age: 70
End: 2025-07-03
Attending: FAMILY MEDICINE
Payer: MEDICARE

## 2025-07-03 VITALS
SYSTOLIC BLOOD PRESSURE: 122 MMHG | RESPIRATION RATE: 16 BRPM | HEART RATE: 78 BPM | TEMPERATURE: 98 F | DIASTOLIC BLOOD PRESSURE: 64 MMHG

## 2025-07-03 DIAGNOSIS — I87.312 CHRONIC VENOUS HYPERTENSION (IDIOPATHIC) WITH ULCER OF LEFT LOWER EXTREMITY (HCC): Primary | ICD-10-CM

## 2025-07-03 DIAGNOSIS — L97.312 VARICOSE VEINS OF RIGHT LOWER EXTREMITY WITH INFLAMMATION, WITH ULCER OF ANKLE WITH FAT LAYER EXPOSED (HCC): ICD-10-CM

## 2025-07-03 DIAGNOSIS — I83.213 VARICOSE VEINS OF RIGHT LOWER EXTREMITY WITH INFLAMMATION, WITH ULCER OF ANKLE WITH FAT LAYER EXPOSED (HCC): ICD-10-CM

## 2025-07-03 DIAGNOSIS — L97.929 CHRONIC VENOUS HYPERTENSION (IDIOPATHIC) WITH ULCER OF LEFT LOWER EXTREMITY (HCC): Primary | ICD-10-CM

## 2025-07-03 PROCEDURE — 29581 APPL MULTLAYER CMPRN SYS LEG: CPT

## 2025-07-03 NOTE — PATIENT INSTRUCTIONS
PATIENT INSTRUCTIONS      FOR Russ Kirkland ,  1955    DATE OF SERVICE: 7/3/2025    Zinc oxide   Silver alginate  Kerramax  Comprilan 3 layer compression wrap to left lower extremity    Follow up with Dr. Correa 1 WEEK      Managing your edema:    Avoid prolonged standing in one place. It is better to have your calf muscles moving to pump fluid out of the legs.  Elevate leg(s) above the level of the heart when sitting or as much as possible.  Take you diuretics as directed by your physician. Do not skip doses or change doses unless instructed to do so by your physician.  Decrease salt intake, follow recommended 2 grams daily.  Do not get leg(s) with compression wrap wet. If wraps are too tight as indicated by pain, numbness/tingling or discoloration of toes remove wrap completely and call the wound center @ [phone].       The treatment plan has been discussed at length between you and your provider. Follow all instructions carefully, it is very important. If you do not follow all instructions you are at risk of your wound not healing, infection, possible loss of limb and even loss of life.    COMPRESSION WRAP PATIENT CARE INSTRUCTIONS  DO NOT get compression wrap wet. When showering, use a shower boot.   Please contact wound clinic and if not able to reach, then go to emergency room if ANY of the following occur:   Tingling or numbness in the foot or toes on leg with compression wrap.  Severe pain that cannot be relieved with elevation and any medication instructed per your doctor.  A fever of 100.4°F (38°C) or higher.  Swelling, coldness, or blue-gray discoloration of the toes.  If the compression wrap feels too tight or too loose.  If the compression wrap is damaged or has rough edges that hurt.  If the compression wrap gets wet, you must cut wrap off.   Drainage from compression wrap that smells different than usual.

## 2025-07-03 NOTE — PROGRESS NOTES
Weekly Wound Education Note    Teaching Provided To: Patient  Training Topics: Cleasing and general instructions, Discharge instructions, Dressing  Training Method: Explain/Verbal, Demonstration  Training Response: Reinforcement needed        Notes: Patient here for follow up wound care visit to left medial ankle. Edema measurement decreased, wound measurement decreased. Provider recommending zinc, silver alginate, kerramax, conforming gauze,tape. Applied comprilian 8cm, 10cm, 12cm, cellona (x2), coban. Pt to follow up with provider in 1 week.

## 2025-07-03 NOTE — PROGRESS NOTES
Chief Complaint   Patient presents with    Wound Care     Follow-up for wound to left ankle. Pain 1/10- \"comes and goes, reminds me its there.\" Wrap tolerated well.       HPI:     Patient in for follow-up on left medial ankle wound.  He is doing well and following the Comprilan wraps.    Lab Results   Component Value Date    BUN 24 (H) 04/29/2025    CREATSERUM 0.91 04/29/2025    ALB 4.4 04/29/2025    TP 7.3 04/29/2025    A1C 5.6 04/16/2018       Medications - Current[1]    Allergies[2]         REVIEW OF SYSTEMS:     Review of Systems (ROS)  This information was obtained from the patient, chart    A comprehensive 10 point review of systems was completed.  Pertinent positives and negatives noted in the the HPI.     Past medical, surgical, family and social history updated where appropriate.  Past Medical History[3]  PHYSICAL EXAM:   /64   Pulse 78   Temp 98 °F (36.7 °C)   Resp 16    Estimated body mass index is 34.17 kg/m² as calculated from the following:    Height as of 6/5/25: 71\".    Weight as of 6/5/25: 245 lb (111.1 kg).   Vital signs reviewed.Appears stated age, well groomed.        Calf  Point of Measurement - Left Calf: 38     Left Calf from:: Heel  Calf Left cm:: 37          Ankle  Point of Measurement - Left Ankle: 12     Left Ankle from:: Heel  Left Ankle cm:: 23.5                Foot                               Wound 06/05/25 #1 Ankle Left;Medial (Active)   Date First Assessed/Time First Assessed: 06/05/25 1547    Wound Number (Wound Clinic Only): #1  Primary Wound Type: Edema  Location: Ankle  Wound Location Orientation: Left;Medial      Assessments 6/5/2025  3:59 PM 7/3/2025  1:43 PM   Wound Image       Drainage Amount Small Small   Drainage Description Serosanguineous Serosanguineous   Treatments Compression (comprilian 8cm, 10cm, 12cm, cellona (x2), coban) Compression (comprilian 8cm, 10cm, 12cm, cellona (x2), coban)   Wound Length (cm) 2.1 cm (slight angle) 1.1 cm   Wound Width (cm) 1.8 cm  0.9 cm   Wound Surface Area (cm^2) 2.97 cm^2 0.78 cm^2   Wound Depth (cm) 0.1 cm 0.1 cm   Wound Volume (cm^3) 0.198 cm^3 0.052 cm^3   Wound Healing % -- 74   Margins Well-defined edges Well-defined edges   Non-staged Wound Description Full thickness Full thickness   Mary-wound Assessment Dry;Edema;Pink Dry;Edema;Hemosiderin staining   Wound Granulation Tissue Pink;Firm Pale Grey;Firm;Red   Wound Bed Granulation (%) 25 % 100 %   Wound Bed Slough (%) 75 % --   Wound Odor None None   Tunneling? No No   Undermining? No No   Sinus Tracts? No No       No associated orders.       Compression Wrap 06/05/25 Ankle Anterior;Left (Active)   Placement Date/Time: 06/05/25 1650   Location: Ankle  Wound Location Orientation: Anterior;Left      Assessments 6/5/2025  3:59 PM 7/3/2025  1:43 PM   Response to Treatment Well tolerated Well tolerated   Compression Layers Multilayer Multilayer   Compression Product Type Comprilan 8cm;Comprilan 10cm;Comprilan 12cm;[place]inette 4in;Coban (comprilian 8cm, 10cm, 12cm, cellona (x2), coban) Comprilan 8cm;Comprilan 10cm;Comprilan 12cm;Stockinette 4in;Coban (comprilian 8cm, 10cm, 12cm, cellona (x2), coban)   Dressing Applied Yes (hydrofera transfer, kerramax, conforming gauze, tape) Yes   Compression Wrap Location Toes to Knee Toes to Knee   Compression Wrap Status Clean;Dry;Intact Clean;Dry;Intact       No associated orders.              ASSESSMENT AND PLAN:      1. Chronic venous hypertension (idiopathic) with ulcer of left lower extremity (HCC)    2. 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  Continue compression wrap precautions as per previous visit.  Risks, benefits, and alternatives of current treatment  plan discussed in detail.  Questions and concerns addressed. Red flags to RTC or ED reviewed.  Patient (or parent) agrees to plan.      No follow-ups on file.    Also refer to patient instructions.     I spent a total of 30 minutes with this patient's care.  This time included preparing to see the patient (eg, review notes and recent diagnostics), seeing the patient, performing a medically appropriate examination and/or evaluation, counseling and educating the patient, and documenting in the record.          Valentin Correa M.D.   [facility]   7/3/2025    Patient Instructions       PATIENT INSTRUCTIONS      FOR Russ Kirkland ,  1955    DATE OF SERVICE: 7/3/2025    Zinc oxide   Silver alginate  Kerramax  Comprilan 3 layer compression wrap to left lower extremity    Follow up with Dr. Correa 1 WEEK      Managing your edema:    Avoid prolonged standing in one place. It is better to have your calf muscles moving to pump fluid out of the legs.  Elevate leg(s) above the level of the heart when sitting or as much as possible.  Take you diuretics as directed by your physician. Do not skip doses or change doses unless instructed to do so by your physician.  Decrease salt intake, follow recommended 2 grams daily.  Do not get leg(s) with compression wrap wet. If wraps are too tight as indicated by pain, numbness/tingling or discoloration of toes remove wrap completely and call the wound center @ [phone].       The treatment plan has been discussed at length between you and your provider. Follow all instructions carefully, it is very important. If you do not follow all instructions you are at risk of your wound not healing, infection, possible loss of limb and even loss of life.    COMPRESSION WRAP PATIENT CARE INSTRUCTIONS  DO NOT get compression wrap wet. When showering, use a shower boot.   Please contact wound clinic and if not able to reach, then go to emergency room if ANY of the  following occur:   Tingling or numbness in the foot or toes on leg with compression wrap.  Severe pain that cannot be relieved with elevation and any medication instructed per your doctor.  A fever of 100.4°F (38°C) or higher.  Swelling, coldness, or blue-gray discoloration of the toes.  If the compression wrap feels too tight or too loose.  If the compression wrap is damaged or has rough edges that hurt.  If the compression wrap gets wet, you must cut wrap off.   Drainage from compression wrap that smells different than usual.          MISCELLANEOUS INSTRUCTIONS  Supplement with a daily multivitamin   Low salt diet  Intense blood sugar control - Goal Blood sugar below 180 at all times recommended.  Increase protein intake / consider protein supplements - see below  Elevate extremities at all times when sitting / laying down.  No tobacco use     DIETARY MODIFICATIONS TO HELP WITH WOUND HEALING:          Please follow any restrictions to diet given by your other doctors     Protein: meats, beans, eggs, milk and yogurt particularly Greek yogurt), tofu, soy nuts, soy protein products     Vitamin C: citrus fruits and juices, strawberries, tomatoes, tomato juice, peppers, baked potatoes, spinach, broccoli, cauliflower, [place] sprouts, cabbage     Vitamin A: dark greens, leafy vegetables, orange or yellow vegetables, cantaloupe, fortified dairy products, liver, fortified cereals     Zinc: fortified cereals, red meats, seafood     Consider Azeem by [employer] (These are essential branch chain amino acids that help with tissue building and wound healing) and take 2 packets/day. You can order online at [employer] or [employer]     ADDITIONAL REMINDERS:     The treatment plan has been discussed at length with you and your provider. Follow all instructions carefully, it is very important. If you do not follow all instructions, you are at risk of your wound not healing, infection, possible loss of limb and even loss of life.  Please  call the clinic at [phone] during regular business hours ( 7:30 AM - 5:30 PM) if you notice increased redness, warmth, pain or pus like drainage or start running a fever greater than 100.3.    For after hour emergencies, please call your primary physician or go to the nearest emergency room.  If your blood pressure is elevated, follow up with your primary care doctor and/or cardiologist as soon as possible.     FOR LEG SWELLING,  LOWER EXTREMITY WOUNDS AND IF USING COMPRESSION:   Managing your edema:    Avoid prolonged standing in one place. It is better to have your calf muscles moving to pump fluid out of the legs.  Elevate leg(s) above the level of the heart when sitting or as much as possible.  Take you diuretics as directed by your physician. Do not skip doses or change doses unless instructed to do so by your physician.  Decrease salt intake, follow recommended 2 grams daily.  Do not get leg(s) with compression wrap wet. If wraps are too tight as indicated by pain, numbness/tingling or discoloration of toes remove wrap completely and call the wound center @ [phone].       The treatment plan has been discussed at length between you and your provider. Follow all instructions carefully, it is very important. If you do not follow all instructions you are at risk of your wound not healing, infection, possible loss of limb and even loss of life.    COMPRESSION WRAP PATIENT CARE INSTRUCTIONS  DO NOT get compression wrap wet. When showering, use a shower boot.   Please contact wound clinic and if not able to reach, then go to emergency room if ANY of the following occur:   Tingling or numbness in the foot or toes on leg with compression wrap.  Severe pain that cannot be relieved with elevation and any medication instructed per your doctor.  A fever of 100.4°F (38°C) or higher.  Swelling, coldness, or blue-gray discoloration of the toes.  If the compression wrap feels too tight or too loose.  If the  compression wrap is damaged or has rough edges that hurt.  If the compression wrap gets wet, you must cut wrap off.   Drainage from compression wrap that smells different than usual.                        [1]   Current Outpatient Medications:     GABAPENTIN 800 MG Oral Tab, TAKE 1 TABLET BY MOUTH THREE TIMES DAILY, Disp: 270 tablet, Rfl: 0    betamethasone 0.1 % External Cream, Apply topically 2 (two) times daily. (Patient not taking: Reported on 6/5/2025), Disp: , Rfl:     docusate sodium 100 MG Oral Cap, Take 1 capsule (100 mg total) by mouth 2 (two) times daily as needed. (Patient not taking: Reported on 6/5/2025), Disp: , Rfl:     Ferrous Sulfate 325 (65 Fe) MG Oral Tab, Take 1 tablet (325 mg total) by mouth daily with breakfast., Disp: , Rfl:     gentamicin 0.1 % External Ointment, Apply topically in the morning and before bedtime. (Patient not taking: Reported on 6/5/2025), Disp: , Rfl:     simvastatin 20 MG Oral Tab, Take 1 tablet (20 mg total) by mouth nightly., Disp: 90 tablet, Rfl: 3    celecoxib 200 MG Oral Cap, TAKE ONE CAPSULE BY MOUTH DAILY AS NEEDED GENERIC EQUIVALENT FOR CELEBREX, Disp: 90 capsule, Rfl: 0    LOSARTAN POTASSIUM-HCTZ 100-12.5 MG Oral Tab, TAKE 1 TABLET BY MOUTH DAILY, Disp: 90 tablet, Rfl: 3    metoprolol succinate ER 50 MG Oral Tablet 24 Hr, Take 1 tablet (50 mg total) by mouth daily., Disp: , Rfl:     cholecalciferol 50 MCG (2000 UT) Oral Cap, cholecalciferol (vitamin D3) 50 mcg (2,000 unit) capsule, [RxNorm: 623453], Disp: , Rfl:     Sildenafil Citrate 100 MG Oral Tab, Take 1 tablet (100 mg total) by mouth daily as needed for Erectile Dysfunction. Take ~ one hour prior to sexual activity on an empty stomach, Disp: 30 tablet, Rfl: 11    aspirin 81 MG Oral Tab EC, Take 1 tablet (81 mg total) by mouth daily., Disp: , Rfl:     WARFARIN 2.5 MG Oral Tab, TAKE 1 TABLET BY MOUTH DAILY, Disp: 90 tablet, Rfl: 3    Glucosamine-Chondroit-Vit C-Mn (GLUCOSAMINE-CHONDROITIN) Oral Cap, Take 1  tablet by mouth daily., Disp: , Rfl:     omega-3 fatty acids (FISH OIL) 1000 MG Oral Cap, Take 1,000 mg by mouth daily., Disp: , Rfl:   [2]   Allergies  Allergen Reactions    Adhesive Tape RASH     Can use paper tape.   [3]   Past Medical History:   Acute perforated appendicitis    Acute, but ill-defined, cerebrovascular disease    Arrhythmia    afib    Atrial fibrillation (HCC)    ablation (2004); CV (May 2009)    Cataract    Deep vein thrombosis (HCC)    can not remember which leg    Hearing impairment    bilateral hearing aids    High blood pressure    High cholesterol    History of blood transfusion    no reaction    History of vein stripping    Osteoarthrosis, unspecified whether generalized or localized, unspecified site    Sleep apnea    CPAP device    Stroke (HCC)    2019    UGI bleed    Unspecified essential hypertension    Venous insufficiency    RT greater saphenous vein ablation 2007

## 2025-07-10 ENCOUNTER — OFFICE VISIT (OUTPATIENT)
Dept: WOUND CARE | Facility: HOSPITAL | Age: 70
End: 2025-07-10
Attending: FAMILY MEDICINE
Payer: MEDICARE

## 2025-07-10 VITALS
DIASTOLIC BLOOD PRESSURE: 61 MMHG | HEART RATE: 73 BPM | TEMPERATURE: 98 F | RESPIRATION RATE: 16 BRPM | SYSTOLIC BLOOD PRESSURE: 110 MMHG

## 2025-07-10 DIAGNOSIS — L97.929 CHRONIC VENOUS HYPERTENSION (IDIOPATHIC) WITH ULCER OF LEFT LOWER EXTREMITY (HCC): Primary | ICD-10-CM

## 2025-07-10 DIAGNOSIS — I87.312 CHRONIC VENOUS HYPERTENSION (IDIOPATHIC) WITH ULCER OF LEFT LOWER EXTREMITY (HCC): Primary | ICD-10-CM

## 2025-07-10 DIAGNOSIS — I10 BENIGN ESSENTIAL HTN: ICD-10-CM

## 2025-07-10 PROCEDURE — 99214 OFFICE O/P EST MOD 30 MIN: CPT | Performed by: FAMILY MEDICINE

## 2025-07-10 NOTE — PROGRESS NOTES
Chief Complaint   Patient presents with    Wound Care     Follow-up for wound to left ankle. Wrap tolerated well. Denies pain or concerns at this time. Seen by [facility] and pending insurance approval to get procedure done.        HPI:     Patient here for follow-up of left medial ankle wound.  He doing well and tolerating Comprilan wrap without difficulties.    Lab Results   Component Value Date    BUN 24 (H) 04/29/2025    CREATSERUM 0.91 04/29/2025    ALB 4.4 04/29/2025    TP 7.3 04/29/2025    A1C 5.6 04/16/2018       Medications - Current[1]    Allergies[2]         REVIEW OF SYSTEMS:     Review of Systems (ROS)  This information was obtained from the patient, chart    A comprehensive 10 point review of systems was completed.  Pertinent positives and negatives noted in the the HPI.     Past medical, surgical, family and social history updated where appropriate.  Past Medical History[3]  PHYSICAL EXAM:   /61   Pulse 73   Temp 97.8 °F (36.6 °C)   Resp 16    Estimated body mass index is 34.17 kg/m² as calculated from the following:    Height as of 6/5/25: 71\".    Weight as of 6/5/25: 245 lb (111.1 kg).   Vital signs reviewed.Appears stated age, well groomed.        Calf  Point of Measurement - Left Calf: 38     Left Calf from:: Heel  Calf Left cm:: 37.2          Ankle  Point of Measurement - Left Ankle: 12     Left Ankle from:: Heel  Left Ankle cm:: 24                Foot                               Wound 06/05/25 #1 Ankle Left;Medial (Active)   Date First Assessed/Time First Assessed: 06/05/25 1547    Wound Number (Wound Clinic Only): #1  Primary Wound Type: Edema  Location: Ankle  Wound Location Orientation: Left;Medial      Assessments 6/5/2025  3:59 PM 7/10/2025  2:01 PM   Wound Image       Drainage Amount Small Scant   Drainage Description Serosanguineous Serosanguineous   Treatments Compression (comprilian 8cm, 10cm, 12cm, cellona (x2), coban) --   Wound Length (cm) 2.1 cm (slight angle) 0.4 cm    Wound Width (cm) 1.8 cm 0.4 cm   Wound Surface Area (cm^2) 2.97 cm^2 0.13 cm^2   Wound Depth (cm) 0.1 cm 0.1 cm   Wound Volume (cm^3) 0.198 cm^3 0.008 cm^3   Wound Healing % -- 96   Margins Well-defined edges Well-defined edges   Non-staged Wound Description Full thickness Full thickness   Mary-wound Assessment Dry;Edema;Pink Dry;Edema;Hemosiderin staining   Wound Granulation Tissue Pink;Firm Red;Firm   Wound Bed Granulation (%) 25 % 50 %   Wound Bed Slough (%) 75 % --   Wound Odor None None   Shape -- 50% scabbed   Tunneling? No No   Undermining? No No   Sinus Tracts? No No       No associated orders.       Compression Wrap 06/05/25 Ankle Anterior;Left (Active)   Placement Date/Time: 06/05/25 1650   Location: Ankle  Wound Location Orientation: Anterior;Left      Assessments 6/5/2025  3:59 PM 7/3/2025  1:43 PM   Response to Treatment Well tolerated Well tolerated   Compression Layers Multilayer Multilayer   Compression Product Type Comprilan 8cm;Comprilan 10cm;Comprilan 12cm;Stockinette 4in;Coban (comprilian 8cm, 10cm, 12cm, cellona (x2), coban) Comprilan 8cm;Comprilan 10cm;Comprilan 12cm;Stockinette 4in;Coban (comprilian 8cm, 10cm, 12cm, cellona (x2), coban)   Dressing Applied Yes (hydrofera transfer, kerramax, conforming gauze, tape) Yes (zinc, silver alginate, kerramax, conforming gauze, tape)   Compression Wrap Location Toes to Knee Toes to Knee   Compression Wrap Status Clean;Dry;Intact Clean;Dry;Intact       No associated orders.              ASSESSMENT AND PLAN:      1. Chronic venous hypertension (idiopathic) with ulcer of left lower extremity (HCC)    2. Benign essential HTN    Clinically the wound is much smaller than previous visit is still a little bit open about 3 to 4 mm.  Will continue with silver alginate, ABD or Kerramax and Comprilan 3 layer compression wrap.  Patient doing very well with that.  Moisturizer applied as he does not dry his skin quite a bit on the ankle area.    Risks, benefits, and  alternatives of current treatment plan discussed in detail.  Questions and concerns addressed. Red flags to RTC or ED reviewed.  Patient (or parent) agrees to plan.      No follow-ups on file.    Also refer to patient instructions.     I spent a total of 30 minutes with this patient's care.  This time included preparing to see the patient (eg, review notes and recent diagnostics), seeing the patient, performing a medically appropriate examination and/or evaluation, counseling and educating the patient, and documenting in the record.          Valentin Correa M.D.   [facility]   7/10/2025    Patient Instructions       PATIENT INSTRUCTIONS      FOR Russ Kirkland ,  1955    DATE OF SERVICE: 7/10/2025    Zinc oxide   Silver alginate  Kerramax  Comprilan 3 layer compression wrap to left lower extremity    Follow up with Dr. Correa 1 WEEK      Managing your edema:    Avoid prolonged standing in one place. It is better to have your calf muscles moving to pump fluid out of the legs.  Elevate leg(s) above the level of the heart when sitting or as much as possible.  Take you diuretics as directed by your physician. Do not skip doses or change doses unless instructed to do so by your physician.  Decrease salt intake, follow recommended 2 grams daily.  Do not get leg(s) with compression wrap wet. If wraps are too tight as indicated by pain, numbness/tingling or discoloration of toes remove wrap completely and call the wound center @ [phone].       The treatment plan has been discussed at length between you and your provider. Follow all instructions carefully, it is very important. If you do not follow all instructions you are at risk of your wound not healing, infection, possible loss of limb and even loss of life.    COMPRESSION WRAP PATIENT CARE INSTRUCTIONS  DO NOT get compression wrap wet. When showering, use a shower boot.   Please contact wound clinic and if not able to reach, then  go to emergency room if ANY of the following occur:   Tingling or numbness in the foot or toes on leg with compression wrap.  Severe pain that cannot be relieved with elevation and any medication instructed per your doctor.  A fever of 100.4°F (38°C) or higher.  Swelling, coldness, or blue-gray discoloration of the toes.  If the compression wrap feels too tight or too loose.  If the compression wrap is damaged or has rough edges that hurt.  If the compression wrap gets wet, you must cut wrap off.   Drainage from compression wrap that smells different than usual.        MISCELLANEOUS INSTRUCTIONS  Supplement with a daily multivitamin   Low salt diet  Intense blood sugar control - Goal Blood sugar below 180 at all times recommended.  Increase protein intake / consider protein supplements - see below  Elevate extremities at all times when sitting / laying down.  No tobacco use     DIETARY MODIFICATIONS TO HELP WITH WOUND HEALING:          Please follow any restrictions to diet given by your other doctors     Protein: meats, beans, eggs, milk and yogurt particularly Greek yogurt), tofu, soy nuts, soy protein products     Vitamin C: citrus fruits and juices, strawberries, tomatoes, tomato juice, peppers, baked potatoes, spinach, broccoli, cauliflower, [place] sprouts, cabbage     Vitamin A: dark greens, leafy vegetables, orange or yellow vegetables, cantaloupe, fortified dairy products, liver, fortified cereals     Zinc: fortified cereals, red meats, seafood     Consider Azeem by [employer] (These are essential branch chain amino acids that help with tissue building and wound healing) and take 2 packets/day. You can order online at [employer] or [employer]     ADDITIONAL REMINDERS:     The treatment plan has been discussed at length with you and your provider. Follow all instructions carefully, it is very important. If you do not follow all instructions, you are at risk of your wound not healing, infection, possible loss of  limb and even loss of life.  Please call the clinic at [phone] during regular business hours ( 7:30 AM - 5:30 PM) if you notice increased redness, warmth, pain or pus like drainage or start running a fever greater than 100.3.    For after hour emergencies, please call your primary physician or go to the nearest emergency room.  If your blood pressure is elevated, follow up with your primary care doctor and/or cardiologist as soon as possible.     FOR LEG SWELLING,  LOWER EXTREMITY WOUNDS AND IF USING COMPRESSION:   Managing your edema:    Avoid prolonged standing in one place. It is better to have your calf muscles moving to pump fluid out of the legs.  Elevate leg(s) above the level of the heart when sitting or as much as possible.  Take you diuretics as directed by your physician. Do not skip doses or change doses unless instructed to do so by your physician.  Decrease salt intake, follow recommended 2 grams daily.  Do not get leg(s) with compression wrap wet. If wraps are too tight as indicated by pain, numbness/tingling or discoloration of toes remove wrap completely and call the wound center @ [phone].       The treatment plan has been discussed at length between you and your provider. Follow all instructions carefully, it is very important. If you do not follow all instructions you are at risk of your wound not healing, infection, possible loss of limb and even loss of life.    COMPRESSION WRAP PATIENT CARE INSTRUCTIONS  DO NOT get compression wrap wet. When showering, use a shower boot.   Please contact wound clinic and if not able to reach, then go to emergency room if ANY of the following occur:   Tingling or numbness in the foot or toes on leg with compression wrap.  Severe pain that cannot be relieved with elevation and any medication instructed per your doctor.  A fever of 100.4°F (38°C) or higher.  Swelling, coldness, or blue-gray discoloration of the toes.  If the compression wrap feels too  tight or too loose.  If the compression wrap is damaged or has rough edges that hurt.  If the compression wrap gets wet, you must cut wrap off.   Drainage from compression wrap that smells different than usual.                        [1]   Current Outpatient Medications:     GABAPENTIN 800 MG Oral Tab, TAKE 1 TABLET BY MOUTH THREE TIMES DAILY, Disp: 270 tablet, Rfl: 0    betamethasone 0.1 % External Cream, Apply topically 2 (two) times daily. (Patient not taking: Reported on 6/5/2025), Disp: , Rfl:     docusate sodium 100 MG Oral Cap, Take 1 capsule (100 mg total) by mouth 2 (two) times daily as needed. (Patient not taking: Reported on 6/5/2025), Disp: , Rfl:     Ferrous Sulfate 325 (65 Fe) MG Oral Tab, Take 1 tablet (325 mg total) by mouth daily with breakfast., Disp: , Rfl:     gentamicin 0.1 % External Ointment, Apply topically in the morning and before bedtime. (Patient not taking: Reported on 6/5/2025), Disp: , Rfl:     simvastatin 20 MG Oral Tab, Take 1 tablet (20 mg total) by mouth nightly., Disp: 90 tablet, Rfl: 3    celecoxib 200 MG Oral Cap, TAKE ONE CAPSULE BY MOUTH DAILY AS NEEDED GENERIC EQUIVALENT FOR CELEBREX, Disp: 90 capsule, Rfl: 0    LOSARTAN POTASSIUM-HCTZ 100-12.5 MG Oral Tab, TAKE 1 TABLET BY MOUTH DAILY, Disp: 90 tablet, Rfl: 3    metoprolol succinate ER 50 MG Oral Tablet 24 Hr, Take 1 tablet (50 mg total) by mouth daily., Disp: , Rfl:     cholecalciferol 50 MCG (2000 UT) Oral Cap, cholecalciferol (vitamin D3) 50 mcg (2,000 unit) capsule, [RxNorm: 298747], Disp: , Rfl:     Sildenafil Citrate 100 MG Oral Tab, Take 1 tablet (100 mg total) by mouth daily as needed for Erectile Dysfunction. Take ~ one hour prior to sexual activity on an empty stomach, Disp: 30 tablet, Rfl: 11    aspirin 81 MG Oral Tab EC, Take 1 tablet (81 mg total) by mouth daily., Disp: , Rfl:     WARFARIN 2.5 MG Oral Tab, TAKE 1 TABLET BY MOUTH DAILY, Disp: 90 tablet, Rfl: 3    Glucosamine-Chondroit-Vit C-Mn  (GLUCOSAMINE-CHONDROITIN) Oral Cap, Take 1 tablet by mouth daily., Disp: , Rfl:     omega-3 fatty acids (FISH OIL) 1000 MG Oral Cap, Take 1,000 mg by mouth daily., Disp: , Rfl:   [2]   Allergies  Allergen Reactions    Adhesive Tape RASH     Can use paper tape.   [3]   Past Medical History:   Acute perforated appendicitis    Acute, but ill-defined, cerebrovascular disease    Arrhythmia    afib    Atrial fibrillation (HCC)    ablation (2004); CV (May 2009)    Cataract    Deep vein thrombosis (HCC)    can not remember which leg    Hearing impairment    bilateral hearing aids    High blood pressure    High cholesterol    History of blood transfusion    no reaction    History of vein stripping    Osteoarthrosis, unspecified whether generalized or localized, unspecified site    Sleep apnea    CPAP device    Stroke (HCC)    2019    UGI bleed    Unspecified essential hypertension    Venous insufficiency    RT greater saphenous vein ablation 2007

## 2025-07-10 NOTE — PROGRESS NOTES
Weekly Wound Education Note    Teaching Provided To: Patient  Training Topics: Cleasing and general instructions, Discharge instructions, Compression, Dressing, Edema control  Training Method: Demonstration, Explain/Verbal  Training Response: Reinforcement needed        Notes: Patient here for follow up wound care visit to left medial ankle. Provider recommending to continue using Resta, silver alginate, 1/2 ABD pad, conforming gauze, tape. Applied comprilian 8cm, 10cm, 12cm, cellona (x2), coban. Patien to follow up with provider in 1 week.

## 2025-07-10 NOTE — PATIENT INSTRUCTIONS
PATIENT INSTRUCTIONS      FOR Russ Kirkland ,  1955    DATE OF SERVICE: 7/10/2025    Zinc oxide   Silver alginate  Kerramax  Comprilan 3 layer compression wrap to left lower extremity    Follow up with Dr. Correa 1 WEEK      Managing your edema:    Avoid prolonged standing in one place. It is better to have your calf muscles moving to pump fluid out of the legs.  Elevate leg(s) above the level of the heart when sitting or as much as possible.  Take you diuretics as directed by your physician. Do not skip doses or change doses unless instructed to do so by your physician.  Decrease salt intake, follow recommended 2 grams daily.  Do not get leg(s) with compression wrap wet. If wraps are too tight as indicated by pain, numbness/tingling or discoloration of toes remove wrap completely and call the wound center @ [phone].       The treatment plan has been discussed at length between you and your provider. Follow all instructions carefully, it is very important. If you do not follow all instructions you are at risk of your wound not healing, infection, possible loss of limb and even loss of life.    COMPRESSION WRAP PATIENT CARE INSTRUCTIONS  DO NOT get compression wrap wet. When showering, use a shower boot.   Please contact wound clinic and if not able to reach, then go to emergency room if ANY of the following occur:   Tingling or numbness in the foot or toes on leg with compression wrap.  Severe pain that cannot be relieved with elevation and any medication instructed per your doctor.  A fever of 100.4°F (38°C) or higher.  Swelling, coldness, or blue-gray discoloration of the toes.  If the compression wrap feels too tight or too loose.  If the compression wrap is damaged or has rough edges that hurt.  If the compression wrap gets wet, you must cut wrap off.   Drainage from compression wrap that smells different than usual.

## 2025-07-14 ENCOUNTER — LAB ENCOUNTER (OUTPATIENT)
Dept: LAB | Facility: HOSPITAL | Age: 70
End: 2025-07-14
Attending: STUDENT IN AN ORGANIZED HEALTH CARE EDUCATION/TRAINING PROGRAM
Payer: MEDICARE

## 2025-07-14 DIAGNOSIS — I48.21 PERMANENT ATRIAL FIBRILLATION (HCC): ICD-10-CM

## 2025-07-14 LAB
INR BLD: 2.93 (ref 0.8–1.2)
PROTHROMBIN TIME: 31.9 SECONDS (ref 11.6–14.8)

## 2025-07-14 PROCEDURE — 85610 PROTHROMBIN TIME: CPT

## 2025-07-14 PROCEDURE — 36415 COLL VENOUS BLD VENIPUNCTURE: CPT

## 2025-07-17 ENCOUNTER — OFFICE VISIT (OUTPATIENT)
Dept: WOUND CARE | Facility: HOSPITAL | Age: 70
End: 2025-07-17
Attending: FAMILY MEDICINE
Payer: MEDICARE

## 2025-07-17 VITALS
RESPIRATION RATE: 16 BRPM | TEMPERATURE: 98 F | SYSTOLIC BLOOD PRESSURE: 122 MMHG | DIASTOLIC BLOOD PRESSURE: 63 MMHG | HEART RATE: 71 BPM

## 2025-07-17 DIAGNOSIS — I87.312 CHRONIC VENOUS HYPERTENSION (IDIOPATHIC) WITH ULCER OF LEFT LOWER EXTREMITY (HCC): Primary | ICD-10-CM

## 2025-07-17 DIAGNOSIS — L97.929 CHRONIC VENOUS HYPERTENSION (IDIOPATHIC) WITH ULCER OF LEFT LOWER EXTREMITY (HCC): Primary | ICD-10-CM

## 2025-07-17 PROCEDURE — 99214 OFFICE O/P EST MOD 30 MIN: CPT | Performed by: FAMILY MEDICINE

## 2025-07-17 NOTE — PROGRESS NOTES
Chief Complaint   Patient presents with    Wound Care     Patients is here for a follow up. Patients stated no issue at this moment        HPI:     Patient here for follow-up of right medial ankle wound.  He is doing well and tolerating Comprilan wrap.  He has no new complaints.    Lab Results   Component Value Date    BUN 24 (H) 04/29/2025    CREATSERUM 0.91 04/29/2025    ALB 4.4 04/29/2025    TP 7.3 04/29/2025    A1C 5.6 04/16/2018       Medications - Current[1]    Allergies[2]         REVIEW OF SYSTEMS:     Review of Systems (ROS)  This information was obtained from the patient, chart    A comprehensive 10 point review of systems was completed.  Pertinent positives and negatives noted in the the HPI.     Past medical, surgical, family and social history updated where appropriate.  Past Medical History[3]  PHYSICAL EXAM:   /63   Pulse 71   Temp 98 °F (36.7 °C)   Resp 16    Estimated body mass index is 34.17 kg/m² as calculated from the following:    Height as of 6/5/25: 71\".    Weight as of 6/5/25: 245 lb (111.1 kg).   Vital signs reviewed.Appears stated age, well groomed.        Calf  Point of Measurement - Left Calf: 38     Left Calf from:: Heel  Calf Left cm:: 38          Ankle  Point of Measurement - Left Ankle: 12     Left Ankle from:: Heel  Left Ankle cm:: 23.5                Foot                               Wound 06/05/25 #1 Ankle Left;Medial (Active)   Date First Assessed/Time First Assessed: 06/05/25 1547    Wound Number (Wound Clinic Only): #1  Primary Wound Type: Edema  Location: Ankle  Wound Location Orientation: Left;Medial      Assessments 6/5/2025  3:59 PM 7/17/2025  1:52 PM   Wound Image       Drainage Amount Small None   Drainage Description Serosanguineous --   Treatments Compression (comprilian 8cm, 10cm, 12cm, cellona (x2), coban) --   Wound Length (cm) 2.1 cm (slight angle) 0.2 cm   Wound Width (cm) 1.8 cm 0.1 cm   Wound Surface Area (cm^2) 2.97 cm^2 0.02 cm^2   Wound Depth (cm) 0.1  cm 0.1 cm   Wound Volume (cm^3) 0.198 cm^3 0.001 cm^3   Wound Healing % -- 99   Margins Well-defined edges Well-defined edges   Non-staged Wound Description Full thickness Full thickness   Mary-wound Assessment Dry;Edema;Pink Dry;Edema;Hemosiderin staining   Wound Granulation Tissue Pink;Firm --   Wound Bed Granulation (%) 25 % --   Wound Bed Slough (%) 75 % --   Wound Odor None None   Shape -- 100% scabbed   Tunneling? No --   Undermining? No --   Sinus Tracts? No --       No associated orders.       Compression Wrap 06/05/25 Ankle Anterior;Left (Active)   Placement Date/Time: 06/05/25 1650   Location: Ankle  Wound Location Orientation: Anterior;Left      Assessments 6/5/2025  3:59 PM 7/10/2025  2:01 PM   Response to Treatment Well tolerated Well tolerated   Compression Layers Multilayer Multilayer   Compression Product Type Comprilan 8cm;Comprilan 10cm;Comprilan 12cm;Stockinette 4in;Coban (comprilian 8cm, 10cm, 12cm, cellona (x2), coban) Comprilan 8cm;Comprilan 10cm;Comprilan 12cm;Stockinette 4in;Coban (comprilian 8cm, 10c, 12cm, cellona (x2), coban)   Dressing Applied Yes (hydrofera transfer, kerramax, conforming gauze, tape) Yes (Resta, silver alginate, 1/2 ABD pad, conforming gauze, tape)   Compression Wrap Location Toes to Knee Toes to Knee   Compression Wrap Status Clean;Dry;Intact Clean;Dry;Intact       No associated orders.              ASSESSMENT AND PLAN:      1. Chronic venous hypertension (idiopathic) with ulcer of left lower extremity (HCC)    He did have an area of a very tiny 1 x 2 mm scab remaining and this was carefully removed with a curette.  He did have a small underlying wound about 1 x 2 mm well granulated superficial.  Will continue with silver alginate and ABD pad and Comprilan 3 layer wrap.  He will have a nurse visit in 1 week and then weekly for the next 3 weeks.  I will be out of the office and he can see me back in 4 weeks if needed.  I expect that his wound will be healed in the next 1  week.  We did discuss importance of using compression garments at home and he does have a lot of compression wraps at home that he orders for them every 6 months.  Advised him to use a brand-new compression wrap every 6 months in order to help prevent new wounds from occurring.  He is also currently seeing a vascular specialist and going to have vein treatment done if he can get it approved by the insurance.  I advised them to let them know about treating any  veins in the ankle area as needed.  This would be helpful.    Risks, benefits, and alternatives of current treatment plan discussed in detail.  Questions and concerns addressed. Red flags to RTC or ED reviewed.  Patient (or parent) agrees to plan.      No follow-ups on file.    Also refer to patient instructions.     I spent a total of 30 minutes with this patient's care.  This time included preparing to see the patient (eg, review notes and recent diagnostics), seeing the patient, performing a medically appropriate examination and/or evaluation, counseling and educating the patient, and documenting in the record.          Valentin Correa M.D.   [facility]   2025    Patient Instructions       PATIENT INSTRUCTIONS      FOR Russ Kirkland ,  1955    DATE OF SERVICE: 2025    Zinc oxide   Silver alginate  Kerramax  Comprilan 3 layer compression wrap to left lower extremity    Nurse visit weekly for next 3 weeks    Follow up with Dr. Correa in 4 weeks if needed      Managing your edema:    Avoid prolonged standing in one place. It is better to have your calf muscles moving to pump fluid out of the legs.  Elevate leg(s) above the level of the heart when sitting or as much as possible.  Take you diuretics as directed by your physician. Do not skip doses or change doses unless instructed to do so by your physician.  Decrease salt intake, follow recommended 2 grams daily.  Do not get leg(s) with compression wrap  wet. If wraps are too tight as indicated by pain, numbness/tingling or discoloration of toes remove wrap completely and call the wound center @ [phone].       The treatment plan has been discussed at length between you and your provider. Follow all instructions carefully, it is very important. If you do not follow all instructions you are at risk of your wound not healing, infection, possible loss of limb and even loss of life.    COMPRESSION WRAP PATIENT CARE INSTRUCTIONS  DO NOT get compression wrap wet. When showering, use a shower boot.   Please contact wound clinic and if not able to reach, then go to emergency room if ANY of the following occur:   Tingling or numbness in the foot or toes on leg with compression wrap.  Severe pain that cannot be relieved with elevation and any medication instructed per your doctor.  A fever of 100.4°F (38°C) or higher.  Swelling, coldness, or blue-gray discoloration of the toes.  If the compression wrap feels too tight or too loose.  If the compression wrap is damaged or has rough edges that hurt.  If the compression wrap gets wet, you must cut wrap off.   Drainage from compression wrap that smells different than usual.      MISCELLANEOUS INSTRUCTIONS  Supplement with a daily multivitamin   Low salt diet  Intense blood sugar control - Goal Blood sugar below 180 at all times recommended.  Increase protein intake / consider protein supplements - see below  Elevate extremities at all times when sitting / laying down.  No tobacco use     DIETARY MODIFICATIONS TO HELP WITH WOUND HEALING:          Please follow any restrictions to diet given by your other doctors     Protein: meats, beans, eggs, milk and yogurt particularly Greek yogurt), tofu, soy nuts, soy protein products     Vitamin C: citrus fruits and juices, strawberries, tomatoes, tomato juice, peppers, baked potatoes, spinach, broccoli, cauliflower, [place] sprouts, cabbage     Vitamin A: dark greens, leafy vegetables,  orange or yellow vegetables, cantaloupe, fortified dairy products, liver, fortified cereals     Zinc: fortified cereals, red meats, seafood     Consider A[employer] (These are essential branch chain amino acids that help with tissue building and wound healing) and take 2 packets/day. You can order online at [employer] or [employer]     ADDITIONAL REMINDERS:     The treatment plan has been discussed at length with you and your provider. Follow all instructions carefully, it is very important. If you do not follow all instructions, you are at risk of your wound not healing, infection, possible loss of limb and even loss of life.  Please call the clinic at [phone] during regular business hours ( 7:30 AM - 5:30 PM) if you notice increased redness, warmth, pain or pus like drainage or start running a fever greater than 100.3.    For after hour emergencies, please call your primary physician or go to the nearest emergency room.  If your blood pressure is elevated, follow up with your primary care doctor and/or cardiologist as soon as possible.     FOR LEG SWELLING,  LOWER EXTREMITY WOUNDS AND IF USING COMPRESSION:   Managing your edema:    Avoid prolonged standing in one place. It is better to have your calf muscles moving to pump fluid out of the legs.  Elevate leg(s) above the level of the heart when sitting or as much as possible.  Take you diuretics as directed by your physician. Do not skip doses or change doses unless instructed to do so by your physician.  Decrease salt intake, follow recommended 2 grams daily.  Do not get leg(s) with compression wrap wet. If wraps are too tight as indicated by pain, numbness/tingling or discoloration of toes remove wrap completely and call the wound center @ [phone].       The treatment plan has been discussed at length between you and your provider. Follow all instructions carefully, it is very important. If you do not follow all instructions you are at risk of your  wound not healing, infection, possible loss of limb and even loss of life.    COMPRESSION WRAP PATIENT CARE INSTRUCTIONS  DO NOT get compression wrap wet. When showering, use a shower boot.   Please contact wound clinic and if not able to reach, then go to emergency room if ANY of the following occur:   Tingling or numbness in the foot or toes on leg with compression wrap.  Severe pain that cannot be relieved with elevation and any medication instructed per your doctor.  A fever of 100.4°F (38°C) or higher.  Swelling, coldness, or blue-gray discoloration of the toes.  If the compression wrap feels too tight or too loose.  If the compression wrap is damaged or has rough edges that hurt.  If the compression wrap gets wet, you must cut wrap off.   Drainage from compression wrap that smells different than usual.                        [1]   Current Outpatient Medications:     GABAPENTIN 800 MG Oral Tab, TAKE 1 TABLET BY MOUTH THREE TIMES DAILY, Disp: 270 tablet, Rfl: 0    betamethasone 0.1 % External Cream, Apply topically 2 (two) times daily. (Patient not taking: Reported on 6/5/2025), Disp: , Rfl:     docusate sodium 100 MG Oral Cap, Take 1 capsule (100 mg total) by mouth 2 (two) times daily as needed. (Patient not taking: Reported on 6/5/2025), Disp: , Rfl:     Ferrous Sulfate 325 (65 Fe) MG Oral Tab, Take 1 tablet (325 mg total) by mouth daily with breakfast., Disp: , Rfl:     gentamicin 0.1 % External Ointment, Apply topically in the morning and before bedtime. (Patient not taking: Reported on 6/5/2025), Disp: , Rfl:     simvastatin 20 MG Oral Tab, Take 1 tablet (20 mg total) by mouth nightly., Disp: 90 tablet, Rfl: 3    celecoxib 200 MG Oral Cap, TAKE ONE CAPSULE BY MOUTH DAILY AS NEEDED GENERIC EQUIVALENT FOR CELEBREX, Disp: 90 capsule, Rfl: 0    LOSARTAN POTASSIUM-HCTZ 100-12.5 MG Oral Tab, TAKE 1 TABLET BY MOUTH DAILY, Disp: 90 tablet, Rfl: 3    metoprolol succinate ER 50 MG Oral Tablet 24 Hr, Take 1 tablet (50  mg total) by mouth daily., Disp: , Rfl:     cholecalciferol 50 MCG (2000 UT) Oral Cap, cholecalciferol (vitamin D3) 50 mcg (2,000 unit) capsule, [RxNorm: 610113], Disp: , Rfl:     Sildenafil Citrate 100 MG Oral Tab, Take 1 tablet (100 mg total) by mouth daily as needed for Erectile Dysfunction. Take ~ one hour prior to sexual activity on an empty stomach, Disp: 30 tablet, Rfl: 11    aspirin 81 MG Oral Tab EC, Take 1 tablet (81 mg total) by mouth daily., Disp: , Rfl:     WARFARIN 2.5 MG Oral Tab, TAKE 1 TABLET BY MOUTH DAILY, Disp: 90 tablet, Rfl: 3    Glucosamine-Chondroit-Vit C-Mn (GLUCOSAMINE-CHONDROITIN) Oral Cap, Take 1 tablet by mouth daily., Disp: , Rfl:     omega-3 fatty acids (FISH OIL) 1000 MG Oral Cap, Take 1,000 mg by mouth daily., Disp: , Rfl:   [2]   Allergies  Allergen Reactions    Adhesive Tape RASH     Can use paper tape.   [3]   Past Medical History:   Acute perforated appendicitis    Acute, but ill-defined, cerebrovascular disease    Arrhythmia    afib    Atrial fibrillation (HCC)    ablation (2004); CV (May 2009)    Cataract    Deep vein thrombosis (HCC)    can not remember which leg    Hearing impairment    bilateral hearing aids    High blood pressure    High cholesterol    History of blood transfusion    no reaction    History of vein stripping    Osteoarthrosis, unspecified whether generalized or localized, unspecified site    Sleep apnea    CPAP device    Stroke (HCC)    2019    UGI bleed    Unspecified essential hypertension    Venous insufficiency    RT greater saphenous vein ablation 2007

## 2025-07-17 NOTE — PROGRESS NOTES
Weekly Wound Education Note    Teaching Provided To: Patient  Training Topics: Dressing, Edema control, Cleasing and general instructions, Compression, Discharge instructions  Training Method: Explain/Verbal, Demonstration  Training Response: Reinforcement needed        Notes: Patient here for follow up wound care visit to left medial ankle. Provider recommending silver alginate, 4x4 gauze, conforming gauze, tape. Applied comprilian 8cm, 10cm, 12cm, cellona (x2), coban. Patient to follow up in 1 week.

## 2025-07-17 NOTE — PATIENT INSTRUCTIONS
PATIENT INSTRUCTIONS      FOR Russ Kirkland ,  1955    DATE OF SERVICE: 2025    Zinc oxide   Silver alginate  Kerramax  Comprilan 3 layer compression wrap to left lower extremity    Nurse visit weekly for next 3 weeks    Follow up with Dr. Correa in 4 weeks if needed      Managing your edema:    Avoid prolonged standing in one place. It is better to have your calf muscles moving to pump fluid out of the legs.  Elevate leg(s) above the level of the heart when sitting or as much as possible.  Take you diuretics as directed by your physician. Do not skip doses or change doses unless instructed to do so by your physician.  Decrease salt intake, follow recommended 2 grams daily.  Do not get leg(s) with compression wrap wet. If wraps are too tight as indicated by pain, numbness/tingling or discoloration of toes remove wrap completely and call the wound center @ [phone].       The treatment plan has been discussed at length between you and your provider. Follow all instructions carefully, it is very important. If you do not follow all instructions you are at risk of your wound not healing, infection, possible loss of limb and even loss of life.    COMPRESSION WRAP PATIENT CARE INSTRUCTIONS  DO NOT get compression wrap wet. When showering, use a shower boot.   Please contact wound clinic and if not able to reach, then go to emergency room if ANY of the following occur:   Tingling or numbness in the foot or toes on leg with compression wrap.  Severe pain that cannot be relieved with elevation and any medication instructed per your doctor.  A fever of 100.4°F (38°C) or higher.  Swelling, coldness, or blue-gray discoloration of the toes.  If the compression wrap feels too tight or too loose.  If the compression wrap is damaged or has rough edges that hurt.  If the compression wrap gets wet, you must cut wrap off.   Drainage from compression wrap that smells different than usual.

## 2025-07-24 ENCOUNTER — OFFICE VISIT (OUTPATIENT)
Dept: WOUND CARE | Facility: HOSPITAL | Age: 70
End: 2025-07-24
Attending: FAMILY MEDICINE
Payer: MEDICARE

## 2025-07-24 VITALS
RESPIRATION RATE: 18 BRPM | TEMPERATURE: 98 F | DIASTOLIC BLOOD PRESSURE: 68 MMHG | HEART RATE: 78 BPM | SYSTOLIC BLOOD PRESSURE: 108 MMHG

## 2025-07-24 DIAGNOSIS — L97.312 VARICOSE VEINS OF RIGHT LOWER EXTREMITY WITH INFLAMMATION, WITH ULCER OF ANKLE WITH FAT LAYER EXPOSED (HCC): ICD-10-CM

## 2025-07-24 DIAGNOSIS — L97.929 CHRONIC VENOUS HYPERTENSION (IDIOPATHIC) WITH ULCER OF LEFT LOWER EXTREMITY (HCC): Primary | ICD-10-CM

## 2025-07-24 DIAGNOSIS — I87.312 CHRONIC VENOUS HYPERTENSION (IDIOPATHIC) WITH ULCER OF LEFT LOWER EXTREMITY (HCC): Primary | ICD-10-CM

## 2025-07-24 DIAGNOSIS — S91.002A UNSPECIFIED OPEN WOUND, LEFT ANKLE, INITIAL ENCOUNTER: ICD-10-CM

## 2025-07-24 DIAGNOSIS — I83.213 VARICOSE VEINS OF RIGHT LOWER EXTREMITY WITH INFLAMMATION, WITH ULCER OF ANKLE WITH FAT LAYER EXPOSED (HCC): ICD-10-CM

## 2025-07-24 PROCEDURE — 29581 APPL MULTLAYER CMPRN SYS LEG: CPT

## 2025-07-24 NOTE — PROGRESS NOTES
Chief Complaint   Patient presents with    Wound Recheck     Patient is here for a follow-up nurse visit of his left medial ankle wound.         Medications - Current[1]    Allergies[2]       HISTORY:     Past medical, surgical, family and social history updated where appropriate.    PHYSICAL EXAM:   /68   Pulse 78   Temp 97.7 °F (36.5 °C)   Resp 18        Vital signs reviewed.      Calf  Point of Measurement - Left Calf: 38     Left Calf from:: Heel  Calf Left cm:: 38.3          Ankle  Point of Measurement - Left Ankle: 12     Left Ankle from:: Heel  Left Ankle cm:: 24                Wound 06/05/25 #1 Ankle Left;Medial (Active)   Date First Assessed/Time First Assessed: 06/05/25 1547    Wound Number (Wound Clinic Only): #1  Primary Wound Type: Edema  Location: Ankle  Wound Location Orientation: Left;Medial      Assessments 6/5/2025  3:59 PM 7/24/2025  1:53 PM   Wound Image       Drainage Amount Small None   Drainage Description Serosanguineous --   Treatments Compression Compression   Wound Length (cm) 2.1 cm 0.1 cm   Wound Width (cm) 1.8 cm 0.1 cm   Wound Surface Area (cm^2) 2.97 cm^2 0.01 cm^2   Wound Depth (cm) 0.1 cm 0 cm   Wound Volume (cm^3) 0.198 cm^3 0 cm^3   Wound Healing % -- 100   Margins Well-defined edges Flat and Intact   Non-staged Wound Description Full thickness Full thickness   Mary-wound Assessment Dry;Edema;Pink Dry;Hemosiderin staining   Wound Granulation Tissue Pink;Firm Pink;Firm   Wound Bed Granulation (%) 25 % 100 %   Wound Bed Slough (%) 75 % --   Wound Odor None None   Tunneling? No No   Undermining? No No   Sinus Tracts? No No       No associated orders.       Compression Wrap 06/05/25 Ankle Anterior;Left (Active)   Placement Date/Time: 06/05/25 1650   Location: Ankle  Wound Location Orientation: Anterior;Left      Assessments 6/5/2025  3:59 PM 7/24/2025  2:13 PM   Response to Treatment Well tolerated Well tolerated   Compression Layers Multilayer Multilayer   Compression  Product Type Comprilan 8cm;Comprilan 10cm;Comprilan 12cm;Stockinette 4in;Coban Comprilan 8cm;Comprilan 10cm;Stockinette 4in;Coban   Dressing Applied Yes Yes   Compression Wrap Location Toes to Knee Toes to Knee   Compression Wrap Status Clean;Dry;Intact Clean;Dry;Intact       No associated orders.          ASSESSMENT AND PLAN:        Risks, benefits, and alternatives of current treatment plan discussed in detail.  Questions and concerns addressed. Red flags to RTC or ED reviewed.  Patient (or parent) agrees to plan.      No follow-ups on file.  Weekly Wound Education Note    Teaching Provided To: Patient  Training Topics: Cleasing and general instructions, Compression, Discharge instructions, Dressing, Edema control  Training Method: Explain/Verbal  Training Response: Patient responds and understands        Notes: Here for RN visit.    Here for nurse visit for wound to left leg. Arrives comprilan wraps, tolerated well - edema measurements stable. Wound fragilly healed, agreeable to be wrapped 1 more week. RN to discuss POC with provider - if possible to get orders to discharge patient if is healed and ok to transition into patient's own compression stocking. Pt currently scheduled for visit 7/31/25.     Keerthi BUITRAGO RN   7/24/2025  2:23 PM             [1]   Current Outpatient Medications:     GABAPENTIN 800 MG Oral Tab, TAKE 1 TABLET BY MOUTH THREE TIMES DAILY, Disp: 270 tablet, Rfl: 0    betamethasone 0.1 % External Cream, Apply topically 2 (two) times daily. (Patient not taking: Reported on 6/5/2025), Disp: , Rfl:     docusate sodium 100 MG Oral Cap, Take 1 capsule (100 mg total) by mouth 2 (two) times daily as needed. (Patient not taking: Reported on 6/5/2025), Disp: , Rfl:     Ferrous Sulfate 325 (65 Fe) MG Oral Tab, Take 1 tablet (325 mg total) by mouth daily with breakfast., Disp: , Rfl:     gentamicin 0.1 % External Ointment, Apply topically in the morning and before bedtime. (Patient not taking: Reported on  6/5/2025), Disp: , Rfl:     simvastatin 20 MG Oral Tab, Take 1 tablet (20 mg total) by mouth nightly., Disp: 90 tablet, Rfl: 3    celecoxib 200 MG Oral Cap, TAKE ONE CAPSULE BY MOUTH DAILY AS NEEDED GENERIC EQUIVALENT FOR CELEBREX, Disp: 90 capsule, Rfl: 0    LOSARTAN POTASSIUM-HCTZ 100-12.5 MG Oral Tab, TAKE 1 TABLET BY MOUTH DAILY, Disp: 90 tablet, Rfl: 3    metoprolol succinate ER 50 MG Oral Tablet 24 Hr, Take 1 tablet (50 mg total) by mouth daily., Disp: , Rfl:     cholecalciferol 50 MCG (2000 UT) Oral Cap, cholecalciferol (vitamin D3) 50 mcg (2,000 unit) capsule, [RxNorm: 653962], Disp: , Rfl:     Sildenafil Citrate 100 MG Oral Tab, Take 1 tablet (100 mg total) by mouth daily as needed for Erectile Dysfunction. Take ~ one hour prior to sexual activity on an empty stomach, Disp: 30 tablet, Rfl: 11    aspirin 81 MG Oral Tab EC, Take 1 tablet (81 mg total) by mouth daily., Disp: , Rfl:     WARFARIN 2.5 MG Oral Tab, TAKE 1 TABLET BY MOUTH DAILY, Disp: 90 tablet, Rfl: 3    Glucosamine-Chondroit-Vit C-Mn (GLUCOSAMINE-CHONDROITIN) Oral Cap, Take 1 tablet by mouth daily., Disp: , Rfl:     omega-3 fatty acids (FISH OIL) 1000 MG Oral Cap, Take 1,000 mg by mouth daily., Disp: , Rfl:   [2]   Allergies  Allergen Reactions    Adhesive Tape RASH     Can use paper tape.

## 2025-07-31 ENCOUNTER — OFFICE VISIT (OUTPATIENT)
Dept: WOUND CARE | Facility: HOSPITAL | Age: 70
End: 2025-07-31
Attending: FAMILY MEDICINE
Payer: MEDICARE

## 2025-07-31 VITALS
TEMPERATURE: 98 F | DIASTOLIC BLOOD PRESSURE: 86 MMHG | SYSTOLIC BLOOD PRESSURE: 153 MMHG | HEART RATE: 76 BPM | RESPIRATION RATE: 16 BRPM

## 2025-07-31 DIAGNOSIS — I83.029 VENOUS ULCER OF LEFT LEG (HCC): Primary | ICD-10-CM

## 2025-07-31 DIAGNOSIS — L97.929 VENOUS ULCER OF LEFT LEG (HCC): Primary | ICD-10-CM

## 2025-07-31 PROCEDURE — 99213 OFFICE O/P EST LOW 20 MIN: CPT | Performed by: NURSE PRACTITIONER

## 2025-08-07 ENCOUNTER — APPOINTMENT (OUTPATIENT)
Dept: WOUND CARE | Facility: HOSPITAL | Age: 70
End: 2025-08-07
Attending: FAMILY MEDICINE

## 2025-08-14 ENCOUNTER — APPOINTMENT (OUTPATIENT)
Dept: WOUND CARE | Facility: HOSPITAL | Age: 70
End: 2025-08-14
Attending: FAMILY MEDICINE

## 2025-08-19 ENCOUNTER — LAB ENCOUNTER (OUTPATIENT)
Dept: LAB | Facility: HOSPITAL | Age: 70
End: 2025-08-19
Attending: STUDENT IN AN ORGANIZED HEALTH CARE EDUCATION/TRAINING PROGRAM

## 2025-08-19 DIAGNOSIS — I48.21 PERMANENT ATRIAL FIBRILLATION (HCC): ICD-10-CM

## 2025-08-19 LAB
INR BLD: 2.3 (ref 0.8–1.2)
PROTHROMBIN TIME: 26.4 SECONDS (ref 11.6–14.8)

## 2025-08-19 PROCEDURE — 85610 PROTHROMBIN TIME: CPT

## 2025-08-19 PROCEDURE — 36415 COLL VENOUS BLD VENIPUNCTURE: CPT

## 2025-09-15 ENCOUNTER — APPOINTMENT (OUTPATIENT)
Age: 70
End: 2025-09-15

## (undated) DIAGNOSIS — E78.49 OTHER HYPERLIPIDEMIA: ICD-10-CM

## (undated) DIAGNOSIS — E55.9 VITAMIN D DEFICIENCY: ICD-10-CM

## (undated) DIAGNOSIS — G60.9 IDIOPATHIC PERIPHERAL NEUROPATHY: ICD-10-CM

## (undated) DIAGNOSIS — R97.20 ELEVATED PSA: ICD-10-CM

## (undated) DIAGNOSIS — I10 BENIGN ESSENTIAL HTN: ICD-10-CM

## (undated) DIAGNOSIS — Z00.00 ANNUAL PHYSICAL EXAM: Primary | ICD-10-CM

## (undated) DEVICE — LAP CHOLE: Brand: MEDLINE INDUSTRIES, INC.

## (undated) DEVICE — Device

## (undated) DEVICE — SOL  .9 1000ML BTL

## (undated) DEVICE — [HIGH FLOW INSUFFLATOR,  DO NOT USE IF PACKAGE IS DAMAGED,  KEEP DRY,  KEEP AWAY FROM SUNLIGHT,  PROTECT FROM HEAT AND RADIOACTIVE SOURCES.]: Brand: PNEUMOSURE

## (undated) DEVICE — GOWN SURG AERO CHROME XXL

## (undated) DEVICE — SUTURE SILK 2-0 FS

## (undated) DEVICE — GAMMEX® PI HYBRID SIZE 6, STERILE POWDER-FREE SURGICAL GLOVE, POLYISOPRENE AND NEOPRENE BLEND: Brand: GAMMEX

## (undated) DEVICE — TROCAR: Brand: KII® SLEEVE

## (undated) DEVICE — SUTURE MONOCRYL 4-0 PS-2

## (undated) DEVICE — SUTURE VICRYL 2-0 CT-1

## (undated) DEVICE — GAMMEX® PI HYBRID SIZE 6.5, STERILE POWDER-FREE SURGICAL GLOVE, POLYISOPRENE AND NEOPRENE BLEND: Brand: GAMMEX

## (undated) DEVICE — PADDING CAST SOFT ROLL 6\" STER

## (undated) DEVICE — ADHESIVE LIQ 2/3ML VI MASTISOL

## (undated) DEVICE — 3M™ STERI-STRIP™ REINFORCED ADHESIVE SKIN CLOSURES, R1547, 1/2 IN X 4 IN (12 MM X 100 MM), 6 STRIPS/ENVELOPE: Brand: 3M™ STERI-STRIP™

## (undated) DEVICE — DRAIN RESERVOIR RELIAVAC 100CC

## (undated) DEVICE — HANDLE SUR BLU PLAS LT FLX SLIP ON ST DISP

## (undated) DEVICE — ELECTRODE ES RET 2 PATE W/ 10FT CRD MPLR DISP

## (undated) DEVICE — ADHESIVE MASTISOL 2/3CC VL

## (undated) DEVICE — PACK DRP UNIV W/ BK TBL MAYO STD BTM TOP SIDE

## (undated) DEVICE — CULTURE TUBE ANAEROBIC

## (undated) DEVICE — TRAY SKIN PREP PVP-1

## (undated) DEVICE — SPONGE GZ 4XL4IN 100% COT 12 PLY TYP VII WVN

## (undated) DEVICE — TROCAR: Brand: KII FIOS FIRST ENTRY

## (undated) DEVICE — DRAIN INCS 7MM 20CMX7MM SIL

## (undated) DEVICE — CHLORAPREP 26ML APPLICATOR

## (undated) DEVICE — TISSUE RETRIEVAL SYSTEM: Brand: INZII RETRIEVAL SYSTEM

## (undated) DEVICE — BANDAGE ROLL,100% COTTON, 6 PLY, LARGE: Brand: KERLIX

## (undated) DEVICE — ZIMMER® STERILE DISPOSABLE TOURNIQUET CUFF WITH PLC, DUAL PORT, SINGLE BLADDER, 34 IN. (86 CM)

## (undated) DEVICE — DRAIN SILICONE FLAT 10MM

## (undated) DEVICE — SUTURE VICRYL 0 UR-6

## (undated) DEVICE — TRAY FOLEY BDX 16F STATLOCK

## (undated) DEVICE — CONTAINER SPEC STR 4OZ GRY LID

## (undated) DEVICE — CULTURE COLLECT/TRANSPORT SYS

## (undated) DEVICE — SUTURE VICRYL 3-0 PS-1

## (undated) DEVICE — UNDYED BRAIDED (POLYGLACTIN 910), SYNTHETIC ABSORBABLE SUTURE: Brand: COATED VICRYL

## (undated) DEVICE — 3M™ TEGADERM™ TRANSPARENT FILM DRESSING, 1626W, 4 IN X 4-3/4 IN (10 CM X 12 CM), 50 EACH/CARTON, 4 CARTON/CASE: Brand: 3M™ TEGADERM™

## (undated) DEVICE — CV PACK-LF: Brand: MEDLINE INDUSTRIES, INC.

## (undated) DEVICE — SHEET,DRAPE,53X77,STERILE: Brand: MEDLINE

## (undated) DEVICE — GLOVE BIOGEL M SURG SZ 8

## (undated) DEVICE — LAPAROTOMY SPONGE - RF AND X-RAY DETECTABLE PRE-WASHED: Brand: SITUATE

## (undated) DEVICE — ABDOMINAL PAD: Brand: DERMACEA

## (undated) DEVICE — SOLUTION IRRIG 1000ML 0.9% NACL USP BTL

## (undated) DEVICE — 3M(TM) TEGADERM(TM) TRANSPARENT FILM DRESSING FRAME STYLE 1628: Brand: 3M™ TEGADERM™

## (undated) DEVICE — PADDING CAST SOFT ROLL 4\" STER

## (undated) DEVICE — #15 STERILE STAINLESS BLADE: Brand: STERILE STAINLESS BLADES

## (undated) DEVICE — SUTURE PROLENE 5-0 C-1

## (undated) DEVICE — BANDAGE COHESIVE W4INXL5YD TAN E POR SLF ADH

## (undated) DEVICE — ENCORE® LATEX MICRO SIZE 8, STERILE LATEX POWDER-FREE SURGICAL GLOVE: Brand: ENCORE

## (undated) DEVICE — MINOR GENERAL: Brand: MEDLINE INDUSTRIES, INC.

## (undated) DEVICE — SUCTION CANISTER, 3000CC,SAFELINER: Brand: DEROYAL

## (undated) DEVICE — HEMOCLIP HORIZON MED 002200

## (undated) DEVICE — TRANSPOSAL ULTRAFLEX DUO/QUAD ULTRA CART MANIFOLD

## (undated) DEVICE — STANDARD HYPODERMIC NEEDLE,POLYPROPYLENE HUB: Brand: MONOJECT

## (undated) DEVICE — NON-ADHERENT PAD PREPACK: Brand: TELFA

## (undated) DEVICE — LIGASURE LAP MARYLAND 37CM

## (undated) DEVICE — SOL  .9 3000ML

## (undated) DEVICE — ENCORE® LATEX ACCLAIM SIZE 8, STERILE LATEX POWDER-FREE SURGICAL GLOVE: Brand: ENCORE

## (undated) DEVICE — GAUZE,SPONGE,4"X4",16PLY,XRAY,STRL,LF: Brand: MEDLINE

## (undated) DEVICE — APPLICATOR PREP 26ML CHG 2% ISO ALC 70%

## (undated) DEVICE — MAT TRANSFER HALFMATS 39 X 49

## (undated) DEVICE — BLADE BEAVER L3

## (undated) DEVICE — #11 STERILE BLADE: Brand: POLYMER COATED BLADES

## (undated) NOTE — LETTER
CRISTELA Notifier: Brandan[employer]   SHARIF Patient Name: Yadira Rondon. Identification Number: [id number]      Advance Beneficiary Notice of Noncoverage (ABN)  NOTE:  If Medicare doesn’t pay for D. Item/service(s) below, you may have to pay.   Medicare garcia this choice  I am not responsible for payment, and I cannot appeal to see if Medicare would pay. H. Additional Information: This notice gives our opinion, not an official Medicare decision.  If you have other questions on this notice or Medicare billin

## (undated) NOTE — LETTER
[facility], [place]  Authorization for Invasive Procedures  1.  I hereby authorize Dr. Whit Wei , my physician and whomever may be designated as the doctor's assistant, to perform the following operation and/or procedure:  Tabitha Villareal performed for the purposes of advancing medicine, science, and/or education, provided my identity is not revealed. If the procedure has been videotaped, the physician/surgeon will obtain the original videotape.  The hospital will not be responsible for stor My signature below affirms that prior to the time of the procedure, I have explained to the patient and/or his legal representative, the risks and benefits involved in the proposed treatment and any reasonable alternative to the proposed treatment.  I have

## (undated) NOTE — LETTER
Notifier: [facility]       Patient Name: Russ Kirkland       Identification Number: [id number]                          Advance Beneficiary Notice of Noncoverage (ABN)   NOTE:  If Medicare doesn’t pay for D. Items/service(s) below, you may have to pay.  Medicare does not pay for everything, even some care that you or your health care provider have good reason to think you need. We expect Medicare may not pay for the D. items/service(s) below.  Items or Services  Bilateral knee Gel one injection Reason Medicare May Not Pay: Estimated Cost   __EKG ($129.00)  __Pap smear ($48.23) __Pelvic/Breast ($65.00)  __ Ear Irrigation ($149)  _x_ Injection(s)  ___ Tdap ($70)       ___ Meningitis ($206)   __Prevnar ($285)  ___ Td ($51)            ___Shingrix ($215)        __Prevnar 20 ($309)  ___ Hep A ($156)   ___Prolia ($1827.00)     __ Xiaflex ($              )   ___ Hep B ($167)      __Pneumovax ($155)                                            ___ Vaccine Administration ($31)   __ Medicare does not cover this service      __ Medicare may not pay for this   item/service for your condition     __ Medicare may not pay for this item/service as often as this   $3600.00     WHAT YOU NEED TO DO NOW:  Read this notice, so you can make an informed decision about your care.  Ask us any questions that you may have after you finish reading.  Choose an option below about whether to receive the D. item/service(s)  listed above.  Note: If you choose Option 1 or 2, we may help you to use any other insurance that you might have, but Medicare cannot require us to do this.  OPTIONS: Check only one box.  We cannot choose a box for you.   OPTION 1. I want the D. item/service(s) listed above. You may ask to be paid now, but I also want Medicare billed for an official decision on payment, which is sent to me on a Medicare Summary Notice (MSN). I understand that if Medicare doesn’t pay, I am responsible for payment, but I can appeal  to Medicare by following the directions on the MSN. If Medicare does pay, you will refund any payments I made to you, less co-pays or deductibles.  OPTION 2. I want the D. item/service(s) listed above, but do not bill Medicare. You may ask to be paid now as I am responsible for payment. I cannot appeal if Medicare is not billed.  OPTION 3. I don't want the D. item/service(s) listed above. I understand with this choice I am not responsible for payment, and I cannot appeal to see if Medicare would pay.    H. Additional Information:    This notice gives our opinion, not an official Medicare decision. If you have other questions on this notice or Medicare billing, call 1-800-MEDICARE (1-534.729.1555/TTY: 1-532.800.5184). Signing below means that you have received and understand this notice. You also receive a copy.  Signature: Date:       You have the right to get Medicare information in an accessible format, like large print, Braille, or audio. You also have the right to file a complaint if you feel you’ve been discriminated against. Visit Medicare.gov/about- us/kcbkarmfzigjl-fnxelwzkgmqyupdkq-hrpgja.  According to the Paperwork Reduction Act of 1995, no persons are required to respond to a collection of information unless it displays a valid OMB control number. The valid OMB control number for this information collection is 6693-0134. The time required to complete this information collection is estimated to average 7 minutes per response, including the time to review instructions, search existing data resources, gather the data needed, and complete and review the information collection. If you have comments concerning the accuracy of the time estimate or suggestions for improving this form, please write to: CMS, [facility]     Stacy Attn: PRUDENCE Reports Clearance Officer, [place], [place] [address].  Form CMS-R-131 (Exp. 1/31/2026) Form Approved OMB No. [id number]

## (undated) NOTE — LETTER
CRISTELA Notifier: [facility]   AVA. Patient Name: Rolando Barber. Identification Number: [id number]      Advance Beneficiary Notice of Noncoverage (ABN)  NOTE:  If Medicare doesn’t pay for D. Item/service(s) below, you may have to pay.   Medicare garcia responsible for payment, and I cannot appeal to see if Medicare would pay. H. Additional Information: This notice gives our opinion, not an official Medicare decision.  If you have other questions on this notice or Medicare billing, call 1-800-MEDICARE

## (undated) NOTE — LETTER
AUTHORIZATION FOR SURGICAL OPERATION OR OTHER PROCEDURE    1.  I hereby authorize Dr. Rhina Morales and the [facility] staff assigned to my case to perform the following operation and/or procedure at the [facility]:    Bilateral knee aspiration and injection with G Patient:           []  Parent    Responsible person                          []  Spouse  In case of minor or                    [] Other  _____________   Incompetent name:  __________________________________________________                               (p

## (undated) NOTE — LETTER
AUTHORIZATION FOR SURGICAL OPERATION OR OTHER PROCEDURE    1. I hereby authorize Dr. Iliana Eli and the [facility] staff assigned to my case to perform the following operation and/or procedure at the [facility]:    _______________________________________________________________________________________________    Bilateral knee aspiration and injection with corticosteroid    _______________________________________________________________________________________________    2. My physician has explained the nature and purpose of the operation or other procedure, possible alternative methods of treatment, the risks involved, and the possibility of complication to me. I acknowledge that no guarantee has been made as to the result that may be obtained. 3.  I recognize that, during the course of this operation, or other procedure, unforseen conditions may necessitate additional or different procedure than those listed above. I, therefore, further authorize and request that the above named physician, his/her physician assistants or designees perform such procedures as are, in his/her professional opinion, necessary and desirable. 4.  Any tissue or organs removed in the operation or other procedure may be disposed of by and at the discretion of the [facility] staff and [facility]. 5.  I understand that in the event of a medical emergency, I will be transported by local paramedics to [facility] or other hospital emergency department. 6.  I certify that I have read and fully understand the above consent to operation and/or other procedure. 7.  I acknowledge that my physician has explained sedation/analgesia administration to me including the risks and benefits. I consent to the administration of sedation/analgesia as may be necessary or desirable in the judgement of my physician.     Witness signature: ___________________________________________________ Date: ______/______/_____                    Time:  ________ A. M.  P.M. Patient Name:   Braxton Jones [facility]  9/9/1955  [id number]       Patient signature:  ___________________________________________________      Statement of Physician  My signature below affirms that prior to the time of the procedure, I have explained to the patient and/or his/her guardian, the risks and benefits involved in the proposed treatment and any reasonable alternative to the proposed treatment. I have also explained the risks and benefits involved in the refusal of the proposed treatment and have answered the patient's questions.                         Date:  ______/______/_______  Provider                      Signature:  __________________________________________________________       Time:  ___________ A.M    P.M.

## (undated) NOTE — MR AVS SNAPSHOT
[facility] [place]  [facility] [address] [address]  [phone]               Thank you for choosing us for your health care visit with Bella Garcia MD.  We are glad to serve you and happy to provide you with this summary of your visit.   [facility] Issues Discussed Today     Atrial fibrillation    Family hx of colon cancer    Hypercholesterolemia    Osteoarthrosis    Routine health maintenance      Instructions and Information about Your Health     None      Allergies as of Feb 28, 2017     A If you've recently had a stay at the Hospital you can access your discharge instructions in [facility] by going to Visits < Admission Summaries.  If you've been to the Emergency Department or your doctor's office, you can view your past visit information in My

## (undated) NOTE — LETTER
AUTHORIZATION FOR SURGICAL OPERATION OR OTHER PROCEDURE    1. I hereby authorize Dr. Alexandro Johnson and the [facility] staff assigned to my case to perform the following operation and/or procedure at the [facility]:  Bilateral knee aspiration and HA     2.   My phys Parent    Responsible person                          []  Spouse  In case of minor or                    [] Other  _____________   Incompetent name:  __________________________________________________                               (please print)      _____

## (undated) NOTE — Clinical Note
Please obtain consent from cardiology to hold anticoagulation if not he will need to be bridged to Lovenox.   Also schedule once approved at the [facility]

## (undated) NOTE — IP AVS SNAPSHOT
[facility]            (For Outpatient Use Only) Initial Admit Date: 11/23/2019   Inpt/Obs Admit Date: Inpt: 11/23/19 / Obs: N/A   Discharge Date:    Maxim Early:  [de-identified]   MRN: [de-identified]   CSN: [id number]   CEID: [id number]        E Subscriber Name:  Zion Morales :    Subscriber ID:  Pt Rel to Subscriber:    Hospital Account Financial Class: [facility]    2019

## (undated) NOTE — LETTER
AUTHORIZATION FOR SURGICAL OPERATION OR OTHER PROCEDURE    1. I hereby authorize Dr. Jc Andrade and the [facility] staff assigned to my case to perform the following operation and/or procedure at the [facility]:  [address]    2. My physician has explained the nature and purpose of the operation or other procedure, possible alternative methods of treatment, the risks involved, and the possibility of complication to me. I acknowledge that no guarantee has been made as to the result that may be obtained. 3.  I recognize that, during the course of this operation, or other procedure, unforseen conditions may necessitate additional or different procedure than those listed above. I, therefore, further authorize and request that the above named physician, his/her physician assistants or designees perform such procedures as are, in his/her professional opinion, necessary and desirable. 4.  Any tissue or organs removed in the operation or other procedure may be disposed of by and at the discretion of the [facility] staff and [facility]. 5.  I understand that in the event of a medical emergency, I will be transported by local paramedics to [facility] or other hospital emergency department. 6.  I certify that I have read and fully understand the above consent to operation and/or other procedure. 7.  I acknowledge that my physician has explained sedation/analgesia administration to me including the risks and benefits. I consent to the administration of sedation/analgesia as may be necessary or desirable in the judgement of my physician. Witness signature: ___________________________________________________ Date:  ______/______/_____                    Time:  ________ A. M.  P.M.        Patient Name:  Fely Rucker [facility]  9/9/1955  [id number]     Patient signature:  ___________________________________________________             Relationship to Patient:           [] Parent    Statement of Physician  My signature below affirms that prior to the time of the procedure, I have explained to the patient and/or his/her guardian, the risks and benefits involved in the proposed treatment and any reasonable alternative to the proposed treatment. I have also explained the risks and benefits involved in the refusal of the proposed treatment and have answered the patient's questions.                         Date:  ______/______/_______  Provider                      Signature:  __________________________________________________________       Time:  ___________ A.M    P.M.

## (undated) NOTE — LETTER
CRISTELA Notifier: [facility]   AVA. Patient Name: Kari Smith. Identification Number: [id number]      Advance Beneficiary Notice of Noncoverage (ABN)  NOTE:  If Medicare doesn’t pay for D. Item/service(s) below, you may have to pay.   Medicare garcia payment, and I cannot appeal to see if Medicare would pay. H. Additional Information: This notice gives our opinion, not an official Medicare decision.  If you have other questions on this notice or Medicare billing, call 1-800-MEDICARE ([phone]

## (undated) NOTE — LETTER
12/3/2021      12/03/21        To Whom It May Concern:      Jamal Kirkland  :  1955    []  Patient has been cleared to hold Warfarin for 3-5 days for Caudal steroid epidural injection.   Holding the medication(s) may put the patient at an increas

## (undated) NOTE — LETTER
[address], [place]  Authorization for Invasive Procedures  1.  I hereby authorize Dr. Johnell Romberg , my physician and whomever may be designated as the doctor's assistant, to perform the following operation and/or procedure:  diagnos a directed donor transfusion, I will discuss this with my physician.      5. I consent to the photographing of the operations or procedures to be performed for the purposes of advancing medicine, science, and/or education, provided my identity is not reveal Witness Signature: ____________________________________________ Date: __________ Time: ___________    Statement of Physician  My signature below affirms that prior to the time of the procedure, I have explained to the patient and/or his legal representativ

## (undated) NOTE — LETTER
Listen Edition Cardiac Device Communication Tool    Preop to complete    [facility]) Phone: [phone] Fax: [phone]   Patient Name Russ Kirkland   Patient  - AGE - SEX 1955 - A: 68 y - male   Surgical Date 3/27/2024   Surgical Procedure Right lower extremity STAB phlebectomy   Surgical Location [facility]   Type of cautery anticipated: Bipolar   Surgical procedure > 2 hours      Device Clinic to Complete the Information Below, Sign and Fax to [phone]    Pacemaker or ICD    Atrial or atrial-ventricular lead?        Indication for device    Is patient pacemaker dependent?    Has pt had routine f/u and is battery life > 3 months    Is ICD programmed to inhibit therapy w/magnet?    Does device have rate response or other sensor?      Surgical Procedure above [facility] Surgical Procedure @ [facility] and below   < 6 in. from ICD: Reprogram therapies OFF w/  asynchronous pacing if PM dependent  < 6 in. from PM: Reprogram asynchronous if PM   dependent ICD: No Change  PM: No Change   > 6 in. from ICD: Magnet*  > 6 in. from PM:  No Change*  * If PM dependent observe for pacing inhibition and minimize cautery if inhibition is seen                                              Bipolar cautery: No Change     Cardiac Device Management Plan (check one)     ___  Reprogram (PAT Dept to provide Rep w/ arrival date/time)   ___ Magnet    ___ No Change    Comments: ___________________________________________________________________        Signature: ________________________________ Date: ____________ Time: ______________     Print Name: ___________________________________

## (undated) NOTE — LETTER
AUTHORIZATION FOR SURGICAL OPERATION OR OTHER PROCEDURE    1. I hereby authorize Dr. Yessica Cain and the [facility] staff assigned to my case to perform the following operation and/or procedure at the [facility]:    Bilateral knee aspiration and injection with corticosteroid     2.  My physician has explained the nature and purpose of the operation or other procedure, possible alternative methods of treatment, the risks involved, and the possibility of complication to me.  I acknowledge that no guarantee has been made as to the result that may be obtained.  3.  I recognize that, during the course of this operation, or other procedure, unforseen conditions may necessitate additional or different procedure than those listed above.  I, therefore, further authorize and request that the above named physician, his/her physician assistants or designees perform such procedures as are, in his/her professional opinion, necessary and desirable.  4.  Any tissue or organs removed in the operation or other procedure may be disposed of by and at the discretion of the [facility] staff and [facility].  5.  I understand that in the event of a medical emergency, I will be transported by local paramedics to [facility] or other hospital emergency department.  6.  I certify that I have read and fully understand the above consent to operation and/or other procedure.    7.  I acknowledge that my physician has explained sedation/analgesia administration to me including the risks and benefits.  I consent to the administration of sedation/analgesia as may be necessary or desirable in the judgement of my physician.    Witness signature: ___________________________________________________ Date:  ______/______/_____                    Time:  ________ A.M.  P.M.       Patient Name:Russ Kirkland  9/9/1955  [id number]         Patient signature:  ___________________________________________________      Statement  of Physician  My signature below affirms that prior to the time of the procedure, I have explained to the patient and/or his/her guardian, the risks and benefits involved in the proposed treatment and any reasonable alternative to the proposed treatment.  I have also explained the risks and benefits involved in the refusal of the proposed treatment and have answered the patient's questions.                        Date:  ______/______/_______  Provider                      Signature:  __________________________________________________________       Time:  ___________ A.M    P.M.

## (undated) NOTE — LETTER
AUTHORIZATION FOR SURGICAL OPERATION OR OTHER PROCEDURE    1. I hereby authorize Dr. Shanice Meier and the [facility] staff assigned to my case to perform the following operation and/or procedure at the [facility]:    Bilateral Knee corticosteroid injection under ultrasound guidance    2. My physician has explained the nature and purpose of the operation or other procedure, possible alternative methods of treatment, the risks involved, and the possibility of complication to me. I acknowledge that no guarantee has been made as to the result that may be obtained. 3.  I recognize that, during the course of this operation, or other procedure, unforseen conditions may necessitate additional or different procedure than those listed above. I, therefore, further authorize and request that the above named physician, his/her physician assistants or designees perform such procedures as are, in his/her professional opinion, necessary and desirable. 4.  Any tissue or organs removed in the operation or other procedure may be disposed of by and at the discretion of the [facility] staff and [facility]. 5.  I understand that in the event of a medical emergency, I will be transported by local paramedics to [facility] or other hospital emergency department. 6.  I certify that I have read and fully understand the above consent to operation and/or other procedure. 7.  I acknowledge that my physician has explained sedation/analgesia administration to me including the risks and benefits. I consent to the administration of sedation/analgesia as may be necessary or desirable in the judgement of my physician. Witness signature: ___________________________________________________ Date:  ______/______/_____                    Time:  ________ A. M.  P.M.        Patient Name: Aziza Sepulveda [facility]  9/9/1955  [id number]       Patient signature: ___________________________________________________                       Statement of Physician  My signature below affirms that prior to the time of the procedure, I have explained to the patient and/or his/her guardian, the risks and benefits involved in the proposed treatment and any reasonable alternative to the proposed treatment. I have also explained the risks and benefits involved in the refusal of the proposed treatment and have answered the patient's questions.                         Date:  ______/______/_______  Provider                      Signature:  __________________________________________________________       Time:  ___________ AVIRGILIO KEITA

## (undated) NOTE — LETTER
AUTHORIZATION FOR SURGICAL OPERATION OR OTHER PROCEDURE    1. I hereby authorize Dr. Abi Mullen and the [facility] staff assigned to my case to perform the following operation and/or procedure at the [facility]:    Bilateral knee aspiration+ injection with hyaluronic acid w/ Gel One CPT 20610x2+[id number]+    2. My physician has explained the nature and purpose of the operation or other procedure, possible alternative methods of treatment, the risks involved, and the possibility of complication to me. I acknowledge that no guarantee has been made as to the result that may be obtained. 3.  I recognize that, during the course of this operation, or other procedure, unforseen conditions may necessitate additional or different procedure than those listed above. I, therefore, further authorize and request that the above named physician, his/her physician assistants or designees perform such procedures as are, in his/her professional opinion, necessary and desirable. 4.  Any tissue or organs removed in the operation or other procedure may be disposed of by and at the discretion of the [facility] staff and [facility]. 5.  I understand that in the event of a medical emergency, I will be transported by local paramedics to [facility] or other hospital emergency department. 6.  I certify that I have read and fully understand the above consent to operation and/or other procedure. 7.  I acknowledge that my physician has explained sedation/analgesia administration to me including the risks and benefits. I consent to the administration of sedation/analgesia as may be necessary or desirable in the judgement of my physician. Witness signature: ___________________________________________________ Date:  ______/______/_____                    Time:  ________ A. M.  P.M.        Patient Name:  Amando Lee [facility]  9/9/1955  [id number]         Patient signature: ___________________________________________________        Statement of Physician  My signature below affirms that prior to the time of the procedure, I have explained to the patient and/or his/her guardian, the risks and benefits involved in the proposed treatment and any reasonable alternative to the proposed treatment. I have also explained the risks and benefits involved in the refusal of the proposed treatment and have answered the patient's questions.                         Date:  ______/______/_______  Provider                      Signature:  __________________________________________________________       Time:  ___________ AVIRGILIO KEITA

## (undated) NOTE — LETTER
Date: 12/6/2024  Patient name: Russ Kirkland  YOB: 1955  Medical Record Number: [id number]  Primary Coverage: Payor: MEDICARE / Plan: MEDICARE PART A&B / Product Type: *No Product type* /   [place]  Insurance ID: [id number]  Patient Address: [address] [address]  Telephone Information:   Home Phone [phone]   Mobile [phone]         Encounter Date: 12/6/2024  Provider: Valentin Correa MD  Diagnosis:     ICD-10-CM   1. Chronic venous hypertension (idiopathic) with ulcer of left lower extremity (HCC)  I87.312    L97.929   2. Benign essential HTN  I10   3. Venous (peripheral) insufficiency  I87.2       Progress Note:  Chief Complaint   Patient presents with    Wound Recheck     Here for left lateral and medial leg wound care follow up       HPI:     Patient here for follow-up of left medial and lateral ankle wound.  He is doing well and has no new complaints.  Tolerating compression wrap.    Lab Results   Component Value Date    BUN 33 (H) 11/20/2024    CREATSERUM 0.80 11/20/2024    ALB 4.7 11/20/2024    TP 7.7 11/20/2024    A1C 5.6 04/16/2018         Current Outpatient Medications:     metoprolol succinate ER 50 MG Oral Tablet 24 Hr, Take 1 tablet (50 mg total) by mouth daily., Disp: , Rfl:     GABAPENTIN 800 MG Oral Tab, TAKE 1 TABLET BY MOUTH THREE TIMES DAILY, Disp: 270 tablet, Rfl: 0    cholecalciferol 50 MCG (2000 UT) Oral Cap, cholecalciferol (vitamin D3) 50 mcg (2,000 unit) capsule, [RxNorm: 956622], Disp: , Rfl:     Sildenafil Citrate 100 MG Oral Tab, Take 1 tablet (100 mg total) by mouth daily as needed for Erectile Dysfunction. Take ~ one hour prior to sexual activity on an empty stomach, Disp: 30 tablet, Rfl: 11    tramadol-acetaminophen 37.5-325 MG Oral Tab, Take 2 tablets by mouth 2 (two) times daily as needed for Pain., Disp: 360 tablet, Rfl: 1    aspirin 81 MG Oral Tab EC, Take 1 tablet (81 mg total) by  mouth daily., Disp: , Rfl:     celecoxib 200 MG Oral Cap, Take 1 capsule (200 mg total) by mouth daily as needed., Disp: 90 capsule, Rfl: 3    enoxaparin (LOVENOX) 120 MG/0.8ML Injection Solution Prefilled Syringe, Inject 1 mg/kg into the skin 2 (two) times daily., Disp: , Rfl:     WARFARIN 2.5 MG Oral Tab, TAKE 1 TABLET BY MOUTH DAILY (Patient taking differently: Take 1 tablet (2.5 mg total) by mouth daily. As directed), Disp: 90 tablet, Rfl: 3    LOSARTAN POTASSIUM-HCTZ 100-12.5 MG Oral Tab, TAKE 1 TABLET BY MOUTH DAILY, Disp: 90 tablet, Rfl: 3    SIMVASTATIN 20 MG Oral Tab, TAKE 1 TABLET BY MOUTH NIGHTLY, Disp: 90 tablet, Rfl: 3    Sildenafil Citrate 100 MG Oral Tab, Take 1 tablet (100 mg total) by mouth daily as needed for Erectile Dysfunction., Disp: 10 tablet, Rfl: 11    Glucosamine-Chondroit-Vit C-Mn (GLUCOSAMINE-CHONDROITIN) Oral Cap, Take 1 tablet by mouth daily., Disp: , Rfl:     omega-3 fatty acids (FISH OIL) 1000 MG Oral Cap, Take 1,000 mg by mouth daily., Disp: , Rfl:     betamethasone 0.1 % External Cream, Apply to wound twice a day, Disp: 15 g, Rfl: 0    Allergies[1]         REVIEW OF SYSTEMS:     Review of Systems (ROS)  This information was obtained from the patient, chart    A comprehensive 10 point review of systems was completed.  Pertinent positives and negatives noted in the the HPI.     Past medical, surgical, family and social history updated where appropriate.    PHYSICAL EXAM:   /85   Pulse 70   Temp 97.5 °F (36.4 °C)   Wt 248 lb   SpO2 97%   BMI 32.72 kg/m²    Estimated body mass index is 32.72 kg/m² as calculated from the following:    Height as of 11/14/24: 73\".    Weight as of this encounter: 248 lb.   Vital signs reviewed.Appears stated age, well groomed.      Awake, alert, in no acute distress  HENT:  normocephalic, atraumatic, external ear canals clear bilaterally, tympanic membranes appear opaque, non-bulging, non-erythematous, nasal turbinates are non-inflamed and not  swollen, oropharynx without erythema, swelling, or exudate, gingiva and lips without any apparent lesions.   Cardiac:  S1 S2 regular rate and rhythm, no murmur, gallop, or rub  Respiratory:  Bilaterally clear to auscultation, no chest tenderness to palpation, no wheezing, no rhonchi, no rales, air entry is adequate, no accessory respiratory muscle use, no chest asymmetry, normal excursion.  GI:  bowel sound positive in all four quadrants, abdomen is soft, non-tender, non-distended, no hepatosplenomegaly, no abnormal aortic pulsation.     Calf                      Ankle                            Foot                              Compression Wrap 06/28/24 Dorsal;Left (Active)   Placement Date/Time: 06/28/24 1028   Wound Location Orientation: Dorsal;Left      Assessments 6/28/2024  9:40 AM 12/6/2024 11:49 AM   Response to Treatment Well tolerated Well tolerated   Compression Layers 3 Multilayer   Compression Product Type Artiflex 10cm;Artiflex 15cm;Comprilan 8cm;Comprilan 10cm;Comprilan 12cm;[place]inette 3in Artiflex 10cm;Artiflex 15cm;Comprilan 8cm;Comprilan 10cm;Comprilan 12cm   Dressing Applied Yes Yes   Compression Wrap Location Toes to Knee Toes to Knee   Compression Wrap Status Clean;Dry;Intact Clean;Dry;Intact       Active Orders   Date Order Priority Status Authorizing Provider   11/29/24 1317 OP Wound Dressing Routine Active Valentin Correa MD     - Cleansing:    Cleanse with normal saline or wound cleanser     - Dressing:    Hydrofera transfer     - Dressing:    ABD pad     - Additional Wound Dressing Information:    Cleanser, hydrofera transfer, abd pad, kenia, tape, gauze       Inactive Orders   Date Order Priority Status Authorizing Provider   07/05/24 1139 OP Wound Dressing Routine Completed Willie Redd APRN     - Dressing:    Alginate     - Dressing:    Iodosorb     - Additional Wound Dressing Information:    zinc periwound, nystatin powder to skin under wraps     - Cleansing:    Cleanse with normal  saline or wound cleanser     - Frequency:    Change dressing weekly       Wound 11/01/24 2 Ankle Left;Medial (Active)   Date First Assessed/Time First Assessed: 11/01/24 1131    Wound Number (Wound Clinic Only): 2  Primary Wound Type: Venous Ulcer  Location: Ankle  Wound Location Orientation: Left;Medial      Assessments 11/1/2024 11:34 AM 12/6/2024 11:49 AM   Wound Image       Site Assessment Dry;Red;Yellow Moist;Pink   Closure Not approximated Not approximated   Drainage Amount Scant Moderate   Drainage Description Serosanguineous;Yellow Serous   Treatments Cleansed;Saline Cleansed;Saline;Wound    Dressing Hydrofera transfer (gentamycin cream, patient's own compression sock) Hydrofera ready;Martha   Dressing Changed New New   Dressing Status Dry;Intact;Clean Dry;Intact;Clean   Wound Length (cm) 1.5 cm 0.3 cm   Wound Width (cm) 0.7 cm 0.3 cm   Wound Surface Area (cm^2) 1.05 cm^2 0.09 cm^2   Wound Depth (cm) 0.1 cm 0.1 cm   Wound Volume (cm^3) 0.105 cm^3 0.009 cm^3   Wound Healing % -- 91   Margins Attached edges Attached edges   Non-staged Wound Description Full thickness Full thickness   Mary-wound Assessment Dry;Hemosiderin staining;Atrophy rogelio Clean;Dry;Intact;Atrophy rogelio;Hemosiderin staining   Wound Granulation Tissue Pink --   Wound Bed Granulation (%) 90 % --   Wound Bed Epithelium (%) 0 % 90 %   Wound Bed Slough (%) 10 % --   Wound Bed Eschar (%) 0 % --   Wound Bed Fibrin (%) 0 % --   State of Healing Non-healing Epithelialized   Wound Odor None --       Active Orders   Date Order Priority Status Authorizing Provider   12/06/24 1319 OP Wound Dressing Routine Active Valentin Correa MD     - Cleansing:    Cleanse with normal saline or wound cleanser     - Dressing:    Hydrofera transfer     - Dressing:    Conforming roll     - Frequency:    Change dressing weekly   11/29/24 1317 OP Wound Dressing Routine Active Valentin Correa MD     - Cleansing:    Cleanse with normal saline or wound cleanser      - Dressing:    Hydrofera transfer     - Dressing:    ABD pad     - Additional Wound Dressing Information:    Cleanser, hydrofera transfer, abd pad, kenia, tape, gauze   11/22/24 1452 OP Wound Dressing Routine Active Valentin Correa MD     - Cleansing:    Cleanse with normal saline or wound cleanser     - Dressing:    Enluxtra humidifiber     - Dressing:    Conforming roll     - Additional Wound Dressing Information:    xeroform on lateral wound; 3 layer comprilan     - Frequency:    Change dressing weekly   11/15/24 0959 OP Wound Dressing Routine Active Valentin Correa MD     - Cleansing:    Cleanse with normal saline or wound cleanser     - Dressing:    Collagen     - Dressing:    Hydrofera transfer     - Dressing:    Enluxtra humidifiber     - Additional Wound Dressing Information:    collagen and hydrofera blue transfer, enluxtra, kerlix, comprilan x 3     - Frequency:    Change dressing weekly   11/11/24 1115 OP Wound Dressing Routine Active Valentin Correa MD     - Cleansing:    Cleanse with normal saline or wound cleanser     - Dressing:    Enluxtra humidifiber     - Dressing:    Kerlix     - Additional Wound Dressing Information:    3 layers comprilan; hydrofera ready on latearl wound with triad     - Frequency:    Change dressing weekly   11/01/24 1431 OP Wound Dressing Routine Active Valentin Correa MD     - Cleansing:    Cleanse with normal saline or wound cleanser     - Dressing:    Dry gauze     - Dressing:    Hydrofera transfer     - Dressing:    Conforming roll     - Additional Wound Dressing Information:    gentamycin cream     - Frequency:    Change dressing daily and PRN              ASSESSMENT AND PLAN:      1. Chronic venous hypertension (idiopathic) with ulcer of left lower extremity (HCC)  - OP Wound Dressing; Future    2. Benign essential HTN  - OP Wound Dressing; Future    3. Venous (peripheral) insufficiency  - OP Wound Dressing; Future    Clinically the lateral ankle wound on the  left remains healed.  The medial ankle wound is significantly better than previous visit.  Silver nitrate was applied for cauterization.  Will continue now with Hydrofera Blue dressing, Kerramax, Comprilan 3 layer compression wrap to left lower extremity.  We did discuss compression wrap precautions and written instructions given.   patient has planned total knee replacement surgery for the left side next week in about 6 days from now at which time he will cut off his compression garment.  After that he will use Hydrofera Blue to the medial ankle wound and change every third day and use his own Velcro compression garment until he sees me back which will be on 1/3/2025.  Risks, benefits, and alternatives of current treatment plan discussed in detail.  Questions and concerns addressed. Red flags to RTC or ED reviewed.  Patient (or parent) agrees to plan.      Return in about 4 weeks (around 1/3/2025) for MD visit for 30 min.    Also refer to patient instructions.     I spent 40 minutes with the patient. This time included:  preparing to see the patient (eg, review notes and recent diagnostics), seeing the patient, performing a medically appropriate examination and/or evaluation, counseling and educating the patient, and documenting in the record.    I agree with staff documentation except for any changes made in my note.       Valentin Correa M.D., Wound Care Physician  [facility]  12/6/2024               [1]   Allergies  Allergen Reactions    Adhesive Tape RASH     Can use paper tape.          12/06/24 1149   Wound 11/01/24 2 Ankle Left;Medial   Date First Assessed/Time First Assessed: 11/01/24 1131    Wound Number (Wound Clinic Only): 2  Primary Wound Type: Venous Ulcer  Location: Ankle  Wound Location Orientation: Left;Medial   Wound Image    Site Assessment Moist;Pink   Closure Not approximated   Drainage Amount Moderate   Drainage Description Serous   Treatments Cleansed;Saline;Wound     Dressing Hydrofera ready;Martha   Dressing Changed New   Dressing Status Dry;Intact;Clean   Wound Length (cm) 0.3 cm   Wound Width (cm) 0.3 cm   Wound Surface Area (cm^2) 0.09 cm^2   Wound Depth (cm) 0.1 cm   Wound Volume (cm^3) 0.009 cm^3   Wound Healing % 91   Margins Attached edges   Non-staged Wound Description Full thickness   Mary-wound Assessment Clean;Dry;Intact;Atrophy rogelio;Hemosiderin staining   Wound Bed Epithelium (%) 90 %   State of Healing Epithelialized   Compression Wrap 06/28/24 Dorsal;Left   Placement Date/Time: 06/28/24 1028   Wound Location Orientation: Dorsal;Left   Response to Treatment Well tolerated   Compression Layers Multilayer   Compression Product Type Artiflex 10cm;Artiflex 15cm;Comprilan 8cm;Comprilan 10cm;Comprilan 12cm   Dressing Applied Yes   Compression Wrap Location Toes to Knee   Compression Wrap Status Clean;Dry;Intact         Wound Treatment Orders:  Orders Placed This Encounter   Procedures    OP Wound Dressing     Standing Status:   Future     Standing Expiration Date:   1/3/2025     Order Specific Question:   Cleansing     Answer:   Cleanse with normal saline or wound cleanser     Order Specific Question:   Dressing     Answer:   Hydrofera transfer     Order Specific Question:   Dressing     Answer:   Conforming roll     Order Specific Question:   Frequency     Answer:   Change dressing weekly         Wound Information/Order:  Wound Number: All wounds  Product:Primary dressing hydrofera blue ready, conforming roll, medical tape  Dispense: 15 days  Dressing Frequency:Change dressing 3x per week    Was a Debridement performed: Yes, Debridement type: mechanical    Compression Stockings ordered: No    Notes: Please call patient with out of pocket cost, thank you!          12/6/2024  [facility] [phone]

## (undated) NOTE — LETTER
09/16/19    Concetta Guzman M.D.  [address]   [phone]      Dear Dr. Tatiana Sánchez,    Your Patient, Bianca Cameron, date of birth 9/9/1955, has been treated at [facility] for his wound(s).  He has

## (undated) NOTE — LETTER
Notifier: [employer]       Patient Name: Russ Kirkland       Identification Number: [id number]                          Advance Beneficiary Notice of Noncoverage (ABN)   NOTE:  If Medicare doesn’t pay for D. Items/service(s) below, you may have to pay.  Medicare does not pay for everything, even some care that you or your health care provider have good reason to think you need. We expect Medicare may not pay for the D. items/service(s) below.  Items or Services Bilateral knee aspiration and injection with corticosteroid  Reason Medicare May Not Pay: Estimated Cost   __EKG ($129.00)  __Pap smear ($48.23) __Pelvic/Breast ($65.00)  __ Ear Irrigation ($149)  _x_ Injection(s)  ___ Tdap ($70)       ___ Meningitis ($206)   __Prevnar ($285)  ___ Td ($51)            ___Shingrix ($215)        __Prevnar 20 ($309)  ___ Hep A ($156)   ___Prolia ($1827.00)     __ Xiaflex ($              )   ___ Hep B ($167)      __Pneumovax ($155)                                            ___ Vaccine Administration ($31)   __ Medicare does not cover this service      __ Medicare may not pay for this   item/service for your condition     __ Medicare may not pay for this item/service as often as this        WHAT YOU NEED TO DO NOW:  Read this notice, so you can make an informed decision about your care.  Ask us any questions that you may have after you finish reading.  Choose an option below about whether to receive the D. item/service(s)  listed above.  Note: If you choose Option 1 or 2, we may help you to use any other insurance that you might have, but Medicare cannot require us to do this.  OPTIONS: Check only one box.  We cannot choose a box for you.   xOPTION 1. I want the D. item/service(s) listed above. You may ask to be paid now, but I also want Medicare billed for an official decision on payment, which is sent to me on a Medicare Summary Notice (MSN). I understand that if Medicare doesn’t pay, I am responsible for payment, but I  can appeal to Medicare by following the directions on the MSN. If Medicare does pay, you will refund any payments I made to you, less co-pays or deductibles.  OPTION 2. I want the D. item/service(s) listed above, but do not bill Medicare. You may ask to be paid now as I am responsible for payment. I cannot appeal if Medicare is not billed.  OPTION 3. I don't want the D. item/service(s) listed above. I understand with this choice I am not responsible for payment, and I cannot appeal to see if Medicare would pay.    H. Additional Information:    This notice gives our opinion, not an official Medicare decision. If you have other questions on this notice or Medicare billing, call 1-800-MEDICARE (1-763.357.4893/TTY: [phone]). Signing below means that you have received and understand this notice. You also receive a copy.  Signature: Date:       You have the right to get Medicare information in an accessible format, like large print, Braille, or audio. You also have the right to file a complaint if you feel you’ve been discriminated against. Visit Medicare.gov/about- us/jesvebyntamnw-opswghmdaqeztbdil-liihfm.  According to the Paperwork Reduction Act of 1995, no persons are required to respond to a collection of information unless it displays a valid OMB control number. The valid OMB control number for this information collection is 3610-1546. The time required to complete this information collection is estimated to average 7 minutes per response, including the time to review instructions, search existing data resources, gather the data needed, and complete and review the information collection. If you have comments concerning the accuracy of the time estimate or suggestions for improving this form, please write to: CMS, [facility] [employer]     [place], Attn: PRUDENCE Reports Clearance Officer, [place], [place] [address].  Form CMS-R-131 (Exp. 1/31/2026) Form Approved OMB No. 6717-1302

## (undated) NOTE — LETTER
AUTHORIZATION FOR SURGICAL OPERATION OR OTHER PROCEDURE    1. I hereby authorize Dr. Abi Mullen and the [facility] staff assigned to my case to perform the following operation and/or procedure at the [facility]:    _______________________________________________________________________________________________    Bilateral knee aspiration and injection with corticosteroid   _______________________________________________________________________________________________    2. My physician has explained the nature and purpose of the operation or other procedure, possible alternative methods of treatment, the risks involved, and the possibility of complication to me. I acknowledge that no guarantee has been made as to the result that may be obtained. 3.  I recognize that, during the course of this operation, or other procedure, unforseen conditions may necessitate additional or different procedure than those listed above. I, therefore, further authorize and request that the above named physician, his/her physician assistants or designees perform such procedures as are, in his/her professional opinion, necessary and desirable. 4.  Any tissue or organs removed in the operation or other procedure may be disposed of by and at the discretion of the [facility] staff and [facility] AT [facility]. 5.  I understand that in the event of a medical emergency, I will be transported by local paramedics to [facility] or other hospital emergency department. 6.  I certify that I have read and fully understand the above consent to operation and/or other procedure. 7.  I acknowledge that my physician has explained sedation/analgesia administration to me including the risks and benefits. I consent to the administration of sedation/analgesia as may be necessary or desirable in the judgement of my physician.     Witness signature: ___________________________________________________ Date: ______/______/_____                    Time:  ________ A. M.  P.M. Patient Name:    Marie Coronel [facility]  9/9/1955  [id number]         Patient signature:  ___________________________________________________      Statement of Physician  My signature below affirms that prior to the time of the procedure, I have explained to the patient and/or his/her guardian, the risks and benefits involved in the proposed treatment and any reasonable alternative to the proposed treatment. I have also explained the risks and benefits involved in the refusal of the proposed treatment and have answered the patient's questions.                         Date:  ______/______/_______  Provider                      Signature:  __________________________________________________________       Time:  ___________ A.M    P.M.

## (undated) NOTE — LETTER
20          Sunday Kirkland  :  1955      To Whom It May Concern: This patient was seen in our office on 20. Mr. Charlette Aschoff has difficulty with right hand weakness and Ataxia which makes it difficult to use fine motor skills.     If this of

## (undated) NOTE — LETTER
22        To Whom It May Concern:      Berlinda Gaucher Hoeft  :  1955    []  Patient has been cleared to hold Warfarin 3-5 days for L5-S1 interlaminar epidural steroid inejction. Holding the medication(s) may put the patient at an increased risk for stroke, heart attack, or neurologic or cardiac events. []  Patient has not been cleared to hold Warfarin for procedure. If this office may be of further assistance, please do not hesitate to contact us. Sincerely,    Madonna Living. Suzi Dakins, [facility] [address]  [phone]    Please advise on how many days to hold Warfarin  MEDICINE recommends for 3-5 business days.

## (undated) NOTE — LETTER
AUTHORIZATION FOR SURGICAL OPERATION OR OTHER PROCEDURE    1.  I hereby authorize Dr. Gino Coronel and the [facility] staff assigned to my case to perform the following operation and/or procedure at the [facility]:    Bilateral knee aspiration and injection with H ___________________________________________________             Relationship to Patient:           []  Parent    Responsible person                          []  Spouse  In case of minor or                    [] Other  _____________   Incompetent name:  ___

## (undated) NOTE — LETTER
Date: 9/27/2021  Patient name: Juan Jose Islas  YOB: 1955  Medical Record Number: [id number]  Coverage: Payor: MEDICARE / Plan: MEDICARE PART A&B / Product Type: *No Product type* /   Insurance ID: [id number]  Patient Address: [place] [address] None   State of Healing Non-healing Non-healing       Inactive Orders   Date Order Priority Status Authorizing Provider   09/20/21 1018 OP WOUND DRESSING Routine Completed JHONATAN Cheek     - Dressing:    Silver alginate     - Dressing:    Sensi-care Dressing:    Kerlix     - Additional Wound Dressing Information:    vaseline to lower legs     - Cleansing:    Cleanse with normal saline or wound cleanser     - Frequency:    Change dressing weekly       Compression Wrap 09/20/21 Leg Anterior; Right (Acti 35  Point of Measurement - Right Calf: 35  Left Calf from[de-identified] Heel  Calf Left cm[de-identified] 40  Right Calf from[de-identified] Heel  Right Calf cm[de-identified] 40    Ankle  Point of Measurement - Left Ankle: 10  Point of Measurement - Right Ankle: 10  Left Ankle from[de-identified] Heel  Left Ankle cm[de-identified]

## (undated) NOTE — LETTER
AUTHORIZATION FOR SURGICAL OPERATION OR OTHER PROCEDURE    1. I hereby authorize Dr. Onelia Durand and the [facility] Office staff assigned to my case to perform the following operation and/or procedure at the [facility] Office:    Bilateral knee aspiration and injection with corticosteroid      2. My physician has explained the nature and purpose of the operation or other procedure, possible alternative methods of treatment, the risks involved, and the possibility of complication to me. I acknowledge that no guarantee has been made as to the result that may be obtained. 3.  I recognize that, during the course of this operation, or other procedure, unforseen conditions may necessitate additional or different procedure than those listed above. I, therefore, further authorize and request that the above named physician, his/her physician assistants or designees perform such procedures as are, in his/her professional opinion, necessary and desirable. 4.  Any tissue or organs removed in the operation or other procedure may be disposed of by and at the discretion of the [facility] staff and [facility]. 5.  I understand that in the event of a medical emergency, I will be transported by local paramedics to [facility] or other hospital emergency department. 6.  I certify that I have read and fully understand the above consent to operation and/or other procedure. 7.  I acknowledge that my physician has explained sedation/analgesia administration to me including the risks and benefits. I consent to the administration of sedation/analgesia as may be necessary or desirable in the judgement of my physician. Witness signature: ___________________________________________________ Date:  ______/______/_____                    Time:  ________ A. M.  P.M.        Patient Name:  Gerhardt Shines  [birth year]  [id number]         Patient signature:  ___________________________________________________      Statement of Physician  My signature below affirms that prior to the time of the procedure, I have explained to the patient and/or his/her guardian, the risks and benefits involved in the proposed treatment and any reasonable alternative to the proposed treatment. I have also explained the risks and benefits involved in the refusal of the proposed treatment and have answered the patient's questions.                         Date:  ______/______/_______  Provider                      Signature:  __________________________________________________________       Time:  ___________ A.M    P.M.

## (undated) NOTE — LETTER
AUTHORIZATION FOR SURGICAL OPERATION OR OTHER PROCEDURE    1. I hereby authorize Dr. Yessica Cain and the [facility] Office staff assigned to my case to perform the following operation and/or procedure at the [facility] Office:    Bilateral knee Gel one     2.  My physician has explained the nature and purpose of the operation or other procedure, possible alternative methods of treatment, the risks involved, and the possibility of complication to me.  I acknowledge that no guarantee has been made as to the result that may be obtained.  3.  I recognize that, during the course of this operation, or other procedure, unforseen conditions may necessitate additional or different procedure than those listed above.  I, therefore, further authorize and request that the above named physician, his/her physician assistants or designees perform such procedures as are, in his/her professional opinion, necessary and desirable.  4.  Any tissue or organs removed in the operation or other procedure may be disposed of by and at the discretion of the [facility] Office staff and [facility].  5.  I understand that in the event of a medical emergency, I will be transported by local paramedics to [facility] or other hospital emergency department.  6.  I certify that I have read and fully understand the above consent to operation and/or other procedure.    7.  I acknowledge that my physician has explained sedation/analgesia administration to me including the risks and benefits.  I consent to the administration of sedation/analgesia as may be necessary or desirable in the judgement of my physician.    Witness signature: ___________________________________________________ Date:  ______/______/_____                    Time:  ________ A.M.  P.M.       Patient Name: .Russ Kirkland  9/9/1955  [id number]           Patient signature:  ___________________________________________________    Statement of Physician  My signature below  affirms that prior to the time of the procedure, I have explained to the patient and/or his/her guardian, the risks and benefits involved in the proposed treatment and any reasonable alternative to the proposed treatment.  I have also explained the risks and benefits involved in the refusal of the proposed treatment and have answered the patient's questions.                        Date:  ______/______/_______  Provider                      Signature:  __________________________________________________________       Time:  ___________ AChristianM    P.M.

## (undated) NOTE — LETTER
08/06/20    Melanie Hickman M.D.  [facility] [address]   [place]   [phone]      Dear Dr. Lizette Kang,    Your Patient, Bianca Cameron, date of birth 9/9/1955, has been treated at [address] [place] [address] for his wound(s).  He has recently compression and skin management. Please reorder wound services as needed in the future. Additional Orders: Follow-Up Appointments  Discharge from the wound care clinic.         Best Regards,    Stephen Murphy DPT, [facility]

## (undated) NOTE — LETTER
Arti Kendrick, [address]       01/17/20        Patient: Vibha Postal   YOB: 1955   Date of Visit: 1/17/2020       Dear  Dr. Sven Teran MD,      Thank you for referring Vibha Kristie to my practice.

## (undated) NOTE — LETTER
AUTHORIZATION FOR SURGICAL OPERATION OR OTHER PROCEDURE    1.  I hereby authorize Dr. Ginette Ny and the [facility] Office staff assigned to my case to perform the following operation and/or procedure at the [facility] Office:    Bilateral knee aspiration and injection with c to Patient:           []  Parent    Responsible person                          []  Spouse  In case of minor or                    [] Other  _____________   Incompetent name:  __________________________________________________

## (undated) NOTE — IP AVS SNAPSHOT
Patient Demographics     Address  [place] Phone  [phone] [facility])  [phone] (Mobile) *Preferred* E-mail Address  [email or web address]      Emergency Contact(s)     Name Relation Home Work [address] [place] [address] Spouse [phone] [facility] [address]  [phone]             Stefany Tate MD In 2 weeks.     Specialty:  INFECTIOUS DISEASES  Contact information:  [address]  [phone]                  Your medication list      TAKE these med Inject 100 mg into the vein daily. Stop taking on:  December 14, 2019   Regine Yan MD         Warfarin Sodium 2.5 MG Tabs  Commonly known as:  COUMADIN  Next dose due: Tonight       Take 1 tablet (2.5 mg total) by mouth nightly.  Or as directed   E Order ID Medication Name Action Time Action Reason Comments    [id number] enoxaparin sodium (LOVENOX) 100 MG/ML injection 90 mg 12/12/19 1245 Given              Recent Vital Signs       Most Recent Value   Vitals  117/86 Filed at 12/12/2019 0759   Pulse  7 PTT 63.0 23.2 - 35.3 seconds [facility]   Comment:         Elevations of the aPTT in patients not receiving  anticoagulant therapy (Heparin, etc.), may be  seen in Factor deficiency, vitamin K deficiency,  factor inhibitors, liver disease, etc.  Clinic Blood — 12/12/19 0340            PTT, ACTIVATED [[id number]] (Abnormal)  Resulted: 12/11/19 2117, Result status: Final result   Ordering provider:  Myles Fairbanks DO  12/11/19 1439 Resulting lab:  [facility]    Specimen Information    Type Source Collected Specimen:  Blood from Bld,Picc Line      Blood Culture Result No Growth 3 Days    Aerobic Bacterial Culture [[id number]]  (Abnormal) Collected:  12/04/19 1247    Order Status:  Completed Lab Status:  Final result Updated:  12/10/19 1059    Specimen:  Absce Anaerobic Culture [[id number]] Collected:  12/04/19 1334    Order Status:  Completed Lab Status:  Final result Updated:  12/09/19 1356    Specimen:  Abscess from Abdomen      Anaerobic Culture No Anaerobes isolated    Anaerobic Culture [[id number]] Collecte Levofloxacin <=0.12  Sensitive    Meropenem <=0.25  Sensitive    Piperacillin + Tazobactam <=4  Sensitive    Trimethoprim/Sulfa <=20  Sensitive                   Blood Culture FREQ X 2 [[id number]] Collected:  11/23/19 1515    Order Status:  Completed Lab Clostridium difficile(toxigenic)PCR Once [[id number]]  (Normal) Collected:  11/24/19 0034    Order Status:  Completed Lab Status:  Final result Updated:  11/24/19 0825    Specimen:  Stool      C.  Difficile Toxin B Gene Negative      Pending Labs     Order denies any prior h/o stroke. He denies any allergies to medications and denies any reactions to anesthesia previously. History     Past Medical History:   Diagnosis Date   • Arrhythmia     afib   • Atrial fibrillation [facility])     ablation (2004);  C celecoxib 200 MG Oral Cap, Take 1 capsule (200 mg total) by mouth daily. Sildenafil Citrate (VIAGRA) 100 MG Oral Tab, Take 1 tablet (100 mg total) by mouth daily as needed for Erectile Dysfunction.   Glucosamine-Chondroit-Vit C-Mn (GLUCOSAMINE-CHONDROITIN) abdomen; hypoactive bowel sounds   Neurological: He is alert and oriented to person, place, and time. No motor deficit. Skin: Skin is warm and dry. Psychiatric: He has a normal mood and affect.          Results:     Lab Results   Component Value Date incidental findings as above.    Dictated by (CST): Antoni Wayne MD on 11/23/2019 at 14:02     Approved by (CST): Antoni Wayne MD on 11/23/2019 at 14:18                Assessment/Plan:     Acute perforated appendicitis  CT as above with perforated jamal Filed:  12/2/2019  3:29 PM Date of Service:  12/2/2019  3:21 PM Status:  Signed    :  Kourtney Mcgee MD (Physician)     Consult Orders    1.  Consult to Physical Medicine Rehab [[id number]] ordered by Karen Killian DO at 12/02/19 1419              Consu Admitting Diagnosis: Diarrhea of infectious origin [A09]  Acute perforated appendicitis [K35.32]    Disposition: Transfer to [address] [place] [address]: Patrick Loredo    Discharge Diagnosis: . Principal Problem:    Acute perforated appendicitis  Active Problems:    Atrial -TPN completed on 12/10; pt being followed by speech therapy -- on [facility] ground solid diet with NTL, now upgraded to soft diet, thin liquids with chin tuck.   - on acetaminophen 1000mg po TID; prn IV dilaudid.   - fever to 101 on 12/8; CXR neg; urine cx neg Elevated transaminases, bilirubin, alk phos  Possible cholestasis 2/2 to zosyn (?).  LFT's normalized     Hypercholesterolemia  Cont fish oil (off zocor since 3/2019)     Varicose veins, stasis ulcer  Has seen vascular surgeon, Dr. Monica Gomez, at [facility] in the pa Take 1 tablet (5 mg total) by mouth every 4 (four) hours as needed. Home Meds - Modified    Warfarin Sodium 2.5 MG Oral Tab  Take 1 tablet (2.5 mg total) by mouth nightly.  Or as directed      Home Meds - Unchanged    Pantoprazole Sodium 40 MG Oral Tab Low grade fever up to 101F is normal after surgery. Severe swelling, fever > 101.5, redness or drainage from wound should be reported to your surgeon. Call the office number during and after hours if needed.     Follow-up:      Call the office at [phone] improve control and mobility. Pt demo improved rt knee control during gait. .  The patient's Approx Degree of Impairment: 50.57% has been calculated based on documentation in the [facility] '6 clicks' Inpatient Basic Mobility Short Form.   Research supports th AM-PAC Score:  Raw Score: 17   Approx Degree of Impairment: 50.57%   Standardized Score (AM-PAC Scale): 42.13   CMS Modifier (G-Code): CK    FUNCTIONAL ABILITY STATUS  Gait Assessment   Gait Assistance: Minimum assistance  Distance (ft): 150' x 1  Assistiv :  Julien Gómez PT (Physical Therapist)       PHYSICAL THERAPY TREATMENT NOTE - INPATIENT     Room Number: 347/347-A       Presenting Problem: acute perforate appendicitis; s/p abscess drainage; CVA (Acute infacrt L parietal & occipital lobe w/ may benefit from Acute Rehab to optimize functional outcomes . DISCHARGE RECOMMENDATIONS  PT Discharge Recommendations: 24 hour care/supervision;Acute rehabilitation     PLAN  PT Treatment Plan: Bed mobility; Endurance; Energy conservation;Patient educati Stoop/Curb Assistance: Not tested           THERAPEUTIC EXERCISES  Lower Extremity Alternating marching  Ankle pumps  Heel raises  Knee extension     Position Sitting       Patient End of Session: Up in chair;Needs met; All patient questions and concerns ad laparocopic drainage of small abscess & appendectomy[SL. 2]    Problem List[SL. 1]  Principal Problem:    Acute perforated appendicitis  Active Problems:    Atrial fibrillation (HCC)    Other hyperlipidemia    Benign essential HTN    Obstructive sleep apnea PT Treatment Plan: Bed mobility; Body mechanics; Coordination; Endurance; Energy conservation;Patient education; Family education;Gait training;Strengthening;Transfer training;Stair training;Balance training;Neuromuscular re-educate[SL. 2]    SUBJECTIVE  \"I fee CMS Modifier (G-Code): CL[SL.2]    FUNCTIONAL ABILITY STATUS  Gait Assessment[SL.1]   Gait Assistance:  Moderate assistance  Distance (ft): 30 ft   Assistive Device: (SBQC on left side )  Pattern: R Steppage;L Steppage;R Foot flat;L Foot flat  Stoop/Curb As Filed:  12/11/2019 11:32 AM Date of Service:  12/11/2019 11:06 AM Status:  Signed    :  Bolivar Mauro (Occupational Therapy Assistant)       OCCUPATIONAL THERAPY TREATMENT NOTE - INPATIENT        Room Number: 347/347-A           Presenting Problem OT Device Recommendations: Grab bars; Shower chair     PLAN  OT Treatment Plan: Balance activities; ADL training;Functional transfer training;UE strengthening/ROM    SUBJECTIVE[BA.1]  Pt seen bedside and agreeable for OT session[BA. 2]    OBJECTIVE  Precautio Upper Extremity Dressing:[BA.1] CGA[BA. 2]  Lower Extremity Dressing:[BA.1] NT[BA.2]    Education Provided:[BA.1] role of OT, bed mobility, standing endurance, safety with transfers with RW, encouragement of use of R hand with all tasks, hand sponge issued isolated R shoulder, elbow and wrist mobility. R shoulder and elbow 3/5. R wrist extension 3-/5. Pt produced a weak grasp with facilitation. Pt able to maintain R hand on the walker inconsistently, requiring assist during ambulation.  Pt with impaired sensa -   Putting on and taking off regular lower body clothing?: A Lot  -   Bathing (including washing, rinsing, drying)?: A Lot  -   Toileting, which includes using toilet, bedpan or urinal? : A Little  -   Putting on and taking off regular upper body clothing Therapist    Filed:  12/9/2019  4:40 PM Date of Service:  12/9/2019  3:55 PM Status:  Signed    :  Crys Amezquita OT (Occupational Therapist)       OCCUPATIONAL THERAPY TREATMENT NOTE - INPATIENT        Room Number: 347/347-A[MS.1]           Presenting Pt noted with multiple deficits, weakness in R side of body UE and LE, minimal use of R hand at this time, decreased activity tolerance, decreased balance, decreased safety awareness, which impacts his performance in self care and transfer skills.  Recommen Approx Degree of Impairment: 59.67%  Standardized Score (AM-PAC Scale): 33.39  CMS Modifier (G-Code): CK[MS. 2]    FUNCTIONAL TRANSFER ASSESSMENT[MS.1]  Supine to Sit : Moderate assistance(x2)  Sit to Stand:  Moderate assistance(x2)[MS.2]  Toilet Transfer: m SPEECH DAILY NOTE - INPATIENT    ASSESSMENT & PLAN   ASSESSMENT  Received call from PA requesting follow up for possible upgrade to thin liquids prior to d/c to [facility] today. Pt was advanced to soft diet yesterday and remained on nectar thick liquids. Goal changed, see new goal below   Goal #2 The patient/family/caregiver will demonstrate understanding and implementation of aspiration precautions and swallow strategies independently over 2-3 session(s).     Reviewed swallowing precautions/strategies wit Goal #7 The patient will tolerate trial upgrade of soft solid consistency and thin liquids without overt signs or symptoms of aspiration with 90 % accuracy over 3 session(s).     New goal     FOLLOW UP[MF.1]  Follow Up Needed: Yes  SLP Follow-up Date: 12/13

## (undated) NOTE — LETTER
AUTHORIZATION FOR SURGICAL OPERATION OR OTHER PROCEDURE    1. I hereby authorize Dr. Yessica Cain and the [facility] Office staff assigned to my case to perform the following operation and/or procedure at the [facility] Office:    Bilateral knee aspiration and injection with corticosteroid     2.  My physician has explained the nature and purpose of the operation or other procedure, possible alternative methods of treatment, the risks involved, and the possibility of complication to me.  I acknowledge that no guarantee has been made as to the result that may be obtained.  3.  I recognize that, during the course of this operation, or other procedure, unforseen conditions may necessitate additional or different procedure than those listed above.  I, therefore, further authorize and request that the above named physician, his/her physician assistants or designees perform such procedures as are, in his/her professional opinion, necessary and desirable.  4.  Any tissue or organs removed in the operation or other procedure may be disposed of by and at the discretion of the [facility] Office staff and [facility].  5.  I understand that in the event of a medical emergency, I will be transported by local paramedics to [facility] or other hospital emergency department.  6.  I certify that I have read and fully understand the above consent to operation and/or other procedure.    7.  I acknowledge that my physician has explained sedation/analgesia administration to me including the risks and benefits.  I consent to the administration of sedation/analgesia as may be necessary or desirable in the judgement of my physician.    Witness signature: ___________________________________________________ Date:  ______/______/_____                    Time:  ________ A.M.  P.M.       Patient Name:  ______________________________________________________  (please print)       Patient signature:   ___________________________________________________             Relationship to Patient:           []  Parent    Responsible person                          []  Spouse  In case of minor or                    [] Other  _____________   Incompetent name:  __________________________________________________                               (please print)      _____________      Responsible person  In case of minor or  Incompetent signature:  _______________________________________________    Statement of Physician  My signature below affirms that prior to the time of the procedure, I have explained to the patient and/or his/her guardian, the risks and benefits involved in the proposed treatment and any reasonable alternative to the proposed treatment.  I have also explained the risks and benefits involved in the refusal of the proposed treatment and have answered the patient's questions.                        Date:  ______/______/_______  Provider                      Signature:  __________________________________________________________       Time:  ___________ A.M    P.M.

## (undated) NOTE — LETTER
Date: 11/10/2023  Patient name: Carolina Navarrete  YOB: 1955  Medical Record Number: [id number]  Primary Coverage: Payor: MEDICARE / Plan: MEDICARE PART A&B / Product Type: *No Product type* /   Secondary Coverage: Brandon Wang ID: [id number]  Patient Address: [place] [id number]  Telephone Information:   Home Phone [phone]   Mobile [phone]         Encounter Date: 11/10/2023  Provider: Josie Lee MD  Diagnosis: No diagnosis found. Progress Note:  Patient ID: Carolina Navarrete is a 70year old male. Debridement Venous Ulcer Right;Medial Ankle   Wound 10/06/23 1 Ankle Right;Medial    Performed by: Nicolás Figueroa MD  Authorized by: Nicolás Figueroa MD      Consent   Consent obtained? verbal  Consent given by: patient  Time out called at 11/10/2023 11:41 AM  Immediately prior to the procedure a time out was called and the performing provider verified the correct patient, procedure, equipment, support staff, and site/side marked as required. Debridement Details  Performed by: physician  Debridement type: conservative sharp    Pre-debridement measurements  Length (cm): 0.7  Width (cm): 0.6  Depth (cm): 0.1  Surface Area (cm^2): 0.42    Post-debridement measurements  Length (cm): 1  Width (cm): 0.4  Depth (cm): 0.1  Percent debrided: 100%  Surface Area (cm^2): 0.4  Area Debrided (cm^2): 0.4  Volume (cm^3): 0.04    Tissue and other material debrided: epidermis  Devitalized tissue debrided: callus  Instrument(s) utilized: forceps and curette  Bleeding: none  Hemostasis obtained with: not applicable  Procedural pain (0-10): 0  Post-procedural pain: 0   Response to treatment: procedure was tolerated well              Patient ID: Carolina Navarrete is a 70year old male.     Debridement Venous Ulcer Left;Medial Leg   Wound 10/06/23 2 Leg Left;Medial    Performed by: Nicolás Figueroa MD  Authorized by: Nicolás Figueroa MD      Consent Consent obtained? verbal  Consent given by: patient  Risks discussed? procedural risks discussed  Time out called at 11/10/2023 11:45 AM  Immediately prior to the procedure a time out was called and the performing provider verified the correct patient, procedure, equipment, support staff, and site/side marked as required. Debridement Details  Performed by: physician  Debridement type: surgical  Level of debridement: subcutaneous tissue  Pain control: none    Pre-debridement measurements  Length (cm): 0.1  Width (cm): 0.1  Depth (cm): 0.1  Surface Area (cm^2): 0.01    Post-debridement measurements  Length (cm): 0.1  Width (cm): 0.1  Depth (cm): 0.1  Percent debrided: 100%  Surface Area (cm^2): 0.01  Area Debrided (cm^2): 0.01  Volume (cm^3): 0    Tissue and other material debrided: epidermis  Devitalized tissue debrided: callus  Instrument(s) utilized: curette and forceps  Bleeding: none  Hemostasis obtained with: not applicable  Procedural pain (0-10): 0  Post-procedural pain: 0   Response to treatment: procedure was tolerated well              Wound Treatment Orders:  No orders of the defined types were placed in this encounter. Compression Stockings ordered: Yes, Product: [employer] HD for both legs with foot piece attachment.  Frequency: Daily use for 90 days      Calf  Point of Measurement - Left Calf: 36  Point of Measurement - Right Calf: 36  Left Calf from[de-identified] Heel  Calf Left cm[de-identified] 44  Right Calf from[de-identified] Heel  Right Calf cm[de-identified] 39.5     Ankle  Point of Measurement - Left Ankle: 10  Point of Measurement - Right Ankle: 10  Left Ankle from[de-identified] Heel  Left Ankle cm[de-identified] 25  Right Ankle from[de-identified] Heel  Right Ankle cm[de-identified] 24     Foot  Point of Measurement - Left Foot: 10  Left Foot from[de-identified] Great toe  Left Foot cm[de-identified] 26.5  Point of Measurement - Right Foot: 10  Right Foot from[de-identified] Great toe  Right Foot cm[de-identified] 27        Heel to Knee  Left Heel to Knee: 45  Right Heel to Knee: 45     Knee to Thigh           Notes: Please call patient with out of pocket cost.        11/10/2023  NPI [id number]

## (undated) NOTE — LETTER
AUTHORIZATION FOR SURGICAL OPERATION OR OTHER PROCEDURE    1.  I hereby authorize Dr. Laura Lewis and the [facility] staff assigned to my case to perform the following operation and/or procedure at the [facility]:      Bilateral knee aspiration and injection with Relationship to Patient:           []  Parent    Responsible person                          []  Spouse  In case of minor or                    [] Other  _____________   Incompetent name:  __________________________________________________

## (undated) NOTE — LETTER
Nia Bates [address] [address]  [place], [address] [facility]    Consent for Operation  Date: __________________                                Time: _______________    1.  I authorize the performance upon Tran Davidson the following operation:  Procedure( procedure has been videotaped, the surgeon will obtain the original videotape. The hospital will not be responsible for storage or maintenance of this tape.   7. For the purpose of advancing medical education, I consent to the admittance of observers to the STATEMENTS REQUIRING INSERTION OR COMPLETION WERE FILLED IN.     Signature of Patient:   ___________________________    When the patient is a minor or mentally incompetent to give consent:  Signature of person authorized to consent for patient: ____________ drugs/illegal medications). Failure to inform my anesthesiologist about these medicines may increase my risk of anesthetic complications. iv. If I am allergic to anything or have had a reaction to anesthesia before.   3. I understand how the anesthesia med I have discussed the procedure and information above with the patient (or patient’s representative) and answered their questions. The patient or their representative has agreed to have anesthesia services.     _______________________________________________

## (undated) NOTE — LETTER
CRISTELA Notifier: [employer]   [employer]. Patient Name: Negrita Osei. Identification Number: [id number]      Advance Beneficiary Notice of Noncoverage (ABN)  NOTE:  If Medicare doesn’t pay for D. Item/service(s) below, you may have to pay.   Medicare garcia not responsible for payment, and I cannot appeal to see if Medicare would pay. H. Additional Information: This notice gives our opinion, not an official Medicare decision.  If you have other questions on this notice or Medicare billing, call [phone]

## (undated) NOTE — LETTER
[facility]  Administration of Anesthesia  1. I, China Swanson, or _________________________________ acting on his behalf, (Patient) (Dependent/Representative) request to receive anesthesia for my pending procedure/operation/treatment.   A infections, high spinal block, spinal bleeding, seizure, cardiac arrest and death. 7. AWARENESS: I understand that it is possible (but unlikely) to have explicit memory of events from the operating room while under general anesthesia.   8. ELECTROCONVULSIV unconscious pt /Relationship    My signature below affirms that prior to the time of the procedure, I have explained to the patient and/or his/her guardian, the risks and benefits of undergoing anesthesia, as well as any reasonable alternatives.     _______

## (undated) NOTE — Clinical Note
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 He is independent with compression and will hopefully remain healed for some time. Please have pt return to [facility] in the future when recurrent venous ulcers may return.

## (undated) NOTE — LETTER
Date: 9/30/2021  Patient name: Janes Crawford  YOB: 1955  Medical Record Number: [id number]  Coverage: Payor: MEDICARE / Plan: MEDICARE PART A&B / Product Type: *No Product type* /   Insurance ID: [id number]  Patient Address: [place] [address] Healing Non-healing Non-healing       Inactive Orders   Date Order Priority Status Authorizing Provider   09/20/21 1018 OP WOUND DRESSING Routine Completed JHONATAN Luevano     - Dressing:    Silver alginate     - Dressing:    Sensi-care/zinc     - Dres - Additional Wound Dressing Information:    vaseline to lower legs     - Cleansing:    Cleanse with normal saline or wound cleanser     - Frequency:    Change dressing weekly       Compression Wrap 09/20/21 Leg Anterior; Right (Active)   Placement Date Calf: 35  Left Calf from[de-identified] Heel  Calf Left cm[de-identified] 40  Right Calf from[de-identified] Heel  Right Calf cm[de-identified] 40    Ankle  Point of Measurement - Left Ankle: 10  Point of Measurement - Right Ankle: 10  Left Ankle from[de-identified] Heel  Left Ankle cm[de-identified] 25     Right Ankle from[de-identified] Heel